# Patient Record
Sex: MALE | Race: WHITE | Employment: OTHER | URBAN - METROPOLITAN AREA
[De-identification: names, ages, dates, MRNs, and addresses within clinical notes are randomized per-mention and may not be internally consistent; named-entity substitution may affect disease eponyms.]

---

## 2017-01-01 PROCEDURE — G9678 ONCOLOGY CARE MODEL SERVICE: HCPCS | Performed by: INTERNAL MEDICINE

## 2017-01-03 ENCOUNTER — APPOINTMENT (OUTPATIENT)
Dept: RADIATION ONCOLOGY | Facility: HOSPITAL | Age: 72
End: 2017-01-03
Attending: RADIOLOGY
Payer: MEDICARE

## 2017-01-04 ENCOUNTER — HOSPITAL ENCOUNTER (OUTPATIENT)
Dept: INTERVENTIONAL RADIOLOGY/VASCULAR | Facility: HOSPITAL | Age: 72
Discharge: HOME | End: 2017-01-04
Attending: INTERNAL MEDICINE | Admitting: INTERNAL MEDICINE
Payer: MEDICARE

## 2017-01-04 DIAGNOSIS — R59.9 LYMPH NODE ENLARGEMENT: ICD-10-CM

## 2017-01-04 PROCEDURE — 88173 CYTOPATH EVAL FNA REPORT: CPT | Performed by: INTERNAL MEDICINE

## 2017-01-04 PROCEDURE — 88172 CYTP DX EVAL FNA 1ST EA SITE: CPT | Performed by: INTERNAL MEDICINE

## 2017-01-04 PROCEDURE — 38505 NEEDLE BIOPSY LYMPH NODES: CPT

## 2017-01-04 PROCEDURE — 10022 HC FINE NEEDLE ASPIR W IMAGING: CPT

## 2017-01-04 PROCEDURE — 88374 M/PHMTRC ALYS ISHQUANT/SEMIQ: CPT

## 2017-01-04 PROCEDURE — 88341 IMHCHEM/IMCYTCHM EA ADD ANTB: CPT | Performed by: INTERNAL MEDICINE

## 2017-01-04 PROCEDURE — 88305 TISSUE EXAM BY PATHOLOGIST: CPT | Performed by: INTERNAL MEDICINE

## 2017-01-04 PROCEDURE — 07923ZX DRAINAGE OF LEFT NECK LYMPHATIC, PERCUTANEOUS APPROACH, DIAGNOSTIC: ICD-10-PCS | Performed by: RADIOLOGY

## 2017-01-04 PROCEDURE — 07B23ZX EXCISION OF LEFT NECK LYMPHATIC, PERCUTANEOUS APPROACH, DIAGNOSTIC: ICD-10-PCS | Performed by: RADIOLOGY

## 2017-01-04 PROCEDURE — 88360 TUMOR IMMUNOHISTOCHEM/MANUAL: CPT

## 2017-01-04 PROCEDURE — 76942 ECHO GUIDE FOR BIOPSY: CPT

## 2017-01-04 PROCEDURE — 88363 XM ARCHIVE TISSUE MOLEC ANAL: CPT | Performed by: INTERNAL MEDICINE

## 2017-01-04 PROCEDURE — 88342 IMHCHEM/IMCYTCHM 1ST ANTB: CPT | Performed by: INTERNAL MEDICINE

## 2017-01-04 PROCEDURE — 81235 EGFR GENE COM VARIANTS: CPT

## 2017-01-04 NOTE — PROCEDURES
[facility]  Procedure Note    [address] [facility] Patient Status:  Outpatient    1945 MRN [id number]   Location [facility] Attending Xavier Moran MD   Hosp Day # 0 PCP Justyna Owens MD     Procedu

## 2017-01-05 ENCOUNTER — TELEPHONE (OUTPATIENT)
Dept: PULMONOLOGY | Facility: CLINIC | Age: 72
End: 2017-01-05

## 2017-01-06 ENCOUNTER — NURSE NAVIGATOR ENCOUNTER (OUTPATIENT)
Dept: HEMATOLOGY/ONCOLOGY | Facility: HOSPITAL | Age: 72
End: 2017-01-06

## 2017-01-06 DIAGNOSIS — C34.90 LUNG CANCER (HCC): Primary | ICD-10-CM

## 2017-01-06 NOTE — PROGRESS NOTES
Introduced myself as Nurse Navigator to Conrado Geller and his wife and explained my role in Luis's care. Role of NN explained as providing:  Guidance to healthcare services to ensure continuity and coordination of care.   Education about your diagnosis, mark

## 2017-01-08 ENCOUNTER — HOSPITAL ENCOUNTER (OUTPATIENT)
Dept: MRI IMAGING | Facility: HOSPITAL | Age: 72
Discharge: HOME | End: 2017-01-08
Attending: INTERNAL MEDICINE
Payer: MEDICARE

## 2017-01-08 ENCOUNTER — PATIENT MESSAGE (OUTPATIENT)
Dept: PULMONOLOGY | Facility: CLINIC | Age: 72
End: 2017-01-08

## 2017-01-08 DIAGNOSIS — C34.90 LUNG CANCER (HCC): ICD-10-CM

## 2017-01-08 PROCEDURE — 70553 MRI BRAIN STEM W/O & W/DYE: CPT

## 2017-01-08 PROCEDURE — A9575 INJ GADOTERATE MEGLUMI 0.1ML: HCPCS | Performed by: INTERNAL MEDICINE

## 2017-01-09 ENCOUNTER — OFFICE VISIT (OUTPATIENT)
Dept: HEMATOLOGY/ONCOLOGY | Facility: HOSPITAL | Age: 72
End: 2017-01-09
Attending: INTERNAL MEDICINE
Payer: MEDICARE

## 2017-01-09 VITALS
HEART RATE: 74 BPM | RESPIRATION RATE: 16 BRPM | DIASTOLIC BLOOD PRESSURE: 88 MMHG | TEMPERATURE: 98 F | HEIGHT: 71 IN | BODY MASS INDEX: 26.04 KG/M2 | SYSTOLIC BLOOD PRESSURE: 144 MMHG | WEIGHT: 186 LBS

## 2017-01-09 DIAGNOSIS — C34.12 CANCER OF UPPER LOBE OF LEFT LUNG (HCC): Primary | ICD-10-CM

## 2017-01-09 PROCEDURE — G0463 HOSPITAL OUTPT CLINIC VISIT: HCPCS | Performed by: INTERNAL MEDICINE

## 2017-01-09 PROCEDURE — 99205 OFFICE O/P NEW HI 60 MIN: CPT | Performed by: INTERNAL MEDICINE

## 2017-01-09 NOTE — CONSULTS
[facility]    PATIENT'S NAME: Keesha Hernandez   CONSULTING PHYSICIAN: Sachin Martinez.  Maegan Ballard MD   PATIENT ACCOUNT #: [de-identified] LOCATION: [address] [place] [address] RECORD #: [id number] YOB: 1945   CONSULTATION DATE: 01/09/2017       CANCER C HISTORY:  Chronic sinusitis, pulmonary nodules, as noted above, hemorrhoids. MEDICATIONS:  None. ALLERGIES:  Dextromethorphan, guaifenesin, ibuprofen (causing itching), phenylephrine (causing itching), pseudoephedrine (causing itching).     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   The patient's performance status is excellent, and we will recommend concurrent chemoradiotherapy using ci

## 2017-01-09 NOTE — TELEPHONE ENCOUNTER
Dr. Sandra Stauffer spoke to pt and family re: bx results, orders MRI of brain and referral to oncology.

## 2017-01-09 NOTE — TELEPHONE ENCOUNTER
From: Natalie Warren  To: Elizabeth Staton MD  Sent: 1/8/2017 10:42 AM CST  Subject: Non-Urgent Medical Question    Update medical procedures:    Fluzone High-Dose 2016-17 0.5ml Syr received 10/6/2016    Colonoscopy done 11/19/2014

## 2017-01-10 ENCOUNTER — TELEPHONE (OUTPATIENT)
Dept: PULMONOLOGY | Facility: CLINIC | Age: 72
End: 2017-01-10

## 2017-01-11 NOTE — TELEPHONE ENCOUNTER
I informed the patient and his wife that the MRI of the brain showed no evidence of metastatic disease.

## 2017-01-12 ENCOUNTER — SOCIAL WORK SERVICES (OUTPATIENT)
Dept: HEMATOLOGY/ONCOLOGY | Facility: HOSPITAL | Age: 72
End: 2017-01-12

## 2017-01-12 ENCOUNTER — NURSE ONLY (OUTPATIENT)
Dept: HEMATOLOGY/ONCOLOGY | Facility: HOSPITAL | Age: 72
End: 2017-01-12
Attending: INTERNAL MEDICINE
Payer: MEDICARE

## 2017-01-12 ENCOUNTER — TELEPHONE (OUTPATIENT)
Dept: HEMATOLOGY/ONCOLOGY | Facility: HOSPITAL | Age: 72
End: 2017-01-12

## 2017-01-12 ENCOUNTER — LAB ENCOUNTER (OUTPATIENT)
Dept: LAB | Facility: HOSPITAL | Age: 72
End: 2017-01-12
Attending: INTERNAL MEDICINE
Payer: MEDICARE

## 2017-01-12 DIAGNOSIS — C34.12 CANCER OF UPPER LOBE OF LEFT LUNG (HCC): Primary | ICD-10-CM

## 2017-01-12 DIAGNOSIS — C34.90 LUNG CANCER (HCC): ICD-10-CM

## 2017-01-12 LAB
ALBUMIN SERPL BCP-MCNC: 4 G/DL (ref 3.5–4.8)
ALBUMIN/GLOB SERPL: 1.5 {RATIO} (ref 1–2)
ALP SERPL-CCNC: 57 U/L (ref 32–100)
ALT SERPL-CCNC: 19 U/L (ref 17–63)
ANION GAP SERPL CALC-SCNC: 8 MMOL/L (ref 0–18)
AST SERPL-CCNC: 24 U/L (ref 15–41)
BASOPHILS # BLD: 0 K/UL (ref 0–0.2)
BASOPHILS NFR BLD: 1 %
BILIRUB SERPL-MCNC: 1.1 MG/DL (ref 0.3–1.2)
BUN SERPL-MCNC: 20 MG/DL (ref 8–20)
BUN/CREAT SERPL: 19.2 (ref 10–20)
CALCIUM SERPL-MCNC: 8.9 MG/DL (ref 8.5–10.5)
CHLORIDE SERPL-SCNC: 101 MMOL/L (ref 95–110)
CO2 SERPL-SCNC: 28 MMOL/L (ref 22–32)
CREAT SERPL-MCNC: 1.04 MG/DL (ref 0.5–1.5)
EOSINOPHIL # BLD: 0.1 K/UL (ref 0–0.7)
EOSINOPHIL NFR BLD: 2 %
ERYTHROCYTE [DISTWIDTH] IN BLOOD BY AUTOMATED COUNT: 13.2 % (ref 11–15)
GLOBULIN PLAS-MCNC: 2.7 G/DL (ref 2.5–3.7)
GLUCOSE SERPL-MCNC: 102 MG/DL (ref 70–99)
HCT VFR BLD AUTO: 44.9 % (ref 41–52)
HGB BLD-MCNC: 15.1 G/DL (ref 13.5–17.5)
LYMPHOCYTES # BLD: 1.4 K/UL (ref 1–4)
LYMPHOCYTES NFR BLD: 30 %
MCH RBC QN AUTO: 33.4 PG (ref 27–32)
MCHC RBC AUTO-ENTMCNC: 33.7 G/DL (ref 32–37)
MCV RBC AUTO: 99 FL (ref 80–100)
MONOCYTES # BLD: 0.5 K/UL (ref 0–1)
MONOCYTES NFR BLD: 11 %
NEUTROPHILS # BLD AUTO: 2.7 K/UL (ref 1.8–7.7)
NEUTROPHILS NFR BLD: 57 %
OSMOLALITY UR CALC.SUM OF ELEC: 287 MOSM/KG (ref 275–295)
PLATELET # BLD AUTO: 199 K/UL (ref 140–400)
PMV BLD AUTO: 10.2 FL (ref 7.4–10.3)
POTASSIUM SERPL-SCNC: 4.3 MMOL/L (ref 3.3–5.1)
PROT SERPL-MCNC: 6.7 G/DL (ref 5.9–8.4)
RBC # BLD AUTO: 4.54 M/UL (ref 4.5–5.9)
SODIUM SERPL-SCNC: 137 MMOL/L (ref 136–144)
WBC # BLD AUTO: 4.7 K/UL (ref 4–11)

## 2017-01-12 PROCEDURE — G0463 HOSPITAL OUTPT CLINIC VISIT: HCPCS | Performed by: INTERNAL MEDICINE

## 2017-01-12 PROCEDURE — 36415 COLL VENOUS BLD VENIPUNCTURE: CPT

## 2017-01-12 PROCEDURE — 80053 COMPREHEN METABOLIC PANEL: CPT

## 2017-01-12 PROCEDURE — 85025 COMPLETE CBC W/AUTO DIFF WBC: CPT

## 2017-01-12 RX ORDER — PROCHLORPERAZINE MALEATE 10 MG
10 TABLET ORAL EVERY 6 HOURS PRN
Qty: 30 TABLET | Refills: 3 | Status: SHIPPED | OUTPATIENT
Start: 2017-01-12 | End: 2017-04-20

## 2017-01-12 RX ORDER — DEXAMETHASONE 4 MG/1
8 TABLET ORAL DAILY
Qty: 6 TABLET | Refills: 3 | Status: SHIPPED | OUTPATIENT
Start: 2017-01-12 | End: 2017-01-13

## 2017-01-12 RX ORDER — OLANZAPINE 5 MG/1
5 TABLET ORAL NIGHTLY
Qty: 16 TABLET | Refills: 0 | Status: SHIPPED | OUTPATIENT
Start: 2017-01-12 | End: 2017-10-20

## 2017-01-12 RX ORDER — ONDANSETRON HYDROCHLORIDE 8 MG/1
8 TABLET, FILM COATED ORAL EVERY 8 HOURS PRN
Qty: 30 TABLET | Refills: 3 | Status: SHIPPED | OUTPATIENT
Start: 2017-01-12 | End: 2017-10-20

## 2017-01-12 NOTE — TELEPHONE ENCOUNTER
Returned call to [facility], they state label directions on decadron need to be re-entered. They said they will be cancelling the current decadron order and asked for MD to re-order it with clarification on label directions.

## 2017-01-12 NOTE — PROGRESS NOTES
Chemotherapy Education    Learner:  Patient, Spouse and Family Member    Barriers / Limitations:  None    Chemotherapy education goals:  · Learn the drug names  · Administration schedule  · Routes of administration  · Treatment setting: if treatment is rec shortness of breath, extremity swelling, palpitations or dizziness develops: Achieved    Comments:    Treatment Effects on Neurological System:    Potential numbness / tingling in hands or feet:  Achieved    Notify MD/RN of any numbness / tingling at next detail    Outcome:  Needs reinforcement     Comments:  Verbalize understanding but may need reinforcement of key concepts along the way. Patient encouraged to call for additional questions.        Patient Education Acknowledgement:  Patient states Yina

## 2017-01-12 NOTE — PROGRESS NOTES
2017  Luis Villasenor  :  1945  [facility]      Met with Luis and family member: spouse and son and presented the patient with the approximate cost of chemotherapy drugs ordered by the oncologist.  The patient wa

## 2017-01-13 ENCOUNTER — TELEPHONE (OUTPATIENT)
Dept: HEMATOLOGY/ONCOLOGY | Facility: HOSPITAL | Age: 72
End: 2017-01-13

## 2017-01-13 ENCOUNTER — OFFICE VISIT (OUTPATIENT)
Dept: RADIATION ONCOLOGY | Facility: HOSPITAL | Age: 72
End: 2017-01-13
Attending: RADIOLOGY
Payer: MEDICARE

## 2017-01-13 VITALS
DIASTOLIC BLOOD PRESSURE: 67 MMHG | HEIGHT: 71 IN | RESPIRATION RATE: 16 BRPM | WEIGHT: 186 LBS | SYSTOLIC BLOOD PRESSURE: 134 MMHG | BODY MASS INDEX: 26.04 KG/M2 | HEART RATE: 68 BPM

## 2017-01-13 DIAGNOSIS — C34.12 CANCER OF UPPER LOBE OF LEFT LUNG (HCC): Primary | ICD-10-CM

## 2017-01-13 PROCEDURE — 99212 OFFICE O/P EST SF 10 MIN: CPT

## 2017-01-13 RX ORDER — OLANZAPINE 5 MG/1
TABLET ORAL
Qty: 90 TABLET | Refills: 0 | OUTPATIENT
Start: 2017-01-13

## 2017-01-13 NOTE — PROGRESS NOTES
Nursing Consultation Note  Patient: Faustino Calderon  YOB: 1945  Age: 66year old  Radiation Oncologist: Dr. Evelyn Armstrong  Referring Physician: No ref. provider found  Diagnosis:No diagnosis found.   Consult Date: 1/13/2017      Chemot tablet Rfl: 3   Ondansetron HCl (ZOFRAN) 8 MG tablet Take 1 tablet (8 mg total) by mouth every 8 (eight) hours as needed for Nausea. Disp: 30 tablet Rfl: 3   dexamethasone (DECADRON) 4 MG tablet Take 2 tablets (8 mg total) by mouth daily.  Take two tablets Patient HAS advnaced directives and a copy has been requested.   Transportation:  Adequate transportation available for expected visits    Pain Assessment:  Describe in your own words what your pain feels like:  none  Present level of pain:  0      Pt seen

## 2017-01-13 NOTE — TELEPHONE ENCOUNTER
Patient's wife has a question for [facility] about genetic testing. Please call Brianna Horn at her mobile phone #[phone].  AS.

## 2017-01-13 NOTE — PROGRESS NOTES
Fatigue Plan Of Care:    Problem:  Fatigue    Problems related to:    Side effect of therapy    Interventions:  Discuss methods to balance rest and activity  Promote excercise within patient's capability  Encourage adequate nutrition and hydration    Expec

## 2017-01-13 NOTE — PROGRESS NOTES
Knowledge Deficit Plan Of Care:    Problem:  Knowledge Deficit    Problems related to:    Radiation therapy    Interventions:  Assess patient knowledge level  Assess knowledge needs  Instruct on treatment planning  Instruct on radiation therapy appointment

## 2017-01-13 NOTE — TELEPHONE ENCOUNTER
Returned Aster's call, I let her know that Dr Max Ha does not recommend genetic testing for patient based on his daughter's MTHFR mutation.   I also then spoke to [place] to inform them of clarification on dexamethasone script: Per Dr Max Ha patient should ta

## 2017-01-13 NOTE — PROGRESS NOTES
Primary language:  English  Language line required?  no  Comprehension Ability:  good  Able to read?  yes  Able to write? yes  Communication tools:  na  Patient's ability to learn:  good  Readiness to learn:   Motivated  Learning preferences:  Discussion,

## 2017-01-13 NOTE — CONSULTS
[facility]    PATIENT'S NAME: Teodora Alba   RADIATION ONCOLOGIST: Micheal Mcmanus.  Chloé Ambriz MD   PATIENT ACCOUNT #: [de-identified] LOCATION: [address] [place] [address] RECORD #: [id number] YOB: 1945   CONSULTATION DATE: 01/13/2017       NHAN He completed his staging with an MRI of the brain on 01/08/2017, and this revealed no evidence of metastases.   The patient then saw Dr. Kirsten Rodas who recommended concomitant chemoradiotherapy for his stage IIIB lung cancer and is planning to deliver cisplatin a and rhythm. Normal S1, S2. No audible murmurs. LYMPHATICS:  There is no supraclavicular, axillary, or inguinal lymphadenopathy. ABDOMEN:  Soft, nontender, nondistended with normoactive bowel sounds and no hepatosplenomegaly.   EXTREMITIES:  Without cl months. I do not expect any long-term or permanent side effects as a result of his treatment. Because I am not likely to irradiate a very large amount of his lung, his risk for radiation pneumonitis should be fairly small.   Still, the risk is real and we

## 2017-01-16 ENCOUNTER — TELEPHONE (OUTPATIENT)
Dept: HEMATOLOGY/ONCOLOGY | Facility: HOSPITAL | Age: 72
End: 2017-01-16

## 2017-01-16 NOTE — TELEPHONE ENCOUNTER
Returned Alonso's call, she had some general questions about the chemo schedule which I answered for her. She has additional questions for Dr Kirsten Rodas regarding genetic testing and specific supplements her dad should take.   I let her know that I would update

## 2017-01-16 NOTE — TELEPHONE ENCOUNTER
PLEASE CALL PT'S DAUGHTER DUNG -[phone] REGARDING QUESTIONS ABOUT PT'S TREATMENTS AND PLAN OF TREATMENT-V

## 2017-01-17 ENCOUNTER — NURSE ONLY (OUTPATIENT)
Dept: HEMATOLOGY/ONCOLOGY | Facility: HOSPITAL | Age: 72
End: 2017-01-17

## 2017-01-17 ENCOUNTER — TELEPHONE (OUTPATIENT)
Dept: HEMATOLOGY/ONCOLOGY | Facility: HOSPITAL | Age: 72
End: 2017-01-17

## 2017-01-17 PROCEDURE — 77399 UNLISTED PX MED RADJ PHYSICS: CPT | Performed by: RADIOLOGY

## 2017-01-17 PROCEDURE — 77332 RADIATION TREATMENT AID(S): CPT | Performed by: RADIOLOGY

## 2017-01-17 PROCEDURE — 77334 RADIATION TREATMENT AID(S): CPT | Performed by: RADIOLOGY

## 2017-01-17 RX ORDER — ASCORBIC ACID 500 MG
500 TABLET ORAL DAILY
COMMUNITY

## 2017-01-17 RX ORDER — MELATONIN
1000 DAILY
COMMUNITY
End: 2020-08-12 | Stop reason: ALTCHOICE

## 2017-01-17 NOTE — TELEPHONE ENCOUNTER
Anthony Manzo said she missed a call from here and she was thinking it could have been Laneia since she was going to get back with her about the testing that they wanted but it not recommending. She is looking for the reasoning. Please call when possible.  Thanks

## 2017-01-19 PROCEDURE — 77338 DESIGN MLC DEVICE FOR IMRT: CPT | Performed by: RADIOLOGY

## 2017-01-19 PROCEDURE — 77293 RESPIRATOR MOTION MGMT SIMUL: CPT | Performed by: RADIOLOGY

## 2017-01-19 PROCEDURE — 77301 RADIOTHERAPY DOSE PLAN IMRT: CPT | Performed by: RADIOLOGY

## 2017-01-19 PROCEDURE — 77300 RADIATION THERAPY DOSE PLAN: CPT | Performed by: RADIOLOGY

## 2017-01-20 ENCOUNTER — NURSE ONLY (OUTPATIENT)
Dept: HEMATOLOGY/ONCOLOGY | Facility: HOSPITAL | Age: 72
End: 2017-01-20
Attending: INTERNAL MEDICINE
Payer: MEDICARE

## 2017-01-20 DIAGNOSIS — C34.12 CANCER OF UPPER LOBE OF LEFT LUNG (HCC): Primary | ICD-10-CM

## 2017-01-20 LAB
ALBUMIN SERPL BCP-MCNC: 4 G/DL (ref 3.5–4.8)
ALBUMIN/GLOB SERPL: 1.3 {RATIO} (ref 1–2)
ALP SERPL-CCNC: 59 U/L (ref 32–100)
ALT SERPL-CCNC: 19 U/L (ref 17–63)
ANION GAP SERPL CALC-SCNC: 7 MMOL/L (ref 0–18)
AST SERPL-CCNC: 22 U/L (ref 15–41)
BASOPHILS # BLD: 0 K/UL (ref 0–0.2)
BASOPHILS NFR BLD: 1 %
BILIRUB SERPL-MCNC: 0.9 MG/DL (ref 0.3–1.2)
BUN SERPL-MCNC: 14 MG/DL (ref 8–20)
BUN/CREAT SERPL: 12.8 (ref 10–20)
CALCIUM SERPL-MCNC: 9.2 MG/DL (ref 8.5–10.5)
CHLORIDE SERPL-SCNC: 101 MMOL/L (ref 95–110)
CO2 SERPL-SCNC: 31 MMOL/L (ref 22–32)
CREAT SERPL-MCNC: 1.09 MG/DL (ref 0.5–1.5)
EOSINOPHIL # BLD: 0.1 K/UL (ref 0–0.7)
EOSINOPHIL NFR BLD: 2 %
ERYTHROCYTE [DISTWIDTH] IN BLOOD BY AUTOMATED COUNT: 12.9 % (ref 11–15)
GLOBULIN PLAS-MCNC: 3.2 G/DL (ref 2.5–3.7)
GLUCOSE SERPL-MCNC: 107 MG/DL (ref 70–99)
HCT VFR BLD AUTO: 44.4 % (ref 41–52)
HGB BLD-MCNC: 15 G/DL (ref 13.5–17.5)
LYMPHOCYTES # BLD: 1.2 K/UL (ref 1–4)
LYMPHOCYTES NFR BLD: 27 %
MCH RBC QN AUTO: 33.2 PG (ref 27–32)
MCHC RBC AUTO-ENTMCNC: 33.9 G/DL (ref 32–37)
MCV RBC AUTO: 97.9 FL (ref 80–100)
MONOCYTES # BLD: 0.5 K/UL (ref 0–1)
MONOCYTES NFR BLD: 11 %
NEUTROPHILS # BLD AUTO: 2.6 K/UL (ref 1.8–7.7)
NEUTROPHILS NFR BLD: 59 %
OSMOLALITY UR CALC.SUM OF ELEC: 289 MOSM/KG (ref 275–295)
PLATELET # BLD AUTO: 201 K/UL (ref 140–400)
PMV BLD AUTO: 9.6 FL (ref 7.4–10.3)
POTASSIUM SERPL-SCNC: 4.3 MMOL/L (ref 3.3–5.1)
PROT SERPL-MCNC: 7.2 G/DL (ref 5.9–8.4)
RBC # BLD AUTO: 4.54 M/UL (ref 4.5–5.9)
SODIUM SERPL-SCNC: 139 MMOL/L (ref 136–144)
WBC # BLD AUTO: 4.4 K/UL (ref 4–11)

## 2017-01-20 PROCEDURE — 36415 COLL VENOUS BLD VENIPUNCTURE: CPT

## 2017-01-20 PROCEDURE — 80053 COMPREHEN METABOLIC PANEL: CPT

## 2017-01-20 PROCEDURE — 85025 COMPLETE CBC W/AUTO DIFF WBC: CPT

## 2017-01-20 NOTE — PROGRESS NOTES
Patient here for lab draw. On 1 attempt #23g butterfly inserted right ac.  Cbc,cmp obtained and sent to lab. Gauze and colban to site. Patient to start cycle 1 Monday.   Instructed will be a long day, encouraged to eat breakfast and take morning medicati

## 2017-01-23 ENCOUNTER — OFFICE VISIT (OUTPATIENT)
Dept: HEMATOLOGY/ONCOLOGY | Facility: HOSPITAL | Age: 72
End: 2017-01-23
Attending: INTERNAL MEDICINE
Payer: MEDICARE

## 2017-01-23 VITALS
SYSTOLIC BLOOD PRESSURE: 151 MMHG | BODY MASS INDEX: 26 KG/M2 | HEART RATE: 72 BPM | TEMPERATURE: 98 F | DIASTOLIC BLOOD PRESSURE: 73 MMHG | RESPIRATION RATE: 16 BRPM | WEIGHT: 185.25 LBS

## 2017-01-23 DIAGNOSIS — C34.12 CANCER OF UPPER LOBE OF LEFT LUNG (HCC): Primary | ICD-10-CM

## 2017-01-23 PROCEDURE — 96361 HYDRATE IV INFUSION ADD-ON: CPT

## 2017-01-23 PROCEDURE — 96367 TX/PROPH/DG ADDL SEQ IV INF: CPT

## 2017-01-23 PROCEDURE — 77280 THER RAD SIMULAJ FIELD SMPL: CPT | Performed by: RADIOLOGY

## 2017-01-23 PROCEDURE — 77386 HC IMRT COMPLEX: CPT | Performed by: RADIOLOGY

## 2017-01-23 PROCEDURE — 96417 CHEMO IV INFUS EACH ADDL SEQ: CPT

## 2017-01-23 PROCEDURE — 96375 TX/PRO/DX INJ NEW DRUG ADDON: CPT

## 2017-01-23 PROCEDURE — 96413 CHEMO IV INFUSION 1 HR: CPT

## 2017-01-23 PROCEDURE — 96366 THER/PROPH/DIAG IV INF ADDON: CPT

## 2017-01-23 RX ORDER — SODIUM CHLORIDE 0.9 % (FLUSH) 0.9 %
SYRINGE (ML) INJECTION
Status: DISCONTINUED
Start: 2017-01-23 | End: 2017-01-23

## 2017-01-23 RX ORDER — SODIUM CHLORIDE 9 MG/ML
1000 INJECTION, SOLUTION INTRAVENOUS ONCE
Status: COMPLETED | OUTPATIENT
Start: 2017-01-23 | End: 2017-01-23

## 2017-01-23 RX ORDER — SODIUM CHLORIDE 9 MG/ML
INJECTION, SOLUTION INTRAVENOUS
Status: DISCONTINUED
Start: 2017-01-23 | End: 2017-01-23

## 2017-01-23 RX ADMIN — SODIUM CHLORIDE 1000 ML: 9 INJECTION, SOLUTION INTRAVENOUS at 12:41:00

## 2017-01-23 NOTE — PROGRESS NOTES
Patient here for Cycle 1 Day 1 of Cisplatin and Etoposide for Adenocarcinoma of left upper lung. Patient is accompanied by wife Michell Conley. Patient will start concurrent RT today. Allergies reviewed and medication list reviewed with patient.   IV started in

## 2017-01-23 NOTE — PATIENT INSTRUCTIONS
Push fluids  Take compazine for nausea if needed.   If nausea not relieved take Zofran  If no BM in 24 hours you can take Senekot, take 2 at bedtime  If you have temperature 100.4 or more do not take tylenol, go to the ER  Any signs of infection call Dr Lyman

## 2017-01-24 ENCOUNTER — OFFICE VISIT (OUTPATIENT)
Dept: HEMATOLOGY/ONCOLOGY | Facility: HOSPITAL | Age: 72
End: 2017-01-24
Attending: INTERNAL MEDICINE
Payer: MEDICARE

## 2017-01-24 ENCOUNTER — OFFICE VISIT (OUTPATIENT)
Dept: RADIATION ONCOLOGY | Facility: HOSPITAL | Age: 72
End: 2017-01-24
Attending: RADIOLOGY
Payer: MEDICARE

## 2017-01-24 VITALS
HEART RATE: 61 BPM | RESPIRATION RATE: 16 BRPM | SYSTOLIC BLOOD PRESSURE: 150 MMHG | BODY MASS INDEX: 26 KG/M2 | TEMPERATURE: 98 F | WEIGHT: 187.81 LBS | OXYGEN SATURATION: 96 % | DIASTOLIC BLOOD PRESSURE: 74 MMHG

## 2017-01-24 DIAGNOSIS — C34.12 CANCER OF UPPER LOBE OF LEFT LUNG (HCC): Primary | ICD-10-CM

## 2017-01-24 PROCEDURE — 96367 TX/PROPH/DG ADDL SEQ IV INF: CPT

## 2017-01-24 PROCEDURE — 77386 HC IMRT COMPLEX: CPT | Performed by: RADIOLOGY

## 2017-01-24 PROCEDURE — 96413 CHEMO IV INFUSION 1 HR: CPT

## 2017-01-24 RX ORDER — SODIUM CHLORIDE 0.9 % (FLUSH) 0.9 %
SYRINGE (ML) INJECTION
Status: DISCONTINUED
Start: 2017-01-24 | End: 2017-01-24

## 2017-01-24 RX ORDER — SODIUM CHLORIDE 9 MG/ML
INJECTION, SOLUTION INTRAVENOUS
Status: DISCONTINUED
Start: 2017-01-24 | End: 2017-01-24

## 2017-01-24 NOTE — PROGRESS NOTES
Patient here for Cycle 2 day 2 Etoposide. Patient denies nausea,vomiting, constipation. Patient reports good appeitie. Saline lock patent to left forearm, good blood return obtained. Flushed with 10 mlns. IV infiltrated after antiemetic started.   Iv r

## 2017-01-24 NOTE — PROGRESS NOTES
[facility] Radiation Treatment Management Note 1-5    Patient:  Lianna Kilpatrick  Age:  66year old  Visit Diagnosis:    1.  Cancer of upper lobe of left lung [facility])      Primary Rad/Onc:  Dr. Jeevan Mackay      Site Delivered Dose (Gy)

## 2017-01-25 ENCOUNTER — OFFICE VISIT (OUTPATIENT)
Dept: HEMATOLOGY/ONCOLOGY | Facility: HOSPITAL | Age: 72
End: 2017-01-25
Attending: INTERNAL MEDICINE
Payer: MEDICARE

## 2017-01-25 VITALS
DIASTOLIC BLOOD PRESSURE: 65 MMHG | TEMPERATURE: 98 F | RESPIRATION RATE: 16 BRPM | SYSTOLIC BLOOD PRESSURE: 133 MMHG | HEART RATE: 70 BPM

## 2017-01-25 DIAGNOSIS — C34.12 CANCER OF UPPER LOBE OF LEFT LUNG (HCC): Primary | ICD-10-CM

## 2017-01-25 PROCEDURE — 77386 HC IMRT COMPLEX: CPT | Performed by: RADIOLOGY

## 2017-01-25 PROCEDURE — 96413 CHEMO IV INFUSION 1 HR: CPT

## 2017-01-25 PROCEDURE — 96367 TX/PROPH/DG ADDL SEQ IV INF: CPT

## 2017-01-25 RX ORDER — SODIUM CHLORIDE 9 MG/ML
INJECTION, SOLUTION INTRAVENOUS
Status: DISCONTINUED
Start: 2017-01-25 | End: 2017-01-25

## 2017-01-25 RX ORDER — SODIUM CHLORIDE 0.9 % (FLUSH) 0.9 %
SYRINGE (ML) INJECTION
Status: DISCONTINUED
Start: 2017-01-25 | End: 2017-01-25

## 2017-01-25 NOTE — PROGRESS NOTES
Pt to infusion for C1 D3 Etoposide. Pt arrived stable and ambulatory with wife. Reports feeling well overall, offers no complaints. States that energy level is good at this time, denies fatigue.  Denies any issues with nausea, vomiting, diarrhea or constipa

## 2017-01-26 ENCOUNTER — OFFICE VISIT (OUTPATIENT)
Dept: HEMATOLOGY/ONCOLOGY | Facility: HOSPITAL | Age: 72
End: 2017-01-26
Attending: INTERNAL MEDICINE
Payer: MEDICARE

## 2017-01-26 VITALS
TEMPERATURE: 99 F | HEART RATE: 82 BPM | RESPIRATION RATE: 16 BRPM | SYSTOLIC BLOOD PRESSURE: 116 MMHG | DIASTOLIC BLOOD PRESSURE: 70 MMHG | BODY MASS INDEX: 26 KG/M2 | WEIGHT: 186 LBS

## 2017-01-26 DIAGNOSIS — C34.12 CANCER OF UPPER LOBE OF LEFT LUNG (HCC): Primary | ICD-10-CM

## 2017-01-26 PROCEDURE — 96366 THER/PROPH/DIAG IV INF ADDON: CPT

## 2017-01-26 PROCEDURE — 96367 TX/PROPH/DG ADDL SEQ IV INF: CPT

## 2017-01-26 PROCEDURE — 77386 HC IMRT COMPLEX: CPT | Performed by: RADIOLOGY

## 2017-01-26 PROCEDURE — 96413 CHEMO IV INFUSION 1 HR: CPT

## 2017-01-26 RX ORDER — SODIUM CHLORIDE 0.9 % (FLUSH) 0.9 %
SYRINGE (ML) INJECTION
Status: DISCONTINUED
Start: 2017-01-26 | End: 2017-01-26

## 2017-01-26 RX ORDER — SODIUM CHLORIDE 9 MG/ML
INJECTION, SOLUTION INTRAVENOUS
Status: DISCONTINUED
Start: 2017-01-26 | End: 2017-01-26

## 2017-01-26 NOTE — PROGRESS NOTES
Pt arrived to infusion for C1D4 Etoposide. PIV remained in right FA from infusion yesterday. IV has a positive blood return. Pt states he is feeling good and tolerating treatment. Complaints of occasional shortness of breath with activity.      Premcurtis g

## 2017-01-27 ENCOUNTER — OFFICE VISIT (OUTPATIENT)
Dept: HEMATOLOGY/ONCOLOGY | Facility: HOSPITAL | Age: 72
End: 2017-01-27
Attending: INTERNAL MEDICINE
Payer: MEDICARE

## 2017-01-27 VITALS
TEMPERATURE: 98 F | SYSTOLIC BLOOD PRESSURE: 138 MMHG | RESPIRATION RATE: 16 BRPM | DIASTOLIC BLOOD PRESSURE: 72 MMHG | HEART RATE: 83 BPM

## 2017-01-27 DIAGNOSIS — C34.12 CANCER OF UPPER LOBE OF LEFT LUNG (HCC): Primary | ICD-10-CM

## 2017-01-27 PROCEDURE — 96413 CHEMO IV INFUSION 1 HR: CPT

## 2017-01-27 PROCEDURE — 96375 TX/PRO/DX INJ NEW DRUG ADDON: CPT

## 2017-01-27 PROCEDURE — 77386 HC IMRT COMPLEX: CPT | Performed by: RADIOLOGY

## 2017-01-27 PROCEDURE — 77336 RADIATION PHYSICS CONSULT: CPT | Performed by: RADIOLOGY

## 2017-01-27 RX ORDER — SODIUM CHLORIDE 9 MG/ML
INJECTION, SOLUTION INTRAVENOUS
Status: DISCONTINUED
Start: 2017-01-27 | End: 2017-01-27

## 2017-01-27 RX ORDER — SODIUM CHLORIDE 0.9 % (FLUSH) 0.9 %
SYRINGE (ML) INJECTION
Status: DISCONTINUED
Start: 2017-01-27 | End: 2017-01-27

## 2017-01-27 NOTE — PROGRESS NOTES
Patient to infusion for Cycle 1 Day 5 Etoposide  Patient arrived ambulatory with spouse  Patient reports feeling well at this time  denies any health changes , no concerns voiced.   denies any N/V/D appetite and fluid intake are good  No reports of fatigue/

## 2017-01-30 ENCOUNTER — OFFICE VISIT (OUTPATIENT)
Dept: HEMATOLOGY/ONCOLOGY | Facility: HOSPITAL | Age: 72
End: 2017-01-30
Attending: INTERNAL MEDICINE
Payer: MEDICARE

## 2017-01-30 VITALS
DIASTOLIC BLOOD PRESSURE: 72 MMHG | BODY MASS INDEX: 26 KG/M2 | RESPIRATION RATE: 16 BRPM | WEIGHT: 183.38 LBS | HEART RATE: 67 BPM | TEMPERATURE: 98 F | SYSTOLIC BLOOD PRESSURE: 136 MMHG

## 2017-01-30 DIAGNOSIS — C34.12 CANCER OF UPPER LOBE OF LEFT LUNG (HCC): Primary | ICD-10-CM

## 2017-01-30 LAB
BASOPHILS # BLD: 0 K/UL (ref 0–0.2)
BASOPHILS NFR BLD: 1 %
EOSINOPHIL # BLD: 0.1 K/UL (ref 0–0.7)
EOSINOPHIL NFR BLD: 1 %
ERYTHROCYTE [DISTWIDTH] IN BLOOD BY AUTOMATED COUNT: 12.3 % (ref 11–15)
HCT VFR BLD AUTO: 40.8 % (ref 41–52)
HGB BLD-MCNC: 13.8 G/DL (ref 13.5–17.5)
LYMPHOCYTES # BLD: 1 K/UL (ref 1–4)
LYMPHOCYTES NFR BLD: 17 %
MCH RBC QN AUTO: 32.9 PG (ref 27–32)
MCHC RBC AUTO-ENTMCNC: 33.8 G/DL (ref 32–37)
MCV RBC AUTO: 97.3 FL (ref 80–100)
MONOCYTES # BLD: 0 K/UL (ref 0–1)
MONOCYTES NFR BLD: 1 %
NEUTROPHILS # BLD AUTO: 4.6 K/UL (ref 1.8–7.7)
NEUTROPHILS NFR BLD: 80 %
PLATELET # BLD AUTO: 130 K/UL (ref 140–400)
PMV BLD AUTO: 9.7 FL (ref 7.4–10.3)
RBC # BLD AUTO: 4.19 M/UL (ref 4.5–5.9)
WBC # BLD AUTO: 5.7 K/UL (ref 4–11)

## 2017-01-30 PROCEDURE — 96375 TX/PRO/DX INJ NEW DRUG ADDON: CPT

## 2017-01-30 PROCEDURE — 96361 HYDRATE IV INFUSION ADD-ON: CPT

## 2017-01-30 PROCEDURE — 85025 COMPLETE CBC W/AUTO DIFF WBC: CPT

## 2017-01-30 PROCEDURE — 96413 CHEMO IV INFUSION 1 HR: CPT

## 2017-01-30 PROCEDURE — 77386 HC IMRT COMPLEX: CPT | Performed by: RADIOLOGY

## 2017-01-30 PROCEDURE — 96366 THER/PROPH/DIAG IV INF ADDON: CPT

## 2017-01-30 PROCEDURE — 96367 TX/PROPH/DG ADDL SEQ IV INF: CPT

## 2017-01-30 RX ORDER — HEPARIN SODIUM (PORCINE) LOCK FLUSH IV SOLN 100 UNIT/ML 100 UNIT/ML
SOLUTION INTRAVENOUS
Status: DISCONTINUED
Start: 2017-01-30 | End: 2017-01-30 | Stop reason: WASHOUT

## 2017-01-30 RX ORDER — 0.9 % SODIUM CHLORIDE 0.9 %
VIAL (ML) INJECTION
Status: DISCONTINUED
Start: 2017-01-30 | End: 2017-01-30 | Stop reason: WASHOUT

## 2017-01-30 RX ORDER — SODIUM CHLORIDE 9 MG/ML
1000 INJECTION, SOLUTION INTRAVENOUS ONCE
Status: COMPLETED | OUTPATIENT
Start: 2017-01-30 | End: 2017-01-30

## 2017-01-30 RX ORDER — SODIUM CHLORIDE 0.9 % (FLUSH) 0.9 %
SYRINGE (ML) INJECTION
Status: DISCONTINUED
Start: 2017-01-30 | End: 2017-01-30

## 2017-01-30 RX ORDER — SODIUM CHLORIDE 9 MG/ML
INJECTION, SOLUTION INTRAVENOUS
Status: DISCONTINUED
Start: 2017-01-30 | End: 2017-01-30

## 2017-01-30 RX ADMIN — SODIUM CHLORIDE 1000 ML: 9 INJECTION, SOLUTION INTRAVENOUS at 11:46:00

## 2017-01-30 NOTE — PROGRESS NOTES
Patient to Infusion for Cycle 1 Day 8 of Cisplatin. Patient states he is feeling good, denies fatigue, good appetite, denies nausea. Patient denies constipation or diarrhea.   Patient had lab earlier this morning, saline lock noted to right upper forearm,

## 2017-01-30 NOTE — PROGRESS NOTES
Patient to [facility] for fast track labs  Patient arrived ambulatory and without complaints  Labs drawn via PIV to Rt FA. PIV saline locked and wrapped with coban  Discharged to waiting area.  chemo pending lab results

## 2017-01-31 ENCOUNTER — OFFICE VISIT (OUTPATIENT)
Dept: RADIATION ONCOLOGY | Facility: HOSPITAL | Age: 72
End: 2017-01-31
Attending: RADIOLOGY
Payer: MEDICARE

## 2017-01-31 VITALS
RESPIRATION RATE: 16 BRPM | WEIGHT: 189 LBS | HEART RATE: 71 BPM | BODY MASS INDEX: 26.46 KG/M2 | HEIGHT: 71 IN | SYSTOLIC BLOOD PRESSURE: 135 MMHG | TEMPERATURE: 97 F | DIASTOLIC BLOOD PRESSURE: 58 MMHG

## 2017-01-31 DIAGNOSIS — C34.12 CANCER OF UPPER LOBE OF LEFT LUNG (HCC): Primary | ICD-10-CM

## 2017-01-31 PROCEDURE — 77386 HC IMRT COMPLEX: CPT | Performed by: RADIOLOGY

## 2017-01-31 NOTE — PROGRESS NOTES
[facility] Radiation Treatment Management Note 6-10    Patient:  Teri Eden  Age:  66year old  Visit Diagnosis:    1.  Cancer of upper lobe of left lung [facility])      Primary Rad/Onc:  Dr. Gordo Morin      Site Delivered Dose (Gy

## 2017-02-01 ENCOUNTER — APPOINTMENT (OUTPATIENT)
Dept: RADIATION ONCOLOGY | Facility: HOSPITAL | Age: 72
End: 2017-02-01
Attending: RADIOLOGY
Payer: MEDICARE

## 2017-02-01 PROCEDURE — G9678 ONCOLOGY CARE MODEL SERVICE: HCPCS | Performed by: INTERNAL MEDICINE

## 2017-02-01 PROCEDURE — 77386 HC IMRT COMPLEX: CPT | Performed by: RADIOLOGY

## 2017-02-02 PROCEDURE — 77386 HC IMRT COMPLEX: CPT | Performed by: RADIOLOGY

## 2017-02-03 ENCOUNTER — TELEPHONE (OUTPATIENT)
Dept: HEMATOLOGY/ONCOLOGY | Facility: HOSPITAL | Age: 72
End: 2017-02-03

## 2017-02-03 PROCEDURE — 77336 RADIATION PHYSICS CONSULT: CPT | Performed by: RADIOLOGY

## 2017-02-03 PROCEDURE — 77386 HC IMRT COMPLEX: CPT | Performed by: RADIOLOGY

## 2017-02-03 NOTE — TELEPHONE ENCOUNTER
Returned call to dtr, she was concerned about a cold and see her father, discussed good handwashing and avoiding exposing him to infections. Answered any questions she had, She thanked me for calling back and answering her questions.

## 2017-02-03 NOTE — TELEPHONE ENCOUNTER
Daughter Fidencio Moon plans to come in and visit with her father but has a cold and is not sure if she should come in. steve

## 2017-02-06 PROCEDURE — 77386 HC IMRT COMPLEX: CPT | Performed by: RADIOLOGY

## 2017-02-07 ENCOUNTER — OFFICE VISIT (OUTPATIENT)
Dept: RADIATION ONCOLOGY | Facility: HOSPITAL | Age: 72
End: 2017-02-07
Attending: RADIOLOGY
Payer: MEDICARE

## 2017-02-07 VITALS
TEMPERATURE: 99 F | HEART RATE: 70 BPM | DIASTOLIC BLOOD PRESSURE: 68 MMHG | SYSTOLIC BLOOD PRESSURE: 163 MMHG | WEIGHT: 184.38 LBS | BODY MASS INDEX: 26 KG/M2 | OXYGEN SATURATION: 97 % | RESPIRATION RATE: 16 BRPM

## 2017-02-07 PROCEDURE — 77386 HC IMRT COMPLEX: CPT | Performed by: RADIOLOGY

## 2017-02-07 NOTE — PROGRESS NOTES
[facility] Radiation Treatment Management Note 11-15    Patient:  Alfreda Carpenter  Age:  66year old  Visit Diagnosis:  No diagnosis found.   Primary Rad/Onc:  Dr. Linda Auguste      Site Delivered Dose (Gy) Prescribed Dose (Gy) Zondra Hammans

## 2017-02-08 PROCEDURE — 77386 HC IMRT COMPLEX: CPT | Performed by: RADIOLOGY

## 2017-02-09 PROCEDURE — 77386 HC IMRT COMPLEX: CPT | Performed by: RADIOLOGY

## 2017-02-10 PROCEDURE — 77386 HC IMRT COMPLEX: CPT | Performed by: RADIOLOGY

## 2017-02-10 PROCEDURE — 77336 RADIATION PHYSICS CONSULT: CPT | Performed by: RADIOLOGY

## 2017-02-13 PROCEDURE — 77386 HC IMRT COMPLEX: CPT | Performed by: RADIOLOGY

## 2017-02-14 ENCOUNTER — OFFICE VISIT (OUTPATIENT)
Dept: RADIATION ONCOLOGY | Facility: HOSPITAL | Age: 72
End: 2017-02-14
Attending: RADIOLOGY
Payer: MEDICARE

## 2017-02-14 VITALS
DIASTOLIC BLOOD PRESSURE: 55 MMHG | BODY MASS INDEX: 26.29 KG/M2 | WEIGHT: 187.81 LBS | RESPIRATION RATE: 16 BRPM | SYSTOLIC BLOOD PRESSURE: 123 MMHG | HEIGHT: 71 IN | HEART RATE: 67 BPM | TEMPERATURE: 98 F

## 2017-02-14 DIAGNOSIS — C34.12 CANCER OF UPPER LOBE OF LEFT LUNG (HCC): Primary | ICD-10-CM

## 2017-02-14 PROCEDURE — 77386 HC IMRT COMPLEX: CPT | Performed by: RADIOLOGY

## 2017-02-14 NOTE — PROGRESS NOTES
[facility] Radiation Treatment Management Note 15-20    Patient:  Jose Narayan  Age:  66year old  Visit Diagnosis:    1.  Cancer of upper lobe of left lung [facility])      Primary Rad/Onc:  Dr. Ariane Smith      Site Delivered Dose (G

## 2017-02-15 PROCEDURE — 77386 HC IMRT COMPLEX: CPT | Performed by: RADIOLOGY

## 2017-02-16 PROCEDURE — 77386 HC IMRT COMPLEX: CPT | Performed by: RADIOLOGY

## 2017-02-17 ENCOUNTER — NURSE ONLY (OUTPATIENT)
Dept: HEMATOLOGY/ONCOLOGY | Facility: HOSPITAL | Age: 72
End: 2017-02-17
Attending: INTERNAL MEDICINE
Payer: MEDICARE

## 2017-02-17 ENCOUNTER — APPOINTMENT (OUTPATIENT)
Dept: LAB | Facility: HOSPITAL | Age: 72
End: 2017-02-17
Attending: INTERNAL MEDICINE
Payer: MEDICARE

## 2017-02-17 VITALS
WEIGHT: 186 LBS | BODY MASS INDEX: 26.04 KG/M2 | RESPIRATION RATE: 16 BRPM | HEART RATE: 64 BPM | DIASTOLIC BLOOD PRESSURE: 63 MMHG | HEIGHT: 71 IN | SYSTOLIC BLOOD PRESSURE: 130 MMHG | TEMPERATURE: 99 F

## 2017-02-17 DIAGNOSIS — Z51.11 CHEMOTHERAPY MANAGEMENT, ENCOUNTER FOR: ICD-10-CM

## 2017-02-17 DIAGNOSIS — C34.12 CANCER OF UPPER LOBE OF LEFT LUNG (HCC): Primary | ICD-10-CM

## 2017-02-17 DIAGNOSIS — T45.1X5A CHEMOTHERAPY-INDUCED NEUTROPENIA (HCC): ICD-10-CM

## 2017-02-17 DIAGNOSIS — D70.1 CHEMOTHERAPY-INDUCED NEUTROPENIA (HCC): ICD-10-CM

## 2017-02-17 LAB
ALBUMIN SERPL BCP-MCNC: 3.9 G/DL (ref 3.5–4.8)
ALBUMIN/GLOB SERPL: 1.2 {RATIO} (ref 1–2)
ALP SERPL-CCNC: 69 U/L (ref 32–100)
ALT SERPL-CCNC: 38 U/L (ref 17–63)
ANION GAP SERPL CALC-SCNC: 7 MMOL/L (ref 0–18)
AST SERPL-CCNC: 34 U/L (ref 15–41)
BASOPHILS # BLD: 0 K/UL (ref 0–0.2)
BASOPHILS NFR BLD: 1 %
BILIRUB SERPL-MCNC: 0.5 MG/DL (ref 0.3–1.2)
BUN SERPL-MCNC: 15 MG/DL (ref 8–20)
BUN/CREAT SERPL: 15 (ref 10–20)
CALCIUM SERPL-MCNC: 9.2 MG/DL (ref 8.5–10.5)
CHLORIDE SERPL-SCNC: 105 MMOL/L (ref 95–110)
CO2 SERPL-SCNC: 27 MMOL/L (ref 22–32)
CREAT SERPL-MCNC: 1 MG/DL (ref 0.5–1.5)
EOSINOPHIL # BLD: 0 K/UL (ref 0–0.7)
EOSINOPHIL NFR BLD: 1 %
ERYTHROCYTE [DISTWIDTH] IN BLOOD BY AUTOMATED COUNT: 12.7 % (ref 11–15)
GLOBULIN PLAS-MCNC: 3.3 G/DL (ref 2.5–3.7)
GLUCOSE SERPL-MCNC: 100 MG/DL (ref 70–99)
HCT VFR BLD AUTO: 39 % (ref 41–52)
HGB BLD-MCNC: 13.3 G/DL (ref 13.5–17.5)
LYMPHOCYTES # BLD: 0.4 K/UL (ref 1–4)
LYMPHOCYTES NFR BLD: 18 %
MCH RBC QN AUTO: 33.4 PG (ref 27–32)
MCHC RBC AUTO-ENTMCNC: 34.2 G/DL (ref 32–37)
MCV RBC AUTO: 97.6 FL (ref 80–100)
MONOCYTES # BLD: 0.4 K/UL (ref 0–1)
MONOCYTES NFR BLD: 17 %
NEUTROPHILS # BLD AUTO: 1.4 K/UL (ref 1.8–7.7)
NEUTROPHILS NFR BLD: 63 %
OSMOLALITY UR CALC.SUM OF ELEC: 289 MOSM/KG (ref 275–295)
PLATELET # BLD AUTO: 173 K/UL (ref 140–400)
PMV BLD AUTO: 8.6 FL (ref 7.4–10.3)
POTASSIUM SERPL-SCNC: 4.5 MMOL/L (ref 3.3–5.1)
PROT SERPL-MCNC: 7.2 G/DL (ref 5.9–8.4)
RBC # BLD AUTO: 4 M/UL (ref 4.5–5.9)
SODIUM SERPL-SCNC: 139 MMOL/L (ref 136–144)
WBC # BLD AUTO: 2.3 K/UL (ref 4–11)

## 2017-02-17 PROCEDURE — 77336 RADIATION PHYSICS CONSULT: CPT | Performed by: RADIOLOGY

## 2017-02-17 PROCEDURE — G0463 HOSPITAL OUTPT CLINIC VISIT: HCPCS | Performed by: INTERNAL MEDICINE

## 2017-02-17 PROCEDURE — 99215 OFFICE O/P EST HI 40 MIN: CPT | Performed by: INTERNAL MEDICINE

## 2017-02-17 PROCEDURE — 77386 HC IMRT COMPLEX: CPT | Performed by: RADIOLOGY

## 2017-02-17 PROCEDURE — 36415 COLL VENOUS BLD VENIPUNCTURE: CPT

## 2017-02-17 PROCEDURE — 85025 COMPLETE CBC W/AUTO DIFF WBC: CPT

## 2017-02-17 PROCEDURE — 80053 COMPREHEN METABOLIC PANEL: CPT

## 2017-02-17 RX ORDER — MONTELUKAST SODIUM 10 MG/1
10 TABLET ORAL NIGHTLY
COMMUNITY
End: 2018-05-31 | Stop reason: ALTCHOICE

## 2017-02-17 RX ORDER — LORATADINE 10 MG/1
10 TABLET ORAL DAILY
Status: ON HOLD | COMMUNITY
End: 2017-10-25

## 2017-02-17 NOTE — PROGRESS NOTES
Patient here for Pre-Chemotherapy lab draw. On 1 attempt #23g butterfly inserted left ac, cbc,cmp obtained and sent to lab. Guaze and colban to site.   Patient discharged ambulatory to [facility] for MD acuna

## 2017-02-17 NOTE — PROGRESS NOTES
Cancer Center Progress Note    Patient Name: Alfreda Carpenter   YOB: 1945   Medical Record Number: [id number]   Attending Physician: Lashae Evans M.D.        Chief Complaint:  Lung cancer    History of Present Illness:  Cancer history:  71-ye Sinus problems   • Lung cancer ([address]      NSCLCA stage IIIB   • Obstructive sleep apnea        Past Surgical History:  History reviewed. No pertinent past surgical history.     Family History:  Family History   Problem Relation Age of Onset   • Cancer Fathe the start of each cycle., Disp: 16 tablet, Rfl: 0  •  Prochlorperazine Maleate (COMPAZINE) 10 mg tablet, Take 1 tablet (10 mg total) by mouth every 6 (six) hours as needed for Nausea., Disp: 30 tablet, Rfl: 3  •  Ondansetron HCl (ZOFRAN) 8 MG tablet, Take Radiology:  none    Cancer of upper lobe of left lung [facility])    Staging form: Lung AJCC V7      Clinical stage from 1/9/2017: Stage IIIB (T1a, N3, M0) - Signed by Ben Mahoney MD on 1/9/2017    Impression and Plan:  66year old male former smoker wi

## 2017-02-20 ENCOUNTER — OFFICE VISIT (OUTPATIENT)
Dept: HEMATOLOGY/ONCOLOGY | Facility: HOSPITAL | Age: 72
End: 2017-02-20
Attending: INTERNAL MEDICINE
Payer: MEDICARE

## 2017-02-20 VITALS
HEART RATE: 70 BPM | TEMPERATURE: 97 F | RESPIRATION RATE: 16 BRPM | DIASTOLIC BLOOD PRESSURE: 64 MMHG | BODY MASS INDEX: 26 KG/M2 | SYSTOLIC BLOOD PRESSURE: 150 MMHG | WEIGHT: 188.81 LBS

## 2017-02-20 DIAGNOSIS — C34.12 CANCER OF UPPER LOBE OF LEFT LUNG (HCC): Primary | ICD-10-CM

## 2017-02-20 PROCEDURE — 77386 HC IMRT COMPLEX: CPT | Performed by: RADIOLOGY

## 2017-02-20 PROCEDURE — 96367 TX/PROPH/DG ADDL SEQ IV INF: CPT

## 2017-02-20 PROCEDURE — 96417 CHEMO IV INFUS EACH ADDL SEQ: CPT

## 2017-02-20 PROCEDURE — 96361 HYDRATE IV INFUSION ADD-ON: CPT

## 2017-02-20 PROCEDURE — 96413 CHEMO IV INFUSION 1 HR: CPT

## 2017-02-20 PROCEDURE — 96366 THER/PROPH/DIAG IV INF ADDON: CPT

## 2017-02-20 RX ORDER — SODIUM CHLORIDE 9 MG/ML
INJECTION, SOLUTION INTRAVENOUS
Status: DISCONTINUED
Start: 2017-02-20 | End: 2017-02-20

## 2017-02-20 RX ORDER — SODIUM CHLORIDE 9 MG/ML
1000 INJECTION, SOLUTION INTRAVENOUS ONCE
Status: COMPLETED | OUTPATIENT
Start: 2017-02-20 | End: 2017-02-20

## 2017-02-20 RX ORDER — 0.9 % SODIUM CHLORIDE 0.9 %
VIAL (ML) INJECTION
Status: DISCONTINUED
Start: 2017-02-20 | End: 2017-02-20

## 2017-02-20 RX ADMIN — SODIUM CHLORIDE 1000 ML: 9 INJECTION, SOLUTION INTRAVENOUS at 11:59:00

## 2017-02-20 NOTE — PROGRESS NOTES
Pt to infusion for C2 D1 Cisplatin/Etoposide. Arrived stable and ambulatory with wife. Reports feeling well overall. Good energy level, denies fatigue. No fevers or infections reported.  Pt states he has good appetite, is eating and drinking well without di

## 2017-02-21 ENCOUNTER — OFFICE VISIT (OUTPATIENT)
Dept: RADIATION ONCOLOGY | Facility: HOSPITAL | Age: 72
End: 2017-02-21
Attending: RADIOLOGY
Payer: MEDICARE

## 2017-02-21 ENCOUNTER — OFFICE VISIT (OUTPATIENT)
Dept: HEMATOLOGY/ONCOLOGY | Facility: HOSPITAL | Age: 72
End: 2017-02-21
Attending: INTERNAL MEDICINE
Payer: MEDICARE

## 2017-02-21 VITALS
SYSTOLIC BLOOD PRESSURE: 131 MMHG | TEMPERATURE: 98 F | DIASTOLIC BLOOD PRESSURE: 68 MMHG | RESPIRATION RATE: 16 BRPM | HEART RATE: 79 BPM

## 2017-02-21 VITALS — WEIGHT: 191 LBS | BODY MASS INDEX: 27 KG/M2

## 2017-02-21 DIAGNOSIS — C34.12 CANCER OF UPPER LOBE OF LEFT LUNG (HCC): Primary | ICD-10-CM

## 2017-02-21 PROCEDURE — 96367 TX/PROPH/DG ADDL SEQ IV INF: CPT

## 2017-02-21 PROCEDURE — 96413 CHEMO IV INFUSION 1 HR: CPT

## 2017-02-21 PROCEDURE — 77386 HC IMRT COMPLEX: CPT | Performed by: RADIOLOGY

## 2017-02-21 RX ORDER — SODIUM CHLORIDE 9 MG/ML
INJECTION, SOLUTION INTRAVENOUS
Status: DISCONTINUED
Start: 2017-02-21 | End: 2017-02-21

## 2017-02-21 RX ORDER — 0.9 % SODIUM CHLORIDE 0.9 %
VIAL (ML) INJECTION
Status: DISCONTINUED
Start: 2017-02-21 | End: 2017-02-21

## 2017-02-21 NOTE — PROGRESS NOTES
[facility] Radiation Treatment Management Note 21-25    Patient:  Katherine Encarnacion  Age:  66year old  Visit Diagnosis:  No diagnosis found.   Primary Rad/Onc:  Dr. Saintclair Scholz      Site Delivered Dose (Gy) Prescribed Dose (Gy) Pari Sargent

## 2017-02-21 NOTE — PROGRESS NOTES
Pt to infusion for C2D2 Etoposide. Pt with no new complaints. States he feels \"good\" after Day 1 yesterday. Wife chairside for support. Right forearm PIV in place from treatment yesterday. PIV flushed with good blood return.   Premedication as ordere

## 2017-02-22 ENCOUNTER — OFFICE VISIT (OUTPATIENT)
Dept: HEMATOLOGY/ONCOLOGY | Facility: HOSPITAL | Age: 72
End: 2017-02-22
Attending: INTERNAL MEDICINE
Payer: MEDICARE

## 2017-02-22 VITALS
SYSTOLIC BLOOD PRESSURE: 134 MMHG | TEMPERATURE: 98 F | HEART RATE: 75 BPM | DIASTOLIC BLOOD PRESSURE: 65 MMHG | RESPIRATION RATE: 16 BRPM

## 2017-02-22 DIAGNOSIS — C34.12 CANCER OF UPPER LOBE OF LEFT LUNG (HCC): Primary | ICD-10-CM

## 2017-02-22 PROCEDURE — 77386 HC IMRT COMPLEX: CPT | Performed by: RADIOLOGY

## 2017-02-22 PROCEDURE — 96413 CHEMO IV INFUSION 1 HR: CPT

## 2017-02-22 PROCEDURE — 96375 TX/PRO/DX INJ NEW DRUG ADDON: CPT

## 2017-02-22 RX ORDER — SODIUM CHLORIDE 9 MG/ML
INJECTION, SOLUTION INTRAVENOUS
Status: DISCONTINUED
Start: 2017-02-22 | End: 2017-02-22

## 2017-02-22 RX ORDER — 0.9 % SODIUM CHLORIDE 0.9 %
VIAL (ML) INJECTION
Status: DISCONTINUED
Start: 2017-02-22 | End: 2017-02-22

## 2017-02-22 NOTE — PROGRESS NOTES
Pt to infusion for C2 D3 Etoposide. Arrived stable and ambulatory with wife. Reports feeling well overall, offers no complaints. Denies any fatigue. No fevers or infections reported. Denies any nausea or vomiting.  Good appetite, eating and drinking well wi

## 2017-02-23 ENCOUNTER — OFFICE VISIT (OUTPATIENT)
Dept: HEMATOLOGY/ONCOLOGY | Facility: HOSPITAL | Age: 72
End: 2017-02-23
Attending: INTERNAL MEDICINE
Payer: MEDICARE

## 2017-02-23 VITALS
DIASTOLIC BLOOD PRESSURE: 74 MMHG | RESPIRATION RATE: 18 BRPM | SYSTOLIC BLOOD PRESSURE: 127 MMHG | TEMPERATURE: 98 F | HEART RATE: 89 BPM

## 2017-02-23 DIAGNOSIS — C34.12 CANCER OF UPPER LOBE OF LEFT LUNG (HCC): Primary | ICD-10-CM

## 2017-02-23 PROCEDURE — 96367 TX/PROPH/DG ADDL SEQ IV INF: CPT

## 2017-02-23 PROCEDURE — 96413 CHEMO IV INFUSION 1 HR: CPT

## 2017-02-23 PROCEDURE — 77386 HC IMRT COMPLEX: CPT | Performed by: RADIOLOGY

## 2017-02-23 RX ORDER — 0.9 % SODIUM CHLORIDE 0.9 %
VIAL (ML) INJECTION
Status: DISCONTINUED
Start: 2017-02-23 | End: 2017-02-23

## 2017-02-23 RX ORDER — SODIUM CHLORIDE 9 MG/ML
INJECTION, SOLUTION INTRAVENOUS
Status: DISCONTINUED
Start: 2017-02-23 | End: 2017-02-23

## 2017-02-23 NOTE — PROGRESS NOTES
Luis to infusion today for C2 D4 Etoposide. He is accompanied by his wife. He offers no complaints, states he feels well. Denies fevers or flu-like symptoms. Reports feeling well after treatment yesterday, drinking plenty of fluids and eating well.

## 2017-02-24 ENCOUNTER — OFFICE VISIT (OUTPATIENT)
Dept: HEMATOLOGY/ONCOLOGY | Facility: HOSPITAL | Age: 72
End: 2017-02-24
Attending: INTERNAL MEDICINE
Payer: MEDICARE

## 2017-02-24 VITALS
TEMPERATURE: 98 F | DIASTOLIC BLOOD PRESSURE: 75 MMHG | SYSTOLIC BLOOD PRESSURE: 154 MMHG | RESPIRATION RATE: 16 BRPM | HEART RATE: 85 BPM

## 2017-02-24 DIAGNOSIS — C34.12 CANCER OF UPPER LOBE OF LEFT LUNG (HCC): Primary | ICD-10-CM

## 2017-02-24 PROCEDURE — 96413 CHEMO IV INFUSION 1 HR: CPT

## 2017-02-24 PROCEDURE — 77386 HC IMRT COMPLEX: CPT | Performed by: RADIOLOGY

## 2017-02-24 PROCEDURE — 96375 TX/PRO/DX INJ NEW DRUG ADDON: CPT

## 2017-02-24 PROCEDURE — 77336 RADIATION PHYSICS CONSULT: CPT | Performed by: RADIOLOGY

## 2017-02-24 RX ORDER — SODIUM CHLORIDE 9 MG/ML
INJECTION, SOLUTION INTRAVENOUS
Status: DISCONTINUED
Start: 2017-02-24 | End: 2017-02-24

## 2017-02-24 RX ORDER — 0.9 % SODIUM CHLORIDE 0.9 %
VIAL (ML) INJECTION
Status: DISCONTINUED
Start: 2017-02-24 | End: 2017-02-24

## 2017-02-24 NOTE — PROGRESS NOTES
Patient to infusion for Cycle 2 Day 5  Etoposide  Patient arrived ambulatory with spouse and reports feeling well.   He offers no complaints , denies any SE at this time  appetite and fluid intake is good   Arrive with PIV to LT FA , flushed well confirm bl

## 2017-02-27 ENCOUNTER — NURSE ONLY (OUTPATIENT)
Dept: HEMATOLOGY/ONCOLOGY | Facility: HOSPITAL | Age: 72
End: 2017-02-27
Attending: INTERNAL MEDICINE
Payer: MEDICARE

## 2017-02-27 VITALS
RESPIRATION RATE: 16 BRPM | WEIGHT: 190 LBS | BODY MASS INDEX: 27 KG/M2 | DIASTOLIC BLOOD PRESSURE: 67 MMHG | TEMPERATURE: 97 F | HEART RATE: 73 BPM | SYSTOLIC BLOOD PRESSURE: 138 MMHG

## 2017-02-27 DIAGNOSIS — C34.12 CANCER OF UPPER LOBE OF LEFT LUNG (HCC): Primary | ICD-10-CM

## 2017-02-27 LAB
BASOPHILS # BLD: 0 K/UL (ref 0–0.2)
BASOPHILS NFR BLD: 0 %
EOSINOPHIL # BLD: 0 K/UL (ref 0–0.7)
EOSINOPHIL NFR BLD: 1 %
ERYTHROCYTE [DISTWIDTH] IN BLOOD BY AUTOMATED COUNT: 13.3 % (ref 11–15)
HCT VFR BLD AUTO: 36.4 % (ref 41–52)
HGB BLD-MCNC: 12.3 G/DL (ref 13.5–17.5)
LYMPHOCYTES # BLD: 0.4 K/UL (ref 1–4)
LYMPHOCYTES NFR BLD: 10 %
MCH RBC QN AUTO: 33.2 PG (ref 27–32)
MCHC RBC AUTO-ENTMCNC: 33.8 G/DL (ref 32–37)
MCV RBC AUTO: 98.1 FL (ref 80–100)
MONOCYTES # BLD: 0.1 K/UL (ref 0–1)
MONOCYTES NFR BLD: 2 %
NEUTROPHILS # BLD AUTO: 3.2 K/UL (ref 1.8–7.7)
NEUTROPHILS NFR BLD: 87 %
PLATELET # BLD AUTO: 123 K/UL (ref 140–400)
PMV BLD AUTO: 8.4 FL (ref 7.4–10.3)
RBC # BLD AUTO: 3.71 M/UL (ref 4.5–5.9)
WBC # BLD AUTO: 3.7 K/UL (ref 4–11)

## 2017-02-27 PROCEDURE — 96413 CHEMO IV INFUSION 1 HR: CPT

## 2017-02-27 PROCEDURE — 96375 TX/PRO/DX INJ NEW DRUG ADDON: CPT

## 2017-02-27 PROCEDURE — 96366 THER/PROPH/DIAG IV INF ADDON: CPT

## 2017-02-27 PROCEDURE — 96361 HYDRATE IV INFUSION ADD-ON: CPT

## 2017-02-27 PROCEDURE — 77386 HC IMRT COMPLEX: CPT | Performed by: RADIOLOGY

## 2017-02-27 PROCEDURE — 85025 COMPLETE CBC W/AUTO DIFF WBC: CPT

## 2017-02-27 PROCEDURE — 96367 TX/PROPH/DG ADDL SEQ IV INF: CPT

## 2017-02-27 RX ORDER — SODIUM CHLORIDE 9 MG/ML
INJECTION, SOLUTION INTRAVENOUS
Status: DISCONTINUED
Start: 2017-02-27 | End: 2017-02-27

## 2017-02-27 RX ORDER — 0.9 % SODIUM CHLORIDE 0.9 %
VIAL (ML) INJECTION
Status: DISCONTINUED
Start: 2017-02-27 | End: 2017-02-27

## 2017-02-27 RX ORDER — SODIUM CHLORIDE 9 MG/ML
1000 INJECTION, SOLUTION INTRAVENOUS ONCE
Status: COMPLETED | OUTPATIENT
Start: 2017-02-27 | End: 2017-02-27

## 2017-02-27 RX ADMIN — SODIUM CHLORIDE 1000 ML: 9 INJECTION, SOLUTION INTRAVENOUS at 11:58:00

## 2017-02-27 NOTE — PROGRESS NOTES
Pt here with his spouse Georges Farmer. Today is his last scheduled chemo treatment - pt is glad. He reports decreased taste and swallowing issue where he finds warmer fluids (soup, etc) go down easier. Oral mucosa is moist, intact. Labs within parameters.  Re

## 2017-02-27 NOTE — PROGRESS NOTES
Pt brought back to lab ambulating without assistance. PIV started in left forearm, blood return positive. Labs drawn from PIV start, attempt X2. Line flushed with 10 cc saline and capped. Site covered in coban. Labs sent.   Pt discharged stable to [facility]

## 2017-02-28 ENCOUNTER — OFFICE VISIT (OUTPATIENT)
Dept: RADIATION ONCOLOGY | Facility: HOSPITAL | Age: 72
End: 2017-02-28
Attending: RADIOLOGY
Payer: MEDICARE

## 2017-02-28 VITALS — BODY MASS INDEX: 26.63 KG/M2 | HEIGHT: 71 IN | HEART RATE: 67 BPM | RESPIRATION RATE: 16 BRPM | WEIGHT: 190.19 LBS

## 2017-02-28 DIAGNOSIS — C34.12 CANCER OF UPPER LOBE OF LEFT LUNG (HCC): Primary | ICD-10-CM

## 2017-02-28 PROCEDURE — 77386 HC IMRT COMPLEX: CPT | Performed by: RADIOLOGY

## 2017-02-28 NOTE — PROGRESS NOTES
[facility] Radiation Treatment Management Note 26-30    Patient:  Ashvin Tillman  Age:  66year old  Visit Diagnosis:    1.  Cancer of upper lobe of left lung [facility])      Primary Rad/Onc:  Dr. Gilbert Done      Site Delivered Dose (G

## 2017-03-01 ENCOUNTER — APPOINTMENT (OUTPATIENT)
Dept: RADIATION ONCOLOGY | Facility: HOSPITAL | Age: 72
End: 2017-03-01
Attending: RADIOLOGY
Payer: MEDICARE

## 2017-03-01 PROCEDURE — 77386 HC IMRT COMPLEX: CPT | Performed by: RADIOLOGY

## 2017-03-01 PROCEDURE — G9678 ONCOLOGY CARE MODEL SERVICE: HCPCS | Performed by: INTERNAL MEDICINE

## 2017-03-02 PROCEDURE — 77386 HC IMRT COMPLEX: CPT | Performed by: RADIOLOGY

## 2017-03-03 PROCEDURE — 77386 HC IMRT COMPLEX: CPT | Performed by: RADIOLOGY

## 2017-03-03 PROCEDURE — 77336 RADIATION PHYSICS CONSULT: CPT | Performed by: RADIOLOGY

## 2017-03-06 PROCEDURE — 77386 HC IMRT COMPLEX: CPT | Performed by: RADIOLOGY

## 2017-03-07 ENCOUNTER — OFFICE VISIT (OUTPATIENT)
Dept: RADIATION ONCOLOGY | Facility: HOSPITAL | Age: 72
End: 2017-03-07
Attending: RADIOLOGY
Payer: MEDICARE

## 2017-03-07 VITALS
WEIGHT: 184.81 LBS | BODY MASS INDEX: 26 KG/M2 | TEMPERATURE: 98 F | DIASTOLIC BLOOD PRESSURE: 68 MMHG | HEART RATE: 73 BPM | OXYGEN SATURATION: 98 % | SYSTOLIC BLOOD PRESSURE: 141 MMHG | RESPIRATION RATE: 16 BRPM

## 2017-03-07 DIAGNOSIS — C34.12 CANCER OF UPPER LOBE OF LEFT LUNG (HCC): Primary | ICD-10-CM

## 2017-03-07 PROCEDURE — 77386 HC IMRT COMPLEX: CPT | Performed by: RADIOLOGY

## 2017-03-07 NOTE — PROGRESS NOTES
[facility] Radiation Treatment Management Note 31-35    Patient:  Damien Villalba  Age:  66year old  Visit Diagnosis:    1.  Cancer of upper lobe of left lung [facility])      Primary Rad/Onc:  Dr. Navarro Listen      Site Delivered Dose (G

## 2017-03-08 PROCEDURE — 77386 HC IMRT COMPLEX: CPT | Performed by: RADIOLOGY

## 2017-03-09 PROCEDURE — 77386 HC IMRT COMPLEX: CPT | Performed by: RADIOLOGY

## 2017-03-10 PROCEDURE — 77386 HC IMRT COMPLEX: CPT | Performed by: RADIOLOGY

## 2017-03-10 PROCEDURE — 77336 RADIATION PHYSICS CONSULT: CPT | Performed by: RADIOLOGY

## 2017-03-14 NOTE — PROGRESS NOTES
[facility]    PATIENT'S NAME: Chucho Grande   RADIATION ONCOLOGIST: Enrique Motley.  Nick Peña MD   PATIENT ACCOUNT #: [de-identified] LOCATION: [address] [place] [address] RECORD #: [id number] YOB: 1945   DATE: 03/10/2017       RADIATION ONCO treat with concomitant chemoradiotherapy. He was treated with a regimen as above, and this was done in conjunction with cis-etoposide given by Dr. Zaida Juarez. TOLERANCE:  The patient tolerated his treatment well overall.   He was quite pleased with the steroi

## 2017-03-17 ENCOUNTER — NURSE ONLY (OUTPATIENT)
Dept: HEMATOLOGY/ONCOLOGY | Facility: HOSPITAL | Age: 72
End: 2017-03-17
Attending: INTERNAL MEDICINE
Payer: MEDICARE

## 2017-03-17 VITALS
TEMPERATURE: 99 F | SYSTOLIC BLOOD PRESSURE: 132 MMHG | RESPIRATION RATE: 16 BRPM | HEART RATE: 74 BPM | WEIGHT: 190 LBS | BODY MASS INDEX: 26.6 KG/M2 | DIASTOLIC BLOOD PRESSURE: 63 MMHG | HEIGHT: 71 IN

## 2017-03-17 DIAGNOSIS — C34.12 CANCER OF UPPER LOBE OF LEFT LUNG (HCC): Primary | ICD-10-CM

## 2017-03-17 DIAGNOSIS — D70.1 CHEMOTHERAPY INDUCED NEUTROPENIA (HCC): ICD-10-CM

## 2017-03-17 DIAGNOSIS — Z09 CHEMOTHERAPY FOLLOW-UP EXAMINATION: ICD-10-CM

## 2017-03-17 DIAGNOSIS — D64.9 ANEMIA, UNSPECIFIED TYPE: ICD-10-CM

## 2017-03-17 DIAGNOSIS — C34.12 CANCER OF UPPER LOBE OF LEFT LUNG (HCC): ICD-10-CM

## 2017-03-17 DIAGNOSIS — T45.1X5A CHEMOTHERAPY INDUCED NEUTROPENIA (HCC): ICD-10-CM

## 2017-03-17 LAB
ALBUMIN SERPL BCP-MCNC: 3.7 G/DL (ref 3.5–4.8)
ALBUMIN/GLOB SERPL: 1.3 {RATIO} (ref 1–2)
ALP SERPL-CCNC: 56 U/L (ref 32–100)
ALT SERPL-CCNC: 25 U/L (ref 17–63)
ANION GAP SERPL CALC-SCNC: 9 MMOL/L (ref 0–18)
AST SERPL-CCNC: 25 U/L (ref 15–41)
BASOPHILS # BLD: 0 K/UL (ref 0–0.2)
BASOPHILS NFR BLD: 0 %
BILIRUB SERPL-MCNC: 0.6 MG/DL (ref 0.3–1.2)
BUN SERPL-MCNC: 25 MG/DL (ref 8–20)
BUN/CREAT SERPL: 28.1 (ref 10–20)
CALCIUM SERPL-MCNC: 9.1 MG/DL (ref 8.5–10.5)
CHLORIDE SERPL-SCNC: 102 MMOL/L (ref 95–110)
CO2 SERPL-SCNC: 26 MMOL/L (ref 22–32)
CREAT SERPL-MCNC: 0.89 MG/DL (ref 0.5–1.5)
EOSINOPHIL # BLD: 0 K/UL (ref 0–0.7)
EOSINOPHIL NFR BLD: 0 %
ERYTHROCYTE [DISTWIDTH] IN BLOOD BY AUTOMATED COUNT: 15.2 % (ref 11–15)
GLOBULIN PLAS-MCNC: 2.9 G/DL (ref 2.5–3.7)
GLUCOSE SERPL-MCNC: 112 MG/DL (ref 70–99)
HCT VFR BLD AUTO: 33.2 % (ref 41–52)
HGB BLD-MCNC: 11.4 G/DL (ref 13.5–17.5)
LYMPHOCYTES # BLD: 0.3 K/UL (ref 1–4)
LYMPHOCYTES NFR BLD: 15 %
MCH RBC QN AUTO: 33.9 PG (ref 27–32)
MCHC RBC AUTO-ENTMCNC: 34.3 G/DL (ref 32–37)
MCV RBC AUTO: 98.8 FL (ref 80–100)
MONOCYTES # BLD: 0.4 K/UL (ref 0–1)
MONOCYTES NFR BLD: 17 %
NEUTROPHILS # BLD AUTO: 1.4 K/UL (ref 1.8–7.7)
NEUTROPHILS NFR BLD: 67 %
OSMOLALITY UR CALC.SUM OF ELEC: 289 MOSM/KG (ref 275–295)
PLATELET # BLD AUTO: 180 K/UL (ref 140–400)
PMV BLD AUTO: 7.9 FL (ref 7.4–10.3)
POTASSIUM SERPL-SCNC: 4.2 MMOL/L (ref 3.3–5.1)
PROT SERPL-MCNC: 6.6 G/DL (ref 5.9–8.4)
RBC # BLD AUTO: 3.36 M/UL (ref 4.5–5.9)
SODIUM SERPL-SCNC: 137 MMOL/L (ref 136–144)
WBC # BLD AUTO: 2.1 K/UL (ref 4–11)

## 2017-03-17 PROCEDURE — G0463 HOSPITAL OUTPT CLINIC VISIT: HCPCS | Performed by: INTERNAL MEDICINE

## 2017-03-17 PROCEDURE — 85025 COMPLETE CBC W/AUTO DIFF WBC: CPT

## 2017-03-17 PROCEDURE — 80053 COMPREHEN METABOLIC PANEL: CPT

## 2017-03-17 PROCEDURE — 36415 COLL VENOUS BLD VENIPUNCTURE: CPT

## 2017-03-17 PROCEDURE — 99215 OFFICE O/P EST HI 40 MIN: CPT | Performed by: INTERNAL MEDICINE

## 2017-03-17 NOTE — PROGRESS NOTES
Cancer Center Progress Note    Patient Name: Natalie Warren   YOB: 1945   Medical Record Number: [id number]   Attending Physician: Ralf Lang M.D.        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     Family History:  Family mouth daily. , Disp: , Rfl:   •  dexamethasone (DECADRON) 4 MG tablet, Take 2 tablets (8 mg total) by mouth daily.  Take two tablets daily for 3 days, starting day 2 of chemo cycle and day 9 of chemo cycle., Disp: 12 tablet, Rfl: 3  •  OLANZapine 5 MG Oral T >60 03/17/2017   CA 9.1 03/17/2017   OSMOCALC 289 03/17/2017   ALKPHO 56 03/17/2017   AST 25 03/17/2017   ALT 25 03/17/2017   ALKPHOS 58 12/10/2011   BILT 0.6 03/17/2017   TP 6.6 03/17/2017   ALB 3.7 03/17/2017   GLOBULIN 2.9 03/17/2017    03/17/2017

## 2017-04-01 PROCEDURE — G9678 ONCOLOGY CARE MODEL SERVICE: HCPCS | Performed by: INTERNAL MEDICINE

## 2017-04-13 ENCOUNTER — OFFICE VISIT (OUTPATIENT)
Dept: RADIATION ONCOLOGY | Facility: HOSPITAL | Age: 72
End: 2017-04-13
Attending: RADIOLOGY
Payer: MEDICARE

## 2017-04-13 VITALS
RESPIRATION RATE: 16 BRPM | WEIGHT: 188 LBS | HEART RATE: 74 BPM | BODY MASS INDEX: 26.32 KG/M2 | TEMPERATURE: 98 F | SYSTOLIC BLOOD PRESSURE: 128 MMHG | HEIGHT: 71 IN | DIASTOLIC BLOOD PRESSURE: 66 MMHG

## 2017-04-13 DIAGNOSIS — C34.12 CANCER OF UPPER LOBE OF LEFT LUNG (HCC): Primary | ICD-10-CM

## 2017-04-14 NOTE — PROGRESS NOTES
[facility]    PATIENT'S NAME: Florinaandria Stratton   RADIATION ONCOLOGIST: Jesús Yusuf.  Lanie Finch MD   PATIENT ACCOUNT #: [de-identified] LOCATION: [address] [place] [address] RECORD #: [id number] YOB: 1945   FOLLOW-UP DATE: 04/13/2017       PALLAVI effects of radiotherapy. His swallowing has recovered a great deal and his skin is well healed. His energy level has recovered as well. Overall, both he and his daughter are quite pleased with his recovery to this point.     He has had no imaging since c 77:24:58  t: 04/14/2017 09:49:53  [facility] [id number]  NAD/    cc: MD Bunny Estrada MD

## 2017-04-20 ENCOUNTER — TELEPHONE (OUTPATIENT)
Dept: PULMONOLOGY | Facility: CLINIC | Age: 72
End: 2017-04-20

## 2017-04-20 ENCOUNTER — OFFICE VISIT (OUTPATIENT)
Dept: PULMONOLOGY | Facility: CLINIC | Age: 72
End: 2017-04-20

## 2017-04-20 ENCOUNTER — TELEPHONE (OUTPATIENT)
Dept: HEMATOLOGY/ONCOLOGY | Facility: HOSPITAL | Age: 72
End: 2017-04-20

## 2017-04-20 VITALS
DIASTOLIC BLOOD PRESSURE: 62 MMHG | SYSTOLIC BLOOD PRESSURE: 108 MMHG | OXYGEN SATURATION: 95 % | WEIGHT: 187.38 LBS | HEIGHT: 69.5 IN | RESPIRATION RATE: 16 BRPM | BODY MASS INDEX: 27.13 KG/M2 | HEART RATE: 75 BPM | TEMPERATURE: 99 F

## 2017-04-20 DIAGNOSIS — C34.12 CANCER OF UPPER LOBE OF LEFT LUNG (HCC): Primary | ICD-10-CM

## 2017-04-20 PROCEDURE — 99213 OFFICE O/P EST LOW 20 MIN: CPT | Performed by: INTERNAL MEDICINE

## 2017-04-20 PROCEDURE — G0463 HOSPITAL OUTPT CLINIC VISIT: HCPCS | Performed by: INTERNAL MEDICINE

## 2017-04-20 RX ORDER — AZITHROMYCIN 250 MG/1
TABLET, FILM COATED ORAL
Qty: 6 TABLET | Refills: 0 | Status: SHIPPED | OUTPATIENT
Start: 2017-04-20 | End: 2017-04-28 | Stop reason: ALTCHOICE

## 2017-04-20 RX ORDER — HYDROCODONE POLISTIREX AND CHLORPHENIRAMINE POLISTIREX 10; 8 MG/5ML; MG/5ML
5 SUSPENSION, EXTENDED RELEASE ORAL 2 TIMES DAILY PRN
Qty: 140 ML | Refills: 0 | Status: SHIPPED | OUTPATIENT
Start: 2017-04-20 | End: 2017-05-04

## 2017-04-20 RX ORDER — PREDNISONE 20 MG/1
TABLET ORAL
Qty: 10 TABLET | Refills: 0 | Status: ON HOLD | OUTPATIENT
Start: 2017-04-20 | End: 2017-10-25 | Stop reason: ALTCHOICE

## 2017-04-20 NOTE — TELEPHONE ENCOUNTER
Pt has persistent productive cough, yellow sputum, & chest congestion per wife. She stts pt had 100.4 degree fever yesterday, but Aleve helped & his temp was 98 degrees this morning. Pt's wife stts he denies any other symptoms.  She stts he finished Chemo e

## 2017-04-20 NOTE — TELEPHONE ENCOUNTER
This patient completed concurrent chemoradiotherapy (Cisplatin & Etoposide - 2/27/2017) & last RT tx 3/10/2017. I explained that Luis's cold symptoms are not related to his treatment he received over a month ago.  He will need to contact his PCP to ad

## 2017-04-20 NOTE — PROGRESS NOTES
The patient is 64-year-old male who I know well from prior evaluation who comes in now for follow-up. He has a history of stage III B adenocarcinoma the lung. He had screening CT scan the chest which demonstrated enlargement of adenopathy.   He underwent

## 2017-04-20 NOTE — TELEPHONE ENCOUNTER
Mrs Landa Pain called and said Luis isn't feeling well. He has chest congestion, a very bad cough and a fever. His fever was 100.1, she gave him aleeve and it has went down to 98.0 but this cough and congestion is giving him trouble.  Please advise

## 2017-04-26 ENCOUNTER — HOSPITAL ENCOUNTER (OUTPATIENT)
Dept: CT IMAGING | Facility: HOSPITAL | Age: 72
Discharge: HOME | End: 2017-04-26
Attending: INTERNAL MEDICINE
Payer: MEDICARE

## 2017-04-26 DIAGNOSIS — C34.12 CANCER OF UPPER LOBE OF LEFT LUNG (HCC): ICD-10-CM

## 2017-04-26 PROCEDURE — 71260 CT THORAX DX C+: CPT

## 2017-04-26 PROCEDURE — 82565 ASSAY OF CREATININE: CPT

## 2017-04-27 ENCOUNTER — LAB ENCOUNTER (OUTPATIENT)
Dept: LAB | Age: 72
End: 2017-04-27
Attending: INTERNAL MEDICINE
Payer: MEDICARE

## 2017-04-27 DIAGNOSIS — T45.1X5A CHEMOTHERAPY INDUCED NEUTROPENIA (HCC): ICD-10-CM

## 2017-04-27 DIAGNOSIS — D64.9 ANEMIA, UNSPECIFIED TYPE: ICD-10-CM

## 2017-04-27 DIAGNOSIS — D70.1 CHEMOTHERAPY INDUCED NEUTROPENIA (HCC): ICD-10-CM

## 2017-04-27 DIAGNOSIS — C34.12 CANCER OF UPPER LOBE OF LEFT LUNG (HCC): ICD-10-CM

## 2017-04-27 PROCEDURE — 85025 COMPLETE CBC W/AUTO DIFF WBC: CPT

## 2017-04-27 PROCEDURE — 80053 COMPREHEN METABOLIC PANEL: CPT

## 2017-04-27 PROCEDURE — 36415 COLL VENOUS BLD VENIPUNCTURE: CPT

## 2017-04-28 ENCOUNTER — OFFICE VISIT (OUTPATIENT)
Dept: HEMATOLOGY/ONCOLOGY | Facility: HOSPITAL | Age: 72
End: 2017-04-28
Attending: INTERNAL MEDICINE
Payer: MEDICARE

## 2017-04-28 VITALS
TEMPERATURE: 98 F | RESPIRATION RATE: 18 BRPM | HEIGHT: 71 IN | HEART RATE: 68 BPM | BODY MASS INDEX: 25.48 KG/M2 | DIASTOLIC BLOOD PRESSURE: 65 MMHG | WEIGHT: 182 LBS | SYSTOLIC BLOOD PRESSURE: 135 MMHG

## 2017-04-28 DIAGNOSIS — D64.9 ANEMIA, UNSPECIFIED TYPE: ICD-10-CM

## 2017-04-28 DIAGNOSIS — Z09 CHEMOTHERAPY FOLLOW-UP EXAMINATION: ICD-10-CM

## 2017-04-28 DIAGNOSIS — T45.1X5A CHEMOTHERAPY INDUCED NEUTROPENIA (HCC): ICD-10-CM

## 2017-04-28 DIAGNOSIS — C34.12 CANCER OF UPPER LOBE OF LEFT LUNG (HCC): Primary | ICD-10-CM

## 2017-04-28 DIAGNOSIS — D70.1 CHEMOTHERAPY INDUCED NEUTROPENIA (HCC): ICD-10-CM

## 2017-04-28 PROCEDURE — 99215 OFFICE O/P EST HI 40 MIN: CPT | Performed by: INTERNAL MEDICINE

## 2017-04-28 PROCEDURE — G0463 HOSPITAL OUTPT CLINIC VISIT: HCPCS | Performed by: INTERNAL MEDICINE

## 2017-04-28 NOTE — PROGRESS NOTES
Cancer Center Progress Note    Patient Name: Jae Colby   YOB: 1945   Medical Record Number: [id number]   Attending Physician: Johnny Hill M.D.        Chief Complaint:  Lung cancer    History of Present Illness:  Cancer history:  72-ye Obstructive sleep apnea        Past Surgical History:  History reviewed. No pertinent past surgical history.     Family History:  Family History   Problem Relation Age of Onset   • Cancer Father      Lung   • Cancer Mother      Breast, ovarian       Social Vitamin B-12 1000 MCG Oral Tab, Take 1,000 mcg by mouth daily. , Disp: , Rfl:   •  OLANZapine 5 MG Oral Tab, Take 1 tablet (5 mg total) by mouth nightly.  Take for 4 days at the start of each cycle., Disp: 16 tablet, Rfl: 0  •  Ondansetron HCl (ZOFRAN) 8 MG Radiology:  CT 4/26/17 chest  1. The treated spiculated peripheral left upper lobe nodule has not significantly intervally changed in size.      2. Interval decrease in size of mediastinal lymphadenopathy.      3.  Peripheral branching nodular opaciti

## 2017-05-01 ENCOUNTER — APPOINTMENT (OUTPATIENT)
Dept: RADIATION ONCOLOGY | Facility: HOSPITAL | Age: 72
End: 2017-05-01
Attending: RADIOLOGY
Payer: MEDICARE

## 2017-05-12 ENCOUNTER — SNF/IP PROF CHARGE ONLY (OUTPATIENT)
Dept: HEMATOLOGY/ONCOLOGY | Facility: HOSPITAL | Age: 72
End: 2017-05-12

## 2017-05-12 DIAGNOSIS — C34.12 CANCER OF UPPER LOBE OF LEFT LUNG (HCC): Primary | ICD-10-CM

## 2017-06-09 ENCOUNTER — OFFICE VISIT (OUTPATIENT)
Dept: HEMATOLOGY/ONCOLOGY | Facility: HOSPITAL | Age: 72
End: 2017-06-09
Attending: INTERNAL MEDICINE
Payer: MEDICARE

## 2017-06-09 ENCOUNTER — OFFICE VISIT (OUTPATIENT)
Dept: RADIATION ONCOLOGY | Facility: HOSPITAL | Age: 72
End: 2017-06-09
Attending: RADIOLOGY
Payer: MEDICARE

## 2017-06-09 VITALS
RESPIRATION RATE: 16 BRPM | HEIGHT: 71 IN | DIASTOLIC BLOOD PRESSURE: 50 MMHG | HEART RATE: 72 BPM | BODY MASS INDEX: 25.76 KG/M2 | TEMPERATURE: 99 F | WEIGHT: 184 LBS | SYSTOLIC BLOOD PRESSURE: 111 MMHG

## 2017-06-09 VITALS
WEIGHT: 184.81 LBS | TEMPERATURE: 98 F | HEART RATE: 72 BPM | OXYGEN SATURATION: 96 % | DIASTOLIC BLOOD PRESSURE: 50 MMHG | RESPIRATION RATE: 16 BRPM | HEIGHT: 71 IN | SYSTOLIC BLOOD PRESSURE: 111 MMHG | BODY MASS INDEX: 25.87 KG/M2

## 2017-06-09 DIAGNOSIS — Z09 CHEMOTHERAPY FOLLOW-UP EXAMINATION: ICD-10-CM

## 2017-06-09 DIAGNOSIS — T45.1X5A CHEMOTHERAPY INDUCED NEUTROPENIA (HCC): ICD-10-CM

## 2017-06-09 DIAGNOSIS — C34.12 CANCER OF UPPER LOBE OF LEFT LUNG (HCC): Primary | ICD-10-CM

## 2017-06-09 DIAGNOSIS — D70.1 CHEMOTHERAPY INDUCED NEUTROPENIA (HCC): ICD-10-CM

## 2017-06-09 DIAGNOSIS — D64.9 ANEMIA, UNSPECIFIED TYPE: ICD-10-CM

## 2017-06-09 PROCEDURE — 99211 OFF/OP EST MAY X REQ PHY/QHP: CPT

## 2017-06-09 PROCEDURE — 99214 OFFICE O/P EST MOD 30 MIN: CPT | Performed by: INTERNAL MEDICINE

## 2017-06-09 PROCEDURE — G0463 HOSPITAL OUTPT CLINIC VISIT: HCPCS | Performed by: INTERNAL MEDICINE

## 2017-06-09 NOTE — PROGRESS NOTES
[facility]    PATIENT'S NAME: Magednelypatrick Ott   RADIATION ONCOLOGIST: Lyn Phelps.  Kale Hendricks MD   PATIENT ACCOUNT #: [de-identified] LOCATION: [address] [place] [address] RECORD #: [id number] YOB: 1945   FOLLOW-UP DATE: 06/09/2017       RAD this is stable and tolerable. His biggest issue at the current time is that he has significant problems in both knees. He will be seeing Orthopedics on June 26 to discuss his potential options in that regard.   With respect to his respiratory status, janeen again for followup in 6 months, but would contact him sooner if the scan should show any suspicious findings. He will continue to follow with Dr. Maegan Ballard as well.   I told the patient that if he should have any problems or questions prior to his next visit, h

## 2017-06-09 NOTE — PROGRESS NOTES
Cancer Center Progress Note    Patient Name: Sarika Argueta   YOB: 1945   Medical Record Number: [id number]   Attending Physician: Brittny Geiger M.D.        Chief Complaint:  Lung cancer    History of Present Illness:  Cancer history:  72-ye NSCLCA stage IIIB   • Obstructive sleep apnea        Past Surgical History:  No past surgical history on file.     Family History:  Family History   Problem Relation Age of Onset   • Cancer Father      Lung   • Cancer Mother      Breast, ovarian       Socia the start of each cycle., Disp: 16 tablet, Rfl: 0  •  Ondansetron HCl (ZOFRAN) 8 MG tablet, Take 1 tablet (8 mg total) by mouth every 8 (eight) hours as needed for Nausea., Disp: 30 tablet, Rfl: 3    Allergies:    Dextromethorphan Hbr    Itching  Guaifenes inferior lingular segment and may represent an acute infectious process. Short-term followup is recommended within 3 months to document resolution.      4. A subcentimeter enhancing focus in the left hepatic lobe has decreased in Security.      5.  Aberrant

## 2017-06-09 NOTE — PROGRESS NOTES
Pt seen in 2 month follow up with Dr Seven Ramírez, having completed radiation to the lung 3/10/17. Last CT scan April. Pt accompanied by his wife. Feeling well overall, but continues to have lingering fatigue, and bilateral knee pain.  Pt has an appointment to

## 2017-07-19 ENCOUNTER — HOSPITAL ENCOUNTER (OUTPATIENT)
Dept: CT IMAGING | Facility: HOSPITAL | Age: 72
Discharge: HOME | End: 2017-07-19
Attending: RADIOLOGY
Payer: MEDICARE

## 2017-07-19 ENCOUNTER — TELEPHONE (OUTPATIENT)
Dept: HEMATOLOGY/ONCOLOGY | Facility: HOSPITAL | Age: 72
End: 2017-07-19

## 2017-07-19 DIAGNOSIS — C34.12 CANCER OF UPPER LOBE OF LEFT LUNG (HCC): ICD-10-CM

## 2017-07-19 LAB — CREAT BLD-MCNC: 0.9 MG/DL (ref 0.5–1.5)

## 2017-07-19 PROCEDURE — 82565 ASSAY OF CREATININE: CPT

## 2017-07-19 PROCEDURE — 71260 CT THORAX DX C+: CPT | Performed by: RADIOLOGY

## 2017-07-19 NOTE — TELEPHONE ENCOUNTER
Wife calling and asking for CT results.  Patient going out of [facility], [address] [facility]

## 2017-07-20 ENCOUNTER — LAB ENCOUNTER (OUTPATIENT)
Dept: LAB | Facility: HOSPITAL | Age: 72
End: 2017-07-20
Attending: INTERNAL MEDICINE
Payer: MEDICARE

## 2017-07-20 ENCOUNTER — OFFICE VISIT (OUTPATIENT)
Dept: HEMATOLOGY/ONCOLOGY | Facility: HOSPITAL | Age: 72
End: 2017-07-20
Attending: INTERNAL MEDICINE
Payer: MEDICARE

## 2017-07-20 VITALS
HEIGHT: 71 IN | TEMPERATURE: 99 F | DIASTOLIC BLOOD PRESSURE: 65 MMHG | BODY MASS INDEX: 25.62 KG/M2 | WEIGHT: 183 LBS | HEART RATE: 64 BPM | SYSTOLIC BLOOD PRESSURE: 136 MMHG | RESPIRATION RATE: 16 BRPM

## 2017-07-20 DIAGNOSIS — C34.12 CANCER OF UPPER LOBE OF LEFT LUNG (HCC): Primary | ICD-10-CM

## 2017-07-20 DIAGNOSIS — Z09 CHEMOTHERAPY FOLLOW-UP EXAMINATION: ICD-10-CM

## 2017-07-20 DIAGNOSIS — Z87.891 FORMER SMOKER: ICD-10-CM

## 2017-07-20 DIAGNOSIS — D64.9 ANEMIA, UNSPECIFIED TYPE: ICD-10-CM

## 2017-07-20 DIAGNOSIS — D70.1 CHEMOTHERAPY INDUCED NEUTROPENIA (HCC): ICD-10-CM

## 2017-07-20 DIAGNOSIS — T45.1X5A CHEMOTHERAPY INDUCED NEUTROPENIA (HCC): ICD-10-CM

## 2017-07-20 DIAGNOSIS — C34.12 CANCER OF UPPER LOBE OF LEFT LUNG (HCC): ICD-10-CM

## 2017-07-20 LAB
ALBUMIN SERPL BCP-MCNC: 4.1 G/DL (ref 3.5–4.8)
ALBUMIN/GLOB SERPL: 1.5 {RATIO} (ref 1–2)
ALP SERPL-CCNC: 53 U/L (ref 32–100)
ALT SERPL-CCNC: 22 U/L (ref 17–63)
ANION GAP SERPL CALC-SCNC: 9 MMOL/L (ref 0–18)
AST SERPL-CCNC: 27 U/L (ref 15–41)
BASOPHILS # BLD: 0 K/UL (ref 0–0.2)
BASOPHILS NFR BLD: 1 %
BILIRUB SERPL-MCNC: 0.7 MG/DL (ref 0.3–1.2)
BUN SERPL-MCNC: 17 MG/DL (ref 8–20)
BUN/CREAT SERPL: 18.9 (ref 10–20)
CALCIUM SERPL-MCNC: 9.8 MG/DL (ref 8.5–10.5)
CHLORIDE SERPL-SCNC: 102 MMOL/L (ref 95–110)
CO2 SERPL-SCNC: 27 MMOL/L (ref 22–32)
CREAT SERPL-MCNC: 0.9 MG/DL (ref 0.5–1.5)
EOSINOPHIL # BLD: 0.1 K/UL (ref 0–0.7)
EOSINOPHIL NFR BLD: 1 %
ERYTHROCYTE [DISTWIDTH] IN BLOOD BY AUTOMATED COUNT: 14.3 % (ref 11–15)
GLOBULIN PLAS-MCNC: 2.8 G/DL (ref 2.5–3.7)
GLUCOSE SERPL-MCNC: 109 MG/DL (ref 70–99)
HCT VFR BLD AUTO: 42.4 % (ref 41–52)
HGB BLD-MCNC: 14.3 G/DL (ref 13.5–17.5)
LYMPHOCYTES # BLD: 0.7 K/UL (ref 1–4)
LYMPHOCYTES NFR BLD: 15 %
MCH RBC QN AUTO: 32.8 PG (ref 27–32)
MCHC RBC AUTO-ENTMCNC: 33.7 G/DL (ref 32–37)
MCV RBC AUTO: 97.2 FL (ref 80–100)
MONOCYTES # BLD: 0.4 K/UL (ref 0–1)
MONOCYTES NFR BLD: 8 %
NEUTROPHILS # BLD AUTO: 3.6 K/UL (ref 1.8–7.7)
NEUTROPHILS NFR BLD: 75 %
OSMOLALITY UR CALC.SUM OF ELEC: 288 MOSM/KG (ref 275–295)
PLATELET # BLD AUTO: 173 K/UL (ref 140–400)
PMV BLD AUTO: 8.7 FL (ref 7.4–10.3)
POTASSIUM SERPL-SCNC: 4.8 MMOL/L (ref 3.3–5.1)
PROT SERPL-MCNC: 6.9 G/DL (ref 5.9–8.4)
RBC # BLD AUTO: 4.36 M/UL (ref 4.5–5.9)
SODIUM SERPL-SCNC: 138 MMOL/L (ref 136–144)
WBC # BLD AUTO: 4.9 K/UL (ref 4–11)

## 2017-07-20 PROCEDURE — 36415 COLL VENOUS BLD VENIPUNCTURE: CPT

## 2017-07-20 PROCEDURE — 85025 COMPLETE CBC W/AUTO DIFF WBC: CPT

## 2017-07-20 PROCEDURE — 80053 COMPREHEN METABOLIC PANEL: CPT

## 2017-07-20 PROCEDURE — G0463 HOSPITAL OUTPT CLINIC VISIT: HCPCS | Performed by: INTERNAL MEDICINE

## 2017-07-20 PROCEDURE — 99214 OFFICE O/P EST MOD 30 MIN: CPT | Performed by: INTERNAL MEDICINE

## 2017-07-20 NOTE — PROGRESS NOTES
Cancer Center Progress Note    Patient Name: Jae Colby   YOB: 1945   Medical Record Number: [id number]   Attending Physician: Johnny Hill M.D.        Chief Complaint:  Lung cancer    History of Present Illness:  Cancer history:  72-ye [facility])    • Obstructive sleep apnea    • Sinus problem     Sinus problems       Past Surgical History:  No past surgical history on file.     Family History:  Family History   Problem Relation Age of Onset   • Cancer Father      Lung   • Cancer Mother      B total) by mouth nightly.  Take for 4 days at the start of each cycle., Disp: 16 tablet, Rfl: 0  •  Ondansetron HCl (ZOFRAN) 8 MG tablet, Take 1 tablet (8 mg total) by mouth every 8 (eight) hours as needed for Nausea., Disp: 30 tablet, Rfl: 3    Allergies: progression. His mediastinal disease has responded to chemotherapy.   There is some evidence of radiation fibrosis per    Cancer of upper lobe of left lung [facility])    Staging form: Lung AJCC V7      Clinical stage from 1/9/2017: Stage IIIB (T1a, N3, M0) - Si

## 2017-07-20 NOTE — TELEPHONE ENCOUNTER
Called wife back, I let her know that Dr Rosa Doan would go over CT with them at pt's upcoming appt next week, she expresses that she is anxious to get the results.   I offered her an appt today for either 215 or 345 if she would like to come in sooner; she said

## 2017-07-26 ENCOUNTER — APPOINTMENT (OUTPATIENT)
Dept: HEMATOLOGY/ONCOLOGY | Facility: HOSPITAL | Age: 72
End: 2017-07-26
Attending: INTERNAL MEDICINE
Payer: MEDICARE

## 2017-10-17 ENCOUNTER — HOSPITAL ENCOUNTER (OUTPATIENT)
Dept: CT IMAGING | Facility: HOSPITAL | Age: 72
Discharge: HOME | End: 2017-10-17
Attending: INTERNAL MEDICINE
Payer: MEDICARE

## 2017-10-17 ENCOUNTER — LAB ENCOUNTER (OUTPATIENT)
Dept: LAB | Facility: HOSPITAL | Age: 72
End: 2017-10-17
Attending: INTERNAL MEDICINE
Payer: MEDICARE

## 2017-10-17 DIAGNOSIS — C34.12 CANCER OF UPPER LOBE OF LEFT LUNG (HCC): ICD-10-CM

## 2017-10-17 PROCEDURE — 80053 COMPREHEN METABOLIC PANEL: CPT

## 2017-10-17 PROCEDURE — 85025 COMPLETE CBC W/AUTO DIFF WBC: CPT

## 2017-10-17 PROCEDURE — 36415 COLL VENOUS BLD VENIPUNCTURE: CPT

## 2017-10-17 PROCEDURE — 71260 CT THORAX DX C+: CPT | Performed by: INTERNAL MEDICINE

## 2017-10-20 ENCOUNTER — TELEPHONE (OUTPATIENT)
Dept: HEMATOLOGY/ONCOLOGY | Facility: HOSPITAL | Age: 72
End: 2017-10-20

## 2017-10-20 ENCOUNTER — OFFICE VISIT (OUTPATIENT)
Dept: HEMATOLOGY/ONCOLOGY | Facility: HOSPITAL | Age: 72
End: 2017-10-20
Attending: INTERNAL MEDICINE
Payer: MEDICARE

## 2017-10-20 ENCOUNTER — TELEPHONE (OUTPATIENT)
Dept: PULMONOLOGY | Facility: CLINIC | Age: 72
End: 2017-10-20

## 2017-10-20 ENCOUNTER — TELEPHONE (OUTPATIENT)
Dept: INTERVENTIONAL RADIOLOGY/VASCULAR | Facility: HOSPITAL | Age: 72
End: 2017-10-20

## 2017-10-20 VITALS
SYSTOLIC BLOOD PRESSURE: 133 MMHG | RESPIRATION RATE: 16 BRPM | DIASTOLIC BLOOD PRESSURE: 59 MMHG | BODY MASS INDEX: 25.9 KG/M2 | HEART RATE: 62 BPM | TEMPERATURE: 98 F | WEIGHT: 185 LBS | HEIGHT: 71 IN

## 2017-10-20 DIAGNOSIS — D64.9 ANEMIA, UNSPECIFIED TYPE: ICD-10-CM

## 2017-10-20 DIAGNOSIS — C34.12 CANCER OF UPPER LOBE OF LEFT LUNG (HCC): ICD-10-CM

## 2017-10-20 DIAGNOSIS — Z51.11 CHEMOTHERAPY MANAGEMENT, ENCOUNTER FOR: ICD-10-CM

## 2017-10-20 DIAGNOSIS — T45.1X5A CHEMOTHERAPY-INDUCED NEUTROPENIA (HCC): ICD-10-CM

## 2017-10-20 DIAGNOSIS — C34.90 LUNG CANCER (HCC): Primary | ICD-10-CM

## 2017-10-20 DIAGNOSIS — D70.1 CHEMOTHERAPY-INDUCED NEUTROPENIA (HCC): ICD-10-CM

## 2017-10-20 PROCEDURE — 99215 OFFICE O/P EST HI 40 MIN: CPT | Performed by: INTERNAL MEDICINE

## 2017-10-20 PROCEDURE — G0463 HOSPITAL OUTPT CLINIC VISIT: HCPCS | Performed by: INTERNAL MEDICINE

## 2017-10-20 RX ORDER — ACETAMINOPHEN 325 MG/1
TABLET ORAL EVERY 6 HOURS PRN
Status: CANCELLED | OUTPATIENT
Start: 2017-10-31

## 2017-10-20 RX ORDER — RANITIDINE 25 MG/ML
50 INJECTION, SOLUTION INTRAMUSCULAR; INTRAVENOUS AS NEEDED
Status: CANCELLED | OUTPATIENT
Start: 2017-10-31

## 2017-10-20 RX ORDER — DIPHENHYDRAMINE HYDROCHLORIDE 50 MG/ML
INJECTION INTRAMUSCULAR; INTRAVENOUS EVERY 4 HOURS PRN
Status: CANCELLED | OUTPATIENT
Start: 2017-10-31

## 2017-10-20 RX ORDER — ALBUTEROL SULFATE 90 UG/1
2 AEROSOL, METERED RESPIRATORY (INHALATION) AS NEEDED
Status: CANCELLED | OUTPATIENT
Start: 2017-10-31

## 2017-10-20 RX ORDER — MEPERIDINE HYDROCHLORIDE 25 MG/ML
INJECTION INTRAMUSCULAR; INTRAVENOUS; SUBCUTANEOUS AS NEEDED
Status: CANCELLED | OUTPATIENT
Start: 2017-10-31

## 2017-10-20 NOTE — TELEPHONE ENCOUNTER
Pt would like to come in for flu vaccine next week. Also requesting a \"senior citezen\" pneumonia vaccine. Prevnar administered 10/20/2004. PJV please advise.

## 2017-10-20 NOTE — TELEPHONE ENCOUNTER
I spoke to patient Josey Martinez to schedule Port Insertion. Appointment is scheduled for [email or web address]. Patient to arrive @12. Instructions given to patient as to where to arrive and arrival time.   Patient NPO midnight 10/24/17 or 6 hrs prior to

## 2017-10-20 NOTE — PROGRESS NOTES
Cancer Center Progress Note    Patient Name: Yas Rivera   YOB: 1945   Medical Record Number: [id number]   Attending Physician: Fernanda Morales M.D.        Chief Complaint:  Lung cancer    History of Present Illness:  Cancer history:  72-ye Sinus problem     Sinus problems       Past Surgical History:  No past surgical history on file.     Family History:  Family History   Problem Relation Age of Onset   • Cancer Father      Lung   • Cancer Mother      Breast, ovarian       Social History: Products    Itching  Ibuprofen               Itching  Phenylephrine           Itching  Pseudoephedrine Hcl     Itching     Review of Systems:  All other systems reviewed and negative x12    Vital Signs:  /59 (BP Location: Left arm, Patient Position: He was diagnosed in December 2016 as above. He was treated with concurrent definitive chemoradiotherapy using cisplatin and etoposide finishing in early March 2017. He had low-grade neutropenia and anemia on chemotherapy.   –Neutropenia anemia-improved fo

## 2017-10-23 NOTE — TELEPHONE ENCOUNTER
Wife notified of below. Nurse visit schedule for tomorrow 11:45. Wife states pt is having a port put in Wednesday and will make sure Dr. Jeannette Matute is okay with pt getting immunizations. Dr. Ame Paez, [place].

## 2017-10-24 ENCOUNTER — OFFICE VISIT (OUTPATIENT)
Dept: HEMATOLOGY/ONCOLOGY | Facility: HOSPITAL | Age: 72
End: 2017-10-24
Attending: PHYSICIAN ASSISTANT
Payer: MEDICARE

## 2017-10-24 ENCOUNTER — NURSE ONLY (OUTPATIENT)
Dept: PULMONOLOGY | Facility: CLINIC | Age: 72
End: 2017-10-24

## 2017-10-24 DIAGNOSIS — C34.12 CANCER OF UPPER LOBE OF LEFT LUNG (HCC): Primary | ICD-10-CM

## 2017-10-24 DIAGNOSIS — Z23 IMMUNIZATION DUE: Primary | ICD-10-CM

## 2017-10-24 PROCEDURE — G0008 ADMIN INFLUENZA VIRUS VAC: HCPCS | Performed by: INTERNAL MEDICINE

## 2017-10-24 PROCEDURE — 90653 IIV ADJUVANT VACCINE IM: CPT | Performed by: INTERNAL MEDICINE

## 2017-10-24 PROCEDURE — G0009 ADMIN PNEUMOCOCCAL VACCINE: HCPCS | Performed by: INTERNAL MEDICINE

## 2017-10-24 PROCEDURE — 99215 OFFICE O/P EST HI 40 MIN: CPT | Performed by: PHYSICIAN ASSISTANT

## 2017-10-24 PROCEDURE — G0463 HOSPITAL OUTPT CLINIC VISIT: HCPCS | Performed by: PHYSICIAN ASSISTANT

## 2017-10-24 PROCEDURE — 90732 PPSV23 VACC 2 YRS+ SUBQ/IM: CPT | Performed by: INTERNAL MEDICINE

## 2017-10-24 NOTE — PATIENT INSTRUCTIONS
Medication Education Record: IV Therapy    Learner:  Patient and Spouse    Barriers / Limitations:  None    Diagnosis:   Lung cancer    IV Cancer Treatment Name(s): Durvalumab  IV Cancer Treatment Frequency Once every 2 weeks    Number of cycles planned Ba provider):  Prochloperazine (Compazine) 10 mg every 6 hours as needed    Helpful hints during cancer treatment:    Diet:  o Avoid greasy or spicy foods on days surrounding chemotherapy  o Eat small frequent meals per day (6-7 meals) rather than 3 large grace sunscreen with an SPF of 30 or higher on any skin exposed to the sun. Re-apply every 2 hours if in the sun and after bathing or sweating.   o For dry skin, use an alcohol-free lotion twice per day, especially after baths.  o If scalp hair loss has occurred Handling Body Waste:   1. Caregivers must wear gloves if exposed to the patient’s blood, urine, stool or vomit. Dispose of the used gloves after each use and wash hands with soap and water.   2. Any sheets or clothes soiled with the bodily fluids should time in your cycle when you could become pregnant or if you are actually pregnant.

## 2017-10-24 NOTE — PROGRESS NOTES
Medication Education Record: IV Therapy    Learner:  Patient and Spouse    Barriers / Limitations:  None    Diagnosis:   Lung cancer    IV Cancer Treatment Name(s): Durvalumab  IV Cancer Treatment Frequency Once every 2 weeks    Number of cycles planned Ba directed by your provider):  Prochloperazine (Compazine) 10 mg every 6 hours as needed    Helpful hints during cancer treatment:    Diet:  o Avoid greasy or spicy foods on days surrounding chemotherapy  o Eat small frequent meals per day (6-7 meals) rather broad-spectrum sunscreen with an SPF of 30 or higher on any skin exposed to the sun. Re-apply every 2 hours if in the sun and after bathing or sweating.   o For dry skin, use an alcohol-free lotion twice per day, especially after baths.  o If scalp hair lo medication. Handling Body Waste:   1. Caregivers must wear gloves if exposed to the patient’s blood, urine, stool or vomit. Dispose of the used gloves after each use and wash hands with soap and water.   2. Any sheets or clothes soiled with the bodily you are at a time in your cycle when you could become pregnant or if you are actually pregnant. Cancer treatment education, including treatment plan, supportive medications, and post-treatment care, was provided to the patient.   The patient/support pers

## 2017-10-25 ENCOUNTER — HOSPITAL ENCOUNTER (OUTPATIENT)
Dept: INTERVENTIONAL RADIOLOGY/VASCULAR | Facility: HOSPITAL | Age: 72
Discharge: HOME | End: 2017-10-25
Attending: INTERNAL MEDICINE | Admitting: INTERNAL MEDICINE
Payer: MEDICARE

## 2017-10-25 VITALS
BODY MASS INDEX: 25.9 KG/M2 | HEART RATE: 64 BPM | SYSTOLIC BLOOD PRESSURE: 110 MMHG | WEIGHT: 185 LBS | RESPIRATION RATE: 17 BRPM | OXYGEN SATURATION: 96 % | HEIGHT: 71 IN | DIASTOLIC BLOOD PRESSURE: 62 MMHG

## 2017-10-25 DIAGNOSIS — C34.90 LUNG CANCER (HCC): ICD-10-CM

## 2017-10-25 PROBLEM — Z45.2 ENCOUNTER FOR CARE RELATED TO PORT-A-CATH: Status: ACTIVE | Noted: 2017-10-25

## 2017-10-25 PROCEDURE — 36561 INSERT TUNNELED CV CATH: CPT

## 2017-10-25 PROCEDURE — 0JH60XZ INSERTION OF TUNNELED VASCULAR ACCESS DEVICE INTO CHEST SUBCUTANEOUS TISSUE AND FASCIA, OPEN APPROACH: ICD-10-PCS | Performed by: RADIOLOGY

## 2017-10-25 PROCEDURE — 02HV33Z INSERTION OF INFUSION DEVICE INTO SUPERIOR VENA CAVA, PERCUTANEOUS APPROACH: ICD-10-PCS | Performed by: RADIOLOGY

## 2017-10-25 PROCEDURE — 77001 FLUOROGUIDE FOR VEIN DEVICE: CPT

## 2017-10-25 PROCEDURE — 99152 MOD SED SAME PHYS/QHP 5/>YRS: CPT

## 2017-10-25 RX ORDER — MIDAZOLAM HYDROCHLORIDE 1 MG/ML
INJECTION INTRAMUSCULAR; INTRAVENOUS
Status: COMPLETED
Start: 2017-10-25 | End: 2017-10-25

## 2017-10-25 RX ORDER — LIDOCAINE HYDROCHLORIDE 20 MG/ML
INJECTION, SOLUTION EPIDURAL; INFILTRATION; INTRACAUDAL; PERINEURAL
Status: COMPLETED
Start: 2017-10-25 | End: 2017-10-25

## 2017-10-25 RX ORDER — SODIUM CHLORIDE 9 MG/ML
INJECTION, SOLUTION INTRAVENOUS
Status: COMPLETED
Start: 2017-10-25 | End: 2017-10-25

## 2017-10-25 RX ORDER — SODIUM CHLORIDE 9 MG/ML
INJECTION, SOLUTION INTRAVENOUS CONTINUOUS
Status: DISCONTINUED | OUTPATIENT
Start: 2017-10-25 | End: 2017-10-25

## 2017-10-25 RX ORDER — BACITRACIN 50000 [USP'U]/1
INJECTION, POWDER, LYOPHILIZED, FOR SOLUTION INTRAMUSCULAR
Status: COMPLETED
Start: 2017-10-25 | End: 2017-10-25

## 2017-10-25 RX ORDER — HEPARIN SODIUM (PORCINE) LOCK FLUSH IV SOLN 100 UNIT/ML 100 UNIT/ML
SOLUTION INTRAVENOUS
Status: COMPLETED
Start: 2017-10-25 | End: 2017-10-25

## 2017-10-25 RX ADMIN — SODIUM CHLORIDE: 9 INJECTION, SOLUTION INTRAVENOUS at 08:57:00

## 2017-10-25 RX ADMIN — SODIUM CHLORIDE: 9 INJECTION, SOLUTION INTRAVENOUS at 08:56:00

## 2017-10-31 ENCOUNTER — OFFICE VISIT (OUTPATIENT)
Dept: HEMATOLOGY/ONCOLOGY | Facility: HOSPITAL | Age: 72
End: 2017-10-31
Attending: INTERNAL MEDICINE
Payer: MEDICARE

## 2017-10-31 VITALS
HEART RATE: 63 BPM | RESPIRATION RATE: 16 BRPM | TEMPERATURE: 98 F | DIASTOLIC BLOOD PRESSURE: 62 MMHG | SYSTOLIC BLOOD PRESSURE: 146 MMHG

## 2017-10-31 DIAGNOSIS — C34.12 CANCER OF UPPER LOBE OF LEFT LUNG (HCC): Primary | ICD-10-CM

## 2017-10-31 DIAGNOSIS — Z45.2 ENCOUNTER FOR CARE RELATED TO PORT-A-CATH: ICD-10-CM

## 2017-10-31 PROCEDURE — 96413 CHEMO IV INFUSION 1 HR: CPT

## 2017-10-31 PROCEDURE — C9492 INJECTION, DURVALUMAB: HCPCS | Performed by: INTERNAL MEDICINE

## 2017-10-31 RX ORDER — SODIUM CHLORIDE 9 MG/ML
INJECTION, SOLUTION INTRAVENOUS
Status: DISCONTINUED
Start: 2017-10-31 | End: 2017-10-31

## 2017-10-31 RX ORDER — 0.9 % SODIUM CHLORIDE 0.9 %
10 VIAL (ML) INJECTION ONCE
Status: CANCELLED | OUTPATIENT
Start: 2017-10-31

## 2017-10-31 RX ORDER — HEPARIN SODIUM (PORCINE) LOCK FLUSH IV SOLN 100 UNIT/ML 100 UNIT/ML
5 SOLUTION INTRAVENOUS ONCE
Status: COMPLETED | OUTPATIENT
Start: 2017-10-31 | End: 2017-10-31

## 2017-10-31 RX ORDER — HEPARIN SODIUM (PORCINE) LOCK FLUSH IV SOLN 100 UNIT/ML 100 UNIT/ML
5 SOLUTION INTRAVENOUS ONCE
Status: CANCELLED | OUTPATIENT
Start: 2017-10-31

## 2017-10-31 RX ORDER — HEPARIN SODIUM (PORCINE) LOCK FLUSH IV SOLN 100 UNIT/ML 100 UNIT/ML
SOLUTION INTRAVENOUS
Status: COMPLETED
Start: 2017-10-31 | End: 2017-10-31

## 2017-10-31 RX ORDER — 0.9 % SODIUM CHLORIDE 0.9 %
VIAL (ML) INJECTION
Status: DISCONTINUED
Start: 2017-10-31 | End: 2017-10-31

## 2017-10-31 RX ADMIN — HEPARIN SODIUM (PORCINE) LOCK FLUSH IV SOLN 100 UNIT/ML 500 UNITS: 100 SOLUTION INTRAVENOUS at 09:18:00

## 2017-10-31 NOTE — PROGRESS NOTES
Post Chemo Documentation  Pt is here for treatment today, C1 durvalumab. Arrives Ambulating independently, accompanied by Spouse                              Denies any complaints at this time.   States that he \"sailed through\" previous chemo treatments outcomes:  able to maintain oral integrity  adequate sleep/rest  family support/coping well  free of infection  maintains/gains weight  no active bleeding  nutritional needs met  optimal lab values  oral membranes intact  patient supported/coping well  sym

## 2017-10-31 NOTE — PATIENT INSTRUCTIONS
Discharge instructions:  - Notify MD if you have fever of greater than 100.4, diarrhea, nausea or vomiting. [facility] number: [phone]  - Take nausea medication as prescribed. - Increase fluid intake.   - Report anything that is out of the ordinary traces of the oral chemotherapy may be present in vaginal fluid or semen for 48 hours after taking. A condom should be used during this time.   Effective birth control should be used throughout treatment to prevent pregnancy while on these medications and

## 2017-11-03 ENCOUNTER — TELEPHONE (OUTPATIENT)
Dept: HEMATOLOGY/ONCOLOGY | Facility: HOSPITAL | Age: 72
End: 2017-11-03

## 2017-11-03 NOTE — TELEPHONE ENCOUNTER
During chemo education, patient/wife inquired about the use of lycopene, curcumin and Selenium. Per Dr. Suni Pratt, ok to take during chemotherapy. When I advised patient of this, he states he is not taking the 3 medications noted above.

## 2017-11-13 ENCOUNTER — NURSE ONLY (OUTPATIENT)
Dept: HEMATOLOGY/ONCOLOGY | Facility: HOSPITAL | Age: 72
End: 2017-11-13
Attending: INTERNAL MEDICINE
Payer: MEDICARE

## 2017-11-13 VITALS
BODY MASS INDEX: 25.76 KG/M2 | HEART RATE: 59 BPM | HEIGHT: 71 IN | RESPIRATION RATE: 16 BRPM | TEMPERATURE: 98 F | SYSTOLIC BLOOD PRESSURE: 143 MMHG | WEIGHT: 184 LBS | DIASTOLIC BLOOD PRESSURE: 65 MMHG

## 2017-11-13 DIAGNOSIS — T45.1X5A CHEMOTHERAPY-INDUCED NEUTROPENIA (HCC): ICD-10-CM

## 2017-11-13 DIAGNOSIS — Z45.2 ENCOUNTER FOR CARE RELATED TO PORT-A-CATH: ICD-10-CM

## 2017-11-13 DIAGNOSIS — D70.1 CHEMOTHERAPY-INDUCED NEUTROPENIA (HCC): ICD-10-CM

## 2017-11-13 DIAGNOSIS — Z51.11 ENCOUNTER FOR ANTINEOPLASTIC CHEMOTHERAPY AND IMMUNOTHERAPY: ICD-10-CM

## 2017-11-13 DIAGNOSIS — C34.12 CANCER OF UPPER LOBE OF LEFT LUNG (HCC): Primary | ICD-10-CM

## 2017-11-13 DIAGNOSIS — Z51.12 ENCOUNTER FOR ANTINEOPLASTIC CHEMOTHERAPY AND IMMUNOTHERAPY: ICD-10-CM

## 2017-11-13 DIAGNOSIS — D64.9 ANEMIA, UNSPECIFIED TYPE: ICD-10-CM

## 2017-11-13 PROBLEM — Z51.81 MEDICATION MONITORING ENCOUNTER: Status: ACTIVE | Noted: 2017-11-13

## 2017-11-13 PROCEDURE — 99215 OFFICE O/P EST HI 40 MIN: CPT | Performed by: INTERNAL MEDICINE

## 2017-11-13 PROCEDURE — 36591 DRAW BLOOD OFF VENOUS DEVICE: CPT

## 2017-11-13 PROCEDURE — G0463 HOSPITAL OUTPT CLINIC VISIT: HCPCS | Performed by: INTERNAL MEDICINE

## 2017-11-13 PROCEDURE — 85025 COMPLETE CBC W/AUTO DIFF WBC: CPT

## 2017-11-13 PROCEDURE — 80053 COMPREHEN METABOLIC PANEL: CPT

## 2017-11-13 RX ORDER — 0.9 % SODIUM CHLORIDE 0.9 %
VIAL (ML) INJECTION
Status: DISCONTINUED
Start: 2017-11-13 | End: 2017-11-13

## 2017-11-13 RX ORDER — HEPARIN SODIUM (PORCINE) LOCK FLUSH IV SOLN 100 UNIT/ML 100 UNIT/ML
5 SOLUTION INTRAVENOUS ONCE
Status: CANCELLED | OUTPATIENT
Start: 2017-11-13

## 2017-11-13 RX ORDER — DIPHENHYDRAMINE HYDROCHLORIDE 50 MG/ML
INJECTION INTRAMUSCULAR; INTRAVENOUS EVERY 4 HOURS PRN
Status: CANCELLED | OUTPATIENT
Start: 2017-11-14

## 2017-11-13 RX ORDER — HEPARIN SODIUM (PORCINE) LOCK FLUSH IV SOLN 100 UNIT/ML 100 UNIT/ML
5 SOLUTION INTRAVENOUS ONCE
Status: COMPLETED | OUTPATIENT
Start: 2017-11-13 | End: 2017-11-13

## 2017-11-13 RX ORDER — RANITIDINE 25 MG/ML
50 INJECTION, SOLUTION INTRAMUSCULAR; INTRAVENOUS AS NEEDED
Status: CANCELLED | OUTPATIENT
Start: 2017-11-14

## 2017-11-13 RX ORDER — ALBUTEROL SULFATE 90 UG/1
2 AEROSOL, METERED RESPIRATORY (INHALATION) AS NEEDED
Status: CANCELLED | OUTPATIENT
Start: 2017-11-14

## 2017-11-13 RX ORDER — ACETAMINOPHEN 325 MG/1
TABLET ORAL EVERY 6 HOURS PRN
Status: CANCELLED | OUTPATIENT
Start: 2017-11-14

## 2017-11-13 RX ORDER — MEPERIDINE HYDROCHLORIDE 25 MG/ML
INJECTION INTRAMUSCULAR; INTRAVENOUS; SUBCUTANEOUS AS NEEDED
Status: CANCELLED | OUTPATIENT
Start: 2017-11-14

## 2017-11-13 RX ORDER — HEPARIN SODIUM (PORCINE) LOCK FLUSH IV SOLN 100 UNIT/ML 100 UNIT/ML
SOLUTION INTRAVENOUS
Status: DISCONTINUED
Start: 2017-11-13 | End: 2017-11-13

## 2017-11-13 RX ORDER — 0.9 % SODIUM CHLORIDE 0.9 %
10 VIAL (ML) INJECTION ONCE
Status: CANCELLED | OUTPATIENT
Start: 2017-11-13

## 2017-11-13 RX ADMIN — HEPARIN SODIUM (PORCINE) LOCK FLUSH IV SOLN 100 UNIT/ML 500 UNITS: 100 SOLUTION INTRAVENOUS at 09:45:00

## 2017-11-13 NOTE — PROGRESS NOTES
Pt to infusion for pre-chemo labs. Arrived ambulatory with wife. Reports feeling well overall, offers no complaints. Port accessed using sterile technique, line flushing well with positive blood return noted.  Labs were collected and sent without difficulty

## 2017-11-13 NOTE — PROGRESS NOTES
Cancer Center Progress Note    Patient Name: Jose Narayan   YOB: 1945   Medical Record Number: [id number]   Attending Physician: Cynthia Velásquez M.D.        Chief Complaint:  Lung cancer    History of Present Illness:  Cancer history:  72-ye inflammation of lung (HCC)    • Obstructive sleep apnea    • Sinus problem     Sinus problems       Past Surgical History:  No past surgical history on file.     Family History:  Family History   Problem Relation Age of Onset   • Cancer Father      Lung   • and negative x12    Vital Signs:  /65 (BP Location: Left arm, Patient Position: Sitting)   Pulse 59   Temp 97.8 °F (36.6 °C) (Oral)   Resp 16   Ht 1.803 m (5' 11\")   Wt 83.5 kg (184 lb)   BMI 25.66 kg/m²     Physical Examination:  General: Patient i neutropenia and anemia on chemotherapy. –Neutropenia anemia-improved following the completion of chemotherapy. –No evidence of progression clinically or on CT as above.   Plan–given he has unresectable stage IIIb disease with no progression following init

## 2017-11-13 NOTE — PATIENT INSTRUCTIONS
1. Proceed with cycle 2 durvalumab    2. Monitor for shortness of breath, cough, diarrhea and abdominal pain. Uncommon but need to call office if occurs    3.   Follow-up with Dr. Rosa Doan in 2 weeks

## 2017-11-14 ENCOUNTER — OFFICE VISIT (OUTPATIENT)
Dept: HEMATOLOGY/ONCOLOGY | Facility: HOSPITAL | Age: 72
End: 2017-11-14
Attending: INTERNAL MEDICINE
Payer: MEDICARE

## 2017-11-14 VITALS
SYSTOLIC BLOOD PRESSURE: 130 MMHG | TEMPERATURE: 98 F | DIASTOLIC BLOOD PRESSURE: 70 MMHG | HEART RATE: 66 BPM | RESPIRATION RATE: 16 BRPM

## 2017-11-14 DIAGNOSIS — Z45.2 ENCOUNTER FOR CARE RELATED TO PORT-A-CATH: ICD-10-CM

## 2017-11-14 DIAGNOSIS — C34.12 CANCER OF UPPER LOBE OF LEFT LUNG (HCC): Primary | ICD-10-CM

## 2017-11-14 PROCEDURE — C9492 INJECTION, DURVALUMAB: HCPCS | Performed by: INTERNAL MEDICINE

## 2017-11-14 PROCEDURE — 96413 CHEMO IV INFUSION 1 HR: CPT

## 2017-11-14 RX ORDER — HEPARIN SODIUM (PORCINE) LOCK FLUSH IV SOLN 100 UNIT/ML 100 UNIT/ML
5 SOLUTION INTRAVENOUS ONCE
Status: COMPLETED | OUTPATIENT
Start: 2017-11-14 | End: 2017-11-14

## 2017-11-14 RX ORDER — 0.9 % SODIUM CHLORIDE 0.9 %
10 VIAL (ML) INJECTION ONCE
Status: CANCELLED | OUTPATIENT
Start: 2017-11-14

## 2017-11-14 RX ORDER — SODIUM CHLORIDE 9 MG/ML
INJECTION, SOLUTION INTRAVENOUS
Status: DISCONTINUED
Start: 2017-11-14 | End: 2017-11-14

## 2017-11-14 RX ORDER — HEPARIN SODIUM (PORCINE) LOCK FLUSH IV SOLN 100 UNIT/ML 100 UNIT/ML
5 SOLUTION INTRAVENOUS ONCE
Status: CANCELLED | OUTPATIENT
Start: 2017-11-14

## 2017-11-14 RX ORDER — 0.9 % SODIUM CHLORIDE 0.9 %
VIAL (ML) INJECTION
Status: DISCONTINUED
Start: 2017-11-14 | End: 2017-11-14

## 2017-11-14 RX ORDER — HEPARIN SODIUM (PORCINE) LOCK FLUSH IV SOLN 100 UNIT/ML 100 UNIT/ML
SOLUTION INTRAVENOUS
Status: COMPLETED
Start: 2017-11-14 | End: 2017-11-14

## 2017-11-14 RX ADMIN — HEPARIN SODIUM (PORCINE) LOCK FLUSH IV SOLN 100 UNIT/ML 500 UNITS: 100 SOLUTION INTRAVENOUS at 09:57:00

## 2017-11-14 NOTE — PROGRESS NOTES
Post Chemo Documentation  Pt is here for treatment today, C2 durvalumab. Arrives Ambulating independently, accompanied by Spouse                              Denies any complaints at this time.     Accessed PAC, primed kyler but when pt accessed kyler woul activity as tolerated  monitor lab values  promoted rest  provided nutrition education    Expected outcomes:  able to maintain oral integrity  adequate sleep/rest  family support/coping well  free of infection  maintains/gains weight  no active bleeding  n

## 2017-11-27 ENCOUNTER — OFFICE VISIT (OUTPATIENT)
Dept: HEMATOLOGY/ONCOLOGY | Facility: HOSPITAL | Age: 72
End: 2017-11-27
Attending: INTERNAL MEDICINE
Payer: MEDICARE

## 2017-11-27 VITALS
SYSTOLIC BLOOD PRESSURE: 145 MMHG | RESPIRATION RATE: 18 BRPM | BODY MASS INDEX: 25.62 KG/M2 | HEIGHT: 71 IN | HEART RATE: 65 BPM | TEMPERATURE: 98 F | WEIGHT: 183 LBS | DIASTOLIC BLOOD PRESSURE: 73 MMHG

## 2017-11-27 DIAGNOSIS — D70.1 CHEMOTHERAPY-INDUCED NEUTROPENIA (HCC): ICD-10-CM

## 2017-11-27 DIAGNOSIS — D64.9 ANEMIA, UNSPECIFIED TYPE: ICD-10-CM

## 2017-11-27 DIAGNOSIS — Z51.11 ENCOUNTER FOR ANTINEOPLASTIC CHEMOTHERAPY AND IMMUNOTHERAPY: ICD-10-CM

## 2017-11-27 DIAGNOSIS — Z45.2 ENCOUNTER FOR CARE RELATED TO PORT-A-CATH: ICD-10-CM

## 2017-11-27 DIAGNOSIS — C34.12 CANCER OF UPPER LOBE OF LEFT LUNG (HCC): Primary | ICD-10-CM

## 2017-11-27 DIAGNOSIS — Z51.12 ENCOUNTER FOR ANTINEOPLASTIC CHEMOTHERAPY AND IMMUNOTHERAPY: ICD-10-CM

## 2017-11-27 DIAGNOSIS — T45.1X5A CHEMOTHERAPY-INDUCED NEUTROPENIA (HCC): ICD-10-CM

## 2017-11-27 PROCEDURE — G0463 HOSPITAL OUTPT CLINIC VISIT: HCPCS | Performed by: INTERNAL MEDICINE

## 2017-11-27 PROCEDURE — 99215 OFFICE O/P EST HI 40 MIN: CPT | Performed by: INTERNAL MEDICINE

## 2017-11-27 PROCEDURE — 85025 COMPLETE CBC W/AUTO DIFF WBC: CPT

## 2017-11-27 PROCEDURE — 80053 COMPREHEN METABOLIC PANEL: CPT

## 2017-11-27 PROCEDURE — 36591 DRAW BLOOD OFF VENOUS DEVICE: CPT

## 2017-11-27 RX ORDER — 0.9 % SODIUM CHLORIDE 0.9 %
VIAL (ML) INJECTION
Status: DISCONTINUED
Start: 2017-11-27 | End: 2017-11-27

## 2017-11-27 RX ORDER — HEPARIN SODIUM (PORCINE) LOCK FLUSH IV SOLN 100 UNIT/ML 100 UNIT/ML
SOLUTION INTRAVENOUS
Status: DISCONTINUED
Start: 2017-11-27 | End: 2017-11-27

## 2017-11-27 RX ORDER — HEPARIN SODIUM (PORCINE) LOCK FLUSH IV SOLN 100 UNIT/ML 100 UNIT/ML
5 SOLUTION INTRAVENOUS ONCE
Status: COMPLETED | OUTPATIENT
Start: 2017-11-27 | End: 2017-11-27

## 2017-11-27 RX ORDER — ACETAMINOPHEN 325 MG/1
TABLET ORAL EVERY 6 HOURS PRN
Status: CANCELLED | OUTPATIENT
Start: 2017-11-28

## 2017-11-27 RX ORDER — DIPHENHYDRAMINE HYDROCHLORIDE 50 MG/ML
INJECTION INTRAMUSCULAR; INTRAVENOUS EVERY 4 HOURS PRN
Status: CANCELLED | OUTPATIENT
Start: 2017-11-28

## 2017-11-27 RX ORDER — RANITIDINE 25 MG/ML
50 INJECTION, SOLUTION INTRAMUSCULAR; INTRAVENOUS AS NEEDED
Status: CANCELLED | OUTPATIENT
Start: 2017-11-28

## 2017-11-27 RX ORDER — 0.9 % SODIUM CHLORIDE 0.9 %
10 VIAL (ML) INJECTION ONCE
Status: CANCELLED | OUTPATIENT
Start: 2017-11-27

## 2017-11-27 RX ORDER — HEPARIN SODIUM (PORCINE) LOCK FLUSH IV SOLN 100 UNIT/ML 100 UNIT/ML
5 SOLUTION INTRAVENOUS ONCE
Status: CANCELLED | OUTPATIENT
Start: 2017-11-27

## 2017-11-27 RX ORDER — MEPERIDINE HYDROCHLORIDE 25 MG/ML
INJECTION INTRAMUSCULAR; INTRAVENOUS; SUBCUTANEOUS AS NEEDED
Status: CANCELLED | OUTPATIENT
Start: 2017-11-28

## 2017-11-27 RX ORDER — ALBUTEROL SULFATE 90 UG/1
2 AEROSOL, METERED RESPIRATORY (INHALATION) AS NEEDED
Status: CANCELLED | OUTPATIENT
Start: 2017-11-28

## 2017-11-27 RX ADMIN — HEPARIN SODIUM (PORCINE) LOCK FLUSH IV SOLN 100 UNIT/ML 500 UNITS: 100 SOLUTION INTRAVENOUS at 14:36:00

## 2017-11-27 NOTE — PROGRESS NOTES
Cancer Center Progress Note    Patient Name: Leopoldo Sessions   YOB: 1945   Medical Record Number: [id number]   Attending Physician: Emily Baker M.D.        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     Family History:  Family History   Problem Relation Age of Onset   • Cancer Father systems reviewed and negative x12    Vital Signs:  /73 (BP Location: Left arm, Patient Position: Sitting, Cuff Size: adult)   Pulse 65   Temp 98.4 °F (36.9 °C) (Oral)   Resp 18   Ht 1.803 m (5' 11\")   Wt 83 kg (183 lb)   BMI 25.52 kg/m²     Physical anemia on chemotherapy. –Neutropenia anemia-improved following the completion of chemotherapy. Lmdick Staples he has unresectable stage IIIb disease with no progression following initial chemotherapy we will add immunotherapy with durvalumab.   –Next CT due [place]

## 2017-11-27 NOTE — PROGRESS NOTES
Pt here for pre-chemo labs and then appt with Dr Keith Thrasher; pt and spouse aware to return tomorrow for possible chemo treatment. He is feeling good overall. He fried a turkey for Thanksgiving. Port accessed per sterile technique.   Easy blood return, flushes

## 2017-11-28 ENCOUNTER — OFFICE VISIT (OUTPATIENT)
Dept: HEMATOLOGY/ONCOLOGY | Facility: HOSPITAL | Age: 72
End: 2017-11-28
Attending: INTERNAL MEDICINE
Payer: MEDICARE

## 2017-11-28 VITALS
RESPIRATION RATE: 16 BRPM | WEIGHT: 184 LBS | SYSTOLIC BLOOD PRESSURE: 142 MMHG | HEART RATE: 68 BPM | DIASTOLIC BLOOD PRESSURE: 68 MMHG | HEIGHT: 71 IN | TEMPERATURE: 98 F | BODY MASS INDEX: 25.76 KG/M2

## 2017-11-28 DIAGNOSIS — Z45.2 ENCOUNTER FOR CARE RELATED TO PORT-A-CATH: ICD-10-CM

## 2017-11-28 DIAGNOSIS — C34.12 CANCER OF UPPER LOBE OF LEFT LUNG (HCC): Primary | ICD-10-CM

## 2017-11-28 PROCEDURE — C9492 INJECTION, DURVALUMAB: HCPCS | Performed by: INTERNAL MEDICINE

## 2017-11-28 PROCEDURE — 96413 CHEMO IV INFUSION 1 HR: CPT

## 2017-11-28 RX ORDER — 0.9 % SODIUM CHLORIDE 0.9 %
10 VIAL (ML) INJECTION ONCE
Status: CANCELLED | OUTPATIENT
Start: 2017-11-28

## 2017-11-28 RX ORDER — SODIUM CHLORIDE 9 MG/ML
INJECTION, SOLUTION INTRAVENOUS
Status: DISCONTINUED
Start: 2017-11-28 | End: 2017-11-28

## 2017-11-28 RX ORDER — HEPARIN SODIUM (PORCINE) LOCK FLUSH IV SOLN 100 UNIT/ML 100 UNIT/ML
5 SOLUTION INTRAVENOUS ONCE
Status: CANCELLED | OUTPATIENT
Start: 2017-11-28

## 2017-11-28 RX ORDER — HEPARIN SODIUM (PORCINE) LOCK FLUSH IV SOLN 100 UNIT/ML 100 UNIT/ML
SOLUTION INTRAVENOUS
Status: DISCONTINUED
Start: 2017-11-28 | End: 2017-11-28

## 2017-11-28 RX ORDER — 0.9 % SODIUM CHLORIDE 0.9 %
VIAL (ML) INJECTION
Status: DISCONTINUED
Start: 2017-11-28 | End: 2017-11-28

## 2017-11-28 RX ORDER — HEPARIN SODIUM (PORCINE) LOCK FLUSH IV SOLN 100 UNIT/ML 100 UNIT/ML
5 SOLUTION INTRAVENOUS ONCE
Status: COMPLETED | OUTPATIENT
Start: 2017-11-28 | End: 2017-11-28

## 2017-11-28 RX ADMIN — HEPARIN SODIUM (PORCINE) LOCK FLUSH IV SOLN 100 UNIT/ML 500 UNITS: 100 SOLUTION INTRAVENOUS at 10:30:00

## 2017-11-28 NOTE — PROGRESS NOTES
Post Chemo Documentation    Patient is here for treatment today, Cycle3, Day 1 Imfinzi.       Arrives Ambulating independently      Accompanied by Spouse                                 Modifications in dose or schedule: No      Verbalizes no complaints at should do this twice a day. 2. If the IV connection is leaking:  a. Put on disposable gloves  b.  Stop the pump by clamping the tubing and turning it off.  c. Cover the connection with a paper towel and a plastic bag.  d. Refer to the Chemotherapy Spill should be used during this time. Effective birth control should be used throughout treatment to prevent pregnancy while on these medications and for several months or years after therapy.   Chemotherapy can have harmful side effects to the fetus, especiall

## 2017-12-11 ENCOUNTER — APPOINTMENT (OUTPATIENT)
Dept: HEMATOLOGY/ONCOLOGY | Facility: HOSPITAL | Age: 72
End: 2017-12-11
Attending: INTERNAL MEDICINE
Payer: MEDICARE

## 2017-12-12 ENCOUNTER — OFFICE VISIT (OUTPATIENT)
Dept: HEMATOLOGY/ONCOLOGY | Facility: HOSPITAL | Age: 72
End: 2017-12-12
Attending: INTERNAL MEDICINE
Payer: MEDICARE

## 2017-12-12 VITALS
DIASTOLIC BLOOD PRESSURE: 59 MMHG | WEIGHT: 183.63 LBS | HEART RATE: 62 BPM | SYSTOLIC BLOOD PRESSURE: 150 MMHG | HEIGHT: 71 IN | RESPIRATION RATE: 16 BRPM | TEMPERATURE: 99 F | BODY MASS INDEX: 25.71 KG/M2

## 2017-12-12 VITALS
WEIGHT: 183 LBS | RESPIRATION RATE: 16 BRPM | BODY MASS INDEX: 26 KG/M2 | SYSTOLIC BLOOD PRESSURE: 150 MMHG | DIASTOLIC BLOOD PRESSURE: 59 MMHG | TEMPERATURE: 99 F | HEART RATE: 62 BPM

## 2017-12-12 DIAGNOSIS — C34.12 CANCER OF UPPER LOBE OF LEFT LUNG (HCC): Primary | ICD-10-CM

## 2017-12-12 DIAGNOSIS — J06.9 UPPER RESPIRATORY TRACT INFECTION, UNSPECIFIED TYPE: ICD-10-CM

## 2017-12-12 DIAGNOSIS — D64.9 ANEMIA, UNSPECIFIED TYPE: ICD-10-CM

## 2017-12-12 DIAGNOSIS — Z51.11 ENCOUNTER FOR ANTINEOPLASTIC CHEMOTHERAPY AND IMMUNOTHERAPY: ICD-10-CM

## 2017-12-12 DIAGNOSIS — Z45.2 ENCOUNTER FOR CARE RELATED TO PORT-A-CATH: ICD-10-CM

## 2017-12-12 DIAGNOSIS — Z51.12 ENCOUNTER FOR ANTINEOPLASTIC CHEMOTHERAPY AND IMMUNOTHERAPY: ICD-10-CM

## 2017-12-12 PROCEDURE — C9492 INJECTION, DURVALUMAB: HCPCS | Performed by: INTERNAL MEDICINE

## 2017-12-12 PROCEDURE — 99215 OFFICE O/P EST HI 40 MIN: CPT | Performed by: INTERNAL MEDICINE

## 2017-12-12 PROCEDURE — 85025 COMPLETE CBC W/AUTO DIFF WBC: CPT

## 2017-12-12 PROCEDURE — G0463 HOSPITAL OUTPT CLINIC VISIT: HCPCS | Performed by: INTERNAL MEDICINE

## 2017-12-12 PROCEDURE — 80053 COMPREHEN METABOLIC PANEL: CPT

## 2017-12-12 PROCEDURE — 96413 CHEMO IV INFUSION 1 HR: CPT

## 2017-12-12 RX ORDER — HEPARIN SODIUM (PORCINE) LOCK FLUSH IV SOLN 100 UNIT/ML 100 UNIT/ML
5 SOLUTION INTRAVENOUS ONCE
Status: CANCELLED | OUTPATIENT
Start: 2017-12-12

## 2017-12-12 RX ORDER — 0.9 % SODIUM CHLORIDE 0.9 %
10 VIAL (ML) INJECTION ONCE
Status: COMPLETED | OUTPATIENT
Start: 2017-12-12 | End: 2017-12-12

## 2017-12-12 RX ORDER — MEPERIDINE HYDROCHLORIDE 25 MG/ML
INJECTION INTRAMUSCULAR; INTRAVENOUS; SUBCUTANEOUS AS NEEDED
Status: CANCELLED | OUTPATIENT
Start: 2017-12-12

## 2017-12-12 RX ORDER — ACETAMINOPHEN 325 MG/1
TABLET ORAL EVERY 6 HOURS PRN
Status: CANCELLED | OUTPATIENT
Start: 2017-12-12

## 2017-12-12 RX ORDER — DIPHENHYDRAMINE HYDROCHLORIDE 50 MG/ML
INJECTION INTRAMUSCULAR; INTRAVENOUS EVERY 4 HOURS PRN
Status: CANCELLED | OUTPATIENT
Start: 2017-12-12

## 2017-12-12 RX ORDER — HEPARIN SODIUM (PORCINE) LOCK FLUSH IV SOLN 100 UNIT/ML 100 UNIT/ML
5 SOLUTION INTRAVENOUS ONCE
Status: COMPLETED | OUTPATIENT
Start: 2017-12-12 | End: 2017-12-12

## 2017-12-12 RX ORDER — SODIUM CHLORIDE 9 MG/ML
INJECTION, SOLUTION INTRAVENOUS
Status: DISCONTINUED
Start: 2017-12-12 | End: 2017-12-12

## 2017-12-12 RX ORDER — HEPARIN SODIUM (PORCINE) LOCK FLUSH IV SOLN 100 UNIT/ML 100 UNIT/ML
SOLUTION INTRAVENOUS
Status: DISCONTINUED
Start: 2017-12-12 | End: 2017-12-12

## 2017-12-12 RX ORDER — 0.9 % SODIUM CHLORIDE 0.9 %
VIAL (ML) INJECTION
Status: DISCONTINUED
Start: 2017-12-12 | End: 2017-12-12

## 2017-12-12 RX ORDER — RANITIDINE 25 MG/ML
50 INJECTION, SOLUTION INTRAMUSCULAR; INTRAVENOUS AS NEEDED
Status: CANCELLED | OUTPATIENT
Start: 2017-12-12

## 2017-12-12 RX ORDER — ALBUTEROL SULFATE 90 UG/1
2 AEROSOL, METERED RESPIRATORY (INHALATION) AS NEEDED
Status: CANCELLED | OUTPATIENT
Start: 2017-12-12

## 2017-12-12 RX ORDER — 0.9 % SODIUM CHLORIDE 0.9 %
10 VIAL (ML) INJECTION ONCE
Status: CANCELLED | OUTPATIENT
Start: 2017-12-12

## 2017-12-12 RX ADMIN — 0.9 % SODIUM CHLORIDE 10 ML: 0.9 % VIAL (ML) INJECTION at 11:00:00

## 2017-12-12 RX ADMIN — HEPARIN SODIUM (PORCINE) LOCK FLUSH IV SOLN 100 UNIT/ML 500 UNITS: 100 SOLUTION INTRAVENOUS at 11:00:00

## 2017-12-12 NOTE — PROGRESS NOTES
Post Chemo Documentation    Patient is here for treatment today, Cycle4, Day 1 Imfinzi.       Arrives Ambulating independently      Accompanied by Spouse                                 Modifications in dose or schedule: No      Verbalizes no complaints at Intravenous (IV) Medication:  1. Make sure the connections of your IV tubing are tight and not leaking. You should do this twice a day. 2. If the IV connection is leaking:  a. Put on disposable gloves  b.  Stop the pump by clamping the tubing and turnin traces of the oral chemotherapy may be present in vaginal fluid or semen for 48 hours after taking. A condom should be used during this time.   Effective birth control should be used throughout treatment to prevent pregnancy while on these medications and

## 2017-12-12 NOTE — PROGRESS NOTES
Pt here for pre-chemo labs Doctor appointment and possible treatment. Ambulated from waiting room with steady gait accompanied by his wife. Patient offers no complaints - he is having some fatigue. Mervin Jewell  Port accessed per sterile technique.   Flushed with 20c

## 2017-12-12 NOTE — PROGRESS NOTES
Cancer Center Progress Note    Patient Name: Yas Rivera   YOB: 1945   Medical Record Number: [id number]   Attending Physician: Fernanda Morales M.D.        Chief Complaint:  Lung cancer    History of Present Illness:  Cancer history:  72-ye pain, diarrhea and rash.         Performance Status:  ECOG 0  Past Medical History:  Past Medical History:   Diagnosis Date   • Hemorrhoids    • Impotence    • Lung cancer ([facility]     NSCLCA stage IIIB   • [facility] Allergies:    Dextromethorphan Hbr    Itching  Guaifenesin             Rash  Homeopathic Products    Itching  Ibuprofen               Itching  Phenylephrine           Itching  Pseudoephedrine Hcl     Itching     Review of Systems:  All other systems re lung (EGFR,ALK, ROS1 negative, PD-L1 80%). He was diagnosed in December 2016 as above. He was treated with concurrent definitive chemoradiotherapy using cisplatin and etoposide finishing in early March 2017.   He had low-grade neutropenia and anemia on ch

## 2017-12-22 ENCOUNTER — NURSE ONLY (OUTPATIENT)
Dept: HEMATOLOGY/ONCOLOGY | Facility: HOSPITAL | Age: 72
End: 2017-12-22
Attending: INTERNAL MEDICINE
Payer: MEDICARE

## 2017-12-22 VITALS
HEIGHT: 71 IN | RESPIRATION RATE: 16 BRPM | HEART RATE: 69 BPM | SYSTOLIC BLOOD PRESSURE: 130 MMHG | WEIGHT: 180 LBS | TEMPERATURE: 98 F | BODY MASS INDEX: 25.2 KG/M2 | DIASTOLIC BLOOD PRESSURE: 83 MMHG

## 2017-12-22 DIAGNOSIS — Z51.11 ENCOUNTER FOR ANTINEOPLASTIC CHEMOTHERAPY AND IMMUNOTHERAPY: ICD-10-CM

## 2017-12-22 DIAGNOSIS — C34.12 CANCER OF UPPER LOBE OF LEFT LUNG (HCC): Primary | ICD-10-CM

## 2017-12-22 DIAGNOSIS — Z87.891 FORMER SMOKER: ICD-10-CM

## 2017-12-22 DIAGNOSIS — D64.9 ANEMIA, UNSPECIFIED TYPE: ICD-10-CM

## 2017-12-22 DIAGNOSIS — Z45.2 ENCOUNTER FOR CARE RELATED TO PORT-A-CATH: ICD-10-CM

## 2017-12-22 DIAGNOSIS — Z51.12 ENCOUNTER FOR ANTINEOPLASTIC CHEMOTHERAPY AND IMMUNOTHERAPY: ICD-10-CM

## 2017-12-22 PROCEDURE — 99215 OFFICE O/P EST HI 40 MIN: CPT | Performed by: INTERNAL MEDICINE

## 2017-12-22 PROCEDURE — 85025 COMPLETE CBC W/AUTO DIFF WBC: CPT

## 2017-12-22 PROCEDURE — 80053 COMPREHEN METABOLIC PANEL: CPT

## 2017-12-22 PROCEDURE — 84443 ASSAY THYROID STIM HORMONE: CPT

## 2017-12-22 PROCEDURE — 36591 DRAW BLOOD OFF VENOUS DEVICE: CPT

## 2017-12-22 PROCEDURE — G0463 HOSPITAL OUTPT CLINIC VISIT: HCPCS | Performed by: INTERNAL MEDICINE

## 2017-12-22 RX ORDER — ACETAMINOPHEN 325 MG/1
TABLET ORAL EVERY 6 HOURS PRN
Status: CANCELLED | OUTPATIENT
Start: 2017-12-26

## 2017-12-22 RX ORDER — ALBUTEROL SULFATE 90 UG/1
2 AEROSOL, METERED RESPIRATORY (INHALATION) AS NEEDED
Status: CANCELLED | OUTPATIENT
Start: 2017-12-26

## 2017-12-22 RX ORDER — 0.9 % SODIUM CHLORIDE 0.9 %
VIAL (ML) INJECTION
Status: DISCONTINUED
Start: 2017-12-22 | End: 2017-12-22

## 2017-12-22 RX ORDER — RANITIDINE 25 MG/ML
50 INJECTION, SOLUTION INTRAMUSCULAR; INTRAVENOUS AS NEEDED
Status: CANCELLED | OUTPATIENT
Start: 2017-12-26

## 2017-12-22 RX ORDER — 0.9 % SODIUM CHLORIDE 0.9 %
10 VIAL (ML) INJECTION ONCE
Status: CANCELLED | OUTPATIENT
Start: 2017-12-22

## 2017-12-22 RX ORDER — DIPHENHYDRAMINE HYDROCHLORIDE 50 MG/ML
INJECTION INTRAMUSCULAR; INTRAVENOUS EVERY 4 HOURS PRN
Status: CANCELLED | OUTPATIENT
Start: 2017-12-26

## 2017-12-22 RX ORDER — HEPARIN SODIUM (PORCINE) LOCK FLUSH IV SOLN 100 UNIT/ML 100 UNIT/ML
5 SOLUTION INTRAVENOUS ONCE
Status: COMPLETED | OUTPATIENT
Start: 2017-12-22 | End: 2017-12-22

## 2017-12-22 RX ORDER — MEPERIDINE HYDROCHLORIDE 25 MG/ML
INJECTION INTRAMUSCULAR; INTRAVENOUS; SUBCUTANEOUS AS NEEDED
Status: CANCELLED | OUTPATIENT
Start: 2017-12-26

## 2017-12-22 RX ORDER — HEPARIN SODIUM (PORCINE) LOCK FLUSH IV SOLN 100 UNIT/ML 100 UNIT/ML
SOLUTION INTRAVENOUS
Status: DISCONTINUED
Start: 2017-12-22 | End: 2017-12-22

## 2017-12-22 RX ORDER — HEPARIN SODIUM (PORCINE) LOCK FLUSH IV SOLN 100 UNIT/ML 100 UNIT/ML
5 SOLUTION INTRAVENOUS ONCE
Status: CANCELLED | OUTPATIENT
Start: 2017-12-22

## 2017-12-22 RX ADMIN — HEPARIN SODIUM (PORCINE) LOCK FLUSH IV SOLN 100 UNIT/ML 500 UNITS: 100 SOLUTION INTRAVENOUS at 10:10:00

## 2017-12-22 NOTE — PROGRESS NOTES
Cancer Center Progress Note    Patient Name: Sarika Argueta   YOB: 1945   Medical Record Number: [id number]   Attending Physician: Brittny Geiger M.D.        Chief Complaint:  Lung cancer    History of Present Illness:  Cancer history:  72-ye History:   Diagnosis Date   • Hemorrhoids    • Impotence    • Lung cancer ([facility] [address]     NSCLCA stage IIIB   • Necrotizing granulomatous inflammation of lung (HCC)    • Obstructive sleep apnea    • Sinus problem     Sinus problems       Past Surgical History: Itching  Ibuprofen               Itching  Phenylephrine           Itching  Pseudoephedrine Hcl     Itching     Review of Systems:  All other systems reviewed and negative x12    Vital Signs:  /83 (BP Location: Left arm, Patient Position: Sitting, Cuf concurrent definitive chemoradiotherapy using cisplatin and etoposide finishing in early March 2017. He had low-grade neutropenia and anemia on chemotherapy. –Neutropenia anemia-improved following the completion of chemotherapy.   Holden Renny he has unresectab

## 2017-12-26 ENCOUNTER — OFFICE VISIT (OUTPATIENT)
Dept: HEMATOLOGY/ONCOLOGY | Facility: HOSPITAL | Age: 72
End: 2017-12-26
Attending: INTERNAL MEDICINE
Payer: MEDICARE

## 2017-12-26 VITALS
DIASTOLIC BLOOD PRESSURE: 64 MMHG | SYSTOLIC BLOOD PRESSURE: 139 MMHG | HEART RATE: 67 BPM | TEMPERATURE: 98 F | RESPIRATION RATE: 16 BRPM

## 2017-12-26 DIAGNOSIS — Z45.2 ENCOUNTER FOR CARE RELATED TO PORT-A-CATH: ICD-10-CM

## 2017-12-26 DIAGNOSIS — C34.12 CANCER OF UPPER LOBE OF LEFT LUNG (HCC): Primary | ICD-10-CM

## 2017-12-26 PROCEDURE — C9492 INJECTION, DURVALUMAB: HCPCS | Performed by: INTERNAL MEDICINE

## 2017-12-26 PROCEDURE — 96413 CHEMO IV INFUSION 1 HR: CPT

## 2017-12-26 RX ORDER — HEPARIN SODIUM (PORCINE) LOCK FLUSH IV SOLN 100 UNIT/ML 100 UNIT/ML
5 SOLUTION INTRAVENOUS ONCE
Status: COMPLETED | OUTPATIENT
Start: 2017-12-26 | End: 2017-12-26

## 2017-12-26 RX ORDER — HEPARIN SODIUM (PORCINE) LOCK FLUSH IV SOLN 100 UNIT/ML 100 UNIT/ML
5 SOLUTION INTRAVENOUS ONCE
Status: CANCELLED | OUTPATIENT
Start: 2017-12-26

## 2017-12-26 RX ORDER — 0.9 % SODIUM CHLORIDE 0.9 %
VIAL (ML) INJECTION
Status: DISCONTINUED
Start: 2017-12-26 | End: 2017-12-26

## 2017-12-26 RX ORDER — HEPARIN SODIUM (PORCINE) LOCK FLUSH IV SOLN 100 UNIT/ML 100 UNIT/ML
SOLUTION INTRAVENOUS
Status: COMPLETED
Start: 2017-12-26 | End: 2017-12-26

## 2017-12-26 RX ORDER — SODIUM CHLORIDE 9 MG/ML
INJECTION, SOLUTION INTRAVENOUS
Status: DISCONTINUED
Start: 2017-12-26 | End: 2017-12-26

## 2017-12-26 RX ORDER — 0.9 % SODIUM CHLORIDE 0.9 %
10 VIAL (ML) INJECTION ONCE
Status: CANCELLED | OUTPATIENT
Start: 2017-12-26

## 2017-12-26 RX ADMIN — HEPARIN SODIUM (PORCINE) LOCK FLUSH IV SOLN 100 UNIT/ML 500 UNITS: 100 SOLUTION INTRAVENOUS at 10:05:00

## 2017-12-26 NOTE — PROGRESS NOTES
Post Chemo Documentation     Patient is here for treatment today, Cycle5, Day 1 Imfinzi.       Arrives Ambulating independently                      Accompanied by Spouse                                                Modifications in dose or schedule: No Accompanied by:Spouse

## 2018-01-09 ENCOUNTER — APPOINTMENT (OUTPATIENT)
Dept: HEMATOLOGY/ONCOLOGY | Facility: HOSPITAL | Age: 73
End: 2018-01-09
Attending: INTERNAL MEDICINE
Payer: MEDICARE

## 2018-01-09 VITALS
WEIGHT: 184 LBS | DIASTOLIC BLOOD PRESSURE: 63 MMHG | RESPIRATION RATE: 18 BRPM | TEMPERATURE: 98 F | BODY MASS INDEX: 25.76 KG/M2 | HEIGHT: 71 IN | HEART RATE: 66 BPM | SYSTOLIC BLOOD PRESSURE: 148 MMHG

## 2018-01-09 DIAGNOSIS — C34.12 CANCER OF UPPER LOBE OF LEFT LUNG (HCC): Primary | ICD-10-CM

## 2018-01-09 DIAGNOSIS — Z45.2 ENCOUNTER FOR CARE RELATED TO PORT-A-CATH: ICD-10-CM

## 2018-01-09 DIAGNOSIS — Z51.12 ENCOUNTER FOR ANTINEOPLASTIC CHEMOTHERAPY AND IMMUNOTHERAPY: ICD-10-CM

## 2018-01-09 DIAGNOSIS — Z51.11 ENCOUNTER FOR ANTINEOPLASTIC CHEMOTHERAPY AND IMMUNOTHERAPY: ICD-10-CM

## 2018-01-09 DIAGNOSIS — D64.9 ANEMIA, UNSPECIFIED TYPE: ICD-10-CM

## 2018-01-09 DIAGNOSIS — Z87.891 FORMER SMOKER: ICD-10-CM

## 2018-01-09 PROCEDURE — 96413 CHEMO IV INFUSION 1 HR: CPT

## 2018-01-09 PROCEDURE — 99215 OFFICE O/P EST HI 40 MIN: CPT | Performed by: INTERNAL MEDICINE

## 2018-01-09 PROCEDURE — G0463 HOSPITAL OUTPT CLINIC VISIT: HCPCS | Performed by: INTERNAL MEDICINE

## 2018-01-09 PROCEDURE — C9492 INJECTION, DURVALUMAB: HCPCS | Performed by: INTERNAL MEDICINE

## 2018-01-09 RX ORDER — 0.9 % SODIUM CHLORIDE 0.9 %
10 VIAL (ML) INJECTION ONCE
Status: CANCELLED | OUTPATIENT
Start: 2018-01-09

## 2018-01-09 RX ORDER — HEPARIN SODIUM (PORCINE) LOCK FLUSH IV SOLN 100 UNIT/ML 100 UNIT/ML
5 SOLUTION INTRAVENOUS ONCE
Status: COMPLETED | OUTPATIENT
Start: 2018-01-09 | End: 2018-01-09

## 2018-01-09 RX ORDER — SODIUM CHLORIDE 9 MG/ML
INJECTION, SOLUTION INTRAVENOUS
Status: DISCONTINUED
Start: 2018-01-09 | End: 2018-01-09

## 2018-01-09 RX ORDER — HEPARIN SODIUM (PORCINE) LOCK FLUSH IV SOLN 100 UNIT/ML 100 UNIT/ML
5 SOLUTION INTRAVENOUS ONCE
Status: CANCELLED | OUTPATIENT
Start: 2018-01-09

## 2018-01-09 RX ORDER — 0.9 % SODIUM CHLORIDE 0.9 %
VIAL (ML) INJECTION
Status: DISCONTINUED
Start: 2018-01-09 | End: 2018-01-09

## 2018-01-09 RX ORDER — DIPHENHYDRAMINE HYDROCHLORIDE 50 MG/ML
INJECTION INTRAMUSCULAR; INTRAVENOUS EVERY 4 HOURS PRN
Status: CANCELLED | OUTPATIENT
Start: 2018-01-09

## 2018-01-09 RX ORDER — ACETAMINOPHEN 325 MG/1
TABLET ORAL EVERY 6 HOURS PRN
Status: CANCELLED | OUTPATIENT
Start: 2018-01-09

## 2018-01-09 RX ORDER — RANITIDINE 25 MG/ML
50 INJECTION, SOLUTION INTRAMUSCULAR; INTRAVENOUS AS NEEDED
Status: CANCELLED | OUTPATIENT
Start: 2018-01-09

## 2018-01-09 RX ORDER — MEPERIDINE HYDROCHLORIDE 25 MG/ML
INJECTION INTRAMUSCULAR; INTRAVENOUS; SUBCUTANEOUS AS NEEDED
Status: CANCELLED | OUTPATIENT
Start: 2018-01-09

## 2018-01-09 RX ORDER — ALBUTEROL SULFATE 90 UG/1
2 AEROSOL, METERED RESPIRATORY (INHALATION) AS NEEDED
Status: CANCELLED | OUTPATIENT
Start: 2018-01-09

## 2018-01-09 RX ORDER — HEPARIN SODIUM (PORCINE) LOCK FLUSH IV SOLN 100 UNIT/ML 100 UNIT/ML
SOLUTION INTRAVENOUS
Status: COMPLETED
Start: 2018-01-09 | End: 2018-01-09

## 2018-01-09 RX ADMIN — HEPARIN SODIUM (PORCINE) LOCK FLUSH IV SOLN 100 UNIT/ML 500 UNITS: 100 SOLUTION INTRAVENOUS at 15:20:00

## 2018-01-09 NOTE — PROGRESS NOTES
Post Chemo Documentation    Patient is here for treatment today, Cycle 6, Day 1 Imfinzi.       Arrives Ambulating independently      Accompanied by Spouse                                 Modifications in dose or schedule: No      Patient reports he is feeli

## 2018-01-09 NOTE — PROGRESS NOTES
Cancer Center Progress Note    Patient Name: Ray Her   YOB: 1945   Medical Record Number: [id number]   Attending Physician: Sri Cordero M.D.        Chief Complaint:  Lung cancer    History of Present Illness:  Cancer history:  72-ye History:   Diagnosis Date   • Hemorrhoids    • Impotence    • Lung cancer ([facility] [address]     NSCLCA stage IIIB   • Necrotizing granulomatous inflammation of lung (HCC)    • Obstructive sleep apnea    • Sinus problem     Sinus problems       Past Surgical History: Itching  Phenylephrine           Itching  Pseudoephedrine Hcl     Itching     Review of Systems:  All other systems reviewed and negative x12    Vital Signs:  /63 (BP Location: Left arm, Patient Position: Sitting, Cuff Size: adult)   Pulse 66 chemoradiotherapy using cisplatin and etoposide finishing in early March 2017. He had low-grade neutropenia and anemia on chemotherapy. –Neutropenia anemia-improved following the completion of chemotherapy.   Merline Pretzel he has unresectable stage IIIb disease

## 2018-01-17 ENCOUNTER — HOSPITAL ENCOUNTER (OUTPATIENT)
Dept: CT IMAGING | Facility: HOSPITAL | Age: 73
Discharge: HOME | End: 2018-01-17
Attending: INTERNAL MEDICINE
Payer: MEDICARE

## 2018-01-17 DIAGNOSIS — C34.12 CANCER OF UPPER LOBE OF LEFT LUNG (HCC): ICD-10-CM

## 2018-01-17 LAB — CREAT BLD-MCNC: 0.9 MG/DL (ref 0.5–1.5)

## 2018-01-17 PROCEDURE — 71260 CT THORAX DX C+: CPT | Performed by: INTERNAL MEDICINE

## 2018-01-17 PROCEDURE — 82565 ASSAY OF CREATININE: CPT

## 2018-01-22 ENCOUNTER — OFFICE VISIT (OUTPATIENT)
Dept: HEMATOLOGY/ONCOLOGY | Facility: HOSPITAL | Age: 73
End: 2018-01-22
Attending: INTERNAL MEDICINE
Payer: MEDICARE

## 2018-01-22 VITALS
WEIGHT: 182 LBS | SYSTOLIC BLOOD PRESSURE: 131 MMHG | BODY MASS INDEX: 25.48 KG/M2 | TEMPERATURE: 98 F | HEIGHT: 71 IN | RESPIRATION RATE: 16 BRPM | HEART RATE: 61 BPM | DIASTOLIC BLOOD PRESSURE: 60 MMHG

## 2018-01-22 DIAGNOSIS — Z51.12 ENCOUNTER FOR ANTINEOPLASTIC CHEMOTHERAPY AND IMMUNOTHERAPY: ICD-10-CM

## 2018-01-22 DIAGNOSIS — C34.12 CANCER OF UPPER LOBE OF LEFT LUNG (HCC): Primary | ICD-10-CM

## 2018-01-22 DIAGNOSIS — D64.9 ANEMIA, UNSPECIFIED TYPE: ICD-10-CM

## 2018-01-22 DIAGNOSIS — Z87.891 FORMER SMOKER: ICD-10-CM

## 2018-01-22 DIAGNOSIS — Z51.11 ENCOUNTER FOR ANTINEOPLASTIC CHEMOTHERAPY AND IMMUNOTHERAPY: ICD-10-CM

## 2018-01-22 DIAGNOSIS — Z45.2 ENCOUNTER FOR CARE RELATED TO PORT-A-CATH: ICD-10-CM

## 2018-01-22 LAB
ALBUMIN SERPL BCP-MCNC: 3.8 G/DL (ref 3.5–4.8)
ALBUMIN/GLOB SERPL: 1.7 {RATIO} (ref 1–2)
ALP SERPL-CCNC: 49 U/L (ref 32–100)
ALT SERPL-CCNC: 16 U/L (ref 17–63)
ANION GAP SERPL CALC-SCNC: 8 MMOL/L (ref 0–18)
AST SERPL-CCNC: 19 U/L (ref 15–41)
BASOPHILS # BLD: 0 K/UL (ref 0–0.2)
BASOPHILS NFR BLD: 1 %
BILIRUB SERPL-MCNC: 0.9 MG/DL (ref 0.3–1.2)
BUN SERPL-MCNC: 15 MG/DL (ref 8–20)
BUN/CREAT SERPL: 16.1 (ref 10–20)
CALCIUM SERPL-MCNC: 8.7 MG/DL (ref 8.5–10.5)
CHLORIDE SERPL-SCNC: 101 MMOL/L (ref 95–110)
CO2 SERPL-SCNC: 26 MMOL/L (ref 22–32)
CREAT SERPL-MCNC: 0.93 MG/DL (ref 0.5–1.5)
EOSINOPHIL # BLD: 0.6 K/UL (ref 0–0.7)
EOSINOPHIL NFR BLD: 13 %
ERYTHROCYTE [DISTWIDTH] IN BLOOD BY AUTOMATED COUNT: 13.3 % (ref 11–15)
GLOBULIN PLAS-MCNC: 2.2 G/DL (ref 2.5–3.7)
GLUCOSE SERPL-MCNC: 105 MG/DL (ref 70–99)
HCT VFR BLD AUTO: 41.3 % (ref 41–52)
HGB BLD-MCNC: 13.8 G/DL (ref 13.5–17.5)
LYMPHOCYTES # BLD: 0.9 K/UL (ref 1–4)
LYMPHOCYTES NFR BLD: 19 %
MCH RBC QN AUTO: 33 PG (ref 27–32)
MCHC RBC AUTO-ENTMCNC: 33.5 G/DL (ref 32–37)
MCV RBC AUTO: 98.4 FL (ref 80–100)
MONOCYTES # BLD: 0.4 K/UL (ref 0–1)
MONOCYTES NFR BLD: 9 %
NEUTROPHILS # BLD AUTO: 2.9 K/UL (ref 1.8–7.7)
NEUTROPHILS NFR BLD: 59 %
OSMOLALITY UR CALC.SUM OF ELEC: 281 MOSM/KG (ref 275–295)
PLATELET # BLD AUTO: 154 K/UL (ref 140–400)
PMV BLD AUTO: 8.9 FL (ref 7.4–10.3)
POTASSIUM SERPL-SCNC: 3.9 MMOL/L (ref 3.3–5.1)
PROT SERPL-MCNC: 6 G/DL (ref 5.9–8.4)
RBC # BLD AUTO: 4.2 M/UL (ref 4.5–5.9)
SODIUM SERPL-SCNC: 135 MMOL/L (ref 136–144)
TSH SERPL-ACNC: 3.35 UIU/ML (ref 0.45–5.33)
WBC # BLD AUTO: 4.9 K/UL (ref 4–11)

## 2018-01-22 PROCEDURE — 99215 OFFICE O/P EST HI 40 MIN: CPT | Performed by: INTERNAL MEDICINE

## 2018-01-22 PROCEDURE — 80053 COMPREHEN METABOLIC PANEL: CPT

## 2018-01-22 PROCEDURE — A4216 STERILE WATER/SALINE, 10 ML: HCPCS

## 2018-01-22 PROCEDURE — 36591 DRAW BLOOD OFF VENOUS DEVICE: CPT

## 2018-01-22 PROCEDURE — 85025 COMPLETE CBC W/AUTO DIFF WBC: CPT

## 2018-01-22 PROCEDURE — G0463 HOSPITAL OUTPT CLINIC VISIT: HCPCS | Performed by: INTERNAL MEDICINE

## 2018-01-22 PROCEDURE — 84443 ASSAY THYROID STIM HORMONE: CPT

## 2018-01-22 RX ORDER — ACETAMINOPHEN 325 MG/1
TABLET ORAL EVERY 6 HOURS PRN
Status: CANCELLED | OUTPATIENT
Start: 2018-01-23

## 2018-01-22 RX ORDER — RANITIDINE 25 MG/ML
50 INJECTION, SOLUTION INTRAMUSCULAR; INTRAVENOUS AS NEEDED
Status: CANCELLED | OUTPATIENT
Start: 2018-01-23

## 2018-01-22 RX ORDER — MEPERIDINE HYDROCHLORIDE 25 MG/ML
INJECTION INTRAMUSCULAR; INTRAVENOUS; SUBCUTANEOUS AS NEEDED
Status: CANCELLED | OUTPATIENT
Start: 2018-01-23

## 2018-01-22 RX ORDER — HEPARIN SODIUM (PORCINE) LOCK FLUSH IV SOLN 100 UNIT/ML 100 UNIT/ML
5 SOLUTION INTRAVENOUS ONCE
Status: CANCELLED | OUTPATIENT
Start: 2018-01-22

## 2018-01-22 RX ORDER — ALBUTEROL SULFATE 90 UG/1
2 AEROSOL, METERED RESPIRATORY (INHALATION) AS NEEDED
Status: CANCELLED | OUTPATIENT
Start: 2018-01-23

## 2018-01-22 RX ORDER — 0.9 % SODIUM CHLORIDE 0.9 %
10 VIAL (ML) INJECTION ONCE
Status: CANCELLED | OUTPATIENT
Start: 2018-01-22

## 2018-01-22 RX ORDER — 0.9 % SODIUM CHLORIDE 0.9 %
VIAL (ML) INJECTION
Status: DISCONTINUED
Start: 2018-01-22 | End: 2018-01-22

## 2018-01-22 RX ORDER — HEPARIN SODIUM (PORCINE) LOCK FLUSH IV SOLN 100 UNIT/ML 100 UNIT/ML
5 SOLUTION INTRAVENOUS ONCE
Status: COMPLETED | OUTPATIENT
Start: 2018-01-22 | End: 2018-01-22

## 2018-01-22 RX ORDER — HEPARIN SODIUM (PORCINE) LOCK FLUSH IV SOLN 100 UNIT/ML 100 UNIT/ML
SOLUTION INTRAVENOUS
Status: COMPLETED
Start: 2018-01-22 | End: 2018-01-22

## 2018-01-22 RX ORDER — DIPHENHYDRAMINE HYDROCHLORIDE 50 MG/ML
INJECTION INTRAMUSCULAR; INTRAVENOUS EVERY 4 HOURS PRN
Status: CANCELLED | OUTPATIENT
Start: 2018-01-23

## 2018-01-22 RX ADMIN — HEPARIN SODIUM (PORCINE) LOCK FLUSH IV SOLN 100 UNIT/ML 500 UNITS: 100 SOLUTION INTRAVENOUS at 08:30:00

## 2018-01-22 NOTE — PROGRESS NOTES
Luis to lab today for fast-track labs. He arrives alert and independent, accompanied by his wife. He feels well, denies fevers or illness. He is due to see Dr. Chon Henry today, with possible treatment tomorrow.   Patient's right-side port accessed using ZIRXi

## 2018-01-23 ENCOUNTER — OFFICE VISIT (OUTPATIENT)
Dept: HEMATOLOGY/ONCOLOGY | Facility: HOSPITAL | Age: 73
End: 2018-01-23
Attending: INTERNAL MEDICINE
Payer: MEDICARE

## 2018-01-23 VITALS
HEART RATE: 73 BPM | TEMPERATURE: 98 F | RESPIRATION RATE: 16 BRPM | SYSTOLIC BLOOD PRESSURE: 133 MMHG | DIASTOLIC BLOOD PRESSURE: 67 MMHG

## 2018-01-23 DIAGNOSIS — Z45.2 ENCOUNTER FOR CARE RELATED TO PORT-A-CATH: ICD-10-CM

## 2018-01-23 DIAGNOSIS — C34.12 CANCER OF UPPER LOBE OF LEFT LUNG (HCC): Primary | ICD-10-CM

## 2018-01-23 PROCEDURE — 96413 CHEMO IV INFUSION 1 HR: CPT

## 2018-01-23 PROCEDURE — C9492 INJECTION, DURVALUMAB: HCPCS | Performed by: INTERNAL MEDICINE

## 2018-01-23 RX ORDER — HEPARIN SODIUM (PORCINE) LOCK FLUSH IV SOLN 100 UNIT/ML 100 UNIT/ML
5 SOLUTION INTRAVENOUS ONCE
Status: CANCELLED | OUTPATIENT
Start: 2018-01-23

## 2018-01-23 RX ORDER — 0.9 % SODIUM CHLORIDE 0.9 %
10 VIAL (ML) INJECTION ONCE
Status: CANCELLED | OUTPATIENT
Start: 2018-01-23

## 2018-01-23 RX ORDER — HEPARIN SODIUM (PORCINE) LOCK FLUSH IV SOLN 100 UNIT/ML 100 UNIT/ML
SOLUTION INTRAVENOUS
Status: DISCONTINUED
Start: 2018-01-23 | End: 2018-01-23

## 2018-01-23 RX ORDER — HEPARIN SODIUM (PORCINE) LOCK FLUSH IV SOLN 100 UNIT/ML 100 UNIT/ML
5 SOLUTION INTRAVENOUS ONCE
Status: COMPLETED | OUTPATIENT
Start: 2018-01-23 | End: 2018-01-23

## 2018-01-23 RX ORDER — SODIUM CHLORIDE 9 MG/ML
INJECTION, SOLUTION INTRAVENOUS
Status: DISCONTINUED
Start: 2018-01-23 | End: 2018-01-23

## 2018-01-23 RX ORDER — 0.9 % SODIUM CHLORIDE 0.9 %
VIAL (ML) INJECTION
Status: DISCONTINUED
Start: 2018-01-23 | End: 2018-01-23

## 2018-01-23 RX ADMIN — HEPARIN SODIUM (PORCINE) LOCK FLUSH IV SOLN 100 UNIT/ML 500 UNITS: 100 SOLUTION INTRAVENOUS at 10:12:00

## 2018-01-23 NOTE — PROGRESS NOTES
Post Chemo Documentation    Patient is here for treatment today, Cycle 7, Day 1 Imfinzi.       Arrives Ambulating independently      Accompanied by Spouse                                 Modifications in dose or schedule: No      Verbalizes no complaints at

## 2018-01-31 ENCOUNTER — SNF/IP PROF CHARGE ONLY (OUTPATIENT)
Dept: HEMATOLOGY/ONCOLOGY | Facility: HOSPITAL | Age: 73
End: 2018-01-31

## 2018-01-31 DIAGNOSIS — C34.12 CANCER OF UPPER LOBE OF LEFT LUNG (HCC): ICD-10-CM

## 2018-01-31 PROCEDURE — G9678 ONCOLOGY CARE MODEL SERVICE: HCPCS | Performed by: INTERNAL MEDICINE

## 2018-02-05 ENCOUNTER — APPOINTMENT (OUTPATIENT)
Dept: HEMATOLOGY/ONCOLOGY | Facility: HOSPITAL | Age: 73
End: 2018-02-05
Attending: INTERNAL MEDICINE
Payer: MEDICARE

## 2018-02-05 VITALS
SYSTOLIC BLOOD PRESSURE: 132 MMHG | TEMPERATURE: 98 F | BODY MASS INDEX: 25.76 KG/M2 | HEART RATE: 70 BPM | HEIGHT: 71 IN | DIASTOLIC BLOOD PRESSURE: 63 MMHG | WEIGHT: 184 LBS | RESPIRATION RATE: 16 BRPM

## 2018-02-05 DIAGNOSIS — Z51.11 ENCOUNTER FOR ANTINEOPLASTIC CHEMOTHERAPY AND IMMUNOTHERAPY: ICD-10-CM

## 2018-02-05 DIAGNOSIS — C34.12 CANCER OF UPPER LOBE OF LEFT LUNG (HCC): Primary | ICD-10-CM

## 2018-02-05 DIAGNOSIS — Z87.891 FORMER SMOKER: ICD-10-CM

## 2018-02-05 DIAGNOSIS — Z51.12 ENCOUNTER FOR ANTINEOPLASTIC CHEMOTHERAPY AND IMMUNOTHERAPY: ICD-10-CM

## 2018-02-05 DIAGNOSIS — D70.1 CHEMOTHERAPY-INDUCED NEUTROPENIA (HCC): ICD-10-CM

## 2018-02-05 DIAGNOSIS — T45.1X5A CHEMOTHERAPY-INDUCED NEUTROPENIA (HCC): ICD-10-CM

## 2018-02-05 DIAGNOSIS — D64.9 ANEMIA, UNSPECIFIED TYPE: ICD-10-CM

## 2018-02-05 PROCEDURE — 99215 OFFICE O/P EST HI 40 MIN: CPT | Performed by: INTERNAL MEDICINE

## 2018-02-05 PROCEDURE — G0463 HOSPITAL OUTPT CLINIC VISIT: HCPCS | Performed by: INTERNAL MEDICINE

## 2018-02-05 RX ORDER — RANITIDINE 25 MG/ML
50 INJECTION, SOLUTION INTRAMUSCULAR; INTRAVENOUS AS NEEDED
Status: CANCELLED | OUTPATIENT
Start: 2018-02-06

## 2018-02-05 RX ORDER — ACETAMINOPHEN 325 MG/1
TABLET ORAL EVERY 6 HOURS PRN
Status: CANCELLED | OUTPATIENT
Start: 2018-02-06

## 2018-02-05 RX ORDER — ALBUTEROL SULFATE 90 UG/1
2 AEROSOL, METERED RESPIRATORY (INHALATION) AS NEEDED
Status: CANCELLED | OUTPATIENT
Start: 2018-02-06

## 2018-02-05 RX ORDER — MEPERIDINE HYDROCHLORIDE 25 MG/ML
INJECTION INTRAMUSCULAR; INTRAVENOUS; SUBCUTANEOUS AS NEEDED
Status: CANCELLED | OUTPATIENT
Start: 2018-02-06

## 2018-02-05 RX ORDER — DIPHENHYDRAMINE HYDROCHLORIDE 50 MG/ML
INJECTION INTRAMUSCULAR; INTRAVENOUS EVERY 4 HOURS PRN
Status: CANCELLED | OUTPATIENT
Start: 2018-02-06

## 2018-02-05 NOTE — PROGRESS NOTES
Cancer Center Progress Note    Patient Name: Bernardo Ly   YOB: 1945   Medical Record Number: [id number]   Attending Physician: Bimal Mantilla M.D.        Chief Complaint:  Lung cancer    History of Present Illness:  Cancer history:  72-ye Status:  ECOG 0  Past Medical History:  Past Medical History:   Diagnosis Date   • Hemorrhoids    • Impotence    • Lung cancer ([facility] [address] [facility]     NSCLCA stage IIIB   • [facility]    • Obstructive sleep apnea    • Sinus prob Rash  Homeopathic Products    Itching  Ibuprofen               Itching  Phenylephrine           Itching  Pseudoephedrine Hcl     Itching     Review of Systems:  All other systems reviewed and negative x12    Vital Signs:  /63 (BP Location 2016 as above. He was treated with concurrent definitive chemoradiotherapy using cisplatin and etoposide finishing in early March 2017. He had low-grade neutropenia and anemia on chemotherapy.   –Neutropenia anemia-improved following the completion of aleisha

## 2018-02-06 ENCOUNTER — OFFICE VISIT (OUTPATIENT)
Dept: HEMATOLOGY/ONCOLOGY | Facility: HOSPITAL | Age: 73
End: 2018-02-06
Attending: INTERNAL MEDICINE
Payer: MEDICARE

## 2018-02-06 VITALS
TEMPERATURE: 98 F | WEIGHT: 186 LBS | HEART RATE: 73 BPM | DIASTOLIC BLOOD PRESSURE: 58 MMHG | BODY MASS INDEX: 26.04 KG/M2 | HEIGHT: 71 IN | SYSTOLIC BLOOD PRESSURE: 127 MMHG | RESPIRATION RATE: 16 BRPM

## 2018-02-06 DIAGNOSIS — Z45.2 ENCOUNTER FOR CARE RELATED TO PORT-A-CATH: ICD-10-CM

## 2018-02-06 DIAGNOSIS — C34.12 CANCER OF UPPER LOBE OF LEFT LUNG (HCC): Primary | ICD-10-CM

## 2018-02-06 PROCEDURE — A4216 STERILE WATER/SALINE, 10 ML: HCPCS

## 2018-02-06 PROCEDURE — C9492 INJECTION, DURVALUMAB: HCPCS | Performed by: INTERNAL MEDICINE

## 2018-02-06 PROCEDURE — 96413 CHEMO IV INFUSION 1 HR: CPT

## 2018-02-06 RX ORDER — 0.9 % SODIUM CHLORIDE 0.9 %
10 VIAL (ML) INJECTION ONCE
Status: CANCELLED | OUTPATIENT
Start: 2018-02-06

## 2018-02-06 RX ORDER — 0.9 % SODIUM CHLORIDE 0.9 %
VIAL (ML) INJECTION
Status: DISCONTINUED
Start: 2018-02-06 | End: 2018-02-06

## 2018-02-06 RX ORDER — HEPARIN SODIUM (PORCINE) LOCK FLUSH IV SOLN 100 UNIT/ML 100 UNIT/ML
5 SOLUTION INTRAVENOUS ONCE
Status: CANCELLED | OUTPATIENT
Start: 2018-02-06

## 2018-02-06 RX ORDER — HEPARIN SODIUM (PORCINE) LOCK FLUSH IV SOLN 100 UNIT/ML 100 UNIT/ML
5 SOLUTION INTRAVENOUS ONCE
Status: COMPLETED | OUTPATIENT
Start: 2018-02-06 | End: 2018-02-06

## 2018-02-06 RX ORDER — SODIUM CHLORIDE 9 MG/ML
INJECTION, SOLUTION INTRAVENOUS
Status: DISCONTINUED
Start: 2018-02-06 | End: 2018-02-06

## 2018-02-06 RX ORDER — HEPARIN SODIUM (PORCINE) LOCK FLUSH IV SOLN 100 UNIT/ML 100 UNIT/ML
SOLUTION INTRAVENOUS
Status: COMPLETED
Start: 2018-02-06 | End: 2018-02-06

## 2018-02-06 RX ADMIN — HEPARIN SODIUM (PORCINE) LOCK FLUSH IV SOLN 100 UNIT/ML 500 UNITS: 100 SOLUTION INTRAVENOUS at 11:35:00

## 2018-02-06 NOTE — PROGRESS NOTES
Post Chemo Documentation    Patient is here for treatment today, Cycle8, Day1 Raheem Whitehead. Per Dr. Pantoja Randee \"Proceed with Cycle 8 durvalumab.  Orders signed\"    Arrives Ambulating independently      Accompanied by Spouse                                 Modificati were answered and patient was instructed to call with further questions. Reinforcement of education is needed at next visit? Yes  Include language provided and  information (if applicable).     Not applicable  Discharge instructions:    Safet

## 2018-02-19 ENCOUNTER — OFFICE VISIT (OUTPATIENT)
Dept: HEMATOLOGY/ONCOLOGY | Facility: HOSPITAL | Age: 73
End: 2018-02-19
Attending: INTERNAL MEDICINE
Payer: MEDICARE

## 2018-02-19 VITALS
WEIGHT: 182 LBS | DIASTOLIC BLOOD PRESSURE: 67 MMHG | SYSTOLIC BLOOD PRESSURE: 136 MMHG | HEART RATE: 73 BPM | HEIGHT: 71 IN | RESPIRATION RATE: 16 BRPM | TEMPERATURE: 98 F | BODY MASS INDEX: 25.48 KG/M2

## 2018-02-19 DIAGNOSIS — Z51.12 ENCOUNTER FOR ANTINEOPLASTIC CHEMOTHERAPY AND IMMUNOTHERAPY: ICD-10-CM

## 2018-02-19 DIAGNOSIS — T45.1X5A CHEMOTHERAPY-INDUCED NEUTROPENIA (HCC): ICD-10-CM

## 2018-02-19 DIAGNOSIS — Z45.2 ENCOUNTER FOR CARE RELATED TO PORT-A-CATH: ICD-10-CM

## 2018-02-19 DIAGNOSIS — D64.9 ANEMIA, UNSPECIFIED TYPE: ICD-10-CM

## 2018-02-19 DIAGNOSIS — C34.12 CANCER OF UPPER LOBE OF LEFT LUNG (HCC): Primary | ICD-10-CM

## 2018-02-19 DIAGNOSIS — D70.1 CHEMOTHERAPY-INDUCED NEUTROPENIA (HCC): ICD-10-CM

## 2018-02-19 DIAGNOSIS — Z51.11 ENCOUNTER FOR ANTINEOPLASTIC CHEMOTHERAPY AND IMMUNOTHERAPY: ICD-10-CM

## 2018-02-19 LAB
ALBUMIN SERPL BCP-MCNC: 3.8 G/DL (ref 3.5–4.8)
ALBUMIN/GLOB SERPL: 1.6 {RATIO} (ref 1–2)
ALP SERPL-CCNC: 48 U/L (ref 32–100)
ALT SERPL-CCNC: 16 U/L (ref 17–63)
ANION GAP SERPL CALC-SCNC: 6 MMOL/L (ref 0–18)
AST SERPL-CCNC: 21 U/L (ref 15–41)
BASOPHILS # BLD: 0 K/UL (ref 0–0.2)
BASOPHILS NFR BLD: 1 %
BILIRUB SERPL-MCNC: 0.8 MG/DL (ref 0.3–1.2)
BUN SERPL-MCNC: 24 MG/DL (ref 8–20)
BUN/CREAT SERPL: 23.5 (ref 10–20)
CALCIUM SERPL-MCNC: 9 MG/DL (ref 8.5–10.5)
CHLORIDE SERPL-SCNC: 105 MMOL/L (ref 95–110)
CO2 SERPL-SCNC: 27 MMOL/L (ref 22–32)
CREAT SERPL-MCNC: 1.02 MG/DL (ref 0.5–1.5)
EOSINOPHIL # BLD: 0.2 K/UL (ref 0–0.7)
EOSINOPHIL NFR BLD: 4 %
ERYTHROCYTE [DISTWIDTH] IN BLOOD BY AUTOMATED COUNT: 13.3 % (ref 11–15)
GLOBULIN PLAS-MCNC: 2.4 G/DL (ref 2.5–3.7)
GLUCOSE SERPL-MCNC: 99 MG/DL (ref 70–99)
HCT VFR BLD AUTO: 40.3 % (ref 41–52)
HGB BLD-MCNC: 14 G/DL (ref 13.5–17.5)
LYMPHOCYTES # BLD: 0.8 K/UL (ref 1–4)
LYMPHOCYTES NFR BLD: 18 %
MCH RBC QN AUTO: 33.9 PG (ref 27–32)
MCHC RBC AUTO-ENTMCNC: 34.7 G/DL (ref 32–37)
MCV RBC AUTO: 97.7 FL (ref 80–100)
MONOCYTES # BLD: 0.4 K/UL (ref 0–1)
MONOCYTES NFR BLD: 8 %
NEUTROPHILS # BLD AUTO: 3.1 K/UL (ref 1.8–7.7)
NEUTROPHILS NFR BLD: 69 %
OSMOLALITY UR CALC.SUM OF ELEC: 290 MOSM/KG (ref 275–295)
PATIENT FASTING: NO
PLATELET # BLD AUTO: 161 K/UL (ref 140–400)
PMV BLD AUTO: 8.6 FL (ref 7.4–10.3)
POTASSIUM SERPL-SCNC: 4 MMOL/L (ref 3.3–5.1)
PROT SERPL-MCNC: 6.2 G/DL (ref 5.9–8.4)
RBC # BLD AUTO: 4.12 M/UL (ref 4.5–5.9)
SODIUM SERPL-SCNC: 138 MMOL/L (ref 136–144)
TSH SERPL-ACNC: 1.4 UIU/ML (ref 0.45–5.33)
WBC # BLD AUTO: 4.4 K/UL (ref 4–11)

## 2018-02-19 PROCEDURE — 36591 DRAW BLOOD OFF VENOUS DEVICE: CPT

## 2018-02-19 PROCEDURE — A4216 STERILE WATER/SALINE, 10 ML: HCPCS

## 2018-02-19 PROCEDURE — 80053 COMPREHEN METABOLIC PANEL: CPT

## 2018-02-19 PROCEDURE — G0463 HOSPITAL OUTPT CLINIC VISIT: HCPCS | Performed by: INTERNAL MEDICINE

## 2018-02-19 PROCEDURE — 99215 OFFICE O/P EST HI 40 MIN: CPT | Performed by: INTERNAL MEDICINE

## 2018-02-19 PROCEDURE — 85025 COMPLETE CBC W/AUTO DIFF WBC: CPT

## 2018-02-19 PROCEDURE — 84443 ASSAY THYROID STIM HORMONE: CPT

## 2018-02-19 RX ORDER — HEPARIN SODIUM (PORCINE) LOCK FLUSH IV SOLN 100 UNIT/ML 100 UNIT/ML
5 SOLUTION INTRAVENOUS ONCE
Status: CANCELLED | OUTPATIENT
Start: 2018-02-19

## 2018-02-19 RX ORDER — DIPHENHYDRAMINE HYDROCHLORIDE 50 MG/ML
INJECTION INTRAMUSCULAR; INTRAVENOUS EVERY 4 HOURS PRN
Status: CANCELLED | OUTPATIENT
Start: 2018-02-20

## 2018-02-19 RX ORDER — ACETAMINOPHEN 325 MG/1
TABLET ORAL EVERY 6 HOURS PRN
Status: CANCELLED | OUTPATIENT
Start: 2018-02-20

## 2018-02-19 RX ORDER — ALBUTEROL SULFATE 90 UG/1
2 AEROSOL, METERED RESPIRATORY (INHALATION) AS NEEDED
Status: CANCELLED | OUTPATIENT
Start: 2018-02-20

## 2018-02-19 RX ORDER — MEPERIDINE HYDROCHLORIDE 25 MG/ML
INJECTION INTRAMUSCULAR; INTRAVENOUS; SUBCUTANEOUS AS NEEDED
Status: CANCELLED | OUTPATIENT
Start: 2018-02-20

## 2018-02-19 RX ORDER — 0.9 % SODIUM CHLORIDE 0.9 %
VIAL (ML) INJECTION
Status: DISCONTINUED
Start: 2018-02-19 | End: 2018-02-19

## 2018-02-19 RX ORDER — RANITIDINE 25 MG/ML
50 INJECTION, SOLUTION INTRAMUSCULAR; INTRAVENOUS AS NEEDED
Status: CANCELLED | OUTPATIENT
Start: 2018-02-20

## 2018-02-19 RX ORDER — 0.9 % SODIUM CHLORIDE 0.9 %
10 VIAL (ML) INJECTION ONCE
Status: CANCELLED | OUTPATIENT
Start: 2018-02-19

## 2018-02-19 RX ORDER — HEPARIN SODIUM (PORCINE) LOCK FLUSH IV SOLN 100 UNIT/ML 100 UNIT/ML
5 SOLUTION INTRAVENOUS ONCE
Status: COMPLETED | OUTPATIENT
Start: 2018-02-19 | End: 2018-02-19

## 2018-02-19 RX ORDER — HEPARIN SODIUM (PORCINE) LOCK FLUSH IV SOLN 100 UNIT/ML 100 UNIT/ML
SOLUTION INTRAVENOUS
Status: COMPLETED
Start: 2018-02-19 | End: 2018-02-19

## 2018-02-19 RX ADMIN — HEPARIN SODIUM (PORCINE) LOCK FLUSH IV SOLN 100 UNIT/ML 500 UNITS: 100 SOLUTION INTRAVENOUS at 11:34:00

## 2018-02-19 NOTE — PROGRESS NOTES
Cancer Center Progress Note    Patient Name: Jesika Good   YOB: 1945   Medical Record Number: [id number]   Attending Physician: Gabe Andino M.D.        Chief Complaint:  Lung cancer    History of Present Illness:  Cancer history:  72-ye Status:  ECOG 0  Past Medical History:  Past Medical History:   Diagnosis Date   • Hemorrhoids    • Impotence    • Lung cancer ([facility] [address]     NSCLCA stage IIIB   • Necrotizing granulomatous inflammation of lung ([facility]    • Obstructive sleep apnea    • Sinus prob Rash  Homeopathic Products    Itching  Ibuprofen               Itching  Phenylephrine           Itching  Pseudoephedrine Hcl     Itching     Review of Systems:  All other systems reviewed and negative x12    Vital Signs:  /67 (BP Location 2016 as above. He was treated with concurrent definitive chemoradiotherapy using cisplatin and etoposide finishing in early March 2017. He had low-grade neutropenia and anemia on chemotherapy.   –Neutropenia anemia-improved following the completion of aleisha

## 2018-02-19 NOTE — PROGRESS NOTES
Port accessed using sterile technique. Labs collected per order. Port deaccessed, site covered w/ 2x2 and secured w/ tape. Appeared to tolerate well. Patient discharged to waiting area for MD davis.

## 2018-02-20 ENCOUNTER — OFFICE VISIT (OUTPATIENT)
Dept: HEMATOLOGY/ONCOLOGY | Facility: HOSPITAL | Age: 73
End: 2018-02-20
Attending: INTERNAL MEDICINE
Payer: MEDICARE

## 2018-02-20 VITALS
SYSTOLIC BLOOD PRESSURE: 123 MMHG | HEART RATE: 62 BPM | RESPIRATION RATE: 16 BRPM | DIASTOLIC BLOOD PRESSURE: 56 MMHG | TEMPERATURE: 98 F

## 2018-02-20 DIAGNOSIS — C34.12 CANCER OF UPPER LOBE OF LEFT LUNG (HCC): Primary | ICD-10-CM

## 2018-02-20 DIAGNOSIS — Z45.2 ENCOUNTER FOR CARE RELATED TO PORT-A-CATH: ICD-10-CM

## 2018-02-20 PROCEDURE — C9492 INJECTION, DURVALUMAB: HCPCS | Performed by: INTERNAL MEDICINE

## 2018-02-20 PROCEDURE — 96413 CHEMO IV INFUSION 1 HR: CPT

## 2018-02-20 PROCEDURE — A4216 STERILE WATER/SALINE, 10 ML: HCPCS

## 2018-02-20 RX ORDER — 0.9 % SODIUM CHLORIDE 0.9 %
10 VIAL (ML) INJECTION ONCE
Status: CANCELLED | OUTPATIENT
Start: 2018-02-20

## 2018-02-20 RX ORDER — HEPARIN SODIUM (PORCINE) LOCK FLUSH IV SOLN 100 UNIT/ML 100 UNIT/ML
5 SOLUTION INTRAVENOUS ONCE
Status: CANCELLED | OUTPATIENT
Start: 2018-02-20

## 2018-02-20 RX ORDER — SODIUM CHLORIDE 9 MG/ML
INJECTION, SOLUTION INTRAVENOUS
Status: DISCONTINUED
Start: 2018-02-20 | End: 2018-02-20

## 2018-02-20 RX ORDER — HEPARIN SODIUM (PORCINE) LOCK FLUSH IV SOLN 100 UNIT/ML 100 UNIT/ML
SOLUTION INTRAVENOUS
Status: DISCONTINUED
Start: 2018-02-20 | End: 2018-02-20

## 2018-02-20 RX ORDER — 0.9 % SODIUM CHLORIDE 0.9 %
VIAL (ML) INJECTION
Status: DISCONTINUED
Start: 2018-02-20 | End: 2018-02-20

## 2018-02-20 RX ORDER — HEPARIN SODIUM (PORCINE) LOCK FLUSH IV SOLN 100 UNIT/ML 100 UNIT/ML
5 SOLUTION INTRAVENOUS ONCE
Status: COMPLETED | OUTPATIENT
Start: 2018-02-20 | End: 2018-02-20

## 2018-02-20 RX ADMIN — HEPARIN SODIUM (PORCINE) LOCK FLUSH IV SOLN 100 UNIT/ML 500 UNITS: 100 SOLUTION INTRAVENOUS at 11:10:00

## 2018-02-20 NOTE — PROGRESS NOTES
Post Chemo Documentation    Patient is here for treatment today, Cycle 9,Day1 Imfinzi.      Arrives Ambulating independently      Accompanied by Spouse just after infusion started                                 Modifications in dose or schedule: No      Ve information (if applicable).     Not applicable  Discharge instructions:    Safety/handling: reviewed  Reviewed symptoms to report to doctor and phone number to call contact Dr. Cristin Jones with any concerns    Discharged to Other stable from infusion     [address]

## 2018-02-28 ENCOUNTER — SNF/IP PROF CHARGE ONLY (OUTPATIENT)
Dept: HEMATOLOGY/ONCOLOGY | Facility: HOSPITAL | Age: 73
End: 2018-02-28

## 2018-02-28 DIAGNOSIS — C34.12 CANCER OF UPPER LOBE OF LEFT LUNG (HCC): ICD-10-CM

## 2018-02-28 PROCEDURE — G9678 ONCOLOGY CARE MODEL SERVICE: HCPCS | Performed by: INTERNAL MEDICINE

## 2018-03-06 ENCOUNTER — OFFICE VISIT (OUTPATIENT)
Dept: HEMATOLOGY/ONCOLOGY | Facility: HOSPITAL | Age: 73
End: 2018-03-06
Attending: INTERNAL MEDICINE
Payer: MEDICARE

## 2018-03-06 VITALS
HEIGHT: 71 IN | SYSTOLIC BLOOD PRESSURE: 138 MMHG | RESPIRATION RATE: 18 BRPM | DIASTOLIC BLOOD PRESSURE: 67 MMHG | HEART RATE: 66 BPM | WEIGHT: 181 LBS | BODY MASS INDEX: 25.34 KG/M2 | TEMPERATURE: 98 F

## 2018-03-06 DIAGNOSIS — Z45.2 ENCOUNTER FOR CARE RELATED TO PORT-A-CATH: ICD-10-CM

## 2018-03-06 DIAGNOSIS — D64.9 ANEMIA, UNSPECIFIED TYPE: ICD-10-CM

## 2018-03-06 DIAGNOSIS — T45.1X5A CHEMOTHERAPY-INDUCED NEUTROPENIA (HCC): ICD-10-CM

## 2018-03-06 DIAGNOSIS — Z51.11 ENCOUNTER FOR ANTINEOPLASTIC CHEMOTHERAPY AND IMMUNOTHERAPY: ICD-10-CM

## 2018-03-06 DIAGNOSIS — D70.1 CHEMOTHERAPY-INDUCED NEUTROPENIA (HCC): ICD-10-CM

## 2018-03-06 DIAGNOSIS — C34.12 CANCER OF UPPER LOBE OF LEFT LUNG (HCC): Primary | ICD-10-CM

## 2018-03-06 DIAGNOSIS — Z51.12 ENCOUNTER FOR ANTINEOPLASTIC CHEMOTHERAPY AND IMMUNOTHERAPY: ICD-10-CM

## 2018-03-06 DIAGNOSIS — Z87.891 FORMER SMOKER: ICD-10-CM

## 2018-03-06 PROCEDURE — C9492 INJECTION, DURVALUMAB: HCPCS | Performed by: INTERNAL MEDICINE

## 2018-03-06 PROCEDURE — A4216 STERILE WATER/SALINE, 10 ML: HCPCS

## 2018-03-06 PROCEDURE — 96413 CHEMO IV INFUSION 1 HR: CPT

## 2018-03-06 PROCEDURE — 99215 OFFICE O/P EST HI 40 MIN: CPT | Performed by: INTERNAL MEDICINE

## 2018-03-06 RX ORDER — DIPHENHYDRAMINE HYDROCHLORIDE 50 MG/ML
INJECTION INTRAMUSCULAR; INTRAVENOUS EVERY 4 HOURS PRN
Status: CANCELLED | OUTPATIENT
Start: 2018-03-06

## 2018-03-06 RX ORDER — 0.9 % SODIUM CHLORIDE 0.9 %
VIAL (ML) INJECTION
Status: DISCONTINUED
Start: 2018-03-06 | End: 2018-03-06

## 2018-03-06 RX ORDER — ALBUTEROL SULFATE 90 UG/1
2 AEROSOL, METERED RESPIRATORY (INHALATION) AS NEEDED
Status: CANCELLED | OUTPATIENT
Start: 2018-03-06

## 2018-03-06 RX ORDER — HEPARIN SODIUM (PORCINE) LOCK FLUSH IV SOLN 100 UNIT/ML 100 UNIT/ML
5 SOLUTION INTRAVENOUS ONCE
Status: COMPLETED | OUTPATIENT
Start: 2018-03-06 | End: 2018-03-06

## 2018-03-06 RX ORDER — HEPARIN SODIUM (PORCINE) LOCK FLUSH IV SOLN 100 UNIT/ML 100 UNIT/ML
5 SOLUTION INTRAVENOUS ONCE
Status: CANCELLED | OUTPATIENT
Start: 2018-03-06

## 2018-03-06 RX ORDER — RANITIDINE 25 MG/ML
50 INJECTION, SOLUTION INTRAMUSCULAR; INTRAVENOUS AS NEEDED
Status: CANCELLED | OUTPATIENT
Start: 2018-03-06

## 2018-03-06 RX ORDER — ACETAMINOPHEN 325 MG/1
TABLET ORAL EVERY 6 HOURS PRN
Status: CANCELLED | OUTPATIENT
Start: 2018-03-06

## 2018-03-06 RX ORDER — HEPARIN SODIUM (PORCINE) LOCK FLUSH IV SOLN 100 UNIT/ML 100 UNIT/ML
SOLUTION INTRAVENOUS
Status: COMPLETED
Start: 2018-03-06 | End: 2018-03-06

## 2018-03-06 RX ORDER — 0.9 % SODIUM CHLORIDE 0.9 %
10 VIAL (ML) INJECTION ONCE
Status: CANCELLED | OUTPATIENT
Start: 2018-03-06

## 2018-03-06 RX ORDER — MEPERIDINE HYDROCHLORIDE 25 MG/ML
INJECTION INTRAMUSCULAR; INTRAVENOUS; SUBCUTANEOUS AS NEEDED
Status: CANCELLED | OUTPATIENT
Start: 2018-03-06

## 2018-03-06 RX ORDER — SODIUM CHLORIDE 9 MG/ML
INJECTION, SOLUTION INTRAVENOUS
Status: DISCONTINUED
Start: 2018-03-06 | End: 2018-03-06

## 2018-03-06 RX ADMIN — HEPARIN SODIUM (PORCINE) LOCK FLUSH IV SOLN 100 UNIT/ML 500 UNITS: 100 SOLUTION INTRAVENOUS at 14:24:00

## 2018-03-06 NOTE — PROGRESS NOTES
Cancer Center Progress Note    Patient Name: Ashlee Gonsales   YOB: 1945   Medical Record Number: [id number]   Attending Physician: Gris Alcantar M.D.        Chief Complaint:  Lung cancer    History of Present Illness:  Cancer history:  72-ye Status:  ECOG 0  Past Medical History:  Past Medical History:   Diagnosis Date   • Hemorrhoids    • Impotence    • Lung cancer ([facility] [address]     NSCLCA stage IIIB   • [facility]rot[facility]    • Obstructive sleep apnea    • Sinus prob Rash  Homeopathic Products    Itching  Ibuprofen               Itching  Phenylephrine           Itching  Pseudoephedrine Hcl     Itching     Review of Systems:  All other systems reviewed and negative x12    Vital Signs:  /67 (BP Location: Left arm, He was treated with concurrent definitive chemoradiotherapy using cisplatin and etoposide finishing in early March 2017. He had low-grade neutropenia and anemia on chemotherapy. –Neutropenia anemia-improved following the completion of chemotherapy.   –gi

## 2018-03-06 NOTE — PROGRESS NOTES
Post Chemo Documentation     Patient is here for treatment today, Cycle10, Day1 Yusuf Munoz. Per Dr. Manohar Saunders \"Proceed with Cycle 10 durvalumab.  Orders signed\"     Arrives Ambulating independently                      Accompanied by Spouse person was attentive during education, verbalized understanding, all questions were answered and patient was instructed to call with further questions. Reinforcement of education is needed at next visit?  Yes  Include language provided and  i

## 2018-03-07 ENCOUNTER — TELEPHONE (OUTPATIENT)
Dept: PULMONOLOGY | Facility: CLINIC | Age: 73
End: 2018-03-07

## 2018-03-07 DIAGNOSIS — G47.33 OSA (OBSTRUCTIVE SLEEP APNEA): Primary | ICD-10-CM

## 2018-03-07 NOTE — TELEPHONE ENCOUNTER
Spouse states pts CPAP machine is broken, pt is eligible for new machine, and requesting new CPA machine at 9 C[address]. Dr. Erik Kamara, okay to order new CPAP machine 9 C[facility]?

## 2018-03-07 NOTE — TELEPHONE ENCOUNTER
Pts wife/Satinder, indicates pt cpap is not working well and indicates pt is due for new cpap, pls call [phone] or M#[phone],thanks.   *[facility] / #[phone] for cpap supplies

## 2018-03-08 NOTE — TELEPHONE ENCOUNTER
Dr. Jaime Siegel informed of below. Telephone order with read back from Dr. Jaime Siegel for new CPAP machine 9 CWP. Order faxed to [facility] [phone]. Fax confirmation received. Spouse informed of this. Spouse verbalized understanding.

## 2018-03-13 ENCOUNTER — TELEPHONE (OUTPATIENT)
Dept: PULMONOLOGY | Facility: CLINIC | Age: 73
End: 2018-03-13

## 2018-03-13 NOTE — TELEPHONE ENCOUNTER
Alexandra Concepcion is asking RN to fax most recent Sleep Study and office notes to [phone] attn: Alexandra Concepcion. For add'l questions pls call. Thank you.

## 2018-03-13 NOTE — TELEPHONE ENCOUNTER
PSG results, titration results, and office visit notes from 4-20-17 faxed to [facility] [phone] and left for Maite FAULKNER to . Fax confirmation received.

## 2018-03-14 ENCOUNTER — TELEPHONE (OUTPATIENT)
Dept: PULMONOLOGY | Facility: CLINIC | Age: 73
End: 2018-03-14

## 2018-03-14 NOTE — TELEPHONE ENCOUNTER
Spoke to Amaya who sttd that he spoke to pt about schedule an office visit with Dr. Jason Slaughter. Alma Rdz pt will be leaving to go out of town for 19 days. Pt will call to schedule an appt.

## 2018-03-14 NOTE — TELEPHONE ENCOUNTER
[facility] states they received office notes from 4/20/17 but they are too old. He needs current notes within last 6 mos discussing use of CPAP. Please fax to [phone].

## 2018-03-20 ENCOUNTER — OFFICE VISIT (OUTPATIENT)
Dept: HEMATOLOGY/ONCOLOGY | Facility: HOSPITAL | Age: 73
End: 2018-03-20
Attending: INTERNAL MEDICINE
Payer: MEDICARE

## 2018-03-20 VITALS
SYSTOLIC BLOOD PRESSURE: 132 MMHG | RESPIRATION RATE: 16 BRPM | HEIGHT: 71 IN | BODY MASS INDEX: 25.2 KG/M2 | TEMPERATURE: 98 F | DIASTOLIC BLOOD PRESSURE: 61 MMHG | HEART RATE: 67 BPM | WEIGHT: 180 LBS

## 2018-03-20 DIAGNOSIS — D70.1 CHEMOTHERAPY-INDUCED NEUTROPENIA (HCC): ICD-10-CM

## 2018-03-20 DIAGNOSIS — Z45.2 ENCOUNTER FOR CARE RELATED TO PORT-A-CATH: ICD-10-CM

## 2018-03-20 DIAGNOSIS — C34.12 CANCER OF UPPER LOBE OF LEFT LUNG (HCC): Primary | ICD-10-CM

## 2018-03-20 DIAGNOSIS — T45.1X5A CHEMOTHERAPY-INDUCED NEUTROPENIA (HCC): ICD-10-CM

## 2018-03-20 DIAGNOSIS — Z51.11 ENCOUNTER FOR ANTINEOPLASTIC CHEMOTHERAPY AND IMMUNOTHERAPY: ICD-10-CM

## 2018-03-20 DIAGNOSIS — Z51.12 ENCOUNTER FOR ANTINEOPLASTIC CHEMOTHERAPY AND IMMUNOTHERAPY: ICD-10-CM

## 2018-03-20 DIAGNOSIS — Z87.891 FORMER SMOKER: ICD-10-CM

## 2018-03-20 LAB
ALBUMIN SERPL BCP-MCNC: 3.7 G/DL (ref 3.5–4.8)
ALBUMIN/GLOB SERPL: 1.5 {RATIO} (ref 1–2)
ALP SERPL-CCNC: 48 U/L (ref 32–100)
ALT SERPL-CCNC: 19 U/L (ref 17–63)
ANION GAP SERPL CALC-SCNC: 6 MMOL/L (ref 0–18)
AST SERPL-CCNC: 22 U/L (ref 15–41)
BASOPHILS # BLD: 0 K/UL (ref 0–0.2)
BASOPHILS NFR BLD: 1 %
BILIRUB SERPL-MCNC: 0.8 MG/DL (ref 0.3–1.2)
BUN SERPL-MCNC: 22 MG/DL (ref 8–20)
BUN/CREAT SERPL: 21.2 (ref 10–20)
CALCIUM SERPL-MCNC: 8.9 MG/DL (ref 8.5–10.5)
CHLORIDE SERPL-SCNC: 108 MMOL/L (ref 95–110)
CO2 SERPL-SCNC: 26 MMOL/L (ref 22–32)
CREAT SERPL-MCNC: 1.04 MG/DL (ref 0.5–1.5)
EOSINOPHIL # BLD: 0.2 K/UL (ref 0–0.7)
EOSINOPHIL NFR BLD: 4 %
ERYTHROCYTE [DISTWIDTH] IN BLOOD BY AUTOMATED COUNT: 12.8 % (ref 11–15)
GLOBULIN PLAS-MCNC: 2.5 G/DL (ref 2.5–3.7)
GLUCOSE SERPL-MCNC: 126 MG/DL (ref 70–99)
HCT VFR BLD AUTO: 38.8 % (ref 41–52)
HGB BLD-MCNC: 13.3 G/DL (ref 13.5–17.5)
LYMPHOCYTES # BLD: 0.8 K/UL (ref 1–4)
LYMPHOCYTES NFR BLD: 18 %
MCH RBC QN AUTO: 33.4 PG (ref 27–32)
MCHC RBC AUTO-ENTMCNC: 34.2 G/DL (ref 32–37)
MCV RBC AUTO: 97.8 FL (ref 80–100)
MONOCYTES # BLD: 0.4 K/UL (ref 0–1)
MONOCYTES NFR BLD: 8 %
NEUTROPHILS # BLD AUTO: 3.2 K/UL (ref 1.8–7.7)
NEUTROPHILS NFR BLD: 70 %
OSMOLALITY UR CALC.SUM OF ELEC: 295 MOSM/KG (ref 275–295)
PATIENT FASTING: NO
PLATELET # BLD AUTO: 156 K/UL (ref 140–400)
PMV BLD AUTO: 8.6 FL (ref 7.4–10.3)
POTASSIUM SERPL-SCNC: 3.8 MMOL/L (ref 3.3–5.1)
PROT SERPL-MCNC: 6.2 G/DL (ref 5.9–8.4)
RBC # BLD AUTO: 3.97 M/UL (ref 4.5–5.9)
SODIUM SERPL-SCNC: 140 MMOL/L (ref 136–144)
TSH SERPL-ACNC: 2.16 UIU/ML (ref 0.45–5.33)
WBC # BLD AUTO: 4.6 K/UL (ref 4–11)

## 2018-03-20 PROCEDURE — 96413 CHEMO IV INFUSION 1 HR: CPT

## 2018-03-20 PROCEDURE — C9492 INJECTION, DURVALUMAB: HCPCS | Performed by: INTERNAL MEDICINE

## 2018-03-20 PROCEDURE — 80053 COMPREHEN METABOLIC PANEL: CPT

## 2018-03-20 PROCEDURE — A4216 STERILE WATER/SALINE, 10 ML: HCPCS

## 2018-03-20 PROCEDURE — 84443 ASSAY THYROID STIM HORMONE: CPT

## 2018-03-20 PROCEDURE — 85025 COMPLETE CBC W/AUTO DIFF WBC: CPT

## 2018-03-20 PROCEDURE — 99215 OFFICE O/P EST HI 40 MIN: CPT | Performed by: INTERNAL MEDICINE

## 2018-03-20 RX ORDER — ACETAMINOPHEN 325 MG/1
TABLET ORAL EVERY 6 HOURS PRN
Status: CANCELLED | OUTPATIENT
Start: 2018-03-20

## 2018-03-20 RX ORDER — RANITIDINE 25 MG/ML
50 INJECTION, SOLUTION INTRAMUSCULAR; INTRAVENOUS AS NEEDED
Status: CANCELLED | OUTPATIENT
Start: 2018-03-20

## 2018-03-20 RX ORDER — 0.9 % SODIUM CHLORIDE 0.9 %
VIAL (ML) INJECTION
Status: DISCONTINUED
Start: 2018-03-20 | End: 2018-03-20

## 2018-03-20 RX ORDER — 0.9 % SODIUM CHLORIDE 0.9 %
10 VIAL (ML) INJECTION ONCE
Status: CANCELLED | OUTPATIENT
Start: 2018-03-20

## 2018-03-20 RX ORDER — 0.9 % SODIUM CHLORIDE 0.9 %
VIAL (ML) INJECTION
Status: DISPENSED
Start: 2018-03-20 | End: 2018-03-20

## 2018-03-20 RX ORDER — HEPARIN SODIUM (PORCINE) LOCK FLUSH IV SOLN 100 UNIT/ML 100 UNIT/ML
5 SOLUTION INTRAVENOUS ONCE
Status: CANCELLED | OUTPATIENT
Start: 2018-03-20

## 2018-03-20 RX ORDER — ALBUTEROL SULFATE 90 UG/1
2 AEROSOL, METERED RESPIRATORY (INHALATION) AS NEEDED
Status: CANCELLED | OUTPATIENT
Start: 2018-03-20

## 2018-03-20 RX ORDER — MEPERIDINE HYDROCHLORIDE 25 MG/ML
INJECTION INTRAMUSCULAR; INTRAVENOUS; SUBCUTANEOUS AS NEEDED
Status: CANCELLED | OUTPATIENT
Start: 2018-03-20

## 2018-03-20 RX ORDER — DIPHENHYDRAMINE HYDROCHLORIDE 50 MG/ML
INJECTION INTRAMUSCULAR; INTRAVENOUS EVERY 4 HOURS PRN
Status: CANCELLED | OUTPATIENT
Start: 2018-03-20

## 2018-03-20 RX ORDER — SODIUM CHLORIDE 9 MG/ML
INJECTION, SOLUTION INTRAVENOUS
Status: DISPENSED
Start: 2018-03-20 | End: 2018-03-20

## 2018-03-20 RX ORDER — HEPARIN SODIUM (PORCINE) LOCK FLUSH IV SOLN 100 UNIT/ML 100 UNIT/ML
5 SOLUTION INTRAVENOUS ONCE
Status: COMPLETED | OUTPATIENT
Start: 2018-03-20 | End: 2018-03-20

## 2018-03-20 RX ORDER — HEPARIN SODIUM (PORCINE) LOCK FLUSH IV SOLN 100 UNIT/ML 100 UNIT/ML
SOLUTION INTRAVENOUS
Status: DISPENSED
Start: 2018-03-20 | End: 2018-03-20

## 2018-03-20 RX ADMIN — HEPARIN SODIUM (PORCINE) LOCK FLUSH IV SOLN 100 UNIT/ML 500 UNITS: 100 SOLUTION INTRAVENOUS at 13:45:00

## 2018-03-20 NOTE — PROGRESS NOTES
Right chest port accessed for SD treatment. Good blood return noted from port. Labs obtained and sent. Port flushed with 20 ml ns, saline locked with alcohol caps.

## 2018-03-20 NOTE — PROGRESS NOTES
Cancer Center Progress Note    Patient Name: Qi Patel   YOB: 1945   Medical Record Number: [id number]   Attending Physician: Nica Tolbert M.D.        Chief Complaint:  Lung cancer    History of Present Illness:  Cancer history:  72-ye Status:  ECOG 0  Past Medical History:  Past Medical History:   Diagnosis Date   • Hemorrhoids    • Impotence    • Lung cancer ([facility] [address]     NSCLCA stage IIIB   • [facility]rot[facility]    • Obstructive sleep apnea    • Sinus prob Rash  Homeopathic Products    Itching  Ibuprofen               Itching  Phenylephrine           Itching  Pseudoephedrine Hcl     Itching     Review of Systems:  All other systems reviewed and negative x12    Vital Signs:  /61 (BP Location as above. He was treated with concurrent definitive chemoradiotherapy using cisplatin and etoposide finishing in early March 2017. He had low-grade neutropenia and anemia on chemotherapy.   –Neutropenia anemia-improved following the completion of chemothe

## 2018-03-20 NOTE — PATIENT INSTRUCTIONS
Cancer Treatment Side Effects (refer to [facility] L[facility] [address]85 for further information):   Allergic reactions  Constipation  Diarrhea  Eye disorders  Fatigue  Hormone gland changes involving the thyroid, pituitary, adrenal and pancreas  Kidney / Bladder effect tobacco  o An acceptable mouthwash is Biotene®   o If soreness or sores develop, contact the office. Diarrhea or Constipation    o For Diarrhea:  Imodium A-D use for diarrhea:   Take 2 tabs (4mg) at the first sign of diarrhea; then take 1 tab (2mg) every bleeds, blood in urine, stool or phlegm)  • Pain with urination  • Persistent mood changes, depression, nervousness, difficulty sleeping   • Pain, redness, swelling or blistering at the IV site  • If you go to Emergency Room for any reason or seek medical fluids after you have treatment. 4. Sexual activity is safe while throughout treatment. It is possible that traces of the oral chemotherapy may be present in vaginal fluid or semen for 48 hours after taking. A condom should be used during this time.   E

## 2018-03-31 ENCOUNTER — SNF/IP PROF CHARGE ONLY (OUTPATIENT)
Dept: HEMATOLOGY/ONCOLOGY | Facility: HOSPITAL | Age: 73
End: 2018-03-31

## 2018-03-31 DIAGNOSIS — C34.12 CANCER OF UPPER LOBE OF LEFT LUNG (HCC): ICD-10-CM

## 2018-03-31 PROCEDURE — G9678 ONCOLOGY CARE MODEL SERVICE: HCPCS | Performed by: INTERNAL MEDICINE

## 2018-04-10 ENCOUNTER — OFFICE VISIT (OUTPATIENT)
Dept: HEMATOLOGY/ONCOLOGY | Facility: HOSPITAL | Age: 73
End: 2018-04-10
Attending: INTERNAL MEDICINE
Payer: MEDICARE

## 2018-04-10 VITALS
HEIGHT: 71 IN | TEMPERATURE: 99 F | OXYGEN SATURATION: 96 % | WEIGHT: 173 LBS | SYSTOLIC BLOOD PRESSURE: 111 MMHG | DIASTOLIC BLOOD PRESSURE: 58 MMHG | BODY MASS INDEX: 24.22 KG/M2 | HEART RATE: 83 BPM | RESPIRATION RATE: 16 BRPM

## 2018-04-10 DIAGNOSIS — D64.9 ANEMIA, UNSPECIFIED TYPE: ICD-10-CM

## 2018-04-10 DIAGNOSIS — C34.12 CANCER OF UPPER LOBE OF LEFT LUNG (HCC): Primary | ICD-10-CM

## 2018-04-10 DIAGNOSIS — Z51.11 ENCOUNTER FOR ANTINEOPLASTIC CHEMOTHERAPY AND IMMUNOTHERAPY: ICD-10-CM

## 2018-04-10 DIAGNOSIS — Z45.2 ENCOUNTER FOR CARE RELATED TO PORT-A-CATH: ICD-10-CM

## 2018-04-10 DIAGNOSIS — Z51.12 ENCOUNTER FOR ANTINEOPLASTIC CHEMOTHERAPY AND IMMUNOTHERAPY: ICD-10-CM

## 2018-04-10 DIAGNOSIS — Z87.891 FORMER SMOKER: ICD-10-CM

## 2018-04-10 PROCEDURE — 99215 OFFICE O/P EST HI 40 MIN: CPT | Performed by: INTERNAL MEDICINE

## 2018-04-10 PROCEDURE — C9492 INJECTION, DURVALUMAB: HCPCS | Performed by: INTERNAL MEDICINE

## 2018-04-10 PROCEDURE — 96413 CHEMO IV INFUSION 1 HR: CPT

## 2018-04-10 PROCEDURE — A4216 STERILE WATER/SALINE, 10 ML: HCPCS

## 2018-04-10 RX ORDER — HEPARIN SODIUM (PORCINE) LOCK FLUSH IV SOLN 100 UNIT/ML 100 UNIT/ML
SOLUTION INTRAVENOUS
Status: COMPLETED
Start: 2018-04-10 | End: 2018-04-10

## 2018-04-10 RX ORDER — 0.9 % SODIUM CHLORIDE 0.9 %
VIAL (ML) INJECTION
Status: DISCONTINUED
Start: 2018-04-10 | End: 2018-04-10

## 2018-04-10 RX ORDER — 0.9 % SODIUM CHLORIDE 0.9 %
10 VIAL (ML) INJECTION ONCE
Status: CANCELLED | OUTPATIENT
Start: 2018-04-10

## 2018-04-10 RX ORDER — DIPHENHYDRAMINE HYDROCHLORIDE 50 MG/ML
INJECTION INTRAMUSCULAR; INTRAVENOUS EVERY 4 HOURS PRN
Status: CANCELLED | OUTPATIENT
Start: 2018-04-10

## 2018-04-10 RX ORDER — ALBUTEROL SULFATE 90 UG/1
2 AEROSOL, METERED RESPIRATORY (INHALATION) AS NEEDED
Status: CANCELLED | OUTPATIENT
Start: 2018-04-10

## 2018-04-10 RX ORDER — MEPERIDINE HYDROCHLORIDE 25 MG/ML
INJECTION INTRAMUSCULAR; INTRAVENOUS; SUBCUTANEOUS AS NEEDED
Status: CANCELLED | OUTPATIENT
Start: 2018-04-10

## 2018-04-10 RX ORDER — SODIUM CHLORIDE 9 MG/ML
INJECTION, SOLUTION INTRAVENOUS
Status: DISCONTINUED
Start: 2018-04-10 | End: 2018-04-10

## 2018-04-10 RX ORDER — HEPARIN SODIUM (PORCINE) LOCK FLUSH IV SOLN 100 UNIT/ML 100 UNIT/ML
5 SOLUTION INTRAVENOUS ONCE
Status: COMPLETED | OUTPATIENT
Start: 2018-04-10 | End: 2018-04-10

## 2018-04-10 RX ORDER — HEPARIN SODIUM (PORCINE) LOCK FLUSH IV SOLN 100 UNIT/ML 100 UNIT/ML
5 SOLUTION INTRAVENOUS ONCE
Status: CANCELLED | OUTPATIENT
Start: 2018-04-10

## 2018-04-10 RX ORDER — RANITIDINE 25 MG/ML
50 INJECTION, SOLUTION INTRAMUSCULAR; INTRAVENOUS AS NEEDED
Status: CANCELLED | OUTPATIENT
Start: 2018-04-10

## 2018-04-10 RX ORDER — ACETAMINOPHEN 325 MG/1
TABLET ORAL EVERY 6 HOURS PRN
Status: CANCELLED | OUTPATIENT
Start: 2018-04-10

## 2018-04-10 RX ADMIN — HEPARIN SODIUM (PORCINE) LOCK FLUSH IV SOLN 100 UNIT/ML 500 UNITS: 100 SOLUTION INTRAVENOUS at 14:05:00

## 2018-04-10 NOTE — PROGRESS NOTES
Cancer Center Progress Note    Patient Name: Leopoldo Sessions   YOB: 1945   Medical Record Number: [id number]   Attending Physician: Emily Baker M.D.        Chief Complaint:  Lung cancer    History of Present Illness:  Cancer history:  73-ye Status:  ECOG 0  Past Medical History:  Past Medical History:   Diagnosis Date   • Hemorrhoids    • Impotence    • Lung cancer ([facility] [address]     NSCLCA stage IIIB   • [facility]ecrot[facility] granulomat[facility]    • Obstructive sleep apnea    • Sinus prob Rash  Homeopathic Products    Itching  Ibuprofen               Itching  Phenylephrine           Itching  Pseudoephedrine Hcl     Itching     Review of Systems:  All other systems reviewed and negative x12    Vital Signs:  /58 (BP Location: Left arm, as above. He was treated with concurrent definitive chemoradiotherapy using cisplatin and etoposide finishing in early March 2017. He had low-grade neutropenia and anemia on chemotherapy.   –Neutropenia anemia-improved following the completion of chemothe

## 2018-04-10 NOTE — PROGRESS NOTES
Post Chemo Documentation    Patient is here for treatment today, Cycle12, Day 1  Infimzi      Arrives Ambulating independently      Accompanied by Spouse                                 Modifications in dose or schedule: No      Verbalizes her is feeling b

## 2018-04-19 ENCOUNTER — TELEPHONE (OUTPATIENT)
Dept: HEMATOLOGY/ONCOLOGY | Facility: HOSPITAL | Age: 73
End: 2018-04-19

## 2018-04-19 NOTE — TELEPHONE ENCOUNTER
Toxicities: C13 D1 Durvalumab on 4/10/2018    Post Nasal Drip/Productive cough/Night Sweats/Fatigue/Anorexia    Fever: Grade 0 (Luis reports he has had a cold for the last 2-3 weeks. He denies c/s/f. He said he did have drenching night sweats last night. He denies having a runny nose, no sore throat or head congestion. He has post nasal drip, chest congestion & a productive cough. He is coughing up yellow/green mucus. He stated he feels the cold symptoms are getting better. He denies urgency, frequency or burning with urination. His urine is a light yellow color. He denies hematuria.)  Fatigue: Grade 1  Dyspnea: Grade 0 (Denies dyspnea at rest or with exertion. He denies chest pain or pressure.)  Anorexia: Grade 2 (Luis reports having a decreased appetite. He is eating small meals 3-4 per day. He will try using the Ensure/Boost supplements.)  Nausea: Grade 0 (Denies)  Vomiting: Grade 0 (Denies)  Dehydration: Grade 1 (Pt is drinking an adequate amount of caffeine free liquids. He denies feeling light headed or dizzy.)  Constipation: Grade 0 (Luis reports he is having 1-2 formed, brown BMs daily. He denies hematochezia or melena.)  Diarrhea: Grade 0 (Denies)    I offered Luis an acute care visit with [place]. He declined. He stated they just got an emergent phone call about his sister in law. They are leaving now to go to her home. He stated she may have passed away. I encouraged him to call the office if his symptoms don't resolve or become worse. He agreed.

## 2018-04-19 NOTE — TELEPHONE ENCOUNTER
Kaity Engle has a cough and is having night sweats. He does not have an appetite, he is not eating. He is coughing up mucus. Kaity Engle is schedule to have a blood test on Tuesday and visit with the doctor. Satinder sounds very concerned.  Please Advise thanks

## 2018-04-23 ENCOUNTER — TELEPHONE (OUTPATIENT)
Dept: HEMATOLOGY/ONCOLOGY | Facility: HOSPITAL | Age: 73
End: 2018-04-23

## 2018-04-23 NOTE — TELEPHONE ENCOUNTER
The patient wife called and said they had a death in the family and the  will be tomorrow.  Conrado Geller has MD michael and chemo for tomorrow, they would like to reschedule, please advise

## 2018-04-24 ENCOUNTER — APPOINTMENT (OUTPATIENT)
Dept: HEMATOLOGY/ONCOLOGY | Facility: HOSPITAL | Age: 73
End: 2018-04-24
Attending: INTERNAL MEDICINE
Payer: MEDICARE

## 2018-04-25 ENCOUNTER — NURSE ONLY (OUTPATIENT)
Dept: HEMATOLOGY/ONCOLOGY | Facility: HOSPITAL | Age: 73
End: 2018-04-25
Attending: INTERNAL MEDICINE
Payer: MEDICARE

## 2018-04-25 VITALS
SYSTOLIC BLOOD PRESSURE: 126 MMHG | HEART RATE: 80 BPM | WEIGHT: 173 LBS | RESPIRATION RATE: 18 BRPM | HEIGHT: 71 IN | BODY MASS INDEX: 24.22 KG/M2 | DIASTOLIC BLOOD PRESSURE: 53 MMHG | TEMPERATURE: 99 F

## 2018-04-25 VITALS
TEMPERATURE: 99 F | DIASTOLIC BLOOD PRESSURE: 53 MMHG | SYSTOLIC BLOOD PRESSURE: 126 MMHG | WEIGHT: 173 LBS | HEART RATE: 80 BPM | BODY MASS INDEX: 24 KG/M2 | RESPIRATION RATE: 18 BRPM

## 2018-04-25 DIAGNOSIS — Z87.891 FORMER SMOKER: ICD-10-CM

## 2018-04-25 DIAGNOSIS — R74.01 TRANSAMINITIS: ICD-10-CM

## 2018-04-25 DIAGNOSIS — Z51.11 ENCOUNTER FOR ANTINEOPLASTIC CHEMOTHERAPY AND IMMUNOTHERAPY: ICD-10-CM

## 2018-04-25 DIAGNOSIS — Z45.2 ENCOUNTER FOR CARE RELATED TO PORT-A-CATH: Primary | ICD-10-CM

## 2018-04-25 DIAGNOSIS — J18.9 PNEUMONITIS: ICD-10-CM

## 2018-04-25 DIAGNOSIS — C34.12 CANCER OF UPPER LOBE OF LEFT LUNG (HCC): Primary | ICD-10-CM

## 2018-04-25 DIAGNOSIS — C34.12 CANCER OF UPPER LOBE OF LEFT LUNG (HCC): ICD-10-CM

## 2018-04-25 DIAGNOSIS — D64.9 ANEMIA, UNSPECIFIED TYPE: ICD-10-CM

## 2018-04-25 DIAGNOSIS — Z51.12 ENCOUNTER FOR ANTINEOPLASTIC CHEMOTHERAPY AND IMMUNOTHERAPY: ICD-10-CM

## 2018-04-25 PROCEDURE — 80053 COMPREHEN METABOLIC PANEL: CPT

## 2018-04-25 PROCEDURE — 36591 DRAW BLOOD OFF VENOUS DEVICE: CPT

## 2018-04-25 PROCEDURE — 85025 COMPLETE CBC W/AUTO DIFF WBC: CPT

## 2018-04-25 PROCEDURE — A4216 STERILE WATER/SALINE, 10 ML: HCPCS

## 2018-04-25 PROCEDURE — 99215 OFFICE O/P EST HI 40 MIN: CPT | Performed by: INTERNAL MEDICINE

## 2018-04-25 PROCEDURE — 84443 ASSAY THYROID STIM HORMONE: CPT

## 2018-04-25 RX ORDER — 0.9 % SODIUM CHLORIDE 0.9 %
10 VIAL (ML) INJECTION ONCE
Status: CANCELLED | OUTPATIENT
Start: 2018-04-25

## 2018-04-25 RX ORDER — 0.9 % SODIUM CHLORIDE 0.9 %
VIAL (ML) INJECTION
Status: DISCONTINUED
Start: 2018-04-25 | End: 2018-04-25

## 2018-04-25 RX ORDER — PREDNISONE 10 MG/1
40 TABLET ORAL 2 TIMES DAILY
Qty: 85 TABLET | Refills: 0 | Status: SHIPPED | OUTPATIENT
Start: 2018-04-25 | End: 2018-05-31 | Stop reason: ALTCHOICE

## 2018-04-25 RX ORDER — HEPARIN SODIUM (PORCINE) LOCK FLUSH IV SOLN 100 UNIT/ML 100 UNIT/ML
SOLUTION INTRAVENOUS
Status: DISCONTINUED
Start: 2018-04-25 | End: 2018-04-25

## 2018-04-25 RX ORDER — HEPARIN SODIUM (PORCINE) LOCK FLUSH IV SOLN 100 UNIT/ML 100 UNIT/ML
5 SOLUTION INTRAVENOUS ONCE
Status: CANCELLED | OUTPATIENT
Start: 2018-04-25

## 2018-04-25 RX ORDER — AZITHROMYCIN 250 MG/1
TABLET, FILM COATED ORAL
Qty: 6 TABLET | Refills: 0 | Status: SHIPPED | OUTPATIENT
Start: 2018-04-25 | End: 2018-05-03 | Stop reason: ALTCHOICE

## 2018-04-25 RX ORDER — HEPARIN SODIUM (PORCINE) LOCK FLUSH IV SOLN 100 UNIT/ML 100 UNIT/ML
5 SOLUTION INTRAVENOUS ONCE
Status: COMPLETED | OUTPATIENT
Start: 2018-04-25 | End: 2018-04-25

## 2018-04-25 RX ADMIN — HEPARIN SODIUM (PORCINE) LOCK FLUSH IV SOLN 100 UNIT/ML 500 UNITS: 100 SOLUTION INTRAVENOUS at 14:30:00

## 2018-04-25 NOTE — PROGRESS NOTES
Cancer Center Progress Note    Patient Name: Marisa John   YOB: 1945   Medical Record Number: [id number]   Attending Physician: Rubén Gonzalez M.D.        Chief Complaint:  Lung cancer    History of Present Illness:  Cancer history:  73-ye week.  He feels more fatigued overall. Casey Lyons He is doing well overall. Denies any unintentional weight loss. His appetite is good. He denies any adenopathy. He denies new cough, shortness of breath, abdominal pain, diarrhea and rash.         Performance Statu mouth daily. , Disp: , Rfl:   •  L-METHYLFOLATE OR, Take 1,000 mg by mouth daily. , Disp: , Rfl:   •  Vitamin B-12 1000 MCG Oral Tab, Take 1,000 mcg by mouth daily. , Disp: , Rfl:     Allergies:    Dextromethorphan Hbr    Itching  Guaifenesin             Rash left upper lobe of the lung (EGFR,ALK, ROS1 negative, PD-L1 80%). He was diagnosed in December 2016 as above. He was treated with concurrent definitive chemoradiotherapy using cisplatin and etoposide finishing in early March 2017.   He had low-grade neutr

## 2018-04-25 NOTE — PROGRESS NOTES
Patient to infusion from clinic to de access port.  chemo will be deferred 2 weeks due to transaminase and patient having URI   Patient rescheduled for repeat labs, MD and chemo   Port  Flushed well with NACL and heparin , de accessed, 2 x 2 and tape to sit

## 2018-04-25 NOTE — PROGRESS NOTES
Luis to lab; port accessed using sterile technique, labs drawn and sent. Port flushed with a total of 30ml ns, capped and secured with paper tape. Luis to lobby to await exam and possible treatment.

## 2018-04-30 ENCOUNTER — SNF/IP PROF CHARGE ONLY (OUTPATIENT)
Dept: HEMATOLOGY/ONCOLOGY | Facility: HOSPITAL | Age: 73
End: 2018-04-30

## 2018-04-30 DIAGNOSIS — C34.12 CANCER OF UPPER LOBE OF LEFT LUNG (HCC): ICD-10-CM

## 2018-04-30 PROCEDURE — G9678 ONCOLOGY CARE MODEL SERVICE: HCPCS | Performed by: INTERNAL MEDICINE

## 2018-05-03 ENCOUNTER — TELEPHONE (OUTPATIENT)
Dept: PULMONOLOGY | Facility: CLINIC | Age: 73
End: 2018-05-03

## 2018-05-03 ENCOUNTER — OFFICE VISIT (OUTPATIENT)
Dept: PULMONOLOGY | Facility: CLINIC | Age: 73
End: 2018-05-03

## 2018-05-03 ENCOUNTER — HOSPITAL ENCOUNTER (OUTPATIENT)
Dept: CT IMAGING | Facility: HOSPITAL | Age: 73
Discharge: HOME | End: 2018-05-03
Attending: INTERNAL MEDICINE | Admitting: INTERNAL MEDICINE
Payer: MEDICARE

## 2018-05-03 VITALS
HEART RATE: 86 BPM | WEIGHT: 172.81 LBS | HEIGHT: 71 IN | OXYGEN SATURATION: 98 % | BODY MASS INDEX: 24.19 KG/M2 | DIASTOLIC BLOOD PRESSURE: 65 MMHG | RESPIRATION RATE: 19 BRPM | SYSTOLIC BLOOD PRESSURE: 105 MMHG

## 2018-05-03 DIAGNOSIS — Z12.5 SCREENING FOR PROSTATE CANCER: ICD-10-CM

## 2018-05-03 DIAGNOSIS — G47.33 OSA (OBSTRUCTIVE SLEEP APNEA): ICD-10-CM

## 2018-05-03 DIAGNOSIS — C34.12 CANCER OF UPPER LOBE OF LEFT LUNG (HCC): Primary | ICD-10-CM

## 2018-05-03 DIAGNOSIS — C34.12 CANCER OF UPPER LOBE OF LEFT LUNG (HCC): ICD-10-CM

## 2018-05-03 PROCEDURE — 74177 CT ABD & PELVIS W/CONTRAST: CPT | Performed by: INTERNAL MEDICINE

## 2018-05-03 PROCEDURE — G0463 HOSPITAL OUTPT CLINIC VISIT: HCPCS | Performed by: INTERNAL MEDICINE

## 2018-05-03 PROCEDURE — 99213 OFFICE O/P EST LOW 20 MIN: CPT | Performed by: INTERNAL MEDICINE

## 2018-05-03 PROCEDURE — 82565 ASSAY OF CREATININE: CPT

## 2018-05-03 PROCEDURE — 71260 CT THORAX DX C+: CPT | Performed by: INTERNAL MEDICINE

## 2018-05-03 NOTE — TELEPHONE ENCOUNTER
Per Hamilton/KAUSHIK Araiza the pt coordinatoris currently in a meeting and will be out of the meeting around 9:15/9:30. Pt given the number to [facility] [phone] to request a download and have it faxed to [phone].

## 2018-05-03 NOTE — TELEPHONE ENCOUNTER
Pts wife states pt has appt with [facility] today and does not know how to get download from CPAP. Please call.

## 2018-05-03 NOTE — PROGRESS NOTES
The patient is a 75-year-old male who I know well from prior evaluation comes in now for follow-up. He is vigilant with use of his CPAP device but the runoff button is malfunctioning. He needs a new device.   He is currently getting immune therapy for his

## 2018-05-08 ENCOUNTER — NURSE ONLY (OUTPATIENT)
Dept: HEMATOLOGY/ONCOLOGY | Facility: HOSPITAL | Age: 73
End: 2018-05-08
Attending: INTERNAL MEDICINE
Payer: MEDICARE

## 2018-05-08 ENCOUNTER — TELEPHONE (OUTPATIENT)
Dept: PULMONOLOGY | Facility: CLINIC | Age: 73
End: 2018-05-08

## 2018-05-08 VITALS
SYSTOLIC BLOOD PRESSURE: 134 MMHG | HEIGHT: 71 IN | BODY MASS INDEX: 24.08 KG/M2 | RESPIRATION RATE: 18 BRPM | DIASTOLIC BLOOD PRESSURE: 67 MMHG | HEART RATE: 68 BPM | WEIGHT: 172 LBS

## 2018-05-08 DIAGNOSIS — Z45.2 ENCOUNTER FOR CARE RELATED TO PORT-A-CATH: Primary | ICD-10-CM

## 2018-05-08 DIAGNOSIS — C34.12 PRIMARY ADENOCARCINOMA OF UPPER LOBE OF LEFT LUNG (HCC): Primary | ICD-10-CM

## 2018-05-08 DIAGNOSIS — C34.12 CANCER OF UPPER LOBE OF LEFT LUNG (HCC): ICD-10-CM

## 2018-05-08 DIAGNOSIS — Z51.11 CHEMOTHERAPY MANAGEMENT, ENCOUNTER FOR: Primary | ICD-10-CM

## 2018-05-08 DIAGNOSIS — D64.9 ANEMIA, UNSPECIFIED TYPE: ICD-10-CM

## 2018-05-08 DIAGNOSIS — R74.01 TRANSAMINITIS: ICD-10-CM

## 2018-05-08 DIAGNOSIS — C34.12 CANCER OF UPPER LOBE OF LEFT LUNG (HCC): Primary | ICD-10-CM

## 2018-05-08 DIAGNOSIS — Z87.891 FORMER SMOKER: ICD-10-CM

## 2018-05-08 DIAGNOSIS — J18.9 PNEUMONITIS: ICD-10-CM

## 2018-05-08 DIAGNOSIS — Z12.5 SCREENING FOR PROSTATE CANCER: ICD-10-CM

## 2018-05-08 PROCEDURE — 99215 OFFICE O/P EST HI 40 MIN: CPT | Performed by: INTERNAL MEDICINE

## 2018-05-08 PROCEDURE — A4216 STERILE WATER/SALINE, 10 ML: HCPCS

## 2018-05-08 PROCEDURE — 36591 DRAW BLOOD OFF VENOUS DEVICE: CPT

## 2018-05-08 PROCEDURE — 80053 COMPREHEN METABOLIC PANEL: CPT

## 2018-05-08 PROCEDURE — 84443 ASSAY THYROID STIM HORMONE: CPT

## 2018-05-08 PROCEDURE — 85025 COMPLETE CBC W/AUTO DIFF WBC: CPT

## 2018-05-08 RX ORDER — HEPARIN SODIUM (PORCINE) LOCK FLUSH IV SOLN 100 UNIT/ML 100 UNIT/ML
5 SOLUTION INTRAVENOUS ONCE
Status: CANCELLED | OUTPATIENT
Start: 2018-05-08

## 2018-05-08 RX ORDER — 0.9 % SODIUM CHLORIDE 0.9 %
VIAL (ML) INJECTION
Status: DISCONTINUED
Start: 2018-05-08 | End: 2018-05-08

## 2018-05-08 RX ORDER — HEPARIN SODIUM (PORCINE) LOCK FLUSH IV SOLN 100 UNIT/ML 100 UNIT/ML
5 SOLUTION INTRAVENOUS ONCE
Status: COMPLETED | OUTPATIENT
Start: 2018-05-08 | End: 2018-05-08

## 2018-05-08 RX ORDER — 0.9 % SODIUM CHLORIDE 0.9 %
10 VIAL (ML) INJECTION ONCE
Status: CANCELLED | OUTPATIENT
Start: 2018-05-08

## 2018-05-08 RX ORDER — HEPARIN SODIUM (PORCINE) LOCK FLUSH IV SOLN 100 UNIT/ML 100 UNIT/ML
SOLUTION INTRAVENOUS
Status: COMPLETED
Start: 2018-05-08 | End: 2018-05-08

## 2018-05-08 RX ADMIN — HEPARIN SODIUM (PORCINE) LOCK FLUSH IV SOLN 100 UNIT/ML 500 UNITS: 100 SOLUTION INTRAVENOUS at 09:30:00

## 2018-05-08 NOTE — PROGRESS NOTES
Patient to Infusion after MD visit. Imfinzi deferred this week. Patient on Prednisone. Return in 2 week. Patient gets Labs every other visit. Wife states that MD want patient to have Lab draw before next visit.   Called and spoke with Kiarra and confirm

## 2018-05-08 NOTE — PROGRESS NOTES
Cancer Center Progress Note    Patient Name: Katherine Encarnacion   YOB: 1945   Medical Record Number: [id number]   Attending Physician: Tula Severs, M.D.        Chief Complaint:  Lung cancer    History of Present Illness:  Cancer history:  73-ye disease. Interval:  The patient returns for consideration of Cycle 13 durvalumab. He is feeling more short of breath. He has had cough and wheezes over the last week. He feels more fatigued overall. Tunde Greco He is doing well overall.  Denies any unintentional 050-232-59 MG/5ML Oral Suspension, Take 15 mL by mouth 4 (four) times daily as needed for Indigestion. , Disp: , Rfl:   •  Montelukast Sodium 10 MG Oral Tab, Take 10 mg by mouth nightly., Disp: , Rfl:   •  L-Glutamine 500 MG Oral Cap, Take 500 mg by mouth d AST 25 05/08/2018   ALT 35 05/08/2018   ALKPHOS 58 12/10/2011   BILT 0.8 05/08/2018   TP 5.7 (L) 05/08/2018   ALB 3.3 (L) 05/08/2018   GLOBULIN 2.4 (L) 05/08/2018    05/08/2018   K 4.1 05/08/2018   CL 99 05/08/2018   CO2 28 05/08/2018       Radiolo

## 2018-05-08 NOTE — PROGRESS NOTES
Patient ambulted from waiting room with steady gait accompanied by his wife. Right side Port accessed using sterile technique. Labs collected and sent per order. Port flushed capped and alcohol caps applied. Ronaldo Tammy Appeared to tolerate well.   Patient dischar

## 2018-05-08 NOTE — TELEPHONE ENCOUNTER
pts wife want [facility] to know that the pt. had his CT Scan done on 5/3 and blood test was done today 5/8.

## 2018-05-09 NOTE — TELEPHONE ENCOUNTER
RN, please let the patient know that his lab results look good. Please also let him know that his CT scan the chest shows inflammation and scar related to prior radiation. No definitive evidence of recurrent cancer.   The patient should already have an or

## 2018-05-10 ENCOUNTER — TELEPHONE (OUTPATIENT)
Dept: PULMONOLOGY | Facility: CLINIC | Age: 73
End: 2018-05-10

## 2018-05-10 NOTE — TELEPHONE ENCOUNTER
PSG 10/19/12 & Titration 11/6/2012 faxed to [facility] at #[phone]. Conf rcvd.

## 2018-05-10 NOTE — TELEPHONE ENCOUNTER
Notified pt's wife of Dr. Jacqueline ferro. Explained rx will be mailed out about 1 mo prior to due date. She verbalized understanding. Rx filed in Chronic Calendar.

## 2018-05-10 NOTE — TELEPHONE ENCOUNTER
Danica/KAUSHIK called to request sleep study results (preferably baseline or any studies less than 9 yrs old). Please fax to [phone].

## 2018-05-18 DIAGNOSIS — Z12.5 PROSTATE CANCER SCREENING: Primary | ICD-10-CM

## 2018-05-22 ENCOUNTER — NURSE ONLY (OUTPATIENT)
Dept: HEMATOLOGY/ONCOLOGY | Facility: HOSPITAL | Age: 73
End: 2018-05-22
Attending: INTERNAL MEDICINE
Payer: MEDICARE

## 2018-05-22 VITALS
RESPIRATION RATE: 18 BRPM | WEIGHT: 178 LBS | HEIGHT: 71 IN | BODY MASS INDEX: 24.92 KG/M2 | SYSTOLIC BLOOD PRESSURE: 123 MMHG | DIASTOLIC BLOOD PRESSURE: 64 MMHG | HEART RATE: 91 BPM | TEMPERATURE: 98 F

## 2018-05-22 VITALS
DIASTOLIC BLOOD PRESSURE: 64 MMHG | HEIGHT: 71 IN | HEART RATE: 91 BPM | WEIGHT: 178 LBS | RESPIRATION RATE: 18 BRPM | SYSTOLIC BLOOD PRESSURE: 123 MMHG | TEMPERATURE: 98 F | BODY MASS INDEX: 24.92 KG/M2

## 2018-05-22 DIAGNOSIS — C34.12 CANCER OF UPPER LOBE OF LEFT LUNG (HCC): Primary | ICD-10-CM

## 2018-05-22 DIAGNOSIS — D64.9 ANEMIA, UNSPECIFIED TYPE: ICD-10-CM

## 2018-05-22 DIAGNOSIS — R74.01 TRANSAMINITIS: ICD-10-CM

## 2018-05-22 DIAGNOSIS — Z45.2 ENCOUNTER FOR CARE RELATED TO PORT-A-CATH: ICD-10-CM

## 2018-05-22 DIAGNOSIS — J18.9 PNEUMONITIS: ICD-10-CM

## 2018-05-22 DIAGNOSIS — Z51.11 CHEMOTHERAPY MANAGEMENT, ENCOUNTER FOR: ICD-10-CM

## 2018-05-22 PROBLEM — D50.9 IRON DEFICIENCY ANEMIA: Status: ACTIVE | Noted: 2018-05-22

## 2018-05-22 PROCEDURE — A4216 STERILE WATER/SALINE, 10 ML: HCPCS

## 2018-05-22 PROCEDURE — 80053 COMPREHEN METABOLIC PANEL: CPT

## 2018-05-22 PROCEDURE — 84443 ASSAY THYROID STIM HORMONE: CPT

## 2018-05-22 PROCEDURE — 82607 VITAMIN B-12: CPT

## 2018-05-22 PROCEDURE — 84466 ASSAY OF TRANSFERRIN: CPT

## 2018-05-22 PROCEDURE — C9492 INJECTION, DURVALUMAB: HCPCS | Performed by: INTERNAL MEDICINE

## 2018-05-22 PROCEDURE — 83540 ASSAY OF IRON: CPT

## 2018-05-22 PROCEDURE — 85025 COMPLETE CBC W/AUTO DIFF WBC: CPT

## 2018-05-22 PROCEDURE — 99215 OFFICE O/P EST HI 40 MIN: CPT | Performed by: INTERNAL MEDICINE

## 2018-05-22 PROCEDURE — 96413 CHEMO IV INFUSION 1 HR: CPT

## 2018-05-22 RX ORDER — 0.9 % SODIUM CHLORIDE 0.9 %
10 VIAL (ML) INJECTION ONCE
Status: CANCELLED | OUTPATIENT
Start: 2018-05-22

## 2018-05-22 RX ORDER — SODIUM CHLORIDE 9 MG/ML
INJECTION, SOLUTION INTRAVENOUS
Status: DISCONTINUED
Start: 2018-05-22 | End: 2018-05-22

## 2018-05-22 RX ORDER — HEPARIN SODIUM (PORCINE) LOCK FLUSH IV SOLN 100 UNIT/ML 100 UNIT/ML
5 SOLUTION INTRAVENOUS ONCE
Status: CANCELLED | OUTPATIENT
Start: 2018-05-22

## 2018-05-22 RX ORDER — MEPERIDINE HYDROCHLORIDE 25 MG/ML
INJECTION INTRAMUSCULAR; INTRAVENOUS; SUBCUTANEOUS AS NEEDED
Status: CANCELLED | OUTPATIENT
Start: 2018-05-22

## 2018-05-22 RX ORDER — 0.9 % SODIUM CHLORIDE 0.9 %
VIAL (ML) INJECTION
Status: DISCONTINUED
Start: 2018-05-22 | End: 2018-05-22

## 2018-05-22 RX ORDER — DIPHENHYDRAMINE HYDROCHLORIDE 50 MG/ML
INJECTION INTRAMUSCULAR; INTRAVENOUS EVERY 4 HOURS PRN
Status: CANCELLED | OUTPATIENT
Start: 2018-05-22

## 2018-05-22 RX ORDER — ALBUTEROL SULFATE 90 UG/1
2 AEROSOL, METERED RESPIRATORY (INHALATION) AS NEEDED
Status: CANCELLED | OUTPATIENT
Start: 2018-05-22

## 2018-05-22 RX ORDER — ACETAMINOPHEN 325 MG/1
TABLET ORAL EVERY 6 HOURS PRN
Status: CANCELLED | OUTPATIENT
Start: 2018-05-22

## 2018-05-22 RX ORDER — HEPARIN SODIUM (PORCINE) LOCK FLUSH IV SOLN 100 UNIT/ML 100 UNIT/ML
5 SOLUTION INTRAVENOUS ONCE
Status: COMPLETED | OUTPATIENT
Start: 2018-05-22 | End: 2018-05-22

## 2018-05-22 RX ORDER — HEPARIN SODIUM (PORCINE) LOCK FLUSH IV SOLN 100 UNIT/ML 100 UNIT/ML
SOLUTION INTRAVENOUS
Status: COMPLETED
Start: 2018-05-22 | End: 2018-05-22

## 2018-05-22 RX ORDER — RANITIDINE 25 MG/ML
50 INJECTION, SOLUTION INTRAMUSCULAR; INTRAVENOUS AS NEEDED
Status: CANCELLED | OUTPATIENT
Start: 2018-05-22

## 2018-05-22 RX ADMIN — HEPARIN SODIUM (PORCINE) LOCK FLUSH IV SOLN 100 UNIT/ML 500 UNITS: 100 SOLUTION INTRAVENOUS at 12:00:00

## 2018-05-22 NOTE — PROGRESS NOTES
Patient here for SD treatment. Port accessed without difficulty, brisk blood return noted. Labs obtained as ordered and sent. Port capped, flushed with alcohol caps. Discharged back to [facility] for MD visit.     Patient reported feeling much better, no fur

## 2018-05-22 NOTE — PROGRESS NOTES
Cancer Center Progress Note    Patient Name: Ángel Cruz   YOB: 1945   Medical Record Number: [id number]   Attending Physician: Jesse Llamas M.D.        Chief Complaint:  Lung cancer    History of Present Illness:  Cancer history:  73-ye disease. Interval:  The patient returns for consideration of Cycle 13 durvalumab. Is now feeling much better overall and is tapered off of prednisone. He feels more fatigued overall. Bertin Rodríguez He is doing well overall. Denies any unintentional weight loss.   Hi Oil-Cholecalciferol (FISH OIL + D3) 0987-9016 MG-UNIT Oral Cap, Take 1 capsule by mouth daily. , Disp: , Rfl:   •  Vitamin C 500 MG Oral Tab, Take 500 mg by mouth daily. , Disp: , Rfl:   •  L-METHYLFOLATE OR, Take 1,000 mg by mouth daily. , Disp: , Rfl:   • 05/22/2018   ALKPHO 63 05/22/2018   AST 24 05/22/2018   ALT 28 05/22/2018   ALKPHOS 58 12/10/2011   BILT 1.2 05/22/2018   TP 6.0 05/22/2018   ALB 3.1 (L) 05/22/2018   GLOBULIN 2.9 05/22/2018    (L) 05/22/2018   K 4.0 05/22/2018    05/22/2018

## 2018-05-22 NOTE — PROGRESS NOTES
Post Chemo Documentation    Patient is here for treatment today, Cycle13, Day 1  Infimzi      Arrives Ambulating independently      Accompanied by Spouse                                 Modifications in dose or schedule: No      Verbalizes he is feeling be Reinforcement of education is needed at next visit? No  Include language provided and  information (if applicable).     NA  Discharge instructions:    Safety/handling:  smartphrase EMONCPOSTCHEMO  Reviewed symptoms to report to doctor and phon

## 2018-05-24 PROBLEM — K90.9 MALABSORPTION: Status: ACTIVE | Noted: 2018-05-24

## 2018-05-24 PROBLEM — K90.9 MALABSORPTION (HCC): Status: ACTIVE | Noted: 2018-05-24

## 2018-05-31 ENCOUNTER — APPOINTMENT (OUTPATIENT)
Dept: HEMATOLOGY/ONCOLOGY | Facility: HOSPITAL | Age: 73
End: 2018-05-31
Attending: INTERNAL MEDICINE
Payer: MEDICARE

## 2018-05-31 ENCOUNTER — TELEPHONE (OUTPATIENT)
Dept: HEMATOLOGY/ONCOLOGY | Facility: HOSPITAL | Age: 73
End: 2018-05-31

## 2018-05-31 ENCOUNTER — TELEPHONE (OUTPATIENT)
Dept: PULMONOLOGY | Facility: CLINIC | Age: 73
End: 2018-05-31

## 2018-05-31 ENCOUNTER — TELEPHONE (OUTPATIENT)
Dept: GASTROENTEROLOGY | Facility: CLINIC | Age: 73
End: 2018-05-31

## 2018-05-31 ENCOUNTER — SNF/IP PROF CHARGE ONLY (OUTPATIENT)
Dept: HEMATOLOGY/ONCOLOGY | Facility: HOSPITAL | Age: 73
End: 2018-05-31

## 2018-05-31 VITALS
SYSTOLIC BLOOD PRESSURE: 132 MMHG | BODY MASS INDEX: 25 KG/M2 | TEMPERATURE: 99 F | RESPIRATION RATE: 16 BRPM | WEIGHT: 178 LBS | HEART RATE: 97 BPM | DIASTOLIC BLOOD PRESSURE: 65 MMHG

## 2018-05-31 VITALS
DIASTOLIC BLOOD PRESSURE: 71 MMHG | TEMPERATURE: 99 F | RESPIRATION RATE: 16 BRPM | SYSTOLIC BLOOD PRESSURE: 103 MMHG | HEART RATE: 72 BPM

## 2018-05-31 DIAGNOSIS — Z87.891 FORMER SMOKER: ICD-10-CM

## 2018-05-31 DIAGNOSIS — D50.9 IRON DEFICIENCY ANEMIA, UNSPECIFIED IRON DEFICIENCY ANEMIA TYPE: Primary | ICD-10-CM

## 2018-05-31 DIAGNOSIS — C34.12 CANCER OF UPPER LOBE OF LEFT LUNG (HCC): ICD-10-CM

## 2018-05-31 DIAGNOSIS — J18.9 PNEUMONITIS: ICD-10-CM

## 2018-05-31 DIAGNOSIS — D50.9 IRON DEFICIENCY ANEMIA, UNSPECIFIED IRON DEFICIENCY ANEMIA TYPE: ICD-10-CM

## 2018-05-31 DIAGNOSIS — D50.9 IRON DEFICIENCY ANEMIA: Primary | ICD-10-CM

## 2018-05-31 DIAGNOSIS — C34.12 CANCER OF UPPER LOBE OF LEFT LUNG (HCC): Primary | ICD-10-CM

## 2018-05-31 DIAGNOSIS — Z51.11 CHEMOTHERAPY MANAGEMENT, ENCOUNTER FOR: ICD-10-CM

## 2018-05-31 DIAGNOSIS — Z45.2 ENCOUNTER FOR CARE RELATED TO PORT-A-CATH: ICD-10-CM

## 2018-05-31 DIAGNOSIS — R74.01 TRANSAMINITIS: ICD-10-CM

## 2018-05-31 PROCEDURE — 85025 COMPLETE CBC W/AUTO DIFF WBC: CPT

## 2018-05-31 PROCEDURE — 80048 BASIC METABOLIC PNL TOTAL CA: CPT

## 2018-05-31 PROCEDURE — 96365 THER/PROPH/DIAG IV INF INIT: CPT

## 2018-05-31 PROCEDURE — 96366 THER/PROPH/DIAG IV INF ADDON: CPT

## 2018-05-31 PROCEDURE — G9678 ONCOLOGY CARE MODEL SERVICE: HCPCS | Performed by: INTERNAL MEDICINE

## 2018-05-31 PROCEDURE — 99215 OFFICE O/P EST HI 40 MIN: CPT | Performed by: INTERNAL MEDICINE

## 2018-05-31 PROCEDURE — A4216 STERILE WATER/SALINE, 10 ML: HCPCS

## 2018-05-31 RX ORDER — HEPARIN SODIUM (PORCINE) LOCK FLUSH IV SOLN 100 UNIT/ML 100 UNIT/ML
5 SOLUTION INTRAVENOUS ONCE
Status: CANCELLED | OUTPATIENT
Start: 2018-05-31

## 2018-05-31 RX ORDER — POLYETHYLENE GLYCOL 3350, SODIUM CHLORIDE, SODIUM BICARBONATE, POTASSIUM CHLORIDE 420; 11.2; 5.72; 1.48 G/4L; G/4L; G/4L; G/4L
4 POWDER, FOR SOLUTION ORAL ONCE
Qty: 4000 ML | Refills: 0 | Status: SHIPPED | OUTPATIENT
Start: 2018-05-31 | End: 2018-05-31

## 2018-05-31 RX ORDER — 0.9 % SODIUM CHLORIDE 0.9 %
10 VIAL (ML) INJECTION ONCE
Status: CANCELLED | OUTPATIENT
Start: 2018-05-31

## 2018-05-31 RX ORDER — HEPARIN SODIUM (PORCINE) LOCK FLUSH IV SOLN 100 UNIT/ML 100 UNIT/ML
SOLUTION INTRAVENOUS
Status: DISCONTINUED
Start: 2018-05-31 | End: 2018-05-31

## 2018-05-31 RX ORDER — 0.9 % SODIUM CHLORIDE 0.9 %
VIAL (ML) INJECTION
Status: DISCONTINUED
Start: 2018-05-31 | End: 2018-05-31

## 2018-05-31 RX ORDER — MULTIVITAMIN WITH IRON
200 TABLET ORAL DAILY
COMMUNITY
End: 2020-11-05 | Stop reason: ALTCHOICE

## 2018-05-31 RX ORDER — PREDNISONE 20 MG/1
20 TABLET ORAL DAILY
Qty: 33 TABLET | Refills: 0 | Status: SHIPPED | OUTPATIENT
Start: 2018-05-31 | End: 2018-07-02 | Stop reason: ALTCHOICE

## 2018-05-31 RX ORDER — HEPARIN SODIUM (PORCINE) LOCK FLUSH IV SOLN 100 UNIT/ML 100 UNIT/ML
5 SOLUTION INTRAVENOUS ONCE
Status: DISCONTINUED | OUTPATIENT
Start: 2018-05-31 | End: 2018-05-31

## 2018-05-31 RX ORDER — PREDNISONE 20 MG/1
20 TABLET ORAL DAILY
Qty: 33 TABLET | Refills: 0 | Status: SHIPPED | OUTPATIENT
Start: 2018-05-31 | End: 2018-05-31

## 2018-05-31 RX ORDER — PREDNISONE 20 MG/1
20 TABLET ORAL DAILY
Qty: 33 TABLET | Refills: 0 | Status: CANCELLED
Start: 2018-05-31

## 2018-05-31 RX ORDER — SODIUM CHLORIDE 9 MG/ML
INJECTION, SOLUTION INTRAVENOUS
Status: DISCONTINUED
Start: 2018-05-31 | End: 2018-05-31

## 2018-05-31 RX ORDER — PROPRANOLOL/HYDROCHLOROTHIAZID 40 MG-25MG
500 TABLET ORAL DAILY
COMMUNITY
End: 2020-08-12 | Stop reason: ALTCHOICE

## 2018-05-31 RX ORDER — BIOTIN 1 MG
1000 TABLET ORAL DAILY
COMMUNITY

## 2018-05-31 NOTE — PROGRESS NOTES
Cancer Center Progress Note    Patient Name: Josey Martinez   YOB: 1945   Medical Record Number: [id number]   Attending Physician: Anibal Weston M.D.        Chief Complaint:  Lung cancer    History of Present Illness:  Cancer history:  73-ye that did not show any evidence of metastatic disease. Interval:  The patient returns for consideration of Cycle 14 durvalumab. He has worsening cough and shortness of breath he feels more fatigued overall. .. Denies any unintentional weight loss.   His a Rfl:   •  L-Glutamine 500 MG Oral Cap, Take 500 mg by mouth daily. , Disp: , Rfl:   •  Fish Oil-Cholecalciferol (FISH OIL + D3) 4138-4527 MG-UNIT Oral Cap, Take 1 capsule by mouth daily. , Disp: , Rfl:   •  Vitamin C 500 MG Oral Tab, Take 500 mg by mouth saranya 05/31/2018   K 5.1 05/31/2018    05/31/2018   CO2 28 05/31/2018       Radiology:  CT chest and pelvis personally reviewed from 5/3/2018. There is some progression of the fibrotic changes post radiation.   There is no definitive evidence of progressio

## 2018-05-31 NOTE — PROGRESS NOTES
Patient here to get first dose of Venofer. Will get total of 3 doses. Patient reports fatigue. States he has not been feeling well. Patient reports cough has returned, states it started on Monday.   Patient states he is coughing at night, and with exe

## 2018-05-31 NOTE — TELEPHONE ENCOUNTER
Dr Jacob Josue, Nurse Navigator called (ext [phone]). Dr Fabio Matias would like pt to have egd /colon for iron deficiency anemia. You did colonoscopy 2014. Does pt need to see you or Jose Luis Freire first, or schedule directly? Thanks.

## 2018-05-31 NOTE — TELEPHONE ENCOUNTER
Spoke to pts wife with approval of ANA LUISA. Pts wife stated pt is having a little trouble breathing. Pt denies difficulty breathing but I could hear him having to catch breath passing the phone to wife. I scheduled an OV.   Future Appointments  Date Time Jalil

## 2018-05-31 NOTE — TELEPHONE ENCOUNTER
Dr. Zaida Juarez is requesting a GI consult/EGD and colonoscopy with Dr. Shanta Richardson for iron deficiency anemia.     Thank you,  Олег CARDONA

## 2018-05-31 NOTE — TELEPHONE ENCOUNTER
Mrs Calderon Palma is calling because the Prednisone was sent to the wrong pharmacy. It should go to [facility] not [facility]. She asked someone to take [facility] off because they cannot use them anymore per insurance.  She will call [facility] and tell them to disrega

## 2018-05-31 NOTE — TELEPHONE ENCOUNTER
If no symptoms and no breathing difficulty, may schedule a colonoscopy and EGD for an iron deficiency anemia. Split dose TriLyte preparation. I would use [facility].

## 2018-05-31 NOTE — TELEPHONE ENCOUNTER
Pam Gonsales notified that once form has been received we will date and fax back to [facility] [phone]. Form received and date has been added and faxed to [facility].

## 2018-05-31 NOTE — TELEPHONE ENCOUNTER
Robert Calvin from [address] [place] [address] dr Kaylynn Del Toro signed a certificate for medical necessity but did not date it .  Would like to know if they can get it dated and sent back to fax number [phone] please call Constantino Lee at [phone] xtn [phone]

## 2018-06-01 ENCOUNTER — TELEPHONE (OUTPATIENT)
Dept: HEMATOLOGY/ONCOLOGY | Facility: HOSPITAL | Age: 73
End: 2018-06-01

## 2018-06-01 NOTE — TELEPHONE ENCOUNTER
Henrry Counter- spoke with pt and wife regarding new 6/14 appts. Wife stated that pt wasn't able to schedule appt with  until 6/13. Pt assumed he needed to be seen sooner. pls call.  Thank you

## 2018-06-01 NOTE — TELEPHONE ENCOUNTER
Notified patient and spouse, that, in my opinion, a consult with Dr. Omar Barksdale on 6/13/18 is reasonable as far as timing. Keep 6/13/18 with GI and 6/14/18 with Dr. Jil Bingham. The GI work-up is necessary but independent of immunotherapy for lung cancer.

## 2018-06-04 ENCOUNTER — APPOINTMENT (OUTPATIENT)
Dept: HEMATOLOGY/ONCOLOGY | Facility: HOSPITAL | Age: 73
End: 2018-06-04
Attending: INTERNAL MEDICINE
Payer: MEDICARE

## 2018-06-05 ENCOUNTER — OFFICE VISIT (OUTPATIENT)
Dept: HEMATOLOGY/ONCOLOGY | Facility: HOSPITAL | Age: 73
End: 2018-06-05
Attending: INTERNAL MEDICINE
Payer: MEDICARE

## 2018-06-05 VITALS
TEMPERATURE: 97 F | DIASTOLIC BLOOD PRESSURE: 75 MMHG | SYSTOLIC BLOOD PRESSURE: 131 MMHG | HEART RATE: 72 BPM | RESPIRATION RATE: 16 BRPM

## 2018-06-05 DIAGNOSIS — Z45.2 ENCOUNTER FOR CARE RELATED TO PORT-A-CATH: ICD-10-CM

## 2018-06-05 DIAGNOSIS — D50.9 IRON DEFICIENCY ANEMIA, UNSPECIFIED IRON DEFICIENCY ANEMIA TYPE: Primary | ICD-10-CM

## 2018-06-05 DIAGNOSIS — C34.12 CANCER OF UPPER LOBE OF LEFT LUNG (HCC): ICD-10-CM

## 2018-06-05 PROCEDURE — 96366 THER/PROPH/DIAG IV INF ADDON: CPT

## 2018-06-05 PROCEDURE — A4216 STERILE WATER/SALINE, 10 ML: HCPCS

## 2018-06-05 PROCEDURE — 96365 THER/PROPH/DIAG IV INF INIT: CPT

## 2018-06-05 RX ORDER — HEPARIN SODIUM (PORCINE) LOCK FLUSH IV SOLN 100 UNIT/ML 100 UNIT/ML
SOLUTION INTRAVENOUS
Status: COMPLETED
Start: 2018-06-05 | End: 2018-06-05

## 2018-06-05 RX ORDER — HEPARIN SODIUM (PORCINE) LOCK FLUSH IV SOLN 100 UNIT/ML 100 UNIT/ML
5 SOLUTION INTRAVENOUS ONCE
Status: CANCELLED | OUTPATIENT
Start: 2018-06-05

## 2018-06-05 RX ORDER — 0.9 % SODIUM CHLORIDE 0.9 %
10 VIAL (ML) INJECTION ONCE
Status: CANCELLED | OUTPATIENT
Start: 2018-06-05

## 2018-06-05 RX ORDER — 0.9 % SODIUM CHLORIDE 0.9 %
VIAL (ML) INJECTION
Status: DISCONTINUED
Start: 2018-06-05 | End: 2018-06-05

## 2018-06-05 RX ORDER — HEPARIN SODIUM (PORCINE) LOCK FLUSH IV SOLN 100 UNIT/ML 100 UNIT/ML
5 SOLUTION INTRAVENOUS ONCE
Status: COMPLETED | OUTPATIENT
Start: 2018-06-05 | End: 2018-06-05

## 2018-06-05 RX ORDER — SODIUM CHLORIDE 9 MG/ML
INJECTION, SOLUTION INTRAVENOUS
Status: DISCONTINUED
Start: 2018-06-05 | End: 2018-06-05

## 2018-06-05 RX ADMIN — HEPARIN SODIUM (PORCINE) LOCK FLUSH IV SOLN 100 UNIT/ML 500 UNITS: 100 SOLUTION INTRAVENOUS at 11:45:00

## 2018-06-12 ENCOUNTER — OFFICE VISIT (OUTPATIENT)
Dept: HEMATOLOGY/ONCOLOGY | Facility: HOSPITAL | Age: 73
End: 2018-06-12
Attending: INTERNAL MEDICINE
Payer: MEDICARE

## 2018-06-12 VITALS
BODY MASS INDEX: 25 KG/M2 | WEIGHT: 181 LBS | TEMPERATURE: 97 F | DIASTOLIC BLOOD PRESSURE: 60 MMHG | HEART RATE: 70 BPM | SYSTOLIC BLOOD PRESSURE: 136 MMHG | RESPIRATION RATE: 16 BRPM

## 2018-06-12 DIAGNOSIS — Z45.2 ENCOUNTER FOR CARE RELATED TO PORT-A-CATH: ICD-10-CM

## 2018-06-12 DIAGNOSIS — D50.9 IRON DEFICIENCY ANEMIA, UNSPECIFIED IRON DEFICIENCY ANEMIA TYPE: Primary | ICD-10-CM

## 2018-06-12 DIAGNOSIS — C34.12 CANCER OF UPPER LOBE OF LEFT LUNG (HCC): ICD-10-CM

## 2018-06-12 PROCEDURE — 96365 THER/PROPH/DIAG IV INF INIT: CPT

## 2018-06-12 PROCEDURE — 96366 THER/PROPH/DIAG IV INF ADDON: CPT

## 2018-06-12 PROCEDURE — A4216 STERILE WATER/SALINE, 10 ML: HCPCS

## 2018-06-12 RX ORDER — HEPARIN SODIUM (PORCINE) LOCK FLUSH IV SOLN 100 UNIT/ML 100 UNIT/ML
5 SOLUTION INTRAVENOUS ONCE
Status: COMPLETED | OUTPATIENT
Start: 2018-06-12 | End: 2018-06-12

## 2018-06-12 RX ORDER — HEPARIN SODIUM (PORCINE) LOCK FLUSH IV SOLN 100 UNIT/ML 100 UNIT/ML
5 SOLUTION INTRAVENOUS ONCE
Status: CANCELLED | OUTPATIENT
Start: 2018-06-12

## 2018-06-12 RX ORDER — 0.9 % SODIUM CHLORIDE 0.9 %
VIAL (ML) INJECTION
Status: DISCONTINUED
Start: 2018-06-12 | End: 2018-06-12

## 2018-06-12 RX ORDER — HEPARIN SODIUM (PORCINE) LOCK FLUSH IV SOLN 100 UNIT/ML 100 UNIT/ML
SOLUTION INTRAVENOUS
Status: COMPLETED
Start: 2018-06-12 | End: 2018-06-12

## 2018-06-12 RX ORDER — SODIUM CHLORIDE 9 MG/ML
INJECTION, SOLUTION INTRAVENOUS
Status: DISCONTINUED
Start: 2018-06-12 | End: 2018-06-12

## 2018-06-12 RX ORDER — 0.9 % SODIUM CHLORIDE 0.9 %
10 VIAL (ML) INJECTION ONCE
Status: CANCELLED | OUTPATIENT
Start: 2018-06-12

## 2018-06-12 RX ADMIN — HEPARIN SODIUM (PORCINE) LOCK FLUSH IV SOLN 100 UNIT/ML 500 UNITS: 100 SOLUTION INTRAVENOUS at 11:25:00

## 2018-06-12 NOTE — PROGRESS NOTES
Pt to infusion for Venofer 400mg 3 of 3. Pt states he feels \"very good\". Continues on prednisone for Imfimzi SE.  States energy and appetite have been very good. Port accessed and flushed with good blood return. Pt tolerated Venofer infusion.   Kevin Gaines

## 2018-06-13 ENCOUNTER — OFFICE VISIT (OUTPATIENT)
Dept: GASTROENTEROLOGY | Facility: CLINIC | Age: 73
End: 2018-06-13

## 2018-06-13 VITALS
WEIGHT: 180.38 LBS | HEIGHT: 71 IN | SYSTOLIC BLOOD PRESSURE: 121 MMHG | DIASTOLIC BLOOD PRESSURE: 73 MMHG | HEART RATE: 76 BPM | BODY MASS INDEX: 25.25 KG/M2

## 2018-06-13 DIAGNOSIS — D50.0 IRON DEFICIENCY ANEMIA DUE TO CHRONIC BLOOD LOSS: Primary | ICD-10-CM

## 2018-06-13 PROCEDURE — G0463 HOSPITAL OUTPT CLINIC VISIT: HCPCS | Performed by: INTERNAL MEDICINE

## 2018-06-13 PROCEDURE — 99203 OFFICE O/P NEW LOW 30 MIN: CPT | Performed by: INTERNAL MEDICINE

## 2018-06-13 RX ORDER — POLYETHYLENE GLYCOL 3350, SODIUM CHLORIDE, SODIUM BICARBONATE, POTASSIUM CHLORIDE 420; 11.2; 5.72; 1.48 G/4L; G/4L; G/4L; G/4L
4 POWDER, FOR SOLUTION ORAL ONCE
Qty: 4000 ML | Refills: 0 | Status: SHIPPED | OUTPATIENT
Start: 2018-06-13 | End: 2018-06-13

## 2018-06-13 NOTE — PATIENT INSTRUCTIONS
Schedule upper and lower endoscopy following a split dose TriLyte preparation and monitored anesthesia care for an iron deficiency anemia.

## 2018-06-14 ENCOUNTER — OFFICE VISIT (OUTPATIENT)
Dept: HEMATOLOGY/ONCOLOGY | Facility: HOSPITAL | Age: 73
End: 2018-06-14
Attending: INTERNAL MEDICINE
Payer: MEDICARE

## 2018-06-14 ENCOUNTER — TELEPHONE (OUTPATIENT)
Dept: GASTROENTEROLOGY | Facility: CLINIC | Age: 73
End: 2018-06-14

## 2018-06-14 VITALS
RESPIRATION RATE: 18 BRPM | DIASTOLIC BLOOD PRESSURE: 56 MMHG | HEIGHT: 71 IN | HEART RATE: 80 BPM | TEMPERATURE: 98 F | WEIGHT: 182 LBS | BODY MASS INDEX: 25.48 KG/M2 | SYSTOLIC BLOOD PRESSURE: 128 MMHG

## 2018-06-14 DIAGNOSIS — D50.9 IRON DEFICIENCY ANEMIA, UNSPECIFIED IRON DEFICIENCY ANEMIA TYPE: Primary | ICD-10-CM

## 2018-06-14 DIAGNOSIS — C34.12 CANCER OF UPPER LOBE OF LEFT LUNG (HCC): ICD-10-CM

## 2018-06-14 DIAGNOSIS — Z12.5 PROSTATE CANCER SCREENING: ICD-10-CM

## 2018-06-14 DIAGNOSIS — R74.01 TRANSAMINITIS: ICD-10-CM

## 2018-06-14 DIAGNOSIS — Z45.2 ENCOUNTER FOR CARE RELATED TO PORT-A-CATH: ICD-10-CM

## 2018-06-14 DIAGNOSIS — J18.9 PNEUMONITIS: ICD-10-CM

## 2018-06-14 PROCEDURE — 80053 COMPREHEN METABOLIC PANEL: CPT

## 2018-06-14 PROCEDURE — 99214 OFFICE O/P EST MOD 30 MIN: CPT | Performed by: INTERNAL MEDICINE

## 2018-06-14 PROCEDURE — 36591 DRAW BLOOD OFF VENOUS DEVICE: CPT

## 2018-06-14 PROCEDURE — A4216 STERILE WATER/SALINE, 10 ML: HCPCS

## 2018-06-14 PROCEDURE — 85025 COMPLETE CBC W/AUTO DIFF WBC: CPT

## 2018-06-14 RX ORDER — HEPARIN SODIUM (PORCINE) LOCK FLUSH IV SOLN 100 UNIT/ML 100 UNIT/ML
SOLUTION INTRAVENOUS
Status: DISCONTINUED
Start: 2018-06-14 | End: 2018-06-14

## 2018-06-14 RX ORDER — HEPARIN SODIUM (PORCINE) LOCK FLUSH IV SOLN 100 UNIT/ML 100 UNIT/ML
5 SOLUTION INTRAVENOUS ONCE
Status: COMPLETED | OUTPATIENT
Start: 2018-06-14 | End: 2018-06-14

## 2018-06-14 RX ORDER — 0.9 % SODIUM CHLORIDE 0.9 %
VIAL (ML) INJECTION
Status: DISCONTINUED
Start: 2018-06-14 | End: 2018-06-14

## 2018-06-14 RX ORDER — HEPARIN SODIUM (PORCINE) LOCK FLUSH IV SOLN 100 UNIT/ML 100 UNIT/ML
5 SOLUTION INTRAVENOUS ONCE
Status: CANCELLED | OUTPATIENT
Start: 2018-06-14

## 2018-06-14 RX ORDER — 0.9 % SODIUM CHLORIDE 0.9 %
10 VIAL (ML) INJECTION ONCE
Status: CANCELLED | OUTPATIENT
Start: 2018-06-14

## 2018-06-14 RX ADMIN — HEPARIN SODIUM (PORCINE) LOCK FLUSH IV SOLN 100 UNIT/ML 500 UNITS: 100 SOLUTION INTRAVENOUS at 12:30:00

## 2018-06-14 NOTE — TELEPHONE ENCOUNTER
Scheduled for:  Colonoscopy [address]  Provider Name:   Date:  6/2/18  Location:  [facility]  Sedation:  Mac  Time:  2738 / DOODOTA 7976  Prep:Split dose Trilyte,Npo 3 hours prior to procedure  Meds/Allergies Reconciled?:  Physician reviewed  Diagnosis

## 2018-06-14 NOTE — PROGRESS NOTES
Cancer Center Progress Note    Patient Name: Marisa John   YOB: 1945   Medical Record Number: [id number]   Attending Physician: Rubén Gonzalez M.D.        Chief Complaint:  Lung cancer    History of Present Illness:  Cancer history:  73-ye 01/08/2017 that did not show any evidence of metastatic disease. Interval:  Returns for follow-up now completing a course of prednisone for transaminitis/pneumonitis. Feeling much better overall. Energy level is improved.  Denies any unintentional weig tablet (20 mg total) by mouth daily. 3 tabs daily x 5 days, then two tabs daily x 5 days, then one tab x 5 days, then 1/2 tab x 5 days. Take with food. , Disp: 33 tablet, Rfl: 0  •  aspirin 81 MG Oral Tab, Take 81 mg by mouth daily. , Disp: , Rfl:   •  L-Gl 06/14/2018   CA 8.8 06/14/2018   OSMOCALC 285 06/14/2018   ALKPHO 61 06/14/2018   AST 23 06/14/2018   ALT 29 06/14/2018   ALKPHOS 58 12/10/2011   BILT 0.7 06/14/2018   TP 5.5 (L) 06/14/2018   ALB 3.3 (L) 06/14/2018   GLOBULIN 2.2 (L) 06/14/2018    06

## 2018-06-18 ENCOUNTER — TELEPHONE (OUTPATIENT)
Dept: PULMONOLOGY | Facility: CLINIC | Age: 73
End: 2018-06-18

## 2018-06-18 NOTE — TELEPHONE ENCOUNTER
Spoke with pt. Informed him of [facility] message below. Pt had no further questions at this time.

## 2018-06-18 NOTE — TELEPHONE ENCOUNTER
----- Message from Davy Dobson MD sent at 6/15/2018  9:42 PM CDT -----  RN, please let the patient know that his PSA is normal.

## 2018-06-19 ENCOUNTER — APPOINTMENT (OUTPATIENT)
Dept: HEMATOLOGY/ONCOLOGY | Facility: HOSPITAL | Age: 73
End: 2018-06-19
Attending: INTERNAL MEDICINE
Payer: MEDICARE

## 2018-06-19 ENCOUNTER — APPOINTMENT (OUTPATIENT)
Dept: HEMATOLOGY/ONCOLOGY | Facility: HOSPITAL | Age: 73
End: 2018-06-19
Payer: MEDICARE

## 2018-06-19 NOTE — PROGRESS NOTES
HPI:    Patient ID: Alicia Rondon is a 74year old male. HPI  The patient is seen today with his wife. He was last seen in November 2014 at the time of a screening colonoscopy which was normal with the exception of a small hyperplastic polyp.     Kermit Goldberg then 1/2 tab x 5 days. Take with food. Disp: 33 tablet Rfl: 0   aspirin 81 MG Oral Tab Take 81 mg by mouth daily. Disp:  Rfl:    L-Glutamine 500 MG Oral Cap Take 500 mg by mouth daily.  Disp:  Rfl:    Fish Oil-Cholecalciferol (FISH OIL + D3) 4059-7574 MG-U (L)   Hemoglobin      13.5 - 17.5 g/dL 10.4 (L) 11.3 (L) 12.7 (L) 10.9 (L)   Hematocrit      41.0 - 52.0 % 31.0 (L) 33.5 (L) 38.6 (L) 32.3 (L)   MCV      80.0 - 100.0 fL 93.8 95.2 96.4 93.5   MCH      27.0 - 32.0 pg 31.6 32.1 (H) 31.7 31.4   MCHC      32. 0 228 - 428 mcg/dL  223 (L)     IRON SATURATION      20 - 55 %  6 (L)     TRANSFERRIN      180 - 329 mg/dL  169 (L)     Vitamin B12      181 - 914 pg/mL  >1,500 (H)       Component      Latest Ref Rng & Units 3/20/2018 2/19/2018 1/22/2018 12/22/2017   WBC BUN/CREA RATIO      10.0 - 20.0       CALCULATED OSMOLALITY      275 - 295 mOsm/kg       GFR, Non-      >=60       GFR, -American      >=60       Patient Fasting? Iron, Serum      45 - 182 mcg/dL       Iron Bind. Cap. (TI U/L       Total Bilirubin      0.3 - 1.2 mg/dL       TOTAL PROTEIN      5.9 - 8.4 g/dL       Albumin      3.5 - 4.8 g/dL       GLOBULIN, TOTAL      2.5 - 3.7 g/dL       A/G RATIO      1.0 - 2.0       ANION GAP      0 - 18 mmol/L       BUN/CREA RATIO      1

## 2018-06-21 ENCOUNTER — SURGERY (OUTPATIENT)
Age: 73
End: 2018-06-21

## 2018-06-21 ENCOUNTER — ANESTHESIA (OUTPATIENT)
Dept: ENDOSCOPY | Facility: HOSPITAL | Age: 73
End: 2018-06-21
Payer: MEDICARE

## 2018-06-21 ENCOUNTER — HOSPITAL ENCOUNTER (OUTPATIENT)
Facility: HOSPITAL | Age: 73
Setting detail: HOSPITAL OUTPATIENT SURGERY
Discharge: HOME | End: 2018-06-21
Attending: INTERNAL MEDICINE | Admitting: INTERNAL MEDICINE
Payer: MEDICARE

## 2018-06-21 ENCOUNTER — ANESTHESIA EVENT (OUTPATIENT)
Dept: ENDOSCOPY | Facility: HOSPITAL | Age: 73
End: 2018-06-21
Payer: MEDICARE

## 2018-06-21 DIAGNOSIS — D50.9 IRON DEFICIENCY ANEMIA, UNSPECIFIED IRON DEFICIENCY ANEMIA TYPE: ICD-10-CM

## 2018-06-21 DIAGNOSIS — K63.5 COLON POLYP: Primary | ICD-10-CM

## 2018-06-21 PROCEDURE — 43239 EGD BIOPSY SINGLE/MULTIPLE: CPT | Performed by: INTERNAL MEDICINE

## 2018-06-21 PROCEDURE — 0DB98ZX EXCISION OF DUODENUM, VIA NATURAL OR ARTIFICIAL OPENING ENDOSCOPIC, DIAGNOSTIC: ICD-10-PCS | Performed by: INTERNAL MEDICINE

## 2018-06-21 PROCEDURE — 0DBK8ZX EXCISION OF ASCENDING COLON, VIA NATURAL OR ARTIFICIAL OPENING ENDOSCOPIC, DIAGNOSTIC: ICD-10-PCS | Performed by: INTERNAL MEDICINE

## 2018-06-21 RX ORDER — LIDOCAINE HYDROCHLORIDE 10 MG/ML
INJECTION, SOLUTION EPIDURAL; INFILTRATION; INTRACAUDAL; PERINEURAL AS NEEDED
Status: DISCONTINUED | OUTPATIENT
Start: 2018-06-21 | End: 2018-06-21 | Stop reason: SURG

## 2018-06-21 RX ORDER — SODIUM CHLORIDE, SODIUM LACTATE, POTASSIUM CHLORIDE, CALCIUM CHLORIDE 600; 310; 30; 20 MG/100ML; MG/100ML; MG/100ML; MG/100ML
INJECTION, SOLUTION INTRAVENOUS CONTINUOUS
Status: DISCONTINUED | OUTPATIENT
Start: 2018-06-21 | End: 2018-06-21

## 2018-06-21 RX ORDER — NALOXONE HYDROCHLORIDE 0.4 MG/ML
80 INJECTION, SOLUTION INTRAMUSCULAR; INTRAVENOUS; SUBCUTANEOUS AS NEEDED
Status: DISCONTINUED | OUTPATIENT
Start: 2018-06-21 | End: 2018-06-21

## 2018-06-21 RX ORDER — LEVOMEFOLATE CALCIUM 7.5 MG TABLET
15 TABLET DAILY
Qty: 30 TABLET | Refills: 0 | Status: SHIPPED | OUTPATIENT
Start: 2018-06-21 | End: 2018-07-21

## 2018-06-21 RX ADMIN — SODIUM CHLORIDE, SODIUM LACTATE, POTASSIUM CHLORIDE, CALCIUM CHLORIDE: 600; 310; 30; 20 INJECTION, SOLUTION INTRAVENOUS at 14:55:00

## 2018-06-21 RX ADMIN — LIDOCAINE HYDROCHLORIDE 50 MG: 10 INJECTION, SOLUTION EPIDURAL; INFILTRATION; INTRACAUDAL; PERINEURAL at 14:58:00

## 2018-06-21 NOTE — H&P
History & Physical Examination    Patient Name: Jyoti Millan  MRN: [id number]  CSN: [id number]  YOB: 1945    Diagnosis: Iron deficiency anemia         Prescriptions Prior to Admission:  LYCOPENE OR Take by mouth.  Disp:  Rfl:  6/15/2018 30. 00 Years     Types: Cigarettes    Quit date: 6/16/2000    Smokeless tobacco: Never Used    Alcohol use Yes  0.5 oz/week    1 Glasses of wine per week         Comment: occassional       SYSTEM Check if Review is Normal Check if Physical Exam is Normal If

## 2018-06-21 NOTE — ANESTHESIA PREPROCEDURE EVALUATION
Anesthesia PreOp Note    HPI:     Taqueria Huitron is a 74year old male who presents for preoperative consultation requested by: Birgit Juárez MD    Date of Surgery: 6/21/2018    Procedure(s):  COLONOSCOPY  ESOPHAGOGASTRODUODENOSCOPY (EGD)  [place] aspirin 81 MG Oral Tab Take 81 mg by mouth daily. Disp:  Rfl:  6/20/2018   L-Glutamine 500 MG Oral Cap Take 500 mg by mouth daily. Disp:  Rfl:  6/15/2018   Fish Oil-Cholecalciferol (FISH OIL + D3) 7100-4227 MG-UNIT Oral Cap Take 1 capsule by mouth daily.  D CREATSERUM 0.92 06/14/2018   GLU 98 06/14/2018   CA 8.8 06/14/2018          Vital Signs: Body mass index is 25.1 kg/m². height is 5' 11\" and weight is 180 lb. His blood pressure is 128/70 and his pulse is 70.  His respiration is 16 and oxygen saturation

## 2018-06-21 NOTE — OPERATIVE REPORT
[facility] Endoscopy Report      Date of Procedure:  06/21/18      Preoperative Diagnosis:  Iron deficiency anemia      Postoperative Diagnosis:  1. Diminutive ascending colon polyp  2.   Normal examination of the upper digestive tract tolerated without immediate complication. Findings:  The preparation of the colon was good. There was a fair amount of extensive medication particulate debris that required irrigation and suctioning.   The terminal ileum was examined for 5 cm and visu

## 2018-06-21 NOTE — ANESTHESIA POSTPROCEDURE EVALUATION
Patient: Jyoti Millan    Procedure Summary     Date:  06/21/18 Room / Location:  [address] [place] [address] ENDOSCOPY 05 / [address] [place] [address] ENDOSCOPY    Anesthesia Start:  8175 Anesthesia Stop:  5827    Procedures:       COLONOSCOPY (N/A )      ESOPHAGOGASTRODUODENOSCOPY (EGD) (N/A ) D

## 2018-06-22 VITALS
DIASTOLIC BLOOD PRESSURE: 74 MMHG | OXYGEN SATURATION: 95 % | BODY MASS INDEX: 25.2 KG/M2 | RESPIRATION RATE: 15 BRPM | WEIGHT: 180 LBS | SYSTOLIC BLOOD PRESSURE: 133 MMHG | HEART RATE: 65 BPM | HEIGHT: 71 IN

## 2018-06-30 ENCOUNTER — SNF/IP PROF CHARGE ONLY (OUTPATIENT)
Dept: HEMATOLOGY/ONCOLOGY | Facility: HOSPITAL | Age: 73
End: 2018-06-30

## 2018-06-30 DIAGNOSIS — C34.12 CANCER OF UPPER LOBE OF LEFT LUNG (HCC): ICD-10-CM

## 2018-06-30 PROCEDURE — G9678 ONCOLOGY CARE MODEL SERVICE: HCPCS | Performed by: INTERNAL MEDICINE

## 2018-07-02 ENCOUNTER — TELEPHONE (OUTPATIENT)
Dept: HEMATOLOGY/ONCOLOGY | Facility: HOSPITAL | Age: 73
End: 2018-07-02

## 2018-07-02 ENCOUNTER — HOSPITAL ENCOUNTER (OUTPATIENT)
Dept: GENERAL RADIOLOGY | Facility: HOSPITAL | Age: 73
Discharge: HOME | End: 2018-07-02
Attending: INTERNAL MEDICINE
Payer: MEDICARE

## 2018-07-02 ENCOUNTER — OFFICE VISIT (OUTPATIENT)
Dept: HEMATOLOGY/ONCOLOGY | Facility: HOSPITAL | Age: 73
End: 2018-07-02
Attending: INTERNAL MEDICINE
Payer: MEDICARE

## 2018-07-02 VITALS
WEIGHT: 180 LBS | HEART RATE: 82 BPM | OXYGEN SATURATION: 96 % | TEMPERATURE: 98 F | SYSTOLIC BLOOD PRESSURE: 135 MMHG | BODY MASS INDEX: 25.2 KG/M2 | DIASTOLIC BLOOD PRESSURE: 62 MMHG | HEIGHT: 71 IN | RESPIRATION RATE: 18 BRPM

## 2018-07-02 DIAGNOSIS — R05.9 COUGH: ICD-10-CM

## 2018-07-02 DIAGNOSIS — J18.9 PNEUMONIA DUE TO INFECTIOUS ORGANISM, UNSPECIFIED LATERALITY, UNSPECIFIED PART OF LUNG: ICD-10-CM

## 2018-07-02 DIAGNOSIS — C34.90 LUNG CANCER (HCC): ICD-10-CM

## 2018-07-02 DIAGNOSIS — D50.9 IRON DEFICIENCY ANEMIA, UNSPECIFIED IRON DEFICIENCY ANEMIA TYPE: ICD-10-CM

## 2018-07-02 DIAGNOSIS — Z45.2 ENCOUNTER FOR CARE RELATED TO PORT-A-CATH: ICD-10-CM

## 2018-07-02 DIAGNOSIS — J18.9 PNEUMONITIS: ICD-10-CM

## 2018-07-02 DIAGNOSIS — C34.12 CANCER OF UPPER LOBE OF LEFT LUNG (HCC): Primary | ICD-10-CM

## 2018-07-02 DIAGNOSIS — C34.12 CANCER OF UPPER LOBE OF LEFT LUNG (HCC): ICD-10-CM

## 2018-07-02 DIAGNOSIS — D50.9 IRON DEFICIENCY ANEMIA, UNSPECIFIED IRON DEFICIENCY ANEMIA TYPE: Primary | ICD-10-CM

## 2018-07-02 LAB
ALBUMIN SERPL BCP-MCNC: 3.2 G/DL (ref 3.5–4.8)
ALBUMIN/GLOB SERPL: 1 {RATIO} (ref 1–2)
ALP SERPL-CCNC: 84 U/L (ref 32–100)
ALT SERPL-CCNC: 43 U/L (ref 17–63)
ANION GAP SERPL CALC-SCNC: 8 MMOL/L (ref 0–18)
AST SERPL-CCNC: 42 U/L (ref 15–41)
BASOPHILS # BLD: 0 K/UL (ref 0–0.2)
BASOPHILS NFR BLD: 1 %
BILIRUB SERPL-MCNC: 1 MG/DL (ref 0.3–1.2)
BUN SERPL-MCNC: 14 MG/DL (ref 8–20)
BUN/CREAT SERPL: 14.1 (ref 10–20)
CALCIUM SERPL-MCNC: 8.8 MG/DL (ref 8.5–10.5)
CHLORIDE SERPL-SCNC: 98 MMOL/L (ref 95–110)
CO2 SERPL-SCNC: 26 MMOL/L (ref 22–32)
CREAT SERPL-MCNC: 0.99 MG/DL (ref 0.5–1.5)
EOSINOPHIL # BLD: 0.1 K/UL (ref 0–0.7)
EOSINOPHIL NFR BLD: 2 %
ERYTHROCYTE [DISTWIDTH] IN BLOOD BY AUTOMATED COUNT: 21 % (ref 11–15)
GLOBULIN PLAS-MCNC: 3.3 G/DL (ref 2.5–3.7)
GLUCOSE SERPL-MCNC: 108 MG/DL (ref 70–99)
HCT VFR BLD AUTO: 35.4 % (ref 41–52)
HGB BLD-MCNC: 11.8 G/DL (ref 13.5–17.5)
IRON SATN MFR SERPL: 8 % (ref 20–55)
IRON SERPL-MCNC: 15 MCG/DL (ref 45–182)
LYMPHOCYTES # BLD: 0.6 K/UL (ref 1–4)
LYMPHOCYTES NFR BLD: 8 %
MCH RBC QN AUTO: 31.9 PG (ref 27–32)
MCHC RBC AUTO-ENTMCNC: 33.3 G/DL (ref 32–37)
MCV RBC AUTO: 95.6 FL (ref 80–100)
MONOCYTES # BLD: 0.9 K/UL (ref 0–1)
MONOCYTES NFR BLD: 11 %
NEUTROPHILS # BLD AUTO: 6.1 K/UL (ref 1.8–7.7)
NEUTROPHILS NFR BLD: 79 %
OSMOLALITY UR CALC.SUM OF ELEC: 275 MOSM/KG (ref 275–295)
PATIENT FASTING: NO
PLATELET # BLD AUTO: 233 K/UL (ref 140–400)
PMV BLD AUTO: 8 FL (ref 7.4–10.3)
POTASSIUM SERPL-SCNC: 4.2 MMOL/L (ref 3.3–5.1)
PROT SERPL-MCNC: 6.5 G/DL (ref 5.9–8.4)
RBC # BLD AUTO: 3.7 M/UL (ref 4.5–5.9)
SODIUM SERPL-SCNC: 132 MMOL/L (ref 136–144)
TIBC SERPL-MCNC: 185 MCG/DL (ref 228–428)
TRANSFERRIN SERPL-MCNC: 140 MG/DL (ref 180–329)
WBC # BLD AUTO: 7.7 K/UL (ref 4–11)

## 2018-07-02 PROCEDURE — A4216 STERILE WATER/SALINE, 10 ML: HCPCS

## 2018-07-02 PROCEDURE — 85025 COMPLETE CBC W/AUTO DIFF WBC: CPT

## 2018-07-02 PROCEDURE — 99214 OFFICE O/P EST MOD 30 MIN: CPT | Performed by: INTERNAL MEDICINE

## 2018-07-02 PROCEDURE — 80053 COMPREHEN METABOLIC PANEL: CPT

## 2018-07-02 PROCEDURE — 71046 X-RAY EXAM CHEST 2 VIEWS: CPT | Performed by: INTERNAL MEDICINE

## 2018-07-02 PROCEDURE — 36591 DRAW BLOOD OFF VENOUS DEVICE: CPT

## 2018-07-02 PROCEDURE — 84466 ASSAY OF TRANSFERRIN: CPT

## 2018-07-02 PROCEDURE — 83540 ASSAY OF IRON: CPT

## 2018-07-02 RX ORDER — HEPARIN SODIUM (PORCINE) LOCK FLUSH IV SOLN 100 UNIT/ML 100 UNIT/ML
5 SOLUTION INTRAVENOUS ONCE
Status: COMPLETED | OUTPATIENT
Start: 2018-07-02 | End: 2018-07-02

## 2018-07-02 RX ORDER — HEPARIN SODIUM (PORCINE) LOCK FLUSH IV SOLN 100 UNIT/ML 100 UNIT/ML
SOLUTION INTRAVENOUS
Status: COMPLETED
Start: 2018-07-02 | End: 2018-07-02

## 2018-07-02 RX ORDER — 0.9 % SODIUM CHLORIDE 0.9 %
10 VIAL (ML) INJECTION ONCE
Status: CANCELLED | OUTPATIENT
Start: 2018-07-02

## 2018-07-02 RX ORDER — 0.9 % SODIUM CHLORIDE 0.9 %
VIAL (ML) INJECTION
Status: DISCONTINUED
Start: 2018-07-02 | End: 2018-07-02

## 2018-07-02 RX ORDER — HEPARIN SODIUM (PORCINE) LOCK FLUSH IV SOLN 100 UNIT/ML 100 UNIT/ML
5 SOLUTION INTRAVENOUS ONCE
Status: CANCELLED | OUTPATIENT
Start: 2018-07-02

## 2018-07-02 RX ORDER — PREDNISONE 20 MG/1
20 TABLET ORAL DAILY
Qty: 18 TABLET | Refills: 0 | Status: SHIPPED | OUTPATIENT
Start: 2018-07-02 | End: 2018-07-31 | Stop reason: ALTCHOICE

## 2018-07-02 RX ORDER — LEVOFLOXACIN 750 MG/1
750 TABLET ORAL DAILY
Qty: 10 TABLET | Refills: 0 | Status: SHIPPED | OUTPATIENT
Start: 2018-07-02 | End: 2018-07-27

## 2018-07-02 RX ADMIN — HEPARIN SODIUM (PORCINE) LOCK FLUSH IV SOLN 100 UNIT/ML 500 UNITS: 100 SOLUTION INTRAVENOUS at 15:51:00

## 2018-07-02 NOTE — PROGRESS NOTES
Cancer Center Progress Note    Patient Name: Janet Chino   YOB: 1945   Medical Record Number: [id number]   Attending Physician: Alex Arana M.D.        Chief Complaint:  Lung cancer    History of Present Illness:  Cancer history:  73-ye performed on 01/08/2017 that did not show any evidence of metastatic disease. Interval:  Presents for interim follow-up with worsening cough low-grade fever with temperature 100.4 at home and feelings of fatigue.   This is been an issue for several days Rfl:   •  Cholecalciferol (VITAMIN D3) 1000 units Oral Cap, Take 1 tablet by mouth daily. , Disp: , Rfl:   •  CALCIUM CITRATE OR, Take by mouth., Disp: , Rfl:   •  PREDNISONE 20 MG Oral Tab, Take 1 tablet (20 mg total) by mouth daily.  3 tabs daily x 5 days, 06/14/2018   ALB 3.3 (L) 06/14/2018   GLOBULIN 2.2 (L) 06/14/2018    06/14/2018   K 4.0 06/14/2018    06/14/2018   CO2 28 06/14/2018       Radiology:  Chest x-ray with worsening left-sided opacity    None    Cancer of upper lobe of left lung (H

## 2018-07-02 NOTE — TELEPHONE ENCOUNTER
The patient is wife is calling said the patient is not feeling well, He has been weak, fatigue,  Shivering always cold and just not feeling well, Please advise

## 2018-07-02 NOTE — TELEPHONE ENCOUNTER
Patient called to say he has been cold, shaking at times, sob with exertion at times, productive cough, yellow sputum that has increased last few days. They had not been taking temp so I requested that he do so now, temp 100.4.       Also appetite decrease

## 2018-07-02 NOTE — TELEPHONE ENCOUNTER
Called patient, LM that iron levels are low, MD has ordered iron infusion x2, asked him to call back with any questions.

## 2018-07-03 ENCOUNTER — APPOINTMENT (OUTPATIENT)
Dept: HEMATOLOGY/ONCOLOGY | Facility: HOSPITAL | Age: 73
End: 2018-07-03
Attending: INTERNAL MEDICINE
Payer: MEDICARE

## 2018-07-03 ENCOUNTER — APPOINTMENT (OUTPATIENT)
Dept: HEMATOLOGY/ONCOLOGY | Facility: HOSPITAL | Age: 73
End: 2018-07-03
Payer: MEDICARE

## 2018-07-06 ENCOUNTER — TELEPHONE (OUTPATIENT)
Dept: HEMATOLOGY/ONCOLOGY | Facility: HOSPITAL | Age: 73
End: 2018-07-06

## 2018-07-10 ENCOUNTER — TELEPHONE (OUTPATIENT)
Dept: GASTROENTEROLOGY | Facility: CLINIC | Age: 73
End: 2018-07-10

## 2018-07-10 ENCOUNTER — TELEPHONE (OUTPATIENT)
Dept: PULMONOLOGY | Facility: CLINIC | Age: 73
End: 2018-07-10

## 2018-07-10 DIAGNOSIS — K92.2 CHRONIC GI BLEEDING: Primary | ICD-10-CM

## 2018-07-10 NOTE — TELEPHONE ENCOUNTER
Pts wife called to request that GS review labs that were ordered by Dr. Mariajose Prabhakar. She states that iron was very low and would like to know what next step in treatment will be. She states that pt is having iron infusion and another one next week per Dr. Mariajose Prabhakar.

## 2018-07-11 ENCOUNTER — OFFICE VISIT (OUTPATIENT)
Dept: HEMATOLOGY/ONCOLOGY | Facility: HOSPITAL | Age: 73
End: 2018-07-11
Attending: INTERNAL MEDICINE
Payer: MEDICARE

## 2018-07-11 VITALS
RESPIRATION RATE: 18 BRPM | HEART RATE: 84 BPM | DIASTOLIC BLOOD PRESSURE: 70 MMHG | SYSTOLIC BLOOD PRESSURE: 128 MMHG | TEMPERATURE: 99 F

## 2018-07-11 DIAGNOSIS — D50.9 IRON DEFICIENCY ANEMIA, UNSPECIFIED IRON DEFICIENCY ANEMIA TYPE: Primary | ICD-10-CM

## 2018-07-11 DIAGNOSIS — C34.12 CANCER OF UPPER LOBE OF LEFT LUNG (HCC): ICD-10-CM

## 2018-07-11 DIAGNOSIS — Z45.2 ENCOUNTER FOR CARE RELATED TO PORT-A-CATH: ICD-10-CM

## 2018-07-11 PROCEDURE — A4216 STERILE WATER/SALINE, 10 ML: HCPCS

## 2018-07-11 PROCEDURE — 96374 THER/PROPH/DIAG INJ IV PUSH: CPT

## 2018-07-11 RX ORDER — HEPARIN SODIUM (PORCINE) LOCK FLUSH IV SOLN 100 UNIT/ML 100 UNIT/ML
5 SOLUTION INTRAVENOUS ONCE
Status: DISCONTINUED | OUTPATIENT
Start: 2018-07-11 | End: 2018-07-11

## 2018-07-11 RX ORDER — HEPARIN SODIUM (PORCINE) LOCK FLUSH IV SOLN 100 UNIT/ML 100 UNIT/ML
5 SOLUTION INTRAVENOUS ONCE
Status: CANCELLED | OUTPATIENT
Start: 2018-07-11

## 2018-07-11 RX ORDER — 0.9 % SODIUM CHLORIDE 0.9 %
VIAL (ML) INJECTION
Status: DISCONTINUED
Start: 2018-07-11 | End: 2018-07-11

## 2018-07-11 RX ORDER — 0.9 % SODIUM CHLORIDE 0.9 %
10 VIAL (ML) INJECTION ONCE
Status: CANCELLED | OUTPATIENT
Start: 2018-07-11

## 2018-07-11 RX ORDER — SODIUM CHLORIDE 9 MG/ML
INJECTION, SOLUTION INTRAVENOUS
Status: DISCONTINUED
Start: 2018-07-11 | End: 2018-07-11

## 2018-07-11 RX ORDER — HEPARIN SODIUM (PORCINE) LOCK FLUSH IV SOLN 100 UNIT/ML 100 UNIT/ML
SOLUTION INTRAVENOUS
Status: DISCONTINUED
Start: 2018-07-11 | End: 2018-07-11

## 2018-07-11 NOTE — PROGRESS NOTES
Luis for first feraheme infusion ordered by Dr. Kirsten Rodas. Has had iron iv infusions previous. Plan of care explained, possible side effects explained. All questions answered to the best of my ability. Refer to charting of port.  Feraheme infused with no s

## 2018-07-12 NOTE — TELEPHONE ENCOUNTER
Routed to GI Schedulers-   GI schedulers- please schedule pt with orders from Dr. Reggie Berry below.

## 2018-07-13 NOTE — TELEPHONE ENCOUNTER
Patient's wife advised that message was routed to GI schedulers and they will call him back to schedule in the next several days.

## 2018-07-16 NOTE — TELEPHONE ENCOUNTER
Scheduled for:  Capsule Endoscopy 42357  Provider Name: Dr. Orourke March  Date:  7/24/18  Location:  [facility]  Sedation:  None  Time:  0730 (pt is aware to arrive at 0630)   Prep:  NPO after midnight, sent via MYOS?:  Physician manny Boyce

## 2018-07-17 ENCOUNTER — APPOINTMENT (OUTPATIENT)
Dept: HEMATOLOGY/ONCOLOGY | Facility: HOSPITAL | Age: 73
End: 2018-07-17
Attending: INTERNAL MEDICINE
Payer: MEDICARE

## 2018-07-18 ENCOUNTER — OFFICE VISIT (OUTPATIENT)
Dept: HEMATOLOGY/ONCOLOGY | Facility: HOSPITAL | Age: 73
End: 2018-07-18
Attending: INTERNAL MEDICINE
Payer: MEDICARE

## 2018-07-18 VITALS
TEMPERATURE: 98 F | RESPIRATION RATE: 18 BRPM | DIASTOLIC BLOOD PRESSURE: 60 MMHG | HEART RATE: 71 BPM | SYSTOLIC BLOOD PRESSURE: 113 MMHG

## 2018-07-18 DIAGNOSIS — Z45.2 ENCOUNTER FOR CARE RELATED TO PORT-A-CATH: ICD-10-CM

## 2018-07-18 DIAGNOSIS — D50.9 IRON DEFICIENCY ANEMIA, UNSPECIFIED IRON DEFICIENCY ANEMIA TYPE: Primary | ICD-10-CM

## 2018-07-18 DIAGNOSIS — C34.12 CANCER OF UPPER LOBE OF LEFT LUNG (HCC): ICD-10-CM

## 2018-07-18 PROCEDURE — A4216 STERILE WATER/SALINE, 10 ML: HCPCS

## 2018-07-18 PROCEDURE — 96365 THER/PROPH/DIAG IV INF INIT: CPT

## 2018-07-18 RX ORDER — HEPARIN SODIUM (PORCINE) LOCK FLUSH IV SOLN 100 UNIT/ML 100 UNIT/ML
SOLUTION INTRAVENOUS
Status: COMPLETED
Start: 2018-07-18 | End: 2018-07-18

## 2018-07-18 RX ORDER — HEPARIN SODIUM (PORCINE) LOCK FLUSH IV SOLN 100 UNIT/ML 100 UNIT/ML
5 SOLUTION INTRAVENOUS ONCE
Status: COMPLETED | OUTPATIENT
Start: 2018-07-18 | End: 2018-07-18

## 2018-07-18 RX ORDER — HEPARIN SODIUM (PORCINE) LOCK FLUSH IV SOLN 100 UNIT/ML 100 UNIT/ML
5 SOLUTION INTRAVENOUS ONCE
Status: CANCELLED | OUTPATIENT
Start: 2018-07-18

## 2018-07-18 RX ORDER — SODIUM CHLORIDE 9 MG/ML
INJECTION, SOLUTION INTRAVENOUS
Status: DISCONTINUED
Start: 2018-07-18 | End: 2018-07-18

## 2018-07-18 RX ORDER — 0.9 % SODIUM CHLORIDE 0.9 %
10 VIAL (ML) INJECTION ONCE
Status: CANCELLED | OUTPATIENT
Start: 2018-07-18

## 2018-07-18 RX ORDER — 0.9 % SODIUM CHLORIDE 0.9 %
VIAL (ML) INJECTION
Status: DISCONTINUED
Start: 2018-07-18 | End: 2018-07-18

## 2018-07-18 RX ADMIN — HEPARIN SODIUM (PORCINE) LOCK FLUSH IV SOLN 100 UNIT/ML 500 UNITS: 100 SOLUTION INTRAVENOUS at 14:15:00

## 2018-07-18 NOTE — PROGRESS NOTES
Pt to infusion today for IV feraheme 2 of 2. Pt states that he has had some improvement in his fatigue and shortness of breath after the first infusion.   Pt still experiencing fatigue, shortness of breath with exertion and dizziness if he stands up too qu

## 2018-07-23 ENCOUNTER — TELEPHONE (OUTPATIENT)
Dept: GASTROENTEROLOGY | Facility: CLINIC | Age: 73
End: 2018-07-23

## 2018-07-23 NOTE — TELEPHONE ENCOUNTER
Andree/Ita from endo called asking if you want this pt to have a prep for tomorrow's capsule endoscopy.  There is not one ordered for the pt    Please avise

## 2018-07-24 ENCOUNTER — SURGERY (OUTPATIENT)
Age: 73
End: 2018-07-24

## 2018-07-24 ENCOUNTER — HOSPITAL ENCOUNTER (OUTPATIENT)
Facility: HOSPITAL | Age: 73
Setting detail: HOSPITAL OUTPATIENT SURGERY
Discharge: HOME | End: 2018-07-24
Attending: INTERNAL MEDICINE | Admitting: INTERNAL MEDICINE
Payer: MEDICARE

## 2018-07-24 VITALS
WEIGHT: 180 LBS | DIASTOLIC BLOOD PRESSURE: 70 MMHG | HEART RATE: 74 BPM | OXYGEN SATURATION: 100 % | BODY MASS INDEX: 25.2 KG/M2 | RESPIRATION RATE: 14 BRPM | HEIGHT: 71 IN | SYSTOLIC BLOOD PRESSURE: 130 MMHG

## 2018-07-24 DIAGNOSIS — K92.2 CHRONIC GI BLEEDING: ICD-10-CM

## 2018-07-24 PROCEDURE — 0DJ07ZZ INSPECTION OF UPPER INTESTINAL TRACT, VIA NATURAL OR ARTIFICIAL OPENING: ICD-10-PCS | Performed by: INTERNAL MEDICINE

## 2018-07-26 ENCOUNTER — TELEPHONE (OUTPATIENT)
Dept: GASTROENTEROLOGY | Facility: CLINIC | Age: 73
End: 2018-07-26

## 2018-07-26 DIAGNOSIS — D50.0 IRON DEFICIENCY ANEMIA DUE TO CHRONIC BLOOD LOSS: Primary | ICD-10-CM

## 2018-07-26 NOTE — TELEPHONE ENCOUNTER
I spoke to the patient and his wife. Since yesterday the patient has felt weak, sleepy and had a low-grade fever and chills. He feels similar to the way he felt when he was anemic. I have reviewed the capsule enteroscopy.   There is clear fluid throughou

## 2018-07-26 NOTE — TELEPHONE ENCOUNTER
Spoke to wife (okay per FYPEGGY in [facility] verbal release)    She stated that pt has been having low iron which is why test was done. He has been having the iron infusions as well. Per wife patient is feeling how he felt before when his \"iron was low\".  Feeling v

## 2018-07-26 NOTE — TELEPHONE ENCOUNTER
Pt wife is calling for results of capsule EGD done 07/24/18 states pt is not feeling well and is weak has loss of appetite and wants to sleep all the time.  Please call thank you

## 2018-07-27 ENCOUNTER — OFFICE VISIT (OUTPATIENT)
Dept: HEMATOLOGY/ONCOLOGY | Facility: HOSPITAL | Age: 73
End: 2018-07-27
Attending: INTERNAL MEDICINE
Payer: MEDICARE

## 2018-07-27 ENCOUNTER — TELEPHONE (OUTPATIENT)
Dept: HEMATOLOGY/ONCOLOGY | Facility: HOSPITAL | Age: 73
End: 2018-07-27

## 2018-07-27 ENCOUNTER — HOSPITAL ENCOUNTER (OUTPATIENT)
Dept: GENERAL RADIOLOGY | Facility: HOSPITAL | Age: 73
Discharge: HOME | End: 2018-07-27
Attending: PHYSICIAN ASSISTANT
Payer: MEDICARE

## 2018-07-27 VITALS
HEIGHT: 71 IN | RESPIRATION RATE: 18 BRPM | SYSTOLIC BLOOD PRESSURE: 142 MMHG | WEIGHT: 178 LBS | TEMPERATURE: 101 F | BODY MASS INDEX: 24.92 KG/M2 | HEART RATE: 84 BPM | DIASTOLIC BLOOD PRESSURE: 62 MMHG

## 2018-07-27 VITALS
SYSTOLIC BLOOD PRESSURE: 142 MMHG | HEART RATE: 84 BPM | TEMPERATURE: 98 F | DIASTOLIC BLOOD PRESSURE: 62 MMHG | RESPIRATION RATE: 18 BRPM

## 2018-07-27 DIAGNOSIS — C34.12 CANCER OF UPPER LOBE OF LEFT LUNG (HCC): ICD-10-CM

## 2018-07-27 DIAGNOSIS — J18.9 PNEUMONITIS: ICD-10-CM

## 2018-07-27 DIAGNOSIS — R06.00 DYSPNEA, UNSPECIFIED TYPE: ICD-10-CM

## 2018-07-27 DIAGNOSIS — D50.0 IRON DEFICIENCY ANEMIA DUE TO CHRONIC BLOOD LOSS: Primary | ICD-10-CM

## 2018-07-27 DIAGNOSIS — R50.9 FEVER AND CHILLS: ICD-10-CM

## 2018-07-27 DIAGNOSIS — D50.9 IRON DEFICIENCY ANEMIA, UNSPECIFIED IRON DEFICIENCY ANEMIA TYPE: ICD-10-CM

## 2018-07-27 DIAGNOSIS — J18.9 PNEUMONIA OF RIGHT LUNG DUE TO INFECTIOUS ORGANISM, UNSPECIFIED PART OF LUNG: ICD-10-CM

## 2018-07-27 DIAGNOSIS — R50.9 FEVER AND CHILLS: Primary | ICD-10-CM

## 2018-07-27 DIAGNOSIS — Z45.2 ENCOUNTER FOR CARE RELATED TO PORT-A-CATH: ICD-10-CM

## 2018-07-27 PROBLEM — J98.4 PNEUMONITIS: Status: ACTIVE | Noted: 2018-07-27

## 2018-07-27 LAB
ALBUMIN SERPL BCP-MCNC: 2.8 G/DL (ref 3.5–4.8)
ALBUMIN/GLOB SERPL: 0.9 {RATIO} (ref 1–2)
ALP SERPL-CCNC: 75 U/L (ref 32–100)
ALT SERPL-CCNC: 33 U/L (ref 17–63)
ANION GAP SERPL CALC-SCNC: 8 MMOL/L (ref 0–18)
AST SERPL-CCNC: 42 U/L (ref 15–41)
BASOPHILS # BLD: 0.1 K/UL (ref 0–0.2)
BASOPHILS NFR BLD: 1 %
BILIRUB SERPL-MCNC: 0.8 MG/DL (ref 0.3–1.2)
BILIRUB UR QL: NEGATIVE
BUN SERPL-MCNC: 15 MG/DL (ref 8–20)
BUN/CREAT SERPL: 15.2 (ref 10–20)
CALCIUM SERPL-MCNC: 8.4 MG/DL (ref 8.5–10.5)
CHLORIDE SERPL-SCNC: 102 MMOL/L (ref 95–110)
CLARITY UR: CLEAR
CO2 SERPL-SCNC: 25 MMOL/L (ref 22–32)
COLOR UR: YELLOW
CREAT SERPL-MCNC: 0.99 MG/DL (ref 0.5–1.5)
EOSINOPHIL # BLD: 0.1 K/UL (ref 0–0.7)
EOSINOPHIL NFR BLD: 2 %
ERYTHROCYTE [DISTWIDTH] IN BLOOD BY AUTOMATED COUNT: 19.8 % (ref 11–15)
FERRITIN SERPL IA-MCNC: 1916 NG/ML (ref 24–336)
GLOBULIN PLAS-MCNC: 3.2 G/DL (ref 2.5–3.7)
GLUCOSE SERPL-MCNC: 163 MG/DL (ref 70–99)
GLUCOSE UR-MCNC: NEGATIVE MG/DL
HCT VFR BLD AUTO: 36.6 % (ref 41–52)
HGB BLD-MCNC: 12.3 G/DL (ref 13.5–17.5)
HGB UR QL STRIP.AUTO: NEGATIVE
IRON SATN MFR SERPL: 9 % (ref 20–55)
IRON SERPL-MCNC: 14 MCG/DL (ref 45–182)
KETONES UR-MCNC: NEGATIVE MG/DL
LEUKOCYTE ESTERASE UR QL STRIP.AUTO: NEGATIVE
LYMPHOCYTES # BLD: 0.4 K/UL (ref 1–4)
LYMPHOCYTES NFR BLD: 4 %
MCH RBC QN AUTO: 32.2 PG (ref 27–32)
MCHC RBC AUTO-ENTMCNC: 33.6 G/DL (ref 32–37)
MCV RBC AUTO: 95.7 FL (ref 80–100)
MONOCYTES # BLD: 0.6 K/UL (ref 0–1)
MONOCYTES NFR BLD: 7 %
NEUTROPHILS # BLD AUTO: 7.1 K/UL (ref 1.8–7.7)
NEUTROPHILS NFR BLD: 86 %
NITRITE UR QL STRIP.AUTO: NEGATIVE
OSMOLALITY UR CALC.SUM OF ELEC: 284 MOSM/KG (ref 275–295)
PATIENT FASTING: NO
PH UR: 6 [PH] (ref 5–8)
PLATELET # BLD AUTO: 188 K/UL (ref 140–400)
PMV BLD AUTO: 8.8 FL (ref 7.4–10.3)
POTASSIUM SERPL-SCNC: 3.9 MMOL/L (ref 3.3–5.1)
PROT SERPL-MCNC: 6 G/DL (ref 5.9–8.4)
PROT UR-MCNC: NEGATIVE MG/DL
RBC # BLD AUTO: 3.82 M/UL (ref 4.5–5.9)
SODIUM SERPL-SCNC: 135 MMOL/L (ref 136–144)
SP GR UR STRIP: 1.01 (ref 1–1.03)
TIBC SERPL-MCNC: 154 MCG/DL (ref 228–428)
TRANSFERRIN SERPL-MCNC: 117 MG/DL (ref 180–329)
UROBILINOGEN UR STRIP-ACNC: <2
VIT C UR-MCNC: NEGATIVE MG/DL
WBC # BLD AUTO: 8.3 K/UL (ref 4–11)

## 2018-07-27 PROCEDURE — 80053 COMPREHEN METABOLIC PANEL: CPT

## 2018-07-27 PROCEDURE — 36591 DRAW BLOOD OFF VENOUS DEVICE: CPT

## 2018-07-27 PROCEDURE — 82728 ASSAY OF FERRITIN: CPT

## 2018-07-27 PROCEDURE — 99213 OFFICE O/P EST LOW 20 MIN: CPT | Performed by: PHYSICIAN ASSISTANT

## 2018-07-27 PROCEDURE — 85025 COMPLETE CBC W/AUTO DIFF WBC: CPT

## 2018-07-27 PROCEDURE — A4216 STERILE WATER/SALINE, 10 ML: HCPCS

## 2018-07-27 PROCEDURE — 83540 ASSAY OF IRON: CPT

## 2018-07-27 PROCEDURE — 71046 X-RAY EXAM CHEST 2 VIEWS: CPT | Performed by: PHYSICIAN ASSISTANT

## 2018-07-27 PROCEDURE — 84466 ASSAY OF TRANSFERRIN: CPT

## 2018-07-27 RX ORDER — HEPARIN SODIUM (PORCINE) LOCK FLUSH IV SOLN 100 UNIT/ML 100 UNIT/ML
5 SOLUTION INTRAVENOUS ONCE
Status: CANCELLED | OUTPATIENT
Start: 2018-07-27

## 2018-07-27 RX ORDER — LEVOFLOXACIN 750 MG/1
750 TABLET ORAL DAILY
Qty: 10 TABLET | Refills: 0 | Status: ON HOLD | OUTPATIENT
Start: 2018-07-27 | End: 2018-08-04

## 2018-07-27 RX ORDER — 0.9 % SODIUM CHLORIDE 0.9 %
10 VIAL (ML) INJECTION ONCE
Status: CANCELLED | OUTPATIENT
Start: 2018-07-27

## 2018-07-27 RX ORDER — 0.9 % SODIUM CHLORIDE 0.9 %
VIAL (ML) INJECTION
Status: DISCONTINUED
Start: 2018-07-27 | End: 2018-07-27

## 2018-07-27 RX ORDER — HEPARIN SODIUM (PORCINE) LOCK FLUSH IV SOLN 100 UNIT/ML 100 UNIT/ML
SOLUTION INTRAVENOUS
Status: COMPLETED
Start: 2018-07-27 | End: 2018-07-27

## 2018-07-27 RX ORDER — HEPARIN SODIUM (PORCINE) LOCK FLUSH IV SOLN 100 UNIT/ML 100 UNIT/ML
5 SOLUTION INTRAVENOUS ONCE
Status: COMPLETED | OUTPATIENT
Start: 2018-07-27 | End: 2018-07-27

## 2018-07-27 RX ADMIN — HEPARIN SODIUM (PORCINE) LOCK FLUSH IV SOLN 100 UNIT/ML 500 UNITS: 100 SOLUTION INTRAVENOUS at 14:25:00

## 2018-07-27 NOTE — PATIENT INSTRUCTIONS
1.  Levaquin 500 mg once daily for 10 days    2. Ibuprofen as needed - monitor temperature    3. Stay hydrated    4. Reschedule CT chest for the week of 8/13/18    5. Call as needed - especially if feeling worse    6.   Follow-up originally scheduled wi

## 2018-07-27 NOTE — PROGRESS NOTES
Labs drawn via port. Pt coming today to see Varghese Pearce NP for complaints of low grade fevers, chills and sweating. Presently afebrile, but c/o always feeling tired, slight shortness of breath on exertion.  Port capped and left in place until after NP visit

## 2018-07-27 NOTE — TELEPHONE ENCOUNTER
Toxicities:  Hx of Lung Cancer & Iron Deficiency Anemia  Pt instructed by Dr Jean Perdomo to have labs (cbc/ferritin/iron/TIBC) drawn today. He also instructed the pt to see Dr Cristin Jones if he continues to not feel well.   Pt scheduled to see Dr Cristin Jones on 8/3/2018

## 2018-07-31 ENCOUNTER — HOSPITAL ENCOUNTER (INPATIENT)
Facility: HOSPITAL | Age: 73
LOS: 4 days | Discharge: HOME | DRG: 194 | End: 2018-08-04
Attending: INTERNAL MEDICINE | Admitting: INTERNAL MEDICINE
Payer: MEDICARE

## 2018-07-31 ENCOUNTER — APPOINTMENT (OUTPATIENT)
Dept: GENERAL RADIOLOGY | Facility: HOSPITAL | Age: 73
DRG: 194 | End: 2018-07-31
Attending: INTERNAL MEDICINE
Payer: MEDICARE

## 2018-07-31 ENCOUNTER — OFFICE VISIT (OUTPATIENT)
Dept: HEMATOLOGY/ONCOLOGY | Facility: HOSPITAL | Age: 73
End: 2018-07-31
Attending: INTERNAL MEDICINE
Payer: MEDICARE

## 2018-07-31 ENCOUNTER — TELEPHONE (OUTPATIENT)
Dept: HEMATOLOGY/ONCOLOGY | Facility: HOSPITAL | Age: 73
End: 2018-07-31

## 2018-07-31 VITALS
TEMPERATURE: 100 F | RESPIRATION RATE: 20 BRPM | HEIGHT: 71 IN | WEIGHT: 179 LBS | SYSTOLIC BLOOD PRESSURE: 124 MMHG | DIASTOLIC BLOOD PRESSURE: 62 MMHG | HEART RATE: 99 BPM | BODY MASS INDEX: 25.06 KG/M2 | OXYGEN SATURATION: 92 %

## 2018-07-31 DIAGNOSIS — J18.9 PNEUMONIA OF RIGHT LUNG DUE TO INFECTIOUS ORGANISM, UNSPECIFIED PART OF LUNG: ICD-10-CM

## 2018-07-31 DIAGNOSIS — C34.12 CANCER OF UPPER LOBE OF LEFT LUNG (HCC): Primary | ICD-10-CM

## 2018-07-31 LAB
ALBUMIN SERPL BCP-MCNC: 2.5 G/DL (ref 3.5–4.8)
ALBUMIN/GLOB SERPL: 0.7 {RATIO} (ref 1–2)
ALP SERPL-CCNC: 146 U/L (ref 32–100)
ALT SERPL-CCNC: 45 U/L (ref 17–63)
ANION GAP SERPL CALC-SCNC: 10 MMOL/L (ref 0–18)
AST SERPL-CCNC: 38 U/L (ref 15–41)
BASOPHILS # BLD: 0 K/UL (ref 0–0.2)
BASOPHILS NFR BLD: 1 %
BILIRUB SERPL-MCNC: 0.5 MG/DL (ref 0.3–1.2)
BUN SERPL-MCNC: 16 MG/DL (ref 8–20)
BUN/CREAT SERPL: 16.5 (ref 10–20)
CALCIUM SERPL-MCNC: 8.5 MG/DL (ref 8.5–10.5)
CHLORIDE SERPL-SCNC: 97 MMOL/L (ref 95–110)
CO2 SERPL-SCNC: 24 MMOL/L (ref 22–32)
CREAT SERPL-MCNC: 0.97 MG/DL (ref 0.5–1.5)
EOSINOPHIL # BLD: 0.1 K/UL (ref 0–0.7)
EOSINOPHIL NFR BLD: 1 %
ERYTHROCYTE [DISTWIDTH] IN BLOOD BY AUTOMATED COUNT: 19.4 % (ref 11–15)
GLOBULIN PLAS-MCNC: 3.5 G/DL (ref 2.5–3.7)
GLUCOSE BLDC GLUCOMTR-MCNC: 118 MG/DL (ref 70–99)
GLUCOSE SERPL-MCNC: 121 MG/DL (ref 70–99)
HCT VFR BLD AUTO: 35.6 % (ref 41–52)
HGB BLD-MCNC: 11.8 G/DL (ref 13.5–17.5)
L PNEUMO AG UR QL: NEGATIVE
LYMPHOCYTES # BLD: 0.5 K/UL (ref 1–4)
LYMPHOCYTES NFR BLD: 6 %
MCH RBC QN AUTO: 32 PG (ref 27–32)
MCHC RBC AUTO-ENTMCNC: 33.1 G/DL (ref 32–37)
MCV RBC AUTO: 96.7 FL (ref 80–100)
MONOCYTES # BLD: 0.7 K/UL (ref 0–1)
MONOCYTES NFR BLD: 8 %
NEUTROPHILS # BLD AUTO: 7.8 K/UL (ref 1.8–7.7)
NEUTROPHILS NFR BLD: 85 %
OSMOLALITY UR CALC.SUM OF ELEC: 274 MOSM/KG (ref 275–295)
PLATELET # BLD AUTO: 227 K/UL (ref 140–400)
PMV BLD AUTO: 7.8 FL (ref 7.4–10.3)
POTASSIUM SERPL-SCNC: 3.7 MMOL/L (ref 3.3–5.1)
PROCALCITONIN SERPL-MCNC: 0.39 NG/ML (ref ?–0.11)
PROT SERPL-MCNC: 6 G/DL (ref 5.9–8.4)
RBC # BLD AUTO: 3.68 M/UL (ref 4.5–5.9)
SODIUM SERPL-SCNC: 131 MMOL/L (ref 136–144)
STREP PNEUMO ANTIGEN, URINE: NEGATIVE
WBC # BLD AUTO: 9.2 K/UL (ref 4–11)

## 2018-07-31 PROCEDURE — 99223 1ST HOSP IP/OBS HIGH 75: CPT | Performed by: INTERNAL MEDICINE

## 2018-07-31 PROCEDURE — 71046 X-RAY EXAM CHEST 2 VIEWS: CPT | Performed by: INTERNAL MEDICINE

## 2018-07-31 RX ORDER — IBUPROFEN 200 MG
1 CAPSULE ORAL DAILY
Status: DISCONTINUED | OUTPATIENT
Start: 2018-08-01 | End: 2018-08-04

## 2018-07-31 RX ORDER — ASPIRIN 81 MG/1
81 TABLET ORAL DAILY
Status: DISCONTINUED | OUTPATIENT
Start: 2018-08-01 | End: 2018-08-04

## 2018-07-31 RX ORDER — IPRATROPIUM BROMIDE AND ALBUTEROL SULFATE 2.5; .5 MG/3ML; MG/3ML
3 SOLUTION RESPIRATORY (INHALATION) EVERY 6 HOURS PRN
Status: DISCONTINUED | OUTPATIENT
Start: 2018-07-31 | End: 2018-08-04

## 2018-07-31 RX ORDER — SODIUM CHLORIDE 450 MG/100ML
INJECTION, SOLUTION INTRAVENOUS CONTINUOUS
Status: DISCONTINUED | OUTPATIENT
Start: 2018-07-31 | End: 2018-08-03

## 2018-07-31 RX ORDER — HEPARIN SODIUM 5000 [USP'U]/ML
5000 INJECTION, SOLUTION INTRAVENOUS; SUBCUTANEOUS EVERY 12 HOURS
Status: DISCONTINUED | OUTPATIENT
Start: 2018-07-31 | End: 2018-08-04

## 2018-07-31 RX ORDER — 0.9 % SODIUM CHLORIDE 0.9 %
VIAL (ML) INJECTION
Status: COMPLETED
Start: 2018-07-31 | End: 2018-07-31

## 2018-07-31 RX ORDER — PROPRANOLOL/HYDROCHLOROTHIAZID 40 MG-25MG
500 TABLET ORAL DAILY
Status: DISCONTINUED | OUTPATIENT
Start: 2018-07-31 | End: 2018-07-31 | Stop reason: RX

## 2018-07-31 RX ORDER — SODIUM CHLORIDE 9 MG/ML
INJECTION, SOLUTION INTRAVENOUS
Status: COMPLETED
Start: 2018-07-31 | End: 2018-07-31

## 2018-07-31 RX ORDER — METHYLPREDNISOLONE SODIUM SUCCINATE 40 MG/ML
40 INJECTION, POWDER, LYOPHILIZED, FOR SOLUTION INTRAMUSCULAR; INTRAVENOUS EVERY 12 HOURS
Status: DISCONTINUED | OUTPATIENT
Start: 2018-07-31 | End: 2018-08-01

## 2018-07-31 RX ORDER — PANTOPRAZOLE SODIUM 40 MG/1
40 TABLET, DELAYED RELEASE ORAL
Status: DISCONTINUED | OUTPATIENT
Start: 2018-08-01 | End: 2018-08-04

## 2018-07-31 RX ORDER — ASCORBIC ACID 500 MG
500 TABLET ORAL DAILY
Status: DISCONTINUED | OUTPATIENT
Start: 2018-08-01 | End: 2018-08-04

## 2018-07-31 RX ORDER — CHOLECALCIFEROL (VITAMIN D3) 125 MCG
1000 CAPSULE ORAL DAILY
Status: DISCONTINUED | OUTPATIENT
Start: 2018-08-01 | End: 2018-08-04

## 2018-07-31 RX ORDER — MULTIVITAMIN WITH IRON
200 TABLET ORAL DAILY
Status: DISCONTINUED | OUTPATIENT
Start: 2018-07-31 | End: 2018-07-31 | Stop reason: RX

## 2018-07-31 NOTE — TELEPHONE ENCOUNTER
Kemi Pérez called to speak with the nurse. Luis has pneumonia. He is experiencing chills, he's sweating, and he has a fever. Last night his fever was 101.8 and today its 99.9.  Jaimee Cadena can be reached at [phone] Please Advise thanks

## 2018-07-31 NOTE — PROGRESS NOTES
S:  74year old male, presents today with his wife, with c/o no improvement on levofloxocin 750 mg daily x 4 days for right-sided pneumonia. (Initial assessment was on 7/27/18). He reports anorexia, malaise and increasing SOB. Fever persists, Tmax 101. 8

## 2018-07-31 NOTE — PROGRESS NOTES
Pharmacy Note: Dietary Supplement Discontinuation Per Policy    Selenium & Tumeric has been discontinued on [facility] per policy. This supplement may be restarted upon discharge using the medication reconciliation process.     Thank you,   Westley Guaman

## 2018-07-31 NOTE — TELEPHONE ENCOUNTER
Toxicities: Hx of Lung CA & Iron Deficiency Anemia  S/P concurrent chemotherapy & RT (completed March 2017) Immunotherapy Durvalumab initiated on 10/31/2017. He completed 12 cycles. His last treatment was 4/10/2018. Durvalumab discontinued due to pneumonitis/transaminitis. Pt saw Denisa Isael Turner on 7/27/2018 & started Levaquin 750mg po daily x 10 days. Pt told to call the office if he does not feel better. Next appt with Dr Akash King on 8/3/2018    Fever/Drenching Night Sweats/Generalized Body Aches/Productive Cough/Fatigue/Dizziness    Fever: Grade 0 (Pt taking Levaquin 750mg po daily x 10 days. He started tx on 7/27/2018 after seeing Isael Conner. Pt reports temp of 101.8 at 9pm last night. He took Tylenol & went to bed. He had drenching night sweats. Temp today 97.6 at 7:30am 99.9 at 11:45am. He has a productive hacking cough. Mucus is clear. Bessy Subramanian also reports he has generalized body aches. No sick family members.)  Fatigue: Grade 2 Sukumar Roberson reports feeling weak & tired)  Dyspnea: Grade 0 (Denies feeling short of breath at rest or with exertion at this time. He does report intermittent dyspnea. He denies chest pain or pressure.)  Anorexia: Grade 1 (Decreased appetite. He reports he is still eating 3 times a day with fruit for snack)  Nausea: Grade 0 (Denies)  Vomiting: Grade 0 (Denies)  Dehydration: Grade 0 Sukumar Roberson reports he is drinking 66-100oz of water daily. He denies feeling light headed or dizzy when he changes position. He does report feeling dizzy when he bends over. )  Constipation: Grade 0 (Denies   Bessy Subramanian had a formed, brown BM this afternoon. He denies hematochezia or melena.)  Diarrhea: Grade 0 (Denies)    Wife & patient concerned he is not feeling better. Pt scheduled for an acute care appt with REAGAN Conner at 2:30pm.  I spoke with Piotr Burnett. She has an RN who can draw labs. She requests Farhan Willis or Dr Akash King let them know what labs to draw.

## 2018-07-31 NOTE — CONSULTS
[facility]    PATIENT'S NAME: [email or web address]   ATTENDING PHYSICIAN: Chyrl Boas, MD   CONSULTING PHYSICIAN: Gloris Meckel.  MD Kami   PATIENT ACCOUNT#:   [id number]    LOCATION:  1W [facility] #:   [id number]       DATE OF BIRTH: History of lung cancer in father, and breast and ovarian cancer in mother. REVIEW OF SYSTEMS:  All other review of systems negative x12. PHYSICAL EXAMINATION:    GENERAL:  No acute distress, alert and oriented.   VITAL SIGNS:  ECOG performance statu consultation. Thank you very much for the consultation request.    Dictated By Perico Zhang.  Keith Thrasher MD  d: 07/31/2018 18:10:02  t: 07/31/2018 18:45:18  [facility] [id number]  [facility]/

## 2018-07-31 NOTE — H&P
[address] [facility] Patient Status:  Inpatient    1945 MRN [id number]   Location [facility] 1W Attending Catia Carpenter MD   Hosp Day # 0 PCP James Dvaalos MD     Date:  2018  Hamida Forte Problem Relation Age of Onset   • Cancer Father      Lung   • Cancer Mother      Breast, ovarian     Social History:  Smoking status: Former Smoker                                                              Packs/day: 1.00      Years: 30.00        Type Results  Component Value Date   WBC 8.3 07/27/2018   HGB 12.3 (L) 07/27/2018   HCT 36.6 (L) 07/27/2018    07/27/2018   CREATSERUM 0.99 07/27/2018   BUN 15 07/27/2018    (L) 07/27/2018   K 3.9 07/27/2018    07/27/2018   CO2 25 07/27/2018

## 2018-08-01 LAB
GLUCOSE BLDC GLUCOMTR-MCNC: 166 MG/DL (ref 70–99)
GLUCOSE BLDC GLUCOMTR-MCNC: 215 MG/DL (ref 70–99)
IRON SATN MFR SERPL: 20 % (ref 20–55)
IRON SERPL-MCNC: 24 MCG/DL (ref 45–182)
TIBC SERPL-MCNC: 120 MCG/DL (ref 228–428)
TRANSFERRIN SERPL-MCNC: 91 MG/DL (ref 180–329)

## 2018-08-01 PROCEDURE — 99233 SBSQ HOSP IP/OBS HIGH 50: CPT | Performed by: INTERNAL MEDICINE

## 2018-08-01 PROCEDURE — 99232 SBSQ HOSP IP/OBS MODERATE 35: CPT | Performed by: INTERNAL MEDICINE

## 2018-08-01 RX ORDER — METHYLPREDNISOLONE SODIUM SUCCINATE 40 MG/ML
40 INJECTION, POWDER, LYOPHILIZED, FOR SOLUTION INTRAMUSCULAR; INTRAVENOUS DAILY
Status: DISCONTINUED | OUTPATIENT
Start: 2018-08-02 | End: 2018-08-02

## 2018-08-01 NOTE — PROGRESS NOTES
[place] [facility]    Progress Note    [address] [place] Patient Status:  Inpatient    1945 MRN [id number]   Location [facility] 4W/SW/SE Attending Georganne Meckel, MD   Hosp Day # 1 PCP Deepali Camp MD     Subjective:     Ca prophylaxis   Heparin sq         Results:     Lab Results  Component Value Date   WBC 9.2 07/31/2018   HGB 11.8 (L) 07/31/2018   HCT 35.6 (L) 07/31/2018    07/31/2018   CREATSERUM 0.97 07/31/2018   BUN 16 07/31/2018    (L) 07/31/2018   K 3.7 0

## 2018-08-01 NOTE — PROGRESS NOTES
[address] [facility] dosing service    Initial Pharmacokinetic Consult for Vancomycin Dosing     Damien Villalba is a 74year old male admitted on 7/31 who is being treated for pneumonia.   Pharmacy has been asked to dose Vancomycin by Dr. Zina Garrison    He has no ac Pharmacy will need BUN/Scr daily while on Vancomycin to assess renal function. 4.  Pharmacy will follow and monitor renal function changes, toxicity and efficacy. Pharmacy will continue to follow him.   We appreciate the opportunity to assist in his c

## 2018-08-02 ENCOUNTER — APPOINTMENT (OUTPATIENT)
Dept: GENERAL RADIOLOGY | Facility: HOSPITAL | Age: 73
DRG: 194 | End: 2018-08-02
Attending: INTERNAL MEDICINE
Payer: MEDICARE

## 2018-08-02 LAB
GLUCOSE BLDC GLUCOMTR-MCNC: 251 MG/DL (ref 70–99)
VANCOMYCIN TROUGH SERPL-MCNC: 11.7 MCG/ML (ref 10–20)

## 2018-08-02 PROCEDURE — 71045 X-RAY EXAM CHEST 1 VIEW: CPT | Performed by: INTERNAL MEDICINE

## 2018-08-02 PROCEDURE — 99232 SBSQ HOSP IP/OBS MODERATE 35: CPT | Performed by: INTERNAL MEDICINE

## 2018-08-02 RX ORDER — 0.9 % SODIUM CHLORIDE 0.9 %
VIAL (ML) INJECTION
Status: COMPLETED
Start: 2018-08-02 | End: 2018-08-02

## 2018-08-02 NOTE — PROGRESS NOTES
120 [facility] dosing service    Follow-up Pharmacokinetic Consult for Vancomycin Dosing     Esha Murry is a 74year old male admitted on 7/31 who is being treated for pneumonia. Patient is on day 3 of Vancomycin 1 gm IV Q 12 hours.   Goal trough is function)    2. Pharmacy will re-check Vancomycin trough levels in next 3-5 days if renally stable. Goal trough level 15-20 ug/mL. 3.  Pharmacy will need BUN/Scr daily while on Vancomycin to assess renal function.     4.  Pharmacy will follow and monit

## 2018-08-02 NOTE — PROGRESS NOTES
Pulmonary/Critical Care Follow Up Note    HPI:   Arnaldo President is a 74year old male with No chief complaint on file.       PCP Rebecca Sánchez MD  Admission Attending Debra Mederos 15 Day #2    Feeling better  Less cough  Less sob  No 1770 ml     nad  Lung RLL crackle  cv reg   abd soft +BS  Ext warm      LABS:             ASSESSMENT/PLAN:     1- Acute RLL pneumonia  Feeling better  CXR stable  No feever  Plan cont iv abx   F/u cbc   Follow cx        2- COPD with acute exacerbation

## 2018-08-02 NOTE — PROGRESS NOTES
[facility]    Hematology/Oncology   Progress Note    [address] [facility] Patient Status:  Inpatient    1945 MRN [id number]   Location [facility] 4W/SW/SE Attending Jeffrey Ponce MD   Hosp Day # 2 PCP Wayne Walter MD adenocarcinoma of the left lung, stage IIIB, status post concurrent chemoradiotherapy and durvalumab consolidation with completion in 5/18, now failing outpatient treatment of pneumonia, admitted to the hospital with pneumonia with continued fevers, fatigu

## 2018-08-03 ENCOUNTER — APPOINTMENT (OUTPATIENT)
Dept: HEMATOLOGY/ONCOLOGY | Facility: HOSPITAL | Age: 73
End: 2018-08-03
Attending: INTERNAL MEDICINE
Payer: MEDICARE

## 2018-08-03 LAB
ANION GAP SERPL CALC-SCNC: 6 MMOL/L (ref 0–18)
BASOPHILS # BLD: 0 K/UL (ref 0–0.2)
BASOPHILS NFR BLD: 0 %
BUN SERPL-MCNC: 19 MG/DL (ref 8–20)
BUN/CREAT SERPL: 19.6 (ref 10–20)
CALCIUM SERPL-MCNC: 8.7 MG/DL (ref 8.5–10.5)
CHLORIDE SERPL-SCNC: 103 MMOL/L (ref 95–110)
CO2 SERPL-SCNC: 29 MMOL/L (ref 22–32)
CREAT SERPL-MCNC: 0.97 MG/DL (ref 0.5–1.5)
EOSINOPHIL # BLD: 0 K/UL (ref 0–0.7)
EOSINOPHIL NFR BLD: 1 %
ERYTHROCYTE [DISTWIDTH] IN BLOOD BY AUTOMATED COUNT: 19 % (ref 11–15)
GLUCOSE BLDC GLUCOMTR-MCNC: 131 MG/DL (ref 70–99)
GLUCOSE BLDC GLUCOMTR-MCNC: 196 MG/DL (ref 70–99)
GLUCOSE SERPL-MCNC: 94 MG/DL (ref 70–99)
HCT VFR BLD AUTO: 35.4 % (ref 41–52)
HGB BLD-MCNC: 11.9 G/DL (ref 13.5–17.5)
LYMPHOCYTES # BLD: 0.8 K/UL (ref 1–4)
LYMPHOCYTES NFR BLD: 12 %
MCH RBC QN AUTO: 32.5 PG (ref 27–32)
MCHC RBC AUTO-ENTMCNC: 33.7 G/DL (ref 32–37)
MCV RBC AUTO: 96.4 FL (ref 80–100)
MONOCYTES # BLD: 0.5 K/UL (ref 0–1)
MONOCYTES NFR BLD: 7 %
NEUTROPHILS # BLD AUTO: 6 K/UL (ref 1.8–7.7)
NEUTROPHILS NFR BLD: 81 %
OSMOLALITY UR CALC.SUM OF ELEC: 288 MOSM/KG (ref 275–295)
PLATELET # BLD AUTO: 289 K/UL (ref 140–400)
PMV BLD AUTO: 7.6 FL (ref 7.4–10.3)
POTASSIUM SERPL-SCNC: 4.1 MMOL/L (ref 3.3–5.1)
RBC # BLD AUTO: 3.67 M/UL (ref 4.5–5.9)
SODIUM SERPL-SCNC: 138 MMOL/L (ref 136–144)
WBC # BLD AUTO: 7.4 K/UL (ref 4–11)

## 2018-08-03 PROCEDURE — 99232 SBSQ HOSP IP/OBS MODERATE 35: CPT | Performed by: INTERNAL MEDICINE

## 2018-08-03 RX ORDER — SODIUM CHLORIDE 9 MG/ML
INJECTION, SOLUTION INTRAVENOUS
Status: COMPLETED
Start: 2018-08-03 | End: 2018-08-03

## 2018-08-03 RX ORDER — 0.9 % SODIUM CHLORIDE 0.9 %
VIAL (ML) INJECTION
Status: COMPLETED
Start: 2018-08-03 | End: 2018-08-03

## 2018-08-03 NOTE — CM/SW NOTE
08/03/18 1400   CM/SW Screening   Referral [facility] staff; Chart review;Nursing rounds  (patient, wife)   Patient's Mental Status Alert;Oriented   Patient's [address]   Number of Levels in Home 1  (plus ba

## 2018-08-03 NOTE — PROGRESS NOTES
[facility]    Hematology/Oncology   Progress Note    60 [facility] Patient Status:  Inpatient    1945 MRN [id number]   Location [facility] 4W/SW/SE Attending Georganne Meckel, MD   Hosp Day # 3 PCP Deepali Camp MD Postop changes left shoulder. 10. Catheter in superior vena cava. Dictated by (CST): Brigid Francois MD on 8/02/2018 at 9:15     Approved by (CST): Brigid Francois MD on 8/02/2018 at 9:24              Medications reviewed.     Assessment and Plan:

## 2018-08-03 NOTE — PROGRESS NOTES
[place] [facility] - [facility]    Progress Note    60 [facility] Patient Status:  Inpatient    1945 MRN [id number]   Location [facility] 4W/SW/SE Attending Imani Avila MD   Hosp Day # 3 PCP Sal Gallegos MD     Subjective:     Padmaja Mclaughlin pneumonitis/transaminitis.     Next CT chest for restaging due in August 2018.       4- WALLACE   Continue with cpap during sleep      5- HL   Statin      6- DVT prophylaxis   Heparin sq           Results:     Lab Results  Component Value Date   WBC 7.4 08/03/

## 2018-08-04 VITALS
BODY MASS INDEX: 24.59 KG/M2 | DIASTOLIC BLOOD PRESSURE: 69 MMHG | RESPIRATION RATE: 18 BRPM | SYSTOLIC BLOOD PRESSURE: 137 MMHG | OXYGEN SATURATION: 96 % | WEIGHT: 175.63 LBS | TEMPERATURE: 97 F | HEART RATE: 73 BPM | HEIGHT: 71 IN

## 2018-08-04 PROBLEM — R50.9 FEVER AND CHILLS: Status: RESOLVED | Noted: 2018-07-27 | Resolved: 2018-08-04

## 2018-08-04 PROBLEM — J98.4 PNEUMONITIS: Status: RESOLVED | Noted: 2018-07-27 | Resolved: 2018-08-04

## 2018-08-04 PROBLEM — R06.00 DYSPNEA: Status: RESOLVED | Noted: 2018-07-27 | Resolved: 2018-08-04

## 2018-08-04 PROBLEM — J18.9 PNEUMONITIS: Status: RESOLVED | Noted: 2018-07-27 | Resolved: 2018-08-04

## 2018-08-04 LAB — GLUCOSE BLDC GLUCOMTR-MCNC: 84 MG/DL (ref 70–99)

## 2018-08-04 PROCEDURE — 99238 HOSP IP/OBS DSCHRG MGMT 30/<: CPT | Performed by: INTERNAL MEDICINE

## 2018-08-04 RX ORDER — HEPARIN SODIUM (PORCINE) LOCK FLUSH IV SOLN 100 UNIT/ML 100 UNIT/ML
SOLUTION INTRAVENOUS
Status: DISCONTINUED
Start: 2018-08-04 | End: 2018-08-04

## 2018-08-04 RX ORDER — AMOXICILLIN AND CLAVULANATE POTASSIUM 875; 125 MG/1; MG/1
1 TABLET, FILM COATED ORAL 2 TIMES DAILY
Qty: 10 TABLET | Refills: 0 | Status: SHIPPED | OUTPATIENT
Start: 2018-08-04 | End: 2018-08-09

## 2018-08-04 RX ORDER — 0.9 % SODIUM CHLORIDE 0.9 %
VIAL (ML) INJECTION
Status: DISCONTINUED
Start: 2018-08-04 | End: 2018-08-04

## 2018-08-04 NOTE — DISCHARGE SUMMARY
[address] [facility] [place],2Nd  Floor Patient Status:  Inpatient    1945 MRN [id number]   Location [facility] 4W/SW/SE Attending Sudhir Arora MD   Hosp Day # 4 PCP Shruti Vann MD     Date of Admission: daily.      CALCIUM CITRATE OR  Take 1 tablet by mouth daily. aspirin 81 MG Oral Tab  Take 81 mg by mouth daily. L-Glutamine 500 MG Oral Cap  Take 500 mg by mouth daily.     Fish Oil-Cholecalciferol (FISH OIL + D3) 6297-1103 MG-UNIT Oral Cap  Take 1

## 2018-08-06 ENCOUNTER — PATIENT OUTREACH (OUTPATIENT)
Dept: CASE MANAGEMENT | Age: 73
End: 2018-08-06

## 2018-08-07 ENCOUNTER — TELEPHONE (OUTPATIENT)
Dept: PULMONOLOGY | Facility: CLINIC | Age: 73
End: 2018-08-07

## 2018-08-07 ENCOUNTER — TELEPHONE (OUTPATIENT)
Dept: CARDIOLOGY UNIT | Facility: HOSPITAL | Age: 73
End: 2018-08-07

## 2018-08-07 DIAGNOSIS — Z12.5 PROSTATE CANCER SCREENING: ICD-10-CM

## 2018-08-07 DIAGNOSIS — R73.09 ELEVATED GLUCOSE LEVEL: Primary | ICD-10-CM

## 2018-08-07 NOTE — TELEPHONE ENCOUNTER
Larissa Maryjaspreet returned call - confirmed appt for pt to see [address] [place] [address] on 8/23/2018. Also wants to know if pt is due for any tests. Pls call. Thank you.

## 2018-08-07 NOTE — TELEPHONE ENCOUNTER
Pt discharged from [facility] on 8/4/18 . Please call to schedule TCM follow up with Primary Care Physician. Book by date 8/18/18.    Thanks

## 2018-08-07 NOTE — PROGRESS NOTES
TCM OUTREACH    Call made to attempt to reach patient for TCM follow up. No answer, Voicemail left requesting call back at [phone].   TE routed to clinical staff for assistance w scheduling      Book by date: 8/18/18    (Triage Team if pt returns charmaine

## 2018-08-08 ENCOUNTER — TELEPHONE (OUTPATIENT)
Dept: CARDIOLOGY UNIT | Facility: HOSPITAL | Age: 73
End: 2018-08-08

## 2018-08-08 ENCOUNTER — OFFICE VISIT (OUTPATIENT)
Dept: CARDIOLOGY CLINIC | Facility: HOSPITAL | Age: 73
End: 2018-08-08
Attending: INTERNAL MEDICINE
Payer: MEDICARE

## 2018-08-08 VITALS
DIASTOLIC BLOOD PRESSURE: 55 MMHG | HEART RATE: 76 BPM | WEIGHT: 174 LBS | SYSTOLIC BLOOD PRESSURE: 126 MMHG | OXYGEN SATURATION: 94 % | BODY MASS INDEX: 24 KG/M2

## 2018-08-08 DIAGNOSIS — Z12.5 PROSTATE CANCER SCREENING: ICD-10-CM

## 2018-08-08 DIAGNOSIS — J18.9 COMMUNITY ACQUIRED PNEUMONIA OF LEFT LOWER LOBE OF LUNG: Primary | ICD-10-CM

## 2018-08-08 DIAGNOSIS — J18.9 PNA (PNEUMONIA): ICD-10-CM

## 2018-08-08 DIAGNOSIS — C34.12 CANCER OF UPPER LOBE OF LEFT LUNG (HCC): ICD-10-CM

## 2018-08-08 DIAGNOSIS — R73.09 ELEVATED GLUCOSE LEVEL: ICD-10-CM

## 2018-08-08 LAB
ALBUMIN SERPL BCP-MCNC: 2.9 G/DL (ref 3.5–4.8)
ALBUMIN/GLOB SERPL: 1 {RATIO} (ref 1–2)
ALP SERPL-CCNC: 89 U/L (ref 32–100)
ALT SERPL-CCNC: 46 U/L (ref 17–63)
ANION GAP SERPL CALC-SCNC: 8 MMOL/L (ref 0–18)
AST SERPL-CCNC: 23 U/L (ref 15–41)
BASOPHILS # BLD: 0 K/UL (ref 0–0.2)
BASOPHILS NFR BLD: 0 %
BILIRUB SERPL-MCNC: 0.5 MG/DL (ref 0.3–1.2)
BUN SERPL-MCNC: 17 MG/DL (ref 8–20)
BUN/CREAT SERPL: 18.3 (ref 10–20)
CALCIUM SERPL-MCNC: 8.8 MG/DL (ref 8.5–10.5)
CHLORIDE SERPL-SCNC: 101 MMOL/L (ref 95–110)
CO2 SERPL-SCNC: 28 MMOL/L (ref 22–32)
CREAT SERPL-MCNC: 0.93 MG/DL (ref 0.5–1.5)
EOSINOPHIL # BLD: 0.1 K/UL (ref 0–0.7)
EOSINOPHIL NFR BLD: 1 %
ERYTHROCYTE [DISTWIDTH] IN BLOOD BY AUTOMATED COUNT: 19.2 % (ref 11–15)
GLOBULIN PLAS-MCNC: 3 G/DL (ref 2.5–3.7)
GLUCOSE SERPL-MCNC: 89 MG/DL (ref 70–99)
HCT VFR BLD AUTO: 38.5 % (ref 41–52)
HGB BLD-MCNC: 13 G/DL (ref 13.5–17.5)
LYMPHOCYTES # BLD: 0.3 K/UL (ref 1–4)
LYMPHOCYTES NFR BLD: 4 %
MCH RBC QN AUTO: 32.3 PG (ref 27–32)
MCHC RBC AUTO-ENTMCNC: 33.6 G/DL (ref 32–37)
MCV RBC AUTO: 96.1 FL (ref 80–100)
MONOCYTES # BLD: 0.8 K/UL (ref 0–1)
MONOCYTES NFR BLD: 10 %
NEUTROPHILS # BLD AUTO: 7.2 K/UL (ref 1.8–7.7)
NEUTROPHILS NFR BLD: 83 %
NEUTS BAND NFR BLD: 2 %
OSMOLALITY UR CALC.SUM OF ELEC: 285 MOSM/KG (ref 275–295)
PATIENT FASTING: NO
PLATELET # BLD AUTO: 354 K/UL (ref 140–400)
PMV BLD AUTO: 7.5 FL (ref 7.4–10.3)
POTASSIUM SERPL-SCNC: 4.6 MMOL/L (ref 3.3–5.1)
PROT SERPL-MCNC: 5.9 G/DL (ref 5.9–8.4)
PSA SERPL-MCNC: 2.8 NG/ML (ref 0–4)
RBC # BLD AUTO: 4.01 M/UL (ref 4.5–5.9)
SODIUM SERPL-SCNC: 137 MMOL/L (ref 136–144)
WBC # BLD AUTO: 8.4 K/UL (ref 4–11)

## 2018-08-08 PROCEDURE — 85025 COMPLETE CBC W/AUTO DIFF WBC: CPT | Performed by: NURSE PRACTITIONER

## 2018-08-08 PROCEDURE — 99214 OFFICE O/P EST MOD 30 MIN: CPT | Performed by: NURSE PRACTITIONER

## 2018-08-08 PROCEDURE — 36415 COLL VENOUS BLD VENIPUNCTURE: CPT | Performed by: NURSE PRACTITIONER

## 2018-08-08 PROCEDURE — 99212 OFFICE O/P EST SF 10 MIN: CPT | Performed by: NURSE PRACTITIONER

## 2018-08-08 PROCEDURE — 94618 PULMONARY STRESS TESTING: CPT | Performed by: NURSE PRACTITIONER

## 2018-08-08 PROCEDURE — 85007 BL SMEAR W/DIFF WBC COUNT: CPT | Performed by: NURSE PRACTITIONER

## 2018-08-08 PROCEDURE — 85027 COMPLETE CBC AUTOMATED: CPT | Performed by: NURSE PRACTITIONER

## 2018-08-08 PROCEDURE — 80053 COMPREHEN METABOLIC PANEL: CPT | Performed by: NURSE PRACTITIONER

## 2018-08-08 NOTE — PROGRESS NOTES
Initial Post Discharge Follow Up   Discharge Date: 8/4/18  Contact Date: 8/6/2018    Consent Verification:  Assessment Completed With: Patient  HIPAA Verified?   Yes    Discharge Dx:  PNA     General:   • How have you been since your discharge from the [facility] mouth daily.  Disp:  Rfl:      • When you were leaving the hospital were any medication changes discussed with you? yes  • If you were prescribed a new medication:   o Was the new medication’s purpose explained? yes  o Was the new medication’s side effects [facility]  [address] [place] [address]  [phone] [facility] AND [address] [place] [address] Hematology Oncology  [address] [place] [address]

## 2018-08-08 NOTE — PATIENT INSTRUCTIONS
Use incentive spirometer 4 sets of 10 breaths daily    Continue all your same medications    Call if having any dizziness, lightheadedness, heart racing, palpitations, chest pain, shortness of breath, coughing, swelling, weight gain, fever, chills or worse

## 2018-08-08 NOTE — PROGRESS NOTES
[address] [facility] Patient Status:  Outpatient    1945 MRN [id number]   Location MD Greg Pereyra MD       [address] [place] [address] is a 74year old male who presents to 08/08/2018 09:07 AM    08/08/2018 09:07 AM   CREATSERUM 0.93 08/08/2018 09:07 AM   BUN 17 08/08/2018 09:07 AM    08/08/2018 09:07 AM   K 4.6 08/08/2018 09:07 AM    08/08/2018 09:07 AM   CO2 28 08/08/2018 09:07 AM   GLU 89 08/08/2018 09:0 Results  Resting SpO2 (minimum): 94 %, Resting HR (max): 76, Resting Oxygen Device: None, Number of laps made: 8 laps, Distance Ambulated (ft): 800 ft, Number of times stopped: 0, Minutes required to return to resting level: 1, Recovery SpO2: 93 %, Recover and with Dr. Rosa Doan on August 17 th    Follow up with the specialty care clinic as needed or as directed    [email or web address]. nhlbi.nih.gov      Exercise daily as tolerated, with goal of doing moderate aerobic exercise like walking for about 30 minutes 5 day

## 2018-08-08 NOTE — TELEPHONE ENCOUNTER
Pt wife stts pt had blood drawn today. Pt wife informed to wait then until appt to discuss any further testing w/ dr. Neela Adames, she voiced understanding.

## 2018-08-08 NOTE — PROGRESS NOTES
TCM OUTREACH    VM received from pt. Callback made to attempt to reach patient for TCM follow up. No answer, Voicemail left requesting call back at [phone].     Book by date: 8/18/18    (Triage Team if pt returns call please transfer to ext [phone])

## 2018-08-08 NOTE — TELEPHONE ENCOUNTER
ALONZOTCB--Dr. Goldman Hopping placed lab orders for pt to have done prior to appt 8/23, pt to fast 12 hrs prior to labs, pt does not need an appt for labs can go to any location

## 2018-08-15 ENCOUNTER — HOSPITAL ENCOUNTER (OUTPATIENT)
Dept: GENERAL RADIOLOGY | Facility: HOSPITAL | Age: 73
Discharge: HOME | DRG: 166 | End: 2018-08-15
Attending: NURSE PRACTITIONER
Payer: MEDICARE

## 2018-08-15 ENCOUNTER — TELEPHONE (OUTPATIENT)
Dept: CARDIOLOGY CLINIC | Facility: HOSPITAL | Age: 73
End: 2018-08-15

## 2018-08-15 DIAGNOSIS — J15.0 PNEUMONIA OF BOTH LUNGS DUE TO KLEBSIELLA PNEUMONIAE, UNSPECIFIED PART OF LUNG (HCC): Primary | ICD-10-CM

## 2018-08-15 DIAGNOSIS — J15.0 PNEUMONIA OF BOTH LUNGS DUE TO KLEBSIELLA PNEUMONIAE, UNSPECIFIED PART OF LUNG (HCC): ICD-10-CM

## 2018-08-15 PROCEDURE — 71046 X-RAY EXAM CHEST 2 VIEWS: CPT | Performed by: NURSE PRACTITIONER

## 2018-08-15 RX ORDER — CEFDINIR 300 MG/1
300 CAPSULE ORAL 2 TIMES DAILY
Qty: 20 CAPSULE | Refills: 0 | Status: ON HOLD | OUTPATIENT
Start: 2018-08-15 | End: 2018-08-27

## 2018-08-15 NOTE — TELEPHONE ENCOUNTER
Repeat chest x-ray noted progressive infiltrate and right lung. Patient had failed outpatient Levaquin therapy, was treated with vancomycin and Zosyn and patient and discharged on Augmentin.   Recommend cefdinir 300 mg 1 tablet twice daily for 10 days and

## 2018-08-15 NOTE — TELEPHONE ENCOUNTER
Patient called reporting his fever of 99.2 with slightly increased cough with white secretions and mildly short of breath with walking and exertion denies chest pain or pleuritic pain.   Denies lightheadedness or dizziness just having some persistent weakne

## 2018-08-17 ENCOUNTER — APPOINTMENT (OUTPATIENT)
Dept: CT IMAGING | Facility: HOSPITAL | Age: 73
DRG: 166 | End: 2018-08-17
Attending: INTERNAL MEDICINE
Payer: MEDICARE

## 2018-08-17 ENCOUNTER — OFFICE VISIT (OUTPATIENT)
Dept: HEMATOLOGY/ONCOLOGY | Facility: HOSPITAL | Age: 73
End: 2018-08-17
Attending: INTERNAL MEDICINE
Payer: MEDICARE

## 2018-08-17 ENCOUNTER — HOSPITAL ENCOUNTER (INPATIENT)
Facility: HOSPITAL | Age: 73
LOS: 10 days | Discharge: HOME | DRG: 166 | End: 2018-08-27
Attending: INTERNAL MEDICINE | Admitting: INTERNAL MEDICINE
Payer: MEDICARE

## 2018-08-17 VITALS
WEIGHT: 178.81 LBS | RESPIRATION RATE: 20 BRPM | HEART RATE: 83 BPM | DIASTOLIC BLOOD PRESSURE: 83 MMHG | HEIGHT: 71 IN | BODY MASS INDEX: 25.03 KG/M2 | SYSTOLIC BLOOD PRESSURE: 135 MMHG | TEMPERATURE: 99 F

## 2018-08-17 VITALS
TEMPERATURE: 99 F | OXYGEN SATURATION: 91 % | SYSTOLIC BLOOD PRESSURE: 135 MMHG | RESPIRATION RATE: 20 BRPM | DIASTOLIC BLOOD PRESSURE: 83 MMHG | HEART RATE: 83 BPM

## 2018-08-17 DIAGNOSIS — C34.12 CANCER OF UPPER LOBE OF LEFT LUNG (HCC): Primary | ICD-10-CM

## 2018-08-17 DIAGNOSIS — J18.9 PNEUMONIA: ICD-10-CM

## 2018-08-17 DIAGNOSIS — J18.9 PNEUMONIA OF RIGHT LUNG DUE TO INFECTIOUS ORGANISM, UNSPECIFIED PART OF LUNG: ICD-10-CM

## 2018-08-17 DIAGNOSIS — R50.9 FEVER, UNSPECIFIED FEVER CAUSE: ICD-10-CM

## 2018-08-17 DIAGNOSIS — J18.9 PNEUMONITIS: ICD-10-CM

## 2018-08-17 DIAGNOSIS — Z45.2 ENCOUNTER FOR CARE RELATED TO PORT-A-CATH: ICD-10-CM

## 2018-08-17 DIAGNOSIS — C34.12 CANCER OF UPPER LOBE OF LEFT LUNG (HCC): ICD-10-CM

## 2018-08-17 DIAGNOSIS — D50.9 IRON DEFICIENCY ANEMIA, UNSPECIFIED IRON DEFICIENCY ANEMIA TYPE: Primary | ICD-10-CM

## 2018-08-17 LAB
ANION GAP SERPL CALC-SCNC: 8 MMOL/L (ref 0–18)
BASOPHILS # BLD: 0 K/UL (ref 0–0.2)
BASOPHILS NFR BLD: 1 %
BUN SERPL-MCNC: 15 MG/DL (ref 8–20)
BUN/CREAT SERPL: 17.2 (ref 10–20)
CALCIUM SERPL-MCNC: 8.2 MG/DL (ref 8.5–10.5)
CHLORIDE SERPL-SCNC: 98 MMOL/L (ref 95–110)
CO2 SERPL-SCNC: 25 MMOL/L (ref 22–32)
CREAT SERPL-MCNC: 0.87 MG/DL (ref 0.5–1.5)
EOSINOPHIL # BLD: 0.1 K/UL (ref 0–0.7)
EOSINOPHIL NFR BLD: 2 %
ERYTHROCYTE [DISTWIDTH] IN BLOOD BY AUTOMATED COUNT: 17.7 % (ref 11–15)
GLUCOSE SERPL-MCNC: 194 MG/DL (ref 70–99)
HCT VFR BLD AUTO: 35.7 % (ref 41–52)
HGB BLD-MCNC: 12.1 G/DL (ref 13.5–17.5)
IRON SATN MFR SERPL: 13 % (ref 20–55)
IRON SERPL-MCNC: 19 MCG/DL (ref 45–182)
LYMPHOCYTES # BLD: 0.4 K/UL (ref 1–4)
LYMPHOCYTES NFR BLD: 6 %
MCH RBC QN AUTO: 32.6 PG (ref 27–32)
MCHC RBC AUTO-ENTMCNC: 33.8 G/DL (ref 32–37)
MCV RBC AUTO: 96.6 FL (ref 80–100)
MONOCYTES # BLD: 0.7 K/UL (ref 0–1)
MONOCYTES NFR BLD: 10 %
NEUTROPHILS # BLD AUTO: 6.3 K/UL (ref 1.8–7.7)
NEUTROPHILS NFR BLD: 83 %
OSMOLALITY UR CALC.SUM OF ELEC: 278 MOSM/KG (ref 275–295)
PLATELET # BLD AUTO: 205 K/UL (ref 140–400)
PMV BLD AUTO: 8.3 FL (ref 7.4–10.3)
POTASSIUM SERPL-SCNC: 3.6 MMOL/L (ref 3.3–5.1)
RBC # BLD AUTO: 3.7 M/UL (ref 4.5–5.9)
SODIUM SERPL-SCNC: 131 MMOL/L (ref 136–144)
TIBC SERPL-MCNC: 149 MCG/DL (ref 228–428)
TRANSFERRIN SERPL-MCNC: 113 MG/DL (ref 180–329)
WBC # BLD AUTO: 7.7 K/UL (ref 4–11)

## 2018-08-17 PROCEDURE — 84466 ASSAY OF TRANSFERRIN: CPT

## 2018-08-17 PROCEDURE — 99215 OFFICE O/P EST HI 40 MIN: CPT | Performed by: INTERNAL MEDICINE

## 2018-08-17 PROCEDURE — 71260 CT THORAX DX C+: CPT | Performed by: INTERNAL MEDICINE

## 2018-08-17 PROCEDURE — 85025 COMPLETE CBC W/AUTO DIFF WBC: CPT

## 2018-08-17 PROCEDURE — 83540 ASSAY OF IRON: CPT

## 2018-08-17 PROCEDURE — 99222 1ST HOSP IP/OBS MODERATE 55: CPT | Performed by: INTERNAL MEDICINE

## 2018-08-17 PROCEDURE — A4216 STERILE WATER/SALINE, 10 ML: HCPCS

## 2018-08-17 PROCEDURE — 36591 DRAW BLOOD OFF VENOUS DEVICE: CPT

## 2018-08-17 RX ORDER — POLYETHYLENE GLYCOL-3350, SODIUM CHLORIDE, POTASSIUM CHLORIDE AND SODIUM BICARBONATE 420; 11.2; 5.72; 1.48 G/438.4G; G/438.4G; G/438.4G; G/438.4G
POWDER, FOR SOLUTION ORAL
COMMUNITY
Start: 2018-06-18 | End: 2019-03-19

## 2018-08-17 RX ORDER — HEPARIN SODIUM (PORCINE) LOCK FLUSH IV SOLN 100 UNIT/ML 100 UNIT/ML
5 SOLUTION INTRAVENOUS ONCE
Status: COMPLETED | OUTPATIENT
Start: 2018-08-17 | End: 2018-08-17

## 2018-08-17 RX ORDER — SODIUM CHLORIDE 9 MG/ML
INJECTION, SOLUTION INTRAVENOUS
Status: COMPLETED
Start: 2018-08-17 | End: 2018-08-17

## 2018-08-17 RX ORDER — ACETAMINOPHEN 325 MG/1
650 TABLET ORAL EVERY 6 HOURS PRN
Status: DISCONTINUED | OUTPATIENT
Start: 2018-08-17 | End: 2018-08-27

## 2018-08-17 RX ORDER — 0.9 % SODIUM CHLORIDE 0.9 %
10 VIAL (ML) INJECTION ONCE
Status: CANCELLED | OUTPATIENT
Start: 2018-08-17

## 2018-08-17 RX ORDER — HEPARIN SODIUM (PORCINE) LOCK FLUSH IV SOLN 100 UNIT/ML 100 UNIT/ML
SOLUTION INTRAVENOUS
Status: COMPLETED
Start: 2018-08-17 | End: 2018-08-17

## 2018-08-17 RX ORDER — MULTIVITAMIN WITH IRON
200 TABLET ORAL DAILY
Status: DISCONTINUED | OUTPATIENT
Start: 2018-08-17 | End: 2018-08-17 | Stop reason: RX

## 2018-08-17 RX ORDER — 0.9 % SODIUM CHLORIDE 0.9 %
VIAL (ML) INJECTION
Status: COMPLETED
Start: 2018-08-17 | End: 2018-08-17

## 2018-08-17 RX ORDER — IPRATROPIUM BROMIDE AND ALBUTEROL SULFATE 2.5; .5 MG/3ML; MG/3ML
3 SOLUTION RESPIRATORY (INHALATION)
Status: DISCONTINUED | OUTPATIENT
Start: 2018-08-17 | End: 2018-08-19

## 2018-08-17 RX ORDER — 0.9 % SODIUM CHLORIDE 0.9 %
VIAL (ML) INJECTION
Status: DISCONTINUED
Start: 2018-08-17 | End: 2018-08-17

## 2018-08-17 RX ORDER — MELATONIN
25 DAILY
Status: DISCONTINUED | OUTPATIENT
Start: 2018-08-17 | End: 2018-08-27

## 2018-08-17 RX ORDER — HEPARIN SODIUM (PORCINE) LOCK FLUSH IV SOLN 100 UNIT/ML 100 UNIT/ML
5 SOLUTION INTRAVENOUS ONCE
Status: CANCELLED | OUTPATIENT
Start: 2018-08-17

## 2018-08-17 RX ORDER — ASPIRIN 81 MG/1
81 TABLET ORAL DAILY
Status: DISCONTINUED | OUTPATIENT
Start: 2018-08-17 | End: 2018-08-27

## 2018-08-17 RX ORDER — SODIUM CHLORIDE 9 MG/ML
INJECTION, SOLUTION INTRAVENOUS CONTINUOUS
Status: DISCONTINUED | OUTPATIENT
Start: 2018-08-17 | End: 2018-08-25

## 2018-08-17 RX ORDER — HEPARIN SODIUM 5000 [USP'U]/ML
5000 INJECTION, SOLUTION INTRAVENOUS; SUBCUTANEOUS EVERY 8 HOURS SCHEDULED
Status: DISCONTINUED | OUTPATIENT
Start: 2018-08-17 | End: 2018-08-27

## 2018-08-17 RX ADMIN — HEPARIN SODIUM (PORCINE) LOCK FLUSH IV SOLN 100 UNIT/ML 500 UNITS: 100 SOLUTION INTRAVENOUS at 11:08:00

## 2018-08-17 NOTE — PROGRESS NOTES
L-Glutamine, lycopene, and selenium has been cancelled due to herbal status/lack of availability at the hospital per P [employer] T Committee Protocol.     Yanique Nieto, PharmD

## 2018-08-17 NOTE — PROGRESS NOTES
Cancer Center Progress Note    Patient Name: Jae Colby   YOB: 1945   Medical Record Number: [id number]   Attending Physician: Johnny Hill M.D.        Chief Complaint:  Lung cancer    History of Present Illness:  Cancer history:  73-ye INDWELLING, IMP    Family History:  Family History   Problem Relation Age of Onset   • Cancer Father      Lung   • Cancer Mother      Breast, ovarian       Social History:    Social History  Social History   Marital status:   Spouse name: N/A    Nickolas Escobedo and negative x12    Vital Signs:  /83 (BP Location: Left arm, Patient Position: Sitting, Cuff Size: adult)   Pulse 83   Temp 99 °F (37.2 °C) (Oral)   Resp 20   Ht 1.803 m (5' 11\")   Wt 81.1 kg (178 lb 12.8 oz)   BMI 24.94 kg/m²     Physical Examinat neutropenia and anemia on chemotherapy. –Neutropenia anemia-improved following the completion of chemotherapy. Wilfredo Farfan he has unresectable stage IIIb disease with no progression following initial chemotherapy we  added immunotherapy with durvalumab.   Corinna Freeman

## 2018-08-17 NOTE — H&P
[address] [facility],[address] [facility] Patient Status:  Inpatient    1945 MRN [id number]   Location [facility] 1W Attending Tiffany Zendejas DO   Hosp Day # 0 PCP Deepa Hernandez MD     Date of Admission: 30 mg by mouth daily. LYCOPENE OR Take 20 mg by mouth daily. Selenium 200 MCG Oral Tab Take 200 mcg by mouth daily. Turmeric (CURCUMIN 95) 500 MG Oral Cap Take 500 mg by mouth daily.      Cholecalciferol (VITAMIN D3) 1000 units Oral Cap Take 1,000 08/08/2018   BUN 17 08/08/2018    08/08/2018   K 4.6 08/08/2018    08/08/2018   CO2 28 08/08/2018   GLU 89 08/08/2018   CA 8.8 08/08/2018   ALB 2.9 (L) 08/08/2018   ALKPHO 89 08/08/2018   TP 5.9 08/08/2018   AST 23 08/08/2018   ALT 46 08/08/201 upper lobe lung cancer. TECHNIQUE:   Two views. FINDINGS: LINES/TUBES:  There is a right-sided chest port catheter, the tip projects over the SVC. HEART/VASC: Heart size within normal limits.  There is calcification and tortuosity of the aorta compatible Postoperative changes of rotator cuff anchors within the greater tuberosity. CONCLUSION:   New large air space opacities within the right upper and right lower lobes suspicious for pneumonia.  Followup exam recommended after resolution of patient's sym tear. 9. Postop changes left shoulder. 10. Catheter in superior vena cava. Dictated by (CST): Piotr Barksdale MD on 8/02/2018 at 9:15     Approved by (CST): Piotr Barksdale MD on 8/02/2018 at 9:24            Assessment   1. HCAP  2. Fevers  3.   A

## 2018-08-18 ENCOUNTER — APPOINTMENT (OUTPATIENT)
Dept: GENERAL RADIOLOGY | Facility: HOSPITAL | Age: 73
DRG: 166 | End: 2018-08-18
Attending: INTERNAL MEDICINE
Payer: MEDICARE

## 2018-08-18 LAB
ANION GAP SERPL CALC-SCNC: 7 MMOL/L (ref 0–18)
BASOPHILS # BLD: 0 K/UL (ref 0–0.2)
BASOPHILS NFR BLD: 0 %
BUN SERPL-MCNC: 11 MG/DL (ref 8–20)
BUN/CREAT SERPL: 13.8 (ref 10–20)
CALCIUM SERPL-MCNC: 8.2 MG/DL (ref 8.5–10.5)
CHLORIDE SERPL-SCNC: 99 MMOL/L (ref 95–110)
CO2 SERPL-SCNC: 27 MMOL/L (ref 22–32)
CREAT SERPL-MCNC: 0.8 MG/DL (ref 0.5–1.5)
EOSINOPHIL # BLD: 0.3 K/UL (ref 0–0.7)
EOSINOPHIL NFR BLD: 4 %
ERYTHROCYTE [DISTWIDTH] IN BLOOD BY AUTOMATED COUNT: 18.3 % (ref 11–15)
ERYTHROCYTE [SEDIMENTATION RATE] IN BLOOD: 99 MM/HR (ref 0–20)
GLUCOSE SERPL-MCNC: 101 MG/DL (ref 70–99)
HCT VFR BLD AUTO: 32.7 % (ref 41–52)
HGB BLD-MCNC: 11.2 G/DL (ref 13.5–17.5)
LYMPHOCYTES # BLD: 0.5 K/UL (ref 1–4)
LYMPHOCYTES NFR BLD: 8 %
MCH RBC QN AUTO: 32.3 PG (ref 27–32)
MCHC RBC AUTO-ENTMCNC: 34.2 G/DL (ref 32–37)
MCV RBC AUTO: 94.4 FL (ref 80–100)
MONOCYTES # BLD: 0.6 K/UL (ref 0–1)
MONOCYTES NFR BLD: 9 %
NEUTROPHILS # BLD AUTO: 5 K/UL (ref 1.8–7.7)
NEUTROPHILS NFR BLD: 78 %
OSMOLALITY UR CALC.SUM OF ELEC: 276 MOSM/KG (ref 275–295)
PLATELET # BLD AUTO: 207 K/UL (ref 140–400)
PMV BLD AUTO: 8.7 FL (ref 7.4–10.3)
POTASSIUM SERPL-SCNC: 3.9 MMOL/L (ref 3.3–5.1)
PROCALCITONIN SERPL-MCNC: 0.17 NG/ML (ref ?–0.11)
RBC # BLD AUTO: 3.46 M/UL (ref 4.5–5.9)
SODIUM SERPL-SCNC: 133 MMOL/L (ref 136–144)
WBC # BLD AUTO: 6.4 K/UL (ref 4–11)

## 2018-08-18 PROCEDURE — 71045 X-RAY EXAM CHEST 1 VIEW: CPT | Performed by: INTERNAL MEDICINE

## 2018-08-18 PROCEDURE — 99233 SBSQ HOSP IP/OBS HIGH 50: CPT | Performed by: INTERNAL MEDICINE

## 2018-08-18 RX ORDER — 0.9 % SODIUM CHLORIDE 0.9 %
VIAL (ML) INJECTION
Status: COMPLETED
Start: 2018-08-18 | End: 2018-08-18

## 2018-08-18 NOTE — PROGRESS NOTES
[facility] dosing service    Initial Pharmacokinetic Consult for Vancomycin Dosing     Ashlee Gonsales is a 74year old male admitted on 8/17/18 who is being treated for pneumonia. Pharmacy has been asked to dose Vancomycin by Dr. Kevan Landaverde.     He has PharmD  8/17/2018  7:27 PM  [address] [place]: [phone]

## 2018-08-18 NOTE — PROGRESS NOTES
[place] [facility] - [facility]    Progress Note    [address] [place] Patient Status:  Inpatient    1945 MRN [id number]   Location [facility] 4W/SW/SE Attending Kb Schulz, DO   Hosp Day # 1 PCP Willie Montenegro MD        Subjective: since unresectable Stage IIIB disease.  Completed 12 cycles of durvalumab, with last treatment on 4/10/18.  Durvalumab discontinued due to pneumonitis/transaminitis.      4- WALLACE   Continue with cpap during sleep      5- HL   Statin      6- DVT prophylaxis

## 2018-08-18 NOTE — PLAN OF CARE
DISCHARGE PLANNING    • Discharge to home or other facility with appropriate resources Progressing        Patient Centered Care    • Patient preferences are identified and integrated in the patient's plan of care Progressing        RESPIRATORY - ADULT    •

## 2018-08-18 NOTE — PROGRESS NOTES
08/18/18 1207   Clinical Encounter Type   Visited With Patient and family together   Routine Visit Introduction   Continue Visiting No  (pt mainly wanted communion, which he recived)   Mandaeism Encounters   Spiritual Requests During Visit / Latonya López

## 2018-08-19 LAB
APTT PPP: 41.8 SECONDS (ref 23.2–35.3)
INR BLD: 1.2 (ref 0.9–1.2)
PROTHROMBIN TIME: 14.6 SECONDS (ref 11.8–14.5)
VANCOMYCIN TROUGH SERPL-MCNC: 13.6 MCG/ML (ref 10–20)

## 2018-08-19 PROCEDURE — 99232 SBSQ HOSP IP/OBS MODERATE 35: CPT | Performed by: INTERNAL MEDICINE

## 2018-08-19 RX ORDER — IPRATROPIUM BROMIDE AND ALBUTEROL SULFATE 2.5; .5 MG/3ML; MG/3ML
3 SOLUTION RESPIRATORY (INHALATION)
Status: DISCONTINUED | OUTPATIENT
Start: 2018-08-19 | End: 2018-08-27

## 2018-08-19 RX ORDER — 0.9 % SODIUM CHLORIDE 0.9 %
VIAL (ML) INJECTION
Status: COMPLETED
Start: 2018-08-19 | End: 2018-08-19

## 2018-08-19 RX ORDER — 0.9 % SODIUM CHLORIDE 0.9 %
VIAL (ML) INJECTION
Status: DISPENSED
Start: 2018-08-19 | End: 2018-08-20

## 2018-08-19 RX ORDER — IPRATROPIUM BROMIDE AND ALBUTEROL SULFATE 2.5; .5 MG/3ML; MG/3ML
3 SOLUTION RESPIRATORY (INHALATION) EVERY 6 HOURS PRN
Status: DISCONTINUED | OUTPATIENT
Start: 2018-08-19 | End: 2018-08-27

## 2018-08-19 NOTE — PROGRESS NOTES
[place] [facility] - [facility]     Progress Note        60 [facility] Patient Status:  Inpatient    1945 MRN [id number]   Location [facility] 4W/SW/SE Attending Irene Wesley, DO   Hosp Day # 2 PCP Felecia Ochoa MD       Subjective: middle and lower lobes. 2. Milder ground-glass infiltrate involving the left upper lobe. 3. Mild right hilar adenopathy, probably reactive. 4. No significant pleural effusion.      Dictated by (CST): Kaylee Hood MD on 8/17/2018 at 21:22     Appr

## 2018-08-19 NOTE — PROGRESS NOTES
120 [facility] dosing service    Follow-up Pharmacokinetic Consult for Vancomycin Dosing     Ray Her is a 74year old male admitted on 8/17 who is being treated for pneumonia. Patient is on day 3 of Vancomycin 1.25 gm IV Q 12 hours.   Goal trough Vancomycin at 1.25 gm IVPB Q 12 hours (based on Trough of 13.6 ug/mL, pharmacokinetics, 78kg weight and renal function)    2. Pharmacy will re-check Vancomycin trough levels prior to 0500am dose on 8/21. Goal trough level 15-20 ug/mL.     3.  Pharmacy efrain

## 2018-08-20 LAB
ANION GAP SERPL CALC-SCNC: 5 MMOL/L (ref 0–18)
BASOPHILS # BLD: 0 K/UL (ref 0–0.2)
BASOPHILS NFR BLD: 1 %
BASOPHILS NFR FLD: 0 %
BUN SERPL-MCNC: 8 MG/DL (ref 8–20)
BUN/CREAT SERPL: 11 (ref 10–20)
CALCIUM SERPL-MCNC: 8.2 MG/DL (ref 8.5–10.5)
CHLORIDE SERPL-SCNC: 101 MMOL/L (ref 95–110)
CO2 SERPL-SCNC: 28 MMOL/L (ref 22–32)
CREAT SERPL-MCNC: 0.73 MG/DL (ref 0.5–1.5)
EOSINOPHIL # BLD: 0.3 K/UL (ref 0–0.7)
EOSINOPHIL NFR BLD: 7 %
EOSINOPHIL NFR FLD: 6 %
ERYTHROCYTE [DISTWIDTH] IN BLOOD BY AUTOMATED COUNT: 18.1 % (ref 11–15)
GLUCOSE SERPL-MCNC: 104 MG/DL (ref 70–99)
HCT VFR BLD AUTO: 40.7 % (ref 41–52)
HGB BLD-MCNC: 13.8 G/DL (ref 13.5–17.5)
LYMPHOCYTES # BLD: 0.3 K/UL (ref 1–4)
LYMPHOCYTES NFR BLD: 6 %
LYMPHOCYTES NFR FLD: 14 %
M TB CMPLX RRNA SPEC QL PROBE: NEGATIVE
M TB IFN-G CD4+ BCKGRND COR BLD-ACNC: 0.02 IU/ML
M TB IFN-G CD4+ T-CELLS BLD-ACNC: 0.08 IU/ML
M TB TUBERC IGNF/MITOGEN IGNF CONTROL: 0.19 IU/ML
MCH RBC QN AUTO: 32.1 PG (ref 27–32)
MCHC RBC AUTO-ENTMCNC: 33.8 G/DL (ref 32–37)
MCV RBC AUTO: 94.9 FL (ref 80–100)
MONOCYTES # BLD: 0.4 K/UL (ref 0–1)
MONOCYTES NFR BLD: 9 %
MONOCYTES NFR FLD: 13 %
NEUTROPHILS # BLD AUTO: 3.8 K/UL (ref 1.8–7.7)
NEUTROPHILS NFR BLD: 79 %
NEUTROPHILS NFR FLD: 66 %
OSMOLALITY UR CALC.SUM OF ELEC: 277 MOSM/KG (ref 275–295)
PLATELET # BLD AUTO: 157 K/UL (ref 140–400)
PMV BLD AUTO: 8 FL (ref 7.4–10.3)
POTASSIUM SERPL-SCNC: 3.7 MMOL/L (ref 3.3–5.1)
RBC # BLD AUTO: 4.29 M/UL (ref 4.5–5.9)
RBC # FLD: 1039 /CUMM (ref ?–1)
SODIUM SERPL-SCNC: 134 MMOL/L (ref 136–144)
WBC # BLD AUTO: 4.8 K/UL (ref 4–11)
WBC # FLD: 170 /CUMM (ref ?–1)
WBC OTHER NFR FLD: 1 %

## 2018-08-20 PROCEDURE — 31624 DX BRONCHOSCOPE/LAVAGE: CPT | Performed by: INTERNAL MEDICINE

## 2018-08-20 PROCEDURE — 0B9F8ZX DRAINAGE OF RIGHT LOWER LUNG LOBE, VIA NATURAL OR ARTIFICIAL OPENING ENDOSCOPIC, DIAGNOSTIC: ICD-10-PCS | Performed by: INTERNAL MEDICINE

## 2018-08-20 RX ORDER — 0.9 % SODIUM CHLORIDE 0.9 %
VIAL (ML) INJECTION
Status: COMPLETED
Start: 2018-08-20 | End: 2018-08-20

## 2018-08-20 RX ORDER — SODIUM CHLORIDE 9 MG/ML
INJECTION, SOLUTION INTRAVENOUS
Status: COMPLETED
Start: 2018-08-20 | End: 2018-08-20

## 2018-08-20 RX ORDER — LIDOCAINE HYDROCHLORIDE 40 MG/ML
INJECTION, SOLUTION RETROBULBAR; TOPICAL
Status: DISCONTINUED | OUTPATIENT
Start: 2018-08-20 | End: 2018-08-20 | Stop reason: HOSPADM

## 2018-08-20 RX ORDER — MIDAZOLAM HYDROCHLORIDE 1 MG/ML
INJECTION INTRAMUSCULAR; INTRAVENOUS
Status: DISCONTINUED | OUTPATIENT
Start: 2018-08-20 | End: 2018-08-20 | Stop reason: HOSPADM

## 2018-08-20 NOTE — PROGRESS NOTES
[facility]    Progress Note      Assessment and Plan:   1. Pulmonary infiltrates–etiology is uncertain. Inflammatory versus infectious versus neoplastic.   The patient underwent bronchoscopy today with bronchoalveolar lavage of the right l

## 2018-08-20 NOTE — OPERATIVE REPORT
[facility] Endoscopy Report      Date of Procedure:  07/24/18        Preoperative Diagnosis:  Iron deficiency anemia with negative upper and lower endoscopy      Postoperative Diagnosis:  1.   Possible nonbleeding small bowel vascular malfor were possible nonbleeding small bowel vascular malformations at 4 minutes 45 seconds, 3 hours 26 minutes, 4 hours 40 minutes and 5 hours 7 minutes. Limited views of the colon were normal.  The quality of the study was good. Impression:  1.   Possible

## 2018-08-20 NOTE — CM/SW NOTE
CTL note:  Pt is a BPCI readmission enrolled in Remedy Program on 8/3/18 under . There are 73 days remaining in current episode.

## 2018-08-20 NOTE — PROCEDURES
[facility]  Procedure Note  2018     [address] [place] [address] Patient Status:  Inpatient    1945 MRN [id number]   Location [facility] ENDOSCOPY LAB SUITES Attending Alton Hitchcock DO   Hosp Day # 3 PCP Raoul Carson MD This office note has been dictated.

## 2018-08-21 LAB — VANCOMYCIN TROUGH SERPL-MCNC: 13.4 MCG/ML (ref 10–20)

## 2018-08-21 PROCEDURE — 99232 SBSQ HOSP IP/OBS MODERATE 35: CPT | Performed by: INTERNAL MEDICINE

## 2018-08-21 RX ORDER — METHYLPREDNISOLONE SODIUM SUCCINATE 40 MG/ML
40 INJECTION, POWDER, LYOPHILIZED, FOR SOLUTION INTRAMUSCULAR; INTRAVENOUS EVERY 6 HOURS
Status: DISCONTINUED | OUTPATIENT
Start: 2018-08-21 | End: 2018-08-25

## 2018-08-21 RX ORDER — SACCHAROMYCES BOULARDII 250 MG
250 CAPSULE ORAL 2 TIMES DAILY
Status: DISCONTINUED | OUTPATIENT
Start: 2018-08-21 | End: 2018-08-27

## 2018-08-21 RX ORDER — SODIUM CHLORIDE 9 MG/ML
INJECTION, SOLUTION INTRAVENOUS
Status: COMPLETED
Start: 2018-08-21 | End: 2018-08-21

## 2018-08-21 NOTE — PROGRESS NOTES
Met with patient and wife at bedside. Continues to have fevers and cough. At this point we have no evidence of recurrence of his lung cancer. We will continue to follow peripherally. Would be happy to see the patient in formal consultation.   Please charmaine

## 2018-08-21 NOTE — CM/SW NOTE
8/21CM-Per rounds, the Patient might need home oxygen.   The Patient's RN has charted the Patient's oxygen This Writer informed the Patient's RN that I would leave a  DME Face to Face Form needs is in the Patient’s chart to be completed  and signed by the P

## 2018-08-21 NOTE — PROGRESS NOTES
[facility]    Progress Note      Assessment and Plan:   1. Pulmonary infiltrates–etiology is uncertain. Inflammatory versus infectious versus neoplastic.   The patient underwent bronchoscopy 2 days ago with bronchoalveolar lavage of the ri

## 2018-08-21 NOTE — PROGRESS NOTES
[facility] dosing service    Follow-up Pharmacokinetic Consult for Vancomycin Dosing     Zi Smith is a 74year old male admitted on 8/17 who is being treated for pneumonia. Patient is on day 4 of Vancomycin 1.25 gm IV Q 12 hours.   Goal trough Vancomycin at 1.25 gm IVPB Q 12 hours (based on Trough of 13.4 ug/mL, pharmacokinetics, 78kg weight and renal function)    2. Pharmacy will re-check Vancomycin trough levels when necessary. Goal trough level 15-20 ug/mL.     3.  Pharmacy will need BUN/Scr

## 2018-08-21 NOTE — PLAN OF CARE
RESPIRATORY - ADULT    • Achieves optimal ventilation and oxygenation Not Progressing          DISCHARGE PLANNING    • Discharge to home or other facility with appropriate resources Progressing        Patient Centered Care    • Patient preferences are iden

## 2018-08-22 ENCOUNTER — APPOINTMENT (OUTPATIENT)
Dept: GENERAL RADIOLOGY | Facility: HOSPITAL | Age: 73
DRG: 166 | End: 2018-08-22
Attending: INTERNAL MEDICINE
Payer: MEDICARE

## 2018-08-22 LAB
ANION GAP SERPL CALC-SCNC: 5 MMOL/L (ref 0–18)
ASPERGILLUS GALACTOMANNAN AG, BAL: POSITIVE
ASPERGILLUS GALACTOMANNAN INDX: 0.62
BUN SERPL-MCNC: 13 MG/DL (ref 8–20)
BUN/CREAT SERPL: 22.8 (ref 10–20)
CALCIUM SERPL-MCNC: 8.4 MG/DL (ref 8.5–10.5)
CHLORIDE SERPL-SCNC: 104 MMOL/L (ref 95–110)
CO2 SERPL-SCNC: 28 MMOL/L (ref 22–32)
CREAT SERPL-MCNC: 0.57 MG/DL (ref 0.5–1.5)
GLUCOSE SERPL-MCNC: 181 MG/DL (ref 70–99)
MYELOPEROX ANTIBODIES, IGG: 0 AU/ML
OSMOLALITY UR CALC.SUM OF ELEC: 289 MOSM/KG (ref 275–295)
POTASSIUM SERPL-SCNC: 3.9 MMOL/L (ref 3.3–5.1)
SERINE PROTEASE3, IGG: 2 AU/ML
SODIUM SERPL-SCNC: 137 MMOL/L (ref 136–144)

## 2018-08-22 PROCEDURE — 71045 X-RAY EXAM CHEST 1 VIEW: CPT | Performed by: INTERNAL MEDICINE

## 2018-08-22 PROCEDURE — 99232 SBSQ HOSP IP/OBS MODERATE 35: CPT | Performed by: INTERNAL MEDICINE

## 2018-08-22 RX ORDER — 0.9 % SODIUM CHLORIDE 0.9 %
VIAL (ML) INJECTION
Status: COMPLETED
Start: 2018-08-22 | End: 2018-08-22

## 2018-08-22 NOTE — PROGRESS NOTES
[facility]    Progress Note      Assessment and Plan:   1. Pulmonary infiltrates–etiology is uncertain. Inflammatory versus infectious versus neoplastic.   The patient underwent bronchoscopy 2 days ago with bronchoalveolar lavage of the ri 08/22/2018   BUN 13 08/22/2018    08/22/2018   K 3.9 08/22/2018    08/22/2018   CO2 28 08/22/2018    08/22/2018   CA 8.4 08/22/2018     Chest x-ray–extensive right greater than left infiltrate without change.   Chronic scarring in the lef

## 2018-08-23 PROCEDURE — 99232 SBSQ HOSP IP/OBS MODERATE 35: CPT | Performed by: INTERNAL MEDICINE

## 2018-08-23 NOTE — PROGRESS NOTES
[facility]    Progress Note      Assessment and Plan:   1. Pulmonary infiltrates–etiology is uncertain. Inflammatory versus infectious versus neoplastic.   The patient underwent bronchoscopy 2 days ago with bronchoalveolar lavage of the ri than left infiltrate without change. Chronic scarring in the left upper lobe.     Ro Villarreal MD  Medical Director, Critical Care, [address] [place] [address]  Medical Director, [facility]  Pager: [phone]

## 2018-08-24 LAB
CYTOMEGALOVIRUS DETECTION, PCR: NOT DETECTED
P. JIROVECII DETECTION BY PCR: NOT DETECTED

## 2018-08-24 PROCEDURE — 99232 SBSQ HOSP IP/OBS MODERATE 35: CPT | Performed by: INTERNAL MEDICINE

## 2018-08-24 NOTE — CM/SW NOTE
CTL progression of care note: met with pt and his wife at bedside. Pt is currently on 02 per N/C. Home 02 pending 02 sats as per conversation with RN. Discussed discharge planning: pt is ambulatory in halls, lives in a ranch home.  Home care services dec

## 2018-08-24 NOTE — DIETARY NOTE
Brief Nutrition Note:    Chart reviewed to rescreen for nutritional risk. Visit to pt. Pt with history of 10# wt loss. Pt reports due to pneumonia x2. Eating 100% of meals at this time.  Denies any GI issues except occasional difficulty swallowing--cant

## 2018-08-24 NOTE — PROGRESS NOTES
[facility]     Progress Note        [address] Patient Status:  Inpatient    1945 MRN [id number]   Location [facility] 4W/SW/SE Attending Olivia Jameson, DO   Hosp Day # 7 PCP Deepali Camp MD       Subjective: S2  Respiratory: Right-sided crackles  GI: abdomen soft, non tender  Extremities: no clubbing, cyanosis, edema  Neurologic: no gross motor deficits  Skin: warm, dry      Results:                    Assessment   1. Pulmonary infiltrates  2.   Acute respirat

## 2018-08-25 LAB
ANION GAP SERPL CALC-SCNC: 8 MMOL/L (ref 0–18)
BUN SERPL-MCNC: 21 MG/DL (ref 8–20)
BUN/CREAT SERPL: 31.3 (ref 10–20)
CALCIUM SERPL-MCNC: 8.1 MG/DL (ref 8.5–10.5)
CHLORIDE SERPL-SCNC: 100 MMOL/L (ref 95–110)
CO2 SERPL-SCNC: 31 MMOL/L (ref 22–32)
CREAT SERPL-MCNC: 0.67 MG/DL (ref 0.5–1.5)
GLUCOSE SERPL-MCNC: 157 MG/DL (ref 70–99)
OSMOLALITY UR CALC.SUM OF ELEC: 294 MOSM/KG (ref 275–295)
POTASSIUM SERPL-SCNC: 3.2 MMOL/L (ref 3.3–5.1)
SODIUM SERPL-SCNC: 139 MMOL/L (ref 136–144)
VANCOMYCIN TROUGH SERPL-MCNC: 16.2 MCG/ML (ref 10–20)

## 2018-08-25 PROCEDURE — 99233 SBSQ HOSP IP/OBS HIGH 50: CPT | Performed by: INTERNAL MEDICINE

## 2018-08-25 RX ORDER — METHYLPREDNISOLONE SODIUM SUCCINATE 40 MG/ML
40 INJECTION, POWDER, LYOPHILIZED, FOR SOLUTION INTRAMUSCULAR; INTRAVENOUS EVERY 12 HOURS
Status: DISCONTINUED | OUTPATIENT
Start: 2018-08-26 | End: 2018-08-27

## 2018-08-25 RX ORDER — 0.9 % SODIUM CHLORIDE 0.9 %
VIAL (ML) INJECTION
Status: COMPLETED
Start: 2018-08-25 | End: 2018-08-25

## 2018-08-25 RX ORDER — FUROSEMIDE 20 MG/1
20 TABLET ORAL ONCE
Status: COMPLETED | OUTPATIENT
Start: 2018-08-25 | End: 2018-08-25

## 2018-08-25 NOTE — PROGRESS NOTES
[facility] dosing service    Follow-up Pharmacokinetic Consult for Vancomycin Dosing     Ray Her is a 74year old male who is being treated for pneumonia. Patient is on day 9 of Vancomycin 1.25 gm IV Q 12 hours. Goal trough is 15-20 ug/mL. above: 1. Continue Vancomycin at 1.25 gm IVPB Q 12 hours (based on Trough of 16.2 ug/mL, pharmacokinetics, 78.6 kg weight and renal function)    2. Pharmacy will re-check Vancomycin trough levels when needed. Goal trough level 15-20 ug/mL.     3.  Phar

## 2018-08-25 NOTE — PROGRESS NOTES
O2 walk, pt ambulated 100 feet on room air, O2 sat 87%, O2 replaced to O2 2L n/c, O2 sat 95% after 100 feet ambulation. O2 sat at rest on room air, 89%, O2 sat at rest on 2L O2 96%.

## 2018-08-25 NOTE — PROGRESS NOTES
[facility]    Progress Note    [address] Patient Status:  Inpatient    1945 MRN [id number]   Location [facility] 4W/SW/SE Attending Smitha Bourgeois, DO   Hosp Day # 8 PCP Rebecca Sánchez MD        Subjective: durvalumab, with last treatment on 4/10/18.  Durvalumab discontinued due to pneumonitis/transaminitis.      4- WALLACE   Continue with cpap during sleep      5- HL   Statin      6- DVT prophylaxis   Heparin sq                                Results:     Lab Re

## 2018-08-26 LAB
ANION GAP SERPL CALC-SCNC: 9 MMOL/L (ref 0–18)
BASOPHILS # BLD: 0 K/UL (ref 0–0.2)
BASOPHILS NFR BLD: 0 %
BUN SERPL-MCNC: 21 MG/DL (ref 8–20)
BUN/CREAT SERPL: 28 (ref 10–20)
CALCIUM SERPL-MCNC: 8.3 MG/DL (ref 8.5–10.5)
CHLORIDE SERPL-SCNC: 99 MMOL/L (ref 95–110)
CO2 SERPL-SCNC: 33 MMOL/L (ref 22–32)
CREAT SERPL-MCNC: 0.75 MG/DL (ref 0.5–1.5)
EOSINOPHIL # BLD: 0 K/UL (ref 0–0.7)
EOSINOPHIL NFR BLD: 0 %
ERYTHROCYTE [DISTWIDTH] IN BLOOD BY AUTOMATED COUNT: 18.1 % (ref 11–15)
GLUCOSE SERPL-MCNC: 141 MG/DL (ref 70–99)
HCT VFR BLD AUTO: 37.1 % (ref 41–52)
HGB BLD-MCNC: 11.9 G/DL (ref 13.5–17.5)
LYMPHOCYTES # BLD: 0.7 K/UL (ref 1–4)
LYMPHOCYTES NFR BLD: 7 %
MCH RBC QN AUTO: 31.5 PG (ref 27–32)
MCHC RBC AUTO-ENTMCNC: 32.2 G/DL (ref 32–37)
MCV RBC AUTO: 97.9 FL (ref 80–100)
METAMYELOCYTES # BLD MANUAL: 0.1 K/UL
METAMYELOCYTES # BLD MANUAL: 0.2 K/UL
METAMYELOCYTES NFR BLD: 2 %
MONOCYTES # BLD: 0.3 K/UL (ref 0–1)
MONOCYTES NFR BLD: 3 %
MYELOCYTES NFR BLD: 1 %
NEUTROPHILS # BLD AUTO: 8.7 K/UL (ref 1.8–7.7)
NEUTROPHILS NFR BLD: 84 %
NEUTS BAND NFR BLD: 3 %
OSMOLALITY UR CALC.SUM OF ELEC: 297 MOSM/KG (ref 275–295)
PLATELET # BLD AUTO: 408 K/UL (ref 140–400)
PMV BLD AUTO: 7.8 FL (ref 7.4–10.3)
POTASSIUM SERPL-SCNC: 3.1 MMOL/L (ref 3.3–5.1)
RBC # BLD AUTO: 3.78 M/UL (ref 4.5–5.9)
SODIUM SERPL-SCNC: 141 MMOL/L (ref 136–144)
WBC # BLD AUTO: 10 K/UL (ref 4–11)

## 2018-08-26 PROCEDURE — 99232 SBSQ HOSP IP/OBS MODERATE 35: CPT | Performed by: INTERNAL MEDICINE

## 2018-08-26 RX ORDER — 0.9 % SODIUM CHLORIDE 0.9 %
VIAL (ML) INJECTION
Status: COMPLETED
Start: 2018-08-26 | End: 2018-08-26

## 2018-08-26 NOTE — PROGRESS NOTES
[facility]    Progress Note      Assessment and Plan:   1. Pulmonary infiltrates–etiology is uncertain. The patient has been covered with a long course of IV antibiotics.   Voriconazole was started because galactomannan was positive on the yanique.    Ruth Ann Cunningham MD  Medical Director, Critical Care, [address] [place] [address]  Medical Director, [facility]  Pager: [phone]

## 2018-08-27 ENCOUNTER — APPOINTMENT (OUTPATIENT)
Dept: GENERAL RADIOLOGY | Facility: HOSPITAL | Age: 73
DRG: 166 | End: 2018-08-27
Attending: INTERNAL MEDICINE
Payer: MEDICARE

## 2018-08-27 VITALS
TEMPERATURE: 97 F | HEART RATE: 87 BPM | SYSTOLIC BLOOD PRESSURE: 144 MMHG | HEIGHT: 71 IN | BODY MASS INDEX: 24.26 KG/M2 | OXYGEN SATURATION: 95 % | WEIGHT: 173.31 LBS | RESPIRATION RATE: 16 BRPM | DIASTOLIC BLOOD PRESSURE: 61 MMHG

## 2018-08-27 LAB
ANION GAP SERPL CALC-SCNC: 10 MMOL/L (ref 0–18)
BUN SERPL-MCNC: 22 MG/DL (ref 8–20)
BUN/CREAT SERPL: 35.5 (ref 10–20)
CALCIUM SERPL-MCNC: 8.3 MG/DL (ref 8.5–10.5)
CHLORIDE SERPL-SCNC: 96 MMOL/L (ref 95–110)
CO2 SERPL-SCNC: 32 MMOL/L (ref 22–32)
CREAT SERPL-MCNC: 0.62 MG/DL (ref 0.5–1.5)
GLUCOSE SERPL-MCNC: 126 MG/DL (ref 70–99)
MAGNESIUM SERPL-MCNC: 2.3 MG/DL (ref 1.8–2.5)
OSMOLALITY UR CALC.SUM OF ELEC: 291 MOSM/KG (ref 275–295)
PHOSPHATE SERPL-MCNC: 3.5 MG/DL (ref 2.4–4.7)
POTASSIUM SERPL-SCNC: 3.3 MMOL/L (ref 3.3–5.1)
SODIUM SERPL-SCNC: 138 MMOL/L (ref 136–144)

## 2018-08-27 PROCEDURE — 71046 X-RAY EXAM CHEST 2 VIEWS: CPT | Performed by: INTERNAL MEDICINE

## 2018-08-27 PROCEDURE — 99238 HOSP IP/OBS DSCHRG MGMT 30/<: CPT | Performed by: INTERNAL MEDICINE

## 2018-08-27 RX ORDER — 0.9 % SODIUM CHLORIDE 0.9 %
VIAL (ML) INJECTION
Status: COMPLETED
Start: 2018-08-27 | End: 2018-08-27

## 2018-08-27 RX ORDER — VORICONAZOLE 200 MG/1
200 TABLET ORAL 2 TIMES DAILY
Qty: 60 TABLET | Refills: 6 | Status: SHIPPED | OUTPATIENT
Start: 2018-08-27 | End: 2019-02-26

## 2018-08-27 RX ORDER — POTASSIUM CHLORIDE 20 MEQ/1
40 TABLET, EXTENDED RELEASE ORAL EVERY 4 HOURS
Status: DISCONTINUED | OUTPATIENT
Start: 2018-08-27 | End: 2018-08-27

## 2018-08-27 RX ORDER — HEPARIN SODIUM (PORCINE) LOCK FLUSH IV SOLN 100 UNIT/ML 100 UNIT/ML
SOLUTION INTRAVENOUS
Status: DISCONTINUED
Start: 2018-08-27 | End: 2018-08-27

## 2018-08-27 NOTE — DISCHARGE SUMMARY
Discharge Summary    Date of Admission: 8/17/2018    Date of Discharge: 8/27/2018    Hospital Course:     The patient was admitted to the hospital with severe pneumonia symptomatology with shortness breath cough and extensive right lung infiltrates which di should see Dr. Brittanie Kim in the office within 2-3 weeks. Discharge Diagnosis:  Invasive aspergillus pneumonia, pneumonia, acute on chronic respiratory failure, COPD, lung cancer, sleep apnea.      Ge Orozco MD  Medical Director, Critical Care, [address] [facility]

## 2018-08-27 NOTE — PLAN OF CARE
PT PULSE OX   92% WHILE ON 2 L O2 AND WALKING,   88% WHILE WALKING WITHOUT O2 ON,   95% RESTING WITH O2 ON AND   95% RESTING WITHOUT O2 ON

## 2018-08-28 ENCOUNTER — PATIENT OUTREACH (OUTPATIENT)
Dept: CASE MANAGEMENT | Age: 73
End: 2018-08-28

## 2018-08-28 DIAGNOSIS — Z02.9 ENCOUNTERS FOR ADMINISTRATIVE PURPOSE: ICD-10-CM

## 2018-08-28 RX ORDER — TRIAMTERENE AND HYDROCHLOROTHIAZIDE 37.5; 25 MG/1; MG/1
CAPSULE ORAL
Qty: 12 CAPSULE | Refills: 0 | Status: SHIPPED | OUTPATIENT
Start: 2018-08-28 | End: 2018-09-22

## 2018-08-28 NOTE — TELEPHONE ENCOUNTER
Pt requesting to spk w rn, indicates pt was released from the hosp yesterday and feet/toes/ankles are swollen, pls call at:[phone],thanks.

## 2018-08-28 NOTE — TELEPHONE ENCOUNTER
Spoke to pt and wife.   Pt states he was discharged yesterday from the hospital.  Pt c/o bilateral feet and ankles swelling which started Saturday in the hospital.  Pt states Dr. Eva Quispe prescribed lasix in the hospital.  Pt states he was not prescribed lasi

## 2018-08-28 NOTE — TELEPHONE ENCOUNTER
RN,  Okay to prescribe Dyazide 1 tablet up to 3 times per week to be taken as needed for lower extremity swelling. Please facilitate.   Also, encouraged the use of compression stockings

## 2018-08-29 ENCOUNTER — TELEPHONE (OUTPATIENT)
Dept: PULMONOLOGY | Facility: CLINIC | Age: 73
End: 2018-08-29

## 2018-08-29 DIAGNOSIS — J18.9 HCAP (HEALTHCARE-ASSOCIATED PNEUMONIA): Primary | ICD-10-CM

## 2018-08-29 DIAGNOSIS — J90 PLEURAL EFFUSION, RIGHT: Primary | ICD-10-CM

## 2018-08-29 DIAGNOSIS — J44.9 CHRONIC OBSTRUCTIVE PULMONARY DISEASE, UNSPECIFIED COPD TYPE (HCC): ICD-10-CM

## 2018-08-29 DIAGNOSIS — C34.90 ADENOCARCINOMA OF LUNG, UNSPECIFIED LATERALITY (HCC): ICD-10-CM

## 2018-08-29 NOTE — TELEPHONE ENCOUNTER
Spoke to pt for TCM today. Pt does not have TCM appt scheduled at this time. TCM appt recommended by 9/10/18 as pt is a moderate risk for readmission. Pt states he also needs to have chest x-ray done prior to appt. Please advise.     TRIAGE:  Please f/u

## 2018-08-29 NOTE — TELEPHONE ENCOUNTER
Current Outpatient Prescriptions:  voriconazole 200 MG Oral Tab Take 1 tablet (200 mg total) by mouth 2 (two) times daily.  Disp: 60 tablet Rfl: 6     Alt med requested Copay is $905 for one mos and $2,273 for 3 mos pt cannot afford med ,pls change to domonique

## 2018-08-29 NOTE — TELEPHONE ENCOUNTER
Transferred to [facility] in [facility] (p. #[phone]). She stts we may speak w/ Pre-cert @ #[phone] re: cost reduction/the member can apply for extra assistance. Transferred to Stefany Ge in [employer].  He stts member called for tier exception & i

## 2018-08-29 NOTE — TELEPHONE ENCOUNTER
[place] stts pt & his wife decided to pay for 1 month of voriconazole.  He stts he is not aware if there is a preferred med, but fluconazole 200 mg bid Q #60 is same class of med & will cost pt $3. Per [employer] vaughan otero of voriconazole 200 mg Q #60 is $5362

## 2018-08-29 NOTE — TELEPHONE ENCOUNTER
Francoise Melo stts she doesn't show anyone in their system w/ last name and . Spoke w/ Roberto Lo @ [facility]. He stts pt has Medicare Part D for prescription coverage @ p.  #[phone], his ID is #[id number], & he doesn't have any supplemental prescription coverag

## 2018-08-29 NOTE — PROGRESS NOTES
Initial Post Discharge Follow Up   Discharge Date: 8/27/18  Contact Date: 8/28/2018    Consent Verification:  Assessment Completed With: Patient  Spouse: wife, Justyna Gavin received per patient?  verbal  HIPAA Verified?   Yes    Discharge Dx:  Andrea units Oral Cap Take 1,000 Units by mouth daily. Disp:  Rfl:    CALCIUM CITRATE OR Take 500 mg by mouth daily. Disp:  Rfl:    aspirin 81 MG Oral Tab Take 81 mg by mouth daily. Disp:  Rfl:    L-Glutamine 500 MG Oral Cap Take 500 mg by mouth daily.  Disp: Follow up appointments:   TE sent to PCP office regarding HFU appt. Per wife, pt also needs to have chest x-ray done prior to appt.       PCP TCM/HFU appointment: scheduled at D/C within 7-14 days  no     NCM Reviewed/scheduled/rescheduled PCP TCM/HFU

## 2018-08-29 NOTE — TELEPHONE ENCOUNTER
Spoke to pt/wife for TCM today. Pt states they declined HH during admission, but now that he is home, he would like to start [facility] for RN and PT. Please advise. Per pt, OK to call home # or wife's cell # back with an update.

## 2018-08-29 NOTE — TELEPHONE ENCOUNTER
Per Qasim Morales pt demographics, H & P, and HH rx w/ specific disciplines should be faxed to #[phone]. [facility]- pls sign rx if agreeable/change to your liking & advise when complete. Thanks.

## 2018-08-30 NOTE — TELEPHONE ENCOUNTER
HH rx, H & P 8/17/18, & Pt Reg Facesheet faxed to [facility] @ #[phone]. Conf rcvd.

## 2018-08-31 NOTE — TELEPHONE ENCOUNTER
Notified pt's wife of below. Explained will f/u re: decision s/p response. She voiced understanding.

## 2018-08-31 NOTE — TELEPHONE ENCOUNTER
Informed pt's wife of Dr. Naren Shelton orders & below. She was given [facility] phone #. Explained they may contact [facility] if they do not hear from them shortly. Spouse voiced understanding.

## 2018-08-31 NOTE — TELEPHONE ENCOUNTER
Requested appeal through Kortney Carroll. She stts decision of verbal appeal will take 7 days, will be in the form of call, & any supportive evidence should be faxed to #[phone].  Faxed H & P 8/17, Discharge Summary 8/27, & Aspergillus Galactomannan results 8/2

## 2018-08-31 NOTE — TELEPHONE ENCOUNTER
Wife stts pt to f/u in 3-4 wks, he is doing much better, swelling annika LEs & breathing has improved, & HH requested b/c she was concerned w/ pt being evaluated in a timely fashion. Explained TCM appt recommended by 9/10, will d/w MD, & f/u.  She voiced under

## 2018-09-05 NOTE — TELEPHONE ENCOUNTER
Per decision re: Request for Reconsideration of Medicare Prescription Drug Denial: \"Medicare rules don't let us approve a tiering exception when there is no lower tier drug used to treat your condition.  Unfortunately, there is no lower tier drug for your

## 2018-09-05 NOTE — TELEPHONE ENCOUNTER
Notified Bridgette Vuong of Dr. Carlota ferro. Explained we may appeal this decision, but there is nothing additional to submit & it doesn't seem likely to be approved based on response maria del rosario.  Instructed her to go online to [facility] site to apply for fi

## 2018-09-05 NOTE — TELEPHONE ENCOUNTER
Informed spouse of Dr. Faustino Ramirez orders. Sched pt on 9/10 12:15 pm [facility] per her request. She was given appt time & location. Kam Vee she hasn't contacted [facility]  since pt is doing much better & will be seen shortly.  Reassured her that flu vaccin

## 2018-09-06 NOTE — TELEPHONE ENCOUNTER
Discussed w/ Loc Palomino (supervisor) as appt never added by PPD Template Edit Team despite two requests sent via [facility] by RN & consult appt for another pt was scheduled on 9/10 12 pm.    Rescheduled pt's appt to 9/13 1 pm [facility].  Pt & his wife were agreeable w/ jan

## 2018-09-10 NOTE — TELEPHONE ENCOUNTER
[facility]- pls sign rx for CXR if you want pt to have one prior to his upcoming visit (see bolded text below from 9/5).

## 2018-09-13 ENCOUNTER — LAB ENCOUNTER (OUTPATIENT)
Dept: LAB | Facility: HOSPITAL | Age: 73
End: 2018-09-13
Attending: INTERNAL MEDICINE
Payer: MEDICARE

## 2018-09-13 ENCOUNTER — HOSPITAL ENCOUNTER (OUTPATIENT)
Dept: GENERAL RADIOLOGY | Facility: HOSPITAL | Age: 73
Discharge: HOME | End: 2018-09-13
Attending: INTERNAL MEDICINE | Admitting: INTERNAL MEDICINE
Payer: MEDICARE

## 2018-09-13 ENCOUNTER — OFFICE VISIT (OUTPATIENT)
Dept: PULMONOLOGY | Facility: CLINIC | Age: 73
End: 2018-09-13
Payer: MEDICARE

## 2018-09-13 VITALS
OXYGEN SATURATION: 95 % | SYSTOLIC BLOOD PRESSURE: 97 MMHG | HEART RATE: 67 BPM | DIASTOLIC BLOOD PRESSURE: 60 MMHG | BODY MASS INDEX: 24.64 KG/M2 | HEIGHT: 71 IN | RESPIRATION RATE: 20 BRPM | WEIGHT: 176 LBS

## 2018-09-13 DIAGNOSIS — R74.01 TRANSAMINITIS: ICD-10-CM

## 2018-09-13 DIAGNOSIS — J90 PLEURAL EFFUSION, RIGHT: ICD-10-CM

## 2018-09-13 DIAGNOSIS — J18.9 PNA (PNEUMONIA): ICD-10-CM

## 2018-09-13 DIAGNOSIS — J18.9 PNEUMONIA OF RIGHT LUNG DUE TO INFECTIOUS ORGANISM, UNSPECIFIED PART OF LUNG: Primary | ICD-10-CM

## 2018-09-13 LAB
ALBUMIN SERPL BCP-MCNC: 3.6 G/DL (ref 3.5–4.8)
ALP SERPL-CCNC: 93 U/L (ref 32–100)
ALT SERPL-CCNC: 25 U/L (ref 17–63)
ANION GAP SERPL CALC-SCNC: 10 MMOL/L (ref 0–18)
AST SERPL-CCNC: 24 U/L (ref 15–41)
BASOPHILS # BLD: 0 K/UL (ref 0–0.2)
BASOPHILS NFR BLD: 1 %
BILIRUB DIRECT SERPL-MCNC: 0.1 MG/DL (ref 0–0.2)
BILIRUB SERPL-MCNC: 0.5 MG/DL (ref 0.3–1.2)
BUN SERPL-MCNC: 12 MG/DL (ref 8–20)
BUN/CREAT SERPL: 14.8 (ref 10–20)
CALCIUM SERPL-MCNC: 9.5 MG/DL (ref 8.5–10.5)
CHLORIDE SERPL-SCNC: 103 MMOL/L (ref 95–110)
CO2 SERPL-SCNC: 26 MMOL/L (ref 22–32)
CREAT SERPL-MCNC: 0.81 MG/DL (ref 0.5–1.5)
EOSINOPHIL # BLD: 0.2 K/UL (ref 0–0.7)
EOSINOPHIL NFR BLD: 4 %
ERYTHROCYTE [DISTWIDTH] IN BLOOD BY AUTOMATED COUNT: 18.6 % (ref 11–15)
GLUCOSE SERPL-MCNC: 99 MG/DL (ref 70–99)
HCT VFR BLD AUTO: 38.2 % (ref 41–52)
HGB BLD-MCNC: 12.7 G/DL (ref 13.5–17.5)
LYMPHOCYTES # BLD: 0.7 K/UL (ref 1–4)
LYMPHOCYTES NFR BLD: 15 %
MCH RBC QN AUTO: 32.3 PG (ref 27–32)
MCHC RBC AUTO-ENTMCNC: 33.1 G/DL (ref 32–37)
MCV RBC AUTO: 97.4 FL (ref 80–100)
MONOCYTES # BLD: 0.5 K/UL (ref 0–1)
MONOCYTES NFR BLD: 9 %
NEUTROPHILS # BLD AUTO: 3.6 K/UL (ref 1.8–7.7)
NEUTROPHILS NFR BLD: 71 %
OSMOLALITY UR CALC.SUM OF ELEC: 288 MOSM/KG (ref 275–295)
PLATELET # BLD AUTO: 156 K/UL (ref 140–400)
PMV BLD AUTO: 8.5 FL (ref 7.4–10.3)
POTASSIUM SERPL-SCNC: 3.9 MMOL/L (ref 3.3–5.1)
PROT SERPL-MCNC: 6.6 G/DL (ref 5.9–8.4)
RBC # BLD AUTO: 3.93 M/UL (ref 4.5–5.9)
SODIUM SERPL-SCNC: 139 MMOL/L (ref 136–144)
WBC # BLD AUTO: 5 K/UL (ref 4–11)

## 2018-09-13 PROCEDURE — 80048 BASIC METABOLIC PNL TOTAL CA: CPT

## 2018-09-13 PROCEDURE — 36415 COLL VENOUS BLD VENIPUNCTURE: CPT

## 2018-09-13 PROCEDURE — 71046 X-RAY EXAM CHEST 2 VIEWS: CPT | Performed by: INTERNAL MEDICINE

## 2018-09-13 PROCEDURE — 1111F DSCHRG MED/CURRENT MED MERGE: CPT | Performed by: INTERNAL MEDICINE

## 2018-09-13 PROCEDURE — G0463 HOSPITAL OUTPT CLINIC VISIT: HCPCS | Performed by: INTERNAL MEDICINE

## 2018-09-13 PROCEDURE — 80076 HEPATIC FUNCTION PANEL: CPT

## 2018-09-13 PROCEDURE — 85025 COMPLETE CBC W/AUTO DIFF WBC: CPT

## 2018-09-13 PROCEDURE — 99213 OFFICE O/P EST LOW 20 MIN: CPT | Performed by: INTERNAL MEDICINE

## 2018-09-13 NOTE — PROGRESS NOTES
The patient is a 56-year-old male who I know well from prior evaluation comes in now for follow-up. He was hospitalized with pneumonia syndrome. The bronchoscopy demonstrated galactomannan positivity on the bronchoalveolar lavage.   He has been started on

## 2018-09-14 ENCOUNTER — TELEPHONE (OUTPATIENT)
Dept: HEMATOLOGY/ONCOLOGY | Facility: HOSPITAL | Age: 73
End: 2018-09-14

## 2018-09-14 NOTE — TELEPHONE ENCOUNTER
Wife called asking when [facility] needs to be seen and if he needs port flush/labs prior to appt?

## 2018-09-17 NOTE — TELEPHONE ENCOUNTER
After scheduling patient wife is asking if there will be a blood test for Iron. Spoke to Kiarra BYRNE she will find out and let me know so I can change comments.  steve

## 2018-09-18 ENCOUNTER — TELEPHONE (OUTPATIENT)
Dept: PULMONOLOGY | Facility: CLINIC | Age: 73
End: 2018-09-18

## 2018-09-19 NOTE — TELEPHONE ENCOUNTER
Pt's wife stts she is sending 3rd/final appeal for voriconazole to [employer] (p. #[phone]/L. #[phone]).  She requests our office fax supportive evidence to them as this info was initially sent to Medicare Pt D during 2nd appeal. Spo

## 2018-09-24 RX ORDER — TRIAMTERENE AND HYDROCHLOROTHIAZIDE 37.5; 25 MG/1; MG/1
CAPSULE ORAL
Qty: 12 CAPSULE | Refills: 2 | Status: SHIPPED | OUTPATIENT
Start: 2018-09-24 | End: 2019-03-19

## 2018-10-01 ENCOUNTER — OFFICE VISIT (OUTPATIENT)
Dept: HEMATOLOGY/ONCOLOGY | Facility: HOSPITAL | Age: 73
End: 2018-10-01
Attending: INTERNAL MEDICINE
Payer: MEDICARE

## 2018-10-01 VITALS
TEMPERATURE: 98 F | HEIGHT: 71 IN | RESPIRATION RATE: 18 BRPM | WEIGHT: 180 LBS | DIASTOLIC BLOOD PRESSURE: 72 MMHG | HEART RATE: 79 BPM | SYSTOLIC BLOOD PRESSURE: 136 MMHG | BODY MASS INDEX: 25.2 KG/M2

## 2018-10-01 DIAGNOSIS — C34.12 CANCER OF UPPER LOBE OF LEFT LUNG (HCC): ICD-10-CM

## 2018-10-01 DIAGNOSIS — Z45.2 ENCOUNTER FOR CARE RELATED TO PORT-A-CATH: ICD-10-CM

## 2018-10-01 DIAGNOSIS — D50.9 IRON DEFICIENCY ANEMIA, UNSPECIFIED IRON DEFICIENCY ANEMIA TYPE: Primary | ICD-10-CM

## 2018-10-01 DIAGNOSIS — B44.9 ASPERGILLUS PNEUMONIA (HCC): ICD-10-CM

## 2018-10-01 PROCEDURE — 84466 ASSAY OF TRANSFERRIN: CPT

## 2018-10-01 PROCEDURE — 99214 OFFICE O/P EST MOD 30 MIN: CPT | Performed by: INTERNAL MEDICINE

## 2018-10-01 PROCEDURE — 36591 DRAW BLOOD OFF VENOUS DEVICE: CPT

## 2018-10-01 PROCEDURE — A4216 STERILE WATER/SALINE, 10 ML: HCPCS

## 2018-10-01 PROCEDURE — 85025 COMPLETE CBC W/AUTO DIFF WBC: CPT

## 2018-10-01 PROCEDURE — 83540 ASSAY OF IRON: CPT

## 2018-10-01 RX ORDER — 0.9 % SODIUM CHLORIDE 0.9 %
VIAL (ML) INJECTION
Status: DISCONTINUED
Start: 2018-10-01 | End: 2018-10-01

## 2018-10-01 RX ORDER — 0.9 % SODIUM CHLORIDE 0.9 %
10 VIAL (ML) INJECTION ONCE
Status: CANCELLED | OUTPATIENT
Start: 2018-10-01

## 2018-10-01 RX ORDER — HEPARIN SODIUM (PORCINE) LOCK FLUSH IV SOLN 100 UNIT/ML 100 UNIT/ML
SOLUTION INTRAVENOUS
Status: COMPLETED
Start: 2018-10-01 | End: 2018-10-01

## 2018-10-01 RX ORDER — HEPARIN SODIUM (PORCINE) LOCK FLUSH IV SOLN 100 UNIT/ML 100 UNIT/ML
5 SOLUTION INTRAVENOUS ONCE
Status: CANCELLED | OUTPATIENT
Start: 2018-10-01

## 2018-10-01 RX ORDER — HEPARIN SODIUM (PORCINE) LOCK FLUSH IV SOLN 100 UNIT/ML 100 UNIT/ML
5 SOLUTION INTRAVENOUS ONCE
Status: COMPLETED | OUTPATIENT
Start: 2018-10-01 | End: 2018-10-01

## 2018-10-01 RX ADMIN — HEPARIN SODIUM (PORCINE) LOCK FLUSH IV SOLN 100 UNIT/ML 500 UNITS: 100 SOLUTION INTRAVENOUS at 13:15:00

## 2018-10-01 NOTE — PROGRESS NOTES
Cancer Center Progress Note    Patient Name: Taqueria Huitron   YOB: 1945   Medical Record Number: [id number]   Attending Physician: Ruby Page M.D.        Chief Complaint:  Lung cancer    History of Present Illness:  Cancer history:  73-ye Surgical History:  8/20/2018: BRONCHOSCOPY; N/A      Comment:  Performed by Cha Morgan MD at [address] [place] [address] ENDOSCOPY  7/24/2018: CAPSULE ENDOSCOPY; N/A      Comment:  Performed by Davon Romo MD at [address] [place] [address] ENDOSCOPY  2010: COLONOSCOPY  6/21/2018: Wendy Summers daily        Occupational Exposure: Not Asked        Hobby Hazards: Not Asked        Sleep Concern: Not Asked        Stress Concern: Not Asked        Weight Concern: Not Asked        Special Diet: Not Asked        Back Care: Not Asked        Exercise: Not 1.803 m (5' 11\")   Wt 81.6 kg (180 lb)   BMI 25.10 kg/m²     Physical Examination:  General: Patient is alert and oriented x 3, not in acute distress. Psych:  Mood and affect appropriate  HEENT: EOMs intact. PERRL. Oropharynx is clear. Neck: No JVD.  No progression following initial chemotherapy we  added immunotherapy with durvalumab. –anemia with severe iron deficiency. Responded to IV iron.   He has met with gastroenterology and had EGD and colonoscopy without any evidence of a source of GI blood loss

## 2018-10-02 ENCOUNTER — TELEPHONE (OUTPATIENT)
Dept: PULMONOLOGY | Facility: CLINIC | Age: 73
End: 2018-10-02

## 2018-10-02 NOTE — TELEPHONE ENCOUNTER
Spouse called requesting appt for flu shot. Appt made for 10/5/18 10:00 am.  Spouse notified of time date and place of appt. Spouse verbalized understanding.

## 2018-10-05 ENCOUNTER — IMMUNIZATION (OUTPATIENT)
Dept: PULMONOLOGY | Facility: CLINIC | Age: 73
End: 2018-10-05
Payer: MEDICARE

## 2018-10-05 PROCEDURE — 90653 IIV ADJUVANT VACCINE IM: CPT | Performed by: INTERNAL MEDICINE

## 2018-10-05 PROCEDURE — G0008 ADMIN INFLUENZA VIRUS VAC: HCPCS | Performed by: INTERNAL MEDICINE

## 2018-10-15 ENCOUNTER — TELEPHONE (OUTPATIENT)
Dept: PULMONOLOGY | Facility: CLINIC | Age: 73
End: 2018-10-15

## 2018-10-15 NOTE — TELEPHONE ENCOUNTER
Mike Lamb calling to see if [address] wanted her to see pt for add'l visits. States pt has been pretty stable but has had add'l visits to hospital due to pneumonia. Pls call. Thank you.

## 2018-10-15 NOTE — TELEPHONE ENCOUNTER
Manny Vasquez informed of Dr. Bianka Raphael message/orders below. Manny Vasquez verbalized understanding.

## 2018-10-25 ENCOUNTER — TELEPHONE (OUTPATIENT)
Dept: PULMONOLOGY | Facility: CLINIC | Age: 73
End: 2018-10-25

## 2018-11-19 ENCOUNTER — HOSPITAL ENCOUNTER (OUTPATIENT)
Dept: CT IMAGING | Facility: HOSPITAL | Age: 73
Discharge: HOME | End: 2018-11-19
Attending: INTERNAL MEDICINE
Payer: MEDICARE

## 2018-11-19 DIAGNOSIS — C34.12 CANCER OF UPPER LOBE OF LEFT LUNG (HCC): ICD-10-CM

## 2018-11-19 PROCEDURE — 71260 CT THORAX DX C+: CPT | Performed by: INTERNAL MEDICINE

## 2018-11-19 PROCEDURE — 82565 ASSAY OF CREATININE: CPT

## 2018-11-19 RX ORDER — 0.9 % SODIUM CHLORIDE 0.9 %
10 VIAL (ML) INJECTION ONCE
Status: CANCELLED | OUTPATIENT
Start: 2018-11-19

## 2018-11-19 RX ORDER — HEPARIN SODIUM (PORCINE) LOCK FLUSH IV SOLN 100 UNIT/ML 100 UNIT/ML
5 SOLUTION INTRAVENOUS ONCE
Status: CANCELLED | OUTPATIENT
Start: 2018-11-19

## 2018-11-20 ENCOUNTER — NURSE ONLY (OUTPATIENT)
Dept: HEMATOLOGY/ONCOLOGY | Facility: HOSPITAL | Age: 73
End: 2018-11-20
Attending: INTERNAL MEDICINE
Payer: MEDICARE

## 2018-11-20 VITALS
TEMPERATURE: 98 F | RESPIRATION RATE: 18 BRPM | HEIGHT: 71 IN | DIASTOLIC BLOOD PRESSURE: 70 MMHG | BODY MASS INDEX: 25.34 KG/M2 | HEART RATE: 76 BPM | SYSTOLIC BLOOD PRESSURE: 111 MMHG | WEIGHT: 181 LBS

## 2018-11-20 DIAGNOSIS — C34.12 CANCER OF UPPER LOBE OF LEFT LUNG (HCC): Primary | ICD-10-CM

## 2018-11-20 DIAGNOSIS — B44.9 ASPERGILLUS PNEUMONIA (HCC): ICD-10-CM

## 2018-11-20 DIAGNOSIS — J18.9 PNEUMONITIS: ICD-10-CM

## 2018-11-20 DIAGNOSIS — D50.9 IRON DEFICIENCY ANEMIA, UNSPECIFIED IRON DEFICIENCY ANEMIA TYPE: ICD-10-CM

## 2018-11-20 DIAGNOSIS — D64.9 ANEMIA, UNSPECIFIED TYPE: ICD-10-CM

## 2018-11-20 DIAGNOSIS — Z45.2 ENCOUNTER FOR CARE RELATED TO PORT-A-CATH: ICD-10-CM

## 2018-11-20 PROCEDURE — A4216 STERILE WATER/SALINE, 10 ML: HCPCS

## 2018-11-20 PROCEDURE — 99214 OFFICE O/P EST MOD 30 MIN: CPT | Performed by: INTERNAL MEDICINE

## 2018-11-20 PROCEDURE — 36591 DRAW BLOOD OFF VENOUS DEVICE: CPT

## 2018-11-20 PROCEDURE — 85025 COMPLETE CBC W/AUTO DIFF WBC: CPT

## 2018-11-20 PROCEDURE — 80053 COMPREHEN METABOLIC PANEL: CPT

## 2018-11-20 RX ORDER — HEPARIN SODIUM (PORCINE) LOCK FLUSH IV SOLN 100 UNIT/ML 100 UNIT/ML
SOLUTION INTRAVENOUS
Status: COMPLETED
Start: 2018-11-20 | End: 2018-11-20

## 2018-11-20 RX ORDER — HEPARIN SODIUM (PORCINE) LOCK FLUSH IV SOLN 100 UNIT/ML 100 UNIT/ML
5 SOLUTION INTRAVENOUS ONCE
Status: COMPLETED | OUTPATIENT
Start: 2018-11-20 | End: 2018-11-20

## 2018-11-20 RX ORDER — 0.9 % SODIUM CHLORIDE 0.9 %
VIAL (ML) INJECTION
Status: DISCONTINUED
Start: 2018-11-20 | End: 2018-11-20

## 2018-11-20 RX ORDER — HEPARIN SODIUM (PORCINE) LOCK FLUSH IV SOLN 100 UNIT/ML 100 UNIT/ML
5 SOLUTION INTRAVENOUS ONCE
Status: CANCELLED | OUTPATIENT
Start: 2018-11-20

## 2018-11-20 RX ORDER — 0.9 % SODIUM CHLORIDE 0.9 %
10 VIAL (ML) INJECTION ONCE
Status: CANCELLED | OUTPATIENT
Start: 2018-11-20

## 2018-11-20 RX ADMIN — HEPARIN SODIUM (PORCINE) LOCK FLUSH IV SOLN 100 UNIT/ML 500 UNITS: 100 SOLUTION INTRAVENOUS at 11:45:00

## 2018-11-20 NOTE — PROGRESS NOTES
Cancer Center Progress Note    Patient Name: Jyoti Millan   YOB: 1945   Medical Record Number: [id number]   Attending Physician: Tito Leon M.D.        Chief Complaint:  Lung cancer    History of Present Illness:  Cancer history:  73-ye Surgical History:   Procedure Laterality Date   • BRONCHOSCOPY N/A 8/20/2018    Performed by Manuel Montesinos MD at [facility] ENDOSCOPY   • CAPSULE ENDOSCOPY N/A 7/24/2018    Performed by Christine Iniguez MD at [facility] ENDOSCOPY   • COLONOSCOPY  2010   • Dale Camejo Not Asked        Special Diet: Not Asked        Back Care: Not Asked        Exercise: Not Asked        Bike Helmet: Not Asked        Seat Belt: Not Asked        Self-Exams: Not Asked    Social History Narrative      Not on file        Current Medications: affect appropriate  HEENT: EOMs intact. PERRL. Oropharynx is clear. Neck: No JVD. No palpable lymphadenopathy. Neck is supple. 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

## 2018-11-27 ENCOUNTER — TELEPHONE (OUTPATIENT)
Dept: PULMONOLOGY | Facility: CLINIC | Age: 73
End: 2018-11-27

## 2018-11-27 ENCOUNTER — OFFICE VISIT (OUTPATIENT)
Dept: PULMONOLOGY | Facility: CLINIC | Age: 73
End: 2018-11-27
Payer: MEDICARE

## 2018-11-27 VITALS
HEART RATE: 61 BPM | HEIGHT: 69.5 IN | SYSTOLIC BLOOD PRESSURE: 126 MMHG | BODY MASS INDEX: 26.93 KG/M2 | DIASTOLIC BLOOD PRESSURE: 72 MMHG | WEIGHT: 186 LBS | RESPIRATION RATE: 18 BRPM | OXYGEN SATURATION: 98 %

## 2018-11-27 DIAGNOSIS — C34.12 CANCER OF UPPER LOBE OF LEFT LUNG (HCC): Primary | ICD-10-CM

## 2018-11-27 PROCEDURE — 99213 OFFICE O/P EST LOW 20 MIN: CPT | Performed by: INTERNAL MEDICINE

## 2018-11-27 PROCEDURE — 1111F DSCHRG MED/CURRENT MED MERGE: CPT | Performed by: INTERNAL MEDICINE

## 2018-11-27 PROCEDURE — G0463 HOSPITAL OUTPT CLINIC VISIT: HCPCS | Performed by: INTERNAL MEDICINE

## 2018-11-27 NOTE — TELEPHONE ENCOUNTER
Pts wife/Satinder calling to ask if pt should be still on O2, pts oxygen is at saturated at 98 on room air, pls call at:[phone],thanks.

## 2018-11-27 NOTE — PROGRESS NOTES
Robotic immune sac the patient is a 68-year-old white male whom I know well from prior evaluation and comes in now for follow-up. He is being treated for pulmonary aspergillosis and his infiltrates are vastly improved with voriconazole.   We have reviewed

## 2018-11-28 NOTE — TELEPHONE ENCOUNTER
Wife informed of below. Wife requesting order be sent to [facility]. Order faxed to [facility] [phone]. Fax confirmation received.

## 2018-11-28 NOTE — TELEPHONE ENCOUNTER
Spouse states pt has not used the oxygen for a couple weeks and would like to know if Dr. Myron Giron thinks they can get rid of oxygen. Pt had appt with Dr. Myron Giron yesterday.   Pt SpO2 was 98% room air at rest.

## 2019-01-03 NOTE — TELEPHONE ENCOUNTER
Can hold eliuis for 24hours prior to surgery.  Restart when hemostasis complete I spoke to Dr. Maegan Ballard earlier and to the patient and his wife today. The patient's hemoglobin dropped slightly despite IV iron. Iron levels remain low. Recent EGD and colonoscopy were negative.   We discussed a capsule enteroscopy including the risks of in

## 2019-01-14 ENCOUNTER — TELEPHONE (OUTPATIENT)
Dept: PULMONOLOGY | Facility: CLINIC | Age: 74
End: 2019-01-14

## 2019-01-14 RX ORDER — AZITHROMYCIN 250 MG/1
TABLET, FILM COATED ORAL
Qty: 1 PACKAGE | Refills: 0 | Status: SHIPPED | OUTPATIENT
Start: 2019-01-14 | End: 2019-02-19 | Stop reason: ALTCHOICE

## 2019-01-14 NOTE — TELEPHONE ENCOUNTER
Pt wife called and states the pt has a bad cold and has been coughing up phlegm please call thank you

## 2019-01-14 NOTE — TELEPHONE ENCOUNTER
Pt c/o productive cough clear sputum, runny nose, body aches (had flu shot) for 1 week. Pt c/o diarrhea x's 2 days. Pt denies SOB, fever, chest pain/congestion/tightness.

## 2019-02-11 ENCOUNTER — HOSPITAL ENCOUNTER (OUTPATIENT)
Dept: CT IMAGING | Facility: HOSPITAL | Age: 74
Discharge: HOME | End: 2019-02-11
Attending: INTERNAL MEDICINE
Payer: MEDICARE

## 2019-02-11 DIAGNOSIS — C34.12 CANCER OF UPPER LOBE OF LEFT LUNG (HCC): ICD-10-CM

## 2019-02-11 LAB — CREAT BLD-MCNC: 1 MG/DL (ref 0.5–1.5)

## 2019-02-11 PROCEDURE — 71260 CT THORAX DX C+: CPT | Performed by: INTERNAL MEDICINE

## 2019-02-11 PROCEDURE — 74177 CT ABD & PELVIS W/CONTRAST: CPT | Performed by: INTERNAL MEDICINE

## 2019-02-11 PROCEDURE — 82565 ASSAY OF CREATININE: CPT

## 2019-02-19 ENCOUNTER — NURSE ONLY (OUTPATIENT)
Dept: HEMATOLOGY/ONCOLOGY | Facility: HOSPITAL | Age: 74
End: 2019-02-19
Attending: INTERNAL MEDICINE
Payer: MEDICARE

## 2019-02-19 VITALS
WEIGHT: 179.88 LBS | RESPIRATION RATE: 18 BRPM | BODY MASS INDEX: 25.18 KG/M2 | SYSTOLIC BLOOD PRESSURE: 142 MMHG | TEMPERATURE: 98 F | HEART RATE: 63 BPM | HEIGHT: 71 IN | DIASTOLIC BLOOD PRESSURE: 73 MMHG

## 2019-02-19 DIAGNOSIS — B44.9 ASPERGILLUS PNEUMONIA (HCC): Primary | ICD-10-CM

## 2019-02-19 DIAGNOSIS — R73.09 ELEVATED GLUCOSE: ICD-10-CM

## 2019-02-19 DIAGNOSIS — Z45.2 ENCOUNTER FOR CARE RELATED TO PORT-A-CATH: ICD-10-CM

## 2019-02-19 DIAGNOSIS — D50.9 IRON DEFICIENCY ANEMIA, UNSPECIFIED IRON DEFICIENCY ANEMIA TYPE: Primary | ICD-10-CM

## 2019-02-19 DIAGNOSIS — C34.12 CANCER OF UPPER LOBE OF LEFT LUNG (HCC): ICD-10-CM

## 2019-02-19 DIAGNOSIS — D50.9 IRON DEFICIENCY ANEMIA, UNSPECIFIED IRON DEFICIENCY ANEMIA TYPE: ICD-10-CM

## 2019-02-19 DIAGNOSIS — J18.9 PNEUMONITIS: ICD-10-CM

## 2019-02-19 LAB
ALBUMIN SERPL-MCNC: 3.8 G/DL (ref 3.4–5)
ALBUMIN/GLOB SERPL: 1.4 {RATIO} (ref 1–2)
ALP LIVER SERPL-CCNC: 103 U/L (ref 45–117)
ALT SERPL-CCNC: 34 U/L (ref 16–61)
ANION GAP SERPL CALC-SCNC: 4 MMOL/L (ref 0–18)
AST SERPL-CCNC: 23 U/L (ref 15–37)
BASOPHILS # BLD AUTO: 0.03 X10(3) UL (ref 0–0.2)
BASOPHILS NFR BLD AUTO: 0.6 %
BILIRUB SERPL-MCNC: 0.7 MG/DL (ref 0.1–2)
BUN BLD-MCNC: 25 MG/DL (ref 7–18)
BUN/CREAT SERPL: 25.8 (ref 10–20)
CALCIUM BLD-MCNC: 8.8 MG/DL (ref 8.5–10.1)
CHLORIDE SERPL-SCNC: 107 MMOL/L (ref 98–107)
CO2 SERPL-SCNC: 28 MMOL/L (ref 21–32)
CREAT BLD-MCNC: 0.97 MG/DL (ref 0.7–1.3)
DEPRECATED RDW RBC AUTO: 49.3 FL (ref 35.1–46.3)
EOSINOPHIL # BLD AUTO: 0.07 X10(3) UL (ref 0–0.7)
EOSINOPHIL NFR BLD AUTO: 1.4 %
ERYTHROCYTE [DISTWIDTH] IN BLOOD BY AUTOMATED COUNT: 13.4 % (ref 11–15)
EST. AVERAGE GLUCOSE BLD GHB EST-MCNC: 120 MG/DL (ref 68–126)
GLOBULIN PLAS-MCNC: 2.8 G/DL (ref 2.8–4.4)
GLUCOSE BLD-MCNC: 95 MG/DL (ref 70–99)
HBA1C MFR BLD HPLC: 5.8 % (ref ?–5.7)
HCT VFR BLD AUTO: 42.3 % (ref 39–53)
HGB BLD-MCNC: 13.9 G/DL (ref 13–17.5)
IMM GRANULOCYTES # BLD AUTO: 0.01 X10(3) UL (ref 0–1)
IMM GRANULOCYTES NFR BLD: 0.2 %
LYMPHOCYTES # BLD AUTO: 1.01 X10(3) UL (ref 1–4)
LYMPHOCYTES NFR BLD AUTO: 20.8 %
M PROTEIN MFR SERPL ELPH: 6.6 G/DL (ref 6.4–8.2)
MCH RBC QN AUTO: 32.9 PG (ref 26–34)
MCHC RBC AUTO-ENTMCNC: 32.9 G/DL (ref 31–37)
MCV RBC AUTO: 100.2 FL (ref 80–100)
MONOCYTES # BLD AUTO: 0.48 X10(3) UL (ref 0.1–1)
MONOCYTES NFR BLD AUTO: 9.9 %
NEUTROPHILS # BLD AUTO: 3.25 X10 (3) UL (ref 1.5–7.7)
NEUTROPHILS # BLD AUTO: 3.25 X10(3) UL (ref 1.5–7.7)
NEUTROPHILS NFR BLD AUTO: 67.1 %
OSMOLALITY SERPL CALC.SUM OF ELEC: 292 MOSM/KG (ref 275–295)
PLATELET # BLD AUTO: 196 10(3)UL (ref 150–450)
POTASSIUM SERPL-SCNC: 4 MMOL/L (ref 3.5–5.1)
RBC # BLD AUTO: 4.22 X10(6)UL (ref 3.8–5.8)
SODIUM SERPL-SCNC: 139 MMOL/L (ref 136–145)
WBC # BLD AUTO: 4.9 X10(3) UL (ref 4–11)

## 2019-02-19 PROCEDURE — 85025 COMPLETE CBC W/AUTO DIFF WBC: CPT

## 2019-02-19 PROCEDURE — 36591 DRAW BLOOD OFF VENOUS DEVICE: CPT

## 2019-02-19 PROCEDURE — 80053 COMPREHEN METABOLIC PANEL: CPT

## 2019-02-19 PROCEDURE — 99214 OFFICE O/P EST MOD 30 MIN: CPT | Performed by: INTERNAL MEDICINE

## 2019-02-19 PROCEDURE — 83036 HEMOGLOBIN GLYCOSYLATED A1C: CPT

## 2019-02-19 RX ORDER — HEPARIN SODIUM (PORCINE) LOCK FLUSH IV SOLN 100 UNIT/ML 100 UNIT/ML
5 SOLUTION INTRAVENOUS ONCE
Status: COMPLETED | OUTPATIENT
Start: 2019-02-19 | End: 2019-02-19

## 2019-02-19 RX ORDER — 0.9 % SODIUM CHLORIDE 0.9 %
VIAL (ML) INJECTION
Status: DISCONTINUED
Start: 2019-02-19 | End: 2019-02-19

## 2019-02-19 RX ORDER — HEPARIN SODIUM (PORCINE) LOCK FLUSH IV SOLN 100 UNIT/ML 100 UNIT/ML
5 SOLUTION INTRAVENOUS ONCE
Status: CANCELLED | OUTPATIENT
Start: 2019-02-19

## 2019-02-19 RX ORDER — 0.9 % SODIUM CHLORIDE 0.9 %
10 VIAL (ML) INJECTION ONCE
Status: CANCELLED | OUTPATIENT
Start: 2019-02-19

## 2019-02-19 RX ADMIN — HEPARIN SODIUM (PORCINE) LOCK FLUSH IV SOLN 100 UNIT/ML 500 UNITS: 100 SOLUTION INTRAVENOUS at 10:04:00

## 2019-02-19 NOTE — PROGRESS NOTES
Cancer Center Progress Note    Patient Name: Katherine Encarnacion   YOB: 1945   Medical Record Number: [id number]   Attending Physician: Tula Severs, M.D.        Chief Complaint:  Lung cancer    History of Present Illness:  Cancer history:  73-ye Surgical History:   Procedure Laterality Date   • BRONCHOSCOPY N/A 8/20/2018    Performed by Marsha Sahu MD at [address] [place] [address] ENDOSCOPY   • CAPSULE ENDOSCOPY N/A 7/24/2018    Performed by Korey Puente MD at [address] [place] [address] ENDOSCOPY   • COLONOSCOPY  2010   • Corinne Limb file        Attends meetings of clubs or organizations: Not on file        Relationship status: Not on file      Intimate partner violence:        Fear of current or ex partner: Not on file        Emotionally abused: Not on file        Physically abused: N MG-UNIT Oral Cap, Take 1 capsule by mouth daily. , Disp: , Rfl:   •  Vitamin C 500 MG Oral Tab, Take 500 mg by mouth daily. , Disp: , Rfl:   •  Vitamin B-12 1000 MCG Oral Tab, Take 1,000 mcg by mouth daily. 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 He was diagnosed in December 2016 as above.

## 2019-02-26 ENCOUNTER — OFFICE VISIT (OUTPATIENT)
Dept: PULMONOLOGY | Facility: CLINIC | Age: 74
End: 2019-02-26
Payer: MEDICARE

## 2019-02-26 VITALS
WEIGHT: 184.81 LBS | RESPIRATION RATE: 18 BRPM | HEIGHT: 71 IN | SYSTOLIC BLOOD PRESSURE: 137 MMHG | BODY MASS INDEX: 25.87 KG/M2 | HEART RATE: 59 BPM | DIASTOLIC BLOOD PRESSURE: 70 MMHG | OXYGEN SATURATION: 99 %

## 2019-02-26 DIAGNOSIS — J18.9 PNEUMONIA OF RIGHT LUNG DUE TO INFECTIOUS ORGANISM, UNSPECIFIED PART OF LUNG: Primary | ICD-10-CM

## 2019-02-26 PROCEDURE — G0463 HOSPITAL OUTPT CLINIC VISIT: HCPCS | Performed by: INTERNAL MEDICINE

## 2019-02-26 PROCEDURE — 99213 OFFICE O/P EST LOW 20 MIN: CPT | Performed by: INTERNAL MEDICINE

## 2019-02-26 RX ORDER — VORICONAZOLE 200 MG/1
200 TABLET ORAL 2 TIMES DAILY
Qty: 60 TABLET | Refills: 6 | Status: SHIPPED | OUTPATIENT
Start: 2019-02-26 | End: 2019-03-07

## 2019-02-26 NOTE — PROGRESS NOTES
Patient is a 68-year-old male who I know well from prior evaluation comes in now for follow-up. He has a history of lung cancer without evident disease and is followed by Dr. Ailyn Michaels.   Additionally, the patient has pulmonary aspergillosis and is follow-up CT

## 2019-03-07 ENCOUNTER — TELEPHONE (OUTPATIENT)
Dept: PULMONOLOGY | Facility: CLINIC | Age: 74
End: 2019-03-07

## 2019-03-07 NOTE — TELEPHONE ENCOUNTER
Pharmacy inquiring if they can dispense 180 tablets of medication and adjust refills down.  Please call thank you [phone]          Current Outpatient Medications:   •  voriconazole 200 MG Oral Tab, Take 1 tablet (200 mg total) by mouth 2 (two) times da

## 2019-03-08 RX ORDER — VORICONAZOLE 200 MG/1
200 TABLET ORAL 2 TIMES DAILY
Qty: 180 TABLET | Refills: 1 | Status: SHIPPED | OUTPATIENT
Start: 2019-03-08 | End: 2019-10-02

## 2019-03-08 NOTE — TELEPHONE ENCOUNTER
Called and informed pt's wife awaiting response from Dr. Jason Slaughter. She is requesting call back once Rx sent. Has already been routed to Dr. Jason Slaughter.

## 2019-03-09 NOTE — TELEPHONE ENCOUNTER
Notified pt's wife of below. Explained rx sent to [facility] x 3 mo w/ 1 refill. She voiced understanding.

## 2019-03-19 ENCOUNTER — OFFICE VISIT (OUTPATIENT)
Dept: PULMONOLOGY | Facility: CLINIC | Age: 74
End: 2019-03-19
Payer: MEDICARE

## 2019-03-19 ENCOUNTER — HOSPITAL ENCOUNTER (OUTPATIENT)
Dept: GENERAL RADIOLOGY | Facility: HOSPITAL | Age: 74
Discharge: HOME | End: 2019-03-19
Attending: INTERNAL MEDICINE
Payer: MEDICARE

## 2019-03-19 ENCOUNTER — TELEPHONE (OUTPATIENT)
Dept: PULMONOLOGY | Facility: CLINIC | Age: 74
End: 2019-03-19

## 2019-03-19 VITALS
HEIGHT: 71 IN | DIASTOLIC BLOOD PRESSURE: 74 MMHG | WEIGHT: 180 LBS | HEART RATE: 79 BPM | TEMPERATURE: 99 F | SYSTOLIC BLOOD PRESSURE: 131 MMHG | BODY MASS INDEX: 25.2 KG/M2

## 2019-03-19 DIAGNOSIS — C34.12 CANCER OF UPPER LOBE OF LEFT LUNG (HCC): Primary | ICD-10-CM

## 2019-03-19 DIAGNOSIS — R05.9 COUGH: ICD-10-CM

## 2019-03-19 PROCEDURE — 99213 OFFICE O/P EST LOW 20 MIN: CPT | Performed by: INTERNAL MEDICINE

## 2019-03-19 PROCEDURE — 71046 X-RAY EXAM CHEST 2 VIEWS: CPT | Performed by: INTERNAL MEDICINE

## 2019-03-19 PROCEDURE — G0463 HOSPITAL OUTPT CLINIC VISIT: HCPCS | Performed by: INTERNAL MEDICINE

## 2019-03-19 RX ORDER — LEVOFLOXACIN 500 MG/1
500 TABLET, FILM COATED ORAL DAILY
Qty: 7 TABLET | Refills: 0 | Status: SHIPPED | OUTPATIENT
Start: 2019-03-19 | End: 2019-03-19

## 2019-03-19 RX ORDER — LEVOFLOXACIN 500 MG/1
500 TABLET, FILM COATED ORAL DAILY
Qty: 7 TABLET | Refills: 0 | Status: SHIPPED | OUTPATIENT
Start: 2019-03-19 | End: 2019-05-21 | Stop reason: ALTCHOICE

## 2019-03-19 NOTE — TELEPHONE ENCOUNTER
Wife states pt has a temp of 105, coughing up  phlegm, chills and body aches.  Please call [phone]

## 2019-03-19 NOTE — PROGRESS NOTES
The patient is 75-year-old male who I know well from prior evaluation comes in now for follow-up. He now has 3 days of robust cough. He is currently being treated for pulmonary aspergillosis and has had radiographic response.   He has a history of lung ca

## 2019-03-19 NOTE — TELEPHONE ENCOUNTER
Called and spoke with the patient and his wife on speaker phone. He has been experiencing a productive cough for the past 3-4 days. White-yellow phlegm. Hard to cough up. Recently Dx with Aspergillus and is taking fluconazole.  Has a low grade fever o 100.5

## 2019-04-19 ENCOUNTER — TELEPHONE (OUTPATIENT)
Dept: PULMONOLOGY | Facility: CLINIC | Age: 74
End: 2019-04-19

## 2019-04-19 NOTE — TELEPHONE ENCOUNTER
Verbal consent obtained from pt, okay to speak to wife. Wife states, they received a automated voicemail to schedule appt for pt and wondering when pt to return to clinic.  Informed her according to [address] [place] [address] 3/19/19, pt should have follow-up visit in 6-12 months

## 2019-04-29 ENCOUNTER — TELEPHONE (OUTPATIENT)
Dept: PULMONOLOGY | Facility: CLINIC | Age: 74
End: 2019-04-29

## 2019-04-29 NOTE — TELEPHONE ENCOUNTER
Pts wife/Luckunalzia calling for pt requesting to spk w Dr Deneen Perez or rn, pt is currently out of town. Pt having severe abd pain/bloating/cold sweats. Last apprx . 15min on/off. Pls call at:[phone],thanks.

## 2019-04-29 NOTE — TELEPHONE ENCOUNTER
Spoke to pt and spouse. Pt c/o midline abd pain more on the right side 10/10 that comes and goes a few times a week, pain lasts 10-15 min with sweating. Pt states this has been going on for 3 weeks. Pt states his stools are normal brown color and formed. Pt denies fever and n/v/d. Pt states he is in [place]. Pt states he feels discomfort when he presses on his right side of abdomen. Pt and spouse informed to call 911/pt to be evaluated in the ER. Pt and spouse verbalized understanding.

## 2019-04-30 NOTE — TELEPHONE ENCOUNTER
Spouse states pt was seen in the ER had CT scan and they were not able to find what has been causing pts abd pain. Spouse states they will be returning to [place] 5/20/19. F/U appt made for 5/21/19 2:30. Spouse notified of time date and place of appt. Spouse verbalized understanding and states she will bring records from ER visit.

## 2019-05-21 ENCOUNTER — NURSE ONLY (OUTPATIENT)
Dept: HEMATOLOGY/ONCOLOGY | Facility: HOSPITAL | Age: 74
End: 2019-05-21
Attending: INTERNAL MEDICINE
Payer: MEDICARE

## 2019-05-21 ENCOUNTER — TELEPHONE (OUTPATIENT)
Dept: PULMONOLOGY | Facility: CLINIC | Age: 74
End: 2019-05-21

## 2019-05-21 VITALS
BODY MASS INDEX: 24.78 KG/M2 | TEMPERATURE: 98 F | HEART RATE: 57 BPM | HEIGHT: 71 IN | SYSTOLIC BLOOD PRESSURE: 130 MMHG | OXYGEN SATURATION: 99 % | RESPIRATION RATE: 18 BRPM | WEIGHT: 177 LBS | DIASTOLIC BLOOD PRESSURE: 65 MMHG

## 2019-05-21 DIAGNOSIS — C34.12 CANCER OF UPPER LOBE OF LEFT LUNG (HCC): ICD-10-CM

## 2019-05-21 DIAGNOSIS — C34.90 MALIGNANT NEOPLASM OF LUNG, UNSPECIFIED LATERALITY, UNSPECIFIED PART OF LUNG (HCC): ICD-10-CM

## 2019-05-21 DIAGNOSIS — D50.9 IRON DEFICIENCY ANEMIA, UNSPECIFIED IRON DEFICIENCY ANEMIA TYPE: Primary | ICD-10-CM

## 2019-05-21 DIAGNOSIS — J18.9 PNEUMONITIS: ICD-10-CM

## 2019-05-21 DIAGNOSIS — B44.9 ASPERGILLUS PNEUMONIA (HCC): ICD-10-CM

## 2019-05-21 DIAGNOSIS — Z45.2 ENCOUNTER FOR CARE RELATED TO PORT-A-CATH: ICD-10-CM

## 2019-05-21 PROCEDURE — 36591 DRAW BLOOD OFF VENOUS DEVICE: CPT

## 2019-05-21 PROCEDURE — 80053 COMPREHEN METABOLIC PANEL: CPT

## 2019-05-21 PROCEDURE — 99214 OFFICE O/P EST MOD 30 MIN: CPT | Performed by: INTERNAL MEDICINE

## 2019-05-21 PROCEDURE — 85025 COMPLETE CBC W/AUTO DIFF WBC: CPT

## 2019-05-21 RX ORDER — HEPARIN SODIUM (PORCINE) LOCK FLUSH IV SOLN 100 UNIT/ML 100 UNIT/ML
5 SOLUTION INTRAVENOUS ONCE
Status: CANCELLED | OUTPATIENT
Start: 2019-05-21

## 2019-05-21 RX ORDER — 0.9 % SODIUM CHLORIDE 0.9 %
VIAL (ML) INJECTION
Status: DISCONTINUED
Start: 2019-05-21 | End: 2019-05-21

## 2019-05-21 RX ORDER — 0.9 % SODIUM CHLORIDE 0.9 %
10 VIAL (ML) INJECTION ONCE
Status: CANCELLED | OUTPATIENT
Start: 2019-05-21

## 2019-05-21 RX ORDER — HEPARIN SODIUM (PORCINE) LOCK FLUSH IV SOLN 100 UNIT/ML 100 UNIT/ML
5 SOLUTION INTRAVENOUS ONCE
Status: COMPLETED | OUTPATIENT
Start: 2019-05-21 | End: 2019-05-21

## 2019-05-21 RX ADMIN — HEPARIN SODIUM (PORCINE) LOCK FLUSH IV SOLN 100 UNIT/ML 500 UNITS: 100 SOLUTION INTRAVENOUS at 09:50:00

## 2019-05-21 NOTE — TELEPHONE ENCOUNTER
Pt stopped at desk- had ER fu appt today w [facility]- cancelled due to [facility] out of office-  See comm 4/29  Pt wants sooner than 1st avail- pl call pt

## 2019-05-21 NOTE — PROGRESS NOTES
Cancer Center Progress Note    Patient Name: Teri Eden   YOB: 1945   Medical Record Number: [id number]   Attending Physician: Norma Edwards M.D.        Chief Complaint:  Lung cancer    History of Present Illness:  Cancer history:  73-ye Surgical History:   Procedure Laterality Date   • BRONCHOSCOPY N/A 8/20/2018    Performed by Janis Brunson MD at [address] [place] [address] ENDOSCOPY   • CAPSULE ENDOSCOPY N/A 7/24/2018    Performed by Uche Molina MD at [address] [place] [address] ENDOSCOPY   • COLONOSCOPY  2010   • Avinash Vargas file        Attends meetings of clubs or organizations: Not on file        Relationship status: Not on file      Intimate partner violence:        Fear of current or ex partner: Not on file        Emotionally abused: Not on file        Physically abused: N Allergies:  No Known Allergies     Review of Systems:  All other systems reviewed and negative x12    Vital Signs:  /65 (BP Location: Left arm, Patient Position: Sitting, Cuff Size: adult)   Pulse 57   Temp 97.8 °F (36.6 °C) (Oral)   Resp 18   Ht cisplatin and etoposide finishing in early March 2017. He had low-grade neutropenia and anemia on chemotherapy. –Neutropenia anemia-improved following the completion of chemotherapy.   Jair Lindsay he has unresectable stage IIIb disease with no progression foll

## 2019-05-28 NOTE — TELEPHONE ENCOUNTER
Pt stts he is doing pretty good. He accepted appt offered. Appt info given. Pt voiced understanding. none

## 2019-06-13 ENCOUNTER — OFFICE VISIT (OUTPATIENT)
Dept: PULMONOLOGY | Facility: CLINIC | Age: 74
End: 2019-06-13
Payer: MEDICARE

## 2019-06-13 VITALS
OXYGEN SATURATION: 98 % | SYSTOLIC BLOOD PRESSURE: 133 MMHG | HEART RATE: 55 BPM | HEIGHT: 70 IN | WEIGHT: 179 LBS | RESPIRATION RATE: 18 BRPM | BODY MASS INDEX: 25.62 KG/M2 | DIASTOLIC BLOOD PRESSURE: 78 MMHG

## 2019-06-13 DIAGNOSIS — J18.9 PNEUMONIA OF RIGHT LUNG DUE TO INFECTIOUS ORGANISM, UNSPECIFIED PART OF LUNG: Primary | ICD-10-CM

## 2019-06-13 PROCEDURE — G0463 HOSPITAL OUTPT CLINIC VISIT: HCPCS | Performed by: INTERNAL MEDICINE

## 2019-06-13 PROCEDURE — 99213 OFFICE O/P EST LOW 20 MIN: CPT | Performed by: INTERNAL MEDICINE

## 2019-06-13 NOTE — PROGRESS NOTES
The patient is a 25-year-old male who I am now evaluating in follow-up. He had a tick bite a couple days ago and the wound looks okay. He has been on voriconazole for pulmonary aspergillosis for the past 10 months.   He is going to get a follow-up CAT sca

## 2019-08-01 ENCOUNTER — TELEPHONE (OUTPATIENT)
Dept: PULMONOLOGY | Facility: CLINIC | Age: 74
End: 2019-08-01

## 2019-08-15 ENCOUNTER — HOSPITAL ENCOUNTER (OUTPATIENT)
Dept: CT IMAGING | Facility: HOSPITAL | Age: 74
Discharge: HOME | End: 2019-08-15
Attending: INTERNAL MEDICINE
Payer: MEDICARE

## 2019-08-15 DIAGNOSIS — C34.12 PRIMARY CANCER OF LEFT UPPER LOBE OF LUNG (HCC): ICD-10-CM

## 2019-08-15 LAB — CREAT BLD-MCNC: 0.9 MG/DL (ref 0.7–1.3)

## 2019-08-15 PROCEDURE — 71260 CT THORAX DX C+: CPT | Performed by: INTERNAL MEDICINE

## 2019-08-15 PROCEDURE — 82565 ASSAY OF CREATININE: CPT

## 2019-08-20 ENCOUNTER — NURSE ONLY (OUTPATIENT)
Dept: HEMATOLOGY/ONCOLOGY | Facility: HOSPITAL | Age: 74
End: 2019-08-20
Attending: INTERNAL MEDICINE
Payer: MEDICARE

## 2019-08-20 ENCOUNTER — TELEPHONE (OUTPATIENT)
Dept: PULMONOLOGY | Facility: CLINIC | Age: 74
End: 2019-08-20

## 2019-08-20 VITALS
WEIGHT: 179.69 LBS | BODY MASS INDEX: 25.16 KG/M2 | RESPIRATION RATE: 18 BRPM | OXYGEN SATURATION: 97 % | SYSTOLIC BLOOD PRESSURE: 142 MMHG | TEMPERATURE: 98 F | DIASTOLIC BLOOD PRESSURE: 96 MMHG | HEIGHT: 71 IN | HEART RATE: 63 BPM

## 2019-08-20 DIAGNOSIS — Z45.2 ENCOUNTER FOR CARE RELATED TO PORT-A-CATH: ICD-10-CM

## 2019-08-20 DIAGNOSIS — C34.12 CANCER OF UPPER LOBE OF LEFT LUNG (HCC): ICD-10-CM

## 2019-08-20 DIAGNOSIS — D50.9 IRON DEFICIENCY ANEMIA, UNSPECIFIED IRON DEFICIENCY ANEMIA TYPE: Primary | ICD-10-CM

## 2019-08-20 DIAGNOSIS — B44.9 ASPERGILLUS PNEUMONIA (HCC): ICD-10-CM

## 2019-08-20 DIAGNOSIS — C34.90 MALIGNANT NEOPLASM OF LUNG, UNSPECIFIED LATERALITY, UNSPECIFIED PART OF LUNG (HCC): ICD-10-CM

## 2019-08-20 DIAGNOSIS — J18.9 PNEUMONITIS: ICD-10-CM

## 2019-08-20 DIAGNOSIS — R93.89 ABNORMAL CHEST CT: Primary | ICD-10-CM

## 2019-08-20 LAB
ALBUMIN SERPL-MCNC: 3.8 G/DL (ref 3.4–5)
ALBUMIN/GLOB SERPL: 1.2 {RATIO} (ref 1–2)
ALP LIVER SERPL-CCNC: 117 U/L (ref 45–117)
ALT SERPL-CCNC: 28 U/L (ref 16–61)
ANION GAP SERPL CALC-SCNC: 5 MMOL/L (ref 0–18)
AST SERPL-CCNC: 21 U/L (ref 15–37)
BASOPHILS # BLD AUTO: 0.03 X10(3) UL (ref 0–0.2)
BASOPHILS NFR BLD AUTO: 0.6 %
BILIRUB SERPL-MCNC: 0.5 MG/DL (ref 0.1–2)
BUN BLD-MCNC: 22 MG/DL (ref 7–18)
BUN/CREAT SERPL: 23.9 (ref 10–20)
CALCIUM BLD-MCNC: 8.9 MG/DL (ref 8.5–10.1)
CHLORIDE SERPL-SCNC: 108 MMOL/L (ref 98–112)
CO2 SERPL-SCNC: 27 MMOL/L (ref 21–32)
CREAT BLD-MCNC: 0.92 MG/DL (ref 0.7–1.3)
DEPRECATED RDW RBC AUTO: 46.9 FL (ref 35.1–46.3)
EOSINOPHIL # BLD AUTO: 0.04 X10(3) UL (ref 0–0.7)
EOSINOPHIL NFR BLD AUTO: 0.8 %
ERYTHROCYTE [DISTWIDTH] IN BLOOD BY AUTOMATED COUNT: 12.6 % (ref 11–15)
GLOBULIN PLAS-MCNC: 3.1 G/DL (ref 2.8–4.4)
GLUCOSE BLD-MCNC: 104 MG/DL (ref 70–99)
HCT VFR BLD AUTO: 42.8 % (ref 39–53)
HGB BLD-MCNC: 14.3 G/DL (ref 13–17.5)
IMM GRANULOCYTES # BLD AUTO: 0.01 X10(3) UL (ref 0–1)
IMM GRANULOCYTES NFR BLD: 0.2 %
LYMPHOCYTES # BLD AUTO: 1.1 X10(3) UL (ref 1–4)
LYMPHOCYTES NFR BLD AUTO: 23.3 %
M PROTEIN MFR SERPL ELPH: 6.9 G/DL (ref 6.4–8.2)
MCH RBC QN AUTO: 33.9 PG (ref 26–34)
MCHC RBC AUTO-ENTMCNC: 33.4 G/DL (ref 31–37)
MCV RBC AUTO: 101.4 FL (ref 80–100)
MONOCYTES # BLD AUTO: 0.4 X10(3) UL (ref 0.1–1)
MONOCYTES NFR BLD AUTO: 8.5 %
NEUTROPHILS # BLD AUTO: 3.15 X10 (3) UL (ref 1.5–7.7)
NEUTROPHILS # BLD AUTO: 3.15 X10(3) UL (ref 1.5–7.7)
NEUTROPHILS NFR BLD AUTO: 66.6 %
OSMOLALITY SERPL CALC.SUM OF ELEC: 294 MOSM/KG (ref 275–295)
PATIENT FASTING: NO
PLATELET # BLD AUTO: 191 10(3)UL (ref 150–450)
POTASSIUM SERPL-SCNC: 4 MMOL/L (ref 3.5–5.1)
RBC # BLD AUTO: 4.22 X10(6)UL (ref 3.8–5.8)
SODIUM SERPL-SCNC: 140 MMOL/L (ref 136–145)
WBC # BLD AUTO: 4.7 X10(3) UL (ref 4–11)

## 2019-08-20 PROCEDURE — 99214 OFFICE O/P EST MOD 30 MIN: CPT | Performed by: INTERNAL MEDICINE

## 2019-08-20 PROCEDURE — 80053 COMPREHEN METABOLIC PANEL: CPT

## 2019-08-20 PROCEDURE — 36591 DRAW BLOOD OFF VENOUS DEVICE: CPT

## 2019-08-20 PROCEDURE — 85025 COMPLETE CBC W/AUTO DIFF WBC: CPT

## 2019-08-20 RX ORDER — HEPARIN SODIUM (PORCINE) LOCK FLUSH IV SOLN 100 UNIT/ML 100 UNIT/ML
5 SOLUTION INTRAVENOUS ONCE
Status: DISCONTINUED | OUTPATIENT
Start: 2019-08-20 | End: 2019-08-20

## 2019-08-20 RX ORDER — HEPARIN SODIUM (PORCINE) LOCK FLUSH IV SOLN 100 UNIT/ML 100 UNIT/ML
5 SOLUTION INTRAVENOUS ONCE
Status: CANCELLED | OUTPATIENT
Start: 2019-08-20

## 2019-08-20 RX ORDER — 0.9 % SODIUM CHLORIDE 0.9 %
VIAL (ML) INJECTION
Status: DISCONTINUED
Start: 2019-08-20 | End: 2019-08-20

## 2019-08-20 RX ORDER — 0.9 % SODIUM CHLORIDE 0.9 %
10 VIAL (ML) INJECTION ONCE
Status: CANCELLED | OUTPATIENT
Start: 2019-08-20

## 2019-08-20 NOTE — PROGRESS NOTES
Cancer Center Progress Note    Patient Name: Sarika Argueta   YOB: 1945   Medical Record Number: [id number]   Attending Physician: Brittny Geiger M.D.        Chief Complaint:  Lung cancer    History of Present Illness:  Cancer history:  74-ye Surgical History:   Procedure Laterality Date   • BRONCHOSCOPY N/A 8/20/2018    Performed by Johanna Meyer MD at [facility] ENDOSCOPY   • CAPSULE ENDOSCOPY N/A 7/24/2018    Performed by Adria Huddleston MD at [facility] ENDOSCOPY   • COLONOSCOPY  2010   • Gemma Dewey organization: Not on file        Attends meetings of clubs or organizations: Not on file        Relationship status: Not on file      Intimate partner violence:        Fear of current or ex partner: Not on file        Emotionally abused: Not on file Disp: , Rfl:     Allergies:  No Known Allergies     Review of Systems:  All other systems reviewed and negative x12    Vital Signs:  BP (!) 142/96 (BP Location: Left arm, Patient Position: Sitting)   Pulse 63   Temp 98 °F (36.7 °C) (Oral)   Resp 18   Ht 1. 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 He was diagnosed in December 2016 as above.   He w

## 2019-08-20 NOTE — TELEPHONE ENCOUNTER
Pt informed of Dr. Carlota Merida message/results/orders below. Pt verbalized understanding. Order added to chronic calendar. Pt c/o cough (sometimes productive) with white sputum for 1 month. Pt denies fever, SOB, chest pain/congestion/tightness.

## 2019-08-20 NOTE — TELEPHONE ENCOUNTER
----- Message from Av Elkins MD sent at 8/18/2019 11:53 AM CDT -----  RN, please call the patient to let him know that the CT scan of the chest showed no evidence of recurrent lung cancer; however, there was a new area of haziness at the periphery o

## 2019-08-21 RX ORDER — LEVOFLOXACIN 500 MG/1
500 TABLET, FILM COATED ORAL DAILY
Qty: 7 TABLET | Refills: 0 | Status: SHIPPED | OUTPATIENT
Start: 2019-08-21 | End: 2020-02-26 | Stop reason: ALTCHOICE

## 2019-10-01 ENCOUNTER — TELEPHONE (OUTPATIENT)
Dept: PULMONOLOGY | Facility: CLINIC | Age: 74
End: 2019-10-01

## 2019-10-01 ENCOUNTER — NURSE ONLY (OUTPATIENT)
Dept: HEMATOLOGY/ONCOLOGY | Facility: HOSPITAL | Age: 74
End: 2019-10-01
Attending: INTERNAL MEDICINE
Payer: MEDICARE

## 2019-10-01 DIAGNOSIS — D50.9 IRON DEFICIENCY ANEMIA, UNSPECIFIED IRON DEFICIENCY ANEMIA TYPE: Primary | ICD-10-CM

## 2019-10-01 DIAGNOSIS — C34.12 CANCER OF UPPER LOBE OF LEFT LUNG (HCC): ICD-10-CM

## 2019-10-01 DIAGNOSIS — Z45.2 ENCOUNTER FOR CARE RELATED TO PORT-A-CATH: ICD-10-CM

## 2019-10-01 PROCEDURE — 96523 IRRIG DRUG DELIVERY DEVICE: CPT

## 2019-10-01 RX ORDER — HEPARIN SODIUM (PORCINE) LOCK FLUSH IV SOLN 100 UNIT/ML 100 UNIT/ML
5 SOLUTION INTRAVENOUS ONCE
Status: COMPLETED | OUTPATIENT
Start: 2019-10-01 | End: 2019-10-01

## 2019-10-01 RX ORDER — 0.9 % SODIUM CHLORIDE 0.9 %
VIAL (ML) INJECTION
Status: DISCONTINUED
Start: 2019-10-01 | End: 2019-10-01

## 2019-10-01 RX ORDER — 0.9 % SODIUM CHLORIDE 0.9 %
10 VIAL (ML) INJECTION ONCE
Status: CANCELLED | OUTPATIENT
Start: 2019-10-01

## 2019-10-01 RX ORDER — HEPARIN SODIUM (PORCINE) LOCK FLUSH IV SOLN 100 UNIT/ML 100 UNIT/ML
5 SOLUTION INTRAVENOUS ONCE
Status: CANCELLED | OUTPATIENT
Start: 2019-10-01

## 2019-10-01 RX ADMIN — HEPARIN SODIUM (PORCINE) LOCK FLUSH IV SOLN 100 UNIT/ML 500 UNITS: 100 SOLUTION INTRAVENOUS at 09:14:00

## 2019-10-01 NOTE — TELEPHONE ENCOUNTER
John Nash was wondering if patient should continue taking Voriconazole. As patient will need a refill. Please call. Thank you. Current Outpatient Medications:  voriconazole 200 MG Oral Tab Take 1 tablet (200 mg total) by mouth 2 (two) times daily.

## 2019-10-02 RX ORDER — VORICONAZOLE 200 MG/1
200 TABLET ORAL 2 TIMES DAILY
Qty: 180 TABLET | Refills: 1 | Status: SHIPPED | OUTPATIENT
Start: 2019-10-02 | End: 2019-11-27

## 2019-10-02 NOTE — TELEPHONE ENCOUNTER
LOV 6/13/19  Last refill 3/8/19    Dr. Jaime Siegel- Please review and sign pended order if appropriate.

## 2019-10-02 NOTE — TELEPHONE ENCOUNTER
Verbal consent obtained from pt to speak to wife regarding BONILLA. Informed wife of Dr. Alesha Jeronimo message/order below. Verified pharmacy. Wife verbalized understanding.

## 2019-10-28 ENCOUNTER — OFFICE VISIT (OUTPATIENT)
Dept: PULMONOLOGY | Facility: CLINIC | Age: 74
End: 2019-10-28
Payer: MEDICARE

## 2019-10-28 VITALS
BODY MASS INDEX: 26.51 KG/M2 | OXYGEN SATURATION: 98 % | TEMPERATURE: 97 F | WEIGHT: 179 LBS | HEART RATE: 60 BPM | DIASTOLIC BLOOD PRESSURE: 77 MMHG | SYSTOLIC BLOOD PRESSURE: 139 MMHG | HEIGHT: 69 IN

## 2019-10-28 DIAGNOSIS — Z12.5 SCREENING FOR PROSTATE CANCER: ICD-10-CM

## 2019-10-28 DIAGNOSIS — E78.5 DYSLIPIDEMIA: ICD-10-CM

## 2019-10-28 DIAGNOSIS — C34.92 MALIGNANT NEOPLASM OF LEFT LUNG, UNSPECIFIED PART OF LUNG (HCC): Primary | ICD-10-CM

## 2019-10-28 PROCEDURE — 90662 IIV NO PRSV INCREASED AG IM: CPT | Performed by: INTERNAL MEDICINE

## 2019-10-28 PROCEDURE — G0439 PPPS, SUBSEQ VISIT: HCPCS | Performed by: INTERNAL MEDICINE

## 2019-10-28 PROCEDURE — G0008 ADMIN INFLUENZA VIRUS VAC: HCPCS | Performed by: INTERNAL MEDICINE

## 2019-10-28 NOTE — PROGRESS NOTES
The patient is a 43-year-old male who I know well from prior evaluation of comes in now for follow-up. His vision is okay, hearing okay, memory is good. 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   He patient's last colonoscopy was June 2018. We will send PSA and lipid panel. We will also give flu shot today.

## 2019-10-31 ENCOUNTER — TELEPHONE (OUTPATIENT)
Dept: PULMONOLOGY | Facility: CLINIC | Age: 74
End: 2019-10-31

## 2019-11-21 ENCOUNTER — HOSPITAL ENCOUNTER (OUTPATIENT)
Dept: CT IMAGING | Facility: HOSPITAL | Age: 74
Discharge: HOME | End: 2019-11-21
Attending: INTERNAL MEDICINE
Payer: MEDICARE

## 2019-11-21 DIAGNOSIS — B44.9 ASPERGILLUS PNEUMONIA (HCC): ICD-10-CM

## 2019-11-21 DIAGNOSIS — C34.90 MALIGNANT NEOPLASM OF LUNG, UNSPECIFIED LATERALITY, UNSPECIFIED PART OF LUNG (HCC): ICD-10-CM

## 2019-11-21 PROCEDURE — 74177 CT ABD & PELVIS W/CONTRAST: CPT | Performed by: INTERNAL MEDICINE

## 2019-11-21 PROCEDURE — 82565 ASSAY OF CREATININE: CPT

## 2019-11-21 PROCEDURE — 71260 CT THORAX DX C+: CPT | Performed by: INTERNAL MEDICINE

## 2019-11-26 ENCOUNTER — NURSE ONLY (OUTPATIENT)
Dept: HEMATOLOGY/ONCOLOGY | Facility: HOSPITAL | Age: 74
End: 2019-11-26
Attending: INTERNAL MEDICINE
Payer: MEDICARE

## 2019-11-26 ENCOUNTER — TELEPHONE (OUTPATIENT)
Dept: PULMONOLOGY | Facility: CLINIC | Age: 74
End: 2019-11-26

## 2019-11-26 VITALS
SYSTOLIC BLOOD PRESSURE: 141 MMHG | WEIGHT: 179.88 LBS | BODY MASS INDEX: 25.18 KG/M2 | TEMPERATURE: 98 F | RESPIRATION RATE: 18 BRPM | DIASTOLIC BLOOD PRESSURE: 69 MMHG | OXYGEN SATURATION: 95 % | HEIGHT: 71 IN | HEART RATE: 61 BPM

## 2019-11-26 DIAGNOSIS — D50.9 IRON DEFICIENCY ANEMIA, UNSPECIFIED IRON DEFICIENCY ANEMIA TYPE: Primary | ICD-10-CM

## 2019-11-26 DIAGNOSIS — C34.12 CANCER OF UPPER LOBE OF LEFT LUNG (HCC): ICD-10-CM

## 2019-11-26 DIAGNOSIS — C34.90 MALIGNANT NEOPLASM OF LUNG, UNSPECIFIED LATERALITY, UNSPECIFIED PART OF LUNG (HCC): ICD-10-CM

## 2019-11-26 DIAGNOSIS — Z45.2 ENCOUNTER FOR CARE RELATED TO PORT-A-CATH: ICD-10-CM

## 2019-11-26 DIAGNOSIS — B44.9 ASPERGILLUS PNEUMONIA (HCC): Primary | ICD-10-CM

## 2019-11-26 DIAGNOSIS — J18.9 PNEUMONITIS: ICD-10-CM

## 2019-11-26 PROCEDURE — 99214 OFFICE O/P EST MOD 30 MIN: CPT | Performed by: INTERNAL MEDICINE

## 2019-11-26 PROCEDURE — 85025 COMPLETE CBC W/AUTO DIFF WBC: CPT

## 2019-11-26 PROCEDURE — 80053 COMPREHEN METABOLIC PANEL: CPT

## 2019-11-26 PROCEDURE — 36591 DRAW BLOOD OFF VENOUS DEVICE: CPT

## 2019-11-26 RX ORDER — HEPARIN SODIUM (PORCINE) LOCK FLUSH IV SOLN 100 UNIT/ML 100 UNIT/ML
5 SOLUTION INTRAVENOUS ONCE
Status: CANCELLED | OUTPATIENT
Start: 2019-11-26

## 2019-11-26 RX ORDER — HEPARIN SODIUM (PORCINE) LOCK FLUSH IV SOLN 100 UNIT/ML 100 UNIT/ML
5 SOLUTION INTRAVENOUS ONCE
Status: COMPLETED | OUTPATIENT
Start: 2019-11-26 | End: 2019-11-26

## 2019-11-26 RX ORDER — 0.9 % SODIUM CHLORIDE 0.9 %
VIAL (ML) INJECTION
Status: DISCONTINUED
Start: 2019-11-26 | End: 2019-11-26

## 2019-11-26 RX ORDER — 0.9 % SODIUM CHLORIDE 0.9 %
10 VIAL (ML) INJECTION ONCE
Status: CANCELLED | OUTPATIENT
Start: 2019-11-26

## 2019-11-26 RX ADMIN — HEPARIN SODIUM (PORCINE) LOCK FLUSH IV SOLN 100 UNIT/ML 500 UNITS: 100 SOLUTION INTRAVENOUS at 09:15:00

## 2019-11-26 NOTE — TELEPHONE ENCOUNTER
RN, please call the patient. There was very subtle increase in 1 of the lymph nodes in the chest.  It looks like Dr. Vicki Lopez has already discussed this with him and has suggested a follow-up CT scan the chest at the 3 months.   Please confirm with patient if
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
## 2019-11-26 NOTE — TELEPHONE ENCOUNTER
Spoke with pt, verbal consent obtained to release BONILLA to wife. Informed wife message to be routed to Dr. Meredith Hall to review results (CT Chest + Abdomen) and also informed them to follow up with Dr. Manohar Saunders as he ordered test. Wife verbalized understanding.

## 2019-11-27 RX ORDER — VORICONAZOLE 200 MG/1
200 TABLET ORAL 2 TIMES DAILY
Qty: 60 TABLET | Refills: 6 | Status: SHIPPED | OUTPATIENT
Start: 2019-11-27 | End: 2019-11-29

## 2019-11-27 RX ORDER — VORICONAZOLE 200 MG/1
200 TABLET ORAL 2 TIMES DAILY
Qty: 60 TABLET | Refills: 6 | Status: SHIPPED | OUTPATIENT
Start: 2019-11-27 | End: 2019-11-27

## 2019-11-27 NOTE — TELEPHONE ENCOUNTER
Spoke with wife. Informed wife of Dr. Samuels Flood message below. Wife verbalized understanding. Dr. Rubén Terrazas- Wife is wondering if pt should continue taking voriconazole? Pt has about 2 weeks left of medication.

## 2019-11-27 NOTE — TELEPHONE ENCOUNTER
Spoke with wife. Wife informed of Dr. Isis Lim message/order below, wife verbalized understanding. Informed wife prescription sent to [facility], wife requesting prescription to be sent to [facility] instead.  Informed [facility] not to fill medi

## 2019-11-29 NOTE — TELEPHONE ENCOUNTER
Pt requesting to have script sent in for 90 day supply. Pt requesting to have two scripts sent in for 90 day supply to [facility].

## 2019-12-01 RX ORDER — VORICONAZOLE 200 MG/1
200 TABLET ORAL 2 TIMES DAILY
Qty: 180 TABLET | Refills: 3 | Status: SHIPPED | OUTPATIENT
Start: 2019-12-01 | End: 2020-02-24

## 2020-02-20 ENCOUNTER — HOSPITAL ENCOUNTER (OUTPATIENT)
Dept: CT IMAGING | Facility: HOSPITAL | Age: 75
Discharge: HOME | End: 2020-02-20
Attending: INTERNAL MEDICINE
Payer: MEDICARE

## 2020-02-20 DIAGNOSIS — C34.90 MALIGNANT NEOPLASM OF LUNG, UNSPECIFIED LATERALITY, UNSPECIFIED PART OF LUNG (HCC): ICD-10-CM

## 2020-02-20 LAB — CREAT BLD-MCNC: 1.1 MG/DL (ref 0.7–1.3)

## 2020-02-20 PROCEDURE — 71260 CT THORAX DX C+: CPT | Performed by: INTERNAL MEDICINE

## 2020-02-20 PROCEDURE — 82565 ASSAY OF CREATININE: CPT

## 2020-02-24 ENCOUNTER — TELEPHONE (OUTPATIENT)
Dept: PULMONOLOGY | Facility: CLINIC | Age: 75
End: 2020-02-24

## 2020-02-26 ENCOUNTER — OFFICE VISIT (OUTPATIENT)
Dept: HEMATOLOGY/ONCOLOGY | Facility: HOSPITAL | Age: 75
End: 2020-02-26
Attending: INTERNAL MEDICINE
Payer: MEDICARE

## 2020-02-26 VITALS
HEART RATE: 60 BPM | BODY MASS INDEX: 25.48 KG/M2 | TEMPERATURE: 98 F | RESPIRATION RATE: 16 BRPM | WEIGHT: 182 LBS | SYSTOLIC BLOOD PRESSURE: 152 MMHG | HEIGHT: 71 IN | DIASTOLIC BLOOD PRESSURE: 71 MMHG | OXYGEN SATURATION: 98 %

## 2020-02-26 DIAGNOSIS — C34.12 CANCER OF UPPER LOBE OF LEFT LUNG (HCC): ICD-10-CM

## 2020-02-26 DIAGNOSIS — B44.9 ASPERGILLUS PNEUMONIA (HCC): ICD-10-CM

## 2020-02-26 DIAGNOSIS — C34.90 MALIGNANT NEOPLASM OF LUNG, UNSPECIFIED LATERALITY, UNSPECIFIED PART OF LUNG (HCC): Primary | ICD-10-CM

## 2020-02-26 DIAGNOSIS — D50.9 IRON DEFICIENCY ANEMIA, UNSPECIFIED IRON DEFICIENCY ANEMIA TYPE: Primary | ICD-10-CM

## 2020-02-26 DIAGNOSIS — C34.90 MALIGNANT NEOPLASM OF LUNG, UNSPECIFIED LATERALITY, UNSPECIFIED PART OF LUNG (HCC): ICD-10-CM

## 2020-02-26 DIAGNOSIS — Z45.2 ENCOUNTER FOR CARE RELATED TO PORT-A-CATH: ICD-10-CM

## 2020-02-26 DIAGNOSIS — J18.9 PNEUMONITIS: ICD-10-CM

## 2020-02-26 LAB
ALBUMIN SERPL-MCNC: 3.7 G/DL (ref 3.4–5)
ALBUMIN/GLOB SERPL: 1.3 {RATIO} (ref 1–2)
ALP LIVER SERPL-CCNC: 112 U/L (ref 45–117)
ALT SERPL-CCNC: 31 U/L (ref 16–61)
ANION GAP SERPL CALC-SCNC: 5 MMOL/L (ref 0–18)
AST SERPL-CCNC: 26 U/L (ref 15–37)
BASOPHILS # BLD AUTO: 0.02 X10(3) UL (ref 0–0.2)
BASOPHILS NFR BLD AUTO: 0.4 %
BILIRUB SERPL-MCNC: 0.3 MG/DL (ref 0.1–2)
BUN BLD-MCNC: 23 MG/DL (ref 7–18)
BUN/CREAT SERPL: 21.9 (ref 10–20)
CALCIUM BLD-MCNC: 8.7 MG/DL (ref 8.5–10.1)
CHLORIDE SERPL-SCNC: 108 MMOL/L (ref 98–112)
CO2 SERPL-SCNC: 26 MMOL/L (ref 21–32)
CREAT BLD-MCNC: 1.05 MG/DL (ref 0.7–1.3)
DEPRECATED RDW RBC AUTO: 47 FL (ref 35.1–46.3)
EOSINOPHIL # BLD AUTO: 0.08 X10(3) UL (ref 0–0.7)
EOSINOPHIL NFR BLD AUTO: 1.7 %
ERYTHROCYTE [DISTWIDTH] IN BLOOD BY AUTOMATED COUNT: 12.6 % (ref 11–15)
GLOBULIN PLAS-MCNC: 2.8 G/DL (ref 2.8–4.4)
GLUCOSE BLD-MCNC: 95 MG/DL (ref 70–99)
HCT VFR BLD AUTO: 42.1 % (ref 39–53)
HGB BLD-MCNC: 14.2 G/DL (ref 13–17.5)
IMM GRANULOCYTES # BLD AUTO: 0.01 X10(3) UL (ref 0–1)
IMM GRANULOCYTES NFR BLD: 0.2 %
LYMPHOCYTES # BLD AUTO: 1.01 X10(3) UL (ref 1–4)
LYMPHOCYTES NFR BLD AUTO: 21.3 %
M PROTEIN MFR SERPL ELPH: 6.5 G/DL (ref 6.4–8.2)
MCH RBC QN AUTO: 34.1 PG (ref 26–34)
MCHC RBC AUTO-ENTMCNC: 33.7 G/DL (ref 31–37)
MCV RBC AUTO: 101.2 FL (ref 80–100)
MONOCYTES # BLD AUTO: 0.45 X10(3) UL (ref 0.1–1)
MONOCYTES NFR BLD AUTO: 9.5 %
NEUTROPHILS # BLD AUTO: 3.17 X10 (3) UL (ref 1.5–7.7)
NEUTROPHILS # BLD AUTO: 3.17 X10(3) UL (ref 1.5–7.7)
NEUTROPHILS NFR BLD AUTO: 66.9 %
OSMOLALITY SERPL CALC.SUM OF ELEC: 291 MOSM/KG (ref 275–295)
PLATELET # BLD AUTO: 185 10(3)UL (ref 150–450)
POTASSIUM SERPL-SCNC: 4.2 MMOL/L (ref 3.5–5.1)
RBC # BLD AUTO: 4.16 X10(6)UL (ref 3.8–5.8)
SODIUM SERPL-SCNC: 139 MMOL/L (ref 136–145)
WBC # BLD AUTO: 4.7 X10(3) UL (ref 4–11)

## 2020-02-26 PROCEDURE — 99214 OFFICE O/P EST MOD 30 MIN: CPT | Performed by: INTERNAL MEDICINE

## 2020-02-26 PROCEDURE — 36591 DRAW BLOOD OFF VENOUS DEVICE: CPT

## 2020-02-26 PROCEDURE — 80053 COMPREHEN METABOLIC PANEL: CPT

## 2020-02-26 PROCEDURE — 85025 COMPLETE CBC W/AUTO DIFF WBC: CPT

## 2020-02-26 RX ORDER — 0.9 % SODIUM CHLORIDE 0.9 %
10 VIAL (ML) INJECTION ONCE
Status: CANCELLED | OUTPATIENT
Start: 2020-02-26

## 2020-02-26 RX ORDER — HEPARIN SODIUM (PORCINE) LOCK FLUSH IV SOLN 100 UNIT/ML 100 UNIT/ML
5 SOLUTION INTRAVENOUS ONCE
Status: COMPLETED | OUTPATIENT
Start: 2020-02-26 | End: 2020-02-26

## 2020-02-26 RX ORDER — 0.9 % SODIUM CHLORIDE 0.9 %
VIAL (ML) INJECTION
Status: DISCONTINUED
Start: 2020-02-26 | End: 2020-02-26

## 2020-02-26 RX ORDER — VORICONAZOLE 200 MG/1
200 TABLET ORAL 2 TIMES DAILY
COMMUNITY
Start: 2018-08-27 | End: 2020-08-12 | Stop reason: ALTCHOICE

## 2020-02-26 RX ORDER — HEPARIN SODIUM (PORCINE) LOCK FLUSH IV SOLN 100 UNIT/ML 100 UNIT/ML
5 SOLUTION INTRAVENOUS ONCE
Status: CANCELLED | OUTPATIENT
Start: 2020-02-26

## 2020-02-26 RX ADMIN — HEPARIN SODIUM (PORCINE) LOCK FLUSH IV SOLN 100 UNIT/ML 500 UNITS: 100 SOLUTION INTRAVENOUS at 08:54:00

## 2020-02-26 NOTE — PROGRESS NOTES
Cancer Center Progress Note    Patient Name: Faustino Calderon   YOB: 1945   Medical Record Number: [id number]   Attending Physician: Antwon Hogan M.D.        Chief Complaint:  Lung cancer    History of Present Illness:  Cancer history:  74-ye Procedure Laterality Date   • BRONCHOSCOPY N/A 8/20/2018    Performed by Jean Marie Orta MD at [address] [place] [address] ENDOSCOPY   • CAPSULE ENDOSCOPY N/A 7/24/2018    Performed by Laina Mccurdy MD at [address] [place] [address] ENDOSCOPY   • COLONOSCOPY  2010   • COLONOSCOPY     • COLONO or organizations: Not on file        Relationship status: Not on file      Intimate partner violence:        Fear of current or ex partner: Not on file        Emotionally abused: Not on file        Physically abused: Not on file        Forced sexual Randall negative x12    Vital Signs:  /71 (BP Location: Left arm, Patient Position: Sitting, Cuff Size: adult)   Pulse 60   Temp 98 °F (36.7 °C) (Oral)   Resp 16   Ht 1.803 m (5' 11\")   Wt 82.6 kg (182 lb)   SpO2 98%   BMI 25.38 kg/m²     Physical Examinati neutropenia and anemia on chemotherapy. –Neutropenia anemia-improved following the completion of chemotherapy. Nida Jerry he has unresectable stage IIIb disease with no progression following initial chemotherapy we  added immunotherapy with durvalumab.   Renetta Abernathy

## 2020-03-11 ENCOUNTER — HOSPITAL ENCOUNTER (OUTPATIENT)
Dept: INTERVENTIONAL RADIOLOGY/VASCULAR | Facility: HOSPITAL | Age: 75
Discharge: HOME | End: 2020-03-11
Attending: RADIOLOGY | Admitting: RADIOLOGY
Payer: MEDICARE

## 2020-03-11 VITALS
RESPIRATION RATE: 20 BRPM | BODY MASS INDEX: 25.5 KG/M2 | HEART RATE: 67 BPM | OXYGEN SATURATION: 98 % | WEIGHT: 182.13 LBS | HEIGHT: 71 IN | SYSTOLIC BLOOD PRESSURE: 136 MMHG | DIASTOLIC BLOOD PRESSURE: 70 MMHG

## 2020-03-11 DIAGNOSIS — C34.90 MALIGNANT NEOPLASM OF LUNG, UNSPECIFIED LATERALITY, UNSPECIFIED PART OF LUNG (HCC): ICD-10-CM

## 2020-03-11 PROCEDURE — 99152 MOD SED SAME PHYS/QHP 5/>YRS: CPT

## 2020-03-11 PROCEDURE — 36590 REMOVAL TUNNELED CV CATH: CPT

## 2020-03-11 PROCEDURE — 05PYX3Z REMOVAL OF INFUSION DEVICE FROM UPPER VEIN, EXTERNAL APPROACH: ICD-10-PCS | Performed by: RADIOLOGY

## 2020-03-11 PROCEDURE — 0JPV3WZ REMOVAL OF TOTALLY IMPLANTABLE VASCULAR ACCESS DEVICE FROM UPPER EXTREMITY SUBCUTANEOUS TISSUE AND FASCIA, PERCUTANEOUS APPROACH: ICD-10-PCS | Performed by: RADIOLOGY

## 2020-03-11 RX ORDER — MIDAZOLAM HYDROCHLORIDE 1 MG/ML
INJECTION INTRAMUSCULAR; INTRAVENOUS
Status: COMPLETED
Start: 2020-03-11 | End: 2020-03-11

## 2020-03-11 RX ORDER — SODIUM CHLORIDE 9 MG/ML
INJECTION, SOLUTION INTRAVENOUS CONTINUOUS
Status: DISCONTINUED | OUTPATIENT
Start: 2020-03-11 | End: 2020-03-11

## 2020-03-11 RX ORDER — LIDOCAINE HYDROCHLORIDE 20 MG/ML
INJECTION, SOLUTION EPIDURAL; INFILTRATION; INTRACAUDAL; PERINEURAL
Status: COMPLETED
Start: 2020-03-11 | End: 2020-03-11

## 2020-03-11 RX ORDER — CEFAZOLIN SODIUM/WATER 2 G/20 ML
SYRINGE (ML) INTRAVENOUS
Status: COMPLETED
Start: 2020-03-11 | End: 2020-03-11

## 2020-03-11 NOTE — PROCEDURES
[facility]  Brief IR Post-Procedure Note    [address] [facility] Patient Status:  Outpatient in a Bed    1945 MRN [id number]   Location [facility] Attending Sebastien West MD   Hosp Day # 0 PCP Angelic Rothman

## 2020-03-11 NOTE — PROGRESS NOTES
DISCHARGE NOTE      Pt is able to sit up and ambulate without difficulty. Pt voided and tolerated fluids and food. Procedural site remains dry and intact. No signs and symptoms of bleeding/hematoma noted.    Instruction provided, patient/family verbali

## 2020-03-11 NOTE — PRE-SEDATION ASSESSMENT
[facility]  IR Pre-Procedure Sedation Assessment    History of snoring or sleep or apnea?    No    History of previous problems with anesthesia or sedation  No    Physical Findings:  Neck: nl ROM  CV: RRR  PULM: normal respiratory rate/effor

## 2020-03-11 NOTE — H&P
[address] [facility] Patient Status:  Outpatient in a Bed    1945 MRN [id number]   Location [facility] Attending Branden Munoz MD   Hosp Day # 0 PCP Chyrl Boas reports that he does not use drugs. Allergies:  No Known Allergies    Home Medications:  voriconazole 200 MG Oral Tab, Take 200 mg by mouth 2 (two) times daily.   , Disp: , Rfl: , 3/10/2020 at Unknown time  Pyridoxine HCl (VITAMIN B-6 OR), Take 30 mg by Anemia      68-year-old male with stage IIIb lung adenocarcinoma status post completion of chemotherapy regimen.     Plan:  Dia Birch MD  3/11/2020  11:03 AM

## 2020-04-29 ENCOUNTER — TELEPHONE (OUTPATIENT)
Dept: PULMONOLOGY | Facility: CLINIC | Age: 75
End: 2020-04-29

## 2020-04-29 NOTE — TELEPHONE ENCOUNTER
Patient's wife Kathryn Ramírez called to request for surgery clearance for the Patient.  The patient is having knee replacement surgery on May 21st. Please advise

## 2020-04-29 NOTE — TELEPHONE ENCOUNTER
Discussed orders from [employer]. Scheduled pt 4/30 11 am. Appt info & video visit instructions given.  Explained benefits & coverage of video visit may be d/w insurance company, instructions for video visit sent via [address], & we did not receiv

## 2020-04-29 NOTE — TELEPHONE ENCOUNTER
Dr. Kevin Epps, Patient is scheduled for knee replacement surgery on 5/21/2020 and requesting surgery clearance. Please advise if you would like video visit, telephone visit, and or in office visit prior to providing clearance. Thank you.

## 2020-04-30 ENCOUNTER — TELEMEDICINE (OUTPATIENT)
Dept: PULMONOLOGY | Facility: CLINIC | Age: 75
End: 2020-04-30

## 2020-04-30 DIAGNOSIS — C34.92 MALIGNANT NEOPLASM OF LEFT LUNG, UNSPECIFIED PART OF LUNG (HCC): Primary | ICD-10-CM

## 2020-04-30 DIAGNOSIS — I25.10 CORONARY ARTERIOSCLEROSIS: ICD-10-CM

## 2020-04-30 PROCEDURE — 99213 OFFICE O/P EST LOW 20 MIN: CPT | Performed by: INTERNAL MEDICINE

## 2020-04-30 NOTE — PROGRESS NOTES
Virtual Video Visit    Joe Romeo verbally consents to to a Video Visit on 4/30/2020. Patient understands and accepts financial responsibility for any deductible, co-insurance and/or co-pays associated with this service.     This visit is conducted never drink and drive, always wear a safety belt, have a smoke detector and fire extiguisher in the house, avoid the use of candles in the home as they are the most common cause of housefire, and see me annually for complete examination.     Duration of the

## 2020-05-01 ENCOUNTER — TELEPHONE (OUTPATIENT)
Dept: PULMONOLOGY | Facility: CLINIC | Age: 75
End: 2020-05-01

## 2020-05-01 DIAGNOSIS — Z01.818 PRE-OP TESTING: Primary | ICD-10-CM

## 2020-05-01 NOTE — TELEPHONE ENCOUNTER
Left message for nurse navigator for Dr. Mario Barney requesting necessary pre-op labs for patient's upcoming knee surgery.

## 2020-05-04 NOTE — TELEPHONE ENCOUNTER
I spoke with Arden Starr, nurse with Dr. Shona Min. They are requesting pre-op CBC, BMP, and EKG, which I have ordered. Please call the patient to facilitate labs and EKG.  We also need to fax video visit with Dr. Ortega Patrick (4/30) to Dr. Ford Gupta nurse, Arden Starr, at 015-3

## 2020-05-05 NOTE — TELEPHONE ENCOUNTER
Orders given by Nora Tariq PA-C discussed w/ pt.  Explained notes from video visit w/ Dr. Carmen Zarate will be faxed to Dr. Andrzej Dong nurse, orders in computer system, may obtain testing through [facility], no appt necessary, 12 hr fasting except water for labs, &

## 2020-05-07 ENCOUNTER — LAB ENCOUNTER (OUTPATIENT)
Dept: LAB | Facility: HOSPITAL | Age: 75
End: 2020-05-07
Attending: PHYSICIAN ASSISTANT
Payer: MEDICARE

## 2020-05-07 DIAGNOSIS — Z01.818 PRE-OP TESTING: ICD-10-CM

## 2020-05-07 DIAGNOSIS — E78.5 DYSLIPIDEMIA: ICD-10-CM

## 2020-05-07 DIAGNOSIS — C34.90 MALIGNANT NEOPLASM OF LUNG, UNSPECIFIED LATERALITY, UNSPECIFIED PART OF LUNG (HCC): ICD-10-CM

## 2020-05-07 DIAGNOSIS — Z12.5 SCREENING FOR PROSTATE CANCER: ICD-10-CM

## 2020-05-07 PROCEDURE — 80053 COMPREHEN METABOLIC PANEL: CPT

## 2020-05-07 PROCEDURE — 93005 ELECTROCARDIOGRAM TRACING: CPT

## 2020-05-07 PROCEDURE — 93010 ELECTROCARDIOGRAM REPORT: CPT | Performed by: PHYSICIAN ASSISTANT

## 2020-05-07 PROCEDURE — 85025 COMPLETE CBC W/AUTO DIFF WBC: CPT

## 2020-05-07 PROCEDURE — 80061 LIPID PANEL: CPT

## 2020-05-07 PROCEDURE — 36415 COLL VENOUS BLD VENIPUNCTURE: CPT

## 2020-05-08 ENCOUNTER — TELEPHONE (OUTPATIENT)
Dept: PULMONOLOGY | Facility: CLINIC | Age: 75
End: 2020-05-08

## 2020-05-08 NOTE — TELEPHONE ENCOUNTER
Called and spoke with spouse, wanted to know if labs/EKG sent to Dr. Chris Caromna. Faxed results to his office [phone] at this time.   Also, she states a PSA was drawn at the time and they informed lab not to do as they do not want to receive a bill and wa

## 2020-05-08 NOTE — TELEPHONE ENCOUNTER
Spoke with wife, stated PSA was an old order from Oct. 2019, so they are ok that is was done and just wanted to let office know.

## 2020-05-14 ENCOUNTER — HOSPITAL ENCOUNTER (OUTPATIENT)
Dept: NUCLEAR MEDICINE | Facility: HOSPITAL | Age: 75
Discharge: HOME | End: 2020-05-14
Attending: INTERNAL MEDICINE
Payer: MEDICARE

## 2020-05-14 ENCOUNTER — HOSPITAL ENCOUNTER (OUTPATIENT)
Dept: CV DIAGNOSTICS | Facility: HOSPITAL | Age: 75
Discharge: HOME | End: 2020-05-14
Attending: INTERNAL MEDICINE
Payer: MEDICARE

## 2020-05-14 DIAGNOSIS — I25.10 CORONARY ARTERIOSCLEROSIS: ICD-10-CM

## 2020-05-14 PROCEDURE — 78452 HT MUSCLE IMAGE SPECT MULT: CPT | Performed by: INTERNAL MEDICINE

## 2020-05-14 PROCEDURE — 93016 CV STRESS TEST SUPVJ ONLY: CPT | Performed by: INTERNAL MEDICINE

## 2020-05-14 PROCEDURE — 93017 CV STRESS TEST TRACING ONLY: CPT | Performed by: INTERNAL MEDICINE

## 2020-05-14 PROCEDURE — 93018 CV STRESS TEST I&R ONLY: CPT | Performed by: INTERNAL MEDICINE

## 2020-05-20 NOTE — TELEPHONE ENCOUNTER
Spoke with Damaris Wallace RN regarding mutual patient and message below. Damaris Wallace informed EKG, lexiscan and labs faxed to [phone]. Fax confirmation received. Damaris Wallace states fax has not been received and will call pulmo office if anything else is needed.

## 2020-05-20 NOTE — TELEPHONE ENCOUNTER
Linda Duncan called again. She needs results by noon today. Please call when information is faxed to [phone].

## 2020-05-20 NOTE — TELEPHONE ENCOUNTER
Rubén Conklin op nurse calling to request additional information to include EGK, actual tracing and lab and john scan required. Please fax:[phone], thanks.   *Patient scheduled for joint replacement tomorrow morning

## 2020-05-28 ENCOUNTER — TELEPHONE (OUTPATIENT)
Dept: PULMONOLOGY | Facility: CLINIC | Age: 75
End: 2020-05-28

## 2020-05-28 NOTE — TELEPHONE ENCOUNTER
----- Message from Venkata Hinds MD sent at 5/26/2020  9:06 PM CDT -----  RN, please call the patient to let him know that his EKG is stable and his stress test is normal.

## 2020-05-28 NOTE — TELEPHONE ENCOUNTER
Spoke to patient regarding Dr. Peralta Field message below. , verified name and date of birth. Patient verbalized understanding.

## 2020-06-08 PROBLEM — Z96.652 S/P TOTAL KNEE ARTHROPLASTY, LEFT: Status: ACTIVE | Noted: 2020-06-08

## 2020-07-03 ENCOUNTER — TELEPHONE (OUTPATIENT)
Dept: PULMONOLOGY | Facility: CLINIC | Age: 75
End: 2020-07-03

## 2020-07-03 NOTE — TELEPHONE ENCOUNTER
Reynaldo Contreras requesting patient to be seen prior to his 7/23/20 Knee replacement for sx clearance. Please call. Thank you.

## 2020-07-03 NOTE — TELEPHONE ENCOUNTER
Spoke with wife. Appointment scheduled for patient on 7/9/20 at [address] [place] [address]. Verified date, time, and location. Wife verbalized understanding.

## 2020-07-03 NOTE — TELEPHONE ENCOUNTER
Dr. Gaines [facility]- Patient needing appointment for surgery clearance (knee replacement surgery on 7/23/20).  Okay to front load on 7/9/20 at 9AM?

## 2020-07-03 NOTE — TELEPHONE ENCOUNTER
You can offer patient appointment with me next week for clearance if he is willing. Unable to front load morning of 7/9 with Dr. Zo Barber. no peripheral edema/no dyspnea on exertion/no chest pain/no palpitations/no paroxysmal nocturnal dyspnea/no orthopnea

## 2020-07-09 ENCOUNTER — TELEPHONE (OUTPATIENT)
Dept: PULMONOLOGY | Facility: CLINIC | Age: 75
End: 2020-07-09

## 2020-07-09 ENCOUNTER — OFFICE VISIT (OUTPATIENT)
Dept: PULMONOLOGY | Facility: CLINIC | Age: 75
End: 2020-07-09
Payer: MEDICARE

## 2020-07-09 VITALS
WEIGHT: 180 LBS | HEART RATE: 64 BPM | SYSTOLIC BLOOD PRESSURE: 112 MMHG | RESPIRATION RATE: 18 BRPM | DIASTOLIC BLOOD PRESSURE: 74 MMHG | BODY MASS INDEX: 25.77 KG/M2 | OXYGEN SATURATION: 99 % | HEIGHT: 70 IN

## 2020-07-09 DIAGNOSIS — G47.33 OSA (OBSTRUCTIVE SLEEP APNEA): ICD-10-CM

## 2020-07-09 DIAGNOSIS — Z01.818 PREOPERATIVE EXAMINATION: Primary | ICD-10-CM

## 2020-07-09 PROCEDURE — 99213 OFFICE O/P EST LOW 20 MIN: CPT | Performed by: PHYSICIAN ASSISTANT

## 2020-07-09 PROCEDURE — G0463 HOSPITAL OUTPT CLINIC VISIT: HCPCS | Performed by: PHYSICIAN ASSISTANT

## 2020-07-09 NOTE — PROGRESS NOTES
Pulmonary Progress Note    History of Present Illness:  Ankita Ko is a 66year old male who is a known patient to Dr. Jasiel Chau and presents to clinic today for preoperative clearance for right knee replacement on 7/23 by Dr. Rebeka Russell.   The patient had obstructive sleep apnea on CPAP with residual respiratory events of 29/h. Will increase pressure setting from 9 CWP to 11 CWP today; however, okay to proceed to operating room as planned.   He should bring his CPAP device with him at the time of the surger

## 2020-07-09 NOTE — TELEPHONE ENCOUNTER
DME order ,LOV and face sheet faxed to [facility] [phone]. Confirmation received.

## 2020-07-10 ENCOUNTER — TELEPHONE (OUTPATIENT)
Dept: PULMONOLOGY | Facility: CLINIC | Age: 75
End: 2020-07-10

## 2020-07-10 NOTE — TELEPHONE ENCOUNTER
----- Message from Marco Herman PA-C sent at 7/10/2020  4:03 PM CDT -----  Regarding: Preop Labs  Nursing: Could you please call Dr. Vita Sheldon office (ortho) in [place] and see if they need preop labs on this patient for upcoming knee replacement?  Please

## 2020-07-13 NOTE — TELEPHONE ENCOUNTER
Tried calling Dr Bal's office at [phone] but they were closed at [address] [facility].

## 2020-07-14 NOTE — TELEPHONE ENCOUNTER
Dr. Sumanth Hernandez office called in to advise that the patient does not need to repeat labs.  Please advise

## 2020-07-14 NOTE — TELEPHONE ENCOUNTER
Spoke with Gabo Armenta at Dr. Larry Viramontes office regarding if pre-op labs are needed and requested fax number to fax H&P. Fax number [phone] ATTN: Dr. Larry Viramontes office. H&P note faxed. Fax confirmation received.  Will await response back from Dr. Larry Viramontes offic

## 2020-07-17 ENCOUNTER — TELEPHONE (OUTPATIENT)
Dept: PULMONOLOGY | Facility: CLINIC | Age: 75
End: 2020-07-17

## 2020-07-17 NOTE — TELEPHONE ENCOUNTER
Spoke with patient and wife. Verbal consent obtained to speak to wife regarding sensitive health information. Wife inquiring if any pre-op labs needs to be done prior to surgery. See telephone encounter 7/10/20.  Informed wife Dr. Carlene Hughes office states pat

## 2020-07-20 ENCOUNTER — TELEPHONE (OUTPATIENT)
Dept: PULMONOLOGY | Facility: CLINIC | Age: 75
End: 2020-07-20

## 2020-07-20 NOTE — TELEPHONE ENCOUNTER
No recent labs ordered. Office visit note and previous EKG (5/7/20) faxed to [facility]. Fax confirmation received.

## 2020-07-20 NOTE — TELEPHONE ENCOUNTER
[facility]  Requesting surgical clearance letter, history, physical and testing results from labs and EKG regarding total knee replacement scheduled for 7/23/20.        Fax: [phone]

## 2020-07-22 NOTE — TELEPHONE ENCOUNTER
Noted. Letter faxed to [facility] pre-op testing department [phone] & [phone]. Fax confirmations received. Spoke with Ines Quinn at [facility] pre-op. Informed Ines Quinn of above. Ines Quinn states they received letter and they johny labs for the patient.  No

## 2020-07-22 NOTE — TELEPHONE ENCOUNTER
Lynn/ Pre-Op Jabier Terry requesting [phone] faxed as soon as possible. Mamie Adams states if Dr. Zo Barber will clear pt based on previous labs from 5/7/20 it must be  notated on [phone] Mamie Adams states fax must be received no later than 11am or pt will need to resche

## 2020-08-04 ENCOUNTER — HOSPITAL ENCOUNTER (OUTPATIENT)
Dept: CT IMAGING | Facility: HOSPITAL | Age: 75
Discharge: HOME | End: 2020-08-04
Attending: INTERNAL MEDICINE
Payer: MEDICARE

## 2020-08-04 DIAGNOSIS — C34.90 MALIGNANT NEOPLASM OF LUNG, UNSPECIFIED LATERALITY, UNSPECIFIED PART OF LUNG (HCC): ICD-10-CM

## 2020-08-04 LAB — CREAT BLD-MCNC: 1 MG/DL (ref 0.7–1.3)

## 2020-08-04 PROCEDURE — 71260 CT THORAX DX C+: CPT | Performed by: INTERNAL MEDICINE

## 2020-08-04 PROCEDURE — 82565 ASSAY OF CREATININE: CPT

## 2020-08-10 ENCOUNTER — TELEPHONE (OUTPATIENT)
Dept: PULMONOLOGY | Facility: CLINIC | Age: 75
End: 2020-08-10

## 2020-08-10 DIAGNOSIS — R59.0 MEDIASTINAL LYMPHADENOPATHY: Primary | ICD-10-CM

## 2020-08-10 PROBLEM — Z96.651 S/P TOTAL KNEE ARTHROPLASTY, RIGHT: Status: ACTIVE | Noted: 2020-08-10

## 2020-08-10 RX ORDER — VORICONAZOLE 200 MG/1
200 TABLET ORAL 2 TIMES DAILY
Qty: 90 TABLET | Refills: 3 | Status: SHIPPED | OUTPATIENT
Start: 2020-08-10 | End: 2021-02-12

## 2020-08-10 RX ORDER — VORICONAZOLE 200 MG/1
200 TABLET ORAL 2 TIMES DAILY
Qty: 60 TABLET | Refills: 5 | Status: SHIPPED | OUTPATIENT
Start: 2020-08-10 | End: 2020-08-10

## 2020-08-10 NOTE — TELEPHONE ENCOUNTER
Adis José calling to inform  [facility] that patient started Physical therapy today. Wanted to make sure it was safe for patient to continue therapy with the pandemic. Please call. Thank you.

## 2020-08-10 NOTE — TELEPHONE ENCOUNTER
Spoke with Satinder (ANA LUISA on file). Pari Blackwood states patient started PT for his knee. Spouse states she is concern with patient going to PT with \"this fungal issue in his lungs\". Spouse states patient wears a mask and practices proper hand hygiene.  Spouse

## 2020-08-10 NOTE — TELEPHONE ENCOUNTER
Wife calling to inform Dr. Verónica De Jesus pt completed CT CHEST on 8/4/20 and requesting to have Dr. Verónica De Jesus review results.  Please call [phone]

## 2020-08-10 NOTE — TELEPHONE ENCOUNTER
MARY and Collette Footman, I spoke with the patient regarding the results of the CAT scan. The patient has new infiltrate in his right apex. There is also subtle increase in the mediastinal lymphadenopathy.   I had discontinued his therapy directed toward pulmonary as

## 2020-08-10 NOTE — TELEPHONE ENCOUNTER
See alternative TE 8/10/20. Script below ordered, will order 90 day supply. Spoke with Satinder (ANA LUISA on file). Informed spouse new 90 day supply script was sent to pharmacy. Spouse voiced understanding.

## 2020-08-10 NOTE — TELEPHONE ENCOUNTER
Current Outpatient Medications   Medication Sig Dispense Refill   • voriconazole 200 MG Oral Tab Take 1 tablet (200 mg total) by mouth 2 (two) times daily. 60 tablet 5     Lucrezia requesting 90 days supplies for Voriconazole. Please call. Thank you.

## 2020-08-12 ENCOUNTER — NURSE ONLY (OUTPATIENT)
Dept: HEMATOLOGY/ONCOLOGY | Facility: HOSPITAL | Age: 75
End: 2020-08-12
Attending: INTERNAL MEDICINE
Payer: MEDICARE

## 2020-08-12 VITALS
BODY MASS INDEX: 25.31 KG/M2 | SYSTOLIC BLOOD PRESSURE: 135 MMHG | HEIGHT: 71 IN | WEIGHT: 180.75 LBS | DIASTOLIC BLOOD PRESSURE: 65 MMHG | RESPIRATION RATE: 16 BRPM | TEMPERATURE: 98 F | HEART RATE: 73 BPM | OXYGEN SATURATION: 96 %

## 2020-08-12 DIAGNOSIS — C34.90 MALIGNANT NEOPLASM OF LUNG, UNSPECIFIED LATERALITY, UNSPECIFIED PART OF LUNG (HCC): ICD-10-CM

## 2020-08-12 DIAGNOSIS — J18.9 PNEUMONITIS: ICD-10-CM

## 2020-08-12 DIAGNOSIS — D50.9 IRON DEFICIENCY ANEMIA, UNSPECIFIED IRON DEFICIENCY ANEMIA TYPE: Primary | ICD-10-CM

## 2020-08-12 DIAGNOSIS — C34.90 MALIGNANT NEOPLASM OF LUNG, UNSPECIFIED LATERALITY, UNSPECIFIED PART OF LUNG (HCC): Primary | ICD-10-CM

## 2020-08-12 DIAGNOSIS — C34.12 CANCER OF UPPER LOBE OF LEFT LUNG (HCC): ICD-10-CM

## 2020-08-12 DIAGNOSIS — Z92.21 HISTORY OF CHEMOTHERAPY: ICD-10-CM

## 2020-08-12 DIAGNOSIS — Z92.3 HISTORY OF RADIATION THERAPY: ICD-10-CM

## 2020-08-12 DIAGNOSIS — Z45.2 ENCOUNTER FOR CARE RELATED TO PORT-A-CATH: ICD-10-CM

## 2020-08-12 DIAGNOSIS — B44.9 ASPERGILLUS PNEUMONIA (HCC): ICD-10-CM

## 2020-08-12 LAB
ALBUMIN SERPL-MCNC: 3.6 G/DL (ref 3.4–5)
ALBUMIN/GLOB SERPL: 1 {RATIO} (ref 1–2)
ALP LIVER SERPL-CCNC: 135 U/L (ref 45–117)
ALT SERPL-CCNC: 25 U/L (ref 16–61)
ANION GAP SERPL CALC-SCNC: 6 MMOL/L (ref 0–18)
AST SERPL-CCNC: 23 U/L (ref 15–37)
BASOPHILS # BLD AUTO: 0.04 X10(3) UL (ref 0–0.2)
BASOPHILS NFR BLD AUTO: 0.8 %
BILIRUB SERPL-MCNC: 0.6 MG/DL (ref 0.1–2)
BUN BLD-MCNC: 27 MG/DL (ref 7–18)
BUN/CREAT SERPL: 25.5 (ref 10–20)
CALCIUM BLD-MCNC: 9.1 MG/DL (ref 8.5–10.1)
CHLORIDE SERPL-SCNC: 105 MMOL/L (ref 98–112)
CO2 SERPL-SCNC: 27 MMOL/L (ref 21–32)
CREAT BLD-MCNC: 1.06 MG/DL (ref 0.7–1.3)
DEPRECATED RDW RBC AUTO: 49.9 FL (ref 35.1–46.3)
EOSINOPHIL # BLD AUTO: 0.06 X10(3) UL (ref 0–0.7)
EOSINOPHIL NFR BLD AUTO: 1.1 %
ERYTHROCYTE [DISTWIDTH] IN BLOOD BY AUTOMATED COUNT: 13.4 % (ref 11–15)
GLOBULIN PLAS-MCNC: 3.7 G/DL (ref 2.8–4.4)
GLUCOSE BLD-MCNC: 96 MG/DL (ref 70–99)
HCT VFR BLD AUTO: 39.5 % (ref 39–53)
HGB BLD-MCNC: 12.9 G/DL (ref 13–17.5)
IMM GRANULOCYTES # BLD AUTO: 0.01 X10(3) UL (ref 0–1)
IMM GRANULOCYTES NFR BLD: 0.2 %
LYMPHOCYTES # BLD AUTO: 0.92 X10(3) UL (ref 1–4)
LYMPHOCYTES NFR BLD AUTO: 17.6 %
M PROTEIN MFR SERPL ELPH: 7.3 G/DL (ref 6.4–8.2)
MCH RBC QN AUTO: 32.8 PG (ref 26–34)
MCHC RBC AUTO-ENTMCNC: 32.7 G/DL (ref 31–37)
MCV RBC AUTO: 100.5 FL (ref 80–100)
MONOCYTES # BLD AUTO: 0.48 X10(3) UL (ref 0.1–1)
MONOCYTES NFR BLD AUTO: 9.2 %
NEUTROPHILS # BLD AUTO: 3.71 X10 (3) UL (ref 1.5–7.7)
NEUTROPHILS # BLD AUTO: 3.71 X10(3) UL (ref 1.5–7.7)
NEUTROPHILS NFR BLD AUTO: 71.1 %
OSMOLALITY SERPL CALC.SUM OF ELEC: 291 MOSM/KG (ref 275–295)
PLATELET # BLD AUTO: 347 10(3)UL (ref 150–450)
POTASSIUM SERPL-SCNC: 4.4 MMOL/L (ref 3.5–5.1)
RBC # BLD AUTO: 3.93 X10(6)UL (ref 3.8–5.8)
SODIUM SERPL-SCNC: 138 MMOL/L (ref 136–145)
WBC # BLD AUTO: 5.2 X10(3) UL (ref 4–11)

## 2020-08-12 PROCEDURE — 80053 COMPREHEN METABOLIC PANEL: CPT

## 2020-08-12 PROCEDURE — 99214 OFFICE O/P EST MOD 30 MIN: CPT | Performed by: INTERNAL MEDICINE

## 2020-08-12 PROCEDURE — 85025 COMPLETE CBC W/AUTO DIFF WBC: CPT

## 2020-08-12 PROCEDURE — 36415 COLL VENOUS BLD VENIPUNCTURE: CPT

## 2020-08-12 RX ORDER — HEPARIN SODIUM (PORCINE) LOCK FLUSH IV SOLN 100 UNIT/ML 100 UNIT/ML
5 SOLUTION INTRAVENOUS ONCE
Status: DISCONTINUED | OUTPATIENT
Start: 2020-08-12 | End: 2020-08-12

## 2020-08-12 RX ORDER — 0.9 % SODIUM CHLORIDE 0.9 %
10 VIAL (ML) INJECTION ONCE
Status: CANCELLED | OUTPATIENT
Start: 2020-08-12

## 2020-08-12 RX ORDER — HEPARIN SODIUM (PORCINE) LOCK FLUSH IV SOLN 100 UNIT/ML 100 UNIT/ML
5 SOLUTION INTRAVENOUS ONCE
Status: CANCELLED | OUTPATIENT
Start: 2020-08-12
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
## 2020-09-05 NOTE — TELEPHONE ENCOUNTER
Dr. Pinky Garcia, order pending for 90 day supply with 1 refill if you are agreeable for Voriconazole. negative...

## 2020-09-23 ENCOUNTER — TELEPHONE (OUTPATIENT)
Dept: PULMONOLOGY | Facility: CLINIC | Age: 75
End: 2020-09-23

## 2020-09-23 NOTE — TELEPHONE ENCOUNTER
Pts wife called to find out if Dr. Ofelia Colbert would like pt to get pneumonia vaccine. Flu shot is scheduled for 9/30/20. Please call.

## 2020-09-23 NOTE — TELEPHONE ENCOUNTER
RN, the patient has had pneumococcal vaccination twice in the past; however, with his COPD and history of lung cancer, he would be a candidate for a Prevnar 13 booster. Please facilitate.

## 2020-09-23 NOTE — TELEPHONE ENCOUNTER
Dr George Ortega- please advise if pt needs pneumonia vaccine, according to our records last pneumococcal injection given on 10/24/17.

## 2020-09-24 NOTE — TELEPHONE ENCOUNTER
Prevnar 13 injection ordered. Spoke with patient's wife and notified her.  Appointment notes edited to include flu shot with Prevnar 13

## 2020-09-30 ENCOUNTER — NURSE ONLY (OUTPATIENT)
Dept: PULMONOLOGY | Facility: CLINIC | Age: 75
End: 2020-09-30
Payer: MEDICARE

## 2020-09-30 VITALS
WEIGHT: 186 LBS | SYSTOLIC BLOOD PRESSURE: 142 MMHG | RESPIRATION RATE: 18 BRPM | HEIGHT: 71 IN | OXYGEN SATURATION: 99 % | TEMPERATURE: 97 F | HEART RATE: 65 BPM | BODY MASS INDEX: 26.04 KG/M2 | DIASTOLIC BLOOD PRESSURE: 78 MMHG

## 2020-09-30 PROCEDURE — G0009 ADMIN PNEUMOCOCCAL VACCINE: HCPCS | Performed by: INTERNAL MEDICINE

## 2020-09-30 PROCEDURE — 90662 IIV NO PRSV INCREASED AG IM: CPT | Performed by: INTERNAL MEDICINE

## 2020-09-30 PROCEDURE — 90670 PCV13 VACCINE IM: CPT | Performed by: INTERNAL MEDICINE

## 2020-09-30 PROCEDURE — G0008 ADMIN INFLUENZA VIRUS VAC: HCPCS | Performed by: INTERNAL MEDICINE

## 2020-09-30 NOTE — PROGRESS NOTES
Pt here for flu shot high dose and Prevnar 13 injection. It was administrated to left and right deltoid. Pt tolerated inj well. VIS gave to pt . Time spent with pt 15 minutes.

## 2020-11-02 ENCOUNTER — PATIENT MESSAGE (OUTPATIENT)
Dept: PULMONOLOGY | Facility: CLINIC | Age: 75
End: 2020-11-02

## 2020-11-02 ENCOUNTER — HOSPITAL ENCOUNTER (OUTPATIENT)
Dept: CT IMAGING | Facility: HOSPITAL | Age: 75
Discharge: HOME | End: 2020-11-02
Attending: INTERNAL MEDICINE
Payer: MEDICARE

## 2020-11-02 DIAGNOSIS — B44.9 ASPERGILLUS PNEUMONIA (HCC): ICD-10-CM

## 2020-11-02 DIAGNOSIS — C34.90 MALIGNANT NEOPLASM OF LUNG, UNSPECIFIED LATERALITY, UNSPECIFIED PART OF LUNG (HCC): ICD-10-CM

## 2020-11-02 PROCEDURE — 82565 ASSAY OF CREATININE: CPT

## 2020-11-02 PROCEDURE — 71260 CT THORAX DX C+: CPT | Performed by: INTERNAL MEDICINE

## 2020-11-05 ENCOUNTER — OFFICE VISIT (OUTPATIENT)
Dept: PULMONOLOGY | Facility: CLINIC | Age: 75
End: 2020-11-05
Payer: MEDICARE

## 2020-11-05 ENCOUNTER — OFFICE VISIT (OUTPATIENT)
Dept: AUDIOLOGY | Facility: CLINIC | Age: 75
End: 2020-11-05
Payer: MEDICARE

## 2020-11-05 ENCOUNTER — OFFICE VISIT (OUTPATIENT)
Dept: OTOLARYNGOLOGY | Facility: CLINIC | Age: 75
End: 2020-11-05
Payer: MEDICARE

## 2020-11-05 VITALS — DIASTOLIC BLOOD PRESSURE: 71 MMHG | HEART RATE: 62 BPM | SYSTOLIC BLOOD PRESSURE: 149 MMHG

## 2020-11-05 VITALS
SYSTOLIC BLOOD PRESSURE: 136 MMHG | RESPIRATION RATE: 18 BRPM | HEART RATE: 68 BPM | TEMPERATURE: 97 F | OXYGEN SATURATION: 95 % | BODY MASS INDEX: 26.6 KG/M2 | WEIGHT: 190 LBS | DIASTOLIC BLOOD PRESSURE: 68 MMHG | HEIGHT: 71 IN

## 2020-11-05 DIAGNOSIS — J18.9 PNEUMONIA OF RIGHT LUNG DUE TO INFECTIOUS ORGANISM, UNSPECIFIED PART OF LUNG: Primary | ICD-10-CM

## 2020-11-05 DIAGNOSIS — H90.3 SENSORINEURAL HEARING LOSS, BILATERAL: Primary | ICD-10-CM

## 2020-11-05 DIAGNOSIS — H69.82 DYSFUNCTION OF LEFT EUSTACHIAN TUBE: Primary | ICD-10-CM

## 2020-11-05 PROCEDURE — 92567 TYMPANOMETRY: CPT | Performed by: AUDIOLOGIST

## 2020-11-05 PROCEDURE — G0463 HOSPITAL OUTPT CLINIC VISIT: HCPCS | Performed by: OTOLARYNGOLOGY

## 2020-11-05 PROCEDURE — 92557 COMPREHENSIVE HEARING TEST: CPT | Performed by: AUDIOLOGIST

## 2020-11-05 PROCEDURE — 99213 OFFICE O/P EST LOW 20 MIN: CPT | Performed by: OTOLARYNGOLOGY

## 2020-11-05 PROCEDURE — 99213 OFFICE O/P EST LOW 20 MIN: CPT | Performed by: INTERNAL MEDICINE

## 2020-11-05 PROCEDURE — G0463 HOSPITAL OUTPT CLINIC VISIT: HCPCS | Performed by: INTERNAL MEDICINE

## 2020-11-05 RX ORDER — LORATADINE 10 MG/1
10 TABLET ORAL DAILY
Qty: 30 TABLET | Refills: 3 | Status: SHIPPED | OUTPATIENT
Start: 2020-11-05

## 2020-11-05 RX ORDER — FLUTICASONE PROPIONATE 50 MCG
1 SPRAY, SUSPENSION (ML) NASAL 2 TIMES DAILY
Qty: 1 BOTTLE | Refills: 3 | Status: SHIPPED | OUTPATIENT
Start: 2020-11-05

## 2020-11-05 RX ORDER — MONTELUKAST SODIUM 10 MG/1
10 TABLET ORAL NIGHTLY
Qty: 30 TABLET | Refills: 3 | Status: SHIPPED | OUTPATIENT
Start: 2020-11-05

## 2020-11-05 NOTE — PROGRESS NOTES
AUDIOGRAM     [facility] was referred for testing by Alice Dang for decreased hearing in both ears   4/6/1945  [id number]      Otoscopic inspection: right ear no cerumen; left ear no cerumen.        Tests/Procedures  Patient was tested via  stand

## 2020-11-05 NOTE — PROGRESS NOTES
Arnaldo President is a 66year old male. Patient presents with:  Hearing Loss: Hearing loss in the left ear. Onset last week. Feels like he has pressure like in an airplane.        HISTORY OF PRESENT ILLNESS  I have seen him in the past for chronic nasal Mother         Breast, ovarian       Past Medical History:   Diagnosis Date   • Exposure to medical diagnostic radiation    • Hemorrhoids    • Impotence    • Lung cancer ([facility] [address]     NSCLCA stage IIIB   • Necrotizing granulomatous inflammation of lung ([facility] [address] Normal, Left: Normal.   Neurological Normal Memory - Normal. Cranial nerves - Cranial nerves II through XII grossly intact.    Head/Face Normal Facial features - Normal. Eyebrows - Normal. Skull - Normal.        Nasopharynx Normal External nose - Normal. Molina Kennedy may occur. When identified these errors have been corrected. While every attempt is made to correct errors during dictation discrepancies may still exist    Christo Toscano MD    11/5/2020    12:53 PM

## 2020-11-05 NOTE — PATIENT INSTRUCTIONS
How Hearing Aids Can Help You     Losing your hearing can be frustrating. But hearing aids can help you hear what Yasmineta Courser been missing. Not everyone who has hearing loss needs hearing aids.  Hearing aids will most likely help you if your hearing loss:  · K © 4527-5032 [employer] [address]. 1407 [facility] [place] [facility], [facility] [place]. All rights reserved. This information is not intended as a substitute for professional medical care. Always follow your healthcare professional's instructions.

## 2020-11-05 NOTE — PROGRESS NOTES
The patient is 75-year-old male well-known well from prior evaluation comes in now for follow-up. He has a history of left upper lobe cancer of the lung status post therapy as well as a history of pulmonary aspergillosis.   His recent CT scan of the chest wear a safety belt, have a smoke detector and fire extiguisher in the house, avoid the use of candles in the home as they are the most common cause of housefire, and see me annually for complete examination. 4.  Cardiac screening–negative stress    5.

## 2020-11-09 ENCOUNTER — TELEPHONE (OUTPATIENT)
Dept: PULMONOLOGY | Facility: CLINIC | Age: 75
End: 2020-11-09

## 2020-11-09 NOTE — TELEPHONE ENCOUNTER
Cindy Diaz states patient saw Dr Rambo Lamar recently and wanted to know what tests were ordered. Please call. Thank you.

## 2020-11-09 NOTE — TELEPHONE ENCOUNTER
Spoke with patient. CT scan was done and Dr. Zo Barber was to discuss further testing with Dr. Fawad Finch. Please advise on next steps.

## 2020-11-11 NOTE — TELEPHONE ENCOUNTER
Spoke to patients spouse (HIPPA verified) and informed currently waiting for Dr. Sascha Solares response on next steps as he was going to discuss further testing with Dr. Scarlett Jeff. Patients spouse wanted to inform Dr. Brittanie Kim patient will be seeing Dr. Scarlett Jeff on Friday.

## 2020-11-13 ENCOUNTER — NURSE ONLY (OUTPATIENT)
Dept: HEMATOLOGY/ONCOLOGY | Facility: HOSPITAL | Age: 75
End: 2020-11-13
Attending: INTERNAL MEDICINE
Payer: MEDICARE

## 2020-11-13 VITALS
HEART RATE: 59 BPM | OXYGEN SATURATION: 97 % | WEIGHT: 186 LBS | DIASTOLIC BLOOD PRESSURE: 58 MMHG | RESPIRATION RATE: 16 BRPM | HEIGHT: 71 IN | BODY MASS INDEX: 26.04 KG/M2 | SYSTOLIC BLOOD PRESSURE: 143 MMHG | TEMPERATURE: 98 F

## 2020-11-13 DIAGNOSIS — B44.9 ASPERGILLUS PNEUMONIA (HCC): ICD-10-CM

## 2020-11-13 DIAGNOSIS — Z92.3 HISTORY OF RADIATION THERAPY: ICD-10-CM

## 2020-11-13 DIAGNOSIS — C34.90 MALIGNANT NEOPLASM OF LUNG, UNSPECIFIED LATERALITY, UNSPECIFIED PART OF LUNG (HCC): Primary | ICD-10-CM

## 2020-11-13 DIAGNOSIS — C34.90 MALIGNANT NEOPLASM OF LUNG, UNSPECIFIED LATERALITY, UNSPECIFIED PART OF LUNG (HCC): ICD-10-CM

## 2020-11-13 DIAGNOSIS — Z92.21 HISTORY OF CHEMOTHERAPY: ICD-10-CM

## 2020-11-13 PROCEDURE — 85025 COMPLETE CBC W/AUTO DIFF WBC: CPT

## 2020-11-13 PROCEDURE — 99214 OFFICE O/P EST MOD 30 MIN: CPT | Performed by: INTERNAL MEDICINE

## 2020-11-13 PROCEDURE — 36415 COLL VENOUS BLD VENIPUNCTURE: CPT

## 2020-11-13 PROCEDURE — 80053 COMPREHEN METABOLIC PANEL: CPT
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
## 2021-01-25 ENCOUNTER — TELEPHONE (OUTPATIENT)
Dept: PULMONOLOGY | Facility: CLINIC | Age: 76
End: 2021-01-25

## 2021-02-03 DIAGNOSIS — Z23 NEED FOR VACCINATION: ICD-10-CM

## 2021-02-04 ENCOUNTER — IMMUNIZATION (OUTPATIENT)
Dept: LAB | Age: 76
End: 2021-02-04
Attending: HOSPITALIST
Payer: MEDICARE

## 2021-02-04 DIAGNOSIS — Z23 NEED FOR VACCINATION: Primary | ICD-10-CM

## 2021-02-04 PROCEDURE — 0001A SARSCOV2 VAC 30MCG/0.3ML IM: CPT

## 2021-02-08 ENCOUNTER — HOSPITAL ENCOUNTER (OUTPATIENT)
Dept: CT IMAGING | Facility: HOSPITAL | Age: 76
Discharge: HOME | End: 2021-02-08
Attending: INTERNAL MEDICINE
Payer: MEDICARE

## 2021-02-08 DIAGNOSIS — J18.9 PNEUMONIA OF RIGHT LUNG DUE TO INFECTIOUS ORGANISM, UNSPECIFIED PART OF LUNG: ICD-10-CM

## 2021-02-08 LAB — CREAT BLD-MCNC: 1.1 MG/DL

## 2021-02-08 PROCEDURE — 82565 ASSAY OF CREATININE: CPT

## 2021-02-08 PROCEDURE — 71260 CT THORAX DX C+: CPT | Performed by: INTERNAL MEDICINE

## 2021-02-11 ENCOUNTER — TELEPHONE (OUTPATIENT)
Dept: PULMONOLOGY | Facility: CLINIC | Age: 76
End: 2021-02-11

## 2021-02-11 DIAGNOSIS — C34.12 CANCER OF UPPER LOBE OF LEFT LUNG (HCC): Primary | ICD-10-CM

## 2021-02-11 NOTE — TELEPHONE ENCOUNTER
RN, the patient is due for CT scan of the chest now. There is no plan to repeat the PET scan at this moment. He should remain on the voriconazole for now and then I will call him after we get the CAT scan.   Results of CAT scan may impact how long we cont

## 2021-02-11 NOTE — TELEPHONE ENCOUNTER
RN, please ignore my last email. Please facilitate PET scanning. I spoke with the patient and his wife.

## 2021-02-11 NOTE — TELEPHONE ENCOUNTER
Eddie Ballesteros MD 3 days ago        Just received test results and doctor’s comments. When do I need a pet scan? What about the aspergillosis?     Do I need to continue the voriconazole or a maintenance dose or discontinue taking i

## 2021-02-11 NOTE — TELEPHONE ENCOUNTER
Dr. Carmen Zarate-  patient had CT CHEST on 2/8/21. Your result note recommendations were to evaluate with PET scanning. I just want to clarify what is needed and when imaging should be done. Please advise.

## 2021-02-11 NOTE — TELEPHONE ENCOUNTER
----- Message from [facility] sent at 2/8/2021  3:05 PM CST -----  Regarding: Non-Urgent Medical Question  Contact: [phone]  Just received test results and doctor’s comments. When do I need a pet scan? What about the aspergillosis?     Do I n

## 2021-02-12 DIAGNOSIS — C34.90 MALIGNANT NEOPLASM OF LUNG, UNSPECIFIED LATERALITY, UNSPECIFIED PART OF LUNG (HCC): Primary | ICD-10-CM

## 2021-02-12 RX ORDER — VORICONAZOLE 200 MG/1
TABLET ORAL
Qty: 90 TABLET | Refills: 0 | Status: SHIPPED | OUTPATIENT
Start: 2021-02-12 | End: 2021-04-01 | Stop reason: ALTCHOICE

## 2021-02-12 NOTE — TELEPHONE ENCOUNTER
PET scan ordered and placed in chronic calendar. Spoke to patient/spouse and informed them of Dr. Jacqueline Peña message/result note below. Patient verbalized understanding. Dr. Africa Amezcua you like PET scan now or in 3 months?

## 2021-02-12 NOTE — TELEPHONE ENCOUNTER
Spoke to patient and informed him PET scan is ordered and due now. Phone number given for central scheduling [phone]. Patient verbalized understanding.

## 2021-02-12 NOTE — TELEPHONE ENCOUNTER
Last seen 11-5-2020   Last refill 8-  Routed to [facility] for sign off

## 2021-02-15 ENCOUNTER — APPOINTMENT (OUTPATIENT)
Dept: HEMATOLOGY/ONCOLOGY | Facility: HOSPITAL | Age: 76
End: 2021-02-15
Attending: INTERNAL MEDICINE
Payer: MEDICARE

## 2021-02-22 ENCOUNTER — HOSPITAL ENCOUNTER (OUTPATIENT)
Dept: NUCLEAR MEDICINE | Facility: HOSPITAL | Age: 76
Discharge: HOME | End: 2021-02-22
Attending: INTERNAL MEDICINE
Payer: MEDICARE

## 2021-02-22 DIAGNOSIS — C34.12 CANCER OF UPPER LOBE OF LEFT LUNG (HCC): ICD-10-CM

## 2021-02-22 LAB — GLUCOSE BLDC GLUCOMTR-MCNC: 103 MG/DL (ref 70–99)

## 2021-02-22 PROCEDURE — 78815 PET IMAGE W/CT SKULL-THIGH: CPT | Performed by: INTERNAL MEDICINE

## 2021-02-22 PROCEDURE — 82962 GLUCOSE BLOOD TEST: CPT

## 2021-02-24 ENCOUNTER — NURSE ONLY (OUTPATIENT)
Dept: HEMATOLOGY/ONCOLOGY | Facility: HOSPITAL | Age: 76
End: 2021-02-24
Attending: INTERNAL MEDICINE
Payer: MEDICARE

## 2021-02-24 VITALS
DIASTOLIC BLOOD PRESSURE: 71 MMHG | RESPIRATION RATE: 18 BRPM | SYSTOLIC BLOOD PRESSURE: 160 MMHG | HEIGHT: 71 IN | TEMPERATURE: 98 F | HEART RATE: 59 BPM | OXYGEN SATURATION: 100 % | BODY MASS INDEX: 26.18 KG/M2 | WEIGHT: 187 LBS

## 2021-02-24 DIAGNOSIS — C34.90 MALIGNANT NEOPLASM OF LUNG, UNSPECIFIED LATERALITY, UNSPECIFIED PART OF LUNG (HCC): ICD-10-CM

## 2021-02-24 DIAGNOSIS — Z92.3 HISTORY OF RADIATION THERAPY: ICD-10-CM

## 2021-02-24 DIAGNOSIS — C34.90 MALIGNANT NEOPLASM OF LUNG, UNSPECIFIED LATERALITY, UNSPECIFIED PART OF LUNG (HCC): Primary | ICD-10-CM

## 2021-02-24 DIAGNOSIS — Z92.21 HISTORY OF CHEMOTHERAPY: ICD-10-CM

## 2021-02-24 DIAGNOSIS — B44.9 ASPERGILLUS PNEUMONIA (HCC): ICD-10-CM

## 2021-02-24 LAB
ALBUMIN SERPL-MCNC: 3.9 G/DL (ref 3.4–5)
ALBUMIN/GLOB SERPL: 1.3 {RATIO} (ref 1–2)
ALP LIVER SERPL-CCNC: 108 U/L
ALT SERPL-CCNC: 26 U/L
ANION GAP SERPL CALC-SCNC: 3 MMOL/L (ref 0–18)
AST SERPL-CCNC: 21 U/L (ref 15–37)
BASOPHILS # BLD AUTO: 0.02 X10(3) UL (ref 0–0.2)
BASOPHILS NFR BLD AUTO: 0.4 %
BILIRUB SERPL-MCNC: 0.5 MG/DL (ref 0.1–2)
BUN BLD-MCNC: 22 MG/DL (ref 7–18)
BUN/CREAT SERPL: 22.7 (ref 10–20)
CALCIUM BLD-MCNC: 9.2 MG/DL (ref 8.5–10.1)
CHLORIDE SERPL-SCNC: 105 MMOL/L (ref 98–112)
CO2 SERPL-SCNC: 30 MMOL/L (ref 21–32)
CREAT BLD-MCNC: 0.97 MG/DL
DEPRECATED RDW RBC AUTO: 47.9 FL (ref 35.1–46.3)
EOSINOPHIL # BLD AUTO: 0.07 X10(3) UL (ref 0–0.7)
EOSINOPHIL NFR BLD AUTO: 1.5 %
ERYTHROCYTE [DISTWIDTH] IN BLOOD BY AUTOMATED COUNT: 13.1 % (ref 11–15)
GLOBULIN PLAS-MCNC: 3.1 G/DL (ref 2.8–4.4)
GLUCOSE BLD-MCNC: 100 MG/DL (ref 70–99)
HCT VFR BLD AUTO: 44.1 %
HGB BLD-MCNC: 14.4 G/DL
IMM GRANULOCYTES # BLD AUTO: 0.01 X10(3) UL (ref 0–1)
IMM GRANULOCYTES NFR BLD: 0.2 %
LYMPHOCYTES # BLD AUTO: 0.83 X10(3) UL (ref 1–4)
LYMPHOCYTES NFR BLD AUTO: 17.6 %
M PROTEIN MFR SERPL ELPH: 7 G/DL (ref 6.4–8.2)
MCH RBC QN AUTO: 32.4 PG (ref 26–34)
MCHC RBC AUTO-ENTMCNC: 32.7 G/DL (ref 31–37)
MCV RBC AUTO: 99.3 FL
MONOCYTES # BLD AUTO: 0.39 X10(3) UL (ref 0.1–1)
MONOCYTES NFR BLD AUTO: 8.3 %
NEUTROPHILS # BLD AUTO: 3.4 X10 (3) UL (ref 1.5–7.7)
NEUTROPHILS # BLD AUTO: 3.4 X10(3) UL (ref 1.5–7.7)
NEUTROPHILS NFR BLD AUTO: 72 %
OSMOLALITY SERPL CALC.SUM OF ELEC: 289 MOSM/KG (ref 275–295)
PLATELET # BLD AUTO: 207 10(3)UL (ref 150–450)
POTASSIUM SERPL-SCNC: 4.2 MMOL/L (ref 3.5–5.1)
RBC # BLD AUTO: 4.44 X10(6)UL
SODIUM SERPL-SCNC: 138 MMOL/L (ref 136–145)
WBC # BLD AUTO: 4.7 X10(3) UL (ref 4–11)

## 2021-02-24 PROCEDURE — 85025 COMPLETE CBC W/AUTO DIFF WBC: CPT

## 2021-02-24 PROCEDURE — 36415 COLL VENOUS BLD VENIPUNCTURE: CPT

## 2021-02-24 PROCEDURE — 80053 COMPREHEN METABOLIC PANEL: CPT

## 2021-02-24 PROCEDURE — 99215 OFFICE O/P EST HI 40 MIN: CPT | Performed by: INTERNAL MEDICINE

## 2021-02-25 ENCOUNTER — IMMUNIZATION (OUTPATIENT)
Dept: LAB | Age: 76
End: 2021-02-25
Attending: HOSPITALIST
Payer: MEDICARE

## 2021-02-25 DIAGNOSIS — Z23 NEED FOR VACCINATION: Primary | ICD-10-CM

## 2021-02-25 PROCEDURE — 0002A SARSCOV2 VAC 30MCG/0.3ML IM: CPT

## 2021-03-01 ENCOUNTER — TELEPHONE (OUTPATIENT)
Dept: PULMONOLOGY | Facility: CLINIC | Age: 76
End: 2021-03-01

## 2021-03-01 DIAGNOSIS — R94.2 ABNORMAL PET SCAN OF LUNG: Primary | ICD-10-CM

## 2021-03-01 NOTE — TELEPHONE ENCOUNTER
RN, please send in orders for bronchoscopy with endobronchial ultrasound needle aspirate of mediastinal lymph node. I already have him scheduled for March 10 at 8:00 in the morning.   Please send in orders for PT PTT and platelets to be done immediately pr

## 2021-03-02 NOTE — TELEPHONE ENCOUNTER
Dr. Gaines Public- Please review/sign pended orders. Also, do I need to contact the patient to provide any instructions?

## 2021-03-03 NOTE — TELEPHONE ENCOUNTER
Spoke with Arianna in (Endoscopy scheduling), states COVID test to be scheduled by pre-admission testing dept [phone]. Wife informed of this and was advised to contact them if she does not hear from them. Provided number. Wife verbalized understanding.

## 2021-03-03 NOTE — TELEPHONE ENCOUNTER
Verbal consent obtained from patient to speak to wife Bhupendra Mcintosh regarding sensitive health information. Confirmed patient had EBUS instructions prior to procedure (date/arrival time/procedure time, where to park.  Location, NPO after midnight prior to or mor

## 2021-03-08 ENCOUNTER — LAB ENCOUNTER (OUTPATIENT)
Dept: LAB | Facility: HOSPITAL | Age: 76
End: 2021-03-08
Attending: INTERNAL MEDICINE
Payer: MEDICARE

## 2021-03-08 DIAGNOSIS — Z01.818 PRE-OP TESTING: ICD-10-CM

## 2021-03-08 LAB — SARS-COV-2 RNA RESP QL NAA+PROBE: NOT DETECTED

## 2021-03-09 ENCOUNTER — ANESTHESIA EVENT (OUTPATIENT)
Dept: ENDOSCOPY | Facility: HOSPITAL | Age: 76
End: 2021-03-09
Payer: MEDICARE

## 2021-03-10 ENCOUNTER — ANESTHESIA (OUTPATIENT)
Dept: ENDOSCOPY | Facility: HOSPITAL | Age: 76
End: 2021-03-10
Payer: MEDICARE

## 2021-03-10 ENCOUNTER — HOSPITAL ENCOUNTER (OUTPATIENT)
Facility: HOSPITAL | Age: 76
Setting detail: HOSPITAL OUTPATIENT SURGERY
Discharge: HOME | End: 2021-03-10
Attending: INTERNAL MEDICINE | Admitting: INTERNAL MEDICINE
Payer: MEDICARE

## 2021-03-10 VITALS
TEMPERATURE: 98 F | RESPIRATION RATE: 14 BRPM | HEART RATE: 63 BPM | WEIGHT: 184 LBS | OXYGEN SATURATION: 97 % | BODY MASS INDEX: 26.34 KG/M2 | HEIGHT: 70 IN | SYSTOLIC BLOOD PRESSURE: 151 MMHG | DIASTOLIC BLOOD PRESSURE: 73 MMHG

## 2021-03-10 DIAGNOSIS — Z01.818 PRE-OP TESTING: Primary | ICD-10-CM

## 2021-03-10 DIAGNOSIS — R94.2 ABNORMAL PET SCAN OF LUNG: ICD-10-CM

## 2021-03-10 LAB
APTT PPP: 29.7 SECONDS (ref 23.2–35.3)
INR BLD: 0.94 (ref 0.9–1.2)
PLATELET # BLD AUTO: 184 10(3)UL (ref 150–450)
PROTHROMBIN TIME: 12.4 SECONDS (ref 11.8–14.5)

## 2021-03-10 PROCEDURE — 07D78ZX EXTRACTION OF THORAX LYMPHATIC, VIA NATURAL OR ARTIFICIAL OPENING ENDOSCOPIC, DIAGNOSTIC: ICD-10-PCS | Performed by: INTERNAL MEDICINE

## 2021-03-10 PROCEDURE — 31652 BRONCH EBUS SAMPLNG 1/2 NODE: CPT | Performed by: INTERNAL MEDICINE

## 2021-03-10 RX ORDER — HYDROCODONE BITARTRATE AND ACETAMINOPHEN 5; 325 MG/1; MG/1
2 TABLET ORAL AS NEEDED
Status: DISCONTINUED | OUTPATIENT
Start: 2021-03-10 | End: 2021-03-10 | Stop reason: HOSPADM

## 2021-03-10 RX ORDER — MORPHINE SULFATE 4 MG/ML
4 INJECTION, SOLUTION INTRAMUSCULAR; INTRAVENOUS EVERY 10 MIN PRN
Status: DISCONTINUED | OUTPATIENT
Start: 2021-03-10 | End: 2021-03-10 | Stop reason: HOSPADM

## 2021-03-10 RX ORDER — NEOSTIGMINE METHYLSULFATE 1 MG/ML
INJECTION INTRAVENOUS AS NEEDED
Status: DISCONTINUED | OUTPATIENT
Start: 2021-03-10 | End: 2021-03-10 | Stop reason: SURG

## 2021-03-10 RX ORDER — DEXAMETHASONE SODIUM PHOSPHATE 4 MG/ML
VIAL (ML) INJECTION AS NEEDED
Status: DISCONTINUED | OUTPATIENT
Start: 2021-03-10 | End: 2021-03-10 | Stop reason: SURG

## 2021-03-10 RX ORDER — EPHEDRINE SULFATE 50 MG/ML
INJECTION, SOLUTION INTRAVENOUS AS NEEDED
Status: DISCONTINUED | OUTPATIENT
Start: 2021-03-10 | End: 2021-03-10 | Stop reason: SURG

## 2021-03-10 RX ORDER — LIDOCAINE HYDROCHLORIDE 40 MG/ML
SOLUTION TOPICAL AS NEEDED
Status: DISCONTINUED | OUTPATIENT
Start: 2021-03-10 | End: 2021-03-10 | Stop reason: SURG

## 2021-03-10 RX ORDER — ONDANSETRON 2 MG/ML
4 INJECTION INTRAMUSCULAR; INTRAVENOUS ONCE AS NEEDED
Status: DISCONTINUED | OUTPATIENT
Start: 2021-03-10 | End: 2021-03-10 | Stop reason: HOSPADM

## 2021-03-10 RX ORDER — SODIUM CHLORIDE, SODIUM LACTATE, POTASSIUM CHLORIDE, CALCIUM CHLORIDE 600; 310; 30; 20 MG/100ML; MG/100ML; MG/100ML; MG/100ML
INJECTION, SOLUTION INTRAVENOUS CONTINUOUS
Status: DISCONTINUED | OUTPATIENT
Start: 2021-03-10 | End: 2021-03-10 | Stop reason: HOSPADM

## 2021-03-10 RX ORDER — SODIUM CHLORIDE, SODIUM LACTATE, POTASSIUM CHLORIDE, CALCIUM CHLORIDE 600; 310; 30; 20 MG/100ML; MG/100ML; MG/100ML; MG/100ML
INJECTION, SOLUTION INTRAVENOUS CONTINUOUS
Status: DISCONTINUED | OUTPATIENT
Start: 2021-03-10 | End: 2021-03-10

## 2021-03-10 RX ORDER — HYDROMORPHONE HYDROCHLORIDE 1 MG/ML
0.6 INJECTION, SOLUTION INTRAMUSCULAR; INTRAVENOUS; SUBCUTANEOUS EVERY 5 MIN PRN
Status: DISCONTINUED | OUTPATIENT
Start: 2021-03-10 | End: 2021-03-10 | Stop reason: HOSPADM

## 2021-03-10 RX ORDER — ROCURONIUM BROMIDE 10 MG/ML
INJECTION, SOLUTION INTRAVENOUS AS NEEDED
Status: DISCONTINUED | OUTPATIENT
Start: 2021-03-10 | End: 2021-03-10 | Stop reason: SURG

## 2021-03-10 RX ORDER — NALOXONE HYDROCHLORIDE 0.4 MG/ML
80 INJECTION, SOLUTION INTRAMUSCULAR; INTRAVENOUS; SUBCUTANEOUS AS NEEDED
Status: DISCONTINUED | OUTPATIENT
Start: 2021-03-10 | End: 2021-03-10 | Stop reason: HOSPADM

## 2021-03-10 RX ORDER — PROCHLORPERAZINE EDISYLATE 5 MG/ML
5 INJECTION INTRAMUSCULAR; INTRAVENOUS ONCE AS NEEDED
Status: DISCONTINUED | OUTPATIENT
Start: 2021-03-10 | End: 2021-03-10 | Stop reason: HOSPADM

## 2021-03-10 RX ORDER — ONDANSETRON 2 MG/ML
INJECTION INTRAMUSCULAR; INTRAVENOUS AS NEEDED
Status: DISCONTINUED | OUTPATIENT
Start: 2021-03-10 | End: 2021-03-10 | Stop reason: SURG

## 2021-03-10 RX ORDER — HYDROMORPHONE HYDROCHLORIDE 1 MG/ML
0.4 INJECTION, SOLUTION INTRAMUSCULAR; INTRAVENOUS; SUBCUTANEOUS EVERY 5 MIN PRN
Status: DISCONTINUED | OUTPATIENT
Start: 2021-03-10 | End: 2021-03-10 | Stop reason: HOSPADM

## 2021-03-10 RX ORDER — GLYCOPYRROLATE 0.2 MG/ML
INJECTION, SOLUTION INTRAMUSCULAR; INTRAVENOUS AS NEEDED
Status: DISCONTINUED | OUTPATIENT
Start: 2021-03-10 | End: 2021-03-10 | Stop reason: SURG

## 2021-03-10 RX ORDER — LIDOCAINE HYDROCHLORIDE 10 MG/ML
INJECTION, SOLUTION EPIDURAL; INFILTRATION; INTRACAUDAL; PERINEURAL AS NEEDED
Status: DISCONTINUED | OUTPATIENT
Start: 2021-03-10 | End: 2021-03-10 | Stop reason: SURG

## 2021-03-10 RX ORDER — HYDROCODONE BITARTRATE AND ACETAMINOPHEN 5; 325 MG/1; MG/1
1 TABLET ORAL AS NEEDED
Status: DISCONTINUED | OUTPATIENT
Start: 2021-03-10 | End: 2021-03-10 | Stop reason: HOSPADM

## 2021-03-10 RX ORDER — MORPHINE SULFATE 4 MG/ML
2 INJECTION, SOLUTION INTRAMUSCULAR; INTRAVENOUS EVERY 10 MIN PRN
Status: DISCONTINUED | OUTPATIENT
Start: 2021-03-10 | End: 2021-03-10 | Stop reason: HOSPADM

## 2021-03-10 RX ORDER — MORPHINE SULFATE 10 MG/ML
6 INJECTION, SOLUTION INTRAMUSCULAR; INTRAVENOUS EVERY 10 MIN PRN
Status: DISCONTINUED | OUTPATIENT
Start: 2021-03-10 | End: 2021-03-10 | Stop reason: HOSPADM

## 2021-03-10 RX ORDER — HYDROMORPHONE HYDROCHLORIDE 1 MG/ML
0.2 INJECTION, SOLUTION INTRAMUSCULAR; INTRAVENOUS; SUBCUTANEOUS EVERY 5 MIN PRN
Status: DISCONTINUED | OUTPATIENT
Start: 2021-03-10 | End: 2021-03-10 | Stop reason: HOSPADM

## 2021-03-10 RX ADMIN — ONDANSETRON 4 MG: 2 INJECTION INTRAMUSCULAR; INTRAVENOUS at 08:29:00

## 2021-03-10 RX ADMIN — ROCURONIUM BROMIDE 40 MG: 10 INJECTION, SOLUTION INTRAVENOUS at 08:25:00

## 2021-03-10 RX ADMIN — SODIUM CHLORIDE, SODIUM LACTATE, POTASSIUM CHLORIDE, CALCIUM CHLORIDE: 600; 310; 30; 20 INJECTION, SOLUTION INTRAVENOUS at 08:57:00

## 2021-03-10 RX ADMIN — SODIUM CHLORIDE, SODIUM LACTATE, POTASSIUM CHLORIDE, CALCIUM CHLORIDE: 600; 310; 30; 20 INJECTION, SOLUTION INTRAVENOUS at 08:34:00

## 2021-03-10 RX ADMIN — LIDOCAINE HYDROCHLORIDE 4 ML: 40 SOLUTION TOPICAL at 08:27:00

## 2021-03-10 RX ADMIN — EPHEDRINE SULFATE 5 MG: 50 INJECTION, SOLUTION INTRAVENOUS at 08:37:00

## 2021-03-10 RX ADMIN — GLYCOPYRROLATE 0.2 MG: 0.2 INJECTION, SOLUTION INTRAMUSCULAR; INTRAVENOUS at 08:29:00

## 2021-03-10 RX ADMIN — DEXAMETHASONE SODIUM PHOSPHATE 4 MG: 4 MG/ML VIAL (ML) INJECTION at 08:29:00

## 2021-03-10 RX ADMIN — LIDOCAINE HYDROCHLORIDE 50 MG: 10 INJECTION, SOLUTION EPIDURAL; INFILTRATION; INTRACAUDAL; PERINEURAL at 08:24:00

## 2021-03-10 RX ADMIN — SODIUM CHLORIDE, SODIUM LACTATE, POTASSIUM CHLORIDE, CALCIUM CHLORIDE: 600; 310; 30; 20 INJECTION, SOLUTION INTRAVENOUS at 08:20:00

## 2021-03-10 RX ADMIN — EPHEDRINE SULFATE 5 MG: 50 INJECTION, SOLUTION INTRAVENOUS at 08:51:00

## 2021-03-10 RX ADMIN — NEOSTIGMINE METHYLSULFATE 4 MG: 1 INJECTION INTRAVENOUS at 08:56:00

## 2021-03-10 RX ADMIN — GLYCOPYRROLATE 0.8 MG: 0.2 INJECTION, SOLUTION INTRAMUSCULAR; INTRAVENOUS at 08:56:00

## 2021-03-10 NOTE — PROCEDURES
[facility]  Procedure Note  03/10/21    [address] [place] [address] Patient Status:  Hospital Outpatient Surgery    1945 MRN [id number]   Location [facility] ENDOSCOPY LAB SUITES Attending Alton Cai MD   Hosp Day # 0 ANTONINA Silva Stage 9: Additional Anesthesia Volume In Cc: 0

## 2021-03-10 NOTE — ANESTHESIA POSTPROCEDURE EVALUATION
Patient: Janice Clark    Procedure Summary     Date: 03/10/21 Room / Location: [facility] ENDOSCOPY 05 / [facility]    Anesthesia Start: 0820 Anesthesia Stop: 2674    Procedures:       BRONCHOSCOPY (N/A )      ENDOBRONCHIAL ULTRASOUND (EBUS) (N/A ) Diagn

## 2021-03-10 NOTE — ANESTHESIA PROCEDURE NOTES
Airway  Date/Time: 3/10/2021 8:27 AM  Urgency: Elective    Airway not difficult    General Information and Staff    Patient location during procedure: OR  Anesthesiologist: Lloyd Patten MD  Resident/CRNA: Aisha Nguyễn CRNA  Performed: CRNA     Indic

## 2021-03-10 NOTE — ANESTHESIA PREPROCEDURE EVALUATION
Anesthesia PreOp Note    HPI:     Taqueria Huitron is a 66year old male who presents for preoperative consultation requested by: Kevin Jane MD    Date of Surgery: 3/10/2021    Procedure(s):  BRONCHOSCOPY/ ENDOBRONCHIAL ULTRASOUND (EBUS)  Kane Morelos Performed by Wilbur Goldberg, MD at [address] [place] [address] ENDOSCOPY   • COLONOSCOPY  2010   • COLONOSCOPY     • COLONOSCOPY N/A 6/21/2018    Performed by Wilbur Goldberg, MD at [address] [place] [address] ENDOSCOPY   • ESOPHAGOGASTRODUODENOSCOPY (EGD) N/A 6/21/2018    Performed by Jose Martin Wyman per week        Comment: rare no longer has his       Drug use: No      Sexual activity: Not on file    Other Topics      Concerns:         Service: Not Asked        Blood Transfusions: Not Asked        Caffeine Concern: Yes          coffee, 2 cups BUN 22 (H) 02/24/2021    CREATSERUM 0.97 02/24/2021     (H) 02/24/2021    PGLU 103 (H) 02/22/2021    CA 9.2 02/24/2021          Vital Signs: Body mass index is 26.4 kg/m². height is 1.778 m (5' 10\") and weight is 83.5 kg (184 lb).  His oral tempe

## 2021-03-10 NOTE — H&P
[facility]    History & Physical    Date:  3/1/2021   Date of Admission:  3/10/2021      Chief Complaint:   Jesika Good is a(n) 66year old male with PET positive mediastinal adenopathy    HPI:   The patient has a history of left upp 20.7      Smokeless tobacco: Never Used    Vaping Use      Vaping Use: Never used    Alcohol use:  Yes      Alcohol/week: 0.8 standard drinks      Types: 1 Glasses of wine per week      Comment: rare no longer has his     Drug use: No    Allergies/Medicatio Assessment/Plan:   1. PET scan positive mediastinal adenopathy    Recommendations: For endobronchial ultrasound needle aspirate of mediastinal lymph nodes    2. History of lung cancer    3. History of aspergillosis    4. Sleep apnea    Florestine Sella.

## 2021-03-12 ENCOUNTER — OFFICE VISIT (OUTPATIENT)
Dept: HEMATOLOGY/ONCOLOGY | Facility: HOSPITAL | Age: 76
End: 2021-03-12
Attending: INTERNAL MEDICINE
Payer: MEDICARE

## 2021-03-12 VITALS
OXYGEN SATURATION: 96 % | SYSTOLIC BLOOD PRESSURE: 138 MMHG | WEIGHT: 189 LBS | DIASTOLIC BLOOD PRESSURE: 75 MMHG | TEMPERATURE: 98 F | RESPIRATION RATE: 18 BRPM | BODY MASS INDEX: 26.46 KG/M2 | HEIGHT: 71 IN | HEART RATE: 74 BPM

## 2021-03-12 DIAGNOSIS — Z92.21 HISTORY OF CHEMOTHERAPY: ICD-10-CM

## 2021-03-12 DIAGNOSIS — B44.9 ASPERGILLUS PNEUMONIA (HCC): ICD-10-CM

## 2021-03-12 DIAGNOSIS — Z92.3 HISTORY OF RADIATION THERAPY: ICD-10-CM

## 2021-03-12 DIAGNOSIS — C34.90 MALIGNANT NEOPLASM OF LUNG, UNSPECIFIED LATERALITY, UNSPECIFIED PART OF LUNG (HCC): Primary | ICD-10-CM

## 2021-03-12 PROCEDURE — 99215 OFFICE O/P EST HI 40 MIN: CPT | Performed by: INTERNAL MEDICINE

## 2021-03-12 NOTE — PROGRESS NOTES
Cancer Center Progress Note    Patient Name: Qi Patel   YOB: 1945   Medical Record Number: [id number]   Attending Physician: Nica Tolbert M.D.        Chief Complaint:  Lung cancer    History of Present Illness:  Cancer history:  75-ye Surgical History:  Past Surgical History:   Procedure Laterality Date   • BRONCHOSCOPY N/A 3/10/2021    Performed by Krissy Parra MD at [address] [place] [address] ENDOSCOPY   • bronchoscopy with lavage N/A 8/20/2018    Performed by John Luna MD at [address] [place] [address] ENDOSCOPY   • C Concern: Not Asked        Weight Concern: Not Asked        Special Diet: Not Asked        Back Care: Not Asked        Exercise: Not Asked        Bike Helmet: Not Asked        Seat Belt: Not Asked        Self-Exams: Not Asked    Social History Narrative Nasal route 2 (two) times daily.  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   He was diagnosed in December 2016

## 2021-03-15 ENCOUNTER — TELEPHONE (OUTPATIENT)
Dept: HEMATOLOGY/ONCOLOGY | Facility: HOSPITAL | Age: 76
End: 2021-03-15

## 2021-03-19 ENCOUNTER — NURSE ONLY (OUTPATIENT)
Dept: HEMATOLOGY/ONCOLOGY | Facility: HOSPITAL | Age: 76
End: 2021-03-19
Attending: INTERNAL MEDICINE
Payer: MEDICARE

## 2021-03-19 VITALS
HEART RATE: 60 BPM | TEMPERATURE: 98 F | DIASTOLIC BLOOD PRESSURE: 57 MMHG | SYSTOLIC BLOOD PRESSURE: 135 MMHG | RESPIRATION RATE: 16 BRPM | OXYGEN SATURATION: 97 %

## 2021-03-19 DIAGNOSIS — C34.12 CANCER OF UPPER LOBE OF LEFT LUNG (HCC): ICD-10-CM

## 2021-03-19 DIAGNOSIS — C34.92 MALIGNANT NEOPLASM OF LEFT LUNG, UNSPECIFIED PART OF LUNG (HCC): Primary | ICD-10-CM

## 2021-03-19 PROCEDURE — 96413 CHEMO IV INFUSION 1 HR: CPT

## 2021-03-19 PROCEDURE — 99211 OFF/OP EST MAY X REQ PHY/QHP: CPT

## 2021-03-19 NOTE — PROGRESS NOTES
Medication Education Record: IV Therapy    Learner:  Patient and Spouse    Barriers / Limitations:  None    Psychosocial Assessment:  patient psychosocial response appropriate    Diagnosis:   Lung cancer     IV Cancer Treatment Name(s): Pembrolizumab   IV higher risk for this, steps will be taken to prevent and minimize this from occurring.     Recommended Anti-nausea medications (as directed by your provider):  Compazine 10mg every 6 hours as needed      Helpful hints during cancer treatment:    Diet:  o Av broad-spectrum sunscreen with an SPF of 30 or higher on any skin exposed to the sun. Re-apply every 2 hours if in the sun and after bathing or sweating.   o For dry skin, use an alcohol-free lotion twice per day, especially after baths.  o If scalp hair lo member is receiving chemotherapy, whether in the clinic or at home, the following precautions must be taken to lessen any exposure to the medication. Safety for my family and friends:  1.  Due to safety concerns and the nature of this treatment enviro

## 2021-03-19 NOTE — PROGRESS NOTES
Pt here for C1D1 Keytruda. Arrives Ambulating independently, accompanied by Spouse           Pregnancy screening: Not applicable    Modifications in dose or schedule: No    Pt had chemo education prior to this appt. Consent was obtained.   Reinforced with

## 2021-03-22 ENCOUNTER — TELEPHONE (OUTPATIENT)
Dept: HEMATOLOGY/ONCOLOGY | Facility: HOSPITAL | Age: 76
End: 2021-03-22

## 2021-03-22 NOTE — TELEPHONE ENCOUNTER
Toxicities: C1 D1 Pembrolizumab on 3/19/2021    Tracy Trevizo said he is feeling well. On Saturday he had some itching on the left side of the abdomen. No redness or rash. It completely resolved by Sunday morning.  I encouraged him to eat 5-6 small, protein rich grace

## 2021-04-01 ENCOUNTER — TELEPHONE (OUTPATIENT)
Dept: HEMATOLOGY/ONCOLOGY | Facility: HOSPITAL | Age: 76
End: 2021-04-01

## 2021-04-01 ENCOUNTER — NURSE ONLY (OUTPATIENT)
Dept: HEMATOLOGY/ONCOLOGY | Facility: HOSPITAL | Age: 76
End: 2021-04-01
Attending: INTERNAL MEDICINE
Payer: MEDICARE

## 2021-04-01 ENCOUNTER — HOSPITAL ENCOUNTER (OUTPATIENT)
Dept: GENERAL RADIOLOGY | Facility: HOSPITAL | Age: 76
Discharge: HOME | End: 2021-04-01
Attending: NURSE PRACTITIONER
Payer: MEDICARE

## 2021-04-01 VITALS
TEMPERATURE: 98 F | OXYGEN SATURATION: 96 % | HEIGHT: 71 IN | HEART RATE: 75 BPM | RESPIRATION RATE: 16 BRPM | SYSTOLIC BLOOD PRESSURE: 142 MMHG | DIASTOLIC BLOOD PRESSURE: 68 MMHG | BODY MASS INDEX: 26.32 KG/M2 | WEIGHT: 188 LBS

## 2021-04-01 DIAGNOSIS — R53.1 WEAKNESS: ICD-10-CM

## 2021-04-01 DIAGNOSIS — R94.2 ABNORMAL PET SCAN OF LUNG: ICD-10-CM

## 2021-04-01 DIAGNOSIS — R06.2 WHEEZE: ICD-10-CM

## 2021-04-01 DIAGNOSIS — E86.9 VOLUME DEPLETION: ICD-10-CM

## 2021-04-01 DIAGNOSIS — R68.83 CHILLS: ICD-10-CM

## 2021-04-01 DIAGNOSIS — J18.9 PNEUMONITIS: ICD-10-CM

## 2021-04-01 DIAGNOSIS — R30.0 DYSURIA: Primary | ICD-10-CM

## 2021-04-01 DIAGNOSIS — R06.00 DOE (DYSPNEA ON EXERTION): ICD-10-CM

## 2021-04-01 DIAGNOSIS — Z92.89 HISTORY OF IMMUNOTHERAPY: ICD-10-CM

## 2021-04-01 DIAGNOSIS — J18.9 COMMUNITY ACQUIRED PNEUMONIA OF LEFT UPPER LOBE OF LUNG: ICD-10-CM

## 2021-04-01 DIAGNOSIS — R30.0 DYSURIA: ICD-10-CM

## 2021-04-01 DIAGNOSIS — C34.92 MALIGNANT NEOPLASM OF LEFT LUNG, UNSPECIFIED PART OF LUNG (HCC): ICD-10-CM

## 2021-04-01 PROCEDURE — 87040 BLOOD CULTURE FOR BACTERIA: CPT

## 2021-04-01 PROCEDURE — 81003 URINALYSIS AUTO W/O SCOPE: CPT

## 2021-04-01 PROCEDURE — 99215 OFFICE O/P EST HI 40 MIN: CPT | Performed by: NURSE PRACTITIONER

## 2021-04-01 PROCEDURE — 36415 COLL VENOUS BLD VENIPUNCTURE: CPT

## 2021-04-01 PROCEDURE — 80053 COMPREHEN METABOLIC PANEL: CPT

## 2021-04-01 PROCEDURE — 85025 COMPLETE CBC W/AUTO DIFF WBC: CPT

## 2021-04-01 PROCEDURE — 71046 X-RAY EXAM CHEST 2 VIEWS: CPT | Performed by: NURSE PRACTITIONER

## 2021-04-01 RX ORDER — LEVOFLOXACIN 750 MG/1
750 TABLET ORAL DAILY
Qty: 7 TABLET | Refills: 0 | Status: SHIPPED | OUTPATIENT
Start: 2021-04-01 | End: 2021-04-16 | Stop reason: ALTCHOICE

## 2021-04-01 RX ORDER — PREDNISONE 20 MG/1
TABLET ORAL
Qty: 31 TABLET | Refills: 0 | Status: SHIPPED | OUTPATIENT
Start: 2021-04-01 | End: 2021-04-01

## 2021-04-01 RX ORDER — PREDNISONE 20 MG/1
TABLET ORAL
Qty: 31 TABLET | Refills: 0 | Status: SHIPPED | OUTPATIENT
Start: 2021-04-01 | End: 2021-04-14

## 2021-04-01 NOTE — TELEPHONE ENCOUNTER
Luis and his wife says he has chills, shortness of breath, and he is very weak. She says he is having a hard time sleeping, and loss of appetite.

## 2021-04-01 NOTE — PROGRESS NOTES
Cancer Center Progress Note    Patient Name: Ankita Ko   YOB: 1945   Medical Record Number: [id number]   Attending Physician: Roseline Strickland M.D.        Chief Complaint:  Lung cancer    History of Present Illness:  Cancer history:  75-ye his CPAP mask. Still able to perform all ADLs and ambulated to clinic without issue. Anorexia is stable, oral hydration is adequate with drinking ~8 glasses/day. No diarrhea, N/V or abdominal pain. Feels he has noticed darker colored urine this week.  Hamilton Palumbo Not on file      Highest education level: Not on file    Occupational History      Not on file    Tobacco Use      Smoking status: Former Smoker        Packs/day: 1.00        Years: 30.00        Pack years: 30        Types: Cigarettes        Quit date: 6/1 Current or Ex-Partner:       Emotionally Abused:       Physically Abused:       Sexually Abused:       Current Medications:    Current Outpatient Medications:   •  VORICONAZOLE 200 MG Oral Tab, TAKE ONE TABLET (200MG TOTAL) BY MOUTH TWICE DAILY , Disp: 90 Date    WBC 9.5 04/01/2021    WBC 4.7 02/24/2021    WBC 3.7 (L) 11/13/2020     Lab Results   Component Value Date    HGB 13.7 04/01/2021    HGB 14.4 02/24/2021    HGB 14.5 11/13/2020      Lab Results   Component Value Date    .0 04/01/2021    PLT 18 to IV iron. He has met with gastroenterology and had EGD and colonoscopy without any evidence of a source of GI blood loss. –Completed 13 cycles of durvalumab however developed recurrent pneumonitis/transaminitis. He has responded well to prednisone.  He

## 2021-04-01 NOTE — TELEPHONE ENCOUNTER
Toxicities: C1 D1 Pembrolizumab on 3/19/2021     Low Grade Fever/Dysuria/Weakness/Dyspnea/Anorexia/Dehydration     Low Grade Fever: (Since last Saturday Maru Enriquez has been having intermittent chills and low grade fevers 99.0 to 99.8 at 7:30 am. He feels gene

## 2021-04-01 NOTE — PATIENT INSTRUCTIONS
Chest xray today in [facility]--walk-in.  I will call you with results    Labs today and urine sample today, will call you with results    Continue to push any non-caffeinated fluids    Ok to take tylenol or motrin after you take your temperature for

## 2021-04-09 ENCOUNTER — OFFICE VISIT (OUTPATIENT)
Dept: HEMATOLOGY/ONCOLOGY | Facility: HOSPITAL | Age: 76
End: 2021-04-09
Attending: INTERNAL MEDICINE
Payer: MEDICARE

## 2021-04-09 VITALS
DIASTOLIC BLOOD PRESSURE: 60 MMHG | RESPIRATION RATE: 16 BRPM | BODY MASS INDEX: 26.6 KG/M2 | WEIGHT: 190 LBS | TEMPERATURE: 98 F | HEART RATE: 82 BPM | HEIGHT: 70.98 IN | OXYGEN SATURATION: 94 % | SYSTOLIC BLOOD PRESSURE: 143 MMHG

## 2021-04-09 DIAGNOSIS — Z51.11 CHEMOTHERAPY MANAGEMENT, ENCOUNTER FOR: Primary | ICD-10-CM

## 2021-04-09 DIAGNOSIS — Z92.89 HISTORY OF IMMUNOTHERAPY: ICD-10-CM

## 2021-04-09 DIAGNOSIS — C34.92 MALIGNANT NEOPLASM OF LEFT LUNG, UNSPECIFIED PART OF LUNG (HCC): Primary | ICD-10-CM

## 2021-04-09 DIAGNOSIS — J18.9 PNEUMONITIS: ICD-10-CM

## 2021-04-09 DIAGNOSIS — C34.12 CANCER OF UPPER LOBE OF LEFT LUNG (HCC): ICD-10-CM

## 2021-04-09 PROCEDURE — 80053 COMPREHEN METABOLIC PANEL: CPT

## 2021-04-09 PROCEDURE — 36415 COLL VENOUS BLD VENIPUNCTURE: CPT

## 2021-04-09 PROCEDURE — 99215 OFFICE O/P EST HI 40 MIN: CPT | Performed by: INTERNAL MEDICINE

## 2021-04-09 PROCEDURE — 85025 COMPLETE CBC W/AUTO DIFF WBC: CPT

## 2021-04-09 RX ORDER — PREDNISONE 10 MG/1
10 TABLET ORAL DAILY
Qty: 30 TABLET | Refills: 0 | Status: SHIPPED | OUTPATIENT
Start: 2021-04-15 | End: 2021-05-04

## 2021-04-09 NOTE — PROGRESS NOTES
Treatment deferred today due to Immune mediated Pneumonitis/Pneumonia. Patient to infusion to have Saline lock removed. Patient states he is feeling better, no fever, cough, or shortness of breath.   States he has completed antibiotic, still has 1 week

## 2021-04-09 NOTE — PATIENT INSTRUCTIONS
We will push your next Keytruda infusion back by 1 week as you continue your Prednisone taper. Complete the current Prednisone taper, where you take 40mg (2 tabs) til tomorrow 4/10, then 20mg  (1 tab) daily for 4 DAYS until Wednesday.     Starting [employer]

## 2021-04-09 NOTE — PROGRESS NOTES
Cancer Center Progress Note    Patient Name: Kimberlee Marcum   YOB: 1945   Medical Record Number: [id number]   Attending Physician: Dwain Contreras M.D.        Chief Complaint:  Lung cancer    History of Present Illness:  Cancer history:  75-ye fevers, chills or wheeze at home. No LAD or n/s. MILLAN is improving with different activities, and denies SOB at rest. No cough. No chest pain or hypoxia at home. No palpitations. Performs all ADLs and ambulated to clinic without issue.     Eating and drinki level: Not on file    Occupational History      Not on file    Tobacco Use      Smoking status: Former Smoker        Packs/day: 1.00        Years: 30.00        Pack years: 30        Types: Cigarettes        Quit date: 6/16/2000        Years since quitting: Emotionally Abused:       Physically Abused:       Sexually Abused:       Current Medications:    Current Outpatient Medications:   •  levofloxacin 750 MG Oral Tab, Take 1 tablet (750 mg total) by mouth daily. , Disp: 7 tablet, Rfl: 0  •  PREDNISONE 20 MG O S1S2  Abdomen: Soft, non-tender, non-distended. NABS x 4, No hepatosplenomegaly. No palpable mass. Extremities: No edema. Neurological: 5/5 motor x4.     Derm:  No rash    LABS:    CBC:    Lab Results   Component Value Date    WBC 11.4 (H) 04/09/2021 following the completion of chemotherapy. Nichelle Maxlouis he has unresectable stage IIIb disease with no progression following initial chemotherapy we  added immunotherapy with durvalumab. –anemia with severe iron deficiency. Responded to IV iron.   He has met wi work-up in process    BRYON Patel    Seen and examined with HARPER Gallagher agree with and edited above. Recurrent adenocarcinoma of the lung currently on pembrolizumab however recurrent pneumonitis also.   Good response to prednisone will begin taper

## 2021-04-12 ENCOUNTER — TELEPHONE (OUTPATIENT)
Dept: HEMATOLOGY/ONCOLOGY | Facility: HOSPITAL | Age: 76
End: 2021-04-12

## 2021-04-12 NOTE — TELEPHONE ENCOUNTER
Left a message giving the patient new appointment times for 4/16, asked to call back if any questions

## 2021-04-16 ENCOUNTER — OFFICE VISIT (OUTPATIENT)
Dept: HEMATOLOGY/ONCOLOGY | Facility: HOSPITAL | Age: 76
End: 2021-04-16
Attending: INTERNAL MEDICINE
Payer: MEDICARE

## 2021-04-16 VITALS
OXYGEN SATURATION: 95 % | SYSTOLIC BLOOD PRESSURE: 131 MMHG | WEIGHT: 189 LBS | RESPIRATION RATE: 16 BRPM | DIASTOLIC BLOOD PRESSURE: 69 MMHG | HEART RATE: 63 BPM | HEIGHT: 71 IN | TEMPERATURE: 98 F | BODY MASS INDEX: 26.46 KG/M2

## 2021-04-16 VITALS
WEIGHT: 189 LBS | DIASTOLIC BLOOD PRESSURE: 69 MMHG | OXYGEN SATURATION: 95 % | TEMPERATURE: 98 F | HEART RATE: 63 BPM | RESPIRATION RATE: 16 BRPM | SYSTOLIC BLOOD PRESSURE: 131 MMHG | BODY MASS INDEX: 26 KG/M2

## 2021-04-16 DIAGNOSIS — C34.12 CANCER OF UPPER LOBE OF LEFT LUNG (HCC): ICD-10-CM

## 2021-04-16 DIAGNOSIS — Z51.81 MEDICATION MONITORING ENCOUNTER: ICD-10-CM

## 2021-04-16 DIAGNOSIS — J18.9 PNEUMONITIS: ICD-10-CM

## 2021-04-16 DIAGNOSIS — Z51.11 CHEMOTHERAPY MANAGEMENT, ENCOUNTER FOR: Primary | ICD-10-CM

## 2021-04-16 DIAGNOSIS — C34.92 MALIGNANT NEOPLASM OF LEFT LUNG, UNSPECIFIED PART OF LUNG (HCC): Primary | ICD-10-CM

## 2021-04-16 PROCEDURE — 99215 OFFICE O/P EST HI 40 MIN: CPT | Performed by: NURSE PRACTITIONER

## 2021-04-16 PROCEDURE — 96413 CHEMO IV INFUSION 1 HR: CPT

## 2021-04-16 NOTE — PROGRESS NOTES
Cancer Center Progress Note    Patient Name: Jose Narayan   YOB: 1945   Medical Record Number: [id number]   Attending Physician: Cynthia Velásquez M.D.        Chief Complaint:  Lung cancer    History of Present Illness:  Cancer history:  75-ye with good energy. No further fevers, chills or wheeze at home. No LAD or n/s. MILLAN is resolved with different activities, and denies SOB at rest. No cough. No chest pain or hypoxia at home. No palpitations.  Performs all ADLs and ambulated to clinic without used    Substance and Sexual Activity      Alcohol use:  Yes        Alcohol/week: 0.8 standard drinks        Types: 1 Glasses of wine per week        Comment: rare no longer has his       Drug use: No      Sexual activity: Not on file    Other Topics      C Montelukast Sodium 10 MG Oral Tab, Take 1 tablet (10 mg total) by mouth nightly. (Patient not taking: Reported on 3/12/2021 ), Disp: 30 tablet, Rfl: 3  •  loratadine 10 MG Oral Tab, Take 1 tablet (10 mg total) by mouth daily.  (Patient not taking: Reported .0 03/10/2021       Lab Results   Component Value Date     (H) 04/09/2021    BUN 29 (H) 04/09/2021    BUNCREA 27.6 (H) 04/09/2021    CREATSERUM 1.05 04/09/2021    ANIONGAP 5 04/09/2021    GFR >60 05/14/2016    GFRNAA 68 04/09/2021    GFRAA all cancer directed therapy as of the end of April 2018. –Recurrent disease biopsy-proven mediastinal lymph nodes as of March 2021. Same histology. Given PD-L1 greater than 50% we will proceed with single agent pembrolizumab.   Also discussed nivolumab

## 2021-04-16 NOTE — PROGRESS NOTES
Pt here for C2 D1 Keytruda.   Arrives Ambulating independently, accompanied by self        Pregnancy screening: Not applicable    Modifications in dose or schedule: yes - delay 1 week d/t pneumonitis, on steroids, now ok to proceed while on 10mg prednisone

## 2021-05-02 DIAGNOSIS — Z51.11 CHEMOTHERAPY MANAGEMENT, ENCOUNTER FOR: ICD-10-CM

## 2021-05-02 DIAGNOSIS — J98.4 PNEUMONITIS: ICD-10-CM

## 2021-05-02 DIAGNOSIS — C34.12 CANCER OF UPPER LOBE OF LEFT LUNG (HCC): ICD-10-CM

## 2021-05-02 DIAGNOSIS — Z92.89 HISTORY OF IMMUNOTHERAPY: ICD-10-CM

## 2021-05-04 RX ORDER — PREDNISONE 5 MG/1
10 TABLET ORAL DAILY
Qty: 60 TABLET | Refills: 0 | Status: SHIPPED | OUTPATIENT
Start: 2021-05-04 | End: 2021-05-07

## 2021-05-07 ENCOUNTER — NURSE ONLY (OUTPATIENT)
Dept: HEMATOLOGY/ONCOLOGY | Facility: HOSPITAL | Age: 76
End: 2021-05-07
Attending: INTERNAL MEDICINE
Payer: MEDICARE

## 2021-05-07 VITALS
DIASTOLIC BLOOD PRESSURE: 65 MMHG | BODY MASS INDEX: 26.74 KG/M2 | WEIGHT: 191 LBS | HEART RATE: 65 BPM | RESPIRATION RATE: 16 BRPM | TEMPERATURE: 98 F | SYSTOLIC BLOOD PRESSURE: 127 MMHG | HEIGHT: 71 IN | OXYGEN SATURATION: 95 %

## 2021-05-07 VITALS
OXYGEN SATURATION: 95 % | BODY MASS INDEX: 27 KG/M2 | DIASTOLIC BLOOD PRESSURE: 65 MMHG | HEART RATE: 65 BPM | SYSTOLIC BLOOD PRESSURE: 127 MMHG | TEMPERATURE: 98 F | RESPIRATION RATE: 16 BRPM | WEIGHT: 191 LBS

## 2021-05-07 DIAGNOSIS — J18.9 PNEUMONITIS: ICD-10-CM

## 2021-05-07 DIAGNOSIS — C34.12 CANCER OF UPPER LOBE OF LEFT LUNG (HCC): ICD-10-CM

## 2021-05-07 DIAGNOSIS — C34.92 MALIGNANT NEOPLASM OF LEFT LUNG, UNSPECIFIED PART OF LUNG (HCC): Primary | ICD-10-CM

## 2021-05-07 DIAGNOSIS — Z51.11 CHEMOTHERAPY MANAGEMENT, ENCOUNTER FOR: Primary | ICD-10-CM

## 2021-05-07 DIAGNOSIS — Z92.89 HISTORY OF IMMUNOTHERAPY: ICD-10-CM

## 2021-05-07 PROCEDURE — 96413 CHEMO IV INFUSION 1 HR: CPT

## 2021-05-07 PROCEDURE — 84443 ASSAY THYROID STIM HORMONE: CPT

## 2021-05-07 PROCEDURE — 99215 OFFICE O/P EST HI 40 MIN: CPT | Performed by: INTERNAL MEDICINE

## 2021-05-07 PROCEDURE — 80053 COMPREHEN METABOLIC PANEL: CPT

## 2021-05-07 PROCEDURE — 85025 COMPLETE CBC W/AUTO DIFF WBC: CPT

## 2021-05-07 RX ORDER — PREDNISONE 1 MG/1
5 TABLET ORAL DAILY
Qty: 30 TABLET | Refills: 1 | Status: SHIPPED | OUTPATIENT
Start: 2021-05-07 | End: 2021-06-01

## 2021-05-07 NOTE — PROGRESS NOTES
Cancer Center Progress Note    Patient Name: Ángel Cruz   YOB: 1945   Medical Record Number: [id number]   Attending Physician: Jesse Llamas M.D.        Chief Complaint:  Lung cancer    History of Present Illness:  Cancer history:  75-ye to clinic without issue. Eating and drinking adequately. No diarrhea, N/V or abdominal pain. No focal neurological deficits, including no confusion, headache pain or vision changes. No skin rash or further pruritis.          Performance Status:  ECOG 0  P Caffeine Concern: Yes          coffee, 2 cups daily        Occupational Exposure: Not Asked        Hobby Hazards: Not Asked        Sleep Concern: Not Asked        Stress Concern: Not Asked        Weight Concern: Not Asked        Special Diet: Not Asked Oral Tab, Take 81 mg by mouth daily. , Disp: , Rfl:   •  Vitamin C 500 MG Oral Tab, Take 500 mg by mouth daily. , Disp: , Rfl:     Allergies:  No Known Allergies     Review of Systems:  All other systems reviewed and negative x12    Vital Signs:  /65 ( Radiology:  PEt/CT Chest   CONCLUSION: Abnormal F 18 FDG PET-CT study demonstrating hypermetabolic mediastinal lymphadenopathy involving the prevascular, right paratracheal, AP window, bilateral hilar and subcarinal lymph nodes suspicious for recurre until next visit. –previous anemia with iron deficiency improved. --Recurrent immune mediated pneumonitis diagnosed 4/1 post-C1 Pembrolizumab with c/o low-grade fever, wheeze, mild MILLAN and fatigue. CXR confirmed, w/ possible pneumonia on 4/1.  Complete

## 2021-05-07 NOTE — PROGRESS NOTES
Pt here for C3 D1 Keytruda. Arrives Ambulating independently, accompanied by self        Pregnancy screening: Not applicable    Modifications in dose or schedule: No    PIV placed to right wrist vein in lab. Easy blood return noted and flushes well.

## 2021-05-08 NOTE — PROGRESS NOTES
Seen and examined with HARPER Gallagher. . Agree with her note. 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 Recurrence >2 years later)currently on pembrolizumab however recurrent pneumonitis also.   Good response to predni

## 2021-05-17 ENCOUNTER — HOSPITAL ENCOUNTER (OUTPATIENT)
Dept: GENERAL RADIOLOGY | Age: 76
Discharge: HOME | End: 2021-05-17
Attending: INTERNAL MEDICINE
Payer: MEDICARE

## 2021-05-17 ENCOUNTER — TELEPHONE (OUTPATIENT)
Dept: PULMONOLOGY | Facility: CLINIC | Age: 76
End: 2021-05-17

## 2021-05-17 DIAGNOSIS — M89.8X1 PAIN OF LEFT CLAVICLE: ICD-10-CM

## 2021-05-17 DIAGNOSIS — R07.81 RIB PAIN ON LEFT SIDE: Primary | ICD-10-CM

## 2021-05-17 DIAGNOSIS — R07.81 RIB PAIN ON LEFT SIDE: ICD-10-CM

## 2021-05-17 PROCEDURE — 73000 X-RAY EXAM OF COLLAR BONE: CPT | Performed by: INTERNAL MEDICINE

## 2021-05-17 PROCEDURE — 71100 X-RAY EXAM RIBS UNI 2 VIEWS: CPT | Performed by: INTERNAL MEDICINE

## 2021-05-17 NOTE — TELEPHONE ENCOUNTER
Per pt he was gardening, pushing a mei, put it against his chest to push, heard something snap, kept working, & pain came back that night & kept persisting.  Stts still has pain below the collar bone/clavicle near first rib on L side rated 5-6/10 w/o moveme

## 2021-05-17 NOTE — TELEPHONE ENCOUNTER
Pt notified of Dr. Mckeon Pole orders below. Explained orders entered in computer & testing may be done at [place] location. Their hours were given. He voiced understanding. Per pt he will have testing today.

## 2021-05-17 NOTE — TELEPHONE ENCOUNTER
Pts wife states pt injured his clavicle on left side 4-5 days ago and is still having pain. Pt would like order for xray. Please call.

## 2021-05-18 NOTE — TELEPHONE ENCOUNTER
Informed pt of Dr. Sascha Solares orders below. Encouraged pt to rest & contact us if sx do not improve. He voiced understanding & was agreeable.

## 2021-05-18 NOTE — TELEPHONE ENCOUNTER
RN, please let the patient know that his clavicular x-ray and rib x-rays did not show any evidence of fracture.

## 2021-05-28 ENCOUNTER — OFFICE VISIT (OUTPATIENT)
Dept: HEMATOLOGY/ONCOLOGY | Facility: HOSPITAL | Age: 76
End: 2021-05-28
Attending: NURSE PRACTITIONER
Payer: MEDICARE

## 2021-05-28 ENCOUNTER — OFFICE VISIT (OUTPATIENT)
Dept: HEMATOLOGY/ONCOLOGY | Facility: HOSPITAL | Age: 76
End: 2021-05-28
Attending: INTERNAL MEDICINE
Payer: MEDICARE

## 2021-05-28 VITALS
WEIGHT: 189 LBS | HEIGHT: 71 IN | SYSTOLIC BLOOD PRESSURE: 85 MMHG | DIASTOLIC BLOOD PRESSURE: 70 MMHG | OXYGEN SATURATION: 95 % | RESPIRATION RATE: 16 BRPM | TEMPERATURE: 99 F | BODY MASS INDEX: 26.46 KG/M2 | HEART RATE: 70 BPM

## 2021-05-28 VITALS
SYSTOLIC BLOOD PRESSURE: 150 MMHG | HEART RATE: 64 BPM | BODY MASS INDEX: 26.46 KG/M2 | WEIGHT: 189 LBS | HEIGHT: 71 IN | RESPIRATION RATE: 16 BRPM | DIASTOLIC BLOOD PRESSURE: 68 MMHG

## 2021-05-28 DIAGNOSIS — Z51.11 CHEMOTHERAPY MANAGEMENT, ENCOUNTER FOR: Primary | ICD-10-CM

## 2021-05-28 DIAGNOSIS — J18.9 PNEUMONITIS: ICD-10-CM

## 2021-05-28 DIAGNOSIS — C34.12 CANCER OF UPPER LOBE OF LEFT LUNG (HCC): ICD-10-CM

## 2021-05-28 DIAGNOSIS — C34.92 MALIGNANT NEOPLASM OF LEFT LUNG, UNSPECIFIED PART OF LUNG (HCC): ICD-10-CM

## 2021-05-28 DIAGNOSIS — C34.92 MALIGNANT NEOPLASM OF LEFT LUNG, UNSPECIFIED PART OF LUNG (HCC): Primary | ICD-10-CM

## 2021-05-28 PROCEDURE — 85025 COMPLETE CBC W/AUTO DIFF WBC: CPT

## 2021-05-28 PROCEDURE — 80053 COMPREHEN METABOLIC PANEL: CPT

## 2021-05-28 PROCEDURE — 99215 OFFICE O/P EST HI 40 MIN: CPT | Performed by: INTERNAL MEDICINE

## 2021-05-28 PROCEDURE — 96413 CHEMO IV INFUSION 1 HR: CPT

## 2021-05-28 NOTE — PROGRESS NOTES
Cancer Center Progress Note    Patient Name: Jae Colby   YOB: 1945   Medical Record Number: [id number]   Attending Physician: Johnny Hill M.D.        Chief Complaint:  Lung cancer    History of Present Illness:  Cancer history:  75-ye palpitations. Had a recent upper left chest injury where he felt he fractured a rib, but xrays done by Padmaja Corbin MD were unremarkable; discomfort from this is much improved. Eating and drinking adequately. No diarrhea, N/V or abdominal pain.   No focal neurol No      Sexual activity: Not on file    Other Topics      Concerns:         Service: Not Asked        Blood Transfusions: Not Asked        Caffeine Concern: Yes          coffee, 2 cups daily        Occupational Exposure: Not Asked        Raudel Roberson Disp: 1 Bottle, Rfl: 3  •  Cholecalciferol (VITAMIN D3) 1000 units Oral Cap, Take 1,000 Units by mouth daily. , Disp: , Rfl:   •  aspirin 81 MG Oral Tab, Take 81 mg by mouth daily. , Disp: , Rfl:   •  Vitamin C 500 MG Oral Tab, Take 500 mg by mouth daily. , 6.7 05/28/2021    ALB 3.2 (L) 05/28/2021    GLOBULIN 3.5 05/28/2021     05/28/2021    K 3.9 05/28/2021     05/28/2021    CO2 27.0 05/28/2021       Radiology:  PEt/CT Chest   CONCLUSION: Abnormal F 18 FDG PET-CT study demonstrating hypermetaboli below. Ok to proceed wtih Cycle #4 Pembrolizumab today; delayed C2 d/t steroid taper, ok to continue with decreased Prednisone dose to 5mg daily as below until next visit. Reimaging with CT prior to next cycle.   –previous anemia with iron deficiency impro

## 2021-05-28 NOTE — PROGRESS NOTES
Pt here for C4 D1 Keytruda. Arrives Ambulating independently, accompanied by self        Pregnancy screening: Not applicable    Modifications in dose or schedule: No    PIV placed to right hand in lab. Easy blood return noted and flushes well.       Mo Forman

## 2021-05-29 DIAGNOSIS — Z51.11 CHEMOTHERAPY MANAGEMENT, ENCOUNTER FOR: ICD-10-CM

## 2021-05-29 DIAGNOSIS — Z92.89 HISTORY OF IMMUNOTHERAPY: ICD-10-CM

## 2021-05-29 DIAGNOSIS — J98.4 PNEUMONITIS: ICD-10-CM

## 2021-05-29 DIAGNOSIS — C34.12 CANCER OF UPPER LOBE OF LEFT LUNG (HCC): ICD-10-CM

## 2021-06-01 RX ORDER — PREDNISONE 5 MG/1
5 TABLET ORAL DAILY
Qty: 30 TABLET | Refills: 0 | Status: SHIPPED | OUTPATIENT
Start: 2021-06-01 | End: 2021-07-02 | Stop reason: ALTCHOICE

## 2021-06-07 ENCOUNTER — TELEPHONE (OUTPATIENT)
Dept: HEMATOLOGY/ONCOLOGY | Facility: HOSPITAL | Age: 76
End: 2021-06-07

## 2021-06-08 ENCOUNTER — TELEPHONE (OUTPATIENT)
Dept: HEMATOLOGY/ONCOLOGY | Facility: HOSPITAL | Age: 76
End: 2021-06-08

## 2021-06-08 ENCOUNTER — OFFICE VISIT (OUTPATIENT)
Dept: PULMONOLOGY | Facility: CLINIC | Age: 76
End: 2021-06-08
Payer: MEDICARE

## 2021-06-08 ENCOUNTER — TELEPHONE (OUTPATIENT)
Dept: PULMONOLOGY | Facility: CLINIC | Age: 76
End: 2021-06-08

## 2021-06-08 VITALS
HEIGHT: 70 IN | OXYGEN SATURATION: 95 % | WEIGHT: 186 LBS | TEMPERATURE: 98 F | DIASTOLIC BLOOD PRESSURE: 65 MMHG | BODY MASS INDEX: 26.63 KG/M2 | HEART RATE: 71 BPM | RESPIRATION RATE: 18 BRPM | SYSTOLIC BLOOD PRESSURE: 147 MMHG

## 2021-06-08 DIAGNOSIS — R21 RASH: Primary | ICD-10-CM

## 2021-06-08 PROCEDURE — 99213 OFFICE O/P EST LOW 20 MIN: CPT | Performed by: INTERNAL MEDICINE

## 2021-06-08 NOTE — TELEPHONE ENCOUNTER
Stts pt has a R buttock closed sore which looks like multiple scabs (5-6 qty) surrounded by pinkish redness in circular formation, & has pain w/ pressure (sitting) & an intermittent throbbing sensation @ any given time onset 1 wk.  Denies any oozing, trauma

## 2021-06-08 NOTE — TELEPHONE ENCOUNTER
Returned call he was seen by PCP today and felt he has shingles, non draining. Will follow up with MD as needed, to call with condition update prior to his next scheduled apt.

## 2021-06-08 NOTE — TELEPHONE ENCOUNTER
Reviewed Dr. Aldo Robertson recommendations from today's appt w/ pt & wife. They had no further concerns/questions at this time.

## 2021-06-08 NOTE — TELEPHONE ENCOUNTER
Called spouse with recommendations to apply heat 10-15 minutes to area and rest, use ES Tylenol for pain, monitor for fevers first and to follow up with PCP or Dermatologist.  She verbalizes understanding.

## 2021-06-08 NOTE — PROGRESS NOTES
Is a 64-year-old male who Garfield from prior evaluation from with 5 days of rash at the right buttock extending towards the right scrotum and right side of the penis and at the prefire and lateral right upper thigh.   It hurts when he applies pressure to

## 2021-06-08 NOTE — TELEPHONE ENCOUNTER
Spouse called again, Safia Trujillo still has lump and redness to right butt cheek, no broken skin, painful when sitting on it(3/10). No fevers, eating and drinking,  Bowels moving okay. Please advise. To be seen, go see dermatologist, other?

## 2021-06-15 ENCOUNTER — HOSPITAL ENCOUNTER (OUTPATIENT)
Dept: CT IMAGING | Facility: HOSPITAL | Age: 76
Discharge: HOME | End: 2021-06-15
Attending: NURSE PRACTITIONER
Payer: MEDICARE

## 2021-06-15 DIAGNOSIS — Z51.11 CHEMOTHERAPY MANAGEMENT, ENCOUNTER FOR: ICD-10-CM

## 2021-06-15 DIAGNOSIS — J18.9 PNEUMONITIS: ICD-10-CM

## 2021-06-15 DIAGNOSIS — C34.92 MALIGNANT NEOPLASM OF LEFT LUNG, UNSPECIFIED PART OF LUNG (HCC): ICD-10-CM

## 2021-06-15 DIAGNOSIS — C34.12 CANCER OF UPPER LOBE OF LEFT LUNG (HCC): ICD-10-CM

## 2021-06-15 PROCEDURE — 71260 CT THORAX DX C+: CPT | Performed by: NURSE PRACTITIONER

## 2021-06-16 ENCOUNTER — TELEPHONE (OUTPATIENT)
Dept: PULMONOLOGY | Facility: CLINIC | Age: 76
End: 2021-06-16

## 2021-06-17 ENCOUNTER — TELEPHONE (OUTPATIENT)
Dept: HEMATOLOGY/ONCOLOGY | Facility: HOSPITAL | Age: 76
End: 2021-06-17

## 2021-06-17 NOTE — TELEPHONE ENCOUNTER
Valencia Ring called with a question about Luis's appointments for tomorrow. They received a call from Dr Myron Giron yesterday with his CT scan results. He told them Luis has pneumonitis.  He asked them to speak with BRYON Mosley about possibly bouchrain

## 2021-06-17 NOTE — TELEPHONE ENCOUNTER
I called Luis and Robyn and updated them that Conrado Geller should come for his appointments tomorrow. Dr Mary Aguilar is reviewing his case at the thoracic tumor board today. They both verbalized understanding and agreed with the plan.

## 2021-06-18 ENCOUNTER — APPOINTMENT (OUTPATIENT)
Dept: HEMATOLOGY/ONCOLOGY | Facility: HOSPITAL | Age: 76
End: 2021-06-18
Attending: INTERNAL MEDICINE
Payer: MEDICARE

## 2021-06-18 ENCOUNTER — OFFICE VISIT (OUTPATIENT)
Dept: HEMATOLOGY/ONCOLOGY | Facility: HOSPITAL | Age: 76
End: 2021-06-18
Attending: NURSE PRACTITIONER
Payer: MEDICARE

## 2021-06-18 VITALS
TEMPERATURE: 98 F | RESPIRATION RATE: 16 BRPM | OXYGEN SATURATION: 95 % | SYSTOLIC BLOOD PRESSURE: 140 MMHG | HEIGHT: 71 IN | HEART RATE: 71 BPM | WEIGHT: 182 LBS | BODY MASS INDEX: 25.48 KG/M2 | DIASTOLIC BLOOD PRESSURE: 65 MMHG

## 2021-06-18 DIAGNOSIS — J18.9 PNEUMONITIS: ICD-10-CM

## 2021-06-18 DIAGNOSIS — C34.12 CANCER OF UPPER LOBE OF LEFT LUNG (HCC): ICD-10-CM

## 2021-06-18 DIAGNOSIS — C34.92 MALIGNANT NEOPLASM OF LEFT LUNG, UNSPECIFIED PART OF LUNG (HCC): Primary | ICD-10-CM

## 2021-06-18 DIAGNOSIS — Z92.89 HISTORY OF IMMUNOTHERAPY: ICD-10-CM

## 2021-06-18 DIAGNOSIS — Z51.11 CHEMOTHERAPY MANAGEMENT, ENCOUNTER FOR: Primary | ICD-10-CM

## 2021-06-18 PROCEDURE — 80053 COMPREHEN METABOLIC PANEL: CPT

## 2021-06-18 PROCEDURE — 84443 ASSAY THYROID STIM HORMONE: CPT

## 2021-06-18 PROCEDURE — 85025 COMPLETE CBC W/AUTO DIFF WBC: CPT

## 2021-06-18 PROCEDURE — 36415 COLL VENOUS BLD VENIPUNCTURE: CPT

## 2021-06-18 PROCEDURE — 99215 OFFICE O/P EST HI 40 MIN: CPT | Performed by: INTERNAL MEDICINE

## 2021-06-18 RX ORDER — PREDNISONE 10 MG/1
TABLET ORAL
Qty: 50 TABLET | Refills: 0 | Status: SHIPPED | OUTPATIENT
Start: 2021-06-18 | End: 2021-07-02 | Stop reason: ALTCHOICE

## 2021-06-18 NOTE — PROGRESS NOTES
Cancer Center Progress Note    Patient Name: Donovan José   YOB: 1945   Medical Record Number: [id number]   Attending Physician: Georges Hoffman M.D.        Chief Complaint:  Lung cancer    History of Present Illness:  Cancer history:  64 ye that resolves, and did notice episode of hypoxia at home this morning at 87% on RA. No palpitations. Fractured rib pain is still improved. Eating and drinking adequately. No diarrhea, N/V or abdominal pain.   No focal neurological deficits, including no co use: Yes        Alcohol/week: 0.8 standard drinks        Types: 1 Glasses of wine per week        Comment: rare no longer has his       Drug use: No      Sexual activity: Not on file    Other Topics      Concerns:         Service: Not Asked total) by mouth daily. , Disp: 30 tablet, Rfl: 3  •  Fluticasone Propionate 50 MCG/ACT Nasal Suspension, 1 spray by Nasal route 2 (two) times daily. , Disp: 1 Bottle, Rfl: 3  •  Cholecalciferol (VITAMIN D3) 1000 units Oral Cap, Take 1,000 Units by mouth andriy 06/18/2021    ALKPHO 83 06/18/2021    AST 24 06/18/2021    ALT 23 06/18/2021    ALKPHOS 58 12/10/2011    BILT 0.5 06/18/2021    TP 7.2 06/18/2021    ALB 3.1 (L) 06/18/2021    GLOBULIN 4.1 06/18/2021     06/18/2021    K 4.4 06/18/2021     06/18/ and transaminitis will use single agent immunotherapy. Received Cycle #1 Pembrolizumab on 3/19/21, w/ recurrent pneumonitis as below. HOLD Cycle #5 Pembrolizumab today; delayed C2 d/t steroid taper, now on decreased Prednisone dose of 5mg daily as below. of the pneumonitis. Repeat current several years later now again with pneumonitis on pembrolizumab. He does have responding disease radiographically.   Based on his most recent CT and symptoms we will increase his prednisone return to clinic in 2 to 3 wee

## 2021-06-18 NOTE — PATIENT INSTRUCTIONS
Prednisone course for treatment of pneumonitis. Please call mid-next week for an update on how you're feeling.     Take 5 tablets (50 mg total) by mouth daily for 4 days,     THEN 4 tablets (40 mg total) daily for 3 days,     THEN 3 tablets (30 mg total) da

## 2021-06-27 DIAGNOSIS — Z51.11 CHEMOTHERAPY MANAGEMENT, ENCOUNTER FOR: ICD-10-CM

## 2021-06-27 DIAGNOSIS — C34.12 CANCER OF UPPER LOBE OF LEFT LUNG (HCC): ICD-10-CM

## 2021-06-27 DIAGNOSIS — J98.4 PNEUMONITIS: ICD-10-CM

## 2021-06-27 DIAGNOSIS — Z92.89 HISTORY OF IMMUNOTHERAPY: ICD-10-CM

## 2021-06-28 RX ORDER — PREDNISONE 5 MG/1
TABLET ORAL
Qty: 30 TABLET | Refills: 0 | OUTPATIENT
Start: 2021-06-28

## 2021-07-02 ENCOUNTER — OFFICE VISIT (OUTPATIENT)
Dept: HEMATOLOGY/ONCOLOGY | Facility: HOSPITAL | Age: 76
End: 2021-07-02
Attending: INTERNAL MEDICINE
Payer: MEDICARE

## 2021-07-02 ENCOUNTER — OFFICE VISIT (OUTPATIENT)
Dept: HEMATOLOGY/ONCOLOGY | Facility: HOSPITAL | Age: 76
End: 2021-07-02
Attending: NURSE PRACTITIONER
Payer: MEDICARE

## 2021-07-02 VITALS
DIASTOLIC BLOOD PRESSURE: 63 MMHG | HEIGHT: 71 IN | BODY MASS INDEX: 26.6 KG/M2 | OXYGEN SATURATION: 96 % | RESPIRATION RATE: 16 BRPM | WEIGHT: 190 LBS | HEART RATE: 74 BPM | TEMPERATURE: 98 F | SYSTOLIC BLOOD PRESSURE: 145 MMHG

## 2021-07-02 VITALS
BODY MASS INDEX: 27 KG/M2 | WEIGHT: 190 LBS | RESPIRATION RATE: 16 BRPM | SYSTOLIC BLOOD PRESSURE: 145 MMHG | TEMPERATURE: 98 F | HEART RATE: 74 BPM | DIASTOLIC BLOOD PRESSURE: 63 MMHG | OXYGEN SATURATION: 96 %

## 2021-07-02 DIAGNOSIS — Z51.11 CHEMOTHERAPY MANAGEMENT, ENCOUNTER FOR: Primary | ICD-10-CM

## 2021-07-02 DIAGNOSIS — J18.9 PNEUMONITIS: ICD-10-CM

## 2021-07-02 DIAGNOSIS — C34.92 MALIGNANT NEOPLASM OF LEFT LUNG, UNSPECIFIED PART OF LUNG (HCC): Primary | ICD-10-CM

## 2021-07-02 DIAGNOSIS — Z92.89 HISTORY OF IMMUNOTHERAPY: ICD-10-CM

## 2021-07-02 DIAGNOSIS — C34.12 CANCER OF UPPER LOBE OF LEFT LUNG (HCC): ICD-10-CM

## 2021-07-02 DIAGNOSIS — D84.9 IMMUNOCOMPROMISED (HCC): ICD-10-CM

## 2021-07-02 LAB
ALBUMIN SERPL-MCNC: 3.2 G/DL (ref 3.4–5)
ALBUMIN/GLOB SERPL: 0.9 {RATIO} (ref 1–2)
ALP LIVER SERPL-CCNC: 80 U/L
ALT SERPL-CCNC: 34 U/L
ANION GAP SERPL CALC-SCNC: 5 MMOL/L (ref 0–18)
AST SERPL-CCNC: 26 U/L (ref 15–37)
BASOPHILS # BLD AUTO: 0.02 X10(3) UL (ref 0–0.2)
BASOPHILS NFR BLD AUTO: 0.2 %
BILIRUB SERPL-MCNC: 0.7 MG/DL (ref 0.1–2)
BUN BLD-MCNC: 25 MG/DL (ref 7–18)
BUN/CREAT SERPL: 28.7 (ref 10–20)
CALCIUM BLD-MCNC: 8.6 MG/DL (ref 8.5–10.1)
CHLORIDE SERPL-SCNC: 103 MMOL/L (ref 98–112)
CO2 SERPL-SCNC: 28 MMOL/L (ref 21–32)
CREAT BLD-MCNC: 0.87 MG/DL
DEPRECATED RDW RBC AUTO: 53 FL (ref 35.1–46.3)
EOSINOPHIL # BLD AUTO: 0.11 X10(3) UL (ref 0–0.7)
EOSINOPHIL NFR BLD AUTO: 0.9 %
ERYTHROCYTE [DISTWIDTH] IN BLOOD BY AUTOMATED COUNT: 14.7 % (ref 11–15)
GLOBULIN PLAS-MCNC: 3.4 G/DL (ref 2.8–4.4)
GLUCOSE BLD-MCNC: 105 MG/DL (ref 70–99)
HCT VFR BLD AUTO: 44 %
HGB BLD-MCNC: 14 G/DL
IMM GRANULOCYTES # BLD AUTO: 0.06 X10(3) UL (ref 0–1)
IMM GRANULOCYTES NFR BLD: 0.5 %
LYMPHOCYTES # BLD AUTO: 0.5 X10(3) UL (ref 1–4)
LYMPHOCYTES NFR BLD AUTO: 4.2 %
M PROTEIN MFR SERPL ELPH: 6.6 G/DL (ref 6.4–8.2)
MCH RBC QN AUTO: 31.5 PG (ref 26–34)
MCHC RBC AUTO-ENTMCNC: 31.8 G/DL (ref 31–37)
MCV RBC AUTO: 98.9 FL
MONOCYTES # BLD AUTO: 0.87 X10(3) UL (ref 0.1–1)
MONOCYTES NFR BLD AUTO: 7.3 %
NEUTROPHILS # BLD AUTO: 10.3 X10 (3) UL (ref 1.5–7.7)
NEUTROPHILS # BLD AUTO: 10.3 X10(3) UL (ref 1.5–7.7)
NEUTROPHILS NFR BLD AUTO: 86.9 %
OSMOLALITY SERPL CALC.SUM OF ELEC: 287 MOSM/KG (ref 275–295)
PLATELET # BLD AUTO: 186 10(3)UL (ref 150–450)
POTASSIUM SERPL-SCNC: 4.2 MMOL/L (ref 3.5–5.1)
RBC # BLD AUTO: 4.45 X10(6)UL
SODIUM SERPL-SCNC: 136 MMOL/L (ref 136–145)
TSI SER-ACNC: 2.05 MIU/ML (ref 0.36–3.74)
WBC # BLD AUTO: 11.9 X10(3) UL (ref 4–11)

## 2021-07-02 PROCEDURE — 80053 COMPREHEN METABOLIC PANEL: CPT

## 2021-07-02 PROCEDURE — 84443 ASSAY THYROID STIM HORMONE: CPT

## 2021-07-02 PROCEDURE — 99215 OFFICE O/P EST HI 40 MIN: CPT | Performed by: INTERNAL MEDICINE

## 2021-07-02 PROCEDURE — 85025 COMPLETE CBC W/AUTO DIFF WBC: CPT

## 2021-07-02 PROCEDURE — 36415 COLL VENOUS BLD VENIPUNCTURE: CPT

## 2021-07-02 PROCEDURE — 96413 CHEMO IV INFUSION 1 HR: CPT

## 2021-07-02 RX ORDER — SULFAMETHOXAZOLE AND TRIMETHOPRIM 800; 160 MG/1; MG/1
TABLET ORAL
Qty: 20 TABLET | Refills: 0 | Status: SHIPPED | OUTPATIENT
Start: 2021-07-02 | End: 2021-07-23

## 2021-07-02 RX ORDER — PREDNISONE 10 MG/1
10 TABLET ORAL DAILY
Qty: 30 TABLET | Refills: 5 | Status: SHIPPED | OUTPATIENT
Start: 2021-07-02 | End: 2021-07-02

## 2021-07-02 RX ORDER — PREDNISONE 10 MG/1
10 TABLET ORAL DAILY
Qty: 30 TABLET | Refills: 5 | Status: SHIPPED | OUTPATIENT
Start: 2021-07-02 | End: 2021-12-14

## 2021-07-02 RX ORDER — SULFAMETHOXAZOLE AND TRIMETHOPRIM 800; 160 MG/1; MG/1
TABLET ORAL
Qty: 20 TABLET | Refills: 0 | Status: SHIPPED | OUTPATIENT
Start: 2021-07-02 | End: 2021-07-02

## 2021-07-02 NOTE — PATIENT INSTRUCTIONS
Continue Keytruda today, but stay on Prednisone 10mg daily indefinitely to prevent the inflammation of the lung    Because Prednisone puts you at bigger risk of infection, will start a preventative dose of antibiotic called Bactrim--- take 1 tablet twice d

## 2021-07-02 NOTE — PROGRESS NOTES
Pt here for C5 D1 Keytruda.   Arrives Ambulating independently, accompanied by self        Pregnancy screening: Not applicable    Modifications in dose or schedule: No    Pt will remain in prednisone 10mg po daily and had bactrim bid on Mon-Wed-Fri added to

## 2021-07-02 NOTE — PROGRESS NOTES
Cancer Center Progress Note    Patient Name: Yas Rivera   YOB: 1945   Medical Record Number: [id number]   Attending Physician: Fernanda Morales M.D.        Chief Complaint:  Lung cancer    History of Present Illness:  Cancer history:  64 ye relatively well. Cough is nearly resolved, MILLAN is 70% improved, no wheeze or CP. No SOB at rest and no hypoxia episodes at home or in clinic today. Able to still perform all ADLs. No fevers, chills, and no LAD or n/s. Eating and drinking adequately.  No 0.8 standard drinks        Types: 1 Glasses of wine per week        Comment: rare no longer has his       Drug use: No      Sexual activity: Not on file    Other Topics      Concerns:         Service: Not Asked        Blood Transfusions: Not Asked Rfl: 0  •  predniSONE 5 MG Oral Tab, Take 1 tablet (5 mg total) by mouth daily. , Disp: 30 tablet, Rfl: 0  •  Montelukast Sodium 10 MG Oral Tab, Take 1 tablet (10 mg total) by mouth nightly., Disp: 30 tablet, Rfl: 3  •  loratadine 10 MG Oral Tab, Take 1 tab Lab Results   Component Value Date     (H) 07/02/2021    BUN 25 (H) 07/02/2021    BUNCREA 28.7 (H) 07/02/2021    CREATSERUM 0.87 07/02/2021    ANIONGAP 5 07/02/2021    GFR >60 05/14/2016    GFRNAA 83 07/02/2021    GFRAA 96 07/02/2021    CA 8.6 as of the end of April 2018. –Recurrent disease biopsy-proven mediastinal lymph nodes as of March 2021. Same histology. Given PD-L1 greater than 50% we will proceed with single agent pembrolizumab.   Also discussed nivolumab/ipilimumab however with pre monitor. Call if worsens w/ increased Prednisone dosing as above. BRYON Barry      Medical decision making high risk complications of systemic therapy for cancer. Seen and examined with HARPER Padron agree with and edited above.   Lizette Schumacher

## 2021-07-09 ENCOUNTER — APPOINTMENT (OUTPATIENT)
Dept: HEMATOLOGY/ONCOLOGY | Facility: HOSPITAL | Age: 76
End: 2021-07-09
Attending: INTERNAL MEDICINE
Payer: MEDICARE

## 2021-07-09 ENCOUNTER — APPOINTMENT (OUTPATIENT)
Dept: HEMATOLOGY/ONCOLOGY | Facility: HOSPITAL | Age: 76
End: 2021-07-09
Attending: NURSE PRACTITIONER
Payer: MEDICARE

## 2021-07-23 ENCOUNTER — OFFICE VISIT (OUTPATIENT)
Dept: HEMATOLOGY/ONCOLOGY | Facility: HOSPITAL | Age: 76
End: 2021-07-23
Attending: INTERNAL MEDICINE
Payer: MEDICARE

## 2021-07-23 VITALS
DIASTOLIC BLOOD PRESSURE: 57 MMHG | RESPIRATION RATE: 16 BRPM | TEMPERATURE: 98 F | SYSTOLIC BLOOD PRESSURE: 122 MMHG | OXYGEN SATURATION: 99 % | HEIGHT: 70.98 IN | WEIGHT: 187 LBS | BODY MASS INDEX: 26.18 KG/M2 | HEART RATE: 56 BPM

## 2021-07-23 VITALS
BODY MASS INDEX: 26 KG/M2 | DIASTOLIC BLOOD PRESSURE: 57 MMHG | RESPIRATION RATE: 16 BRPM | SYSTOLIC BLOOD PRESSURE: 122 MMHG | WEIGHT: 187 LBS | OXYGEN SATURATION: 99 % | HEART RATE: 56 BPM | TEMPERATURE: 98 F

## 2021-07-23 DIAGNOSIS — C34.92 MALIGNANT NEOPLASM OF LEFT LUNG, UNSPECIFIED PART OF LUNG (HCC): ICD-10-CM

## 2021-07-23 DIAGNOSIS — C34.92 MALIGNANT NEOPLASM OF LEFT LUNG, UNSPECIFIED PART OF LUNG (HCC): Primary | ICD-10-CM

## 2021-07-23 DIAGNOSIS — J18.9 PNEUMONITIS: ICD-10-CM

## 2021-07-23 DIAGNOSIS — Z51.11 CHEMOTHERAPY MANAGEMENT, ENCOUNTER FOR: Primary | ICD-10-CM

## 2021-07-23 DIAGNOSIS — C34.12 CANCER OF UPPER LOBE OF LEFT LUNG (HCC): ICD-10-CM

## 2021-07-23 DIAGNOSIS — D84.9 IMMUNOCOMPROMISED (HCC): ICD-10-CM

## 2021-07-23 LAB
ALBUMIN SERPL-MCNC: 3.7 G/DL (ref 3.4–5)
ALBUMIN/GLOB SERPL: 1.2 {RATIO} (ref 1–2)
ALP LIVER SERPL-CCNC: 71 U/L
ALT SERPL-CCNC: 40 U/L
ANION GAP SERPL CALC-SCNC: 6 MMOL/L (ref 0–18)
AST SERPL-CCNC: 28 U/L (ref 15–37)
BASOPHILS # BLD AUTO: 0.02 X10(3) UL (ref 0–0.2)
BASOPHILS NFR BLD AUTO: 0.3 %
BILIRUB SERPL-MCNC: 0.4 MG/DL (ref 0.1–2)
BUN BLD-MCNC: 20 MG/DL (ref 7–18)
BUN/CREAT SERPL: 19 (ref 10–20)
CALCIUM BLD-MCNC: 8.9 MG/DL (ref 8.5–10.1)
CHLORIDE SERPL-SCNC: 108 MMOL/L (ref 98–112)
CO2 SERPL-SCNC: 24 MMOL/L (ref 21–32)
CREAT BLD-MCNC: 1.05 MG/DL
DEPRECATED RDW RBC AUTO: 51.9 FL (ref 35.1–46.3)
EOSINOPHIL # BLD AUTO: 0.03 X10(3) UL (ref 0–0.7)
EOSINOPHIL NFR BLD AUTO: 0.4 %
ERYTHROCYTE [DISTWIDTH] IN BLOOD BY AUTOMATED COUNT: 14.6 % (ref 11–15)
GLOBULIN PLAS-MCNC: 3.1 G/DL (ref 2.8–4.4)
GLUCOSE BLD-MCNC: 106 MG/DL (ref 70–99)
HCT VFR BLD AUTO: 41.7 %
HGB BLD-MCNC: 13.6 G/DL
IMM GRANULOCYTES # BLD AUTO: 0.03 X10(3) UL (ref 0–1)
IMM GRANULOCYTES NFR BLD: 0.4 %
LYMPHOCYTES # BLD AUTO: 0.71 X10(3) UL (ref 1–4)
LYMPHOCYTES NFR BLD AUTO: 9.3 %
M PROTEIN MFR SERPL ELPH: 6.8 G/DL (ref 6.4–8.2)
MCH RBC QN AUTO: 31.5 PG (ref 26–34)
MCHC RBC AUTO-ENTMCNC: 32.6 G/DL (ref 31–37)
MCV RBC AUTO: 96.5 FL
MONOCYTES # BLD AUTO: 0.31 X10(3) UL (ref 0.1–1)
MONOCYTES NFR BLD AUTO: 4.1 %
NEUTROPHILS # BLD AUTO: 6.54 X10 (3) UL (ref 1.5–7.7)
NEUTROPHILS # BLD AUTO: 6.54 X10(3) UL (ref 1.5–7.7)
NEUTROPHILS NFR BLD AUTO: 85.5 %
OSMOLALITY SERPL CALC.SUM OF ELEC: 289 MOSM/KG (ref 275–295)
PLATELET # BLD AUTO: 204 10(3)UL (ref 150–450)
POTASSIUM SERPL-SCNC: 4.5 MMOL/L (ref 3.5–5.1)
RBC # BLD AUTO: 4.32 X10(6)UL
SODIUM SERPL-SCNC: 138 MMOL/L (ref 136–145)
TSI SER-ACNC: 2.09 MIU/ML (ref 0.36–3.74)
WBC # BLD AUTO: 7.6 X10(3) UL (ref 4–11)

## 2021-07-23 PROCEDURE — 96413 CHEMO IV INFUSION 1 HR: CPT

## 2021-07-23 PROCEDURE — 99215 OFFICE O/P EST HI 40 MIN: CPT | Performed by: INTERNAL MEDICINE

## 2021-07-23 PROCEDURE — 80053 COMPREHEN METABOLIC PANEL: CPT

## 2021-07-23 PROCEDURE — 84443 ASSAY THYROID STIM HORMONE: CPT

## 2021-07-23 PROCEDURE — 85025 COMPLETE CBC W/AUTO DIFF WBC: CPT

## 2021-07-23 RX ORDER — SULFAMETHOXAZOLE AND TRIMETHOPRIM 800; 160 MG/1; MG/1
TABLET ORAL
Qty: 20 TABLET | Refills: 3 | Status: SHIPPED | OUTPATIENT
Start: 2021-07-23 | End: 2021-10-22

## 2021-07-23 NOTE — PROGRESS NOTES
Cancer Center Progress Note    Patient Name: Zi Smith   YOB: 1945   Medical Record Number: [id number]   Attending Physician: Deyvi Morales M.D.        Chief Complaint:  Lung cancer    History of Present Illness:  Cancer history:  64 ye today.  Able to still perform all ADLs, doing a lot of outside yard work without issue. No fevers, chills, and no LAD or n/s. Eating and drinking adequately. No diarrhea, N/V or abdominal pain.   No focal neurological deficits, including no confusion, hea his       Drug use: No      Sexual activity: Not on file    Other Topics      Concerns:         Service: Not Asked        Blood Transfusions: Not Asked        Caffeine Concern: Yes          coffee, 2 cups daily        Occupational Exposure: Not Ask mg total) by mouth daily. , Disp: 30 tablet, Rfl: 3  •  Fluticasone Propionate 50 MCG/ACT Nasal Suspension, 1 spray by Nasal route 2 (two) times daily. 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 Received Cycle #1 Pembrolizumab on 3/19/21, w/ recurrent pneumonitis as below. Delayed C2 d/t steroid taper, and was kept on 5mg Prednisone daily.   Reimaging with CT after C4 with pneumo

## 2021-07-30 ENCOUNTER — APPOINTMENT (OUTPATIENT)
Dept: HEMATOLOGY/ONCOLOGY | Facility: HOSPITAL | Age: 76
End: 2021-07-30
Attending: INTERNAL MEDICINE
Payer: MEDICARE

## 2021-07-30 ENCOUNTER — APPOINTMENT (OUTPATIENT)
Dept: HEMATOLOGY/ONCOLOGY | Facility: HOSPITAL | Age: 76
End: 2021-07-30
Attending: NURSE PRACTITIONER
Payer: MEDICARE

## 2021-08-13 ENCOUNTER — NURSE ONLY (OUTPATIENT)
Dept: HEMATOLOGY/ONCOLOGY | Facility: HOSPITAL | Age: 76
End: 2021-08-13
Attending: INTERNAL MEDICINE
Payer: MEDICARE

## 2021-08-13 ENCOUNTER — OFFICE VISIT (OUTPATIENT)
Dept: HEMATOLOGY/ONCOLOGY | Facility: HOSPITAL | Age: 76
End: 2021-08-13
Attending: NURSE PRACTITIONER
Payer: MEDICARE

## 2021-08-13 VITALS
HEART RATE: 71 BPM | HEIGHT: 71 IN | WEIGHT: 189 LBS | OXYGEN SATURATION: 97 % | DIASTOLIC BLOOD PRESSURE: 56 MMHG | BODY MASS INDEX: 26.46 KG/M2 | TEMPERATURE: 98 F | SYSTOLIC BLOOD PRESSURE: 128 MMHG | RESPIRATION RATE: 18 BRPM

## 2021-08-13 VITALS
WEIGHT: 189 LBS | BODY MASS INDEX: 26 KG/M2 | RESPIRATION RATE: 18 BRPM | SYSTOLIC BLOOD PRESSURE: 128 MMHG | HEART RATE: 71 BPM | OXYGEN SATURATION: 97 % | DIASTOLIC BLOOD PRESSURE: 56 MMHG | TEMPERATURE: 98 F

## 2021-08-13 DIAGNOSIS — C34.92 MALIGNANT NEOPLASM OF LEFT LUNG, UNSPECIFIED PART OF LUNG (HCC): Primary | ICD-10-CM

## 2021-08-13 DIAGNOSIS — Z51.11 CHEMOTHERAPY MANAGEMENT, ENCOUNTER FOR: Primary | ICD-10-CM

## 2021-08-13 DIAGNOSIS — C34.92 MALIGNANT NEOPLASM OF LEFT LUNG, UNSPECIFIED PART OF LUNG (HCC): ICD-10-CM

## 2021-08-13 DIAGNOSIS — B44.9 ASPERGILLUS PNEUMONIA (HCC): ICD-10-CM

## 2021-08-13 DIAGNOSIS — J18.9 PNEUMONITIS: ICD-10-CM

## 2021-08-13 LAB
ALBUMIN SERPL-MCNC: 3.6 G/DL (ref 3.4–5)
ALBUMIN/GLOB SERPL: 1.2 {RATIO} (ref 1–2)
ALP LIVER SERPL-CCNC: 72 U/L
ALT SERPL-CCNC: 32 U/L
ANION GAP SERPL CALC-SCNC: 3 MMOL/L (ref 0–18)
AST SERPL-CCNC: 28 U/L (ref 15–37)
BASOPHILS # BLD AUTO: 0.03 X10(3) UL (ref 0–0.2)
BASOPHILS NFR BLD AUTO: 0.6 %
BILIRUB SERPL-MCNC: 0.5 MG/DL (ref 0.1–2)
BUN BLD-MCNC: 25 MG/DL (ref 7–18)
BUN/CREAT SERPL: 28.1 (ref 10–20)
CALCIUM BLD-MCNC: 8.9 MG/DL (ref 8.5–10.1)
CHLORIDE SERPL-SCNC: 107 MMOL/L (ref 98–112)
CO2 SERPL-SCNC: 28 MMOL/L (ref 21–32)
CREAT BLD-MCNC: 0.89 MG/DL
DEPRECATED RDW RBC AUTO: 53.2 FL (ref 35.1–46.3)
EOSINOPHIL # BLD AUTO: 0.24 X10(3) UL (ref 0–0.7)
EOSINOPHIL NFR BLD AUTO: 4.5 %
ERYTHROCYTE [DISTWIDTH] IN BLOOD BY AUTOMATED COUNT: 14.8 % (ref 11–15)
GLOBULIN PLAS-MCNC: 3.1 G/DL (ref 2.8–4.4)
GLUCOSE BLD-MCNC: 93 MG/DL (ref 70–99)
HCT VFR BLD AUTO: 41.2 %
HGB BLD-MCNC: 13.5 G/DL
IMM GRANULOCYTES # BLD AUTO: 0.03 X10(3) UL (ref 0–1)
IMM GRANULOCYTES NFR BLD: 0.6 %
LYMPHOCYTES # BLD AUTO: 0.89 X10(3) UL (ref 1–4)
LYMPHOCYTES NFR BLD AUTO: 16.5 %
M PROTEIN MFR SERPL ELPH: 6.7 G/DL (ref 6.4–8.2)
MCH RBC QN AUTO: 31.5 PG (ref 26–34)
MCHC RBC AUTO-ENTMCNC: 32.8 G/DL (ref 31–37)
MCV RBC AUTO: 96.3 FL
MONOCYTES # BLD AUTO: 0.46 X10(3) UL (ref 0.1–1)
MONOCYTES NFR BLD AUTO: 8.5 %
NEUTROPHILS # BLD AUTO: 3.74 X10 (3) UL (ref 1.5–7.7)
NEUTROPHILS # BLD AUTO: 3.74 X10(3) UL (ref 1.5–7.7)
NEUTROPHILS NFR BLD AUTO: 69.3 %
OSMOLALITY SERPL CALC.SUM OF ELEC: 290 MOSM/KG (ref 275–295)
PLATELET # BLD AUTO: 184 10(3)UL (ref 150–450)
POTASSIUM SERPL-SCNC: 4.5 MMOL/L (ref 3.5–5.1)
RBC # BLD AUTO: 4.28 X10(6)UL
SODIUM SERPL-SCNC: 138 MMOL/L (ref 136–145)
TSI SER-ACNC: 2.43 MIU/ML (ref 0.36–3.74)
WBC # BLD AUTO: 5.4 X10(3) UL (ref 4–11)

## 2021-08-13 PROCEDURE — 99215 OFFICE O/P EST HI 40 MIN: CPT | Performed by: INTERNAL MEDICINE

## 2021-08-13 PROCEDURE — 96413 CHEMO IV INFUSION 1 HR: CPT

## 2021-08-13 PROCEDURE — 85025 COMPLETE CBC W/AUTO DIFF WBC: CPT

## 2021-08-13 PROCEDURE — 80053 COMPREHEN METABOLIC PANEL: CPT

## 2021-08-13 PROCEDURE — 84443 ASSAY THYROID STIM HORMONE: CPT

## 2021-08-13 NOTE — PROGRESS NOTES
Cancer Center Progress Note    Patient Name: Ángel Cruz   YOB: 1945   Medical Record Number: [id number]   Attending Physician: Jesse Llamas M.D.        Chief Complaint:  Lung cancer    History of Present Illness:  Cancer history:  64 ye today.  Able to still perform all ADLs, doing a lot of outside yard work without issue. No fevers, chills, and no LAD or n/s. Eating and drinking adequately. No diarrhea, N/V or abdominal pain.   No focal neurological deficits, including no confusion, hea his       Drug use: No      Sexual activity: Not on file    Other Topics      Concerns:         Service: Not Asked        Blood Transfusions: Not Asked        Caffeine Concern: Yes          coffee, 2 cups daily        Occupational Exposure: Not Ask mg total) by mouth daily. , Disp: 30 tablet, Rfl: 3  •  Fluticasone Propionate 50 MCG/ACT Nasal Suspension, 1 spray by Nasal route 2 (two) times daily. , Disp: 1 Bottle, Rfl: 3  •  Cholecalciferol (VITAMIN D3) 1000 units Oral Cap, Take 1,000 Units by mouth d OSMOCALC 290 08/13/2021    ALKPHO 72 08/13/2021    AST 28 08/13/2021    ALT 32 08/13/2021    ALKPHOS 58 12/10/2011    BILT 0.5 08/13/2021    TP 6.7 08/13/2021    ALB 3.6 08/13/2021    GLOBULIN 3.1 08/13/2021     08/13/2021    K 4.5 08/13/2021    CL 1 pneumonitis changes, decreased areas of lymphadenopathy r/t malignancy showing good tx response. Due to recurrence of clinically symptomatic pneumonitis below, HELD Cycle #5 Pembrolizumab for 2 weeks with below steroid taper.    Plan to continue Cycle #7 to

## 2021-08-13 NOTE — PROGRESS NOTES
Pt here for C7 D1 Keytruda. Arrives Ambulating independently, accompanied by self        Pregnancy screening: Not applicable    Modifications in dose or schedule: No. Tolerating tx, had exam prior.  Lab results wnl      PIV established in lab today, + bloo

## 2021-08-20 ENCOUNTER — APPOINTMENT (OUTPATIENT)
Dept: HEMATOLOGY/ONCOLOGY | Facility: HOSPITAL | Age: 76
End: 2021-08-20
Attending: INTERNAL MEDICINE
Payer: MEDICARE

## 2021-09-03 ENCOUNTER — NURSE ONLY (OUTPATIENT)
Dept: HEMATOLOGY/ONCOLOGY | Facility: HOSPITAL | Age: 76
End: 2021-09-03
Attending: INTERNAL MEDICINE
Payer: MEDICARE

## 2021-09-03 ENCOUNTER — OFFICE VISIT (OUTPATIENT)
Dept: HEMATOLOGY/ONCOLOGY | Facility: HOSPITAL | Age: 76
End: 2021-09-03
Attending: NURSE PRACTITIONER
Payer: MEDICARE

## 2021-09-03 VITALS
DIASTOLIC BLOOD PRESSURE: 62 MMHG | RESPIRATION RATE: 16 BRPM | OXYGEN SATURATION: 96 % | TEMPERATURE: 98 F | BODY MASS INDEX: 26.46 KG/M2 | HEART RATE: 53 BPM | HEIGHT: 71 IN | SYSTOLIC BLOOD PRESSURE: 127 MMHG | WEIGHT: 189 LBS

## 2021-09-03 VITALS
HEART RATE: 53 BPM | OXYGEN SATURATION: 96 % | DIASTOLIC BLOOD PRESSURE: 62 MMHG | SYSTOLIC BLOOD PRESSURE: 127 MMHG | RESPIRATION RATE: 16 BRPM | BODY MASS INDEX: 26 KG/M2 | TEMPERATURE: 98 F | WEIGHT: 189 LBS

## 2021-09-03 DIAGNOSIS — C34.92 MALIGNANT NEOPLASM OF LEFT LUNG, UNSPECIFIED PART OF LUNG (HCC): Primary | ICD-10-CM

## 2021-09-03 DIAGNOSIS — Z51.11 CHEMOTHERAPY MANAGEMENT, ENCOUNTER FOR: Primary | ICD-10-CM

## 2021-09-03 DIAGNOSIS — C34.92 MALIGNANT NEOPLASM OF LEFT LUNG, UNSPECIFIED PART OF LUNG (HCC): ICD-10-CM

## 2021-09-03 DIAGNOSIS — J18.9 PNEUMONITIS: ICD-10-CM

## 2021-09-03 LAB
ALBUMIN SERPL-MCNC: 3.9 G/DL (ref 3.4–5)
ALBUMIN/GLOB SERPL: 1.3 {RATIO} (ref 1–2)
ALP LIVER SERPL-CCNC: 67 U/L
ALT SERPL-CCNC: 29 U/L
ANION GAP SERPL CALC-SCNC: 8 MMOL/L (ref 0–18)
AST SERPL-CCNC: 22 U/L (ref 15–37)
BASOPHILS # BLD AUTO: 0.01 X10(3) UL (ref 0–0.2)
BASOPHILS NFR BLD AUTO: 0.2 %
BILIRUB SERPL-MCNC: 0.5 MG/DL (ref 0.1–2)
BUN BLD-MCNC: 25 MG/DL (ref 7–18)
BUN/CREAT SERPL: 25 (ref 10–20)
CALCIUM BLD-MCNC: 8.9 MG/DL (ref 8.5–10.1)
CHLORIDE SERPL-SCNC: 105 MMOL/L (ref 98–112)
CO2 SERPL-SCNC: 24 MMOL/L (ref 21–32)
CREAT BLD-MCNC: 1 MG/DL
DEPRECATED RDW RBC AUTO: 53.6 FL (ref 35.1–46.3)
EOSINOPHIL # BLD AUTO: 0.03 X10(3) UL (ref 0–0.7)
EOSINOPHIL NFR BLD AUTO: 0.5 %
ERYTHROCYTE [DISTWIDTH] IN BLOOD BY AUTOMATED COUNT: 14.8 % (ref 11–15)
GLOBULIN PLAS-MCNC: 2.9 G/DL (ref 2.8–4.4)
GLUCOSE BLD-MCNC: 109 MG/DL (ref 70–99)
HCT VFR BLD AUTO: 43.5 %
HGB BLD-MCNC: 14.3 G/DL
IMM GRANULOCYTES # BLD AUTO: 0.02 X10(3) UL (ref 0–1)
IMM GRANULOCYTES NFR BLD: 0.3 %
LYMPHOCYTES # BLD AUTO: 0.61 X10(3) UL (ref 1–4)
LYMPHOCYTES NFR BLD AUTO: 10.2 %
M PROTEIN MFR SERPL ELPH: 6.8 G/DL (ref 6.4–8.2)
MCH RBC QN AUTO: 31.8 PG (ref 26–34)
MCHC RBC AUTO-ENTMCNC: 32.9 G/DL (ref 31–37)
MCV RBC AUTO: 96.9 FL
MONOCYTES # BLD AUTO: 0.22 X10(3) UL (ref 0.1–1)
MONOCYTES NFR BLD AUTO: 3.7 %
NEUTROPHILS # BLD AUTO: 5.08 X10 (3) UL (ref 1.5–7.7)
NEUTROPHILS # BLD AUTO: 5.08 X10(3) UL (ref 1.5–7.7)
NEUTROPHILS NFR BLD AUTO: 85.1 %
OSMOLALITY SERPL CALC.SUM OF ELEC: 289 MOSM/KG (ref 275–295)
PLATELET # BLD AUTO: 195 10(3)UL (ref 150–450)
POTASSIUM SERPL-SCNC: 4.4 MMOL/L (ref 3.5–5.1)
RBC # BLD AUTO: 4.49 X10(6)UL
SODIUM SERPL-SCNC: 137 MMOL/L (ref 136–145)
TSI SER-ACNC: 1.82 MIU/ML (ref 0.36–3.74)
WBC # BLD AUTO: 6 X10(3) UL (ref 4–11)

## 2021-09-03 PROCEDURE — 96413 CHEMO IV INFUSION 1 HR: CPT

## 2021-09-03 PROCEDURE — 80053 COMPREHEN METABOLIC PANEL: CPT

## 2021-09-03 PROCEDURE — 84443 ASSAY THYROID STIM HORMONE: CPT

## 2021-09-03 PROCEDURE — 85025 COMPLETE CBC W/AUTO DIFF WBC: CPT

## 2021-09-03 PROCEDURE — 99215 OFFICE O/P EST HI 40 MIN: CPT | Performed by: INTERNAL MEDICINE

## 2021-09-03 NOTE — PATIENT INSTRUCTIONS
For our Immunocompromised patients with cancer on cancer treatment and/or steroid therapy:     We recommend you obtain a Covid Booster vaccine for further protection against Covid-19; please obtain at least 28 days after your SECOND mRNA vaccination Parmjit

## 2021-09-03 NOTE — PROGRESS NOTES
Cancer Center Progress Note    Patient Name: Raymond Beavers   YOB: 1945   Medical Record Number: [id number]   Attending Physician: Sahil Shotr M.D.        Chief Complaint:  Lung cancer    History of Present Illness:  Cancer history:  64 ye Able to still perform all ADLs, doing a lot of outside yard work without issue. No fevers, chills, and no LAD or n/s. Eating and drinking adequately. No diarrhea, N/V or abdominal pain.   No focal neurological deficits, including no confusion, headache p no longer has his       Drug use: No      Sexual activity: Not on file    Other Topics      Concerns:         Service: Not Asked        Blood Transfusions: Not Asked        Caffeine Concern: Yes          coffee, 2 cups daily        Occupational Exp Take 1 tablet (10 mg total) by mouth daily. , Disp: 30 tablet, Rfl: 3  •  Fluticasone Propionate 50 MCG/ACT Nasal Suspension, 1 spray by Nasal route 2 (two) times daily. , Disp: 1 Bottle, Rfl: 3  •  Cholecalciferol (VITAMIN D3) 1000 units Oral Cap, Take 1,00 09/03/2021    OSMOCALC 289 09/03/2021    ALKPHO 67 09/03/2021    AST 22 09/03/2021    ALT 29 09/03/2021    ALKPHOS 58 12/10/2011    BILT 0.5 09/03/2021    TP 6.8 09/03/2021    ALB 3.9 09/03/2021    GLOBULIN 2.9 09/03/2021     09/03/2021    K 4.4 09/0 C4 with pneumonitis changes, decreased areas of lymphadenopathy r/t malignancy showing good tx response. Due to recurrence of clinically symptomatic pneumonitis below, HELD Cycle #5 Pembrolizumab for 2 weeks with below steroid taper.    Plan to continue Cyc

## 2021-09-03 NOTE — PROGRESS NOTES
Pt here for C8 D1 Keytruda. Arrives Ambulating independently, accompanied by self        Pregnancy screening: Not applicable    Modifications in dose or schedule: No. Tolerating tx, had exam prior.  Lab results wnl      PIV established in lab today, + bloo

## 2021-09-06 ENCOUNTER — IMMUNIZATION (OUTPATIENT)
Dept: LAB | Facility: HOSPITAL | Age: 76
End: 2021-09-06
Attending: EMERGENCY MEDICINE
Payer: MEDICARE

## 2021-09-06 DIAGNOSIS — Z23 NEED FOR VACCINATION: Primary | ICD-10-CM

## 2021-09-06 PROCEDURE — 0003A SARSCOV2 VAC 30MCG/0.3ML IM: CPT

## 2021-09-07 ENCOUNTER — TELEPHONE (OUTPATIENT)
Dept: PULMONOLOGY | Facility: CLINIC | Age: 76
End: 2021-09-07

## 2021-09-07 NOTE — TELEPHONE ENCOUNTER
Pt's wife states that her's and her husbands C-pap machines are both recalled, both are pt's of Dr Lucinda Livingston, looking for advise.  Please call

## 2021-09-08 NOTE — TELEPHONE ENCOUNTER
pts wfie is requesting to speak to Nurse Brooklyn Ortega again, as she has additional questions for her.

## 2021-09-08 NOTE — TELEPHONE ENCOUNTER
Spoke to patient and relayed Dr. Marie Finely message below:  The device is very safe to use as long as they do not use an ozone based cleaning system.   It is thought that the ozone cleaning system resulted in premature degradation of one of the filters in the

## 2021-09-20 ENCOUNTER — NURSE ONLY (OUTPATIENT)
Dept: HEMATOLOGY/ONCOLOGY | Facility: HOSPITAL | Age: 76
End: 2021-09-20
Attending: INTERNAL MEDICINE
Payer: MEDICARE

## 2021-09-20 ENCOUNTER — HOSPITAL ENCOUNTER (OUTPATIENT)
Dept: CT IMAGING | Facility: HOSPITAL | Age: 76
Discharge: HOME | End: 2021-09-20
Attending: NURSE PRACTITIONER
Payer: MEDICARE

## 2021-09-20 DIAGNOSIS — J18.9 PNEUMONITIS: ICD-10-CM

## 2021-09-20 DIAGNOSIS — Z51.11 CHEMOTHERAPY MANAGEMENT, ENCOUNTER FOR: ICD-10-CM

## 2021-09-20 DIAGNOSIS — C34.92 MALIGNANT NEOPLASM OF LEFT LUNG, UNSPECIFIED PART OF LUNG (HCC): ICD-10-CM

## 2021-09-20 PROCEDURE — 71260 CT THORAX DX C+: CPT | Performed by: NURSE PRACTITIONER

## 2021-09-20 PROCEDURE — 36415 COLL VENOUS BLD VENIPUNCTURE: CPT

## 2021-09-24 ENCOUNTER — OFFICE VISIT (OUTPATIENT)
Dept: HEMATOLOGY/ONCOLOGY | Facility: HOSPITAL | Age: 76
End: 2021-09-24
Attending: INTERNAL MEDICINE
Payer: MEDICARE

## 2021-09-24 ENCOUNTER — OFFICE VISIT (OUTPATIENT)
Dept: HEMATOLOGY/ONCOLOGY | Facility: HOSPITAL | Age: 76
End: 2021-09-24
Attending: NURSE PRACTITIONER
Payer: MEDICARE

## 2021-09-24 VITALS
SYSTOLIC BLOOD PRESSURE: 138 MMHG | WEIGHT: 192 LBS | BODY MASS INDEX: 26.88 KG/M2 | OXYGEN SATURATION: 96 % | HEART RATE: 61 BPM | DIASTOLIC BLOOD PRESSURE: 59 MMHG | HEIGHT: 71 IN | RESPIRATION RATE: 18 BRPM | TEMPERATURE: 98 F

## 2021-09-24 VITALS
WEIGHT: 192 LBS | OXYGEN SATURATION: 98 % | RESPIRATION RATE: 18 BRPM | SYSTOLIC BLOOD PRESSURE: 138 MMHG | DIASTOLIC BLOOD PRESSURE: 59 MMHG | BODY MASS INDEX: 27 KG/M2 | TEMPERATURE: 98 F | HEART RATE: 61 BPM

## 2021-09-24 DIAGNOSIS — C34.92 MALIGNANT NEOPLASM OF LEFT LUNG, UNSPECIFIED PART OF LUNG (HCC): Primary | ICD-10-CM

## 2021-09-24 DIAGNOSIS — Z51.11 CHEMOTHERAPY MANAGEMENT, ENCOUNTER FOR: Primary | ICD-10-CM

## 2021-09-24 DIAGNOSIS — C34.92 MALIGNANT NEOPLASM OF LEFT LUNG, UNSPECIFIED PART OF LUNG (HCC): ICD-10-CM

## 2021-09-24 DIAGNOSIS — J18.9 PNEUMONITIS: ICD-10-CM

## 2021-09-24 LAB
ALBUMIN SERPL-MCNC: 3.5 G/DL (ref 3.4–5)
ALBUMIN/GLOB SERPL: 1.1 {RATIO} (ref 1–2)
ALP LIVER SERPL-CCNC: 82 U/L
ALT SERPL-CCNC: 30 U/L
ANION GAP SERPL CALC-SCNC: 7 MMOL/L (ref 0–18)
AST SERPL-CCNC: 22 U/L (ref 15–37)
BASOPHILS # BLD AUTO: 0.03 X10(3) UL (ref 0–0.2)
BASOPHILS NFR BLD AUTO: 0.3 %
BILIRUB SERPL-MCNC: 0.4 MG/DL (ref 0.1–2)
BUN BLD-MCNC: 30 MG/DL (ref 7–18)
BUN/CREAT SERPL: 33.7 (ref 10–20)
CALCIUM BLD-MCNC: 8.4 MG/DL (ref 8.5–10.1)
CHLORIDE SERPL-SCNC: 107 MMOL/L (ref 98–112)
CO2 SERPL-SCNC: 24 MMOL/L (ref 21–32)
CREAT BLD-MCNC: 0.89 MG/DL
DEPRECATED RDW RBC AUTO: 54.1 FL (ref 35.1–46.3)
EOSINOPHIL # BLD AUTO: 0.42 X10(3) UL (ref 0–0.7)
EOSINOPHIL NFR BLD AUTO: 4.6 %
ERYTHROCYTE [DISTWIDTH] IN BLOOD BY AUTOMATED COUNT: 14.9 % (ref 11–15)
GLOBULIN PLAS-MCNC: 3.1 G/DL (ref 2.8–4.4)
GLUCOSE BLD-MCNC: 101 MG/DL (ref 70–99)
HCT VFR BLD AUTO: 41.7 %
HGB BLD-MCNC: 13.9 G/DL
IMM GRANULOCYTES # BLD AUTO: 0.04 X10(3) UL (ref 0–1)
IMM GRANULOCYTES NFR BLD: 0.4 %
LYMPHOCYTES # BLD AUTO: 0.93 X10(3) UL (ref 1–4)
LYMPHOCYTES NFR BLD AUTO: 10.2 %
MCH RBC QN AUTO: 32.5 PG (ref 26–34)
MCHC RBC AUTO-ENTMCNC: 33.3 G/DL (ref 31–37)
MCV RBC AUTO: 97.4 FL
MONOCYTES # BLD AUTO: 0.59 X10(3) UL (ref 0.1–1)
MONOCYTES NFR BLD AUTO: 6.5 %
NEUTROPHILS # BLD AUTO: 7.13 X10 (3) UL (ref 1.5–7.7)
NEUTROPHILS # BLD AUTO: 7.13 X10(3) UL (ref 1.5–7.7)
NEUTROPHILS NFR BLD AUTO: 78 %
OSMOLALITY SERPL CALC.SUM OF ELEC: 292 MOSM/KG (ref 275–295)
PLATELET # BLD AUTO: 204 10(3)UL (ref 150–450)
POTASSIUM SERPL-SCNC: 4.1 MMOL/L (ref 3.5–5.1)
PROT SERPL-MCNC: 6.6 G/DL (ref 6.4–8.2)
RBC # BLD AUTO: 4.28 X10(6)UL
SODIUM SERPL-SCNC: 138 MMOL/L (ref 136–145)
TSI SER-ACNC: 2.6 MIU/ML (ref 0.36–3.74)
WBC # BLD AUTO: 9.1 X10(3) UL (ref 4–11)

## 2021-09-24 PROCEDURE — 96413 CHEMO IV INFUSION 1 HR: CPT

## 2021-09-24 PROCEDURE — 80053 COMPREHEN METABOLIC PANEL: CPT

## 2021-09-24 PROCEDURE — 84443 ASSAY THYROID STIM HORMONE: CPT

## 2021-09-24 PROCEDURE — 85025 COMPLETE CBC W/AUTO DIFF WBC: CPT

## 2021-09-24 PROCEDURE — 99215 OFFICE O/P EST HI 40 MIN: CPT | Performed by: INTERNAL MEDICINE

## 2021-09-24 NOTE — PROGRESS NOTES
Pt here for C9 D1 Keytruda. Arrives Ambulating independently, accompanied by self         Pregnancy screening: Not applicable     Modifications in dose or schedule: No.    PIV established in lab today, + blood return noted.                               Fr

## 2021-09-24 NOTE — PROGRESS NOTES
Cancer Center Progress Note    Patient Name: Donovan José   YOB: 1945   Medical Record Number: [id number]   Attending Physician: Georges Hoffman M.D.        Chief Complaint:  Lung cancer    History of Present Illness:  Cancer history:  64 ye today.  Able to still perform all ADLs, doing a lot of outside yard work without issue. No fevers, chills, and no LAD or n/s. Eating and drinking adequately. No diarrhea, N/V or abdominal pain.   No focal neurological deficits, including no confusion, hea Types: 1 Glasses of wine per week        Comment: rare no longer has his       Drug use: No      Sexual activity: Not on file    Other Topics      Concerns:         Service: Not Asked        Blood Transfusions: Not Asked        Caffeine Concern: [employer] on file      Current Medications:    Current Outpatient Medications:   •  Sulfamethoxazole-TMP -160 MG Oral Tab per tablet, One tablet PO BID on Monday, Wednesday, Friday, Disp: 20 tablet, Rfl: 3  •  predniSONE 10 MG Oral Tab, Take 1 tablet (10 mg to Date    HGB 13.9 09/24/2021    HGB 14.3 09/03/2021    HGB 13.5 08/13/2021      Lab Results   Component Value Date    .0 09/24/2021    .0 09/03/2021    .0 08/13/2021       Lab Results   Component Value Date     (H) 09/24/2021 mediastinal lymph nodes as of March 2021. Same histology. Given PD-L1 greater than 50% we will proceed with single agent pembrolizumab.   Also discussed nivolumab/ipilimumab however with previous issues of pneumonitis and transaminitis will use single age monitoring. --Suspecting healing shingles lesions to right thigh/buttocks. No intervention needed with crusting over since 6/8. No PHN pain. Supportive care and monitor. Call if worsens w/ increased Prednisone dosing as above.        Medical decision ma

## 2021-10-14 ENCOUNTER — TELEPHONE (OUTPATIENT)
Dept: PULMONOLOGY | Facility: CLINIC | Age: 76
End: 2021-10-14

## 2021-10-14 NOTE — TELEPHONE ENCOUNTER
Spouse accepted [place] for [place] 10:[place] [facility] [place]. Appt info given. She voiced understanding. Dr. Vita Dennison- pls sign rx if agreeable.

## 2021-10-15 ENCOUNTER — OFFICE VISIT (OUTPATIENT)
Dept: HEMATOLOGY/ONCOLOGY | Facility: HOSPITAL | Age: 76
End: 2021-10-15
Attending: INTERNAL MEDICINE
Payer: MEDICARE

## 2021-10-15 VITALS
DIASTOLIC BLOOD PRESSURE: 53 MMHG | HEART RATE: 57 BPM | WEIGHT: 191 LBS | TEMPERATURE: 98 F | OXYGEN SATURATION: 97 % | RESPIRATION RATE: 18 BRPM | SYSTOLIC BLOOD PRESSURE: 143 MMHG | BODY MASS INDEX: 26.74 KG/M2 | HEIGHT: 71 IN

## 2021-10-15 DIAGNOSIS — C34.92 MALIGNANT NEOPLASM OF LEFT LUNG, UNSPECIFIED PART OF LUNG (HCC): Primary | ICD-10-CM

## 2021-10-15 DIAGNOSIS — Z51.11 CHEMOTHERAPY MANAGEMENT, ENCOUNTER FOR: ICD-10-CM

## 2021-10-15 DIAGNOSIS — J18.9 PNEUMONITIS: ICD-10-CM

## 2021-10-15 DIAGNOSIS — B44.9 ASPERGILLUS PNEUMONIA (HCC): ICD-10-CM

## 2021-10-15 PROCEDURE — 99215 OFFICE O/P EST HI 40 MIN: CPT | Performed by: INTERNAL MEDICINE

## 2021-10-15 PROCEDURE — 84443 ASSAY THYROID STIM HORMONE: CPT

## 2021-10-15 PROCEDURE — 96413 CHEMO IV INFUSION 1 HR: CPT

## 2021-10-15 PROCEDURE — 80053 COMPREHEN METABOLIC PANEL: CPT

## 2021-10-15 PROCEDURE — 85025 COMPLETE CBC W/AUTO DIFF WBC: CPT

## 2021-10-15 NOTE — PROGRESS NOTES
Pt here for C10 D1 Keytruda. Arrives Ambulating independently, accompanied by self         Pregnancy screening: Not applicable     Modifications in dose or schedule: No.   Please note C12 will be delayed (ok per MD) for personal reasons.  C13 will occur on

## 2021-10-15 NOTE — PROGRESS NOTES
Cancer Center Progress Note    Patient Name: Ángel Cruz   YOB: 1945   Medical Record Number: [id number]   Attending Physician: Jesse Llamas M.D.        Chief Complaint:  Lung cancer    History of Present Illness:  Cancer history:  64 ye able to complete activities of daily living    Covid vaccinated. Received Covid booster on 9/6 without issue.     Performance Status:  ECOG 0    Past Medical History:  Past Medical History:   Diagnosis Date   • Exposure to medical diagnostic radiation    • Hazards: Not Asked        Sleep Concern: Not Asked        Stress Concern: Not Asked        Weight Concern: Not Asked        Special Diet: Not Asked        Back Care: Not Asked        Exercise: Not Asked        Bike Helmet: Not Asked        Seat Belt: Not A Take 1 tablet (10 mg total) by mouth nightly., Disp: 30 tablet, Rfl: 3  •  loratadine 10 MG Oral Tab, Take 1 tablet (10 mg total) by mouth daily. , Disp: 30 tablet, Rfl: 3  •  Fluticasone Propionate 50 MCG/ACT Nasal Suspension, 1 spray by Nasal route 2 (two AST 22 09/24/2021    ALT 30 09/24/2021    ALKPHOS 58 12/10/2011    BILT 0.4 09/24/2021    TP 6.6 09/24/2021    ALB 3.5 09/24/2021    GLOBULIN 3.1 09/24/2021     09/24/2021    K 4.1 09/24/2021     09/24/2021    CO2 24.0 09/24/2021       Radiolog Prednisone 10mg daily dosing. Continue PCP prophy with Bactrim MWF BID. Reimaging prior to C9 with ongoing stable disease and improvement in pneumonitis. –Anemia with severe iron deficiency. Resolved. Responded to IV iron.   He has met with gastroenter

## 2021-10-20 ENCOUNTER — NURSE ONLY (OUTPATIENT)
Dept: PULMONOLOGY | Facility: CLINIC | Age: 76
End: 2021-10-20
Payer: MEDICARE

## 2021-10-20 DIAGNOSIS — Z23 FLU VACCINE NEED: ICD-10-CM

## 2021-10-20 PROCEDURE — G0008 ADMIN INFLUENZA VIRUS VAC: HCPCS | Performed by: INTERNAL MEDICINE

## 2021-10-20 PROCEDURE — 90662 IIV NO PRSV INCREASED AG IM: CPT | Performed by: INTERNAL MEDICINE

## 2021-10-22 DIAGNOSIS — C34.12 CANCER OF UPPER LOBE OF LEFT LUNG (HCC): ICD-10-CM

## 2021-10-22 DIAGNOSIS — D84.9 IMMUNOCOMPROMISED (HCC): ICD-10-CM

## 2021-10-22 DIAGNOSIS — J98.4 PNEUMONITIS: ICD-10-CM

## 2021-10-22 DIAGNOSIS — Z51.11 CHEMOTHERAPY MANAGEMENT, ENCOUNTER FOR: ICD-10-CM

## 2021-10-22 RX ORDER — SULFAMETHOXAZOLE AND TRIMETHOPRIM 800; 160 MG/1; MG/1
TABLET ORAL
Qty: 20 TABLET | Refills: 3 | Status: CANCELLED | OUTPATIENT
Start: 2021-10-22

## 2021-11-05 ENCOUNTER — NURSE ONLY (OUTPATIENT)
Dept: HEMATOLOGY/ONCOLOGY | Facility: HOSPITAL | Age: 76
End: 2021-11-05
Attending: INTERNAL MEDICINE
Payer: MEDICARE

## 2021-11-05 VITALS
SYSTOLIC BLOOD PRESSURE: 140 MMHG | RESPIRATION RATE: 18 BRPM | BODY MASS INDEX: 26.6 KG/M2 | OXYGEN SATURATION: 96 % | DIASTOLIC BLOOD PRESSURE: 70 MMHG | HEART RATE: 66 BPM | WEIGHT: 190 LBS | TEMPERATURE: 98 F | HEIGHT: 71 IN

## 2021-11-05 VITALS
BODY MASS INDEX: 27 KG/M2 | OXYGEN SATURATION: 96 % | HEART RATE: 66 BPM | SYSTOLIC BLOOD PRESSURE: 140 MMHG | RESPIRATION RATE: 18 BRPM | TEMPERATURE: 98 F | DIASTOLIC BLOOD PRESSURE: 70 MMHG | WEIGHT: 190 LBS

## 2021-11-05 DIAGNOSIS — C34.92 MALIGNANT NEOPLASM OF LEFT LUNG, UNSPECIFIED PART OF LUNG (HCC): ICD-10-CM

## 2021-11-05 DIAGNOSIS — C34.92 MALIGNANT NEOPLASM OF LEFT LUNG, UNSPECIFIED PART OF LUNG (HCC): Primary | ICD-10-CM

## 2021-11-05 DIAGNOSIS — B44.9 ASPERGILLUS PNEUMONIA (HCC): ICD-10-CM

## 2021-11-05 DIAGNOSIS — Z51.11 CHEMOTHERAPY MANAGEMENT, ENCOUNTER FOR: Primary | ICD-10-CM

## 2021-11-05 DIAGNOSIS — J18.9 PNEUMONITIS: ICD-10-CM

## 2021-11-05 PROCEDURE — 80053 COMPREHEN METABOLIC PANEL: CPT

## 2021-11-05 PROCEDURE — 84443 ASSAY THYROID STIM HORMONE: CPT

## 2021-11-05 PROCEDURE — 85025 COMPLETE CBC W/AUTO DIFF WBC: CPT

## 2021-11-05 PROCEDURE — 96413 CHEMO IV INFUSION 1 HR: CPT

## 2021-11-05 PROCEDURE — 99215 OFFICE O/P EST HI 40 MIN: CPT | Performed by: INTERNAL MEDICINE

## 2021-11-05 NOTE — PROGRESS NOTES
Seen and examined with HARPER Galalgher. . Agree with her note. 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 Recurrence >2 years later)currently on pembrolizumab however recurrent pneumonitis also.   Good response to predni

## 2021-11-05 NOTE — PROGRESS NOTES
Pt here for C11 D1 Keytruda. Arrives Ambulating independently, accompanied by self         Pregnancy screening: Not applicable     Modifications in dose or schedule: No.   Please note C12 will be delayed (ok per MD) , pt will be visiting his daughter.   C1

## 2021-11-05 NOTE — PROGRESS NOTES
Cancer Center Progress Note    Patient Name: Alfreda Carpenter   YOB: 1945   Medical Record Number: [id number]   Attending Physician: Lashae Evans M.D.        Chief Complaint:  Lung cancer    History of Present Illness:  Cancer history:  64 ye very good. Is able to complete activities of daily living. No new diarrhea, abdominal pain, skin rash, pruritis, anorexia, or new pain. No focal neurological deficits. No active bleeding. Covid vaccinated.  Received Covid booster on 9/6 without issue Blood Transfusions: Not Asked        Caffeine Concern: Yes          coffee, 2 cups daily        Occupational Exposure: Not Asked        Hobby Hazards: Not Asked        Sleep Concern: Not Asked        Stress Concern: Not Asked        Weight Concern: Not Ask tablet, Rfl: 3  •  predniSONE 10 MG Oral Tab, Take 1 tablet (10 mg total) by mouth daily. , Disp: 30 tablet, Rfl: 5  •  Montelukast Sodium 10 MG Oral Tab, Take 1 tablet (10 mg total) by mouth nightly., Disp: 30 tablet, Rfl: 3  •  loratadine 10 MG Oral Tab, Lab Results   Component Value Date     (H) 11/05/2021    BUN 23 (H) 11/05/2021    BUNCREA 20.2 (H) 11/05/2021    CREATSERUM 1.14 11/05/2021    ANIONGAP 4 11/05/2021    GFR >60 05/14/2016    GFRNAA 61 11/05/2021    GFRAA 71 11/05/2021    CA 9.1 Prednisone daily. Reimaging with CT after C4 with pneumonitis changes, decreased areas of lymphadenopathy r/t malignancy showing good tx response.  Due to recurrence of clinically symptomatic pneumonitis below, HELD Cycle #5 Pembrolizumab for 2 weeks with 99.3

## 2021-11-26 ENCOUNTER — APPOINTMENT (OUTPATIENT)
Dept: HEMATOLOGY/ONCOLOGY | Facility: HOSPITAL | Age: 76
End: 2021-11-26
Attending: INTERNAL MEDICINE
Payer: MEDICARE

## 2021-11-26 ENCOUNTER — APPOINTMENT (OUTPATIENT)
Dept: HEMATOLOGY/ONCOLOGY | Facility: HOSPITAL | Age: 76
End: 2021-11-26
Attending: NURSE PRACTITIONER
Payer: MEDICARE

## 2021-12-02 ENCOUNTER — TELEPHONE (OUTPATIENT)
Dept: PULMONOLOGY | Facility: CLINIC | Age: 76
End: 2021-12-02

## 2021-12-02 DIAGNOSIS — G47.33 OSA (OBSTRUCTIVE SLEEP APNEA): Primary | ICD-10-CM

## 2021-12-02 NOTE — TELEPHONE ENCOUNTER
Haim Welsh requesting orders for Cpap mask to be switched from [facility] to [address] [place] [address] Rd. Please call. Thank you.

## 2021-12-10 ENCOUNTER — NURSE ONLY (OUTPATIENT)
Dept: HEMATOLOGY/ONCOLOGY | Facility: HOSPITAL | Age: 76
End: 2021-12-10
Attending: INTERNAL MEDICINE
Payer: MEDICARE

## 2021-12-10 VITALS
RESPIRATION RATE: 18 BRPM | WEIGHT: 192 LBS | HEIGHT: 71 IN | DIASTOLIC BLOOD PRESSURE: 58 MMHG | OXYGEN SATURATION: 97 % | BODY MASS INDEX: 26.88 KG/M2 | HEART RATE: 62 BPM | SYSTOLIC BLOOD PRESSURE: 134 MMHG | TEMPERATURE: 98 F

## 2021-12-10 VITALS
WEIGHT: 192 LBS | OXYGEN SATURATION: 97 % | HEIGHT: 71 IN | DIASTOLIC BLOOD PRESSURE: 58 MMHG | HEART RATE: 62 BPM | BODY MASS INDEX: 26.88 KG/M2 | TEMPERATURE: 98 F | RESPIRATION RATE: 18 BRPM | SYSTOLIC BLOOD PRESSURE: 134 MMHG

## 2021-12-10 DIAGNOSIS — Z51.11 CHEMOTHERAPY MANAGEMENT, ENCOUNTER FOR: Primary | ICD-10-CM

## 2021-12-10 DIAGNOSIS — C34.92 MALIGNANT NEOPLASM OF LEFT LUNG, UNSPECIFIED PART OF LUNG (HCC): Primary | ICD-10-CM

## 2021-12-10 DIAGNOSIS — J18.9 PNEUMONITIS: ICD-10-CM

## 2021-12-10 DIAGNOSIS — C34.92 MALIGNANT NEOPLASM OF LEFT LUNG, UNSPECIFIED PART OF LUNG (HCC): ICD-10-CM

## 2021-12-10 DIAGNOSIS — D84.9 IMMUNOCOMPROMISED (HCC): ICD-10-CM

## 2021-12-10 PROCEDURE — 80053 COMPREHEN METABOLIC PANEL: CPT

## 2021-12-10 PROCEDURE — 36415 COLL VENOUS BLD VENIPUNCTURE: CPT

## 2021-12-10 PROCEDURE — 84443 ASSAY THYROID STIM HORMONE: CPT

## 2021-12-10 PROCEDURE — 96413 CHEMO IV INFUSION 1 HR: CPT

## 2021-12-10 PROCEDURE — 99215 OFFICE O/P EST HI 40 MIN: CPT | Performed by: NURSE PRACTITIONER

## 2021-12-10 PROCEDURE — 85025 COMPLETE CBC W/AUTO DIFF WBC: CPT

## 2021-12-10 NOTE — PROGRESS NOTES
Cancer Center Progress Note    Patient Name: Delfina Wright   YOB: 1945   Medical Record Number: [id number]   Attending Physician: Ernesto Parra M.D.        Chief Complaint:  Lung cancer    History of Present Illness:  Cancer history:  64 ye good.  Is able to complete activities of daily living. No new diarrhea, abdominal pain, skin rash, pruritis, anorexia, or new pain. No focal neurological deficits. No active bleeding. Covid vaccinated. Received Covid booster on 9/6 without issue. Transfusions: Not Asked        Caffeine Concern: Yes          coffee, 2 cups daily        Occupational Exposure: Not Asked        Hobby Hazards: Not Asked        Sleep Concern: Not Asked        Stress Concern: Not Asked        Weight Concern: Not Asked lb)   SpO2 97%   BMI 26.78 kg/m²     Physical Examination:  General: Patient is alert and oriented x 3, not in acute distress. Psych:  Mood and affect appropriate  HEENT: EOMs intact. PERRL. Oropharynx is clear. Neck: No JVD.  No palpable lymphadenopathy treated with concurrent definitive chemoradiotherapy using cisplatin and etoposide finishing in early March 2017. He had low-grade neutropenia and anemia on chemotherapy.  Given he has unresectable stage IIIb disease with no progression following initial c several cycles. 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 Pulmonology on consult for mgmt and monitoring.        Medical decision making high risk

## 2021-12-10 NOTE — PROGRESS NOTES
Pt here for C12 Keytruda. Arrives Ambulating independently, accompanied by self        Pregnancy screening: Not applicable    Modifications in dose or schedule: No. Tolerating tx, had exam prior.  Lab results wnl      PIV established by this nurse, + blood

## 2021-12-14 DIAGNOSIS — Z51.11 CHEMOTHERAPY MANAGEMENT, ENCOUNTER FOR: ICD-10-CM

## 2021-12-14 DIAGNOSIS — J98.4 PNEUMONITIS: ICD-10-CM

## 2021-12-14 DIAGNOSIS — Z92.89 HISTORY OF IMMUNOTHERAPY: ICD-10-CM

## 2021-12-14 RX ORDER — PREDNISONE 10 MG/1
TABLET ORAL
Qty: 30 TABLET | Refills: 5 | Status: SHIPPED | OUTPATIENT
Start: 2021-12-14 | End: 2022-05-02

## 2021-12-21 ENCOUNTER — OFFICE VISIT (OUTPATIENT)
Dept: PULMONOLOGY | Facility: CLINIC | Age: 76
End: 2021-12-21
Payer: MEDICARE

## 2021-12-21 VITALS
RESPIRATION RATE: 16 BRPM | DIASTOLIC BLOOD PRESSURE: 67 MMHG | BODY MASS INDEX: 27.27 KG/M2 | SYSTOLIC BLOOD PRESSURE: 136 MMHG | HEIGHT: 71 IN | OXYGEN SATURATION: 95 % | HEART RATE: 64 BPM | WEIGHT: 194.81 LBS

## 2021-12-21 DIAGNOSIS — C34.12 CANCER OF UPPER LOBE OF LEFT LUNG (HCC): Primary | ICD-10-CM

## 2021-12-21 DIAGNOSIS — E78.5 DYSLIPIDEMIA: ICD-10-CM

## 2021-12-21 DIAGNOSIS — Z12.5 ENCOUNTER FOR PROSTATE CANCER SCREENING: ICD-10-CM

## 2021-12-21 DIAGNOSIS — G47.33 OSA (OBSTRUCTIVE SLEEP APNEA): ICD-10-CM

## 2021-12-21 PROCEDURE — 99213 OFFICE O/P EST LOW 20 MIN: CPT | Performed by: INTERNAL MEDICINE

## 2021-12-21 NOTE — PROGRESS NOTES
The patient is 38-year-old male who [place] from prior evaluation comes in now for follow-up. He notes he is doing well. He is having serial CT scans performed by his oncology team.  He needs a new CPAP machine. His current machine is being recalled.

## 2021-12-30 ENCOUNTER — OFFICE VISIT (OUTPATIENT)
Dept: HEMATOLOGY/ONCOLOGY | Facility: HOSPITAL | Age: 76
End: 2021-12-30
Attending: INTERNAL MEDICINE
Payer: MEDICARE

## 2021-12-30 VITALS
OXYGEN SATURATION: 96 % | SYSTOLIC BLOOD PRESSURE: 135 MMHG | DIASTOLIC BLOOD PRESSURE: 71 MMHG | TEMPERATURE: 99 F | WEIGHT: 198 LBS | RESPIRATION RATE: 16 BRPM | BODY MASS INDEX: 27.72 KG/M2 | HEART RATE: 67 BPM | HEIGHT: 71 IN

## 2021-12-30 VITALS
TEMPERATURE: 99 F | RESPIRATION RATE: 16 BRPM | OXYGEN SATURATION: 96 % | BODY MASS INDEX: 28 KG/M2 | HEART RATE: 67 BPM | SYSTOLIC BLOOD PRESSURE: 135 MMHG | WEIGHT: 198 LBS | DIASTOLIC BLOOD PRESSURE: 71 MMHG

## 2021-12-30 DIAGNOSIS — C34.92 MALIGNANT NEOPLASM OF LEFT LUNG, UNSPECIFIED PART OF LUNG (HCC): ICD-10-CM

## 2021-12-30 DIAGNOSIS — C34.12 CANCER OF UPPER LOBE OF LEFT LUNG (HCC): ICD-10-CM

## 2021-12-30 DIAGNOSIS — C34.92 MALIGNANT NEOPLASM OF LEFT LUNG, UNSPECIFIED PART OF LUNG (HCC): Primary | ICD-10-CM

## 2021-12-30 DIAGNOSIS — Z51.11 CHEMOTHERAPY MANAGEMENT, ENCOUNTER FOR: Primary | ICD-10-CM

## 2021-12-30 DIAGNOSIS — J18.9 PNEUMONITIS: ICD-10-CM

## 2021-12-30 DIAGNOSIS — D84.9 IMMUNOCOMPROMISED (HCC): ICD-10-CM

## 2021-12-30 PROCEDURE — 96361 HYDRATE IV INFUSION ADD-ON: CPT

## 2021-12-30 PROCEDURE — 84443 ASSAY THYROID STIM HORMONE: CPT

## 2021-12-30 PROCEDURE — 80053 COMPREHEN METABOLIC PANEL: CPT

## 2021-12-30 PROCEDURE — 99215 OFFICE O/P EST HI 40 MIN: CPT | Performed by: NURSE PRACTITIONER

## 2021-12-30 PROCEDURE — 96413 CHEMO IV INFUSION 1 HR: CPT

## 2021-12-30 PROCEDURE — 85025 COMPLETE CBC W/AUTO DIFF WBC: CPT

## 2021-12-30 RX ORDER — SULFAMETHOXAZOLE AND TRIMETHOPRIM 800; 160 MG/1; MG/1
TABLET ORAL
Qty: 60 TABLET | Refills: 3 | Status: SHIPPED | OUTPATIENT
Start: 2021-12-30

## 2021-12-30 NOTE — PROGRESS NOTES
Cancer Center Progress Note    Patient Name: Raymond Beavers   YOB: 1945   Medical Record Number: [id number]   Attending Physician: Sahil Short M.D.        Chief Complaint:  Lung cancer    History of Present Illness:  Cancer history:  64 ye good.  Is able to complete activities of daily living. No new diarrhea, abdominal pain, skin rash, pruritis, anorexia, or new pain. No focal neurological deficits. No active bleeding. Covid vaccinated. Received Covid booster on 9/6 without issue. Transfusions: Not Asked        Caffeine Concern: Yes          coffee, 2 cups daily        Occupational Exposure: Not Asked        Hobby Hazards: Not Asked        Sleep Concern: Not Asked        Stress Concern: Not Asked        Weight Concern: Not Asked 96%   BMI 27.62 kg/m²     Physical Examination:  General: Patient is alert and oriented x 3, not in acute distress. Psych:  Mood and affect appropriate  HEENT: EOMs intact. PERRL. Oropharynx is clear. Neck: No JVD. No palpable lymphadenopathy.  Neck is s concurrent definitive chemoradiotherapy using cisplatin and etoposide finishing in early March 2017. He had low-grade neutropenia and anemia on chemotherapy.  Given he has unresectable stage IIIb disease with no progression following initial chemotherapy w imaging 9/2021 with improvement in pneumonitis. –Cautiously proceeding with pembrolizumab and is now tolerating treatment well over several cycles.     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

## 2021-12-30 NOTE — PROGRESS NOTES
Pt here for C13 Keytruda.   Arrives Ambulating independently from [facility]    Pregnancy screening: Not applicable    Modifications in dose or schedule: No.NSB added to today's treatment    Frequency of blood return and site check throughout administration: Prior

## 2022-01-18 ENCOUNTER — HOSPITAL ENCOUNTER (OUTPATIENT)
Dept: CT IMAGING | Facility: HOSPITAL | Age: 77
Discharge: HOME | End: 2022-01-18
Attending: NURSE PRACTITIONER
Payer: MEDICARE

## 2022-01-18 DIAGNOSIS — Z51.11 CHEMOTHERAPY MANAGEMENT, ENCOUNTER FOR: ICD-10-CM

## 2022-01-18 DIAGNOSIS — C34.92 MALIGNANT NEOPLASM OF LEFT LUNG, UNSPECIFIED PART OF LUNG (HCC): ICD-10-CM

## 2022-01-18 PROCEDURE — 74177 CT ABD & PELVIS W/CONTRAST: CPT | Performed by: NURSE PRACTITIONER

## 2022-01-18 PROCEDURE — 71260 CT THORAX DX C+: CPT | Performed by: NURSE PRACTITIONER

## 2022-01-20 ENCOUNTER — TELEPHONE (OUTPATIENT)
Dept: PULMONOLOGY | Facility: CLINIC | Age: 77
End: 2022-01-20

## 2022-01-20 NOTE — TELEPHONE ENCOUNTER
Tania Slater requesting Dr Esperanza Garcia to review CT Scan patient had done for Dr Linette Hector. Please call to discuss. Thank you.

## 2022-01-21 ENCOUNTER — OFFICE VISIT (OUTPATIENT)
Dept: HEMATOLOGY/ONCOLOGY | Facility: HOSPITAL | Age: 77
End: 2022-01-21
Attending: INTERNAL MEDICINE
Payer: MEDICARE

## 2022-01-21 VITALS
BODY MASS INDEX: 28 KG/M2 | HEART RATE: 61 BPM | OXYGEN SATURATION: 96 % | DIASTOLIC BLOOD PRESSURE: 61 MMHG | RESPIRATION RATE: 16 BRPM | SYSTOLIC BLOOD PRESSURE: 136 MMHG | TEMPERATURE: 98 F | WEIGHT: 198 LBS

## 2022-01-21 VITALS
HEIGHT: 71 IN | HEART RATE: 61 BPM | DIASTOLIC BLOOD PRESSURE: 61 MMHG | OXYGEN SATURATION: 96 % | WEIGHT: 198 LBS | BODY MASS INDEX: 27.72 KG/M2 | TEMPERATURE: 98 F | SYSTOLIC BLOOD PRESSURE: 136 MMHG | RESPIRATION RATE: 16 BRPM

## 2022-01-21 DIAGNOSIS — J18.9 PNEUMONITIS: ICD-10-CM

## 2022-01-21 DIAGNOSIS — R94.2 ABNORMAL PET SCAN OF LUNG: ICD-10-CM

## 2022-01-21 DIAGNOSIS — C34.92 MALIGNANT NEOPLASM OF LEFT LUNG, UNSPECIFIED PART OF LUNG (HCC): Primary | ICD-10-CM

## 2022-01-21 DIAGNOSIS — Z12.5 ENCOUNTER FOR PROSTATE CANCER SCREENING: ICD-10-CM

## 2022-01-21 DIAGNOSIS — C34.92 MALIGNANT NEOPLASM OF LEFT LUNG, UNSPECIFIED PART OF LUNG (HCC): ICD-10-CM

## 2022-01-21 DIAGNOSIS — E78.5 DYSLIPIDEMIA: ICD-10-CM

## 2022-01-21 DIAGNOSIS — Z51.11 CHEMOTHERAPY MANAGEMENT, ENCOUNTER FOR: Primary | ICD-10-CM

## 2022-01-21 LAB
ALBUMIN SERPL-MCNC: 4.1 G/DL (ref 3.4–5)
ALBUMIN/GLOB SERPL: 1.4 {RATIO} (ref 1–2)
ALP LIVER SERPL-CCNC: 71 U/L
ALT SERPL-CCNC: 27 U/L
ANION GAP SERPL CALC-SCNC: 6 MMOL/L (ref 0–18)
AST SERPL-CCNC: 24 U/L (ref 15–37)
BASOPHILS # BLD AUTO: 0.03 X10(3) UL (ref 0–0.2)
BASOPHILS NFR BLD AUTO: 0.4 %
BILIRUB SERPL-MCNC: 0.6 MG/DL (ref 0.1–2)
BUN BLD-MCNC: 26 MG/DL (ref 7–18)
BUN/CREAT SERPL: 24.3 (ref 10–20)
CALCIUM BLD-MCNC: 9.5 MG/DL (ref 8.5–10.1)
CHLORIDE SERPL-SCNC: 107 MMOL/L (ref 98–112)
CHOLEST SERPL-MCNC: 247 MG/DL (ref ?–200)
CO2 SERPL-SCNC: 24 MMOL/L (ref 21–32)
COMPLEXED PSA SERPL-MCNC: 4.61 NG/ML (ref ?–4)
CREAT BLD-MCNC: 1.07 MG/DL
DEPRECATED RDW RBC AUTO: 47.2 FL (ref 35.1–46.3)
EOSINOPHIL # BLD AUTO: 0.04 X10(3) UL (ref 0–0.7)
EOSINOPHIL NFR BLD AUTO: 0.5 %
ERYTHROCYTE [DISTWIDTH] IN BLOOD BY AUTOMATED COUNT: 12.5 % (ref 11–15)
FASTING PATIENT LIPID ANSWER: NO
GLOBULIN PLAS-MCNC: 2.9 G/DL (ref 2.8–4.4)
GLUCOSE BLD-MCNC: 113 MG/DL (ref 70–99)
HCT VFR BLD AUTO: 45.7 %
HDLC SERPL-MCNC: 75 MG/DL (ref 40–59)
HGB BLD-MCNC: 15.2 G/DL
IMM GRANULOCYTES # BLD AUTO: 0.06 X10(3) UL (ref 0–1)
IMM GRANULOCYTES NFR BLD: 0.7 %
LDLC SERPL CALC-MCNC: 156 MG/DL (ref ?–100)
LYMPHOCYTES # BLD AUTO: 0.63 X10(3) UL (ref 1–4)
LYMPHOCYTES NFR BLD AUTO: 7.4 %
MCH RBC QN AUTO: 33.5 PG (ref 26–34)
MCHC RBC AUTO-ENTMCNC: 33.3 G/DL (ref 31–37)
MCV RBC AUTO: 100.7 FL
MONOCYTES # BLD AUTO: 0.33 X10(3) UL (ref 0.1–1)
MONOCYTES NFR BLD AUTO: 3.9 %
NEUTROPHILS # BLD AUTO: 7.44 X10 (3) UL (ref 1.5–7.7)
NEUTROPHILS # BLD AUTO: 7.44 X10(3) UL (ref 1.5–7.7)
NEUTROPHILS NFR BLD AUTO: 87.1 %
NONHDLC SERPL-MCNC: 172 MG/DL (ref ?–130)
OSMOLALITY SERPL CALC.SUM OF ELEC: 290 MOSM/KG (ref 275–295)
PLATELET # BLD AUTO: 192 10(3)UL (ref 150–450)
PLATELET # BLD AUTO: 198 10(3)UL (ref 150–450)
POTASSIUM SERPL-SCNC: 4.1 MMOL/L (ref 3.5–5.1)
PROT SERPL-MCNC: 7 G/DL (ref 6.4–8.2)
RBC # BLD AUTO: 4.54 X10(6)UL
SODIUM SERPL-SCNC: 137 MMOL/L (ref 136–145)
TRIGL SERPL-MCNC: 95 MG/DL (ref 30–149)
TSI SER-ACNC: 1.95 MIU/ML (ref 0.36–3.74)
VLDLC SERPL CALC-MCNC: 18 MG/DL (ref 0–30)
WBC # BLD AUTO: 8.5 X10(3) UL (ref 4–11)

## 2022-01-21 PROCEDURE — 84443 ASSAY THYROID STIM HORMONE: CPT

## 2022-01-21 PROCEDURE — 80053 COMPREHEN METABOLIC PANEL: CPT

## 2022-01-21 PROCEDURE — 96413 CHEMO IV INFUSION 1 HR: CPT

## 2022-01-21 PROCEDURE — 85049 AUTOMATED PLATELET COUNT: CPT

## 2022-01-21 PROCEDURE — 80061 LIPID PANEL: CPT

## 2022-01-21 PROCEDURE — 85025 COMPLETE CBC W/AUTO DIFF WBC: CPT

## 2022-01-21 PROCEDURE — 99215 OFFICE O/P EST HI 40 MIN: CPT | Performed by: INTERNAL MEDICINE

## 2022-01-21 NOTE — PROGRESS NOTES
Pt here for C14  Keytruda. Arrives Ambulating independently, accompanied by self        Pregnancy screening: Not applicable    Modifications in dose or schedule: No. Tolerating tx, had exam prior.  Lab results wnl      PIV established in lab with + blood,

## 2022-01-21 NOTE — TELEPHONE ENCOUNTER
Per incoming call from Dr. Garry Barthel, discussed with Dr. Garzon Standard will watch for now and repeat CT scan in 6-8 weeks. Spoke with Patient and wife informed them of Dr. Soy Brunson message.   Patient and wife verbalized understanding and state that they had appointmen

## 2022-01-21 NOTE — PROGRESS NOTES
Cancer Center Progress Note    Patient Name: Nolan Cooney   YOB: 1945   Medical Record Number: [id number]   Attending Physician: Mary Luis M.D.        Chief Complaint:  Lung cancer    History of Present Illness:  Cancer history:  64 ye good.  Is able to complete activities of daily living. No new diarrhea, abdominal pain, skin rash, pruritis, anorexia, or new pain. No focal neurological deficits. No active bleeding. Covid vaccinated. Received Covid booster on 9/6 without issue. Transfusions: Not Asked        Caffeine Concern: Yes          coffee, 2 cups daily        Occupational Exposure: Not Asked        Hobby Hazards: Not Asked        Sleep Concern: Not Asked        Stress Concern: Not Asked        Weight Concern: Not Asked kg/m²     Physical Examination:  General: Patient is alert and oriented x 3, not in acute distress. Psych:  Mood and affect appropriate  HEENT: EOMs intact. PERRL. Oropharynx is clear. Neck: No JVD. No palpable lymphadenopathy. Neck is supple.   Ardelle Dickson chemoradiotherapy using cisplatin and etoposide finishing in early March 2017. He had low-grade neutropenia and anemia on chemotherapy.  Given he has unresectable stage IIIb disease with no progression following initial chemotherapy we  added immunotherapy exam, encouraged to push aggressive oral hydration. Will provide 0.9NS IV bolus today in clinic. Monitor. 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   Now on Prednisone 10mg daily indefinitely(lowest tolerable dosing) as

## 2022-02-10 ENCOUNTER — APPOINTMENT (OUTPATIENT)
Dept: HEMATOLOGY/ONCOLOGY | Facility: HOSPITAL | Age: 77
End: 2022-02-10
Attending: INTERNAL MEDICINE
Payer: MEDICARE

## 2022-02-10 ENCOUNTER — TELEPHONE (OUTPATIENT)
Dept: PULMONOLOGY | Facility: CLINIC | Age: 77
End: 2022-02-10

## 2022-02-11 ENCOUNTER — TELEPHONE (OUTPATIENT)
Dept: PULMONOLOGY | Facility: CLINIC | Age: 77
End: 2022-02-11

## 2022-02-11 ENCOUNTER — NURSE ONLY (OUTPATIENT)
Dept: HEMATOLOGY/ONCOLOGY | Facility: HOSPITAL | Age: 77
End: 2022-02-11
Attending: INTERNAL MEDICINE
Payer: MEDICARE

## 2022-02-11 VITALS
BODY MASS INDEX: 27.58 KG/M2 | WEIGHT: 197 LBS | DIASTOLIC BLOOD PRESSURE: 56 MMHG | RESPIRATION RATE: 16 BRPM | SYSTOLIC BLOOD PRESSURE: 126 MMHG | TEMPERATURE: 99 F | HEIGHT: 71 IN | OXYGEN SATURATION: 94 % | HEART RATE: 65 BPM

## 2022-02-11 DIAGNOSIS — J18.9 PNEUMONITIS: ICD-10-CM

## 2022-02-11 DIAGNOSIS — C34.92 MALIGNANT NEOPLASM OF LEFT LUNG, UNSPECIFIED PART OF LUNG (HCC): Primary | ICD-10-CM

## 2022-02-11 DIAGNOSIS — B44.9 ASPERGILLUS PNEUMONIA (HCC): ICD-10-CM

## 2022-02-11 DIAGNOSIS — Z51.11 CHEMOTHERAPY MANAGEMENT, ENCOUNTER FOR: Primary | ICD-10-CM

## 2022-02-11 DIAGNOSIS — C34.92 MALIGNANT NEOPLASM OF LEFT LUNG, UNSPECIFIED PART OF LUNG (HCC): ICD-10-CM

## 2022-02-11 LAB
ALBUMIN SERPL-MCNC: 3.9 G/DL (ref 3.4–5)
ALBUMIN/GLOB SERPL: 1.1 {RATIO} (ref 1–2)
ALP LIVER SERPL-CCNC: 85 U/L
ALT SERPL-CCNC: 28 U/L
ANION GAP SERPL CALC-SCNC: 5 MMOL/L (ref 0–18)
AST SERPL-CCNC: 23 U/L (ref 15–37)
BASOPHILS # BLD AUTO: 0.02 X10(3) UL (ref 0–0.2)
BASOPHILS NFR BLD AUTO: 0.2 %
BILIRUB SERPL-MCNC: 0.7 MG/DL (ref 0.1–2)
BUN BLD-MCNC: 18 MG/DL (ref 7–18)
BUN/CREAT SERPL: 16.1 (ref 10–20)
CALCIUM BLD-MCNC: 9.1 MG/DL (ref 8.5–10.1)
CHLORIDE SERPL-SCNC: 104 MMOL/L (ref 98–112)
CO2 SERPL-SCNC: 29 MMOL/L (ref 21–32)
CREAT BLD-MCNC: 1.12 MG/DL
DEPRECATED RDW RBC AUTO: 47.9 FL (ref 35.1–46.3)
EOSINOPHIL # BLD AUTO: 0.14 X10(3) UL (ref 0–0.7)
EOSINOPHIL NFR BLD AUTO: 1.6 %
ERYTHROCYTE [DISTWIDTH] IN BLOOD BY AUTOMATED COUNT: 12.6 % (ref 11–15)
GLOBULIN PLAS-MCNC: 3.5 G/DL (ref 2.8–4.4)
GLUCOSE BLD-MCNC: 107 MG/DL (ref 70–99)
HCT VFR BLD AUTO: 50.2 %
HGB BLD-MCNC: 16.6 G/DL
IMM GRANULOCYTES # BLD AUTO: 0.03 X10(3) UL (ref 0–1)
IMM GRANULOCYTES NFR BLD: 0.3 %
LYMPHOCYTES # BLD AUTO: 0.66 X10(3) UL (ref 1–4)
LYMPHOCYTES NFR BLD AUTO: 7.3 %
MCH RBC QN AUTO: 34.1 PG (ref 26–34)
MCHC RBC AUTO-ENTMCNC: 33.1 G/DL (ref 31–37)
MCV RBC AUTO: 103.1 FL
MONOCYTES # BLD AUTO: 0.55 X10(3) UL (ref 0.1–1)
MONOCYTES NFR BLD AUTO: 6.1 %
NEUTROPHILS # BLD AUTO: 7.63 X10 (3) UL (ref 1.5–7.7)
NEUTROPHILS # BLD AUTO: 7.63 X10(3) UL (ref 1.5–7.7)
NEUTROPHILS NFR BLD AUTO: 84.5 %
OSMOLALITY SERPL CALC.SUM OF ELEC: 288 MOSM/KG (ref 275–295)
PLATELET # BLD AUTO: 195 10(3)UL (ref 150–450)
POTASSIUM SERPL-SCNC: 4.5 MMOL/L (ref 3.5–5.1)
PROT SERPL-MCNC: 7.4 G/DL (ref 6.4–8.2)
RBC # BLD AUTO: 4.87 X10(6)UL
SODIUM SERPL-SCNC: 138 MMOL/L (ref 136–145)
TSI SER-ACNC: 1.9 MIU/ML (ref 0.36–3.74)
WBC # BLD AUTO: 9 X10(3) UL (ref 4–11)

## 2022-02-11 PROCEDURE — 80053 COMPREHEN METABOLIC PANEL: CPT

## 2022-02-11 PROCEDURE — 99215 OFFICE O/P EST HI 40 MIN: CPT | Performed by: INTERNAL MEDICINE

## 2022-02-11 PROCEDURE — 85025 COMPLETE CBC W/AUTO DIFF WBC: CPT

## 2022-02-11 PROCEDURE — 96413 CHEMO IV INFUSION 1 HR: CPT

## 2022-02-11 PROCEDURE — 84443 ASSAY THYROID STIM HORMONE: CPT

## 2022-02-11 NOTE — PROGRESS NOTES
Seen and examined with HARPER Gallagher. . Agree with her note. 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 Recurrence >2 years later)currently on pembrolizumab however recurrent pneumonitis also. Good response to prednisone now taperred to 10mg daily  and continue pembrolizumab. on exam comfortable nonlabored respirations.   Most recent imaging reviewed at thoracic tumor conference there are changes of possible subtle progression however we will not change his treatment at this time we will reimage in 6 weeks    Autumn Arevalo MD

## 2022-02-11 NOTE — TELEPHONE ENCOUNTER
Addended notes faxed to [address] [place] [address] at [facility] at [phone]. Fax confirmation received.

## 2022-02-11 NOTE — TELEPHONE ENCOUNTER
Dr. Garry Barthel - Please addend office visit note 12/21/21 to state patient was using and benefiting from CPAP before device malfunctioned.

## 2022-02-11 NOTE — PROGRESS NOTES
Pt here for C15D1 Keytruda. Arrives Ambulating independently, accompanied by Spouse           Modifications in dose or schedule: No      Patient denies possible pregnancy since last therapy cycle: NA    Patient reports he is well, denies any complaints. PIV previously started from lab draw, positive blood return noted.  Frequency of blood return and site check throughout administration: Prior to administration, Prior to each drug and At completion of therapy   Discharged with future appointments scheduled, Ambulating independently, accompanied by:Spouse    Outpatient Oncology Care Plan  Problem list:  fatigue  Problems related to:    chemotherapy  side effect of treatment  Interventions:  monitor for signs/symptoms of infection  educate regarding self care  encourage activity as tolerated  Expected outcomes:  adequate sleep/rest  free of infection  maintains/gains weight  understands plan of care  verbalize how to care for self  Progress towards outcome:  making progress    Education Record    Learner:  Patient and Spouse  Barriers / Limitations:  None  Method:  Discussion  Outcome:  Observed demonstration  Comments:

## 2022-02-11 NOTE — TELEPHONE ENCOUNTER
Calling for face to face notes stating that CPAP was benefiting pt before it became broken beyond repair

## 2022-02-18 ENCOUNTER — TELEPHONE (OUTPATIENT)
Dept: PULMONOLOGY | Facility: CLINIC | Age: 77
End: 2022-02-18

## 2022-02-18 NOTE — TELEPHONE ENCOUNTER
Pt's wife Seth Soler states pt wants Dr to lower the pressure to 9 when he gets his new C-pap machine so he can get used to it.

## 2022-02-18 NOTE — TELEPHONE ENCOUNTER
[facility] states that notes need to say pt was using and benefiting from C Pap and that machine is broken beyond repair. Please fax.

## 2022-02-18 NOTE — TELEPHONE ENCOUNTER
Spoke to patients wife and informed her to call office when he does receive new CPAP machine so adjustments can be made. Spouse verbalized understanding.

## 2022-02-18 NOTE — TELEPHONE ENCOUNTER
Dr. Ivy Pisano is requesting another addendum based on Medicare Guidelines to office visit note 12/21/2021. Jared Burroughs please add BEYOND REPAIR to the sentence below. Addendum: The patient was using and was benefiting from ongoing use of his CPAP device prior to a being broken.

## 2022-02-21 NOTE — TELEPHONE ENCOUNTER
Addended office visit note faxed to [address] [place] [address] at [facility]. Fax confirmation received.

## 2022-02-23 ENCOUNTER — TELEPHONE (OUTPATIENT)
Dept: HEMATOLOGY/ONCOLOGY | Facility: HOSPITAL | Age: 77
End: 2022-02-23

## 2022-02-23 ENCOUNTER — TELEPHONE (OUTPATIENT)
Dept: PULMONOLOGY | Facility: CLINIC | Age: 77
End: 2022-02-23

## 2022-02-23 ENCOUNTER — HOSPITAL ENCOUNTER (OUTPATIENT)
Dept: GENERAL RADIOLOGY | Facility: HOSPITAL | Age: 77
Discharge: HOME | End: 2022-02-23
Attending: NURSE PRACTITIONER
Payer: MEDICARE

## 2022-02-23 ENCOUNTER — OFFICE VISIT (OUTPATIENT)
Dept: HEMATOLOGY/ONCOLOGY | Facility: HOSPITAL | Age: 77
End: 2022-02-23
Attending: INTERNAL MEDICINE
Payer: MEDICARE

## 2022-02-23 VITALS
SYSTOLIC BLOOD PRESSURE: 149 MMHG | TEMPERATURE: 98 F | HEIGHT: 71 IN | WEIGHT: 193 LBS | DIASTOLIC BLOOD PRESSURE: 75 MMHG | BODY MASS INDEX: 27.02 KG/M2 | HEART RATE: 69 BPM | OXYGEN SATURATION: 93 % | RESPIRATION RATE: 16 BRPM

## 2022-02-23 DIAGNOSIS — J18.9 PNEUMONITIS: ICD-10-CM

## 2022-02-23 DIAGNOSIS — C34.92 MALIGNANT NEOPLASM OF LEFT LUNG, UNSPECIFIED PART OF LUNG (HCC): ICD-10-CM

## 2022-02-23 DIAGNOSIS — J18.9 PNEUMONIA OF RIGHT LOWER LOBE DUE TO INFECTIOUS ORGANISM: ICD-10-CM

## 2022-02-23 DIAGNOSIS — R06.00 DOE (DYSPNEA ON EXERTION): ICD-10-CM

## 2022-02-23 DIAGNOSIS — C34.92 MALIGNANT NEOPLASM OF LEFT LUNG, UNSPECIFIED PART OF LUNG (HCC): Primary | ICD-10-CM

## 2022-02-23 PROCEDURE — 99214 OFFICE O/P EST MOD 30 MIN: CPT | Performed by: INTERNAL MEDICINE

## 2022-02-23 PROCEDURE — 71046 X-RAY EXAM CHEST 2 VIEWS: CPT | Performed by: NURSE PRACTITIONER

## 2022-02-23 RX ORDER — PANTOPRAZOLE SODIUM 40 MG/1
40 TABLET, DELAYED RELEASE ORAL DAILY
Qty: 30 TABLET | Refills: 0 | Status: SHIPPED | OUTPATIENT
Start: 2022-02-23 | End: 2022-03-11

## 2022-02-23 RX ORDER — PREDNISONE 20 MG/1
TABLET ORAL
Qty: 34 TABLET | Refills: 0 | Status: SHIPPED | OUTPATIENT
Start: 2022-02-23 | End: 2022-03-09

## 2022-02-23 RX ORDER — LEVOFLOXACIN 750 MG/1
750 TABLET ORAL DAILY
Qty: 7 TABLET | Refills: 0 | Status: SHIPPED | OUTPATIENT
Start: 2022-02-23 | End: 2022-03-02

## 2022-02-23 NOTE — PATIENT INSTRUCTIONS
Start Steroid taper, take with food in AM;    Prednisone 80mg daily x 3 days    Prednisone 60mg x 4 days    Prednisone 40mg daily x 3 days    Prednisone 20mg daily x 4 days    Go back to 10mg daily at completion      Take Pantoprazole on empty stomach every morning to protect stomach from steroid-induced heartburn/GERD symptoms      Levaquin 750mg daily x 7 days-- antibiotic      Call us on Friday to let us know how you are feeling    Will delay treatment by 1 week to let steroid taper complete. Will call to reschedule.     Will hold on CT imaging      If worsening low oxygen <90%, shortness of breath, or new fevers/chills, chest pain or wheeze, report to ER right away and page Dr Ghulam Gomez

## 2022-02-23 NOTE — TELEPHONE ENCOUNTER
Dr. Imani Corona, patient calling and requesting you review chest xray done today 2/23/22. Please advise. Thank you.

## 2022-02-23 NOTE — TELEPHONE ENCOUNTER
Patient and spouse called, he is having increasing sob with exertion, PO 89-91%. Denies fevers, sob with exertion, denies nausea, vomiting or diarrhea, eating fair, occasional cough with mucus-clear to white, occasional lightheaded, no urine or bowel issues. Taste and smell intact, mild dry throat. Had had a day of runny nose, gone now or present after using CPAP which is on recall. No know Covid exposure, Vaccinated x 3. He thinks he is also due for CT scan. Please advise.

## 2022-02-23 NOTE — TELEPHONE ENCOUNTER
[facility], instructed to present to Keysha Iverson for stat CXR followed by ACV with Frank Hodges at 1130. They will work their way over to CXR now.

## 2022-02-24 NOTE — TELEPHONE ENCOUNTER
Spoke to Reba Torres at [facility] who stated patient is not eligable for a new device until 5/2023 per Medicare guidelines; despite having DX of lung cancer . Reba Torres stated frustration speaking to patient/spouse multiple times a day regarding issue. Reba Torres suggested patient call Medicare or they would have to pay $1200 for a new device. Spoke to patient who and relayed information from [facility]. Offered re-assurance.

## 2022-02-25 ENCOUNTER — TELEPHONE (OUTPATIENT)
Dept: HEMATOLOGY/ONCOLOGY | Facility: HOSPITAL | Age: 77
End: 2022-02-25

## 2022-02-25 NOTE — TELEPHONE ENCOUNTER
Per Uzma Bustamante @ [facility] (p. #[phone]) they are going to provide pt with loaner CPAP machine on Monday until he receives new CPAP machine from [employer] d/t recall. Dr. Vincent Walker.

## 2022-02-25 NOTE — TELEPHONE ENCOUNTER
Luis called to let BRYON Teague know that he is feeling stronger. He is feeling better. He still has dyspnea on exertion, but he recovers quickly. No chest pain or chest pressure. Lewis Queen his wife said that they received a call from Dr Laurita Whalen yesterday. He told them Konrad Mary will be calling them about setting up a CT scan. They will await Smita's call.

## 2022-02-25 NOTE — TELEPHONE ENCOUNTER
Checked in with Conrado Geller on respiratory status. Compliant with current Prednisone and levaquin dosing per Rx. Feels his MILLAN is improving. No hypoxia. No new acute changes, including no fevers/chills, SOB at rest, CP or tightness. No congestion or wheeze. Good energy and performing more ADLs. Tony Miramontes MD called to discuss next CT scan. TB conference reviewed his case w/ MD Mcclure. [id number] [address] per MD to hold off on CT imaging for now with improvement of symptoms, will plan to reassess at ~8-week time point before next visit in March. Will perform sooner if symptoms worsen. Pt and wife agreeable to this plan.

## 2022-03-04 ENCOUNTER — APPOINTMENT (OUTPATIENT)
Dept: HEMATOLOGY/ONCOLOGY | Facility: HOSPITAL | Age: 77
End: 2022-03-04
Attending: INTERNAL MEDICINE
Payer: MEDICARE

## 2022-03-06 ENCOUNTER — TELEPHONE (OUTPATIENT)
Dept: HEMATOLOGY/ONCOLOGY | Facility: HOSPITAL | Age: 77
End: 2022-03-06

## 2022-03-06 NOTE — TELEPHONE ENCOUNTER
One episode of sat at 85% but improved, no further desats  To call if persistent low sats  Cherri Reyes MD

## 2022-03-07 NOTE — TELEPHONE ENCOUNTER
Called patient he states his pulse ox has been running from 92%, 93%, no increased sob at this time, he is having CT and follow up later in week and reinforced to call if PO decrease or sob worsens, he verbalizes understanding.

## 2022-03-10 ENCOUNTER — HOSPITAL ENCOUNTER (OUTPATIENT)
Dept: CT IMAGING | Facility: HOSPITAL | Age: 77
Discharge: HOME | End: 2022-03-10
Attending: NURSE PRACTITIONER
Payer: MEDICARE

## 2022-03-10 DIAGNOSIS — Z51.11 CHEMOTHERAPY MANAGEMENT, ENCOUNTER FOR: ICD-10-CM

## 2022-03-10 DIAGNOSIS — R06.00 DOE (DYSPNEA ON EXERTION): ICD-10-CM

## 2022-03-10 DIAGNOSIS — Z92.89 HISTORY OF IMMUNOTHERAPY: ICD-10-CM

## 2022-03-10 DIAGNOSIS — C34.92 MALIGNANT NEOPLASM OF LEFT LUNG, UNSPECIFIED PART OF LUNG (HCC): ICD-10-CM

## 2022-03-10 PROCEDURE — 74177 CT ABD & PELVIS W/CONTRAST: CPT | Performed by: NURSE PRACTITIONER

## 2022-03-10 PROCEDURE — 71260 CT THORAX DX C+: CPT | Performed by: NURSE PRACTITIONER

## 2022-03-11 ENCOUNTER — NURSE ONLY (OUTPATIENT)
Dept: HEMATOLOGY/ONCOLOGY | Facility: HOSPITAL | Age: 77
End: 2022-03-11
Attending: INTERNAL MEDICINE
Payer: MEDICARE

## 2022-03-11 VITALS
SYSTOLIC BLOOD PRESSURE: 135 MMHG | OXYGEN SATURATION: 96 % | RESPIRATION RATE: 18 BRPM | BODY MASS INDEX: 27.3 KG/M2 | TEMPERATURE: 98 F | HEIGHT: 71 IN | HEART RATE: 64 BPM | WEIGHT: 195 LBS | DIASTOLIC BLOOD PRESSURE: 63 MMHG

## 2022-03-11 DIAGNOSIS — C34.92 MALIGNANT NEOPLASM OF LEFT LUNG, UNSPECIFIED PART OF LUNG (HCC): ICD-10-CM

## 2022-03-11 DIAGNOSIS — J18.9 PNEUMONIA OF RIGHT LOWER LOBE DUE TO INFECTIOUS ORGANISM: Primary | ICD-10-CM

## 2022-03-11 DIAGNOSIS — C34.92 MALIGNANT NEOPLASM OF LEFT LUNG, UNSPECIFIED PART OF LUNG (HCC): Primary | ICD-10-CM

## 2022-03-11 DIAGNOSIS — R06.00 DOE (DYSPNEA ON EXERTION): ICD-10-CM

## 2022-03-11 DIAGNOSIS — J18.9 PNEUMONITIS: ICD-10-CM

## 2022-03-11 DIAGNOSIS — Z51.11 CHEMOTHERAPY MANAGEMENT, ENCOUNTER FOR: ICD-10-CM

## 2022-03-11 LAB
ALBUMIN SERPL-MCNC: 3.6 G/DL (ref 3.4–5)
ALBUMIN/GLOB SERPL: 1.2 {RATIO} (ref 1–2)
ALP LIVER SERPL-CCNC: 79 U/L
ALT SERPL-CCNC: 32 U/L
ANION GAP SERPL CALC-SCNC: 7 MMOL/L (ref 0–18)
BASOPHILS # BLD AUTO: 0.03 X10(3) UL (ref 0–0.2)
BASOPHILS NFR BLD AUTO: 0.3 %
BILIRUB SERPL-MCNC: 0.9 MG/DL (ref 0.1–2)
BUN BLD-MCNC: 25 MG/DL (ref 7–18)
BUN/CREAT SERPL: 21.9 (ref 10–20)
CALCIUM BLD-MCNC: 8.7 MG/DL (ref 8.5–10.1)
CHLORIDE SERPL-SCNC: 105 MMOL/L (ref 98–112)
CO2 SERPL-SCNC: 28 MMOL/L (ref 21–32)
CREAT BLD-MCNC: 1.14 MG/DL
EOSINOPHIL # BLD AUTO: 0.05 X10(3) UL (ref 0–0.7)
EOSINOPHIL NFR BLD AUTO: 0.4 %
ERYTHROCYTE [DISTWIDTH] IN BLOOD BY AUTOMATED COUNT: 12.9 % (ref 11–15)
GLOBULIN PLAS-MCNC: 3 G/DL (ref 2.8–4.4)
GLUCOSE BLD-MCNC: 97 MG/DL (ref 70–99)
HCT VFR BLD AUTO: 47.4 %
HGB BLD-MCNC: 15.2 G/DL
IMM GRANULOCYTES # BLD AUTO: 0.09 X10(3) UL (ref 0–1)
IMM GRANULOCYTES NFR BLD: 0.8 %
LYMPHOCYTES # BLD AUTO: 0.84 X10(3) UL (ref 1–4)
LYMPHOCYTES NFR BLD AUTO: 7.4 %
MCH RBC QN AUTO: 32.7 PG (ref 26–34)
MCHC RBC AUTO-ENTMCNC: 32.1 G/DL (ref 31–37)
MCV RBC AUTO: 101.9 FL
MONOCYTES # BLD AUTO: 0.69 X10(3) UL (ref 0.1–1)
MONOCYTES NFR BLD AUTO: 6.1 %
NEUTROPHILS # BLD AUTO: 9.64 X10 (3) UL (ref 1.5–7.7)
NEUTROPHILS # BLD AUTO: 9.64 X10(3) UL (ref 1.5–7.7)
NEUTROPHILS NFR BLD AUTO: 85 %
OSMOLALITY SERPL CALC.SUM OF ELEC: 294 MOSM/KG (ref 275–295)
PLATELET # BLD AUTO: 178 10(3)UL (ref 150–450)
POTASSIUM SERPL-SCNC: 4.1 MMOL/L (ref 3.5–5.1)
PROT SERPL-MCNC: 6.6 G/DL (ref 6.4–8.2)
RBC # BLD AUTO: 4.65 X10(6)UL
TSI SER-ACNC: 2.6 MIU/ML (ref 0.36–3.74)
WBC # BLD AUTO: 11.3 X10(3) UL (ref 4–11)

## 2022-03-11 PROCEDURE — 85025 COMPLETE CBC W/AUTO DIFF WBC: CPT

## 2022-03-11 PROCEDURE — 99215 OFFICE O/P EST HI 40 MIN: CPT | Performed by: INTERNAL MEDICINE

## 2022-03-11 PROCEDURE — 84443 ASSAY THYROID STIM HORMONE: CPT

## 2022-03-11 PROCEDURE — 36415 COLL VENOUS BLD VENIPUNCTURE: CPT

## 2022-03-11 PROCEDURE — 80053 COMPREHEN METABOLIC PANEL: CPT

## 2022-03-11 RX ORDER — AMOXICILLIN AND CLAVULANATE POTASSIUM 875; 125 MG/1; MG/1
1 TABLET, FILM COATED ORAL 2 TIMES DAILY
Qty: 14 TABLET | Refills: 0 | Status: SHIPPED | OUTPATIENT
Start: 2022-03-11 | End: 2022-03-18 | Stop reason: ALTCHOICE

## 2022-03-11 RX ORDER — PANTOPRAZOLE SODIUM 40 MG/1
40 TABLET, DELAYED RELEASE ORAL DAILY
Qty: 30 TABLET | Refills: 0 | Status: SHIPPED | OUTPATIENT
Start: 2022-03-11

## 2022-03-14 ENCOUNTER — TELEPHONE (OUTPATIENT)
Dept: PULMONOLOGY | Facility: CLINIC | Age: 77
End: 2022-03-14

## 2022-03-14 NOTE — TELEPHONE ENCOUNTER
Dr. Pippa Medellin, patients wife would like to discuss CT Scan results done on  3/10/22. Thank you.

## 2022-03-15 NOTE — TELEPHONE ENCOUNTER
Informed patients spouse (HIPPA verified) and informed message sent to Dr. Jaylin Lino. Informed awaiting response. Spouse verbalized understanding and had no further questions at this time.

## 2022-03-16 ENCOUNTER — OFFICE VISIT (OUTPATIENT)
Dept: SURGERY | Facility: CLINIC | Age: 77
End: 2022-03-16
Payer: MEDICARE

## 2022-03-16 VITALS
BODY MASS INDEX: 27.92 KG/M2 | DIASTOLIC BLOOD PRESSURE: 73 MMHG | SYSTOLIC BLOOD PRESSURE: 142 MMHG | HEART RATE: 64 BPM | HEIGHT: 70 IN | WEIGHT: 195 LBS

## 2022-03-16 DIAGNOSIS — N52.8 OTHER MALE ERECTILE DYSFUNCTION: ICD-10-CM

## 2022-03-16 DIAGNOSIS — R97.20 ELEVATED PSA: Primary | ICD-10-CM

## 2022-03-16 DIAGNOSIS — N50.3 EPIDIDYMAL CYST: ICD-10-CM

## 2022-03-16 PROCEDURE — 99204 OFFICE O/P NEW MOD 45 MIN: CPT | Performed by: UROLOGY

## 2022-03-16 NOTE — TELEPHONE ENCOUNTER
Discussed results with wife. Getting Augmentin by oncology which and ordered and reviewed the CT with the patient.

## 2022-03-17 DIAGNOSIS — C34.92 MALIGNANT NEOPLASM OF LEFT LUNG, UNSPECIFIED PART OF LUNG (HCC): ICD-10-CM

## 2022-03-17 RX ORDER — PANTOPRAZOLE SODIUM 40 MG/1
TABLET, DELAYED RELEASE ORAL
Qty: 30 TABLET | Refills: 0 | OUTPATIENT
Start: 2022-03-17

## 2022-03-18 ENCOUNTER — OFFICE VISIT (OUTPATIENT)
Dept: HEMATOLOGY/ONCOLOGY | Facility: HOSPITAL | Age: 77
End: 2022-03-18
Attending: INTERNAL MEDICINE
Payer: MEDICARE

## 2022-03-18 ENCOUNTER — OFFICE VISIT (OUTPATIENT)
Dept: HEMATOLOGY/ONCOLOGY | Facility: HOSPITAL | Age: 77
End: 2022-03-18
Attending: NURSE PRACTITIONER
Payer: MEDICARE

## 2022-03-18 VITALS
HEIGHT: 71 IN | DIASTOLIC BLOOD PRESSURE: 57 MMHG | TEMPERATURE: 98 F | HEART RATE: 75 BPM | RESPIRATION RATE: 18 BRPM | OXYGEN SATURATION: 94 % | WEIGHT: 200 LBS | BODY MASS INDEX: 28 KG/M2 | SYSTOLIC BLOOD PRESSURE: 129 MMHG

## 2022-03-18 VITALS
RESPIRATION RATE: 18 BRPM | HEART RATE: 75 BPM | OXYGEN SATURATION: 94 % | WEIGHT: 200 LBS | BODY MASS INDEX: 28 KG/M2 | DIASTOLIC BLOOD PRESSURE: 57 MMHG | TEMPERATURE: 98 F | SYSTOLIC BLOOD PRESSURE: 129 MMHG

## 2022-03-18 DIAGNOSIS — C34.92 MALIGNANT NEOPLASM OF LEFT LUNG, UNSPECIFIED PART OF LUNG (HCC): Primary | ICD-10-CM

## 2022-03-18 DIAGNOSIS — Z51.11 CHEMOTHERAPY MANAGEMENT, ENCOUNTER FOR: ICD-10-CM

## 2022-03-18 DIAGNOSIS — B44.9 ASPERGILLUS PNEUMONIA (HCC): ICD-10-CM

## 2022-03-18 DIAGNOSIS — Z92.3 HISTORY OF RADIATION THERAPY: ICD-10-CM

## 2022-03-18 LAB
ALBUMIN SERPL-MCNC: 3.6 G/DL (ref 3.4–5)
ALBUMIN/GLOB SERPL: 1.2 {RATIO} (ref 1–2)
ALP LIVER SERPL-CCNC: 69 U/L
ALT SERPL-CCNC: 31 U/L
ANION GAP SERPL CALC-SCNC: 4 MMOL/L (ref 0–18)
AST SERPL-CCNC: 21 U/L (ref 15–37)
BASOPHILS # BLD AUTO: 0.02 X10(3) UL (ref 0–0.2)
BASOPHILS NFR BLD AUTO: 0.2 %
BILIRUB SERPL-MCNC: 0.8 MG/DL (ref 0.1–2)
BUN BLD-MCNC: 27 MG/DL (ref 7–18)
BUN/CREAT SERPL: 26.2 (ref 10–20)
CALCIUM BLD-MCNC: 9.1 MG/DL (ref 8.5–10.1)
CHLORIDE SERPL-SCNC: 107 MMOL/L (ref 98–112)
CO2 SERPL-SCNC: 28 MMOL/L (ref 21–32)
CREAT BLD-MCNC: 1.03 MG/DL
DEPRECATED RDW RBC AUTO: 47.1 FL (ref 35.1–46.3)
EOSINOPHIL # BLD AUTO: 0.02 X10(3) UL (ref 0–0.7)
EOSINOPHIL NFR BLD AUTO: 0.2 %
ERYTHROCYTE [DISTWIDTH] IN BLOOD BY AUTOMATED COUNT: 12.5 % (ref 11–15)
FASTING STATUS PATIENT QL REPORTED: NO
GLOBULIN PLAS-MCNC: 3 G/DL (ref 2.8–4.4)
GLUCOSE BLD-MCNC: 114 MG/DL (ref 70–99)
HCT VFR BLD AUTO: 44.3 %
HGB BLD-MCNC: 14.3 G/DL
IMM GRANULOCYTES NFR BLD: 0.4 %
LYMPHOCYTES # BLD AUTO: 0.44 X10(3) UL (ref 1–4)
MCH RBC QN AUTO: 32.9 PG (ref 26–34)
MCV RBC AUTO: 101.8 FL
MONOCYTES # BLD AUTO: 0.35 X10(3) UL (ref 0.1–1)
MONOCYTES NFR BLD AUTO: 3.9 %
NEUTROPHILS # BLD AUTO: 8.2 X10 (3) UL (ref 1.5–7.7)
NEUTROPHILS # BLD AUTO: 8.2 X10(3) UL (ref 1.5–7.7)
NEUTROPHILS NFR BLD AUTO: 90.4 %
OSMOLALITY SERPL CALC.SUM OF ELEC: 294 MOSM/KG (ref 275–295)
PLATELET # BLD AUTO: 149 10(3)UL (ref 150–450)
POTASSIUM SERPL-SCNC: 4.3 MMOL/L (ref 3.5–5.1)
PROT SERPL-MCNC: 6.6 G/DL (ref 6.4–8.2)
RBC # BLD AUTO: 4.35 X10(6)UL
SODIUM SERPL-SCNC: 139 MMOL/L (ref 136–145)
TSI SER-ACNC: 2.26 MIU/ML (ref 0.36–3.74)
WBC # BLD AUTO: 9.1 X10(3) UL (ref 4–11)

## 2022-03-18 PROCEDURE — 99215 OFFICE O/P EST HI 40 MIN: CPT | Performed by: INTERNAL MEDICINE

## 2022-03-18 PROCEDURE — 80053 COMPREHEN METABOLIC PANEL: CPT

## 2022-03-18 PROCEDURE — 85025 COMPLETE CBC W/AUTO DIFF WBC: CPT

## 2022-03-18 PROCEDURE — 84443 ASSAY THYROID STIM HORMONE: CPT

## 2022-03-18 PROCEDURE — 96413 CHEMO IV INFUSION 1 HR: CPT

## 2022-03-18 RX ORDER — ALBUTEROL SULFATE 90 UG/1
2 AEROSOL, METERED RESPIRATORY (INHALATION) EVERY 4 HOURS PRN
Qty: 1 EACH | Refills: 2 | Status: SHIPPED | OUTPATIENT
Start: 2022-03-18

## 2022-03-18 NOTE — PROGRESS NOTES
Pt here for [facility]. Arrives Ambulating independently, accompanied by Spouse           Modifications in dose or schedule: he was deferred for Immune related pneumonitis. OK to proceed now. Patient denies possible pregnancy since last therapy cycle: NA    Patient reports he is well, denies any complaints. PIV previously started from lab draw, positive blood return noted.  Frequency of blood return and site check throughout administration: Prior to administration, Prior to each drug and At completion of therapy   Discharged with future appointments scheduled, Ambulating independently, accompanied by:Spouse    Outpatient Oncology Care Plan  Problem list:  fatigue  Problems related to:    chemotherapy  side effect of treatment  Interventions:  monitor for signs/symptoms of infection  educate regarding self care  encourage activity as tolerated  Expected outcomes:  adequate sleep/rest  free of infection  maintains/gains weight  understands plan of care  verbalize how to care for self  Progress towards outcome:  making progress    Education Record    Learner:  Patient and Spouse  Barriers / Limitations:  None  Method:  Discussion  Outcome:  Observed demonstration  Comments:

## 2022-03-18 NOTE — PATIENT INSTRUCTIONS
1. Proceed with pembrolizumab today    2. Continue prednisone 10 mg once daily for now. Albuterol inhaler 2 puffs every 4 hours as needed     3. Call office if breathing gets worse or new symptoms developed    4. Will discuss bronchoscopy with Dr. Laurita Whalen    5.   Follow-up in 3 weeks

## 2022-03-20 ENCOUNTER — LAB ENCOUNTER (OUTPATIENT)
Dept: LAB | Facility: HOSPITAL | Age: 77
End: 2022-03-20
Attending: RADIOLOGY
Payer: MEDICARE

## 2022-03-20 DIAGNOSIS — Z01.818 PRE-OP TESTING: ICD-10-CM

## 2022-03-20 LAB — SARS-COV-2 RNA RESP QL NAA+PROBE: NOT DETECTED

## 2022-03-23 ENCOUNTER — HOSPITAL ENCOUNTER (OUTPATIENT)
Dept: INTERVENTIONAL RADIOLOGY/VASCULAR | Facility: HOSPITAL | Age: 77
Discharge: HOME | End: 2022-03-23
Attending: RADIOLOGY | Admitting: RADIOLOGY
Payer: MEDICARE

## 2022-03-23 VITALS
BODY MASS INDEX: 27.3 KG/M2 | DIASTOLIC BLOOD PRESSURE: 59 MMHG | SYSTOLIC BLOOD PRESSURE: 117 MMHG | WEIGHT: 195 LBS | RESPIRATION RATE: 16 BRPM | HEIGHT: 71 IN | OXYGEN SATURATION: 96 % | HEART RATE: 58 BPM | TEMPERATURE: 98 F

## 2022-03-23 DIAGNOSIS — Z01.818 PRE-OP TESTING: Primary | ICD-10-CM

## 2022-03-23 DIAGNOSIS — C34.92 MALIGNANT NEOPLASM OF LEFT LUNG, UNSPECIFIED PART OF LUNG (HCC): ICD-10-CM

## 2022-03-23 PROCEDURE — 77001 FLUOROGUIDE FOR VEIN DEVICE: CPT

## 2022-03-23 PROCEDURE — 02HV33Z INSERTION OF INFUSION DEVICE INTO SUPERIOR VENA CAVA, PERCUTANEOUS APPROACH: ICD-10-PCS | Performed by: RADIOLOGY

## 2022-03-23 PROCEDURE — 36561 INSERT TUNNELED CV CATH: CPT

## 2022-03-23 PROCEDURE — 99152 MOD SED SAME PHYS/QHP 5/>YRS: CPT

## 2022-03-23 PROCEDURE — 36415 COLL VENOUS BLD VENIPUNCTURE: CPT

## 2022-03-23 RX ORDER — HEPARIN SODIUM (PORCINE) LOCK FLUSH IV SOLN 100 UNIT/ML 100 UNIT/ML
SOLUTION INTRAVENOUS
Status: COMPLETED
Start: 2022-03-23 | End: 2022-03-23

## 2022-03-23 RX ORDER — MIDAZOLAM HYDROCHLORIDE 1 MG/ML
INJECTION INTRAMUSCULAR; INTRAVENOUS
Status: COMPLETED
Start: 2022-03-23 | End: 2022-03-23

## 2022-03-23 RX ORDER — CEFAZOLIN SODIUM/WATER 2 G/20 ML
SYRINGE (ML) INTRAVENOUS
Status: COMPLETED
Start: 2022-03-23 | End: 2022-03-23

## 2022-03-23 RX ORDER — LIDOCAINE HYDROCHLORIDE 20 MG/ML
INJECTION, SOLUTION EPIDURAL; INFILTRATION; INTRACAUDAL; PERINEURAL
Status: COMPLETED
Start: 2022-03-23 | End: 2022-03-23

## 2022-03-23 RX ORDER — SODIUM CHLORIDE 9 MG/ML
INJECTION, SOLUTION INTRAVENOUS CONTINUOUS
Status: DISCONTINUED | OUTPATIENT
Start: 2022-03-23 | End: 2022-03-23

## 2022-03-23 RX ADMIN — SODIUM CHLORIDE: 9 INJECTION, SOLUTION INTRAVENOUS at 09:15:00

## 2022-03-23 NOTE — INTERVAL H&P NOTE
The above referenced H&P was reviewed by Sy Miller MD on 3/23/2022, the patient was examined and no significant changes have occurred in the patient's condition since the H&P was performed. Risks, benefits, alternative treatments and consequences of no treatment were discussed. We will proceed with procedure as planned.       Sy Miller MD  3/23/2022  10:44 AM

## 2022-03-23 NOTE — IVS NOTE
DISCHARGE NOTE     Pt is able to sit up and ambulate without difficulty. Pt voided and tolerated fluids and food. Right chest procedural site remains dry and intact with good circulation, motion, and sensation. No signs and symptoms of bleeding/hematoma noted. Pt denies any pain or discomfort at this time. IV access removed  Instruction provided, patient/family verbalizes understanding. Dr. Cindy Weston spoke with patient/family post procedure.      Pt discharge via wheelchair to [address] - patient's wife, Kong Roy, at bedside and will be driving patient home    Follow up Appointment: n/a    New Prescription: n/a

## 2022-03-25 ENCOUNTER — APPOINTMENT (OUTPATIENT)
Dept: HEMATOLOGY/ONCOLOGY | Facility: HOSPITAL | Age: 77
End: 2022-03-25
Attending: INTERNAL MEDICINE
Payer: MEDICARE

## 2022-03-25 ENCOUNTER — APPOINTMENT (OUTPATIENT)
Dept: HEMATOLOGY/ONCOLOGY | Facility: HOSPITAL | Age: 77
End: 2022-03-25
Attending: NURSE PRACTITIONER
Payer: MEDICARE

## 2022-04-01 ENCOUNTER — APPOINTMENT (OUTPATIENT)
Dept: HEMATOLOGY/ONCOLOGY | Facility: HOSPITAL | Age: 77
End: 2022-04-01
Attending: INTERNAL MEDICINE
Payer: MEDICARE

## 2022-04-07 RX ORDER — SODIUM CHLORIDE 9 MG/ML
10 INJECTION INTRAVENOUS ONCE
OUTPATIENT
Start: 2022-04-08

## 2022-04-07 RX ORDER — HEPARIN SODIUM (PORCINE) LOCK FLUSH IV SOLN 100 UNIT/ML 100 UNIT/ML
5 SOLUTION INTRAVENOUS ONCE
OUTPATIENT
Start: 2022-04-08

## 2022-04-08 ENCOUNTER — OFFICE VISIT (OUTPATIENT)
Dept: HEMATOLOGY/ONCOLOGY | Facility: HOSPITAL | Age: 77
End: 2022-04-08
Attending: INTERNAL MEDICINE
Payer: MEDICARE

## 2022-04-08 ENCOUNTER — OFFICE VISIT (OUTPATIENT)
Dept: HEMATOLOGY/ONCOLOGY | Facility: HOSPITAL | Age: 77
End: 2022-04-08
Attending: NURSE PRACTITIONER
Payer: MEDICARE

## 2022-04-08 VITALS
RESPIRATION RATE: 18 BRPM | DIASTOLIC BLOOD PRESSURE: 54 MMHG | WEIGHT: 193.81 LBS | TEMPERATURE: 98 F | HEIGHT: 71 IN | SYSTOLIC BLOOD PRESSURE: 141 MMHG | OXYGEN SATURATION: 93 % | BODY MASS INDEX: 27.13 KG/M2 | HEART RATE: 66 BPM

## 2022-04-08 VITALS
HEIGHT: 71 IN | TEMPERATURE: 98 F | RESPIRATION RATE: 18 BRPM | HEART RATE: 66 BPM | SYSTOLIC BLOOD PRESSURE: 141 MMHG | WEIGHT: 193 LBS | BODY MASS INDEX: 27.02 KG/M2 | OXYGEN SATURATION: 93 % | DIASTOLIC BLOOD PRESSURE: 54 MMHG

## 2022-04-08 DIAGNOSIS — C34.92 MALIGNANT NEOPLASM OF LEFT LUNG, UNSPECIFIED PART OF LUNG (HCC): ICD-10-CM

## 2022-04-08 DIAGNOSIS — R06.00 DOE (DYSPNEA ON EXERTION): ICD-10-CM

## 2022-04-08 DIAGNOSIS — Z51.11 CHEMOTHERAPY MANAGEMENT, ENCOUNTER FOR: Primary | ICD-10-CM

## 2022-04-08 DIAGNOSIS — C34.12 CANCER OF UPPER LOBE OF LEFT LUNG (HCC): ICD-10-CM

## 2022-04-08 DIAGNOSIS — C34.92 MALIGNANT NEOPLASM OF LEFT LUNG, UNSPECIFIED PART OF LUNG (HCC): Primary | ICD-10-CM

## 2022-04-08 DIAGNOSIS — J18.9 PNEUMONITIS: ICD-10-CM

## 2022-04-08 LAB
ALBUMIN SERPL-MCNC: 3.3 G/DL (ref 3.4–5)
ALBUMIN/GLOB SERPL: 1 {RATIO} (ref 1–2)
ALP LIVER SERPL-CCNC: 66 U/L
ALT SERPL-CCNC: 26 U/L
ANION GAP SERPL CALC-SCNC: 4 MMOL/L (ref 0–18)
AST SERPL-CCNC: 18 U/L (ref 15–37)
BASOPHILS # BLD AUTO: 0.02 X10(3) UL (ref 0–0.2)
BASOPHILS NFR BLD AUTO: 0.3 %
BILIRUB SERPL-MCNC: 0.6 MG/DL (ref 0.1–2)
BUN BLD-MCNC: 27 MG/DL (ref 7–18)
BUN/CREAT SERPL: 23.9 (ref 10–20)
CALCIUM BLD-MCNC: 8.8 MG/DL (ref 8.5–10.1)
CHLORIDE SERPL-SCNC: 107 MMOL/L (ref 98–112)
CO2 SERPL-SCNC: 28 MMOL/L (ref 21–32)
CREAT BLD-MCNC: 1.13 MG/DL
DEPRECATED RDW RBC AUTO: 48.7 FL (ref 35.1–46.3)
EOSINOPHIL # BLD AUTO: 0.06 X10(3) UL (ref 0–0.7)
EOSINOPHIL NFR BLD AUTO: 0.9 %
ERYTHROCYTE [DISTWIDTH] IN BLOOD BY AUTOMATED COUNT: 13.2 % (ref 11–15)
GLOBULIN PLAS-MCNC: 3.2 G/DL (ref 2.8–4.4)
GLUCOSE BLD-MCNC: 96 MG/DL (ref 70–99)
HCT VFR BLD AUTO: 41.2 %
HGB BLD-MCNC: 13.4 G/DL
IMM GRANULOCYTES # BLD AUTO: 0.04 X10(3) UL (ref 0–1)
IMM GRANULOCYTES NFR BLD: 0.6 %
LYMPHOCYTES # BLD AUTO: 0.86 X10(3) UL (ref 1–4)
LYMPHOCYTES NFR BLD AUTO: 13.5 %
MCH RBC QN AUTO: 32.6 PG (ref 26–34)
MCHC RBC AUTO-ENTMCNC: 32.5 G/DL (ref 31–37)
MCV RBC AUTO: 100.2 FL
MONOCYTES # BLD AUTO: 0.69 X10(3) UL (ref 0.1–1)
MONOCYTES NFR BLD AUTO: 10.8 %
NEUTROPHILS # BLD AUTO: 4.72 X10 (3) UL (ref 1.5–7.7)
NEUTROPHILS # BLD AUTO: 4.72 X10(3) UL (ref 1.5–7.7)
NEUTROPHILS NFR BLD AUTO: 73.9 %
OSMOLALITY SERPL CALC.SUM OF ELEC: 293 MOSM/KG (ref 275–295)
PLATELET # BLD AUTO: 191 10(3)UL (ref 150–450)
POTASSIUM SERPL-SCNC: 3.7 MMOL/L (ref 3.5–5.1)
PROT SERPL-MCNC: 6.5 G/DL (ref 6.4–8.2)
RBC # BLD AUTO: 4.11 X10(6)UL
SODIUM SERPL-SCNC: 139 MMOL/L (ref 136–145)
TSI SER-ACNC: 3.06 MIU/ML (ref 0.36–3.74)
WBC # BLD AUTO: 6.4 X10(3) UL (ref 4–11)

## 2022-04-08 PROCEDURE — 84443 ASSAY THYROID STIM HORMONE: CPT

## 2022-04-08 PROCEDURE — 85025 COMPLETE CBC W/AUTO DIFF WBC: CPT

## 2022-04-08 PROCEDURE — 80053 COMPREHEN METABOLIC PANEL: CPT

## 2022-04-08 PROCEDURE — 36591 DRAW BLOOD OFF VENOUS DEVICE: CPT

## 2022-04-08 PROCEDURE — 99215 OFFICE O/P EST HI 40 MIN: CPT | Performed by: INTERNAL MEDICINE

## 2022-04-08 RX ORDER — PREDNISONE 10 MG/1
TABLET ORAL
Qty: 82 TABLET | Refills: 0 | Status: SHIPPED | OUTPATIENT
Start: 2022-04-08 | End: 2022-04-29

## 2022-04-08 NOTE — PROGRESS NOTES
Patient did not get treated for C17D1 Keytruda, will be delayed. HARPER Curiel accessed port. Future appointments scheduled.
hard copy, drawn during this pregnancy

## 2022-04-08 NOTE — PROGRESS NOTES
Seen and examined with HARPER Gallagher. . Agree with her note. 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 Recurrence >2 years later)currently on pembrolizumab however recurrent pneumonitis also. Good response to prednisone now taperred to 10mg daily he is feeling much more short of breath this visit we will increase his prednisone.   Given recent changes on CT imaging reviewed at thoracic tumor conference we will have him see pulmonology for bronchoscopy to further delineate whether he has progression versus pneumonitis also history of aspergillus and at risk for other opportunistic infections

## 2022-04-12 ENCOUNTER — TELEPHONE (OUTPATIENT)
Dept: PULMONOLOGY | Facility: CLINIC | Age: 77
End: 2022-04-12

## 2022-04-12 NOTE — TELEPHONE ENCOUNTER
I spoke to the patient and we will try to schedule bronchoscopy with transbronchial biopsies under MAC for April 20 at 8:00 in the morning. I left a message with the .

## 2022-04-12 NOTE — TELEPHONE ENCOUNTER
Dr. Pippa Medellin, are you aware of this? Does patient need to be added on for bronch? Patient was last seen by you 12/21/21. Copied from Dr. Mohsen Dwyer office note of 4/8/22:    Due to worsening MILLAN today, will hold immunotherapy and encourage a repeat bronch with Pulmonology in near future.

## 2022-04-12 NOTE — TELEPHONE ENCOUNTER
Pts wife Bre Westbrook states that Dr. Miguel Larkin was going to speak to Dr Cory Gregory about recent PET scan results. She states that pt might need bronchoscopy  Please call.

## 2022-04-13 NOTE — TELEPHONE ENCOUNTER
Incoming call from Dr. Garry Barthel, patient to have bronchoscopy with transbronchial biopsies, MAC anesthesia and fluoroscopy. Labs prior to procedure PT/INR, PTT & Platelets. Spoke with Arianna in Endoscopy, bronchoscopy scheduled for 8:30am on 4/20/22. Patient to arrive at 7:30am.    Dr. Garry Barthel - Please review/sign referral.  Also, patient has reported taking aspirin 81mg daily. When do you want him to discontinue prior to procedure?

## 2022-04-15 DIAGNOSIS — C34.92 MALIGNANT NEOPLASM OF LEFT LUNG, UNSPECIFIED PART OF LUNG (HCC): ICD-10-CM

## 2022-04-15 RX ORDER — PANTOPRAZOLE SODIUM 40 MG/1
40 TABLET, DELAYED RELEASE ORAL DAILY
Qty: 90 TABLET | Refills: 1 | Status: SHIPPED | OUTPATIENT
Start: 2022-04-15 | End: 2022-08-12

## 2022-04-17 ENCOUNTER — LAB ENCOUNTER (OUTPATIENT)
Dept: LAB | Facility: HOSPITAL | Age: 77
End: 2022-04-17
Attending: INTERNAL MEDICINE
Payer: MEDICARE

## 2022-04-17 DIAGNOSIS — Z01.818 PRE-OP TESTING: ICD-10-CM

## 2022-04-17 LAB — SARS-COV-2 RNA RESP QL NAA+PROBE: NOT DETECTED

## 2022-04-18 ENCOUNTER — TELEPHONE (OUTPATIENT)
Dept: PULMONOLOGY | Facility: CLINIC | Age: 77
End: 2022-04-18

## 2022-04-18 NOTE — TELEPHONE ENCOUNTER
Spoke with patient's wife states patient has dental cleaning and check tomorrow, 4/19/22, given Amoxicillin 2000mg to take 1 hour prior to the cleaning. Inquiring if this will affect bronchoscopy on 4/20/22.

## 2022-04-18 NOTE — TELEPHONE ENCOUNTER
Spoke with patient's wife, Vincent Greenwood, informed her of Dr. Nona Pugh message. Wife verbalized understanding.

## 2022-04-20 ENCOUNTER — HOSPITAL ENCOUNTER (OUTPATIENT)
Facility: HOSPITAL | Age: 77
Setting detail: HOSPITAL OUTPATIENT SURGERY
Discharge: HOME | End: 2022-04-20
Attending: INTERNAL MEDICINE | Admitting: INTERNAL MEDICINE
Payer: MEDICARE

## 2022-04-20 ENCOUNTER — ANESTHESIA EVENT (OUTPATIENT)
Dept: ENDOSCOPY | Facility: HOSPITAL | Age: 77
End: 2022-04-20
Payer: MEDICARE

## 2022-04-20 ENCOUNTER — APPOINTMENT (OUTPATIENT)
Dept: GENERAL RADIOLOGY | Facility: HOSPITAL | Age: 77
End: 2022-04-20
Attending: INTERNAL MEDICINE
Payer: MEDICARE

## 2022-04-20 ENCOUNTER — ANESTHESIA (OUTPATIENT)
Dept: ENDOSCOPY | Facility: HOSPITAL | Age: 77
End: 2022-04-20
Payer: MEDICARE

## 2022-04-20 VITALS
BODY MASS INDEX: 27.3 KG/M2 | WEIGHT: 195 LBS | TEMPERATURE: 97 F | SYSTOLIC BLOOD PRESSURE: 139 MMHG | HEIGHT: 71 IN | DIASTOLIC BLOOD PRESSURE: 67 MMHG | OXYGEN SATURATION: 96 % | RESPIRATION RATE: 15 BRPM | HEART RATE: 55 BPM

## 2022-04-20 DIAGNOSIS — Z01.818 PRE-OP TESTING: Primary | ICD-10-CM

## 2022-04-20 DIAGNOSIS — R91.8 PULMONARY INFILTRATES: ICD-10-CM

## 2022-04-20 LAB
APTT PPP: 27.2 SECONDS (ref 23.3–35.6)
BASOPHILS NFR BRONCH: 0 %
EOSINOPHIL NFR BRONCH: 19 %
INR BLD: 0.94 (ref 0.8–1.2)
LYMPHOCYTES NFR BRONCH: 10 %
M TB CMPLX RRNA SPEC QL PROBE: NOT DETECTED
MONOS+MACROS NFR BRONCH: 10 %
NEUTROPHILS NFR BRONCH: 52 %
PLATELET # BLD AUTO: 162 10(3)UL (ref 150–450)
PROTHROMBIN TIME: 12.6 SECONDS (ref 11.6–14.8)
RBC # FLD: 578 /CUMM (ref ?–1)
TOTAL CELLS COUNTED FLD: 100
WBC # FLD: 24 /CUMM (ref ?–1)
WBC OTHER NFR BRONCH: 9 %

## 2022-04-20 PROCEDURE — 0BDF8ZX EXTRACTION OF RIGHT LOWER LUNG LOBE, VIA NATURAL OR ARTIFICIAL OPENING ENDOSCOPIC, DIAGNOSTIC: ICD-10-PCS | Performed by: INTERNAL MEDICINE

## 2022-04-20 PROCEDURE — 31623 DX BRONCHOSCOPE/BRUSH: CPT | Performed by: INTERNAL MEDICINE

## 2022-04-20 PROCEDURE — 0BBF8ZX EXCISION OF RIGHT LOWER LUNG LOBE, VIA NATURAL OR ARTIFICIAL OPENING ENDOSCOPIC, DIAGNOSTIC: ICD-10-PCS | Performed by: INTERNAL MEDICINE

## 2022-04-20 PROCEDURE — 76000 FLUOROSCOPY <1 HR PHYS/QHP: CPT | Performed by: INTERNAL MEDICINE

## 2022-04-20 PROCEDURE — 31624 DX BRONCHOSCOPE/LAVAGE: CPT | Performed by: INTERNAL MEDICINE

## 2022-04-20 PROCEDURE — 31628 BRONCHOSCOPY/LUNG BX EACH: CPT | Performed by: INTERNAL MEDICINE

## 2022-04-20 RX ORDER — ATROPINE SULFATE 0.4 MG/ML
0.4 AMPUL (ML) INJECTION ONCE
Status: COMPLETED | OUTPATIENT
Start: 2022-04-20 | End: 2022-04-20

## 2022-04-20 RX ORDER — MIDAZOLAM HYDROCHLORIDE 1 MG/ML
INJECTION INTRAMUSCULAR; INTRAVENOUS AS NEEDED
Status: DISCONTINUED | OUTPATIENT
Start: 2022-04-20 | End: 2022-04-20 | Stop reason: SURG

## 2022-04-20 RX ORDER — SODIUM CHLORIDE, SODIUM LACTATE, POTASSIUM CHLORIDE, CALCIUM CHLORIDE 600; 310; 30; 20 MG/100ML; MG/100ML; MG/100ML; MG/100ML
INJECTION, SOLUTION INTRAVENOUS CONTINUOUS
Status: DISCONTINUED | OUTPATIENT
Start: 2022-04-20 | End: 2022-04-20

## 2022-04-20 RX ORDER — LIDOCAINE HYDROCHLORIDE 10 MG/ML
INJECTION, SOLUTION EPIDURAL; INFILTRATION; INTRACAUDAL; PERINEURAL AS NEEDED
Status: DISCONTINUED | OUTPATIENT
Start: 2022-04-20 | End: 2022-04-20 | Stop reason: SURG

## 2022-04-20 RX ADMIN — LIDOCAINE HYDROCHLORIDE 50 MG: 10 INJECTION, SOLUTION EPIDURAL; INFILTRATION; INTRACAUDAL; PERINEURAL at 08:56:00

## 2022-04-20 RX ADMIN — SODIUM CHLORIDE, SODIUM LACTATE, POTASSIUM CHLORIDE, CALCIUM CHLORIDE: 600; 310; 30; 20 INJECTION, SOLUTION INTRAVENOUS at 09:20:00

## 2022-04-20 RX ADMIN — SODIUM CHLORIDE, SODIUM LACTATE, POTASSIUM CHLORIDE, CALCIUM CHLORIDE: 600; 310; 30; 20 INJECTION, SOLUTION INTRAVENOUS at 08:54:00

## 2022-04-20 RX ADMIN — MIDAZOLAM HYDROCHLORIDE 1 MG: 1 INJECTION INTRAMUSCULAR; INTRAVENOUS at 08:56:00

## 2022-04-20 NOTE — PROCEDURES
[facility]  Procedure Note  22    [address] [place] [address] Patient Status:  Hospital Outpatient Surgery    1945 MRN [id number]   Location [facility] ENDOSCOPY LAB SUITES Attending Ashok Horton MD   [facility] Day # 0 PCP Hemal Marie MD     Procedure: Bronchoscopy with right lower lobe transbronchial biopsies under fluoroscopic guidance    Pre-Procedure Diagnosis: Infiltrates right lower lobe    Post-Procedure Diagnosis: Mild friability to airways    Anesthesia:  MAC    Finding and procedure: The patient was sedated by anesthesiology and a bite-block was placed. The small Olympus bronchoscope was inserted through the bite-block. The trachea was unremarkable as reaching the mainstem bronchi and reached the segmental bronchi which were completely imaged. The right lower lobe lateral segment was cannulated and lavaged with 90 cc of saline followed by 30 cc relatively clear return followed by 2 brushings of the right lower lobe lateral segment using fluoroscopic guidance followed by 7 transbronchial biopsies using fluoroscopic guidance of the right lower lobe lateral and posterior segments.     Specimens: Sent    Blood Loss: None    Tourniquet Time: None  Complications:  None  Drains:  None    Secondary Diagnosis: None    Hemal Marie MD  Medical Director, Critical Care, [facility]  Medical Director, [address] [place] [address]. [phone]  Pager: [phone]

## 2022-04-21 ENCOUNTER — TELEPHONE (OUTPATIENT)
Dept: PULMONOLOGY | Facility: CLINIC | Age: 77
End: 2022-04-21

## 2022-04-22 ENCOUNTER — APPOINTMENT (OUTPATIENT)
Dept: HEMATOLOGY/ONCOLOGY | Facility: HOSPITAL | Age: 77
End: 2022-04-22
Attending: INTERNAL MEDICINE
Payer: MEDICARE

## 2022-04-23 LAB
ASPERGILLUS GALACTOMANNAN AG, BAL: POSITIVE
ASPERGILLUS GALACTOMANNAN INDX: 4.21

## 2022-04-26 ENCOUNTER — TELEPHONE (OUTPATIENT)
Dept: PULMONOLOGY | Facility: CLINIC | Age: 77
End: 2022-04-26

## 2022-04-26 RX ORDER — VORICONAZOLE 200 MG/1
200 TABLET, FILM COATED ORAL 2 TIMES DAILY
Qty: 180 TABLET | Refills: 3 | Status: SHIPPED | OUTPATIENT
Start: 2022-04-26

## 2022-04-26 NOTE — TELEPHONE ENCOUNTER
RN, I spoke with the patient. 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 Will discuss with his oncologist implications regarding immunotherapy.

## 2022-04-27 ENCOUNTER — TELEPHONE (OUTPATIENT)
Dept: HEMATOLOGY/ONCOLOGY | Facility: HOSPITAL | Age: 77
End: 2022-04-27

## 2022-04-27 NOTE — TELEPHONE ENCOUNTER
Spoke with patient's wife. She wanted to make sure you discuss patient's test results with Dr. Imani Corona.

## 2022-04-27 NOTE — TELEPHONE ENCOUNTER
Confirmed with wife that Dr Momo Curtis and Dr Rogelio Hendricks may have already spoken, but that on Thursdays during Tumor Board they will discuss Luis's care. She confirmed she was already aware, but appreciated the phone call.

## 2022-04-29 ENCOUNTER — NURSE ONLY (OUTPATIENT)
Dept: HEMATOLOGY/ONCOLOGY | Facility: HOSPITAL | Age: 77
End: 2022-04-29
Attending: INTERNAL MEDICINE
Payer: MEDICARE

## 2022-04-29 VITALS
HEART RATE: 65 BPM | SYSTOLIC BLOOD PRESSURE: 120 MMHG | HEIGHT: 71 IN | OXYGEN SATURATION: 96 % | WEIGHT: 196 LBS | DIASTOLIC BLOOD PRESSURE: 65 MMHG | TEMPERATURE: 98 F | BODY MASS INDEX: 27.44 KG/M2 | RESPIRATION RATE: 18 BRPM

## 2022-04-29 VITALS
DIASTOLIC BLOOD PRESSURE: 65 MMHG | BODY MASS INDEX: 27.44 KG/M2 | SYSTOLIC BLOOD PRESSURE: 120 MMHG | OXYGEN SATURATION: 96 % | HEIGHT: 71 IN | WEIGHT: 196 LBS | RESPIRATION RATE: 18 BRPM | HEART RATE: 65 BPM | TEMPERATURE: 98 F

## 2022-04-29 DIAGNOSIS — Z92.3 HISTORY OF RADIATION THERAPY: ICD-10-CM

## 2022-04-29 DIAGNOSIS — C34.92 MALIGNANT NEOPLASM OF LEFT LUNG, UNSPECIFIED PART OF LUNG (HCC): Primary | ICD-10-CM

## 2022-04-29 DIAGNOSIS — D50.9 IRON DEFICIENCY ANEMIA, UNSPECIFIED IRON DEFICIENCY ANEMIA TYPE: ICD-10-CM

## 2022-04-29 DIAGNOSIS — B44.9 ASPERGILLUS PNEUMONIA (HCC): ICD-10-CM

## 2022-04-29 DIAGNOSIS — C34.12 CANCER OF UPPER LOBE OF LEFT LUNG (HCC): ICD-10-CM

## 2022-04-29 DIAGNOSIS — Z51.11 CHEMOTHERAPY MANAGEMENT, ENCOUNTER FOR: ICD-10-CM

## 2022-04-29 DIAGNOSIS — Z45.2 ENCOUNTER FOR CARE RELATED TO PORT-A-CATH: ICD-10-CM

## 2022-04-29 LAB
ALBUMIN SERPL-MCNC: 3.4 G/DL (ref 3.4–5)
ALBUMIN/GLOB SERPL: 1.1 {RATIO} (ref 1–2)
ALP LIVER SERPL-CCNC: 63 U/L
ALT SERPL-CCNC: 29 U/L
ANION GAP SERPL CALC-SCNC: 4 MMOL/L (ref 0–18)
AST SERPL-CCNC: 18 U/L (ref 15–37)
BASOPHILS # BLD AUTO: 0.01 X10(3) UL (ref 0–0.2)
BASOPHILS NFR BLD AUTO: 0.1 %
BILIRUB SERPL-MCNC: 0.6 MG/DL (ref 0.1–2)
BUN BLD-MCNC: 27 MG/DL (ref 7–18)
BUN/CREAT SERPL: 23.9 (ref 10–20)
CALCIUM BLD-MCNC: 9.1 MG/DL (ref 8.5–10.1)
CHLORIDE SERPL-SCNC: 106 MMOL/L (ref 98–112)
CO2 SERPL-SCNC: 29 MMOL/L (ref 21–32)
CREAT BLD-MCNC: 1.13 MG/DL
DEPRECATED RDW RBC AUTO: 51.5 FL (ref 35.1–46.3)
EOSINOPHIL # BLD AUTO: 0 X10(3) UL (ref 0–0.7)
EOSINOPHIL NFR BLD AUTO: 0 %
ERYTHROCYTE [DISTWIDTH] IN BLOOD BY AUTOMATED COUNT: 13.6 % (ref 11–15)
GLOBULIN PLAS-MCNC: 3.1 G/DL (ref 2.8–4.4)
GLUCOSE BLD-MCNC: 109 MG/DL (ref 70–99)
HCT VFR BLD AUTO: 43.5 %
HGB BLD-MCNC: 14.1 G/DL
IMM GRANULOCYTES # BLD AUTO: 0.08 X10(3) UL (ref 0–1)
IMM GRANULOCYTES NFR BLD: 0.8 %
LYMPHOCYTES # BLD AUTO: 0.49 X10(3) UL (ref 1–4)
LYMPHOCYTES NFR BLD AUTO: 4.6 %
MCH RBC QN AUTO: 32.9 PG (ref 26–34)
MCHC RBC AUTO-ENTMCNC: 32.4 G/DL (ref 31–37)
MCV RBC AUTO: 101.6 FL
MONOCYTES # BLD AUTO: 0.4 X10(3) UL (ref 0.1–1)
MONOCYTES NFR BLD AUTO: 3.8 %
NEUTROPHILS # BLD AUTO: 9.65 X10 (3) UL (ref 1.5–7.7)
NEUTROPHILS # BLD AUTO: 9.65 X10(3) UL (ref 1.5–7.7)
NEUTROPHILS NFR BLD AUTO: 90.7 %
OSMOLALITY SERPL CALC.SUM OF ELEC: 294 MOSM/KG (ref 275–295)
PLATELET # BLD AUTO: 167 10(3)UL (ref 150–450)
POTASSIUM SERPL-SCNC: 4.5 MMOL/L (ref 3.5–5.1)
PROT SERPL-MCNC: 6.5 G/DL (ref 6.4–8.2)
RBC # BLD AUTO: 4.28 X10(6)UL
SODIUM SERPL-SCNC: 139 MMOL/L (ref 136–145)
TSI SER-ACNC: 1.75 MIU/ML (ref 0.36–3.74)
WBC # BLD AUTO: 10.6 X10(3) UL (ref 4–11)

## 2022-04-29 PROCEDURE — 85025 COMPLETE CBC W/AUTO DIFF WBC: CPT

## 2022-04-29 PROCEDURE — 80053 COMPREHEN METABOLIC PANEL: CPT

## 2022-04-29 PROCEDURE — 96413 CHEMO IV INFUSION 1 HR: CPT

## 2022-04-29 PROCEDURE — 99215 OFFICE O/P EST HI 40 MIN: CPT | Performed by: INTERNAL MEDICINE

## 2022-04-29 PROCEDURE — 84443 ASSAY THYROID STIM HORMONE: CPT

## 2022-04-29 RX ORDER — HEPARIN SODIUM (PORCINE) LOCK FLUSH IV SOLN 100 UNIT/ML 100 UNIT/ML
5 SOLUTION INTRAVENOUS ONCE
OUTPATIENT
Start: 2022-04-29

## 2022-04-29 RX ORDER — SODIUM CHLORIDE 9 MG/ML
10 INJECTION INTRAVENOUS ONCE
OUTPATIENT
Start: 2022-04-29

## 2022-04-29 RX ORDER — HEPARIN SODIUM (PORCINE) LOCK FLUSH IV SOLN 100 UNIT/ML 100 UNIT/ML
5 SOLUTION INTRAVENOUS ONCE
Status: COMPLETED | OUTPATIENT
Start: 2022-04-29 | End: 2022-04-29

## 2022-04-29 RX ORDER — HEPARIN SODIUM (PORCINE) LOCK FLUSH IV SOLN 100 UNIT/ML 100 UNIT/ML
SOLUTION INTRAVENOUS
Status: COMPLETED
Start: 2022-04-29 | End: 2022-04-29

## 2022-04-29 RX ADMIN — HEPARIN SODIUM (PORCINE) LOCK FLUSH IV SOLN 100 UNIT/ML 500 UNITS: 100 SOLUTION INTRAVENOUS at 16:43:00

## 2022-04-29 NOTE — PROGRESS NOTES
Pt here for [address]. Arrives Ambulating independently, accompanied by Spouse           Modifications in dose or schedule: Yes, patient was delayed due to MILLAN. Patient had a bronchoscopy with positive Aspergillus, per Severo Shall, APN okay to proceed with today's treatment. Patient denies possible pregnancy since last therapy cycle: NA    Patient reports he is well, denies any complaints. PIV previously started from lab draw, positive blood return noted.  Frequency of blood return and site check throughout administration: Prior to administration, Prior to each drug and At completion of therapy   Discharged with future appointments scheduled, Ambulating independently, accompanied by:Spouse    Outpatient Oncology Care Plan  Problem list:  fatigue  Problems related to:    chemotherapy  side effect of treatment  Interventions:  monitor for signs/symptoms of infection  educate regarding self care  encourage activity as tolerated  Expected outcomes:  adequate sleep/rest  free of infection  maintains/gains weight  understands plan of care  verbalize how to care for self  Progress towards outcome:  making progress    Education Record    Learner:  Patient and Spouse  Barriers / Limitations:  None  Method:  Discussion  Outcome:  Observed demonstration  Comments:

## 2022-05-02 DIAGNOSIS — Z92.89 HISTORY OF IMMUNOTHERAPY: ICD-10-CM

## 2022-05-02 DIAGNOSIS — Z51.11 CHEMOTHERAPY MANAGEMENT, ENCOUNTER FOR: ICD-10-CM

## 2022-05-02 DIAGNOSIS — J98.4 PNEUMONITIS: ICD-10-CM

## 2022-05-02 RX ORDER — PREDNISONE 10 MG/1
TABLET ORAL
Qty: 30 TABLET | Refills: 5 | Status: SHIPPED | OUTPATIENT
Start: 2022-05-02 | End: 2022-09-23

## 2022-05-16 ENCOUNTER — TELEPHONE (OUTPATIENT)
Dept: PULMONOLOGY | Facility: CLINIC | Age: 77
End: 2022-05-16

## 2022-05-16 NOTE — TELEPHONE ENCOUNTER
Informed spouse of Dr. Nona Pugh orders below. She accepted appt for pt on 5/19 @ 2 pm at [facility]. Appt info given. Explained pt to obtain CXR prior to appt, they may call Central Scheduling to schedule appt for CXR if desired, & to allow enough time for CXR before appt time. She voiced understanding. Confirmed she has phone # for [employer].

## 2022-05-16 NOTE — TELEPHONE ENCOUNTER
Wife called in stating pt is having shortness of breathe. Attempting to call high priority RN line.  Please call [phone]

## 2022-05-16 NOTE — TELEPHONE ENCOUNTER
Per wife pt has been taking voriconazole for aspergillus x 2 wks & still has SOB. Pt c/o feeling bloated & weak (generalized). Pt stts he walks 20 feet & has SOB. Confirmed pt taking prednisone 10 mg qd, Bactrim bid three times/wk, and hasn't been using albuterol inhaler or montelukast sodium. Wife stts his lowest O2 sat is 92-93%. Instructed pt to use albuterol inhaler as ordered. Explained RN will f/u s/p d/w MD. He voiced understanding. Dr. Scott Renteria- pt looking for an appt or your recommendation(s).

## 2022-05-19 ENCOUNTER — OFFICE VISIT (OUTPATIENT)
Dept: PULMONOLOGY | Facility: CLINIC | Age: 77
End: 2022-05-19
Payer: MEDICARE

## 2022-05-19 ENCOUNTER — HOSPITAL ENCOUNTER (OUTPATIENT)
Dept: GENERAL RADIOLOGY | Facility: HOSPITAL | Age: 77
Discharge: HOME | End: 2022-05-19
Attending: INTERNAL MEDICINE
Payer: MEDICARE

## 2022-05-19 VITALS
HEART RATE: 76 BPM | SYSTOLIC BLOOD PRESSURE: 122 MMHG | OXYGEN SATURATION: 96 % | HEIGHT: 70 IN | RESPIRATION RATE: 16 BRPM | DIASTOLIC BLOOD PRESSURE: 75 MMHG | BODY MASS INDEX: 27.92 KG/M2 | WEIGHT: 195 LBS

## 2022-05-19 DIAGNOSIS — R06.00 DYSPNEA, UNSPECIFIED TYPE: Primary | ICD-10-CM

## 2022-05-19 DIAGNOSIS — R06.02 SOB (SHORTNESS OF BREATH): ICD-10-CM

## 2022-05-19 PROBLEM — J44.9 CHRONIC OBSTRUCTIVE PULMONARY DISEASE, UNSPECIFIED (HCC): Status: ACTIVE | Noted: 2022-05-19

## 2022-05-19 PROCEDURE — 99213 OFFICE O/P EST LOW 20 MIN: CPT | Performed by: INTERNAL MEDICINE

## 2022-05-19 PROCEDURE — 71046 X-RAY EXAM CHEST 2 VIEWS: CPT | Performed by: INTERNAL MEDICINE

## 2022-05-19 RX ORDER — FLUTICASONE FUROATE AND VILANTEROL TRIFENATATE 100; 25 UG/1; UG/1
1 POWDER RESPIRATORY (INHALATION) DAILY
Qty: 1 EACH | Refills: 6 | Status: SHIPPED | OUTPATIENT
Start: 2022-05-19

## 2022-05-20 ENCOUNTER — NURSE ONLY (OUTPATIENT)
Dept: HEMATOLOGY/ONCOLOGY | Facility: HOSPITAL | Age: 77
End: 2022-05-20
Attending: INTERNAL MEDICINE
Payer: MEDICARE

## 2022-05-20 VITALS
SYSTOLIC BLOOD PRESSURE: 132 MMHG | HEIGHT: 71 IN | RESPIRATION RATE: 16 BRPM | OXYGEN SATURATION: 95 % | TEMPERATURE: 98 F | BODY MASS INDEX: 26.88 KG/M2 | WEIGHT: 192 LBS | DIASTOLIC BLOOD PRESSURE: 71 MMHG | HEART RATE: 66 BPM

## 2022-05-20 VITALS
WEIGHT: 192 LBS | OXYGEN SATURATION: 95 % | HEIGHT: 71 IN | RESPIRATION RATE: 16 BRPM | TEMPERATURE: 98 F | DIASTOLIC BLOOD PRESSURE: 71 MMHG | HEART RATE: 66 BPM | BODY MASS INDEX: 26.88 KG/M2 | SYSTOLIC BLOOD PRESSURE: 132 MMHG

## 2022-05-20 DIAGNOSIS — C34.92 MALIGNANT NEOPLASM OF LEFT LUNG, UNSPECIFIED PART OF LUNG (HCC): Primary | ICD-10-CM

## 2022-05-20 DIAGNOSIS — D50.9 IRON DEFICIENCY ANEMIA, UNSPECIFIED IRON DEFICIENCY ANEMIA TYPE: ICD-10-CM

## 2022-05-20 DIAGNOSIS — Z51.11 CHEMOTHERAPY MANAGEMENT, ENCOUNTER FOR: Primary | ICD-10-CM

## 2022-05-20 DIAGNOSIS — Z45.2 ENCOUNTER FOR CARE RELATED TO PORT-A-CATH: ICD-10-CM

## 2022-05-20 DIAGNOSIS — R06.00 DOE (DYSPNEA ON EXERTION): ICD-10-CM

## 2022-05-20 DIAGNOSIS — C34.12 CANCER OF UPPER LOBE OF LEFT LUNG (HCC): ICD-10-CM

## 2022-05-20 DIAGNOSIS — B44.9 ASPERGILLUS PNEUMONIA (HCC): ICD-10-CM

## 2022-05-20 DIAGNOSIS — C34.92 MALIGNANT NEOPLASM OF LEFT LUNG, UNSPECIFIED PART OF LUNG (HCC): ICD-10-CM

## 2022-05-20 LAB
ALBUMIN SERPL-MCNC: 3.4 G/DL (ref 3.4–5)
ALBUMIN/GLOB SERPL: 1.2 {RATIO} (ref 1–2)
ALP LIVER SERPL-CCNC: 69 U/L
ALT SERPL-CCNC: 26 U/L
ANION GAP SERPL CALC-SCNC: 6 MMOL/L (ref 0–18)
AST SERPL-CCNC: 28 U/L (ref 15–37)
BASOPHILS # BLD AUTO: 0.04 X10(3) UL (ref 0–0.2)
BASOPHILS NFR BLD AUTO: 0.5 %
BILIRUB SERPL-MCNC: 0.4 MG/DL (ref 0.1–2)
BUN BLD-MCNC: 25 MG/DL (ref 7–18)
BUN/CREAT SERPL: 23.4 (ref 10–20)
CALCIUM BLD-MCNC: 8.6 MG/DL (ref 8.5–10.1)
CHLORIDE SERPL-SCNC: 108 MMOL/L (ref 98–112)
CO2 SERPL-SCNC: 27 MMOL/L (ref 21–32)
CREAT BLD-MCNC: 1.07 MG/DL
DEPRECATED RDW RBC AUTO: 50.3 FL (ref 35.1–46.3)
EOSINOPHIL # BLD AUTO: 0.04 X10(3) UL (ref 0–0.7)
EOSINOPHIL NFR BLD AUTO: 0.5 %
ERYTHROCYTE [DISTWIDTH] IN BLOOD BY AUTOMATED COUNT: 13.5 % (ref 11–15)
GLOBULIN PLAS-MCNC: 2.9 G/DL (ref 2.8–4.4)
GLUCOSE BLD-MCNC: 93 MG/DL (ref 70–99)
HCT VFR BLD AUTO: 40.5 %
HGB BLD-MCNC: 13.4 G/DL
IMM GRANULOCYTES # BLD AUTO: 0.03 X10(3) UL (ref 0–1)
IMM GRANULOCYTES NFR BLD: 0.4 %
LYMPHOCYTES # BLD AUTO: 0.95 X10(3) UL (ref 1–4)
LYMPHOCYTES NFR BLD AUTO: 12.1 %
MCH RBC QN AUTO: 33.1 PG (ref 26–34)
MCHC RBC AUTO-ENTMCNC: 33.1 G/DL (ref 31–37)
MCV RBC AUTO: 100 FL
MONOCYTES # BLD AUTO: 0.76 X10(3) UL (ref 0.1–1)
MONOCYTES NFR BLD AUTO: 9.6 %
NEUTROPHILS # BLD AUTO: 6.06 X10 (3) UL (ref 1.5–7.7)
NEUTROPHILS # BLD AUTO: 6.06 X10(3) UL (ref 1.5–7.7)
NEUTROPHILS NFR BLD AUTO: 76.9 %
OSMOLALITY SERPL CALC.SUM OF ELEC: 296 MOSM/KG (ref 275–295)
PLATELET # BLD AUTO: 200 10(3)UL (ref 150–450)
POTASSIUM SERPL-SCNC: 3.6 MMOL/L (ref 3.5–5.1)
PROT SERPL-MCNC: 6.3 G/DL (ref 6.4–8.2)
RBC # BLD AUTO: 4.05 X10(6)UL
SODIUM SERPL-SCNC: 141 MMOL/L (ref 136–145)
TSI SER-ACNC: 0.92 MIU/ML (ref 0.36–3.74)
WBC # BLD AUTO: 7.9 X10(3) UL (ref 4–11)

## 2022-05-20 PROCEDURE — 99215 OFFICE O/P EST HI 40 MIN: CPT | Performed by: INTERNAL MEDICINE

## 2022-05-20 PROCEDURE — 96413 CHEMO IV INFUSION 1 HR: CPT

## 2022-05-20 PROCEDURE — 85025 COMPLETE CBC W/AUTO DIFF WBC: CPT

## 2022-05-20 PROCEDURE — 80053 COMPREHEN METABOLIC PANEL: CPT

## 2022-05-20 PROCEDURE — 84443 ASSAY THYROID STIM HORMONE: CPT

## 2022-05-20 RX ORDER — SODIUM CHLORIDE 9 MG/ML
10 INJECTION INTRAVENOUS ONCE
OUTPATIENT
Start: 2022-05-20

## 2022-05-20 RX ORDER — HEPARIN SODIUM (PORCINE) LOCK FLUSH IV SOLN 100 UNIT/ML 100 UNIT/ML
5 SOLUTION INTRAVENOUS ONCE
Status: COMPLETED | OUTPATIENT
Start: 2022-05-20 | End: 2022-05-20

## 2022-05-20 RX ORDER — HEPARIN SODIUM (PORCINE) LOCK FLUSH IV SOLN 100 UNIT/ML 100 UNIT/ML
SOLUTION INTRAVENOUS
Status: COMPLETED
Start: 2022-05-20 | End: 2022-05-20

## 2022-05-20 RX ORDER — HEPARIN SODIUM (PORCINE) LOCK FLUSH IV SOLN 100 UNIT/ML 100 UNIT/ML
5 SOLUTION INTRAVENOUS ONCE
OUTPATIENT
Start: 2022-05-20

## 2022-05-20 RX ADMIN — HEPARIN SODIUM (PORCINE) LOCK FLUSH IV SOLN 100 UNIT/ML 500 UNITS: 100 SOLUTION INTRAVENOUS at 12:28:00

## 2022-05-20 NOTE — PROGRESS NOTES
Pt to infusion for C19D1 Keytruda. Arrives Ambulating independently, accompanied by Spouse from MD visit           Modifications in dose or schedule: No    Patient denies possible pregnancy since last therapy cycle: No      Drugs/infusions dual verified for appearance and physical integrity: yes     PIV previously established in lab - present blood return noted. Frequency of blood return and site check throughout administration: Prior to administration, Prior to each drug and At completion of therapy   PIV removed, site covered with gauze and coban.  Discharged with future appointments scheduled, Ambulating independently, accompanied by:Spouse

## 2022-05-26 ENCOUNTER — TELEPHONE (OUTPATIENT)
Dept: NEPHROLOGY | Facility: CLINIC | Age: 77
End: 2022-05-26

## 2022-05-26 NOTE — TELEPHONE ENCOUNTER
----- Message from Wendi Katz MD sent at 5/24/2022  9:47 PM CDT -----  RN, please call the patient to let him know that his repeat chest x-ray a few days ago showed improvement in the right lung base.   Bonita Marion

## 2022-05-26 NOTE — TELEPHONE ENCOUNTER
Spoke with pt wife per Rob Mir chest xray showed improvement in the lung base. Pt wife voiced understanding.

## 2022-05-27 ENCOUNTER — TELEPHONE (OUTPATIENT)
Dept: PULMONOLOGY | Facility: CLINIC | Age: 77
End: 2022-05-27

## 2022-06-08 ENCOUNTER — TELEPHONE (OUTPATIENT)
Dept: PULMONOLOGY | Facility: CLINIC | Age: 77
End: 2022-06-08

## 2022-06-10 ENCOUNTER — NURSE ONLY (OUTPATIENT)
Dept: HEMATOLOGY/ONCOLOGY | Facility: HOSPITAL | Age: 77
End: 2022-06-10
Attending: INTERNAL MEDICINE
Payer: MEDICARE

## 2022-06-10 VITALS
HEIGHT: 71 IN | HEART RATE: 62 BPM | DIASTOLIC BLOOD PRESSURE: 53 MMHG | SYSTOLIC BLOOD PRESSURE: 123 MMHG | TEMPERATURE: 98 F | BODY MASS INDEX: 26.88 KG/M2 | OXYGEN SATURATION: 95 % | RESPIRATION RATE: 16 BRPM | WEIGHT: 192 LBS

## 2022-06-10 VITALS
WEIGHT: 192 LBS | SYSTOLIC BLOOD PRESSURE: 123 MMHG | RESPIRATION RATE: 16 BRPM | BODY MASS INDEX: 26.88 KG/M2 | DIASTOLIC BLOOD PRESSURE: 53 MMHG | OXYGEN SATURATION: 95 % | HEART RATE: 62 BPM | TEMPERATURE: 98 F | HEIGHT: 71 IN

## 2022-06-10 DIAGNOSIS — C34.12 CANCER OF UPPER LOBE OF LEFT LUNG (HCC): ICD-10-CM

## 2022-06-10 DIAGNOSIS — C34.92 MALIGNANT NEOPLASM OF LEFT LUNG, UNSPECIFIED PART OF LUNG (HCC): ICD-10-CM

## 2022-06-10 DIAGNOSIS — D50.9 IRON DEFICIENCY ANEMIA, UNSPECIFIED IRON DEFICIENCY ANEMIA TYPE: ICD-10-CM

## 2022-06-10 DIAGNOSIS — Z45.2 ENCOUNTER FOR CARE RELATED TO PORT-A-CATH: ICD-10-CM

## 2022-06-10 DIAGNOSIS — B44.9 ASPERGILLUS PNEUMONIA (HCC): ICD-10-CM

## 2022-06-10 DIAGNOSIS — Z51.12 ENCOUNTER FOR ANTINEOPLASTIC IMMUNOTHERAPY: Primary | ICD-10-CM

## 2022-06-10 DIAGNOSIS — J18.9 PNEUMONITIS: ICD-10-CM

## 2022-06-10 DIAGNOSIS — R53.1 WEAKNESS GENERALIZED: Primary | ICD-10-CM

## 2022-06-10 DIAGNOSIS — Z51.12 ENCOUNTER FOR ANTINEOPLASTIC IMMUNOTHERAPY: ICD-10-CM

## 2022-06-10 DIAGNOSIS — C34.92 MALIGNANT NEOPLASM OF LEFT LUNG, UNSPECIFIED PART OF LUNG (HCC): Primary | ICD-10-CM

## 2022-06-10 LAB
ALBUMIN SERPL-MCNC: 3.2 G/DL (ref 3.4–5)
ALBUMIN/GLOB SERPL: 1.1 {RATIO} (ref 1–2)
ALP LIVER SERPL-CCNC: 78 U/L
ALT SERPL-CCNC: 21 U/L
ANION GAP SERPL CALC-SCNC: 7 MMOL/L (ref 0–18)
AST SERPL-CCNC: 21 U/L (ref 15–37)
BASOPHILS # BLD AUTO: 0.02 X10(3) UL (ref 0–0.2)
BASOPHILS NFR BLD AUTO: 0.3 %
BILIRUB SERPL-MCNC: 0.2 MG/DL (ref 0.1–2)
BUN BLD-MCNC: 22 MG/DL (ref 7–18)
BUN/CREAT SERPL: 20.4 (ref 10–20)
CALCIUM BLD-MCNC: 8.7 MG/DL (ref 8.5–10.1)
CHLORIDE SERPL-SCNC: 107 MMOL/L (ref 98–112)
CO2 SERPL-SCNC: 28 MMOL/L (ref 21–32)
CREAT BLD-MCNC: 1.08 MG/DL
DEPRECATED RDW RBC AUTO: 50.7 FL (ref 35.1–46.3)
EOSINOPHIL # BLD AUTO: 0.06 X10(3) UL (ref 0–0.7)
EOSINOPHIL NFR BLD AUTO: 0.8 %
ERYTHROCYTE [DISTWIDTH] IN BLOOD BY AUTOMATED COUNT: 13.7 % (ref 11–15)
GLOBULIN PLAS-MCNC: 2.8 G/DL (ref 2.8–4.4)
GLUCOSE BLD-MCNC: 84 MG/DL (ref 70–99)
HCT VFR BLD AUTO: 40.4 %
HGB BLD-MCNC: 12.9 G/DL
IMM GRANULOCYTES # BLD AUTO: 0.06 X10(3) UL (ref 0–1)
IMM GRANULOCYTES NFR BLD: 0.8 %
LYMPHOCYTES # BLD AUTO: 0.68 X10(3) UL (ref 1–4)
LYMPHOCYTES NFR BLD AUTO: 9.5 %
MCH RBC QN AUTO: 32 PG (ref 26–34)
MCHC RBC AUTO-ENTMCNC: 31.9 G/DL (ref 31–37)
MCV RBC AUTO: 100.2 FL
MONOCYTES # BLD AUTO: 0.67 X10(3) UL (ref 0.1–1)
MONOCYTES NFR BLD AUTO: 9.4 %
NEUTROPHILS # BLD AUTO: 5.65 X10 (3) UL (ref 1.5–7.7)
NEUTROPHILS # BLD AUTO: 5.65 X10(3) UL (ref 1.5–7.7)
NEUTROPHILS NFR BLD AUTO: 79.2 %
OSMOLALITY SERPL CALC.SUM OF ELEC: 297 MOSM/KG (ref 275–295)
PLATELET # BLD AUTO: 213 10(3)UL (ref 150–450)
POTASSIUM SERPL-SCNC: 4.2 MMOL/L (ref 3.5–5.1)
PROT SERPL-MCNC: 6 G/DL (ref 6.4–8.2)
RBC # BLD AUTO: 4.03 X10(6)UL
SODIUM SERPL-SCNC: 142 MMOL/L (ref 136–145)
TSI SER-ACNC: 2.96 MIU/ML (ref 0.36–3.74)
WBC # BLD AUTO: 7.1 X10(3) UL (ref 4–11)

## 2022-06-10 PROCEDURE — 80053 COMPREHEN METABOLIC PANEL: CPT

## 2022-06-10 PROCEDURE — 96413 CHEMO IV INFUSION 1 HR: CPT

## 2022-06-10 PROCEDURE — 84443 ASSAY THYROID STIM HORMONE: CPT

## 2022-06-10 PROCEDURE — 99215 OFFICE O/P EST HI 40 MIN: CPT | Performed by: INTERNAL MEDICINE

## 2022-06-10 PROCEDURE — 85025 COMPLETE CBC W/AUTO DIFF WBC: CPT

## 2022-06-10 RX ORDER — HEPARIN SODIUM (PORCINE) LOCK FLUSH IV SOLN 100 UNIT/ML 100 UNIT/ML
5 SOLUTION INTRAVENOUS ONCE
OUTPATIENT
Start: 2022-06-10

## 2022-06-10 RX ORDER — HEPARIN SODIUM (PORCINE) LOCK FLUSH IV SOLN 100 UNIT/ML 100 UNIT/ML
5 SOLUTION INTRAVENOUS ONCE
Status: COMPLETED | OUTPATIENT
Start: 2022-06-10 | End: 2022-06-10

## 2022-06-10 RX ORDER — SODIUM CHLORIDE 9 MG/ML
10 INJECTION INTRAVENOUS ONCE
OUTPATIENT
Start: 2022-06-10

## 2022-06-10 RX ORDER — HEPARIN SODIUM (PORCINE) LOCK FLUSH IV SOLN 100 UNIT/ML 100 UNIT/ML
SOLUTION INTRAVENOUS
Status: COMPLETED
Start: 2022-06-10 | End: 2022-06-10

## 2022-06-10 RX ADMIN — HEPARIN SODIUM (PORCINE) LOCK FLUSH IV SOLN 100 UNIT/ML 500 UNITS: 100 SOLUTION INTRAVENOUS at 10:32:00

## 2022-06-10 NOTE — PATIENT INSTRUCTIONS
1. Proceed with cycle 20 pembrolizumab    2. Will discuss treatment length with Dr. Gabriel Gilbert and CT with Dr. Gabriel Gilbert    3. Check cortisol level on 7/1/22    4. Call as needed    5.   Follow-up in 3 weeks

## 2022-06-10 NOTE — PROGRESS NOTES
Pt to infusion for C20D1 Keytruda. Arrives Ambulating independently, accompanied by Spouse from MD visit           Modifications in dose or schedule: No    Patient denies possible pregnancy since last therapy cycle: NA    Pt still being treated for Aspergillus. He does c/o getting short of breath if he exerts himself too much. No other complaints. Drugs/infusions dual verified for appearance and physical integrity: yes     Port previously accessed in lab, good blood return noted. . Frequency of blood return and site check throughout administration: Prior to administration, Prior to each drug and At completion of therapy     Tolerated treatment well. Port flushed and de-accessed.      Discharged with future appointments scheduled, Ambulating independently, accompanied by:Spouse

## 2022-06-17 ENCOUNTER — IMMUNIZATION (OUTPATIENT)
Dept: LAB | Age: 77
End: 2022-06-17
Attending: EMERGENCY MEDICINE
Payer: MEDICARE

## 2022-06-17 DIAGNOSIS — Z23 NEED FOR VACCINATION: Primary | ICD-10-CM

## 2022-06-17 PROCEDURE — 0054A SARSCOV2 VAC 30MCG TRS SUCR: CPT

## 2022-07-01 ENCOUNTER — NURSE ONLY (OUTPATIENT)
Dept: HEMATOLOGY/ONCOLOGY | Facility: HOSPITAL | Age: 77
End: 2022-07-01
Attending: INTERNAL MEDICINE
Payer: MEDICARE

## 2022-07-01 VITALS
TEMPERATURE: 98 F | HEART RATE: 62 BPM | DIASTOLIC BLOOD PRESSURE: 50 MMHG | OXYGEN SATURATION: 95 % | RESPIRATION RATE: 18 BRPM | SYSTOLIC BLOOD PRESSURE: 130 MMHG | HEIGHT: 71 IN | BODY MASS INDEX: 26.79 KG/M2 | WEIGHT: 191.38 LBS

## 2022-07-01 VITALS
WEIGHT: 191.38 LBS | BODY MASS INDEX: 27 KG/M2 | DIASTOLIC BLOOD PRESSURE: 50 MMHG | TEMPERATURE: 98 F | OXYGEN SATURATION: 95 % | HEART RATE: 62 BPM | RESPIRATION RATE: 18 BRPM | SYSTOLIC BLOOD PRESSURE: 130 MMHG

## 2022-07-01 DIAGNOSIS — C34.92 MALIGNANT NEOPLASM OF LEFT LUNG, UNSPECIFIED PART OF LUNG (HCC): ICD-10-CM

## 2022-07-01 DIAGNOSIS — D50.9 IRON DEFICIENCY ANEMIA, UNSPECIFIED IRON DEFICIENCY ANEMIA TYPE: ICD-10-CM

## 2022-07-01 DIAGNOSIS — E78.5 DYSLIPIDEMIA: ICD-10-CM

## 2022-07-01 DIAGNOSIS — Z45.2 ENCOUNTER FOR CARE RELATED TO PORT-A-CATH: ICD-10-CM

## 2022-07-01 DIAGNOSIS — Z51.11 CHEMOTHERAPY MANAGEMENT, ENCOUNTER FOR: Primary | ICD-10-CM

## 2022-07-01 DIAGNOSIS — Z51.12 ENCOUNTER FOR ANTINEOPLASTIC IMMUNOTHERAPY: ICD-10-CM

## 2022-07-01 DIAGNOSIS — C34.12 CANCER OF UPPER LOBE OF LEFT LUNG (HCC): ICD-10-CM

## 2022-07-01 DIAGNOSIS — J18.9 PNEUMONITIS: ICD-10-CM

## 2022-07-01 DIAGNOSIS — C34.92 MALIGNANT NEOPLASM OF LEFT LUNG, UNSPECIFIED PART OF LUNG (HCC): Primary | ICD-10-CM

## 2022-07-01 DIAGNOSIS — R53.1 WEAKNESS GENERALIZED: Primary | ICD-10-CM

## 2022-07-01 DIAGNOSIS — B44.9 ASPERGILLUS PNEUMONIA (HCC): ICD-10-CM

## 2022-07-01 LAB
ALBUMIN SERPL-MCNC: 3.4 G/DL (ref 3.4–5)
ALBUMIN/GLOB SERPL: 1.3 {RATIO} (ref 1–2)
ALP LIVER SERPL-CCNC: 70 U/L
ALT SERPL-CCNC: 26 U/L
ANION GAP SERPL CALC-SCNC: 8 MMOL/L (ref 0–18)
AST SERPL-CCNC: 24 U/L (ref 15–37)
BASOPHILS # BLD AUTO: 0.03 X10(3) UL (ref 0–0.2)
BASOPHILS NFR BLD AUTO: 0.6 %
BILIRUB SERPL-MCNC: 0.4 MG/DL (ref 0.1–2)
BUN BLD-MCNC: 30 MG/DL (ref 7–18)
BUN/CREAT SERPL: 29.1 (ref 10–20)
CALCIUM BLD-MCNC: 8.2 MG/DL (ref 8.5–10.1)
CHLORIDE SERPL-SCNC: 108 MMOL/L (ref 98–112)
CHOLEST SERPL-MCNC: 184 MG/DL (ref ?–200)
CO2 SERPL-SCNC: 25 MMOL/L (ref 21–32)
CORTIS SERPL-MCNC: 6.1 UG/DL
CORTIS SERPL-MCNC: 6.6 UG/DL
CREAT BLD-MCNC: 1.03 MG/DL
DEPRECATED RDW RBC AUTO: 52 FL (ref 35.1–46.3)
EOSINOPHIL # BLD AUTO: 0.07 X10(3) UL (ref 0–0.7)
EOSINOPHIL NFR BLD AUTO: 1.3 %
ERYTHROCYTE [DISTWIDTH] IN BLOOD BY AUTOMATED COUNT: 13.9 % (ref 11–15)
GLOBULIN PLAS-MCNC: 2.7 G/DL (ref 2.8–4.4)
GLUCOSE BLD-MCNC: 96 MG/DL (ref 70–99)
HCT VFR BLD AUTO: 40.6 %
HDLC SERPL-MCNC: 49 MG/DL (ref 40–59)
HGB BLD-MCNC: 13 G/DL
IMM GRANULOCYTES # BLD AUTO: 0.03 X10(3) UL (ref 0–1)
IMM GRANULOCYTES NFR BLD: 0.6 %
LDLC SERPL CALC-MCNC: 110 MG/DL (ref ?–100)
LYMPHOCYTES # BLD AUTO: 0.64 X10(3) UL (ref 1–4)
LYMPHOCYTES NFR BLD AUTO: 11.9 %
MCH RBC QN AUTO: 32.3 PG (ref 26–34)
MCHC RBC AUTO-ENTMCNC: 32 G/DL (ref 31–37)
MCV RBC AUTO: 100.7 FL
MONOCYTES # BLD AUTO: 0.59 X10(3) UL (ref 0.1–1)
MONOCYTES NFR BLD AUTO: 10.9 %
NEUTROPHILS # BLD AUTO: 4.03 X10 (3) UL (ref 1.5–7.7)
NEUTROPHILS # BLD AUTO: 4.03 X10(3) UL (ref 1.5–7.7)
NEUTROPHILS NFR BLD AUTO: 74.7 %
NONHDLC SERPL-MCNC: 135 MG/DL (ref ?–130)
OSMOLALITY SERPL CALC.SUM OF ELEC: 298 MOSM/KG (ref 275–295)
PLATELET # BLD AUTO: 174 10(3)UL (ref 150–450)
POTASSIUM SERPL-SCNC: 3.6 MMOL/L (ref 3.5–5.1)
PROT SERPL-MCNC: 6.1 G/DL (ref 6.4–8.2)
RBC # BLD AUTO: 4.03 X10(6)UL
SODIUM SERPL-SCNC: 141 MMOL/L (ref 136–145)
TRIGL SERPL-MCNC: 142 MG/DL (ref 30–149)
TSI SER-ACNC: 3.11 MIU/ML (ref 0.36–3.74)
VLDLC SERPL CALC-MCNC: 24 MG/DL (ref 0–30)
WBC # BLD AUTO: 5.4 X10(3) UL (ref 4–11)

## 2022-07-01 PROCEDURE — 82533 TOTAL CORTISOL: CPT

## 2022-07-01 PROCEDURE — 84443 ASSAY THYROID STIM HORMONE: CPT

## 2022-07-01 PROCEDURE — 85025 COMPLETE CBC W/AUTO DIFF WBC: CPT

## 2022-07-01 PROCEDURE — 80053 COMPREHEN METABOLIC PANEL: CPT

## 2022-07-01 PROCEDURE — 80061 LIPID PANEL: CPT

## 2022-07-01 PROCEDURE — 96413 CHEMO IV INFUSION 1 HR: CPT

## 2022-07-01 PROCEDURE — 99215 OFFICE O/P EST HI 40 MIN: CPT | Performed by: INTERNAL MEDICINE

## 2022-07-01 RX ORDER — HEPARIN SODIUM (PORCINE) LOCK FLUSH IV SOLN 100 UNIT/ML 100 UNIT/ML
5 SOLUTION INTRAVENOUS ONCE
Status: COMPLETED | OUTPATIENT
Start: 2022-07-01 | End: 2022-07-01

## 2022-07-01 RX ORDER — SODIUM CHLORIDE 9 MG/ML
10 INJECTION INTRAVENOUS ONCE
OUTPATIENT
Start: 2022-07-01

## 2022-07-01 RX ORDER — HEPARIN SODIUM (PORCINE) LOCK FLUSH IV SOLN 100 UNIT/ML 100 UNIT/ML
SOLUTION INTRAVENOUS
Status: COMPLETED
Start: 2022-07-01 | End: 2022-07-01

## 2022-07-01 RX ORDER — HEPARIN SODIUM (PORCINE) LOCK FLUSH IV SOLN 100 UNIT/ML 100 UNIT/ML
5 SOLUTION INTRAVENOUS ONCE
OUTPATIENT
Start: 2022-07-01

## 2022-07-01 RX ADMIN — HEPARIN SODIUM (PORCINE) LOCK FLUSH IV SOLN 100 UNIT/ML 500 UNITS: 100 SOLUTION INTRAVENOUS at 10:36:00

## 2022-07-01 NOTE — PROGRESS NOTES
Pt to infusion for C21D1 Keytruda. Arrives Ambulating independently, accompanied by Self from MD visit. No complaints at this time. Modifications in dose or schedule: No    Patient denies possible pregnancy since last therapy cycle: NA    Drugs/infusions dual verified for appearance and physical integrity: yes     Port previously accessed in lab - present blood return noted. Frequency of blood return and site check throughout administration: Prior to administration, Prior to each drug and At completion of therapy     Tolerated treatment well. Port deaccessed and heparin locked - site covered with gauze and paper tape. Discharged with future appointments scheduled, Ambulating independently, accompanied by:Self.

## 2022-07-11 ENCOUNTER — TELEPHONE (OUTPATIENT)
Dept: PULMONOLOGY | Facility: CLINIC | Age: 77
End: 2022-07-11

## 2022-07-22 ENCOUNTER — OFFICE VISIT (OUTPATIENT)
Dept: HEMATOLOGY/ONCOLOGY | Facility: HOSPITAL | Age: 77
End: 2022-07-22
Attending: INTERNAL MEDICINE
Payer: MEDICARE

## 2022-07-22 VITALS
HEART RATE: 55 BPM | BODY MASS INDEX: 27 KG/M2 | DIASTOLIC BLOOD PRESSURE: 65 MMHG | OXYGEN SATURATION: 98 % | TEMPERATURE: 98 F | RESPIRATION RATE: 20 BRPM | WEIGHT: 192.38 LBS | SYSTOLIC BLOOD PRESSURE: 158 MMHG

## 2022-07-22 VITALS
DIASTOLIC BLOOD PRESSURE: 65 MMHG | OXYGEN SATURATION: 98 % | BODY MASS INDEX: 26.93 KG/M2 | RESPIRATION RATE: 20 BRPM | TEMPERATURE: 98 F | SYSTOLIC BLOOD PRESSURE: 158 MMHG | WEIGHT: 192.38 LBS | HEART RATE: 55 BPM | HEIGHT: 71 IN

## 2022-07-22 DIAGNOSIS — Z51.11 CHEMOTHERAPY MANAGEMENT, ENCOUNTER FOR: ICD-10-CM

## 2022-07-22 DIAGNOSIS — Z45.2 ENCOUNTER FOR CARE RELATED TO PORT-A-CATH: ICD-10-CM

## 2022-07-22 DIAGNOSIS — D50.9 IRON DEFICIENCY ANEMIA, UNSPECIFIED IRON DEFICIENCY ANEMIA TYPE: ICD-10-CM

## 2022-07-22 DIAGNOSIS — C34.92 MALIGNANT NEOPLASM OF LEFT LUNG, UNSPECIFIED PART OF LUNG (HCC): Primary | ICD-10-CM

## 2022-07-22 DIAGNOSIS — C34.12 CANCER OF UPPER LOBE OF LEFT LUNG (HCC): ICD-10-CM

## 2022-07-22 DIAGNOSIS — C34.92 MALIGNANT NEOPLASM OF LEFT LUNG, UNSPECIFIED PART OF LUNG (HCC): ICD-10-CM

## 2022-07-22 DIAGNOSIS — B44.9 ASPERGILLUS PNEUMONIA (HCC): ICD-10-CM

## 2022-07-22 DIAGNOSIS — Z51.12 ENCOUNTER FOR ANTINEOPLASTIC IMMUNOTHERAPY: Primary | ICD-10-CM

## 2022-07-22 DIAGNOSIS — J18.9 PNEUMONITIS: ICD-10-CM

## 2022-07-22 LAB
ALBUMIN SERPL-MCNC: 3.4 G/DL (ref 3.4–5)
ALBUMIN/GLOB SERPL: 1.2 {RATIO} (ref 1–2)
ALP LIVER SERPL-CCNC: 87 U/L
ALT SERPL-CCNC: 25 U/L
ANION GAP SERPL CALC-SCNC: 8 MMOL/L (ref 0–18)
AST SERPL-CCNC: 25 U/L (ref 15–37)
BASOPHILS # BLD AUTO: 0.02 X10(3) UL (ref 0–0.2)
BASOPHILS NFR BLD AUTO: 0.4 %
BILIRUB SERPL-MCNC: 0.3 MG/DL (ref 0.1–2)
BUN BLD-MCNC: 27 MG/DL (ref 7–18)
BUN/CREAT SERPL: 30 (ref 10–20)
CALCIUM BLD-MCNC: 8.8 MG/DL (ref 8.5–10.1)
CHLORIDE SERPL-SCNC: 109 MMOL/L (ref 98–112)
CO2 SERPL-SCNC: 24 MMOL/L (ref 21–32)
CREAT BLD-MCNC: 0.9 MG/DL
DEPRECATED RDW RBC AUTO: 52.6 FL (ref 35.1–46.3)
EOSINOPHIL # BLD AUTO: 0.08 X10(3) UL (ref 0–0.7)
EOSINOPHIL NFR BLD AUTO: 1.4 %
ERYTHROCYTE [DISTWIDTH] IN BLOOD BY AUTOMATED COUNT: 14.2 % (ref 11–15)
GLOBULIN PLAS-MCNC: 2.8 G/DL (ref 2.8–4.4)
GLUCOSE BLD-MCNC: 93 MG/DL (ref 70–99)
HCT VFR BLD AUTO: 40.1 %
HGB BLD-MCNC: 12.9 G/DL
IMM GRANULOCYTES # BLD AUTO: 0.03 X10(3) UL (ref 0–1)
IMM GRANULOCYTES NFR BLD: 0.5 %
LYMPHOCYTES # BLD AUTO: 0.87 X10(3) UL (ref 1–4)
LYMPHOCYTES NFR BLD AUTO: 15.4 %
MCH RBC QN AUTO: 32.2 PG (ref 26–34)
MCHC RBC AUTO-ENTMCNC: 32.2 G/DL (ref 31–37)
MCV RBC AUTO: 100 FL
MONOCYTES # BLD AUTO: 0.6 X10(3) UL (ref 0.1–1)
MONOCYTES NFR BLD AUTO: 10.6 %
NEUTROPHILS # BLD AUTO: 4.06 X10 (3) UL (ref 1.5–7.7)
NEUTROPHILS # BLD AUTO: 4.06 X10(3) UL (ref 1.5–7.7)
NEUTROPHILS NFR BLD AUTO: 71.7 %
OSMOLALITY SERPL CALC.SUM OF ELEC: 297 MOSM/KG (ref 275–295)
PLATELET # BLD AUTO: 181 10(3)UL (ref 150–450)
POTASSIUM SERPL-SCNC: 4 MMOL/L (ref 3.5–5.1)
PROT SERPL-MCNC: 6.2 G/DL (ref 6.4–8.2)
RBC # BLD AUTO: 4.01 X10(6)UL
SODIUM SERPL-SCNC: 141 MMOL/L (ref 136–145)
TSI SER-ACNC: 2.63 MIU/ML (ref 0.36–3.74)
WBC # BLD AUTO: 5.7 X10(3) UL (ref 4–11)

## 2022-07-22 PROCEDURE — 99215 OFFICE O/P EST HI 40 MIN: CPT | Performed by: INTERNAL MEDICINE

## 2022-07-22 PROCEDURE — 96413 CHEMO IV INFUSION 1 HR: CPT

## 2022-07-22 PROCEDURE — 84443 ASSAY THYROID STIM HORMONE: CPT

## 2022-07-22 PROCEDURE — 80053 COMPREHEN METABOLIC PANEL: CPT

## 2022-07-22 PROCEDURE — 85025 COMPLETE CBC W/AUTO DIFF WBC: CPT

## 2022-07-22 RX ORDER — HEPARIN SODIUM (PORCINE) LOCK FLUSH IV SOLN 100 UNIT/ML 100 UNIT/ML
5 SOLUTION INTRAVENOUS ONCE
OUTPATIENT
Start: 2022-07-22

## 2022-07-22 RX ORDER — HEPARIN SODIUM (PORCINE) LOCK FLUSH IV SOLN 100 UNIT/ML 100 UNIT/ML
SOLUTION INTRAVENOUS
Status: COMPLETED
Start: 2022-07-22 | End: 2022-07-22

## 2022-07-22 RX ORDER — SODIUM CHLORIDE 9 MG/ML
10 INJECTION INTRAVENOUS ONCE
OUTPATIENT
Start: 2022-07-22

## 2022-07-22 RX ORDER — HEPARIN SODIUM (PORCINE) LOCK FLUSH IV SOLN 100 UNIT/ML 100 UNIT/ML
5 SOLUTION INTRAVENOUS ONCE
Status: COMPLETED | OUTPATIENT
Start: 2022-07-22 | End: 2022-07-22

## 2022-07-22 RX ADMIN — HEPARIN SODIUM (PORCINE) LOCK FLUSH IV SOLN 100 UNIT/ML 500 UNITS: 100 SOLUTION INTRAVENOUS at 10:42:00

## 2022-07-22 NOTE — PROGRESS NOTES
Pt to infusion for C22D1 Keytruda. Arrives Ambulating independently, accompanied by Spouse from MD visit. No complaints at this time. Modifications in dose or schedule: No    Patient denies possible pregnancy since last therapy cycle: NA    Drugs/infusions dual verified for appearance and physical integrity: yes     Port previously accessed in lab - present blood return noted. Frequency of blood return and site check throughout administration: Prior to administration, Prior to each drug and At completion of therapy     Tolerated treatment well. Port deaccessed and heparin locked - site covered with gauze and paper tape. Discharged with future appointments scheduled, Ambulating independently, accompanied by:Self. Copy of AVS provided.

## 2022-08-09 ENCOUNTER — HOSPITAL ENCOUNTER (OUTPATIENT)
Dept: CT IMAGING | Facility: HOSPITAL | Age: 77
Discharge: HOME | End: 2022-08-09
Attending: NURSE PRACTITIONER
Payer: MEDICARE

## 2022-08-09 DIAGNOSIS — J18.9 PNEUMONITIS: ICD-10-CM

## 2022-08-09 DIAGNOSIS — B44.9 ASPERGILLUS PNEUMONIA (HCC): ICD-10-CM

## 2022-08-09 DIAGNOSIS — Z51.11 CHEMOTHERAPY MANAGEMENT, ENCOUNTER FOR: ICD-10-CM

## 2022-08-09 DIAGNOSIS — C34.92 MALIGNANT NEOPLASM OF LEFT LUNG, UNSPECIFIED PART OF LUNG (HCC): ICD-10-CM

## 2022-08-09 PROCEDURE — 74177 CT ABD & PELVIS W/CONTRAST: CPT | Performed by: NURSE PRACTITIONER

## 2022-08-09 PROCEDURE — 71260 CT THORAX DX C+: CPT | Performed by: NURSE PRACTITIONER

## 2022-08-09 RX ORDER — HEPARIN SODIUM (PORCINE) LOCK FLUSH IV SOLN 100 UNIT/ML 100 UNIT/ML
500 SOLUTION INTRAVENOUS AS NEEDED
Status: DISCONTINUED | OUTPATIENT
Start: 2022-08-09 | End: 2022-08-11

## 2022-08-09 RX ORDER — HEPARIN SODIUM (PORCINE) LOCK FLUSH IV SOLN 100 UNIT/ML 100 UNIT/ML
SOLUTION INTRAVENOUS
Status: COMPLETED
Start: 2022-08-09 | End: 2022-08-09

## 2022-08-09 RX ADMIN — HEPARIN SODIUM (PORCINE) LOCK FLUSH IV SOLN 100 UNIT/ML 500 UNITS: 100 SOLUTION INTRAVENOUS at 08:58:00

## 2022-08-10 ENCOUNTER — TELEPHONE (OUTPATIENT)
Dept: PULMONOLOGY | Facility: CLINIC | Age: 77
End: 2022-08-10

## 2022-08-10 DIAGNOSIS — Z85.118 HISTORY OF PRIMARY MALIGNANT NEOPLASM OF LEFT LUNG: Primary | ICD-10-CM

## 2022-08-10 NOTE — TELEPHONE ENCOUNTER
RN, I spoke to the patient.   Please add to the calendar a repeat CT scan of the chest at the 6-month interval.

## 2022-08-10 NOTE — TELEPHONE ENCOUNTER
Dr. Scott Renteria - Would you like patient to have repeat CT chest-abdomen-pelvis or CT chest with contrast at 6-moth interval?

## 2022-08-12 ENCOUNTER — OFFICE VISIT (OUTPATIENT)
Dept: HEMATOLOGY/ONCOLOGY | Facility: HOSPITAL | Age: 77
End: 2022-08-12
Attending: NURSE PRACTITIONER
Payer: MEDICARE

## 2022-08-12 ENCOUNTER — OFFICE VISIT (OUTPATIENT)
Dept: HEMATOLOGY/ONCOLOGY | Facility: HOSPITAL | Age: 77
End: 2022-08-12
Attending: INTERNAL MEDICINE
Payer: MEDICARE

## 2022-08-12 VITALS
HEART RATE: 76 BPM | DIASTOLIC BLOOD PRESSURE: 77 MMHG | OXYGEN SATURATION: 96 % | WEIGHT: 187 LBS | SYSTOLIC BLOOD PRESSURE: 128 MMHG | RESPIRATION RATE: 20 BRPM | BODY MASS INDEX: 26 KG/M2 | TEMPERATURE: 98 F

## 2022-08-12 VITALS
TEMPERATURE: 98 F | DIASTOLIC BLOOD PRESSURE: 77 MMHG | SYSTOLIC BLOOD PRESSURE: 128 MMHG | RESPIRATION RATE: 20 BRPM | WEIGHT: 187 LBS | BODY MASS INDEX: 26.18 KG/M2 | OXYGEN SATURATION: 96 % | HEIGHT: 71 IN | HEART RATE: 76 BPM

## 2022-08-12 DIAGNOSIS — D50.9 IRON DEFICIENCY ANEMIA, UNSPECIFIED IRON DEFICIENCY ANEMIA TYPE: ICD-10-CM

## 2022-08-12 DIAGNOSIS — C34.12 CANCER OF UPPER LOBE OF LEFT LUNG (HCC): ICD-10-CM

## 2022-08-12 DIAGNOSIS — C34.92 MALIGNANT NEOPLASM OF LEFT LUNG, UNSPECIFIED PART OF LUNG (HCC): ICD-10-CM

## 2022-08-12 DIAGNOSIS — Z51.12 ENCOUNTER FOR ANTINEOPLASTIC IMMUNOTHERAPY: Primary | ICD-10-CM

## 2022-08-12 DIAGNOSIS — D84.9 IMMUNOCOMPROMISED (HCC): ICD-10-CM

## 2022-08-12 DIAGNOSIS — Z45.2 ENCOUNTER FOR CARE RELATED TO PORT-A-CATH: ICD-10-CM

## 2022-08-12 DIAGNOSIS — J18.9 PNEUMONITIS: ICD-10-CM

## 2022-08-12 DIAGNOSIS — Z51.11 CHEMOTHERAPY MANAGEMENT, ENCOUNTER FOR: ICD-10-CM

## 2022-08-12 DIAGNOSIS — C34.92 MALIGNANT NEOPLASM OF LEFT LUNG, UNSPECIFIED PART OF LUNG (HCC): Primary | ICD-10-CM

## 2022-08-12 LAB
ALBUMIN SERPL-MCNC: 3.4 G/DL (ref 3.4–5)
ALBUMIN/GLOB SERPL: 1.2 {RATIO} (ref 1–2)
ALP LIVER SERPL-CCNC: 86 U/L
ALT SERPL-CCNC: 26 U/L
ANION GAP SERPL CALC-SCNC: 8 MMOL/L (ref 0–18)
AST SERPL-CCNC: 24 U/L (ref 15–37)
BASOPHILS # BLD AUTO: 0.03 X10(3) UL (ref 0–0.2)
BASOPHILS NFR BLD AUTO: 0.5 %
BILIRUB SERPL-MCNC: 0.4 MG/DL (ref 0.1–2)
BUN BLD-MCNC: 33 MG/DL (ref 7–18)
BUN/CREAT SERPL: 28.7 (ref 10–20)
CALCIUM BLD-MCNC: 8.6 MG/DL (ref 8.5–10.1)
CHLORIDE SERPL-SCNC: 109 MMOL/L (ref 98–112)
CO2 SERPL-SCNC: 25 MMOL/L (ref 21–32)
CREAT BLD-MCNC: 1.15 MG/DL
DEPRECATED RDW RBC AUTO: 53.2 FL (ref 35.1–46.3)
EOSINOPHIL # BLD AUTO: 0.09 X10(3) UL (ref 0–0.7)
EOSINOPHIL NFR BLD AUTO: 1.4 %
ERYTHROCYTE [DISTWIDTH] IN BLOOD BY AUTOMATED COUNT: 14.2 % (ref 11–15)
GFR SERPLBLD BASED ON 1.73 SQ M-ARVRAT: 65 ML/MIN/1.73M2 (ref 60–?)
GLOBULIN PLAS-MCNC: 2.8 G/DL (ref 2.8–4.4)
GLUCOSE BLD-MCNC: 86 MG/DL (ref 70–99)
HCT VFR BLD AUTO: 43.4 %
HGB BLD-MCNC: 13.6 G/DL
IMM GRANULOCYTES # BLD AUTO: 0.02 X10(3) UL (ref 0–1)
IMM GRANULOCYTES NFR BLD: 0.3 %
LYMPHOCYTES # BLD AUTO: 0.91 X10(3) UL (ref 1–4)
LYMPHOCYTES NFR BLD AUTO: 14.4 %
MCH RBC QN AUTO: 31.8 PG (ref 26–34)
MCHC RBC AUTO-ENTMCNC: 31.3 G/DL (ref 31–37)
MCV RBC AUTO: 101.4 FL
MONOCYTES # BLD AUTO: 0.66 X10(3) UL (ref 0.1–1)
MONOCYTES NFR BLD AUTO: 10.5 %
NEUTROPHILS # BLD AUTO: 4.59 X10 (3) UL (ref 1.5–7.7)
NEUTROPHILS # BLD AUTO: 4.59 X10(3) UL (ref 1.5–7.7)
NEUTROPHILS NFR BLD AUTO: 72.9 %
OSMOLALITY SERPL CALC.SUM OF ELEC: 301 MOSM/KG (ref 275–295)
PLATELET # BLD AUTO: 191 10(3)UL (ref 150–450)
POTASSIUM SERPL-SCNC: 4.1 MMOL/L (ref 3.5–5.1)
PROT SERPL-MCNC: 6.2 G/DL (ref 6.4–8.2)
RBC # BLD AUTO: 4.28 X10(6)UL
SODIUM SERPL-SCNC: 142 MMOL/L (ref 136–145)
TSI SER-ACNC: 3.35 MIU/ML (ref 0.36–3.74)
WBC # BLD AUTO: 6.3 X10(3) UL (ref 4–11)

## 2022-08-12 PROCEDURE — 99215 OFFICE O/P EST HI 40 MIN: CPT | Performed by: INTERNAL MEDICINE

## 2022-08-12 PROCEDURE — 85025 COMPLETE CBC W/AUTO DIFF WBC: CPT

## 2022-08-12 PROCEDURE — 96413 CHEMO IV INFUSION 1 HR: CPT

## 2022-08-12 PROCEDURE — 80053 COMPREHEN METABOLIC PANEL: CPT

## 2022-08-12 PROCEDURE — 84443 ASSAY THYROID STIM HORMONE: CPT

## 2022-08-12 RX ORDER — HEPARIN SODIUM (PORCINE) LOCK FLUSH IV SOLN 100 UNIT/ML 100 UNIT/ML
5 SOLUTION INTRAVENOUS ONCE
Status: COMPLETED | OUTPATIENT
Start: 2022-08-12 | End: 2022-08-12

## 2022-08-12 RX ORDER — HEPARIN SODIUM (PORCINE) LOCK FLUSH IV SOLN 100 UNIT/ML 100 UNIT/ML
5 SOLUTION INTRAVENOUS ONCE
OUTPATIENT
Start: 2022-08-12

## 2022-08-12 RX ORDER — SODIUM CHLORIDE 9 MG/ML
10 INJECTION INTRAVENOUS ONCE
OUTPATIENT
Start: 2022-08-12

## 2022-08-12 RX ORDER — SULFAMETHOXAZOLE AND TRIMETHOPRIM 800; 160 MG/1; MG/1
TABLET ORAL
Qty: 60 TABLET | Refills: 3 | Status: SHIPPED | OUTPATIENT
Start: 2022-08-12

## 2022-08-12 RX ORDER — HEPARIN SODIUM (PORCINE) LOCK FLUSH IV SOLN 100 UNIT/ML 100 UNIT/ML
SOLUTION INTRAVENOUS
Status: COMPLETED
Start: 2022-08-12 | End: 2022-08-12

## 2022-08-12 RX ORDER — PANTOPRAZOLE SODIUM 40 MG/1
40 TABLET, DELAYED RELEASE ORAL DAILY
Qty: 90 TABLET | Refills: 3 | Status: SHIPPED | OUTPATIENT
Start: 2022-08-12

## 2022-08-12 RX ADMIN — HEPARIN SODIUM (PORCINE) LOCK FLUSH IV SOLN 100 UNIT/ML 500 UNITS: 100 SOLUTION INTRAVENOUS at 10:57:00

## 2022-08-12 NOTE — PROGRESS NOTES
Pt here for Lizy[address]wilfred[address] 39. Arrives Ambulating independently, accompanied by Spouse from MD visit. Pregnancy screening: Not applicable    Modifications in dose or schedule: No    Drugs/infusions dual verified for appearance and physical integrity. IV pump settings were dual verified: yes     Port accessed previously in lab - present blood return noted. Frequency of blood return and site check throughout administration: Prior to administration, Prior to each drug and At completion of therapy   Appeared to tolerate treatment well. Port deaccessed and heparin locked - site covered with gauze and paper tape. Discharged to Home, Ambulating independently, accompanied by:Spouse with future appointments scheduled. Copy of AVS provided.       Education Record    Learner:  Patient and Spouse  Barriers / Limitations:  None  Method:  Brief focused and Discussion  Outcome:  Shows understanding  Comments:

## 2022-08-23 ENCOUNTER — PATIENT OUTREACH (OUTPATIENT)
Dept: CASE MANAGEMENT | Age: 77
End: 2022-08-23

## 2022-08-30 ENCOUNTER — OFFICE VISIT (OUTPATIENT)
Dept: PULMONOLOGY | Facility: CLINIC | Age: 77
End: 2022-08-30
Payer: MEDICARE

## 2022-08-30 VITALS
OXYGEN SATURATION: 96 % | RESPIRATION RATE: 17 BRPM | DIASTOLIC BLOOD PRESSURE: 65 MMHG | HEIGHT: 71 IN | SYSTOLIC BLOOD PRESSURE: 131 MMHG | BODY MASS INDEX: 26.6 KG/M2 | HEART RATE: 63 BPM | WEIGHT: 190 LBS

## 2022-08-30 DIAGNOSIS — C34.12 CANCER OF UPPER LOBE OF LEFT LUNG (HCC): Primary | ICD-10-CM

## 2022-08-30 DIAGNOSIS — G47.33 OSA (OBSTRUCTIVE SLEEP APNEA): ICD-10-CM

## 2022-08-30 PROCEDURE — 99213 OFFICE O/P EST LOW 20 MIN: CPT | Performed by: INTERNAL MEDICINE

## 2022-08-30 PROCEDURE — 1126F AMNT PAIN NOTED NONE PRSNT: CPT | Performed by: INTERNAL MEDICINE

## 2022-08-30 RX ORDER — ESCITALOPRAM OXALATE 10 MG/1
10 TABLET ORAL DAILY
Qty: 30 TABLET | Refills: 6 | Status: SHIPPED | OUTPATIENT
Start: 2022-08-30

## 2022-09-02 ENCOUNTER — APPOINTMENT (OUTPATIENT)
Dept: HEMATOLOGY/ONCOLOGY | Facility: HOSPITAL | Age: 77
End: 2022-09-02
Attending: INTERNAL MEDICINE
Payer: MEDICARE

## 2022-09-02 ENCOUNTER — OFFICE VISIT (OUTPATIENT)
Dept: HEMATOLOGY/ONCOLOGY | Facility: HOSPITAL | Age: 77
End: 2022-09-02
Attending: NURSE PRACTITIONER
Payer: MEDICARE

## 2022-09-02 VITALS
RESPIRATION RATE: 18 BRPM | OXYGEN SATURATION: 96 % | WEIGHT: 187 LBS | BODY MASS INDEX: 26.18 KG/M2 | SYSTOLIC BLOOD PRESSURE: 123 MMHG | DIASTOLIC BLOOD PRESSURE: 50 MMHG | TEMPERATURE: 98 F | HEART RATE: 53 BPM | HEIGHT: 71 IN

## 2022-09-02 VITALS
SYSTOLIC BLOOD PRESSURE: 123 MMHG | OXYGEN SATURATION: 96 % | DIASTOLIC BLOOD PRESSURE: 50 MMHG | RESPIRATION RATE: 18 BRPM | TEMPERATURE: 98 F | HEART RATE: 53 BPM

## 2022-09-02 DIAGNOSIS — C34.92 MALIGNANT NEOPLASM OF LEFT LUNG, UNSPECIFIED PART OF LUNG (HCC): ICD-10-CM

## 2022-09-02 DIAGNOSIS — Z51.12 ENCOUNTER FOR ANTINEOPLASTIC IMMUNOTHERAPY: Primary | ICD-10-CM

## 2022-09-02 DIAGNOSIS — J18.9 PNEUMONITIS: ICD-10-CM

## 2022-09-02 DIAGNOSIS — C34.92 MALIGNANT NEOPLASM OF LEFT LUNG, UNSPECIFIED PART OF LUNG (HCC): Primary | ICD-10-CM

## 2022-09-02 DIAGNOSIS — Z45.2 ENCOUNTER FOR CARE RELATED TO PORT-A-CATH: ICD-10-CM

## 2022-09-02 DIAGNOSIS — C34.12 CANCER OF UPPER LOBE OF LEFT LUNG (HCC): ICD-10-CM

## 2022-09-02 DIAGNOSIS — D50.9 IRON DEFICIENCY ANEMIA, UNSPECIFIED IRON DEFICIENCY ANEMIA TYPE: ICD-10-CM

## 2022-09-02 LAB
ALBUMIN SERPL-MCNC: 3.5 G/DL (ref 3.4–5)
ALBUMIN/GLOB SERPL: 1.4 {RATIO} (ref 1–2)
ALP LIVER SERPL-CCNC: 93 U/L
ALT SERPL-CCNC: 29 U/L
ANION GAP SERPL CALC-SCNC: 6 MMOL/L (ref 0–18)
AST SERPL-CCNC: 28 U/L (ref 15–37)
BASOPHILS # BLD AUTO: 0.02 X10(3) UL (ref 0–0.2)
BASOPHILS NFR BLD AUTO: 0.4 %
BILIRUB SERPL-MCNC: 0.5 MG/DL (ref 0.1–2)
BUN BLD-MCNC: 28 MG/DL (ref 7–18)
BUN/CREAT SERPL: 26.9 (ref 10–20)
CALCIUM BLD-MCNC: 8.7 MG/DL (ref 8.5–10.1)
CHLORIDE SERPL-SCNC: 111 MMOL/L (ref 98–112)
CO2 SERPL-SCNC: 26 MMOL/L (ref 21–32)
CREAT BLD-MCNC: 1.04 MG/DL
DEPRECATED RDW RBC AUTO: 54.2 FL (ref 35.1–46.3)
EOSINOPHIL # BLD AUTO: 0.09 X10(3) UL (ref 0–0.7)
EOSINOPHIL NFR BLD AUTO: 1.8 %
ERYTHROCYTE [DISTWIDTH] IN BLOOD BY AUTOMATED COUNT: 14.5 % (ref 11–15)
GFR SERPLBLD BASED ON 1.73 SQ M-ARVRAT: 73 ML/MIN/1.73M2 (ref 60–?)
GLOBULIN PLAS-MCNC: 2.5 G/DL (ref 2.8–4.4)
GLUCOSE BLD-MCNC: 84 MG/DL (ref 70–99)
HCT VFR BLD AUTO: 40.6 %
HGB BLD-MCNC: 12.9 G/DL
IMM GRANULOCYTES # BLD AUTO: 0.02 X10(3) UL (ref 0–1)
IMM GRANULOCYTES NFR BLD: 0.4 %
LYMPHOCYTES # BLD AUTO: 0.71 X10(3) UL (ref 1–4)
LYMPHOCYTES NFR BLD AUTO: 14.2 %
MCH RBC QN AUTO: 32.1 PG (ref 26–34)
MCHC RBC AUTO-ENTMCNC: 31.8 G/DL (ref 31–37)
MCV RBC AUTO: 101 FL
MONOCYTES # BLD AUTO: 0.61 X10(3) UL (ref 0.1–1)
MONOCYTES NFR BLD AUTO: 12.2 %
NEUTROPHILS # BLD AUTO: 3.55 X10 (3) UL (ref 1.5–7.7)
NEUTROPHILS # BLD AUTO: 3.55 X10(3) UL (ref 1.5–7.7)
NEUTROPHILS NFR BLD AUTO: 71 %
OSMOLALITY SERPL CALC.SUM OF ELEC: 301 MOSM/KG (ref 275–295)
PLATELET # BLD AUTO: 180 10(3)UL (ref 150–450)
POTASSIUM SERPL-SCNC: 4 MMOL/L (ref 3.5–5.1)
PROT SERPL-MCNC: 6 G/DL (ref 6.4–8.2)
RBC # BLD AUTO: 4.02 X10(6)UL
SODIUM SERPL-SCNC: 143 MMOL/L (ref 136–145)
TSI SER-ACNC: 2.52 MIU/ML (ref 0.36–3.74)
WBC # BLD AUTO: 5 X10(3) UL (ref 4–11)

## 2022-09-02 PROCEDURE — 85025 COMPLETE CBC W/AUTO DIFF WBC: CPT

## 2022-09-02 PROCEDURE — 99215 OFFICE O/P EST HI 40 MIN: CPT | Performed by: INTERNAL MEDICINE

## 2022-09-02 PROCEDURE — 96413 CHEMO IV INFUSION 1 HR: CPT

## 2022-09-02 PROCEDURE — 80053 COMPREHEN METABOLIC PANEL: CPT

## 2022-09-02 PROCEDURE — 84443 ASSAY THYROID STIM HORMONE: CPT

## 2022-09-02 RX ORDER — SODIUM CHLORIDE 9 MG/ML
10 INJECTION INTRAVENOUS ONCE
OUTPATIENT
Start: 2022-09-02

## 2022-09-02 RX ORDER — HEPARIN SODIUM (PORCINE) LOCK FLUSH IV SOLN 100 UNIT/ML 100 UNIT/ML
5 SOLUTION INTRAVENOUS ONCE
Status: COMPLETED | OUTPATIENT
Start: 2022-09-02 | End: 2022-09-02

## 2022-09-02 RX ORDER — HEPARIN SODIUM (PORCINE) LOCK FLUSH IV SOLN 100 UNIT/ML 100 UNIT/ML
5 SOLUTION INTRAVENOUS ONCE
OUTPATIENT
Start: 2022-09-02

## 2022-09-02 RX ORDER — HEPARIN SODIUM (PORCINE) LOCK FLUSH IV SOLN 100 UNIT/ML 100 UNIT/ML
SOLUTION INTRAVENOUS
Status: COMPLETED
Start: 2022-09-02 | End: 2022-09-02

## 2022-09-02 RX ADMIN — HEPARIN SODIUM (PORCINE) LOCK FLUSH IV SOLN 100 UNIT/ML 500 UNITS: 100 SOLUTION INTRAVENOUS at 11:17:00

## 2022-09-02 NOTE — PROGRESS NOTES
Pt here for Ryan Canas 59. Arrives Ambulating independently, accompanied by Spouse from MD visit. Pregnancy screening: Not applicable    Modifications in dose or schedule: No    Drugs/infusions dual verified for appearance and physical integrity. IV pump settings were dual verified: yes     Port accessed previously in lab - present blood return noted. Frequency of blood return and site check throughout administration: Prior to administration, Prior to each drug and At completion of therapy   Appeared to tolerate treatment well. Port deaccessed and heparin locked - site covered with gauze and paper tape. Discharged to Home, Ambulating independently, accompanied by:Spouse with future appointments scheduled. Copy of AVS provided.       Education Record    Learner:  Patient and Spouse  Barriers / Limitations:  None  Method:  Brief focused and Discussion  Outcome:  Shows understanding  Comments:

## 2022-09-02 NOTE — PATIENT INSTRUCTIONS
1. Proceed with cycle 24    2. Start thinking of activities to do in the winter to stay active    3. Call as needed    4.   Follow-up in 3 weeks

## 2022-09-23 ENCOUNTER — NURSE ONLY (OUTPATIENT)
Dept: HEMATOLOGY/ONCOLOGY | Facility: HOSPITAL | Age: 77
End: 2022-09-23
Attending: INTERNAL MEDICINE
Payer: MEDICARE

## 2022-09-23 ENCOUNTER — OFFICE VISIT (OUTPATIENT)
Dept: HEMATOLOGY/ONCOLOGY | Facility: HOSPITAL | Age: 77
End: 2022-09-23
Attending: NURSE PRACTITIONER
Payer: MEDICARE

## 2022-09-23 VITALS
WEIGHT: 190.63 LBS | TEMPERATURE: 98 F | HEIGHT: 71 IN | SYSTOLIC BLOOD PRESSURE: 107 MMHG | DIASTOLIC BLOOD PRESSURE: 89 MMHG | HEART RATE: 64 BPM | RESPIRATION RATE: 18 BRPM | OXYGEN SATURATION: 98 % | BODY MASS INDEX: 26.69 KG/M2

## 2022-09-23 VITALS
TEMPERATURE: 98 F | SYSTOLIC BLOOD PRESSURE: 107 MMHG | DIASTOLIC BLOOD PRESSURE: 89 MMHG | RESPIRATION RATE: 18 BRPM | OXYGEN SATURATION: 98 % | HEART RATE: 64 BPM

## 2022-09-23 DIAGNOSIS — C34.92 MALIGNANT NEOPLASM OF LEFT LUNG, UNSPECIFIED PART OF LUNG (HCC): ICD-10-CM

## 2022-09-23 DIAGNOSIS — Z51.12 ENCOUNTER FOR ANTINEOPLASTIC IMMUNOTHERAPY: ICD-10-CM

## 2022-09-23 DIAGNOSIS — Z45.2 ENCOUNTER FOR CARE RELATED TO PORT-A-CATH: ICD-10-CM

## 2022-09-23 DIAGNOSIS — Z51.11 CHEMOTHERAPY MANAGEMENT, ENCOUNTER FOR: Primary | ICD-10-CM

## 2022-09-23 DIAGNOSIS — B44.9 ASPERGILLUS PNEUMONIA (HCC): ICD-10-CM

## 2022-09-23 DIAGNOSIS — C34.12 CANCER OF UPPER LOBE OF LEFT LUNG (HCC): ICD-10-CM

## 2022-09-23 DIAGNOSIS — Z51.12 ENCOUNTER FOR ANTINEOPLASTIC IMMUNOTHERAPY: Primary | ICD-10-CM

## 2022-09-23 DIAGNOSIS — J18.9 PNEUMONITIS: ICD-10-CM

## 2022-09-23 DIAGNOSIS — D50.9 IRON DEFICIENCY ANEMIA, UNSPECIFIED IRON DEFICIENCY ANEMIA TYPE: Primary | ICD-10-CM

## 2022-09-23 DIAGNOSIS — Z92.89 HISTORY OF IMMUNOTHERAPY: ICD-10-CM

## 2022-09-23 LAB
ALBUMIN SERPL-MCNC: 3.4 G/DL (ref 3.4–5)
ALBUMIN/GLOB SERPL: 1.2 {RATIO} (ref 1–2)
ALP LIVER SERPL-CCNC: 110 U/L
ALT SERPL-CCNC: 33 U/L
ANION GAP SERPL CALC-SCNC: 8 MMOL/L (ref 0–18)
AST SERPL-CCNC: 32 U/L (ref 15–37)
BASOPHILS # BLD AUTO: 0.03 X10(3) UL (ref 0–0.2)
BASOPHILS NFR BLD AUTO: 0.4 %
BILIRUB SERPL-MCNC: 0.4 MG/DL (ref 0.1–2)
BUN BLD-MCNC: 30 MG/DL (ref 7–18)
BUN/CREAT SERPL: 31.3 (ref 10–20)
CALCIUM BLD-MCNC: 8.5 MG/DL (ref 8.5–10.1)
CHLORIDE SERPL-SCNC: 107 MMOL/L (ref 98–112)
CO2 SERPL-SCNC: 24 MMOL/L (ref 21–32)
CREAT BLD-MCNC: 0.96 MG/DL
DEPRECATED RDW RBC AUTO: 50.9 FL (ref 35.1–46.3)
EOSINOPHIL # BLD AUTO: 0.12 X10(3) UL (ref 0–0.7)
EOSINOPHIL NFR BLD AUTO: 1.7 %
ERYTHROCYTE [DISTWIDTH] IN BLOOD BY AUTOMATED COUNT: 14 % (ref 11–15)
GFR SERPLBLD BASED ON 1.73 SQ M-ARVRAT: 80 ML/MIN/1.73M2 (ref 60–?)
GLOBULIN PLAS-MCNC: 2.8 G/DL (ref 2.8–4.4)
GLUCOSE BLD-MCNC: 97 MG/DL (ref 70–99)
HCT VFR BLD AUTO: 43 %
HGB BLD-MCNC: 13.9 G/DL
IMM GRANULOCYTES # BLD AUTO: 0.04 X10(3) UL (ref 0–1)
IMM GRANULOCYTES NFR BLD: 0.6 %
LYMPHOCYTES # BLD AUTO: 0.78 X10(3) UL (ref 1–4)
LYMPHOCYTES NFR BLD AUTO: 11 %
MCH RBC QN AUTO: 31.9 PG (ref 26–34)
MCHC RBC AUTO-ENTMCNC: 32.3 G/DL (ref 31–37)
MCV RBC AUTO: 98.6 FL
MONOCYTES # BLD AUTO: 0.57 X10(3) UL (ref 0.1–1)
MONOCYTES NFR BLD AUTO: 8.1 %
NEUTROPHILS # BLD AUTO: 5.52 X10 (3) UL (ref 1.5–7.7)
NEUTROPHILS # BLD AUTO: 5.52 X10(3) UL (ref 1.5–7.7)
NEUTROPHILS NFR BLD AUTO: 78.2 %
OSMOLALITY SERPL CALC.SUM OF ELEC: 294 MOSM/KG (ref 275–295)
PLATELET # BLD AUTO: 157 10(3)UL (ref 150–450)
POTASSIUM SERPL-SCNC: 4.1 MMOL/L (ref 3.5–5.1)
PROT SERPL-MCNC: 6.2 G/DL (ref 6.4–8.2)
RBC # BLD AUTO: 4.36 X10(6)UL
SODIUM SERPL-SCNC: 139 MMOL/L (ref 136–145)
TSI SER-ACNC: 2.93 MIU/ML (ref 0.36–3.74)
WBC # BLD AUTO: 7.1 X10(3) UL (ref 4–11)

## 2022-09-23 PROCEDURE — 99215 OFFICE O/P EST HI 40 MIN: CPT | Performed by: INTERNAL MEDICINE

## 2022-09-23 PROCEDURE — 80053 COMPREHEN METABOLIC PANEL: CPT

## 2022-09-23 PROCEDURE — 96413 CHEMO IV INFUSION 1 HR: CPT

## 2022-09-23 PROCEDURE — 84443 ASSAY THYROID STIM HORMONE: CPT

## 2022-09-23 PROCEDURE — 85025 COMPLETE CBC W/AUTO DIFF WBC: CPT

## 2022-09-23 RX ORDER — HEPARIN SODIUM (PORCINE) LOCK FLUSH IV SOLN 100 UNIT/ML 100 UNIT/ML
5 SOLUTION INTRAVENOUS ONCE
Status: COMPLETED | OUTPATIENT
Start: 2022-09-23 | End: 2022-09-23

## 2022-09-23 RX ORDER — SODIUM CHLORIDE 9 MG/ML
10 INJECTION INTRAVENOUS ONCE
OUTPATIENT
Start: 2022-09-23

## 2022-09-23 RX ORDER — HEPARIN SODIUM (PORCINE) LOCK FLUSH IV SOLN 100 UNIT/ML 100 UNIT/ML
5 SOLUTION INTRAVENOUS ONCE
OUTPATIENT
Start: 2022-09-23

## 2022-09-23 RX ORDER — HEPARIN SODIUM (PORCINE) LOCK FLUSH IV SOLN 100 UNIT/ML 100 UNIT/ML
SOLUTION INTRAVENOUS
Status: COMPLETED
Start: 2022-09-23 | End: 2022-09-23

## 2022-09-23 RX ORDER — PREDNISONE 10 MG/1
TABLET ORAL
Qty: 30 TABLET | Refills: 5 | Status: SHIPPED | OUTPATIENT
Start: 2022-09-23

## 2022-09-23 RX ADMIN — HEPARIN SODIUM (PORCINE) LOCK FLUSH IV SOLN 100 UNIT/ML 500 UNITS: 100 SOLUTION INTRAVENOUS at 11:51:00

## 2022-09-23 NOTE — PROGRESS NOTES
Pt here for C25 keytruda  Arrives Ambulating independently, accompanied by Spouse           Modifications in dose or schedule: No, lab results appropriate for tx, had exam prior. Drugs/infusion dual verified for appearance and physical integrity: yes          Port accessed in the lab today with  positive blood return noted.  Frequency of blood return and site check throughout administration: Prior to each drug and At completion of therapy   Discharged with future appointments scheduled, Ambulating independently, accompanied by:Spouse    Outpatient Oncology Care Plan  Problem list:  none noted by patient, none noted by this nurse  Problems related to:    combined modality therapy  side effect of treatment  Interventions:  monitor effect of therapy  monitor lab values  Expected outcomes:  optimal lab values  patient supported/coping well  understands plan of care  Progress towards outcome:  making progress    Education Record    Learner:  Patient and Spouse  Barriers / Limitations:  None  Method:  Discussion  Outcome:  Shows understanding  Comments: reviewed plan of care for today

## 2022-10-04 ENCOUNTER — TELEPHONE (OUTPATIENT)
Dept: HEMATOLOGY/ONCOLOGY | Facility: HOSPITAL | Age: 77
End: 2022-10-04

## 2022-10-04 NOTE — TELEPHONE ENCOUNTER
Mrs. Kelley Lamar called in because they plan on being out of town for about 4 weeks in November so she asked me to schedule his next 2 infusions. He will come 10/14 and 11/4 as plan but after that he will return 12/2 making that four weeks from the previous cycle. This is just a FYI.  Thank you

## 2022-10-14 ENCOUNTER — OFFICE VISIT (OUTPATIENT)
Dept: HEMATOLOGY/ONCOLOGY | Facility: HOSPITAL | Age: 77
End: 2022-10-14
Attending: INTERNAL MEDICINE
Payer: MEDICARE

## 2022-10-14 VITALS
WEIGHT: 191 LBS | RESPIRATION RATE: 18 BRPM | BODY MASS INDEX: 26.74 KG/M2 | DIASTOLIC BLOOD PRESSURE: 72 MMHG | TEMPERATURE: 98 F | HEART RATE: 68 BPM | SYSTOLIC BLOOD PRESSURE: 138 MMHG | HEIGHT: 71 IN | OXYGEN SATURATION: 98 %

## 2022-10-14 DIAGNOSIS — B44.9 ASPERGILLUS PNEUMONIA (HCC): ICD-10-CM

## 2022-10-14 DIAGNOSIS — Z51.11 CHEMOTHERAPY MANAGEMENT, ENCOUNTER FOR: Primary | ICD-10-CM

## 2022-10-14 DIAGNOSIS — D50.9 IRON DEFICIENCY ANEMIA, UNSPECIFIED IRON DEFICIENCY ANEMIA TYPE: ICD-10-CM

## 2022-10-14 DIAGNOSIS — C34.12 CANCER OF UPPER LOBE OF LEFT LUNG (HCC): ICD-10-CM

## 2022-10-14 DIAGNOSIS — C34.92 MALIGNANT NEOPLASM OF LEFT LUNG, UNSPECIFIED PART OF LUNG (HCC): Primary | ICD-10-CM

## 2022-10-14 DIAGNOSIS — C34.92 MALIGNANT NEOPLASM OF LEFT LUNG, UNSPECIFIED PART OF LUNG (HCC): ICD-10-CM

## 2022-10-14 DIAGNOSIS — Z45.2 ENCOUNTER FOR CARE RELATED TO PORT-A-CATH: ICD-10-CM

## 2022-10-14 DIAGNOSIS — Z51.12 ENCOUNTER FOR ANTINEOPLASTIC IMMUNOTHERAPY: Primary | ICD-10-CM

## 2022-10-14 DIAGNOSIS — J18.9 PNEUMONITIS: ICD-10-CM

## 2022-10-14 LAB
ALBUMIN SERPL-MCNC: 3.3 G/DL (ref 3.4–5)
ALBUMIN/GLOB SERPL: 1.1 {RATIO} (ref 1–2)
ALP LIVER SERPL-CCNC: 105 U/L
ALT SERPL-CCNC: 32 U/L
ANION GAP SERPL CALC-SCNC: 6 MMOL/L (ref 0–18)
AST SERPL-CCNC: 35 U/L (ref 15–37)
BASOPHILS # BLD AUTO: 0.02 X10(3) UL (ref 0–0.2)
BASOPHILS NFR BLD AUTO: 0.3 %
BILIRUB SERPL-MCNC: 0.4 MG/DL (ref 0.1–2)
BUN BLD-MCNC: 22 MG/DL (ref 7–18)
BUN/CREAT SERPL: 22.7 (ref 10–20)
CALCIUM BLD-MCNC: 8.6 MG/DL (ref 8.5–10.1)
CHLORIDE SERPL-SCNC: 107 MMOL/L (ref 98–112)
CO2 SERPL-SCNC: 26 MMOL/L (ref 21–32)
CREAT BLD-MCNC: 0.97 MG/DL
DEPRECATED RDW RBC AUTO: 52.8 FL (ref 35.1–46.3)
EOSINOPHIL # BLD AUTO: 0.09 X10(3) UL (ref 0–0.7)
EOSINOPHIL NFR BLD AUTO: 1.3 %
ERYTHROCYTE [DISTWIDTH] IN BLOOD BY AUTOMATED COUNT: 14.4 % (ref 11–15)
GFR SERPLBLD BASED ON 1.73 SQ M-ARVRAT: 79 ML/MIN/1.73M2 (ref 60–?)
GLOBULIN PLAS-MCNC: 2.9 G/DL (ref 2.8–4.4)
GLUCOSE BLD-MCNC: 96 MG/DL (ref 70–99)
HCT VFR BLD AUTO: 40.9 %
HGB BLD-MCNC: 13.2 G/DL
IMM GRANULOCYTES # BLD AUTO: 0.03 X10(3) UL (ref 0–1)
IMM GRANULOCYTES NFR BLD: 0.4 %
LYMPHOCYTES # BLD AUTO: 0.55 X10(3) UL (ref 1–4)
LYMPHOCYTES NFR BLD AUTO: 7.8 %
MCH RBC QN AUTO: 32.1 PG (ref 26–34)
MCHC RBC AUTO-ENTMCNC: 32.3 G/DL (ref 31–37)
MCV RBC AUTO: 99.5 FL
MONOCYTES # BLD AUTO: 0.63 X10(3) UL (ref 0.1–1)
MONOCYTES NFR BLD AUTO: 8.9 %
NEUTROPHILS # BLD AUTO: 5.77 X10 (3) UL (ref 1.5–7.7)
NEUTROPHILS # BLD AUTO: 5.77 X10(3) UL (ref 1.5–7.7)
NEUTROPHILS NFR BLD AUTO: 81.3 %
OSMOLALITY SERPL CALC.SUM OF ELEC: 291 MOSM/KG (ref 275–295)
PLATELET # BLD AUTO: 184 10(3)UL (ref 150–450)
POTASSIUM SERPL-SCNC: 3.8 MMOL/L (ref 3.5–5.1)
PROT SERPL-MCNC: 6.2 G/DL (ref 6.4–8.2)
RBC # BLD AUTO: 4.11 X10(6)UL
SODIUM SERPL-SCNC: 139 MMOL/L (ref 136–145)
TSI SER-ACNC: 2.72 MIU/ML (ref 0.36–3.74)
WBC # BLD AUTO: 7.1 X10(3) UL (ref 4–11)

## 2022-10-14 PROCEDURE — 80053 COMPREHEN METABOLIC PANEL: CPT

## 2022-10-14 PROCEDURE — 96413 CHEMO IV INFUSION 1 HR: CPT

## 2022-10-14 PROCEDURE — 84443 ASSAY THYROID STIM HORMONE: CPT

## 2022-10-14 PROCEDURE — 85025 COMPLETE CBC W/AUTO DIFF WBC: CPT

## 2022-10-14 PROCEDURE — 99215 OFFICE O/P EST HI 40 MIN: CPT | Performed by: INTERNAL MEDICINE

## 2022-10-14 RX ORDER — HEPARIN SODIUM (PORCINE) LOCK FLUSH IV SOLN 100 UNIT/ML 100 UNIT/ML
SOLUTION INTRAVENOUS
Status: COMPLETED
Start: 2022-10-14 | End: 2022-10-14

## 2022-10-14 RX ORDER — SODIUM CHLORIDE 9 MG/ML
10 INJECTION INTRAVENOUS ONCE
OUTPATIENT
Start: 2022-10-14

## 2022-10-14 RX ORDER — HEPARIN SODIUM (PORCINE) LOCK FLUSH IV SOLN 100 UNIT/ML 100 UNIT/ML
5 SOLUTION INTRAVENOUS ONCE
OUTPATIENT
Start: 2022-10-14

## 2022-10-14 RX ORDER — HEPARIN SODIUM (PORCINE) LOCK FLUSH IV SOLN 100 UNIT/ML 100 UNIT/ML
5 SOLUTION INTRAVENOUS ONCE
Status: DISCONTINUED | OUTPATIENT
Start: 2022-10-14 | End: 2022-10-14

## 2022-10-14 RX ADMIN — HEPARIN SODIUM (PORCINE) LOCK FLUSH IV SOLN 100 UNIT/ML 500 UNITS: 100 SOLUTION INTRAVENOUS at 12:20:00

## 2022-10-14 NOTE — PROGRESS NOTES
Agree with note per HARPER Yi recurrent adenocarcinoma of the lung on pembrolizumab now with relatively good disease control also history of pneumonitis and Aspergillus pneumonia he is back on voriconazole as outlined above. We will proceed with next dose of pembrolizumab is continuing 10 mg daily of prednisone with pneumocystis prophylaxis also.

## 2022-10-14 NOTE — PROGRESS NOTES
Pt here for C26 keytruda  Arrives Ambulating independently, accompanied by Spouse           Modifications in dose or schedule: No, lab results appropriate for tx, had exam prior. Drugs/infusion dual verified for appearance and physical integrity: yes    Port accessed in the lab today with  positive blood return noted. Frequency of blood return and site check throughout administration: Prior to each drug and At completion of therapy. PAC hep locked and decannulated.    Discharged with future appointments scheduled, Ambulating independently, accompanied by:Spouse    Outpatient Oncology Care Plan  Problem list:  none noted by patient, none noted by this nurse  Problems related to:    combined modality therapy  side effect of treatment  Interventions:  monitor effect of therapy  monitor lab values  Expected outcomes:  optimal lab values  patient supported/coping well  understands plan of care  Progress towards outcome:  making progress    Education Record    Learner:  Patient and Spouse  Barriers / Limitations:  None  Method:  Discussion  Outcome:  Shows understanding  Comments: reviewed plan of care for today

## 2022-10-18 ENCOUNTER — IMMUNIZATION (OUTPATIENT)
Dept: LAB | Age: 77
End: 2022-10-18
Attending: EMERGENCY MEDICINE
Payer: MEDICARE

## 2022-10-18 DIAGNOSIS — Z23 NEED FOR VACCINATION: Primary | ICD-10-CM

## 2022-10-18 PROCEDURE — 90471 IMMUNIZATION ADMIN: CPT

## 2022-10-18 PROCEDURE — 90662 IIV NO PRSV INCREASED AG IM: CPT

## 2022-10-18 PROCEDURE — 0124A SARSCOV2 VAC BVL 30MCG/0.3ML: CPT

## 2022-10-31 ENCOUNTER — NURSE ONLY (OUTPATIENT)
Dept: HEMATOLOGY/ONCOLOGY | Facility: HOSPITAL | Age: 77
End: 2022-10-31
Attending: INTERNAL MEDICINE
Payer: MEDICARE

## 2022-10-31 ENCOUNTER — HOSPITAL ENCOUNTER (OUTPATIENT)
Dept: CT IMAGING | Facility: HOSPITAL | Age: 77
Discharge: HOME | End: 2022-10-31
Attending: INTERNAL MEDICINE
Payer: MEDICARE

## 2022-10-31 DIAGNOSIS — C34.12 CANCER OF UPPER LOBE OF LEFT LUNG (HCC): ICD-10-CM

## 2022-10-31 DIAGNOSIS — Z51.11 CHEMOTHERAPY MANAGEMENT, ENCOUNTER FOR: ICD-10-CM

## 2022-10-31 DIAGNOSIS — D50.9 IRON DEFICIENCY ANEMIA, UNSPECIFIED IRON DEFICIENCY ANEMIA TYPE: Primary | ICD-10-CM

## 2022-10-31 DIAGNOSIS — J18.9 PNEUMONITIS: ICD-10-CM

## 2022-10-31 DIAGNOSIS — Z45.2 ENCOUNTER FOR CARE RELATED TO PORT-A-CATH: ICD-10-CM

## 2022-10-31 DIAGNOSIS — B44.9 ASPERGILLUS PNEUMONIA (HCC): ICD-10-CM

## 2022-10-31 DIAGNOSIS — C34.92 MALIGNANT NEOPLASM OF LEFT LUNG, UNSPECIFIED PART OF LUNG (HCC): ICD-10-CM

## 2022-10-31 PROCEDURE — 96523 IRRIG DRUG DELIVERY DEVICE: CPT

## 2022-10-31 PROCEDURE — 71260 CT THORAX DX C+: CPT | Performed by: INTERNAL MEDICINE

## 2022-10-31 PROCEDURE — 74177 CT ABD & PELVIS W/CONTRAST: CPT | Performed by: INTERNAL MEDICINE

## 2022-10-31 RX ORDER — HEPARIN SODIUM (PORCINE) LOCK FLUSH IV SOLN 100 UNIT/ML 100 UNIT/ML
5 SOLUTION INTRAVENOUS ONCE
OUTPATIENT
Start: 2022-10-31

## 2022-10-31 RX ORDER — HEPARIN SODIUM (PORCINE) LOCK FLUSH IV SOLN 100 UNIT/ML 100 UNIT/ML
5 SOLUTION INTRAVENOUS ONCE
Status: COMPLETED | OUTPATIENT
Start: 2022-10-31 | End: 2022-10-31

## 2022-10-31 RX ORDER — SODIUM CHLORIDE 9 MG/ML
10 INJECTION INTRAVENOUS ONCE
OUTPATIENT
Start: 2022-10-31

## 2022-10-31 RX ADMIN — HEPARIN SODIUM (PORCINE) LOCK FLUSH IV SOLN 100 UNIT/ML 500 UNITS: 100 SOLUTION INTRAVENOUS at 07:50:00

## 2022-11-01 ENCOUNTER — TELEPHONE (OUTPATIENT)
Dept: PULMONOLOGY | Facility: CLINIC | Age: 77
End: 2022-11-01

## 2022-11-01 NOTE — TELEPHONE ENCOUNTER
I spoke to the patient regarding the results of the CT scan of the chest.  He feels perfectly well. He remains on the antifungal.  He has a follow-up CT scan of the chest planned for February 2023. I have already bronched him several times. We will manage conservatively with the follow-up CT scan prior to consideration of further bronchoscopy. He remains on immune therapy. Could have an incipient mycobacterial infection.

## 2022-11-01 NOTE — TELEPHONE ENCOUNTER
Patient's wife is calling to inform dr. Carmelita Arnold that patient did a CT and results are available to be seen .

## 2022-11-04 ENCOUNTER — OFFICE VISIT (OUTPATIENT)
Dept: HEMATOLOGY/ONCOLOGY | Facility: HOSPITAL | Age: 77
End: 2022-11-04
Attending: NURSE PRACTITIONER
Payer: MEDICARE

## 2022-11-04 ENCOUNTER — NURSE ONLY (OUTPATIENT)
Dept: HEMATOLOGY/ONCOLOGY | Facility: HOSPITAL | Age: 77
End: 2022-11-04
Attending: INTERNAL MEDICINE
Payer: MEDICARE

## 2022-11-04 VITALS
OXYGEN SATURATION: 97 % | RESPIRATION RATE: 18 BRPM | BODY MASS INDEX: 26.32 KG/M2 | HEIGHT: 71 IN | TEMPERATURE: 98 F | WEIGHT: 188 LBS | DIASTOLIC BLOOD PRESSURE: 51 MMHG | HEART RATE: 67 BPM | SYSTOLIC BLOOD PRESSURE: 135 MMHG

## 2022-11-04 DIAGNOSIS — Z51.12 ENCOUNTER FOR ANTINEOPLASTIC IMMUNOTHERAPY: ICD-10-CM

## 2022-11-04 DIAGNOSIS — R53.1 WEAKNESS GENERALIZED: Primary | ICD-10-CM

## 2022-11-04 DIAGNOSIS — C34.12 CANCER OF UPPER LOBE OF LEFT LUNG (HCC): ICD-10-CM

## 2022-11-04 DIAGNOSIS — B44.9 ASPERGILLUS PNEUMONIA (HCC): ICD-10-CM

## 2022-11-04 DIAGNOSIS — C34.92 MALIGNANT NEOPLASM OF LEFT LUNG, UNSPECIFIED PART OF LUNG (HCC): Primary | ICD-10-CM

## 2022-11-04 DIAGNOSIS — J18.9 PNEUMONITIS: ICD-10-CM

## 2022-11-04 DIAGNOSIS — C34.92 MALIGNANT NEOPLASM OF LEFT LUNG, UNSPECIFIED PART OF LUNG (HCC): ICD-10-CM

## 2022-11-04 DIAGNOSIS — D50.9 IRON DEFICIENCY ANEMIA, UNSPECIFIED IRON DEFICIENCY ANEMIA TYPE: ICD-10-CM

## 2022-11-04 DIAGNOSIS — Z45.2 ENCOUNTER FOR CARE RELATED TO PORT-A-CATH: ICD-10-CM

## 2022-11-04 LAB
ALBUMIN SERPL-MCNC: 3.4 G/DL (ref 3.4–5)
ALBUMIN/GLOB SERPL: 1.2 {RATIO} (ref 1–2)
ALP LIVER SERPL-CCNC: 115 U/L
ALT SERPL-CCNC: 27 U/L
ANION GAP SERPL CALC-SCNC: 7 MMOL/L (ref 0–18)
AST SERPL-CCNC: 26 U/L (ref 15–37)
BASOPHILS # BLD AUTO: 0.03 X10(3) UL (ref 0–0.2)
BASOPHILS NFR BLD AUTO: 0.5 %
BILIRUB SERPL-MCNC: 0.3 MG/DL (ref 0.1–2)
BUN BLD-MCNC: 27 MG/DL (ref 7–18)
BUN/CREAT SERPL: 23.5 (ref 10–20)
CALCIUM BLD-MCNC: 8.7 MG/DL (ref 8.5–10.1)
CHLORIDE SERPL-SCNC: 109 MMOL/L (ref 98–112)
CO2 SERPL-SCNC: 26 MMOL/L (ref 21–32)
CREAT BLD-MCNC: 1.15 MG/DL
DEPRECATED RDW RBC AUTO: 51 FL (ref 35.1–46.3)
EOSINOPHIL # BLD AUTO: 0.08 X10(3) UL (ref 0–0.7)
EOSINOPHIL NFR BLD AUTO: 1.4 %
ERYTHROCYTE [DISTWIDTH] IN BLOOD BY AUTOMATED COUNT: 14 % (ref 11–15)
FASTING STATUS PATIENT QL REPORTED: NO
GFR SERPLBLD BASED ON 1.73 SQ M-ARVRAT: 65 ML/MIN/1.73M2 (ref 60–?)
GLOBULIN PLAS-MCNC: 2.9 G/DL (ref 2.8–4.4)
GLUCOSE BLD-MCNC: 93 MG/DL (ref 70–99)
HCT VFR BLD AUTO: 40.9 %
HGB BLD-MCNC: 13.3 G/DL
IMM GRANULOCYTES # BLD AUTO: 0.04 X10(3) UL (ref 0–1)
IMM GRANULOCYTES NFR BLD: 0.7 %
LYMPHOCYTES # BLD AUTO: 0.84 X10(3) UL (ref 1–4)
LYMPHOCYTES NFR BLD AUTO: 14.6 %
MCH RBC QN AUTO: 32.3 PG (ref 26–34)
MCHC RBC AUTO-ENTMCNC: 32.5 G/DL (ref 31–37)
MCV RBC AUTO: 99.3 FL
MONOCYTES # BLD AUTO: 0.65 X10(3) UL (ref 0.1–1)
MONOCYTES NFR BLD AUTO: 11.3 %
NEUTROPHILS # BLD AUTO: 4.1 X10 (3) UL (ref 1.5–7.7)
NEUTROPHILS # BLD AUTO: 4.1 X10(3) UL (ref 1.5–7.7)
NEUTROPHILS NFR BLD AUTO: 71.5 %
OSMOLALITY SERPL CALC.SUM OF ELEC: 299 MOSM/KG (ref 275–295)
PLATELET # BLD AUTO: 191 10(3)UL (ref 150–450)
POTASSIUM SERPL-SCNC: 4.1 MMOL/L (ref 3.5–5.1)
PROT SERPL-MCNC: 6.3 G/DL (ref 6.4–8.2)
RBC # BLD AUTO: 4.12 X10(6)UL
SODIUM SERPL-SCNC: 142 MMOL/L (ref 136–145)
T4 FREE SERPL-MCNC: 1.1 NG/DL (ref 0.8–1.7)
TSI SER-ACNC: 3.86 MIU/ML (ref 0.36–3.74)
WBC # BLD AUTO: 5.7 X10(3) UL (ref 4–11)

## 2022-11-04 PROCEDURE — 85025 COMPLETE CBC W/AUTO DIFF WBC: CPT

## 2022-11-04 PROCEDURE — 99215 OFFICE O/P EST HI 40 MIN: CPT | Performed by: INTERNAL MEDICINE

## 2022-11-04 PROCEDURE — 80053 COMPREHEN METABOLIC PANEL: CPT

## 2022-11-04 PROCEDURE — 84439 ASSAY OF FREE THYROXINE: CPT

## 2022-11-04 PROCEDURE — 84443 ASSAY THYROID STIM HORMONE: CPT

## 2022-11-04 PROCEDURE — 96413 CHEMO IV INFUSION 1 HR: CPT

## 2022-11-04 RX ORDER — HEPARIN SODIUM (PORCINE) LOCK FLUSH IV SOLN 100 UNIT/ML 100 UNIT/ML
5 SOLUTION INTRAVENOUS ONCE
OUTPATIENT
Start: 2022-11-04

## 2022-11-04 RX ORDER — SODIUM CHLORIDE 9 MG/ML
10 INJECTION INTRAVENOUS ONCE
OUTPATIENT
Start: 2022-11-04

## 2022-11-04 RX ORDER — HEPARIN SODIUM (PORCINE) LOCK FLUSH IV SOLN 100 UNIT/ML 100 UNIT/ML
5 SOLUTION INTRAVENOUS ONCE
Status: COMPLETED | OUTPATIENT
Start: 2022-11-04 | End: 2022-11-04

## 2022-11-04 RX ORDER — HEPARIN SODIUM (PORCINE) LOCK FLUSH IV SOLN 100 UNIT/ML 100 UNIT/ML
SOLUTION INTRAVENOUS
Status: COMPLETED
Start: 2022-11-04 | End: 2022-11-04

## 2022-11-04 RX ADMIN — HEPARIN SODIUM (PORCINE) LOCK FLUSH IV SOLN 100 UNIT/ML 500 UNITS: 100 SOLUTION INTRAVENOUS at 11:09:00

## 2022-11-04 NOTE — PROGRESS NOTES
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 We will proceed with next dose of pembrolizumab is continuing 10 mg daily of prednisone with pneumocystis prophylaxis also.     Most recent imaging personally reviewed there are some right upper lobe lung changes of unclear significance no new infectious symptoms we will monitor this is also been reviewed by pulmonology

## 2022-11-04 NOTE — PROGRESS NOTES
Pt here for C27 keytruda  Arrives Ambulating independently, accompanied by Family member     No complaints today. Modifications in dose or schedule: No - will push back next cycle by 1 week d/t travel, already scheduled. Drugs/infusions dual verified for appearance and physical integrity. IV pump settings were dual verified: yes     Port accessed in the lab today with  positive blood return noted. Frequency of blood return and site check throughout administration: Prior to each drug and At completion of therapy     Tolerated treatment without complications.     Discharged with future appointments scheduled, Ambulating independently, accompanied by:Family member    Outpatient Oncology Care Plan  Problem list:  none noted by patient, none noted by this nurse  Problems related to:    combined modality therapy  side effect of treatment  Interventions:  monitor effect of therapy  monitor lab values  Expected outcomes:  optimal lab values  patient supported/coping well  understands plan of care  Progress towards outcome:  making progress    Education Record    Learner:  Patient and Spouse  Barriers / Limitations:  None  Method:  Discussion  Outcome:  Shows understanding  Comments: reviewed plan of care for today

## 2022-11-29 ENCOUNTER — TELEPHONE (OUTPATIENT)
Dept: HEMATOLOGY/ONCOLOGY | Facility: HOSPITAL | Age: 77
End: 2022-11-29

## 2022-11-29 NOTE — TELEPHONE ENCOUNTER
Patient called and said they are out of town. They think their flight may get cancelled to return. What would happen if the patient missed his appointments on Friday? Please call patient's wife.

## 2022-11-29 NOTE — TELEPHONE ENCOUNTER
Reassured that an interruption in his immunotherapy should not result in any ill effects, but to please keep us apprised of any cancellations for the appointment. I assured her we would get him in as soon as possible upon their return. She was appreciative of the call and will hopefully be able to fly back home on Thursday and will let us know if flight is cancelled.

## 2022-12-02 ENCOUNTER — NURSE ONLY (OUTPATIENT)
Dept: HEMATOLOGY/ONCOLOGY | Facility: HOSPITAL | Age: 77
End: 2022-12-02
Attending: INTERNAL MEDICINE
Payer: MEDICARE

## 2022-12-02 VITALS
HEIGHT: 71 IN | SYSTOLIC BLOOD PRESSURE: 151 MMHG | DIASTOLIC BLOOD PRESSURE: 65 MMHG | WEIGHT: 185.63 LBS | HEART RATE: 65 BPM | RESPIRATION RATE: 20 BRPM | TEMPERATURE: 98 F | OXYGEN SATURATION: 98 % | BODY MASS INDEX: 25.99 KG/M2

## 2022-12-02 VITALS
DIASTOLIC BLOOD PRESSURE: 58 MMHG | HEART RATE: 69 BPM | OXYGEN SATURATION: 96 % | RESPIRATION RATE: 28 BRPM | TEMPERATURE: 98 F | SYSTOLIC BLOOD PRESSURE: 137 MMHG

## 2022-12-02 DIAGNOSIS — D50.9 IRON DEFICIENCY ANEMIA, UNSPECIFIED IRON DEFICIENCY ANEMIA TYPE: ICD-10-CM

## 2022-12-02 DIAGNOSIS — Z51.12 ENCOUNTER FOR ANTINEOPLASTIC IMMUNOTHERAPY: Primary | ICD-10-CM

## 2022-12-02 DIAGNOSIS — Z45.2 ENCOUNTER FOR CARE RELATED TO PORT-A-CATH: ICD-10-CM

## 2022-12-02 DIAGNOSIS — C34.12 CANCER OF UPPER LOBE OF LEFT LUNG (HCC): ICD-10-CM

## 2022-12-02 DIAGNOSIS — C34.92 MALIGNANT NEOPLASM OF LEFT LUNG, UNSPECIFIED PART OF LUNG (HCC): ICD-10-CM

## 2022-12-02 DIAGNOSIS — J18.9 PNEUMONITIS: ICD-10-CM

## 2022-12-02 DIAGNOSIS — B44.9 ASPERGILLUS PNEUMONIA (HCC): ICD-10-CM

## 2022-12-02 LAB
ALBUMIN SERPL-MCNC: 3.2 G/DL (ref 3.4–5)
ALBUMIN/GLOB SERPL: 1.1 {RATIO} (ref 1–2)
ALP LIVER SERPL-CCNC: 118 U/L
ALT SERPL-CCNC: 32 U/L
ANION GAP SERPL CALC-SCNC: 6 MMOL/L (ref 0–18)
AST SERPL-CCNC: 22 U/L (ref 15–37)
BASOPHILS # BLD AUTO: 0.03 X10(3) UL (ref 0–0.2)
BASOPHILS NFR BLD AUTO: 0.5 %
BILIRUB SERPL-MCNC: 0.4 MG/DL (ref 0.1–2)
BUN BLD-MCNC: 19 MG/DL (ref 7–18)
BUN/CREAT SERPL: 19.2 (ref 10–20)
CALCIUM BLD-MCNC: 8.2 MG/DL (ref 8.5–10.1)
CHLORIDE SERPL-SCNC: 107 MMOL/L (ref 98–112)
CO2 SERPL-SCNC: 28 MMOL/L (ref 21–32)
CREAT BLD-MCNC: 0.99 MG/DL
DEPRECATED RDW RBC AUTO: 50 FL (ref 35.1–46.3)
EOSINOPHIL # BLD AUTO: 0.12 X10(3) UL (ref 0–0.7)
EOSINOPHIL NFR BLD AUTO: 2 %
ERYTHROCYTE [DISTWIDTH] IN BLOOD BY AUTOMATED COUNT: 13.7 % (ref 11–15)
GFR SERPLBLD BASED ON 1.73 SQ M-ARVRAT: 77 ML/MIN/1.73M2 (ref 60–?)
GLOBULIN PLAS-MCNC: 3 G/DL (ref 2.8–4.4)
GLUCOSE BLD-MCNC: 91 MG/DL (ref 70–99)
HCT VFR BLD AUTO: 42.6 %
HGB BLD-MCNC: 13.5 G/DL
IMM GRANULOCYTES # BLD AUTO: 0.04 X10(3) UL (ref 0–1)
IMM GRANULOCYTES NFR BLD: 0.7 %
LYMPHOCYTES # BLD AUTO: 0.84 X10(3) UL (ref 1–4)
LYMPHOCYTES NFR BLD AUTO: 13.8 %
MCH RBC QN AUTO: 31.5 PG (ref 26–34)
MCHC RBC AUTO-ENTMCNC: 31.7 G/DL (ref 31–37)
MCV RBC AUTO: 99.3 FL
MONOCYTES # BLD AUTO: 0.66 X10(3) UL (ref 0.1–1)
MONOCYTES NFR BLD AUTO: 10.8 %
NEUTROPHILS # BLD AUTO: 4.4 X10 (3) UL (ref 1.5–7.7)
NEUTROPHILS # BLD AUTO: 4.4 X10(3) UL (ref 1.5–7.7)
NEUTROPHILS NFR BLD AUTO: 72.2 %
OSMOLALITY SERPL CALC.SUM OF ELEC: 294 MOSM/KG (ref 275–295)
PLATELET # BLD AUTO: 196 10(3)UL (ref 150–450)
POTASSIUM SERPL-SCNC: 4.3 MMOL/L (ref 3.5–5.1)
PROT SERPL-MCNC: 6.2 G/DL (ref 6.4–8.2)
RBC # BLD AUTO: 4.29 X10(6)UL
SODIUM SERPL-SCNC: 141 MMOL/L (ref 136–145)
T4 FREE SERPL-MCNC: 1.1 NG/DL (ref 0.8–1.7)
TSI SER-ACNC: 3.92 MIU/ML (ref 0.36–3.74)
WBC # BLD AUTO: 6.1 X10(3) UL (ref 4–11)

## 2022-12-02 PROCEDURE — 84443 ASSAY THYROID STIM HORMONE: CPT

## 2022-12-02 PROCEDURE — 80053 COMPREHEN METABOLIC PANEL: CPT

## 2022-12-02 PROCEDURE — 85025 COMPLETE CBC W/AUTO DIFF WBC: CPT

## 2022-12-02 PROCEDURE — 96413 CHEMO IV INFUSION 1 HR: CPT

## 2022-12-02 PROCEDURE — 99215 OFFICE O/P EST HI 40 MIN: CPT | Performed by: INTERNAL MEDICINE

## 2022-12-02 PROCEDURE — 84439 ASSAY OF FREE THYROXINE: CPT

## 2022-12-02 RX ORDER — SODIUM CHLORIDE 9 MG/ML
10 INJECTION INTRAVENOUS ONCE
OUTPATIENT
Start: 2022-12-02

## 2022-12-02 RX ORDER — HEPARIN SODIUM (PORCINE) LOCK FLUSH IV SOLN 100 UNIT/ML 100 UNIT/ML
SOLUTION INTRAVENOUS
Status: DISPENSED
Start: 2022-12-02 | End: 2022-12-02

## 2022-12-02 RX ORDER — HEPARIN SODIUM (PORCINE) LOCK FLUSH IV SOLN 100 UNIT/ML 100 UNIT/ML
5 SOLUTION INTRAVENOUS ONCE
OUTPATIENT
Start: 2022-12-02

## 2022-12-02 RX ORDER — HEPARIN SODIUM (PORCINE) LOCK FLUSH IV SOLN 100 UNIT/ML 100 UNIT/ML
5 SOLUTION INTRAVENOUS ONCE
Status: COMPLETED | OUTPATIENT
Start: 2022-12-02 | End: 2022-12-02

## 2022-12-02 RX ADMIN — HEPARIN SODIUM (PORCINE) LOCK FLUSH IV SOLN 100 UNIT/ML 500 UNITS: 100 SOLUTION INTRAVENOUS at 12:30:00

## 2022-12-02 NOTE — PROGRESS NOTES
Pt here for C28 keytruda  Arrives Ambulating independently, accompanied by Family member     No complaints today. Modifications in dose or schedule: Yes - current cycle delayed by 1 week d/t travel,     Drugs/infusions dual verified for appearance and physical integrity. IV pump settings were dual verified: yes     Port accessed in the lab today with  positive blood return noted. Frequency of blood return and site check throughout administration: Prior to each drug and At completion of therapy     Tolerated treatment without complications.     Discharged with future appointments scheduled, Ambulating independently, accompanied by:Family member    Outpatient Oncology Care Plan  Problem list:  none noted by patient, none noted by this nurse  Problems related to:    combined modality therapy  side effect of treatment  Interventions:  monitor effect of therapy  monitor lab values  Expected outcomes:  optimal lab values  patient supported/coping well  understands plan of care  Progress towards outcome:  making progress    Education Record    Learner:  Patient and Spouse  Barriers / Limitations:  None  Method:  Discussion  Outcome:  Shows understanding  Comments: reviewed plan of care for today

## 2022-12-02 NOTE — PROGRESS NOTES
Agree with note per HARPER Rios recurrent adenocarcinoma of the lung on pembrolizumab now with relatively good disease control also history of pneumonitis and Aspergillus pneumonia he is back on voriconazole with pulm. We will proceed with next dose of pembrolizumab is continuing 10 mg daily of prednisone with pneumocystis prophylaxis also.

## 2022-12-12 ENCOUNTER — TELEPHONE (OUTPATIENT)
Facility: CLINIC | Age: 77
End: 2022-12-12

## 2022-12-21 ENCOUNTER — TELEPHONE (OUTPATIENT)
Dept: HEMATOLOGY/ONCOLOGY | Facility: HOSPITAL | Age: 77
End: 2022-12-21

## 2022-12-21 NOTE — TELEPHONE ENCOUNTER
Luis and his wife called in this morning to switch his appointments from this Friday to next Friday due to the upcoming expected weather. I've adjusted the appointments as he requested.  FYI only

## 2022-12-22 ENCOUNTER — OFFICE VISIT (OUTPATIENT)
Dept: OTOLARYNGOLOGY | Facility: CLINIC | Age: 77
End: 2022-12-22
Payer: MEDICARE

## 2022-12-22 VITALS — HEIGHT: 71 IN | WEIGHT: 185 LBS | BODY MASS INDEX: 25.9 KG/M2

## 2022-12-22 DIAGNOSIS — H61.23 BILATERAL IMPACTED CERUMEN: Primary | ICD-10-CM

## 2022-12-23 ENCOUNTER — APPOINTMENT (OUTPATIENT)
Dept: HEMATOLOGY/ONCOLOGY | Facility: HOSPITAL | Age: 77
End: 2022-12-23
Attending: INTERNAL MEDICINE
Payer: MEDICARE

## 2022-12-29 ENCOUNTER — TELEPHONE (OUTPATIENT)
Dept: PULMONOLOGY | Facility: CLINIC | Age: 77
End: 2022-12-29

## 2022-12-30 ENCOUNTER — OFFICE VISIT (OUTPATIENT)
Dept: HEMATOLOGY/ONCOLOGY | Facility: HOSPITAL | Age: 77
End: 2022-12-30
Attending: INTERNAL MEDICINE
Payer: MEDICARE

## 2022-12-30 VITALS
OXYGEN SATURATION: 93 % | HEART RATE: 85 BPM | TEMPERATURE: 98 F | HEIGHT: 71 IN | WEIGHT: 186 LBS | DIASTOLIC BLOOD PRESSURE: 72 MMHG | RESPIRATION RATE: 18 BRPM | SYSTOLIC BLOOD PRESSURE: 153 MMHG | BODY MASS INDEX: 26.04 KG/M2

## 2022-12-30 DIAGNOSIS — Z51.12 ENCOUNTER FOR ANTINEOPLASTIC IMMUNOTHERAPY: ICD-10-CM

## 2022-12-30 DIAGNOSIS — C34.92 MALIGNANT NEOPLASM OF LEFT LUNG, UNSPECIFIED PART OF LUNG (HCC): ICD-10-CM

## 2022-12-30 DIAGNOSIS — B44.9 ASPERGILLUS PNEUMONIA (HCC): ICD-10-CM

## 2022-12-30 DIAGNOSIS — C34.92 MALIGNANT NEOPLASM OF LEFT LUNG, UNSPECIFIED PART OF LUNG (HCC): Primary | ICD-10-CM

## 2022-12-30 DIAGNOSIS — Z51.12 ENCOUNTER FOR ANTINEOPLASTIC IMMUNOTHERAPY: Primary | ICD-10-CM

## 2022-12-30 DIAGNOSIS — D50.9 IRON DEFICIENCY ANEMIA, UNSPECIFIED IRON DEFICIENCY ANEMIA TYPE: ICD-10-CM

## 2022-12-30 DIAGNOSIS — J18.9 PNEUMONITIS: ICD-10-CM

## 2022-12-30 DIAGNOSIS — Z45.2 ENCOUNTER FOR CARE RELATED TO PORT-A-CATH: ICD-10-CM

## 2022-12-30 DIAGNOSIS — C34.12 CANCER OF UPPER LOBE OF LEFT LUNG (HCC): ICD-10-CM

## 2022-12-30 LAB
ALBUMIN SERPL-MCNC: 3.6 G/DL (ref 3.4–5)
ALBUMIN/GLOB SERPL: 1.2 {RATIO} (ref 1–2)
ALP LIVER SERPL-CCNC: 124 U/L
ALT SERPL-CCNC: 28 U/L
ANION GAP SERPL CALC-SCNC: 6 MMOL/L (ref 0–18)
AST SERPL-CCNC: 28 U/L (ref 15–37)
BASOPHILS # BLD AUTO: 0.03 X10(3) UL (ref 0–0.2)
BASOPHILS NFR BLD AUTO: 0.3 %
BILIRUB SERPL-MCNC: 0.4 MG/DL (ref 0.1–2)
BUN BLD-MCNC: 27 MG/DL (ref 7–18)
BUN/CREAT SERPL: 27.6 (ref 10–20)
CALCIUM BLD-MCNC: 8.7 MG/DL (ref 8.5–10.1)
CHLORIDE SERPL-SCNC: 106 MMOL/L (ref 98–112)
CO2 SERPL-SCNC: 26 MMOL/L (ref 21–32)
CREAT BLD-MCNC: 0.98 MG/DL
DEPRECATED RDW RBC AUTO: 50.5 FL (ref 35.1–46.3)
EOSINOPHIL # BLD AUTO: 0.01 X10(3) UL (ref 0–0.7)
EOSINOPHIL NFR BLD AUTO: 0.1 %
ERYTHROCYTE [DISTWIDTH] IN BLOOD BY AUTOMATED COUNT: 13.9 % (ref 11–15)
GFR SERPLBLD BASED ON 1.73 SQ M-ARVRAT: 78 ML/MIN/1.73M2 (ref 60–?)
GLOBULIN PLAS-MCNC: 3 G/DL (ref 2.8–4.4)
GLUCOSE BLD-MCNC: 121 MG/DL (ref 70–99)
HCT VFR BLD AUTO: 43.4 %
HGB BLD-MCNC: 14 G/DL
IMM GRANULOCYTES # BLD AUTO: 0.04 X10(3) UL (ref 0–1)
IMM GRANULOCYTES NFR BLD: 0.5 %
LYMPHOCYTES # BLD AUTO: 0.33 X10(3) UL (ref 1–4)
LYMPHOCYTES NFR BLD AUTO: 3.7 %
MCH RBC QN AUTO: 31.5 PG (ref 26–34)
MCHC RBC AUTO-ENTMCNC: 32.3 G/DL (ref 31–37)
MCV RBC AUTO: 97.7 FL
MONOCYTES # BLD AUTO: 0.36 X10(3) UL (ref 0.1–1)
MONOCYTES NFR BLD AUTO: 4.1 %
NEUTROPHILS # BLD AUTO: 8.07 X10 (3) UL (ref 1.5–7.7)
NEUTROPHILS # BLD AUTO: 8.07 X10(3) UL (ref 1.5–7.7)
NEUTROPHILS NFR BLD AUTO: 91.3 %
OSMOLALITY SERPL CALC.SUM OF ELEC: 292 MOSM/KG (ref 275–295)
PLATELET # BLD AUTO: 203 10(3)UL (ref 150–450)
POTASSIUM SERPL-SCNC: 4.3 MMOL/L (ref 3.5–5.1)
PROT SERPL-MCNC: 6.6 G/DL (ref 6.4–8.2)
RBC # BLD AUTO: 4.44 X10(6)UL
SODIUM SERPL-SCNC: 138 MMOL/L (ref 136–145)
TSI SER-ACNC: 1.83 MIU/ML (ref 0.36–3.74)
WBC # BLD AUTO: 8.8 X10(3) UL (ref 4–11)

## 2022-12-30 PROCEDURE — 85025 COMPLETE CBC W/AUTO DIFF WBC: CPT

## 2022-12-30 PROCEDURE — 99215 OFFICE O/P EST HI 40 MIN: CPT | Performed by: INTERNAL MEDICINE

## 2022-12-30 PROCEDURE — 80053 COMPREHEN METABOLIC PANEL: CPT

## 2022-12-30 PROCEDURE — 96413 CHEMO IV INFUSION 1 HR: CPT

## 2022-12-30 PROCEDURE — 84443 ASSAY THYROID STIM HORMONE: CPT

## 2022-12-30 RX ORDER — HEPARIN SODIUM (PORCINE) LOCK FLUSH IV SOLN 100 UNIT/ML 100 UNIT/ML
SOLUTION INTRAVENOUS
Status: COMPLETED
Start: 2022-12-30 | End: 2022-12-30

## 2022-12-30 RX ORDER — HEPARIN SODIUM (PORCINE) LOCK FLUSH IV SOLN 100 UNIT/ML 100 UNIT/ML
5 SOLUTION INTRAVENOUS ONCE
OUTPATIENT
Start: 2022-12-30

## 2022-12-30 RX ORDER — HEPARIN SODIUM (PORCINE) LOCK FLUSH IV SOLN 100 UNIT/ML 100 UNIT/ML
5 SOLUTION INTRAVENOUS ONCE
Status: COMPLETED | OUTPATIENT
Start: 2022-12-30 | End: 2022-12-30

## 2022-12-30 RX ORDER — SODIUM CHLORIDE 9 MG/ML
10 INJECTION INTRAVENOUS ONCE
OUTPATIENT
Start: 2022-12-30

## 2022-12-30 RX ADMIN — HEPARIN SODIUM (PORCINE) LOCK FLUSH IV SOLN 100 UNIT/ML 500 UNITS: 100 SOLUTION INTRAVENOUS at 16:41:00

## 2022-12-30 NOTE — PROGRESS NOTES
Agree with note per HARPER Raza Rather recurrent adenocarcinoma of the lung on pembrolizumab now with relatively good disease control also history of pneumonitis and Aspergillus pneumonia he is back on voriconazole with pulm. We will proceed with next dose of pembrolizumab is continuing 10 mg daily of prednisone with pneumocystis prophylaxis also.

## 2022-12-30 NOTE — PROGRESS NOTES
Pt here for C29 keytruda  Arrives Ambulating independently, accompanied by Family member     No complaints today. Modifications in dose or schedule: No     Drugs/infusions dual verified for appearance and physical integrity. IV pump settings were dual verified: yes     Port accessed in the lab today with  positive blood return noted. Frequency of blood return and site check throughout administration: Prior to each drug and At completion of therapy     Tolerated treatment without complications.     Discharged with future appointments scheduled, Ambulating independently, accompanied by:Family member    Outpatient Oncology Care Plan  Problem list:  none noted by patient, none noted by this nurse  Problems related to:    combined modality therapy  side effect of treatment  Interventions:  monitor effect of therapy  monitor lab values  Expected outcomes:  optimal lab values  patient supported/coping well  understands plan of care  Progress towards outcome:  making progress    Education Record    Learner:  Patient and Spouse  Barriers / Limitations:  None  Method:  Discussion  Outcome:  Shows understanding  Comments: reviewed plan of care for today

## 2023-01-03 ENCOUNTER — TELEPHONE (OUTPATIENT)
Dept: PULMONOLOGY | Facility: CLINIC | Age: 78
End: 2023-01-03

## 2023-01-03 NOTE — TELEPHONE ENCOUNTER
Received fax from [facility] requesting a signed prescription form for CPAP supplies. Placed in Dr. Esthela Khan folder for review.

## 2023-01-09 NOTE — TELEPHONE ENCOUNTER
Dr. Mohsen Rosado,    The patient reported his swallowing is improved but at times he has difficulty swallowing.     Your appointments     Date & Time Appointment Department [facility])    Jan 11, 2023  3:30 PM CST Follow Up Visit with Marta Munoz MD [facility], [address] [facility], [place] ([facility])                                                                                                      [facility], [address] [facility], [facility] [place] [address],[address] [facility]  [phone]

## 2023-01-10 ENCOUNTER — TELEPHONE (OUTPATIENT)
Dept: PULMONOLOGY | Facility: CLINIC | Age: 78
End: 2023-01-10

## 2023-01-10 DIAGNOSIS — R06.00 DYSPNEA, UNSPECIFIED TYPE: Primary | ICD-10-CM

## 2023-01-10 RX ORDER — VORICONAZOLE 200 MG/1
200 TABLET, FILM COATED ORAL 2 TIMES DAILY
Qty: 180 TABLET | Refills: 3 | Status: ON HOLD | OUTPATIENT
Start: 2023-01-10

## 2023-01-10 NOTE — TELEPHONE ENCOUNTER
Wife called in stating pt is having issues being out of breathe only when he walks or goes up the stairs please call

## 2023-01-10 NOTE — TELEPHONE ENCOUNTER
Dr. Briana Gaucher to patient who is requesting order for stress test. Reports dyspnea with exertion for the last few weeks, Sp02 remains in the upper 90's.

## 2023-01-10 NOTE — TELEPHONE ENCOUNTER
Patients wife Lewis Queen calling to inform that a  request script refill for rx Vorizonazole, 90 day script. Please call to update at [phone].   *[facility] [address] [facility], [address] [facility] [phone], [phone]

## 2023-01-10 NOTE — TELEPHONE ENCOUNTER
LOV: 8/30/2022  Last refill: 4/26/222    Dr. Tish Garcia review and sign pended prescription if agreeable.

## 2023-01-11 ENCOUNTER — OFFICE VISIT (OUTPATIENT)
Facility: CLINIC | Age: 78
End: 2023-01-11

## 2023-01-11 ENCOUNTER — TELEPHONE (OUTPATIENT)
Dept: PULMONOLOGY | Facility: CLINIC | Age: 78
End: 2023-01-11

## 2023-01-11 VITALS
BODY MASS INDEX: 26.33 KG/M2 | HEART RATE: 68 BPM | SYSTOLIC BLOOD PRESSURE: 132 MMHG | DIASTOLIC BLOOD PRESSURE: 79 MMHG | HEIGHT: 70 IN | WEIGHT: 183.88 LBS

## 2023-01-11 DIAGNOSIS — R13.19 ESOPHAGEAL DYSPHAGIA: Primary | ICD-10-CM

## 2023-01-11 PROCEDURE — 99204 OFFICE O/P NEW MOD 45 MIN: CPT | Performed by: INTERNAL MEDICINE

## 2023-01-11 PROCEDURE — 3075F SYST BP GE 130 - 139MM HG: CPT | Performed by: INTERNAL MEDICINE

## 2023-01-11 PROCEDURE — 3078F DIAST BP <80 MM HG: CPT | Performed by: INTERNAL MEDICINE

## 2023-01-11 PROCEDURE — 3008F BODY MASS INDEX DOCD: CPT | Performed by: INTERNAL MEDICINE

## 2023-01-11 NOTE — TELEPHONE ENCOUNTER
Received fax from [facility] requesting a signed prescription to replace patient's CPAP device. Placed in Dr. Mariely Gomez folder for review.

## 2023-01-12 ENCOUNTER — TELEPHONE (OUTPATIENT)
Dept: PULMONOLOGY | Facility: CLINIC | Age: 78
End: 2023-01-12

## 2023-01-12 NOTE — TELEPHONE ENCOUNTER
Received fax from [facility] [place] [facility] detailing approval for coverage of VORICONAZOLE 200 mg tablet. Authorization is good until 12/31/2023. Fax sent to scanning.

## 2023-01-12 NOTE — TELEPHONE ENCOUNTER
Spoke with [facility] and was told voriconazole needs PA, but patient filled it on 1/10/23. Patient has been on voriconazole for Aspergillus pneumonia (B44.9) since August 2018. Spoke with [employer] representative from medication PA line ([phone]) and completed PA over the phone and it was approved.      Reference/Auth number: [id number]

## 2023-01-13 ENCOUNTER — HOSPITAL ENCOUNTER (OUTPATIENT)
Dept: CV DIAGNOSTICS | Facility: HOSPITAL | Age: 78
Discharge: HOME | End: 2023-01-13
Attending: INTERNAL MEDICINE
Payer: MEDICARE

## 2023-01-13 ENCOUNTER — HOSPITAL ENCOUNTER (OUTPATIENT)
Dept: NUCLEAR MEDICINE | Facility: HOSPITAL | Age: 78
Discharge: HOME | End: 2023-01-13
Attending: INTERNAL MEDICINE
Payer: MEDICARE

## 2023-01-13 ENCOUNTER — APPOINTMENT (OUTPATIENT)
Dept: HEMATOLOGY/ONCOLOGY | Facility: HOSPITAL | Age: 78
End: 2023-01-13
Attending: INTERNAL MEDICINE
Payer: MEDICARE

## 2023-01-13 DIAGNOSIS — R06.00 DYSPNEA, UNSPECIFIED TYPE: ICD-10-CM

## 2023-01-13 LAB
% OF MAX PREDICTED HR: 100 %
MAX DIASTOLIC BP: 59 MMHG
MAX HEART RATE: 91 BPM
MAX PREDICTED HEART RATE: 141 BPM
MAX SYSTOLIC BP: 141 MMHG
MAX WORK LOAD: 10

## 2023-01-13 PROCEDURE — 93017 CV STRESS TEST TRACING ONLY: CPT | Performed by: INTERNAL MEDICINE

## 2023-01-13 PROCEDURE — 78452 HT MUSCLE IMAGE SPECT MULT: CPT | Performed by: INTERNAL MEDICINE

## 2023-01-13 PROCEDURE — 93016 CV STRESS TEST SUPVJ ONLY: CPT | Performed by: INTERNAL MEDICINE

## 2023-01-13 PROCEDURE — 93018 CV STRESS TEST I&R ONLY: CPT | Performed by: INTERNAL MEDICINE

## 2023-01-13 RX ORDER — REGADENOSON 0.08 MG/ML
INJECTION, SOLUTION INTRAVENOUS
Status: COMPLETED
Start: 2023-01-13 | End: 2023-01-13

## 2023-01-13 RX ADMIN — REGADENOSON 0.4 MG: 0.08 INJECTION, SOLUTION INTRAVENOUS at 08:56:00

## 2023-01-14 ENCOUNTER — HOSPITAL ENCOUNTER (OUTPATIENT)
Facility: HOSPITAL | Age: 78
Setting detail: OBSERVATION
LOS: 1 days | Discharge: HOME | DRG: 176 | End: 2023-01-19
Attending: EMERGENCY MEDICINE | Admitting: INTERNAL MEDICINE
Payer: MEDICARE

## 2023-01-14 ENCOUNTER — APPOINTMENT (OUTPATIENT)
Dept: ULTRASOUND IMAGING | Facility: HOSPITAL | Age: 78
DRG: 176 | End: 2023-01-14
Attending: INTERNAL MEDICINE
Payer: MEDICARE

## 2023-01-14 ENCOUNTER — APPOINTMENT (OUTPATIENT)
Dept: ULTRASOUND IMAGING | Facility: HOSPITAL | Age: 78
End: 2023-01-14
Attending: INTERNAL MEDICINE
Payer: MEDICARE

## 2023-01-14 ENCOUNTER — APPOINTMENT (OUTPATIENT)
Dept: CT IMAGING | Facility: HOSPITAL | Age: 78
DRG: 176 | End: 2023-01-14
Attending: EMERGENCY MEDICINE
Payer: MEDICARE

## 2023-01-14 ENCOUNTER — APPOINTMENT (OUTPATIENT)
Dept: CT IMAGING | Facility: HOSPITAL | Age: 78
End: 2023-01-14
Attending: EMERGENCY MEDICINE
Payer: MEDICARE

## 2023-01-14 ENCOUNTER — APPOINTMENT (OUTPATIENT)
Dept: GENERAL RADIOLOGY | Facility: HOSPITAL | Age: 78
DRG: 176 | End: 2023-01-14
Attending: EMERGENCY MEDICINE
Payer: MEDICARE

## 2023-01-14 ENCOUNTER — HOSPITAL ENCOUNTER (INPATIENT)
Facility: HOSPITAL | Age: 78
LOS: 5 days | Discharge: HOME | End: 2023-01-19
Attending: EMERGENCY MEDICINE | Admitting: INTERNAL MEDICINE
Payer: MEDICARE

## 2023-01-14 ENCOUNTER — TELEPHONE (OUTPATIENT)
Dept: HEMATOLOGY/ONCOLOGY | Facility: HOSPITAL | Age: 78
End: 2023-01-14

## 2023-01-14 ENCOUNTER — HOSPITAL ENCOUNTER (INPATIENT)
Facility: HOSPITAL | Age: 78
LOS: 5 days | Discharge: HOME | DRG: 176 | End: 2023-01-19
Attending: EMERGENCY MEDICINE | Admitting: INTERNAL MEDICINE
Payer: MEDICARE

## 2023-01-14 ENCOUNTER — APPOINTMENT (OUTPATIENT)
Dept: GENERAL RADIOLOGY | Facility: HOSPITAL | Age: 78
End: 2023-01-14
Attending: EMERGENCY MEDICINE
Payer: MEDICARE

## 2023-01-14 DIAGNOSIS — C34.92 MALIGNANT NEOPLASM OF LEFT LUNG, UNSPECIFIED PART OF LUNG (HCC): ICD-10-CM

## 2023-01-14 DIAGNOSIS — I26.94 MULTIPLE SUBSEGMENTAL PULMONARY EMBOLI WITHOUT ACUTE COR PULMONALE (HCC): Primary | ICD-10-CM

## 2023-01-14 LAB
ANION GAP SERPL CALC-SCNC: 9 MMOL/L (ref 0–18)
APTT PPP: 228.5 SECONDS (ref 23.3–35.6)
APTT PPP: 28.9 SECONDS (ref 23.3–35.6)
BASOPHILS # BLD AUTO: 0.02 X10(3) UL (ref 0–0.2)
BASOPHILS NFR BLD AUTO: 0.2 %
BUN BLD-MCNC: 26 MG/DL (ref 7–18)
BUN/CREAT SERPL: 24.8 (ref 10–20)
CALCIUM BLD-MCNC: 8.7 MG/DL (ref 8.5–10.1)
CHLORIDE SERPL-SCNC: 103 MMOL/L (ref 98–112)
CO2 SERPL-SCNC: 26 MMOL/L (ref 21–32)
CREAT BLD-MCNC: 1.05 MG/DL
DEPRECATED RDW RBC AUTO: 47.7 FL (ref 35.1–46.3)
EOSINOPHIL # BLD AUTO: 0.03 X10(3) UL (ref 0–0.7)
EOSINOPHIL NFR BLD AUTO: 0.3 %
ERYTHROCYTE [DISTWIDTH] IN BLOOD BY AUTOMATED COUNT: 13.4 % (ref 11–15)
FLUAV + FLUBV RNA SPEC NAA+PROBE: NEGATIVE
FLUAV + FLUBV RNA SPEC NAA+PROBE: NEGATIVE
GFR SERPLBLD BASED ON 1.73 SQ M-ARVRAT: 72 ML/MIN/1.73M2 (ref 60–?)
GLUCOSE BLD-MCNC: 109 MG/DL (ref 70–99)
HCT VFR BLD AUTO: 43.3 %
HGB BLD-MCNC: 14.2 G/DL
IMM GRANULOCYTES # BLD AUTO: 0.04 X10(3) UL (ref 0–1)
IMM GRANULOCYTES NFR BLD: 0.4 %
LYMPHOCYTES # BLD AUTO: 0.41 X10(3) UL (ref 1–4)
LYMPHOCYTES NFR BLD AUTO: 3.9 %
MCH RBC QN AUTO: 31.6 PG (ref 26–34)
MCHC RBC AUTO-ENTMCNC: 32.8 G/DL (ref 31–37)
MCV RBC AUTO: 96.2 FL
MONOCYTES # BLD AUTO: 0.76 X10(3) UL (ref 0.1–1)
MONOCYTES NFR BLD AUTO: 7.2 %
NEUTROPHILS # BLD AUTO: 9.35 X10 (3) UL (ref 1.5–7.7)
NEUTROPHILS # BLD AUTO: 9.35 X10(3) UL (ref 1.5–7.7)
NEUTROPHILS NFR BLD AUTO: 88 %
NT-PROBNP SERPL-MCNC: 202 PG/ML (ref ?–450)
OSMOLALITY SERPL CALC.SUM OF ELEC: 291 MOSM/KG (ref 275–295)
PLATELET # BLD AUTO: 213 10(3)UL (ref 150–450)
POTASSIUM SERPL-SCNC: 4.2 MMOL/L (ref 3.5–5.1)
RBC # BLD AUTO: 4.5 X10(6)UL
RSV RNA SPEC NAA+PROBE: NEGATIVE
SARS-COV-2 RNA RESP QL NAA+PROBE: NOT DETECTED
SODIUM SERPL-SCNC: 138 MMOL/L (ref 136–145)
TROPONIN I HIGH SENSITIVITY: 7 NG/L
WBC # BLD AUTO: 10.6 X10(3) UL (ref 4–11)

## 2023-01-14 PROCEDURE — 71260 CT THORAX DX C+: CPT | Performed by: EMERGENCY MEDICINE

## 2023-01-14 PROCEDURE — 93970 EXTREMITY STUDY: CPT | Performed by: INTERNAL MEDICINE

## 2023-01-14 RX ORDER — PREDNISONE 10 MG/1
10 TABLET ORAL DAILY
Status: DISCONTINUED | OUTPATIENT
Start: 2023-01-15 | End: 2023-01-19

## 2023-01-14 RX ORDER — ALBUTEROL SULFATE 90 UG/1
2 AEROSOL, METERED RESPIRATORY (INHALATION) EVERY 4 HOURS PRN
Status: DISCONTINUED | OUTPATIENT
Start: 2023-01-14 | End: 2023-01-19

## 2023-01-14 RX ORDER — MONTELUKAST SODIUM 10 MG/1
10 TABLET ORAL NIGHTLY PRN
Status: DISCONTINUED | OUTPATIENT
Start: 2023-01-14 | End: 2023-01-19

## 2023-01-14 RX ORDER — VORICONAZOLE 200 MG/1
200 TABLET, FILM COATED ORAL 2 TIMES DAILY
Status: DISCONTINUED | OUTPATIENT
Start: 2023-01-14 | End: 2023-01-19

## 2023-01-14 RX ORDER — HEPARIN SODIUM 1000 [USP'U]/ML
80 INJECTION, SOLUTION INTRAVENOUS; SUBCUTANEOUS ONCE
Status: COMPLETED | OUTPATIENT
Start: 2023-01-14 | End: 2023-01-14

## 2023-01-14 RX ORDER — MELATONIN
1000 DAILY
Status: DISCONTINUED | OUTPATIENT
Start: 2023-01-15 | End: 2023-01-19

## 2023-01-14 RX ORDER — CETIRIZINE HYDROCHLORIDE 10 MG/1
10 TABLET ORAL DAILY PRN
Status: DISCONTINUED | OUTPATIENT
Start: 2023-01-14 | End: 2023-01-19

## 2023-01-14 RX ORDER — HEPARIN SODIUM AND DEXTROSE 10000; 5 [USP'U]/100ML; G/100ML
18 INJECTION INTRAVENOUS ONCE
Status: COMPLETED | OUTPATIENT
Start: 2023-01-14 | End: 2023-01-14

## 2023-01-14 RX ORDER — SULFAMETHOXAZOLE AND TRIMETHOPRIM 800; 160 MG/1; MG/1
1 TABLET ORAL
Status: DISCONTINUED | OUTPATIENT
Start: 2023-01-16 | End: 2023-01-19

## 2023-01-14 RX ORDER — FLUTICASONE PROPIONATE 50 MCG
1 SPRAY, SUSPENSION (ML) NASAL 2 TIMES DAILY PRN
Status: DISCONTINUED | OUTPATIENT
Start: 2023-01-14 | End: 2023-01-19

## 2023-01-14 RX ORDER — FLUTICASONE FUROATE AND VILANTEROL 100; 25 UG/1; UG/1
1 POWDER RESPIRATORY (INHALATION) DAILY PRN
Status: DISCONTINUED | OUTPATIENT
Start: 2023-01-14 | End: 2023-01-19

## 2023-01-14 RX ORDER — ASCORBIC ACID 500 MG
500 TABLET ORAL DAILY
Status: DISCONTINUED | OUTPATIENT
Start: 2023-01-15 | End: 2023-01-19

## 2023-01-14 RX ORDER — HEPARIN SODIUM AND DEXTROSE 10000; 5 [USP'U]/100ML; G/100ML
INJECTION INTRAVENOUS CONTINUOUS
Status: DISCONTINUED | OUTPATIENT
Start: 2023-01-14 | End: 2023-01-18

## 2023-01-14 RX ORDER — PANTOPRAZOLE SODIUM 40 MG/1
40 TABLET, DELAYED RELEASE ORAL
Status: DISCONTINUED | OUTPATIENT
Start: 2023-01-15 | End: 2023-01-19

## 2023-01-14 NOTE — ED QUICK NOTES
Orders for admission, patient is aware of plan and ready to go upstairs. Any questions, please call ED RN Louis Gonzales at extension [phone].      Patient Covid vaccination status: Fully vaccinated and immunocompromised     COVID Test Ordered in ED: SARS-CoV-2/Flu A and B/RSV by PCR ([facility])    COVID Suspicion at Admission: N/A    Running Infusions:  None    Mental Status/LOC at time of transport: AOX4    Other pertinent information:   CIWA score: N/A   NIH score:  N/A

## 2023-01-14 NOTE — ED INITIAL ASSESSMENT (HPI)
Component      Latest Ref Rng & Units 7/19/2021   WBC      4.2 - 11.0 K/mcL 10.5   RBC      4.50 - 5.90 mil/mcL 5.46   HGB      13.0 - 17.0 g/dL 15.2   HCT      39.0 - 51.0 % 45.8   MCV      78.0 - 100.0 fl 83.9   MCH      26.0 - 34.0 pg 27.8   MCHC      32.0 - 36.5 g/dL 33.2   RDW-CV      11.0 - 15.0 % 13.8   RDW-SD      39.0 - 50.0 fL 41.3   PLT      140 - 450 K/mcL 173   NRBC      <=0 /100 WBC 0   Neutrophil      % 70   LYMPH      % 16   MONO      % 8   EOSIN      % 4   BASO      % 1   Immature Granulocytes      % 1   Absolute Neutrophil      1.8 - 7.7 K/mcL 7.5   Absolute Lymph      1.0 - 4.0 K/mcL 1.7   Absolute Mono      0.3 - 0.9 K/mcL 0.9   Absolute Eos      0.0 - 0.5 K/mcL 0.4   Absolute Baso      0.0 - 0.3 K/mcL 0.1   Absolute Immature Granulocytes      0.0 - 0.2 K/mcL 0.1   Fasting Status      Hours 12   Sodium      135 - 145 mmol/L 136   Potassium      3.4 - 5.1 mmol/L 3.4   Chloride      98 - 107 mmol/L 102   CO2      21 - 32 mmol/L 28   ANION GAP      10 - 20 mmol/L 9 (L)   Glucose      65 - 99 mg/dL 143 (H)   BUN      6 - 20 mg/dL 10   Creatinine      0.67 - 1.17 mg/dL 0.86   Glomerular Filtration Rate      >90 mL/min/1.73m2 90 (L)   BUN/CREATININE RATIO      7 - 25 12   CALCIUM      8.4 - 10.2 mg/dL 9.4   CHOLESTEROL      <=199 mg/dL 92   TRIGLYCERIDE      <=149 mg/dL 108   HDL      >=40 mg/dL 28 (L)   CALCULATED LDL      <=129 mg/dL 42   CALCULATED NON HDL      mg/dL 64   CHOL/HDL      <=4.4 3.3     Attempted to contact patient.  No answer, left detailed message as indicated per snapshot.   Provided call back number for further questions.        Stress test yesterday. Right rib pain starting at home yesterday evening. Worse when touching it or breathing. Unable to sleep d/t the patient states low o2 sats at home, 70's. 94% in triage in a wheelchair.

## 2023-01-14 NOTE — TELEPHONE ENCOUNTER
Pt with hx of called complaining of hypoxia, O2 sat at home high 80's-low 90's, normally mid-high 90's and sudden onset R sided pleuritic CP. Had stress test yesterday, pain may have started then but certainly more severe since this morning. Advised they head to ED to rule out PE, ACS, recurrent/progressive pneumonitis. They expressed an understanding.      Paulino Cardoza, [address] [place] [address]

## 2023-01-15 ENCOUNTER — APPOINTMENT (OUTPATIENT)
Dept: CV DIAGNOSTICS | Facility: HOSPITAL | Age: 78
DRG: 176 | End: 2023-01-15
Attending: INTERNAL MEDICINE
Payer: MEDICARE

## 2023-01-15 ENCOUNTER — APPOINTMENT (OUTPATIENT)
Dept: CV DIAGNOSTICS | Facility: HOSPITAL | Age: 78
End: 2023-01-15
Attending: INTERNAL MEDICINE
Payer: MEDICARE

## 2023-01-15 LAB
ALBUMIN SERPL-MCNC: 3 G/DL (ref 3.4–5)
ALBUMIN/GLOB SERPL: 1 {RATIO} (ref 1–2)
ALP LIVER SERPL-CCNC: 132 U/L
ALT SERPL-CCNC: 28 U/L
ANION GAP SERPL CALC-SCNC: 6 MMOL/L (ref 0–18)
APTT PPP: 131.4 SECONDS (ref 23.3–35.6)
APTT PPP: 61.7 SECONDS (ref 23.3–35.6)
APTT PPP: 66.2 SECONDS (ref 23.3–35.6)
AST SERPL-CCNC: 19 U/L (ref 15–37)
ATRIAL RATE: 87 BPM
BASOPHILS # BLD AUTO: 0.02 X10(3) UL (ref 0–0.2)
BASOPHILS NFR BLD AUTO: 0.3 %
BILIRUB SERPL-MCNC: 0.6 MG/DL (ref 0.1–2)
BUN BLD-MCNC: 22 MG/DL (ref 7–18)
BUN/CREAT SERPL: 26.2 (ref 10–20)
CALCIUM BLD-MCNC: 8.5 MG/DL (ref 8.5–10.1)
CHLORIDE SERPL-SCNC: 105 MMOL/L (ref 98–112)
CO2 SERPL-SCNC: 29 MMOL/L (ref 21–32)
CREAT BLD-MCNC: 0.84 MG/DL
DEPRECATED RDW RBC AUTO: 48.3 FL (ref 35.1–46.3)
EOSINOPHIL # BLD AUTO: 0.05 X10(3) UL (ref 0–0.7)
EOSINOPHIL NFR BLD AUTO: 0.7 %
ERYTHROCYTE [DISTWIDTH] IN BLOOD BY AUTOMATED COUNT: 13.6 % (ref 11–15)
GFR SERPLBLD BASED ON 1.73 SQ M-ARVRAT: 89 ML/MIN/1.73M2 (ref 60–?)
GLOBULIN PLAS-MCNC: 3.1 G/DL (ref 2.8–4.4)
GLUCOSE BLD-MCNC: 95 MG/DL (ref 70–99)
HCT VFR BLD AUTO: 40.7 %
HGB BLD-MCNC: 13.4 G/DL
IMM GRANULOCYTES # BLD AUTO: 0.05 X10(3) UL (ref 0–1)
IMM GRANULOCYTES NFR BLD: 0.7 %
LYMPHOCYTES # BLD AUTO: 0.53 X10(3) UL (ref 1–4)
LYMPHOCYTES NFR BLD AUTO: 7.5 %
MCH RBC QN AUTO: 31.5 PG (ref 26–34)
MCHC RBC AUTO-ENTMCNC: 32.9 G/DL (ref 31–37)
MCV RBC AUTO: 95.8 FL
MONOCYTES # BLD AUTO: 0.75 X10(3) UL (ref 0.1–1)
MONOCYTES NFR BLD AUTO: 10.6 %
NEUTROPHILS # BLD AUTO: 5.65 X10 (3) UL (ref 1.5–7.7)
NEUTROPHILS # BLD AUTO: 5.65 X10(3) UL (ref 1.5–7.7)
NEUTROPHILS NFR BLD AUTO: 80.2 %
OSMOLALITY SERPL CALC.SUM OF ELEC: 293 MOSM/KG (ref 275–295)
P AXIS: 62 DEGREES
P-R INTERVAL: 154 MS
PLATELET # BLD AUTO: 206 10(3)UL (ref 150–450)
POTASSIUM SERPL-SCNC: 3.9 MMOL/L (ref 3.5–5.1)
PROT SERPL-MCNC: 6.1 G/DL (ref 6.4–8.2)
Q-T INTERVAL: 382 MS
QRS DURATION: 90 MS
QTC CALCULATION (BEZET): 459 MS
R AXIS: -17 DEGREES
RBC # BLD AUTO: 4.25 X10(6)UL
SODIUM SERPL-SCNC: 140 MMOL/L (ref 136–145)
T AXIS: 79 DEGREES
VENTRICULAR RATE: 87 BPM
WBC # BLD AUTO: 7.1 X10(3) UL (ref 4–11)

## 2023-01-15 PROCEDURE — 93306 TTE W/DOPPLER COMPLETE: CPT | Performed by: INTERNAL MEDICINE

## 2023-01-15 PROCEDURE — 99223 1ST HOSP IP/OBS HIGH 75: CPT | Performed by: INTERNAL MEDICINE

## 2023-01-15 NOTE — PLAN OF CARE
Pt alert and oriented x4. Pt on room air. Heparin running per orders. Echo completed today. Pt up standby assist.   Problem: Patient Centered Care  Goal: Patient preferences are identified and integrated in the patient's plan of care  Description: Interventions:  - What would you like us to know as we care for you? I have a port   - Provide timely, complete, and accurate information to patient/family  - Incorporate patient and family knowledge, values, beliefs, and cultural backgrounds into the planning and delivery of care  - Encourage patient/family to participate in care and decision-making at the level they choose  - Honor patient and family perspectives and choices  Outcome: Progressing     Problem: CARDIOVASCULAR - ADULT  Goal: Maintains optimal cardiac output and hemodynamic stability  Description: INTERVENTIONS:  - Monitor vital signs, rhythm, and trends  - Monitor for bleeding, hypotension and signs of decreased cardiac output  - Evaluate effectiveness of vasoactive medications to optimize hemodynamic stability  - Monitor arterial and/or venous puncture sites for bleeding and/or hematoma  - Assess quality of pulses, skin color and temperature  - Assess for signs of decreased coronary artery perfusion - ex.  Angina  - Evaluate fluid balance, assess for edema, trend weights  Outcome: Progressing  Goal: Absence of cardiac arrhythmias or at baseline  Description: INTERVENTIONS:  - Continuous cardiac monitoring, monitor vital signs, obtain 12 lead EKG if indicated  - Evaluate effectiveness of antiarrhythmic and heart rate control medications as ordered  - Initiate emergency measures for life threatening arrhythmias  - Monitor electrolytes and administer replacement therapy as ordered  Outcome: Progressing     Problem: RESPIRATORY - ADULT  Goal: Achieves optimal ventilation and oxygenation  Description: INTERVENTIONS:  - Assess for changes in respiratory status  - Assess for changes in mentation and behavior  - Position to facilitate oxygenation and minimize respiratory effort  - Oxygen supplementation based on oxygen saturation or ABGs  - Provide Smoking Cessation handout, if applicable  - Encourage broncho-pulmonary hygiene including cough, deep breathe, Incentive Spirometry  - Assess the need for suctioning and perform as needed  - Assess and instruct to report SOB or any respiratory difficulty  - Respiratory Therapy support as indicated  - Manage/alleviate anxiety  - Monitor for signs/symptoms of CO2 retention  Outcome: Progressing     Problem: SKIN/TISSUE INTEGRITY - ADULT  Goal: Skin integrity remains intact  Description: INTERVENTIONS  - Assess and document risk factors for pressure ulcer development  - Assess and document skin integrity  - Monitor for areas of redness and/or skin breakdown  - Initiate interventions, skin care algorithm/standards of care as needed  Outcome: Progressing     Problem: HEMATOLOGIC - ADULT  Goal: Maintains hematologic stability  Description: INTERVENTIONS  - Assess for signs and symptoms of bleeding or hemorrhage  - Monitor labs and vital signs for trends  - Administer supportive blood products/factors, fluids and medications as ordered and appropriate  - Administer supportive blood products/factors as ordered and appropriate  Outcome: Progressing  Goal: Free from bleeding injury  Description: (Example usage: patient with low platelets)  INTERVENTIONS:  - Avoid intramuscular injections, enemas and rectal medication administration  - Ensure safe mobilization of patient  - Hold pressure on venipuncture sites to achieve adequate hemostasis  - Assess for signs and symptoms of internal bleeding  - Monitor lab trends  - Patient is to report abnormal signs of bleeding to staff  - Avoid use of toothpicks and dental floss  - Use electric shaver for shaving  - Use soft bristle tooth brush  - Limit straining and forceful nose blowing  Outcome: Progressing     Problem: Patient/Family Goals  Goal: Patient/Family Long Term Goal  Description: Patient's Long Term Goal: Resolution of the blood clots    Interventions:  - Transition to an oral anticoagulant  - Medication compliance  - Serial outpatient CT scans  - See additional Care Plan goals for specific interventions  Outcome: Progressing  Goal: Patient/Family Short Term Goal  Description: Patient's Short Term Goal: To return to level of function prior to blood clots without MILLAN.     Interventions:   - Heparin gtt  - O2 PRN  - Ambulation as tolerated  - Duplex US of BLE  - Echocardiogram    - See additional Care Plan goals for specific interventions  Outcome: Progressing

## 2023-01-15 NOTE — PLAN OF CARE
Pt. on Nursing Heparin IV Titration for Pulmonary Embolism/DVT Order Set, running at 12.5 ml/ hr. Next lab draw at 6am. Pt. is a unit draw via right chest port. Plan is for echo 01/15. Problem: Patient Centered Care  Goal: Patient preferences are identified and integrated in the patient's plan of care  Description: Interventions:  - What would you like us to know as we care for you? I have a port   - Provide timely, complete, and accurate information to patient/family  - Incorporate patient and family knowledge, values, beliefs, and cultural backgrounds into the planning and delivery of care  - Encourage patient/family to participate in care and decision-making at the level they choose  - Honor patient and family perspectives and choices  1/15/2023 0157 by Steve Christie RN  Outcome: Progressing  1/15/2023 0156 by Steve Christie RN  Outcome: Progressing     Problem: CARDIOVASCULAR - ADULT  Goal: Maintains optimal cardiac output and hemodynamic stability  Description: INTERVENTIONS:  - Monitor vital signs, rhythm, and trends  - Monitor for bleeding, hypotension and signs of decreased cardiac output  - Evaluate effectiveness of vasoactive medications to optimize hemodynamic stability  - Monitor arterial and/or venous puncture sites for bleeding and/or hematoma  - Assess quality of pulses, skin color and temperature  - Assess for signs of decreased coronary artery perfusion - ex.  Angina  - Evaluate fluid balance, assess for edema, trend weights  1/15/2023 0157 by Steve Christie RN  Outcome: Progressing  1/15/2023 0156 by Steve Christie RN  Outcome: Progressing  Goal: Absence of cardiac arrhythmias or at baseline  Description: INTERVENTIONS:  - Continuous cardiac monitoring, monitor vital signs, obtain 12 lead EKG if indicated  - Evaluate effectiveness of antiarrhythmic and heart rate control medications as ordered  - Initiate emergency measures for life threatening arrhythmias  - Monitor electrolytes and administer replacement therapy as ordered  1/15/2023 0157 by Kushal Garrido RN  Outcome: Progressing  1/15/2023 0156 by Kushal Garrido RN  Outcome: Progressing     Problem: RESPIRATORY - ADULT  Goal: Achieves optimal ventilation and oxygenation  Description: INTERVENTIONS:  - Assess for changes in respiratory status  - Assess for changes in mentation and behavior  - Position to facilitate oxygenation and minimize respiratory effort  - Oxygen supplementation based on oxygen saturation or ABGs  - Provide Smoking Cessation handout, if applicable  - Encourage broncho-pulmonary hygiene including cough, deep breathe, Incentive Spirometry  - Assess the need for suctioning and perform as needed  - Assess and instruct to report SOB or any respiratory difficulty  - Respiratory Therapy support as indicated  - Manage/alleviate anxiety  - Monitor for signs/symptoms of CO2 retention  1/15/2023 0157 by Kushal Garrido RN  Outcome: Progressing  1/15/2023 0156 by Kushal Garrido RN  Outcome: Progressing     Problem: SKIN/TISSUE INTEGRITY - ADULT  Goal: Skin integrity remains intact  Description: INTERVENTIONS  - Assess and document risk factors for pressure ulcer development  - Assess and document skin integrity  - Monitor for areas of redness and/or skin breakdown  - Initiate interventions, skin care algorithm/standards of care as needed  1/15/2023 0157 by Kushal Garrido RN  Outcome: Progressing  1/15/2023 0156 by Kushal Garrido RN  Outcome: Progressing     Problem: HEMATOLOGIC - ADULT  Goal: Maintains hematologic stability  Description: INTERVENTIONS  - Assess for signs and symptoms of bleeding or hemorrhage  - Monitor labs and vital signs for trends  - Administer supportive blood products/factors, fluids and medications as ordered and appropriate  - Administer supportive blood products/factors as ordered and appropriate  1/15/2023 0157 by Kushal Garrido RN  Outcome: Progressing  1/15/2023 0156 by Kushal Garrido RN  Outcome: Progressing  Goal: Free from bleeding injury  Description: (Example usage: patient with low platelets)  INTERVENTIONS:  - Avoid intramuscular injections, enemas and rectal medication administration  - Ensure safe mobilization of patient  - Hold pressure on venipuncture sites to achieve adequate hemostasis  - Assess for signs and symptoms of internal bleeding  - Monitor lab trends  - Patient is to report abnormal signs of bleeding to staff  - Avoid use of toothpicks and dental floss  - Use electric shaver for shaving  - Use soft bristle tooth brush  - Limit straining and forceful nose blowing  1/15/2023 0157 by Nallely Morse RN  Outcome: Progressing  1/15/2023 0156 by Nallely Morse RN  Outcome: Progressing     Problem: Patient/Family Goals  Goal: Patient/Family Long Term Goal  Description: Patient's Long Term Goal: Resolution of the blood clots    Interventions:  - Transition to an oral anticoagulant  - Medication compliance  - Serial outpatient CT scans  - See additional Care Plan goals for specific interventions  1/15/2023 0157 by Nallely Morse RN  Outcome: Progressing  1/15/2023 0156 by Nallely Morse RN  Outcome: Progressing  Goal: Patient/Family Short Term Goal  Description: Patient's Short Term Goal: To return to level of function prior to blood clots without MILLAN.     Interventions:   - Heparin gtt  - O2 PRN  - Ambulation as tolerated  - Duplex US of BLE  - Echocardiogram    - See additional Care Plan goals for specific interventions  1/15/2023 0157 by Nallely Morse RN  Outcome: Progressing  1/15/2023 0156 by Nallely Morse RN  Outcome: Progressing

## 2023-01-15 NOTE — PLAN OF CARE
Problem: Patient Centered Care  Goal: Patient preferences are identified and integrated in the patient's plan of care  Description: Interventions:  - What would you like us to know as we care for you? I have a port   - Provide timely, complete, and accurate information to patient/family  - Incorporate patient and family knowledge, values, beliefs, and cultural backgrounds into the planning and delivery of care  - Encourage patient/family to participate in care and decision-making at the level they choose  - Honor patient and family perspectives and choices  Outcome: Progressing     Problem: CARDIOVASCULAR - ADULT  Goal: Maintains optimal cardiac output and hemodynamic stability  Description: INTERVENTIONS:  - Monitor vital signs, rhythm, and trends  - Monitor for bleeding, hypotension and signs of decreased cardiac output  - Evaluate effectiveness of vasoactive medications to optimize hemodynamic stability  - Monitor arterial and/or venous puncture sites for bleeding and/or hematoma  - Assess quality of pulses, skin color and temperature  - Assess for signs of decreased coronary artery perfusion - ex.  Angina  - Evaluate fluid balance, assess for edema, trend weights  Outcome: Progressing  Goal: Absence of cardiac arrhythmias or at baseline  Description: INTERVENTIONS:  - Continuous cardiac monitoring, monitor vital signs, obtain 12 lead EKG if indicated  - Evaluate effectiveness of antiarrhythmic and heart rate control medications as ordered  - Initiate emergency measures for life threatening arrhythmias  - Monitor electrolytes and administer replacement therapy as ordered  Outcome: Progressing     Problem: RESPIRATORY - ADULT  Goal: Achieves optimal ventilation and oxygenation  Description: INTERVENTIONS:  - Assess for changes in respiratory status  - Assess for changes in mentation and behavior  - Position to facilitate oxygenation and minimize respiratory effort  - Oxygen supplementation based on oxygen saturation or ABGs  - Provide Smoking Cessation handout, if applicable  - Encourage broncho-pulmonary hygiene including cough, deep breathe, Incentive Spirometry  - Assess the need for suctioning and perform as needed  - Assess and instruct to report SOB or any respiratory difficulty  - Respiratory Therapy support as indicated  - Manage/alleviate anxiety  - Monitor for signs/symptoms of CO2 retention  Outcome: Progressing     Problem: SKIN/TISSUE INTEGRITY - ADULT  Goal: Skin integrity remains intact  Description: INTERVENTIONS  - Assess and document risk factors for pressure ulcer development  - Assess and document skin integrity  - Monitor for areas of redness and/or skin breakdown  - Initiate interventions, skin care algorithm/standards of care as needed  Outcome: Progressing     Problem: HEMATOLOGIC - ADULT  Goal: Maintains hematologic stability  Description: INTERVENTIONS  - Assess for signs and symptoms of bleeding or hemorrhage  - Monitor labs and vital signs for trends  - Administer supportive blood products/factors, fluids and medications as ordered and appropriate  - Administer supportive blood products/factors as ordered and appropriate  Outcome: Progressing  Goal: Free from bleeding injury  Description: (Example usage: patient with low platelets)  INTERVENTIONS:  - Avoid intramuscular injections, enemas and rectal medication administration  - Ensure safe mobilization of patient  - Hold pressure on venipuncture sites to achieve adequate hemostasis  - Assess for signs and symptoms of internal bleeding  - Monitor lab trends  - Patient is to report abnormal signs of bleeding to staff  - Avoid use of toothpicks and dental floss  - Use electric shaver for shaving  - Use soft bristle tooth brush  - Limit straining and forceful nose blowing  Outcome: Progressing    Patient A&O x4, up with standby assist.  Pt comfortable this evening, denies pain at the present time, C/O SOB with exertion, sating 94% on room air. IV heparin infusing @ 15ml/hr - next PTT @ 2320 tonight. Plan for 2D ECHO and bilateral Venous doppler of the lower legs.

## 2023-01-16 LAB
ANION GAP SERPL CALC-SCNC: 4 MMOL/L (ref 0–18)
APTT PPP: 80.3 SECONDS (ref 23.3–35.6)
BASOPHILS # BLD AUTO: 0.01 X10(3) UL (ref 0–0.2)
BASOPHILS NFR BLD AUTO: 0.1 %
BUN BLD-MCNC: 22 MG/DL (ref 7–18)
BUN/CREAT SERPL: 27.2 (ref 10–20)
CALCIUM BLD-MCNC: 8.6 MG/DL (ref 8.5–10.1)
CHLORIDE SERPL-SCNC: 107 MMOL/L (ref 98–112)
CO2 SERPL-SCNC: 28 MMOL/L (ref 21–32)
CREAT BLD-MCNC: 0.81 MG/DL
DEPRECATED RDW RBC AUTO: 47.3 FL (ref 35.1–46.3)
EOSINOPHIL # BLD AUTO: 0.05 X10(3) UL (ref 0–0.7)
EOSINOPHIL NFR BLD AUTO: 0.7 %
ERYTHROCYTE [DISTWIDTH] IN BLOOD BY AUTOMATED COUNT: 13.3 % (ref 11–15)
GFR SERPLBLD BASED ON 1.73 SQ M-ARVRAT: 90 ML/MIN/1.73M2 (ref 60–?)
GLUCOSE BLD-MCNC: 96 MG/DL (ref 70–99)
HCT VFR BLD AUTO: 39.1 %
HGB BLD-MCNC: 13 G/DL
IMM GRANULOCYTES # BLD AUTO: 0.04 X10(3) UL (ref 0–1)
IMM GRANULOCYTES NFR BLD: 0.6 %
LYMPHOCYTES # BLD AUTO: 0.55 X10(3) UL (ref 1–4)
LYMPHOCYTES NFR BLD AUTO: 8 %
MCH RBC QN AUTO: 31.8 PG (ref 26–34)
MCHC RBC AUTO-ENTMCNC: 33.2 G/DL (ref 31–37)
MCV RBC AUTO: 95.6 FL
MONOCYTES # BLD AUTO: 0.75 X10(3) UL (ref 0.1–1)
MONOCYTES NFR BLD AUTO: 11 %
NEUTROPHILS # BLD AUTO: 5.44 X10 (3) UL (ref 1.5–7.7)
NEUTROPHILS # BLD AUTO: 5.44 X10(3) UL (ref 1.5–7.7)
NEUTROPHILS NFR BLD AUTO: 79.6 %
OSMOLALITY SERPL CALC.SUM OF ELEC: 291 MOSM/KG (ref 275–295)
PLATELET # BLD AUTO: 203 10(3)UL (ref 150–450)
POTASSIUM SERPL-SCNC: 3.9 MMOL/L (ref 3.5–5.1)
RBC # BLD AUTO: 4.09 X10(6)UL
SODIUM SERPL-SCNC: 139 MMOL/L (ref 136–145)
WBC # BLD AUTO: 6.8 X10(3) UL (ref 4–11)

## 2023-01-16 PROCEDURE — 99232 SBSQ HOSP IP/OBS MODERATE 35: CPT | Performed by: INTERNAL MEDICINE

## 2023-01-16 NOTE — PLAN OF CARE
Continuing with heparin gtt. Patient does not complain of any chest pain or discomfort anywhere. Awaiting to decide on which PO anticoagulant to be put on. 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 Problem: Patient Centered Care  Goal: Patient preferences are identified and integrated in the patient's plan of care  Description: Interventions:  - What would you like us to know as we care for you? I have a port   - Provide timely, complete, and accurate information to patient/family  - Incorporate patient and family knowledge, values, beliefs, and cultural backgrounds into the planning and delivery of care  - Encourage patient/family to participate in care and decision-making at the level they choose  - Honor patient and family perspectives and choices  Outcome: Progressing     Problem: CARDIOVASCULAR - ADULT  Goal: Maintains optimal cardiac output and hemodynamic stability  Description: INTERVENTIONS:  - Monitor vital signs, rhythm, and trends  - Monitor for bleeding, hypotension and signs of decreased cardiac output  - Evaluate effectiveness of vasoactive medications to optimize hemodynamic stability  - Monitor arterial and/or venous puncture sites for bleeding and/or hematoma  - Assess quality of pulses, skin color and temperature  - Assess for signs of decreased coronary artery perfusion - ex.  Angina  - Evaluate fluid balance, assess for edema, trend weights  Outcome: Progressing  Goal: Absence of cardiac arrhythmias or at baseline  Description: INTERVENTIONS:  - Continuous cardiac monitoring, monitor vital signs, obtain 12 lead EKG if indicated  - Evaluate effectiveness of antiarrhythmic and heart rate control medications as ordered  - Initiate emergency measures for life threatening arrhythmias  - Monitor electrolytes and administer replacement therapy as ordered  Outcome: Progressing     Problem: RESPIRATORY - ADULT  Goal: Achieves optimal ventilation and oxygenation  Description: INTERVENTIONS:  - Assess for changes in respiratory status  - Assess for changes in mentation and behavior  - Position to facilitate oxygenation and minimize respiratory effort  - Oxygen supplementation based on oxygen saturation or ABGs  - Provide Smoking Cessation handout, if applicable  - Encourage broncho-pulmonary hygiene including cough, deep breathe, Incentive Spirometry  - Assess the need for suctioning and perform as needed  - Assess and instruct to report SOB or any respiratory difficulty  - Respiratory Therapy support as indicated  - Manage/alleviate anxiety  - Monitor for signs/symptoms of CO2 retention  Outcome: Progressing     Problem: SKIN/TISSUE INTEGRITY - ADULT  Goal: Skin integrity remains intact  Description: INTERVENTIONS  - Assess and document risk factors for pressure ulcer development  - Assess and document skin integrity  - Monitor for areas of redness and/or skin breakdown  - Initiate interventions, skin care algorithm/standards of care as needed  Outcome: Progressing     Problem: HEMATOLOGIC - ADULT  Goal: Maintains hematologic stability  Description: INTERVENTIONS  - Assess for signs and symptoms of bleeding or hemorrhage  - Monitor labs and vital signs for trends  - Administer supportive blood products/factors, fluids and medications as ordered and appropriate  - Administer supportive blood products/factors as ordered and appropriate  Outcome: Progressing  Goal: Free from bleeding injury  Description: (Example usage: patient with low platelets)  INTERVENTIONS:  - Avoid intramuscular injections, enemas and rectal medication administration  - Ensure safe mobilization of patient  - Hold pressure on venipuncture sites to achieve adequate hemostasis  - Assess for signs and symptoms of internal bleeding  - Monitor lab trends  - Patient is to report abnormal signs of bleeding to staff  - Avoid use of toothpicks and dental floss  - Use electric shaver for shaving  - Use soft bristle tooth brush  - Limit straining and forceful nose blowing  Outcome: Progressing     Problem: Patient/Family Goals  Goal: Patient/Family Long Term Goal  Description: Patient's Long Term Goal: Resolution of the blood clots    Interventions:  - Transition to an oral anticoagulant  - Medication compliance  - Serial outpatient CT scans  - See additional Care Plan goals for specific interventions  Outcome: Progressing  Goal: Patient/Family Short Term Goal  Description: Patient's Short Term Goal: To return to level of function prior to blood clots without MILLAN.     Interventions:   - Heparin gtt  - O2 PRN  - Ambulation as tolerated  - Duplex US of BLE  - Echocardiogram    - See additional Care Plan goals for specific interventions  Outcome: Progressing

## 2023-01-16 NOTE — PLAN OF CARE
Problem: Patient Centered Care  Goal: Patient preferences are identified and integrated in the patient's plan of care  Description: Interventions:  - What would you like us to know as we care for you? I have a port   - Provide timely, complete, and accurate information to patient/family  - Incorporate patient and family knowledge, values, beliefs, and cultural backgrounds into the planning and delivery of care  - Encourage patient/family to participate in care and decision-making at the level they choose  - Honor patient and family perspectives and choices  Outcome: Progressing     Problem: CARDIOVASCULAR - ADULT  Goal: Maintains optimal cardiac output and hemodynamic stability  Description: INTERVENTIONS:  - Monitor vital signs, rhythm, and trends  - Monitor for bleeding, hypotension and signs of decreased cardiac output  - Evaluate effectiveness of vasoactive medications to optimize hemodynamic stability  - Monitor arterial and/or venous puncture sites for bleeding and/or hematoma  - Assess quality of pulses, skin color and temperature  - Assess for signs of decreased coronary artery perfusion - ex.  Angina  - Evaluate fluid balance, assess for edema, trend weights  Outcome: Progressing  Goal: Absence of cardiac arrhythmias or at baseline  Description: INTERVENTIONS:  - Continuous cardiac monitoring, monitor vital signs, obtain 12 lead EKG if indicated  - Evaluate effectiveness of antiarrhythmic and heart rate control medications as ordered  - Initiate emergency measures for life threatening arrhythmias  - Monitor electrolytes and administer replacement therapy as ordered  Outcome: Progressing     Problem: RESPIRATORY - ADULT  Goal: Achieves optimal ventilation and oxygenation  Description: INTERVENTIONS:  - Assess for changes in respiratory status  - Assess for changes in mentation and behavior  - Position to facilitate oxygenation and minimize respiratory effort  - Oxygen supplementation based on oxygen saturation or ABGs  - Provide Smoking Cessation handout, if applicable  - Encourage broncho-pulmonary hygiene including cough, deep breathe, Incentive Spirometry  - Assess the need for suctioning and perform as needed  - Assess and instruct to report SOB or any respiratory difficulty  - Respiratory Therapy support as indicated  - Manage/alleviate anxiety  - Monitor for signs/symptoms of CO2 retention  Outcome: Progressing     Problem: SKIN/TISSUE INTEGRITY - ADULT  Goal: Skin integrity remains intact  Description: INTERVENTIONS  - Assess and document risk factors for pressure ulcer development  - Assess and document skin integrity  - Monitor for areas of redness and/or skin breakdown  - Initiate interventions, skin care algorithm/standards of care as needed  Outcome: Progressing     Problem: HEMATOLOGIC - ADULT  Goal: Maintains hematologic stability  Description: INTERVENTIONS  - Assess for signs and symptoms of bleeding or hemorrhage  - Monitor labs and vital signs for trends  - Administer supportive blood products/factors, fluids and medications as ordered and appropriate  - Administer supportive blood products/factors as ordered and appropriate  Outcome: Progressing  Goal: Free from bleeding injury  Description: (Example usage: patient with low platelets)  INTERVENTIONS:  - Avoid intramuscular injections, enemas and rectal medication administration  - Ensure safe mobilization of patient  - Hold pressure on venipuncture sites to achieve adequate hemostasis  - Assess for signs and symptoms of internal bleeding  - Monitor lab trends  - Patient is to report abnormal signs of bleeding to staff  - Avoid use of toothpicks and dental floss  - Use electric shaver for shaving  - Use soft bristle tooth brush  - Limit straining and forceful nose blowing  Outcome: Progressing     Problem: Patient/Family Goals  Goal: Patient/Family Long Term Goal  Description: Patient's Long Term Goal: Resolution of the blood clots    Interventions:  - Transition to an oral anticoagulant  - Medication compliance  - Serial outpatient CT scans  - See additional Care Plan goals for specific interventions  Outcome: Progressing  Goal: Patient/Family Short Term Goal  Description: Patient's Short Term Goal: To return to level of function prior to blood clots without MILLAN. Interventions:   - Heparin gtt  - O2 PRN  - Ambulation as tolerated  - Duplex US of BLE  - Echocardiogram    - See additional Care Plan goals for specific interventions  Outcome: Progressing     Patient vital signs stable. Call light and belongings within reach. Denies pain at this time. IV heparin gtt in progress. Ed locked and in lowest position, bed alarm on.

## 2023-01-16 NOTE — CM/SW NOTE
Home w/wife  PE, DVT  Hep gtt  RA  Self care-ambulatory. PLAN; TBD    CM/SW to remain available for support and/or discharge planning.     Samantha Cisneros RN, CCM    Ext.  [phone]

## 2023-01-16 NOTE — PATIENT INSTRUCTIONS
1.  Esophagus x-ray either as an outpatient or during your hospitalization. 2.  Further recommendations pending results of the above testing.

## 2023-01-17 ENCOUNTER — TELEPHONE (OUTPATIENT)
Dept: HEMATOLOGY/ONCOLOGY | Facility: HOSPITAL | Age: 78
End: 2023-01-17

## 2023-01-17 ENCOUNTER — TELEPHONE (OUTPATIENT)
Facility: CLINIC | Age: 78
End: 2023-01-17

## 2023-01-17 ENCOUNTER — APPOINTMENT (OUTPATIENT)
Dept: GENERAL RADIOLOGY | Facility: HOSPITAL | Age: 78
DRG: 176 | End: 2023-01-17
Attending: INTERNAL MEDICINE
Payer: MEDICARE

## 2023-01-17 ENCOUNTER — APPOINTMENT (OUTPATIENT)
Dept: GENERAL RADIOLOGY | Facility: HOSPITAL | Age: 78
End: 2023-01-17
Attending: INTERNAL MEDICINE
Payer: MEDICARE

## 2023-01-17 LAB
ANION GAP SERPL CALC-SCNC: 6 MMOL/L (ref 0–18)
APTT PPP: 95.8 SECONDS (ref 23.3–35.6)
BUN BLD-MCNC: 22 MG/DL (ref 7–18)
BUN/CREAT SERPL: 24.2 (ref 10–20)
CALCIUM BLD-MCNC: 8.7 MG/DL (ref 8.5–10.1)
CHLORIDE SERPL-SCNC: 109 MMOL/L (ref 98–112)
CO2 SERPL-SCNC: 26 MMOL/L (ref 21–32)
CREAT BLD-MCNC: 0.91 MG/DL
DEPRECATED RDW RBC AUTO: 47.4 FL (ref 35.1–46.3)
ERYTHROCYTE [DISTWIDTH] IN BLOOD BY AUTOMATED COUNT: 13.5 % (ref 11–15)
GFR SERPLBLD BASED ON 1.73 SQ M-ARVRAT: 86 ML/MIN/1.73M2 (ref 60–?)
GLUCOSE BLD-MCNC: 96 MG/DL (ref 70–99)
HCT VFR BLD AUTO: 37.2 %
HGB BLD-MCNC: 12.3 G/DL
MCH RBC QN AUTO: 31.4 PG (ref 26–34)
MCHC RBC AUTO-ENTMCNC: 33.1 G/DL (ref 31–37)
MCV RBC AUTO: 94.9 FL
OSMOLALITY SERPL CALC.SUM OF ELEC: 295 MOSM/KG (ref 275–295)
PLATELET # BLD AUTO: 212 10(3)UL (ref 150–450)
PLATELET # BLD AUTO: 212 10(3)UL (ref 150–450)
POTASSIUM SERPL-SCNC: 4 MMOL/L (ref 3.5–5.1)
RBC # BLD AUTO: 3.92 X10(6)UL
SODIUM SERPL-SCNC: 141 MMOL/L (ref 136–145)
WBC # BLD AUTO: 5.2 X10(3) UL (ref 4–11)

## 2023-01-17 PROCEDURE — 99223 1ST HOSP IP/OBS HIGH 75: CPT | Performed by: INTERNAL MEDICINE

## 2023-01-17 PROCEDURE — 99232 SBSQ HOSP IP/OBS MODERATE 35: CPT | Performed by: INTERNAL MEDICINE

## 2023-01-17 PROCEDURE — 74220 X-RAY XM ESOPHAGUS 1CNTRST: CPT | Performed by: INTERNAL MEDICINE

## 2023-01-17 NOTE — TELEPHONE ENCOUNTER
I spoke to the patient's wife and to her . We reviewed the esophagram results. Dr. Adams Alvarado has seen the patient in consultation with plans for an upper endoscopy tomorrow to exclude structural lesions of the esophagus. All questions were answered.

## 2023-01-17 NOTE — TELEPHONE ENCOUNTER
Call placed to spouse. Thanked her for calling and advised her that Dr Duran Rodriguez was not aware of the admission, but he knows now. She states that Dr Scot Ford was contacted on Saturday and he told them to go directly to the ED and Ld Carter was admitted with multiple blood clots in his lungs. I explained without a call for oncology consult, Kami would not have been notifed so thanked her for calling us. She stated she had called our call center yesterday re the admission and the upcoming appointments and was told \"not to change any appointments until seen by Dr Smith Staff". No record seen of this call. I apologized, explained to keep 1/20 appointments as planned and asked her to request Duran Rodriguez consult of the floor nurse. She confirms she will do so. Update provided to Dr Duran Rodriguez.

## 2023-01-17 NOTE — PLAN OF CARE
Bridge from IV to PO anticoagulation for Pe's and DVT's  Problem: Patient Centered Care  Goal: Patient preferences are identified and integrated in the patient's plan of care  Description: Interventions:  - What would you like us to know as we care for you? I have a port   - Provide timely, complete, and accurate information to patient/family  - Incorporate patient and family knowledge, values, beliefs, and cultural backgrounds into the planning and delivery of care  - Encourage patient/family to participate in care and decision-making at the level they choose  - Honor patient and family perspectives and choices  Outcome: Progressing     Problem: CARDIOVASCULAR - ADULT  Goal: Maintains optimal cardiac output and hemodynamic stability  Description: INTERVENTIONS:  - Monitor vital signs, rhythm, and trends  - Monitor for bleeding, hypotension and signs of decreased cardiac output  - Evaluate effectiveness of vasoactive medications to optimize hemodynamic stability  - Monitor arterial and/or venous puncture sites for bleeding and/or hematoma  - Assess quality of pulses, skin color and temperature  - Assess for signs of decreased coronary artery perfusion - ex.  Angina  - Evaluate fluid balance, assess for edema, trend weights  Outcome: Progressing  Goal: Absence of cardiac arrhythmias or at baseline  Description: INTERVENTIONS:  - Continuous cardiac monitoring, monitor vital signs, obtain 12 lead EKG if indicated  - Evaluate effectiveness of antiarrhythmic and heart rate control medications as ordered  - Initiate emergency measures for life threatening arrhythmias  - Monitor electrolytes and administer replacement therapy as ordered  Outcome: Progressing     Problem: RESPIRATORY - ADULT  Goal: Achieves optimal ventilation and oxygenation  Description: INTERVENTIONS:  - Assess for changes in respiratory status  - Assess for changes in mentation and behavior  - Position to facilitate oxygenation and minimize respiratory effort  - Oxygen supplementation based on oxygen saturation or ABGs  - Provide Smoking Cessation handout, if applicable  - Encourage broncho-pulmonary hygiene including cough, deep breathe, Incentive Spirometry  - Assess the need for suctioning and perform as needed  - Assess and instruct to report SOB or any respiratory difficulty  - Respiratory Therapy support as indicated  - Manage/alleviate anxiety  - Monitor for signs/symptoms of CO2 retention  Outcome: Progressing     Problem: SKIN/TISSUE INTEGRITY - ADULT  Goal: Skin integrity remains intact  Description: INTERVENTIONS  - Assess and document risk factors for pressure ulcer development  - Assess and document skin integrity  - Monitor for areas of redness and/or skin breakdown  - Initiate interventions, skin care algorithm/standards of care as needed  Outcome: Progressing     Problem: HEMATOLOGIC - ADULT  Goal: Maintains hematologic stability  Description: INTERVENTIONS  - Assess for signs and symptoms of bleeding or hemorrhage  - Monitor labs and vital signs for trends  - Administer supportive blood products/factors, fluids and medications as ordered and appropriate  - Administer supportive blood products/factors as ordered and appropriate  Outcome: Progressing  Goal: Free from bleeding injury  Description: (Example usage: patient with low platelets)  INTERVENTIONS:  - Avoid intramuscular injections, enemas and rectal medication administration  - Ensure safe mobilization of patient  - Hold pressure on venipuncture sites to achieve adequate hemostasis  - Assess for signs and symptoms of internal bleeding  - Monitor lab trends  - Patient is to report abnormal signs of bleeding to staff  - Avoid use of toothpicks and dental floss  - Use electric shaver for shaving  - Use soft bristle tooth brush  - Limit straining and forceful nose blowing  Outcome: Progressing     Problem: Patient/Family Goals  Goal: Patient/Family Long Term Goal  Description: Patient's Long Term Goal: Resolution of the blood clots    Interventions:  - Transition to an oral anticoagulant  - Medication compliance  - Serial outpatient CT scans  - See additional Care Plan goals for specific interventions  Outcome: Progressing  Goal: Patient/Family Short Term Goal  Description: Patient's Short Term Goal: To return to level of function prior to blood clots without MILLAN.     Interventions:   - Heparin gtt  - O2 PRN  - Ambulation as tolerated  - Duplex US of BLE  - Echocardiogram    - See additional Care Plan goals for specific interventions  Outcome: Progressing

## 2023-01-17 NOTE — TELEPHONE ENCOUNTER
Patients wife called because she wanted doctor Kami to know that  has been in the hospital since Saturday.

## 2023-01-18 ENCOUNTER — ANESTHESIA EVENT (OUTPATIENT)
Dept: ENDOSCOPY | Facility: HOSPITAL | Age: 78
End: 2023-01-18
Payer: MEDICARE

## 2023-01-18 ENCOUNTER — ANESTHESIA (OUTPATIENT)
Dept: ENDOSCOPY | Facility: HOSPITAL | Age: 78
End: 2023-01-18
Payer: MEDICARE

## 2023-01-18 LAB
APTT PPP: 96.8 SECONDS (ref 23.3–35.6)
PLATELET # BLD AUTO: 220 10(3)UL (ref 150–450)
SARS-COV-2 RNA RESP QL NAA+PROBE: NOT DETECTED

## 2023-01-18 PROCEDURE — 43235 EGD DIAGNOSTIC BRUSH WASH: CPT | Performed by: INTERNAL MEDICINE

## 2023-01-18 PROCEDURE — 0DJ08ZZ INSPECTION OF UPPER INTESTINAL TRACT, VIA NATURAL OR ARTIFICIAL OPENING ENDOSCOPIC: ICD-10-PCS | Performed by: INTERNAL MEDICINE

## 2023-01-18 PROCEDURE — 99232 SBSQ HOSP IP/OBS MODERATE 35: CPT | Performed by: INTERNAL MEDICINE

## 2023-01-18 RX ORDER — SODIUM CHLORIDE, SODIUM LACTATE, POTASSIUM CHLORIDE, CALCIUM CHLORIDE 600; 310; 30; 20 MG/100ML; MG/100ML; MG/100ML; MG/100ML
INJECTION, SOLUTION INTRAVENOUS CONTINUOUS PRN
Status: DISCONTINUED | OUTPATIENT
Start: 2023-01-18 | End: 2023-01-18 | Stop reason: SURG

## 2023-01-18 RX ADMIN — SODIUM CHLORIDE, SODIUM LACTATE, POTASSIUM CHLORIDE, CALCIUM CHLORIDE: 600; 310; 30; 20 INJECTION, SOLUTION INTRAVENOUS at 11:18:00

## 2023-01-18 RX ADMIN — SODIUM CHLORIDE, SODIUM LACTATE, POTASSIUM CHLORIDE, CALCIUM CHLORIDE: 600; 310; 30; 20 INJECTION, SOLUTION INTRAVENOUS at 11:32:00

## 2023-01-18 NOTE — PLAN OF CARE
Patient alert and oriented x 4. Vitals stable on room air. Patient denies pain. Patient ambulate frequently. Plan for patient to have EGD performed late tomorrow morning. Patient to be NPO at midnight. Patient on heparin drip at 11.5ml/hr. Heparin drip to be stopped 6 hours prior to procedure. Consent already signed. Call light within reach. Problem: Patient Centered Care  Goal: Patient preferences are identified and integrated in the patient's plan of care  Description: Interventions:  - What would you like us to know as we care for you? I have a port   - Provide timely, complete, and accurate information to patient/family  - Incorporate patient and family knowledge, values, beliefs, and cultural backgrounds into the planning and delivery of care  - Encourage patient/family to participate in care and decision-making at the level they choose  - Honor patient and family perspectives and choices  Outcome: Progressing     Problem: CARDIOVASCULAR - ADULT  Goal: Maintains optimal cardiac output and hemodynamic stability  Description: INTERVENTIONS:  - Monitor vital signs, rhythm, and trends  - Monitor for bleeding, hypotension and signs of decreased cardiac output  - Evaluate effectiveness of vasoactive medications to optimize hemodynamic stability  - Monitor arterial and/or venous puncture sites for bleeding and/or hematoma  - Assess quality of pulses, skin color and temperature  - Assess for signs of decreased coronary artery perfusion - ex.  Angina  - Evaluate fluid balance, assess for edema, trend weights  Outcome: Progressing  Goal: Absence of cardiac arrhythmias or at baseline  Description: INTERVENTIONS:  - Continuous cardiac monitoring, monitor vital signs, obtain 12 lead EKG if indicated  - Evaluate effectiveness of antiarrhythmic and heart rate control medications as ordered  - Initiate emergency measures for life threatening arrhythmias  - Monitor electrolytes and administer replacement therapy as ordered  Outcome: Progressing     Problem: RESPIRATORY - ADULT  Goal: Achieves optimal ventilation and oxygenation  Description: INTERVENTIONS:  - Assess for changes in respiratory status  - Assess for changes in mentation and behavior  - Position to facilitate oxygenation and minimize respiratory effort  - Oxygen supplementation based on oxygen saturation or ABGs  - Provide Smoking Cessation handout, if applicable  - Encourage broncho-pulmonary hygiene including cough, deep breathe, Incentive Spirometry  - Assess the need for suctioning and perform as needed  - Assess and instruct to report SOB or any respiratory difficulty  - Respiratory Therapy support as indicated  - Manage/alleviate anxiety  - Monitor for signs/symptoms of CO2 retention  Outcome: Progressing     Problem: SKIN/TISSUE INTEGRITY - ADULT  Goal: Skin integrity remains intact  Description: INTERVENTIONS  - Assess and document risk factors for pressure ulcer development  - Assess and document skin integrity  - Monitor for areas of redness and/or skin breakdown  - Initiate interventions, skin care algorithm/standards of care as needed  Outcome: Progressing     Problem: HEMATOLOGIC - ADULT  Goal: Maintains hematologic stability  Description: INTERVENTIONS  - Assess for signs and symptoms of bleeding or hemorrhage  - Monitor labs and vital signs for trends  - Administer supportive blood products/factors, fluids and medications as ordered and appropriate  - Administer supportive blood products/factors as ordered and appropriate  Outcome: Progressing  Goal: Free from bleeding injury  Description: (Example usage: patient with low platelets)  INTERVENTIONS:  - Avoid intramuscular injections, enemas and rectal medication administration  - Ensure safe mobilization of patient  - Hold pressure on venipuncture sites to achieve adequate hemostasis  - Assess for signs and symptoms of internal bleeding  - Monitor lab trends  - Patient is to report abnormal signs of bleeding to staff  - Avoid use of toothpicks and dental floss  - Use electric shaver for shaving  - Use soft bristle tooth brush  - Limit straining and forceful nose blowing  Outcome: Progressing     Problem: Patient/Family Goals  Goal: Patient/Family Long Term Goal  Description: Patient's Long Term Goal: Resolution of the blood clots    Interventions:  - Transition to an oral anticoagulant  - Medication compliance  - Serial outpatient CT scans  - See additional Care Plan goals for specific interventions  Outcome: Progressing  Goal: Patient/Family Short Term Goal  Description: Patient's Short Term Goal: To return to level of function prior to blood clots without MILLAN.     Interventions:   - Heparin gtt  - O2 PRN  - Ambulation as tolerated  - Duplex US of BLE  - Echocardiogram    - See additional Care Plan goals for specific interventions  Outcome: Progressing

## 2023-01-18 NOTE — INTERVAL H&P NOTE
Pre-op Diagnosis: dysphagia    The above referenced H&P was reviewed by Una Gonzalez MD on 1/18/2023, the patient was examined and no significant changes have occurred in the patient's condition since the H&P was performed. I discussed with the patient and/or legal representative the potential benefits, risks and side effects of this procedure; the likelihood of the patient achieving goals; and potential problems that might occur during recuperation. I discussed reasonable alternatives to the procedure, including risks, benefits and side effects related to the alternatives and risks related to not receiving this procedure. We will proceed with procedure as planned.

## 2023-01-18 NOTE — OPERATIVE REPORT
ESOPHAGOGASTRODUODENOSCOPY (EGD) [address]     1945 Age 76year old   PCP Virginia Kim MD Endoscopist Luis Gutiérrez MD     Date of procedure: 23    Procedure: EGD     Pre-operative diagnosis: dysphagia    Post-operative diagnosis: see impression    Medications: MAC    Complications: none    Procedure:  Informed consent was obtained from the patient after the risks of the procedure were discussed, including but not limited to bleeding, perforation, aspiration, infection, or possibility of a missed lesion. After discussions of the risks/benefits and alternatives to this procedure, as well as the planned sedation, the patient was placed in the left lateral decubitus position and begun on continuous blood pressure pulse oximetry and EKG monitoring and this was maintained throughout the procedure. Once an adequate level of sedation was obtained a bite block was placed. Then the lubricated tip of the [id number] diagnostic video upper endoscope was inserted and advanced using direct visualization into the posterior pharynx and ultimately into the esophagus, stomach, and duodenum. Complications: None    Findings:      1. Esophagus: The squamocolumnar junction was noted at 43 cm and appeared normal. The diaphragmatic pinch was noted noted at 44 cm from the incisors. Thus a small 1 cm sliding hiatal hernia. Prolonged observation in esophagus showed several spastic contractions. No rings or webs or strictures. There was no evidence of erosions, ulcerations, varices, or masses. 2. Stomach: We then entered the stomach. Gastric mucosa appeared normal in the forward view with no evidence of erythema, erosions, or ulcerations. There was no evidence of any luminal or submucosal masses. A retroflexed view allowed examination of the angularis, cardia and fundus and these areas also appeared normal with a patulous cardia.       3. Duodenum: The duodenal mucosa appeared normal in the 1st and 2nd portion of the duodenum. We then withdrew the instrument from the patient who tolerated the procedure well. Impression:   1. 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 No focal stricture or mass    Recommend:  1. Continue dysphagia diet  2. Follow-up with Dr Angy Vargas in office  3. May resume anticoagulation    >>>If tissue was sampled/removed and you have not received your pathology results either by phone or letter within 2 weeks, please call our office at [phone].     Specimens:  none    Blood loss: <1 ml      Carmen Gaitan MD  [facility], [facility]

## 2023-01-18 NOTE — PLAN OF CARE
Pt Aox4, RA, VSS, denies presence of pain. Heparin stopped at 0500 for EGD in the morning. Labs drawn as ordered. No new complaints over night, safety precautions in place, call light within reach. Problem: Patient Centered Care  Goal: Patient preferences are identified and integrated in the patient's plan of care  Description: Interventions:  - What would you like us to know as we care for you? I have a port   - Provide timely, complete, and accurate information to patient/family  - Incorporate patient and family knowledge, values, beliefs, and cultural backgrounds into the planning and delivery of care  - Encourage patient/family to participate in care and decision-making at the level they choose  - Honor patient and family perspectives and choices  Outcome: Progressing     Problem: CARDIOVASCULAR - ADULT  Goal: Maintains optimal cardiac output and hemodynamic stability  Description: INTERVENTIONS:  - Monitor vital signs, rhythm, and trends  - Monitor for bleeding, hypotension and signs of decreased cardiac output  - Evaluate effectiveness of vasoactive medications to optimize hemodynamic stability  - Monitor arterial and/or venous puncture sites for bleeding and/or hematoma  - Assess quality of pulses, skin color and temperature  - Assess for signs of decreased coronary artery perfusion - ex.  Angina  - Evaluate fluid balance, assess for edema, trend weights  Outcome: Progressing  Goal: Absence of cardiac arrhythmias or at baseline  Description: INTERVENTIONS:  - Continuous cardiac monitoring, monitor vital signs, obtain 12 lead EKG if indicated  - Evaluate effectiveness of antiarrhythmic and heart rate control medications as ordered  - Initiate emergency measures for life threatening arrhythmias  - Monitor electrolytes and administer replacement therapy as ordered  Outcome: Progressing     Problem: RESPIRATORY - ADULT  Goal: Achieves optimal ventilation and oxygenation  Description: INTERVENTIONS:  - Assess for changes in respiratory status  - Assess for changes in mentation and behavior  - Position to facilitate oxygenation and minimize respiratory effort  - Oxygen supplementation based on oxygen saturation or ABGs  - Provide Smoking Cessation handout, if applicable  - Encourage broncho-pulmonary hygiene including cough, deep breathe, Incentive Spirometry  - Assess the need for suctioning and perform as needed  - Assess and instruct to report SOB or any respiratory difficulty  - Respiratory Therapy support as indicated  - Manage/alleviate anxiety  - Monitor for signs/symptoms of CO2 retention  Outcome: Progressing     Problem: SKIN/TISSUE INTEGRITY - ADULT  Goal: Skin integrity remains intact  Description: INTERVENTIONS  - Assess and document risk factors for pressure ulcer development  - Assess and document skin integrity  - Monitor for areas of redness and/or skin breakdown  - Initiate interventions, skin care algorithm/standards of care as needed  Outcome: Progressing     Problem: HEMATOLOGIC - ADULT  Goal: Maintains hematologic stability  Description: INTERVENTIONS  - Assess for signs and symptoms of bleeding or hemorrhage  - Monitor labs and vital signs for trends  - Administer supportive blood products/factors, fluids and medications as ordered and appropriate  - Administer supportive blood products/factors as ordered and appropriate  Outcome: Progressing  Goal: Free from bleeding injury  Description: (Example usage: patient with low platelets)  INTERVENTIONS:  - Avoid intramuscular injections, enemas and rectal medication administration  - Ensure safe mobilization of patient  - Hold pressure on venipuncture sites to achieve adequate hemostasis  - Assess for signs and symptoms of internal bleeding  - Monitor lab trends  - Patient is to report abnormal signs of bleeding to staff  - Avoid use of toothpicks and dental floss  - Use electric shaver for shaving  - Use soft bristle tooth brush  - Limit straining and forceful nose blowing  Outcome: Progressing     Problem: Patient/Family Goals  Goal: Patient/Family Long Term Goal  Description: Patient's Long Term Goal: Resolution of the blood clots    Interventions:  - Transition to an oral anticoagulant  - Medication compliance  - Serial outpatient CT scans  - See additional Care Plan goals for specific interventions  Outcome: Progressing  Goal: Patient/Family Short Term Goal  Description: Patient's Short Term Goal: To return to level of function prior to blood clots without MILLAN.     Interventions:   - Heparin gtt  - O2 PRN  - Ambulation as tolerated  - Duplex US of BLE  - Echocardiogram    - See additional Care Plan goals for specific interventions  Outcome: Progressing

## 2023-01-18 NOTE — PLAN OF CARE
Problem: Patient Centered Care  Goal: Patient preferences are identified and integrated in the patient's plan of care  Description: Interventions:  - What would you like us to know as we care for you? I have a port   - Provide timely, complete, and accurate information to patient/family  - Incorporate patient and family knowledge, values, beliefs, and cultural backgrounds into the planning and delivery of care  - Encourage patient/family to participate in care and decision-making at the level they choose  - Honor patient and family perspectives and choices  Outcome: Progressing     Problem: CARDIOVASCULAR - ADULT  Goal: Maintains optimal cardiac output and hemodynamic stability  Description: INTERVENTIONS:  - Monitor vital signs, rhythm, and trends  - Monitor for bleeding, hypotension and signs of decreased cardiac output  - Evaluate effectiveness of vasoactive medications to optimize hemodynamic stability  - Monitor arterial and/or venous puncture sites for bleeding and/or hematoma  - Assess quality of pulses, skin color and temperature  - Assess for signs of decreased coronary artery perfusion - ex.  Angina  - Evaluate fluid balance, assess for edema, trend weights  Outcome: Progressing  Goal: Absence of cardiac arrhythmias or at baseline  Description: INTERVENTIONS:  - Continuous cardiac monitoring, monitor vital signs, obtain 12 lead EKG if indicated  - Evaluate effectiveness of antiarrhythmic and heart rate control medications as ordered  - Initiate emergency measures for life threatening arrhythmias  - Monitor electrolytes and administer replacement therapy as ordered  Outcome: Progressing     Problem: RESPIRATORY - ADULT  Goal: Achieves optimal ventilation and oxygenation  Description: INTERVENTIONS:  - Assess for changes in respiratory status  - Assess for changes in mentation and behavior  - Position to facilitate oxygenation and minimize respiratory effort  - Oxygen supplementation based on oxygen saturation or ABGs  - Provide Smoking Cessation handout, if applicable  - Encourage broncho-pulmonary hygiene including cough, deep breathe, Incentive Spirometry  - Assess the need for suctioning and perform as needed  - Assess and instruct to report SOB or any respiratory difficulty  - Respiratory Therapy support as indicated  - Manage/alleviate anxiety  - Monitor for signs/symptoms of CO2 retention  Outcome: Progressing     Problem: SKIN/TISSUE INTEGRITY - ADULT  Goal: Skin integrity remains intact  Description: INTERVENTIONS  - Assess and document risk factors for pressure ulcer development  - Assess and document skin integrity  - Monitor for areas of redness and/or skin breakdown  - Initiate interventions, skin care algorithm/standards of care as needed  Outcome: Progressing     Problem: HEMATOLOGIC - ADULT  Goal: Maintains hematologic stability  Description: INTERVENTIONS  - Assess for signs and symptoms of bleeding or hemorrhage  - Monitor labs and vital signs for trends  - Administer supportive blood products/factors, fluids and medications as ordered and appropriate  - Administer supportive blood products/factors as ordered and appropriate  Outcome: Progressing  Goal: Free from bleeding injury  Description: (Example usage: patient with low platelets)  INTERVENTIONS:  - Avoid intramuscular injections, enemas and rectal medication administration  - Ensure safe mobilization of patient  - Hold pressure on venipuncture sites to achieve adequate hemostasis  - Assess for signs and symptoms of internal bleeding  - Monitor lab trends  - Patient is to report abnormal signs of bleeding to staff  - Avoid use of toothpicks and dental floss  - Use electric shaver for shaving  - Use soft bristle tooth brush  - Limit straining and forceful nose blowing  Outcome: Progressing     Problem: Patient/Family Goals  Goal: Patient/Family Long Term Goal  Description: Patient's Long Term Goal: Resolution of the blood clots    Interventions:  - Transition to an oral anticoagulant  - Medication compliance  - Serial outpatient CT scans  - See additional Care Plan goals for specific interventions  Outcome: Progressing  Goal: Patient/Family Short Term Goal  Description: Patient's Short Term Goal: To return to level of function prior to blood clots without MILLAN. Interventions:   - Heparin gtt  - O2 PRN  - Ambulation as tolerated  - Duplex US of BLE  - Echocardiogram    - See additional Care Plan goals for specific interventions  Outcome: Progressing     Patient is alert and orientated x 4. Patient is on RA. EGD completed today. Patient started on xarelto.

## 2023-01-19 ENCOUNTER — TELEPHONE (OUTPATIENT)
Facility: CLINIC | Age: 78
End: 2023-01-19

## 2023-01-19 ENCOUNTER — TELEPHONE (OUTPATIENT)
Dept: HEMATOLOGY/ONCOLOGY | Facility: HOSPITAL | Age: 78
End: 2023-01-19

## 2023-01-19 VITALS
RESPIRATION RATE: 17 BRPM | HEIGHT: 70 IN | HEART RATE: 74 BPM | BODY MASS INDEX: 25.65 KG/M2 | SYSTOLIC BLOOD PRESSURE: 128 MMHG | DIASTOLIC BLOOD PRESSURE: 70 MMHG | WEIGHT: 179.19 LBS | OXYGEN SATURATION: 92 % | TEMPERATURE: 98 F

## 2023-01-19 LAB — PLATELET # BLD AUTO: 227 10(3)UL (ref 150–450)

## 2023-01-19 PROCEDURE — 99232 SBSQ HOSP IP/OBS MODERATE 35: CPT | Performed by: INTERNAL MEDICINE

## 2023-01-19 PROCEDURE — 99238 HOSP IP/OBS DSCHRG MGMT 30/<: CPT | Performed by: INTERNAL MEDICINE

## 2023-01-19 NOTE — TELEPHONE ENCOUNTER
How about Monday, 1/23/2023 at 11 AM.  The patient is scheduled at that time was admitted to the hospital and will likely not be coming.

## 2023-01-19 NOTE — DISCHARGE PLANNING
I called the pharmacy below to inquire about the copay of Xarelto before patient discharges. [facility]/pharmacy #[id number]- [address] [facility]      The patient's copay is 15mg tablets $839.99 for 21 day supply.     $599.99 for 30 days of 20 mg nightly (cost with the coupon). Primary care RN has 30 day free trial coupon to be given to patient at discharge. [address] [place] [address]: [phone]      Addendum:   Primary RN, CAPRI and attending updated. Primary RN asked to check on prescription coverage information from patient. Patient and his wife called [facility] pharmacy and updated prescription insurance information. Per patient, Xarelto is about $75 for a 30 day supply.        Lazarus Dublin RN, Discharge Leader [id number]

## 2023-01-19 NOTE — PLAN OF CARE
Pt Aox4, RA, VSS, denies the presence of pain. Morning labs accessed through port. Pt sleeps with a CPAP. No new complaints overnight, call light within reach. Problem: Patient Centered Care  Goal: Patient preferences are identified and integrated in the patient's plan of care  Description: Interventions:  - What would you like us to know as we care for you? I have a port   - Provide timely, complete, and accurate information to patient/family  - Incorporate patient and family knowledge, values, beliefs, and cultural backgrounds into the planning and delivery of care  - Encourage patient/family to participate in care and decision-making at the level they choose  - Honor patient and family perspectives and choices  Outcome: Progressing     Problem: CARDIOVASCULAR - ADULT  Goal: Maintains optimal cardiac output and hemodynamic stability  Description: INTERVENTIONS:  - Monitor vital signs, rhythm, and trends  - Monitor for bleeding, hypotension and signs of decreased cardiac output  - Evaluate effectiveness of vasoactive medications to optimize hemodynamic stability  - Monitor arterial and/or venous puncture sites for bleeding and/or hematoma  - Assess quality of pulses, skin color and temperature  - Assess for signs of decreased coronary artery perfusion - ex.  Angina  - Evaluate fluid balance, assess for edema, trend weights  Outcome: Progressing  Goal: Absence of cardiac arrhythmias or at baseline  Description: INTERVENTIONS:  - Continuous cardiac monitoring, monitor vital signs, obtain 12 lead EKG if indicated  - Evaluate effectiveness of antiarrhythmic and heart rate control medications as ordered  - Initiate emergency measures for life threatening arrhythmias  - Monitor electrolytes and administer replacement therapy as ordered  Outcome: Progressing     Problem: RESPIRATORY - ADULT  Goal: Achieves optimal ventilation and oxygenation  Description: INTERVENTIONS:  - Assess for changes in respiratory status  - Assess for changes in mentation and behavior  - Position to facilitate oxygenation and minimize respiratory effort  - Oxygen supplementation based on oxygen saturation or ABGs  - Provide Smoking Cessation handout, if applicable  - Encourage broncho-pulmonary hygiene including cough, deep breathe, Incentive Spirometry  - Assess the need for suctioning and perform as needed  - Assess and instruct to report SOB or any respiratory difficulty  - Respiratory Therapy support as indicated  - Manage/alleviate anxiety  - Monitor for signs/symptoms of CO2 retention  Outcome: Progressing     Problem: SKIN/TISSUE INTEGRITY - ADULT  Goal: Skin integrity remains intact  Description: INTERVENTIONS  - Assess and document risk factors for pressure ulcer development  - Assess and document skin integrity  - Monitor for areas of redness and/or skin breakdown  - Initiate interventions, skin care algorithm/standards of care as needed  Outcome: Progressing     Problem: HEMATOLOGIC - ADULT  Goal: Maintains hematologic stability  Description: INTERVENTIONS  - Assess for signs and symptoms of bleeding or hemorrhage  - Monitor labs and vital signs for trends  - Administer supportive blood products/factors, fluids and medications as ordered and appropriate  - Administer supportive blood products/factors as ordered and appropriate  Outcome: Progressing  Goal: Free from bleeding injury  Description: (Example usage: patient with low platelets)  INTERVENTIONS:  - Avoid intramuscular injections, enemas and rectal medication administration  - Ensure safe mobilization of patient  - Hold pressure on venipuncture sites to achieve adequate hemostasis  - Assess for signs and symptoms of internal bleeding  - Monitor lab trends  - Patient is to report abnormal signs of bleeding to staff  - Avoid use of toothpicks and dental floss  - Use electric shaver for shaving  - Use soft bristle tooth brush  - Limit straining and forceful nose blowing  Outcome: Progressing     Problem: Patient/Family Goals  Goal: Patient/Family Long Term Goal  Description: Patient's Long Term Goal: Resolution of the blood clots    Interventions:  - Transition to an oral anticoagulant  - Medication compliance  - Serial outpatient CT scans  - See additional Care Plan goals for specific interventions  Outcome: Progressing  Goal: Patient/Family Short Term Goal  Description: Patient's Short Term Goal: To return to level of function prior to blood clots without MILLAN.     Interventions:   - Heparin gtt  - O2 PRN  - Ambulation as tolerated  - Duplex US of BLE  - Echocardiogram    - See additional Care Plan goals for specific interventions  Outcome: Progressing

## 2023-01-19 NOTE — PLAN OF CARE
Patient alert and oriented. Ambulating independently in room, wife at the bedside. Cleared for discharge home. Discharge instructions reviewed with patient and wife, questions answered, verbalized understanding. IV removed and tele discontinued. Port de-accessed by [employer]. Patient will be transported home by wife. Problem: Patient Centered Care  Goal: Patient preferences are identified and integrated in the patient's plan of care  Description: Interventions:  - What would you like us to know as we care for you? I have a port   - Provide timely, complete, and accurate information to patient/family  - Incorporate patient and family knowledge, values, beliefs, and cultural backgrounds into the planning and delivery of care  - Encourage patient/family to participate in care and decision-making at the level they choose  - Honor patient and family perspectives and choices  Outcome: Completed     Problem: CARDIOVASCULAR - ADULT  Goal: Maintains optimal cardiac output and hemodynamic stability  Description: INTERVENTIONS:  - Monitor vital signs, rhythm, and trends  - Monitor for bleeding, hypotension and signs of decreased cardiac output  - Evaluate effectiveness of vasoactive medications to optimize hemodynamic stability  - Monitor arterial and/or venous puncture sites for bleeding and/or hematoma  - Assess quality of pulses, skin color and temperature  - Assess for signs of decreased coronary artery perfusion - ex.  Angina  - Evaluate fluid balance, assess for edema, trend weights  Outcome: Completed  Goal: Absence of cardiac arrhythmias or at baseline  Description: INTERVENTIONS:  - Continuous cardiac monitoring, monitor vital signs, obtain 12 lead EKG if indicated  - Evaluate effectiveness of antiarrhythmic and heart rate control medications as ordered  - Initiate emergency measures for life threatening arrhythmias  - Monitor electrolytes and administer replacement therapy as ordered  Outcome: Completed     Problem: RESPIRATORY - ADULT  Goal: Achieves optimal ventilation and oxygenation  Description: INTERVENTIONS:  - Assess for changes in respiratory status  - Assess for changes in mentation and behavior  - Position to facilitate oxygenation and minimize respiratory effort  - Oxygen supplementation based on oxygen saturation or ABGs  - Provide Smoking Cessation handout, if applicable  - Encourage broncho-pulmonary hygiene including cough, deep breathe, Incentive Spirometry  - Assess the need for suctioning and perform as needed  - Assess and instruct to report SOB or any respiratory difficulty  - Respiratory Therapy support as indicated  - Manage/alleviate anxiety  - Monitor for signs/symptoms of CO2 retention  Outcome: Completed     Problem: SKIN/TISSUE INTEGRITY - ADULT  Goal: Skin integrity remains intact  Description: INTERVENTIONS  - Assess and document risk factors for pressure ulcer development  - Assess and document skin integrity  - Monitor for areas of redness and/or skin breakdown  - Initiate interventions, skin care algorithm/standards of care as needed  Outcome: Completed     Problem: HEMATOLOGIC - ADULT  Goal: Maintains hematologic stability  Description: INTERVENTIONS  - Assess for signs and symptoms of bleeding or hemorrhage  - Monitor labs and vital signs for trends  - Administer supportive blood products/factors, fluids and medications as ordered and appropriate  - Administer supportive blood products/factors as ordered and appropriate  Outcome: Completed  Goal: Free from bleeding injury  Description: (Example usage: patient with low platelets)  INTERVENTIONS:  - Avoid intramuscular injections, enemas and rectal medication administration  - Ensure safe mobilization of patient  - Hold pressure on venipuncture sites to achieve adequate hemostasis  - Assess for signs and symptoms of internal bleeding  - Monitor lab trends  - Patient is to report abnormal signs of bleeding to staff  - Avoid use of toothpicks and dental floss  - Use electric shaver for shaving  - Use soft bristle tooth brush  - Limit straining and forceful nose blowing  Outcome: Completed     Problem: Patient/Family Goals  Goal: Patient/Family Long Term Goal  Description: Patient's Long Term Goal: Resolution of the blood clots    Interventions:  - Transition to an oral anticoagulant  - Medication compliance  - Serial outpatient CT scans  - See additional Care Plan goals for specific interventions  Outcome: Completed  Goal: Patient/Family Short Term Goal  Description: Patient's Short Term Goal: To return to level of function prior to blood clots without MILLAN.     Interventions:   - Heparin gtt  - O2 PRN  - Ambulation as tolerated  - Duplex US of BLE  - Echocardiogram    - See additional Care Plan goals for specific interventions  Outcome: Completed

## 2023-01-19 NOTE — TELEPHONE ENCOUNTER
I spoke to the pt and he accepted the appt.     Date time and [facility] location reviewed with the pt    Your Appointments    Monday January 23, 2023 11:00 AM  Follow Up Visit with Atif Cody MD  [address], [address] [facility],[address], [facility]) [address] [place] [address],[address]  [phone]

## 2023-01-19 NOTE — CDS QUERY
Clarification - Significance of Diagnosis   How To Answer This Query:  1)  Click \"Edit Button\" on the toolbar. 2)  Type an \"X\" in the bracket for the diagnosis that applies. (You may also add additional clinical details as you feel necessary to substantiate your response). 3)  Finally, click \"Sign\" to complete response. Thank you! Steive Moran    Dear Doctor:  Clinical information (provided below) suggests a diagnosis in the progress notes. For accurate ICD-10-CM code assignment to reflect severity of illness and risk of mortality,    PLEASE (X)  ALL THAT APPLY TO DIAGNOSIS:     PNEUMONITIS DUE TO Gwynda Pacer  ____________________   SELECTION BY PROVIDER ONLY      (  x)  Clinically Significant Diagnosis    (  )  Incidental Finding without Clinical Significance    (  )  Other (please specify):     (  )  Unable to Determine (please comment)         Documentation includes:    01/17 - Pulmonary infiltrates-prior history of pulmonary fungal disease and the patient remains on voriconazole. The infiltrates, however, are more typical for pneumonitis associated with Keytruda. 01/14 CT Chest:  CONCLUSION:   1. Positive for acute-appearing bilateral segmental branch pulmonary emboli, which predominantly involve the right upper, right lower, and left lower lobes. No CT evidence of right heart strain. 2. Extensive mixed interstitial and alveolar opacities throughout both lungs, with new involvement of the right upper/left lower lobes and to a lesser degree right lower lobe since comparison chest CT in October, 2013. Differential considerations   include any combination of multifocal infection/pneumonia (including fungal pneumonia), drug related pneumonitis, or pulmonary infarctions. Progressive neoplasm is less favored given overall appearance and configuration. Short interval post treatment   follow-up chest CT is advised to assess evolution. 3. History of left lung adenocarcinoma. 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 Stable moderate mediastinal and right hilar lymphadenopathy. 4. Aberrant retroesophageal right subclavian artery. 5. Trace dependent right pleural effusion, which is new since prior. 01/18 HEME/ONC Impression and Plan:  Hospitalized with acute pulmonary embolus now doing well on heparin. We reviewed the patient's CT findings with pulmonology. There are findings concerning for progression of pneumonitis. Agree that it is unlikely progression of aspergillosis given he is on voriconazole already. We will review findings closer at tumor conference. Of note his pulmonary status remains reasonably stable he is off oxygen    Meds:  01/15/2023 Prednisone 10 mg oral daily   01/14/2023 Vfend 200 mg 2x daily oral invasive fungal infection maintenance  01/16/2023 Bactrim -160 mg 1 tablet oral 3 X weekly MWF for Pneumocystis    PMH:  Lung Ca,  Aspergillus Pneumonia, Immune mediated pneumonitis    If you have any questions, please contact Clinical Documentation  Specialist:  SB Alvarado at [phone]     Thank You!      THIS FORM IS A PERMANENT PART OF THE MEDICAL RECORD

## 2023-01-20 ENCOUNTER — TELEPHONE (OUTPATIENT)
Dept: HEMATOLOGY/ONCOLOGY | Facility: HOSPITAL | Age: 78
End: 2023-01-20

## 2023-01-20 ENCOUNTER — PATIENT OUTREACH (OUTPATIENT)
Dept: CASE MANAGEMENT | Age: 78
End: 2023-01-20

## 2023-01-20 ENCOUNTER — APPOINTMENT (OUTPATIENT)
Dept: HEMATOLOGY/ONCOLOGY | Facility: HOSPITAL | Age: 78
End: 2023-01-20
Attending: INTERNAL MEDICINE
Payer: MEDICARE

## 2023-01-20 ENCOUNTER — TELEPHONE (OUTPATIENT)
Dept: PULMONOLOGY | Facility: CLINIC | Age: 78
End: 2023-01-20

## 2023-01-20 ENCOUNTER — APPOINTMENT (OUTPATIENT)
Dept: HEMATOLOGY/ONCOLOGY | Facility: HOSPITAL | Age: 78
End: 2023-01-20
Attending: NURSE PRACTITIONER
Payer: MEDICARE

## 2023-01-20 NOTE — PROGRESS NOTES
Left message on mailbox for pt to call NCM back for TCM. NCM contact information included in message. Follow up appointments:     Follow up With Specialties Details Why Contact Info   Beryle Byers, MD GASTROENTEROLOGY Follow up in 2 week(s)  [address] [facility]   [phone]    Effie Pagan MD PULMONARY DISEASES, Internal Medicine, Sleep Disorders Follow up in 3 week(s)  [address]   [phone]

## 2023-01-20 NOTE — PROGRESS NOTES
Left message on mailbox for pt to call NCM back for TCM. NCM contact information included in message.       Future Appointments   Date Time Provider Dia Malka   1/23/2023 11:00 AM Marta Munoz MD [facility] OF THE [facility]   2/10/2023  8:30 AM EM CC LAB1 [facility] CHEMO EMO   2/10/2023  9:30 AM BRYON Florian [facility] HEM ONC EMO   2/10/2023 10:30 AM EM CC INFRN 5 [facility] CHEMO EMO   3/3/2023  8:30 AM EM CC LAB1 [facility] CHEMO EMO   3/3/2023  9:30 AM BRYON Florian [facility] HEM ONC EMO   3/3/2023 10:30 AM EM CC INFRN 2 [facility] CHEMO EMO   5/8/2023  3:15 PM Brenda Hernandez MD [facility]

## 2023-01-23 ENCOUNTER — OFFICE VISIT (OUTPATIENT)
Facility: CLINIC | Age: 78
End: 2023-01-23

## 2023-01-23 VITALS
HEIGHT: 70 IN | HEART RATE: 72 BPM | BODY MASS INDEX: 26.41 KG/M2 | SYSTOLIC BLOOD PRESSURE: 147 MMHG | WEIGHT: 184.5 LBS | DIASTOLIC BLOOD PRESSURE: 82 MMHG

## 2023-01-23 DIAGNOSIS — R13.19 ESOPHAGEAL DYSPHAGIA: Primary | ICD-10-CM

## 2023-01-23 NOTE — PATIENT INSTRUCTIONS
1.  Continue pantoprazole in the interim. 2.  Please contact me if the difficulty swallowing returns and is problematic.

## 2023-01-24 ENCOUNTER — TELEPHONE (OUTPATIENT)
Dept: PULMONOLOGY | Facility: CLINIC | Age: 78
End: 2023-01-24

## 2023-01-24 NOTE — TELEPHONE ENCOUNTER
Pt's wife is calling because she received a notice in the mail from insurance stating that Dr. Scott Renteria has to approve the patients hospital stay

## 2023-01-25 NOTE — TELEPHONE ENCOUNTER
Spoke with patient's wife Bre Westbrook (ANA LUISA on file). Wife states she received a letter from insurance stating denial of medical coverage for inpatient hospital stay. Advised wife to contact financial counseling department to discuss issues of billing/insurance coverage. Provided number [phone]. Wife verbalized understanding.

## 2023-01-26 ENCOUNTER — NURSE ONLY (OUTPATIENT)
Dept: HEMATOLOGY/ONCOLOGY | Facility: HOSPITAL | Age: 78
End: 2023-01-26
Attending: INTERNAL MEDICINE
Payer: MEDICARE

## 2023-01-26 VITALS
BODY MASS INDEX: 25.62 KG/M2 | HEART RATE: 67 BPM | TEMPERATURE: 98 F | OXYGEN SATURATION: 97 % | HEIGHT: 71 IN | DIASTOLIC BLOOD PRESSURE: 59 MMHG | WEIGHT: 183 LBS | SYSTOLIC BLOOD PRESSURE: 118 MMHG | RESPIRATION RATE: 18 BRPM

## 2023-01-26 DIAGNOSIS — Z51.12 ENCOUNTER FOR ANTINEOPLASTIC IMMUNOTHERAPY: Primary | ICD-10-CM

## 2023-01-26 DIAGNOSIS — C34.92 MALIGNANT NEOPLASM OF LEFT LUNG, UNSPECIFIED PART OF LUNG (HCC): ICD-10-CM

## 2023-01-26 DIAGNOSIS — I26.99 PULMONARY EMBOLISM, OTHER, UNSPECIFIED CHRONICITY, UNSPECIFIED WHETHER ACUTE COR PULMONALE PRESENT (HCC): ICD-10-CM

## 2023-01-26 DIAGNOSIS — R53.1 WEAKNESS GENERALIZED: Primary | ICD-10-CM

## 2023-01-26 DIAGNOSIS — D50.9 IRON DEFICIENCY ANEMIA, UNSPECIFIED IRON DEFICIENCY ANEMIA TYPE: ICD-10-CM

## 2023-01-26 DIAGNOSIS — C34.92 MALIGNANT NEOPLASM OF LEFT LUNG, UNSPECIFIED PART OF LUNG (HCC): Primary | ICD-10-CM

## 2023-01-26 DIAGNOSIS — Z51.12 ENCOUNTER FOR ANTINEOPLASTIC IMMUNOTHERAPY: ICD-10-CM

## 2023-01-26 DIAGNOSIS — C34.12 CANCER OF UPPER LOBE OF LEFT LUNG (HCC): ICD-10-CM

## 2023-01-26 DIAGNOSIS — Z45.2 ENCOUNTER FOR CARE RELATED TO PORT-A-CATH: ICD-10-CM

## 2023-01-26 LAB
ALBUMIN SERPL-MCNC: 3.3 G/DL (ref 3.4–5)
ALBUMIN/GLOB SERPL: 1.1 {RATIO} (ref 1–2)
ALP LIVER SERPL-CCNC: 128 U/L
ALT SERPL-CCNC: 55 U/L
ANION GAP SERPL CALC-SCNC: 5 MMOL/L (ref 0–18)
AST SERPL-CCNC: 26 U/L (ref 15–37)
BASOPHILS # BLD AUTO: 0.04 X10(3) UL (ref 0–0.2)
BASOPHILS NFR BLD AUTO: 0.4 %
BILIRUB SERPL-MCNC: 0.3 MG/DL (ref 0.1–2)
BUN BLD-MCNC: 26 MG/DL (ref 7–18)
BUN/CREAT SERPL: 26.5 (ref 10–20)
CALCIUM BLD-MCNC: 8.7 MG/DL (ref 8.5–10.1)
CHLORIDE SERPL-SCNC: 107 MMOL/L (ref 98–112)
CO2 SERPL-SCNC: 27 MMOL/L (ref 21–32)
CREAT BLD-MCNC: 0.98 MG/DL
DEPRECATED RDW RBC AUTO: 48.8 FL (ref 35.1–46.3)
EOSINOPHIL # BLD AUTO: 0.04 X10(3) UL (ref 0–0.7)
EOSINOPHIL NFR BLD AUTO: 0.4 %
ERYTHROCYTE [DISTWIDTH] IN BLOOD BY AUTOMATED COUNT: 13.8 % (ref 11–15)
GFR SERPLBLD BASED ON 1.73 SQ M-ARVRAT: 78 ML/MIN/1.73M2 (ref 60–?)
GLOBULIN PLAS-MCNC: 2.9 G/DL (ref 2.8–4.4)
GLUCOSE BLD-MCNC: 112 MG/DL (ref 70–99)
HCT VFR BLD AUTO: 39.9 %
HGB BLD-MCNC: 13.3 G/DL
IMM GRANULOCYTES # BLD AUTO: 0.11 X10(3) UL (ref 0–1)
IMM GRANULOCYTES NFR BLD: 1 %
LYMPHOCYTES # BLD AUTO: 0.67 X10(3) UL (ref 1–4)
LYMPHOCYTES NFR BLD AUTO: 6.1 %
MCH RBC QN AUTO: 31.8 PG (ref 26–34)
MCHC RBC AUTO-ENTMCNC: 33.3 G/DL (ref 31–37)
MCV RBC AUTO: 95.5 FL
MONOCYTES # BLD AUTO: 0.69 X10(3) UL (ref 0.1–1)
MONOCYTES NFR BLD AUTO: 6.3 %
NEUTROPHILS # BLD AUTO: 9.47 X10 (3) UL (ref 1.5–7.7)
NEUTROPHILS # BLD AUTO: 9.47 X10(3) UL (ref 1.5–7.7)
NEUTROPHILS NFR BLD AUTO: 85.8 %
OSMOLALITY SERPL CALC.SUM OF ELEC: 294 MOSM/KG (ref 275–295)
PLATELET # BLD AUTO: 262 10(3)UL (ref 150–450)
POTASSIUM SERPL-SCNC: 3.9 MMOL/L (ref 3.5–5.1)
PROT SERPL-MCNC: 6.2 G/DL (ref 6.4–8.2)
RBC # BLD AUTO: 4.18 X10(6)UL
SODIUM SERPL-SCNC: 139 MMOL/L (ref 136–145)
TSI SER-ACNC: 2.05 MIU/ML (ref 0.36–3.74)
WBC # BLD AUTO: 11 X10(3) UL (ref 4–11)

## 2023-01-26 PROCEDURE — 96413 CHEMO IV INFUSION 1 HR: CPT

## 2023-01-26 PROCEDURE — 84443 ASSAY THYROID STIM HORMONE: CPT

## 2023-01-26 PROCEDURE — 80053 COMPREHEN METABOLIC PANEL: CPT

## 2023-01-26 PROCEDURE — 85025 COMPLETE CBC W/AUTO DIFF WBC: CPT

## 2023-01-26 PROCEDURE — 99215 OFFICE O/P EST HI 40 MIN: CPT | Performed by: INTERNAL MEDICINE

## 2023-01-26 RX ORDER — SODIUM CHLORIDE 9 MG/ML
10 INJECTION INTRAVENOUS ONCE
OUTPATIENT
Start: 2023-01-26

## 2023-01-26 RX ORDER — HEPARIN SODIUM (PORCINE) LOCK FLUSH IV SOLN 100 UNIT/ML 100 UNIT/ML
5 SOLUTION INTRAVENOUS ONCE
OUTPATIENT
Start: 2023-01-26

## 2023-01-26 RX ORDER — HEPARIN SODIUM (PORCINE) LOCK FLUSH IV SOLN 100 UNIT/ML 100 UNIT/ML
SOLUTION INTRAVENOUS
Status: COMPLETED
Start: 2023-01-26 | End: 2023-01-26

## 2023-01-26 RX ORDER — HEPARIN SODIUM (PORCINE) LOCK FLUSH IV SOLN 100 UNIT/ML 100 UNIT/ML
5 SOLUTION INTRAVENOUS ONCE
Status: COMPLETED | OUTPATIENT
Start: 2023-01-26 | End: 2023-01-26

## 2023-01-26 RX ADMIN — HEPARIN SODIUM (PORCINE) LOCK FLUSH IV SOLN 100 UNIT/ML 500 UNITS: 100 SOLUTION INTRAVENOUS at 14:31:00

## 2023-01-26 NOTE — PROGRESS NOTES
Pt here for C30D1.   Arrives Ambulating independently, accompanied by Spouse           Pregnancy screening: Not applicable    Modifications in dose or schedule: No    Frequency of blood return and site check throughout administration: Prior to administration and At completion of therapy   Discharged to Home, Ambulating independently, accompanied by:Spouse    Outpatient Oncology Care Plan  Problem list:  fatigue  Problems related to:  chemotherapy  side effect of treatment  Interventions:  encourage activity as tolerated  monitor effect of therapy  promoted rest  Expected outcomes:  adequate sleep/rest  symptoms relieved/minimized  Progress towards outcome:  making progress    Education Record    Learner:  Patient  Barriers / Limitations:  None  Method:  Discussion  Outcome:  Shows understanding  Comments:

## 2023-02-10 ENCOUNTER — APPOINTMENT (OUTPATIENT)
Dept: HEMATOLOGY/ONCOLOGY | Facility: HOSPITAL | Age: 78
End: 2023-02-10
Attending: NURSE PRACTITIONER
Payer: MEDICARE

## 2023-02-10 ENCOUNTER — APPOINTMENT (OUTPATIENT)
Dept: HEMATOLOGY/ONCOLOGY | Facility: HOSPITAL | Age: 78
End: 2023-02-10
Attending: INTERNAL MEDICINE
Payer: MEDICARE

## 2023-02-13 ENCOUNTER — OFFICE VISIT (OUTPATIENT)
Dept: PULMONOLOGY | Facility: CLINIC | Age: 78
End: 2023-02-13

## 2023-02-13 VITALS
OXYGEN SATURATION: 95 % | WEIGHT: 187 LBS | DIASTOLIC BLOOD PRESSURE: 63 MMHG | BODY MASS INDEX: 26.77 KG/M2 | RESPIRATION RATE: 20 BRPM | SYSTOLIC BLOOD PRESSURE: 121 MMHG | HEIGHT: 70 IN

## 2023-02-13 DIAGNOSIS — C34.92 MALIGNANT NEOPLASM OF LEFT LUNG, UNSPECIFIED PART OF LUNG (HCC): Primary | ICD-10-CM

## 2023-02-13 PROCEDURE — 3074F SYST BP LT 130 MM HG: CPT | Performed by: INTERNAL MEDICINE

## 2023-02-13 PROCEDURE — 99213 OFFICE O/P EST LOW 20 MIN: CPT | Performed by: INTERNAL MEDICINE

## 2023-02-13 PROCEDURE — 1111F DSCHRG MED/CURRENT MED MERGE: CPT | Performed by: INTERNAL MEDICINE

## 2023-02-13 PROCEDURE — 3008F BODY MASS INDEX DOCD: CPT | Performed by: INTERNAL MEDICINE

## 2023-02-13 PROCEDURE — 3078F DIAST BP <80 MM HG: CPT | Performed by: INTERNAL MEDICINE

## 2023-02-13 PROCEDURE — 1126F AMNT PAIN NOTED NONE PRSNT: CPT | Performed by: INTERNAL MEDICINE

## 2023-02-13 NOTE — PROGRESS NOTES
The patient is a 64-year-old male who I know well from prior evaluation comes in now for follow-up. He had venous thromboembolism and was started on Xarelto and he is breathing better. 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 This was reidentified on the same CT that identified a pulmonary embolism. 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 Review of Systems:  Vision normal. Ear nose and throat normal. Bowel normal. Bladder function normal. No depression. No thyroid disease. No lymphatic system concerns. No rash. Muscles and joints unremarkable. No weight loss no weight gain. Physical Examination:  Vital signs normal. HEENT examination is unremarkable with pupils equal round and reactive to light and accommodation. Neck without adenopathy, thyromegaly, JVD nor bruit. Lungs clear to auscultation and percussion. Cardiac regular rate and rhythm no murmur. Abdomen nontender, without hepatosplenomegaly and no mass appreciable. Extremities and Musculoskeletal without clubbing cyanosis nor edema, and mobility acceptable. Neurologic grossly intact with symmetric tone and strength and reflex. Assessment and plan:  1. Venous thromboembolism-lifelong Xarelto  2. Erythematous right eye-follows with ophthalmology and getting steroid drop  3. COPD-CPM  4. WALLACE- patient remains on CPAP  5. History of lung cancer-following with oncology. 6.  Abnormal CT scan of the chest-thought related to both pulmonary aspergillosis as well as immunotherapy associated pneumonitis. We will repeat CT scan of the chest May 2023.

## 2023-02-16 ENCOUNTER — OFFICE VISIT (OUTPATIENT)
Dept: HEMATOLOGY/ONCOLOGY | Facility: HOSPITAL | Age: 78
End: 2023-02-16
Attending: INTERNAL MEDICINE
Payer: MEDICARE

## 2023-02-16 ENCOUNTER — APPOINTMENT (OUTPATIENT)
Dept: HEMATOLOGY/ONCOLOGY | Facility: HOSPITAL | Age: 78
End: 2023-02-16
Attending: NURSE PRACTITIONER
Payer: MEDICARE

## 2023-02-16 VITALS
DIASTOLIC BLOOD PRESSURE: 87 MMHG | SYSTOLIC BLOOD PRESSURE: 121 MMHG | HEART RATE: 68 BPM | RESPIRATION RATE: 18 BRPM | WEIGHT: 187.5 LBS | BODY MASS INDEX: 26.25 KG/M2 | HEIGHT: 71 IN | TEMPERATURE: 98 F | OXYGEN SATURATION: 95 %

## 2023-02-16 DIAGNOSIS — J18.9 PNEUMONITIS: ICD-10-CM

## 2023-02-16 DIAGNOSIS — C34.92 MALIGNANT NEOPLASM OF LEFT LUNG, UNSPECIFIED PART OF LUNG (HCC): ICD-10-CM

## 2023-02-16 DIAGNOSIS — R53.1 WEAKNESS GENERALIZED: Primary | ICD-10-CM

## 2023-02-16 DIAGNOSIS — Z45.2 ENCOUNTER FOR CARE RELATED TO PORT-A-CATH: ICD-10-CM

## 2023-02-16 DIAGNOSIS — B44.9 ASPERGILLUS PNEUMONIA (HCC): ICD-10-CM

## 2023-02-16 DIAGNOSIS — Z51.12 ENCOUNTER FOR ANTINEOPLASTIC IMMUNOTHERAPY: Primary | ICD-10-CM

## 2023-02-16 DIAGNOSIS — D50.9 IRON DEFICIENCY ANEMIA, UNSPECIFIED IRON DEFICIENCY ANEMIA TYPE: ICD-10-CM

## 2023-02-16 DIAGNOSIS — C34.12 CANCER OF UPPER LOBE OF LEFT LUNG (HCC): ICD-10-CM

## 2023-02-16 DIAGNOSIS — Z51.12 ENCOUNTER FOR ANTINEOPLASTIC IMMUNOTHERAPY: ICD-10-CM

## 2023-02-16 DIAGNOSIS — I26.99 PULMONARY EMBOLISM, OTHER, UNSPECIFIED CHRONICITY, UNSPECIFIED WHETHER ACUTE COR PULMONALE PRESENT (HCC): ICD-10-CM

## 2023-02-16 DIAGNOSIS — C34.92 MALIGNANT NEOPLASM OF LEFT LUNG, UNSPECIFIED PART OF LUNG (HCC): Primary | ICD-10-CM

## 2023-02-16 LAB
ALBUMIN SERPL-MCNC: 3.4 G/DL (ref 3.4–5)
ALBUMIN/GLOB SERPL: 1.1 {RATIO} (ref 1–2)
ALP LIVER SERPL-CCNC: 104 U/L
ALT SERPL-CCNC: 35 U/L
ANION GAP SERPL CALC-SCNC: 7 MMOL/L (ref 0–18)
AST SERPL-CCNC: 28 U/L (ref 15–37)
BASOPHILS # BLD AUTO: 0.04 X10(3) UL (ref 0–0.2)
BASOPHILS NFR BLD AUTO: 0.6 %
BILIRUB SERPL-MCNC: 0.5 MG/DL (ref 0.1–2)
BUN BLD-MCNC: 24 MG/DL (ref 7–18)
BUN/CREAT SERPL: 24.2 (ref 10–20)
CALCIUM BLD-MCNC: 8.6 MG/DL (ref 8.5–10.1)
CHLORIDE SERPL-SCNC: 109 MMOL/L (ref 98–112)
CO2 SERPL-SCNC: 26 MMOL/L (ref 21–32)
CREAT BLD-MCNC: 0.99 MG/DL
DEPRECATED RDW RBC AUTO: 52.6 FL (ref 35.1–46.3)
EOSINOPHIL # BLD AUTO: 0.07 X10(3) UL (ref 0–0.7)
EOSINOPHIL NFR BLD AUTO: 1.1 %
ERYTHROCYTE [DISTWIDTH] IN BLOOD BY AUTOMATED COUNT: 14.6 % (ref 11–15)
GFR SERPLBLD BASED ON 1.73 SQ M-ARVRAT: 77 ML/MIN/1.73M2 (ref 60–?)
GLOBULIN PLAS-MCNC: 3.1 G/DL (ref 2.8–4.4)
GLUCOSE BLD-MCNC: 89 MG/DL (ref 70–99)
HCT VFR BLD AUTO: 41.7 %
HGB BLD-MCNC: 13.7 G/DL
IMM GRANULOCYTES # BLD AUTO: 0.03 X10(3) UL (ref 0–1)
IMM GRANULOCYTES NFR BLD: 0.5 %
LYMPHOCYTES # BLD AUTO: 0.56 X10(3) UL (ref 1–4)
LYMPHOCYTES NFR BLD AUTO: 8.7 %
MCH RBC QN AUTO: 31.8 PG (ref 26–34)
MCHC RBC AUTO-ENTMCNC: 32.9 G/DL (ref 31–37)
MCV RBC AUTO: 96.8 FL
MONOCYTES # BLD AUTO: 0.61 X10(3) UL (ref 0.1–1)
MONOCYTES NFR BLD AUTO: 9.5 %
NEUTROPHILS # BLD AUTO: 5.11 X10 (3) UL (ref 1.5–7.7)
NEUTROPHILS # BLD AUTO: 5.11 X10(3) UL (ref 1.5–7.7)
NEUTROPHILS NFR BLD AUTO: 79.6 %
OSMOLALITY SERPL CALC.SUM OF ELEC: 298 MOSM/KG (ref 275–295)
PLATELET # BLD AUTO: 169 10(3)UL (ref 150–450)
POTASSIUM SERPL-SCNC: 3.9 MMOL/L (ref 3.5–5.1)
PROT SERPL-MCNC: 6.5 G/DL (ref 6.4–8.2)
RBC # BLD AUTO: 4.31 X10(6)UL
SODIUM SERPL-SCNC: 142 MMOL/L (ref 136–145)
TSI SER-ACNC: 2.91 MIU/ML (ref 0.36–3.74)
WBC # BLD AUTO: 6.4 X10(3) UL (ref 4–11)

## 2023-02-16 PROCEDURE — 80053 COMPREHEN METABOLIC PANEL: CPT

## 2023-02-16 PROCEDURE — 84443 ASSAY THYROID STIM HORMONE: CPT

## 2023-02-16 PROCEDURE — 85025 COMPLETE CBC W/AUTO DIFF WBC: CPT

## 2023-02-16 PROCEDURE — 96413 CHEMO IV INFUSION 1 HR: CPT

## 2023-02-16 PROCEDURE — 99215 OFFICE O/P EST HI 40 MIN: CPT | Performed by: NURSE PRACTITIONER

## 2023-02-16 RX ORDER — HEPARIN SODIUM (PORCINE) LOCK FLUSH IV SOLN 100 UNIT/ML 100 UNIT/ML
5 SOLUTION INTRAVENOUS ONCE
Status: COMPLETED | OUTPATIENT
Start: 2023-02-16 | End: 2023-02-16

## 2023-02-16 RX ORDER — HEPARIN SODIUM (PORCINE) LOCK FLUSH IV SOLN 100 UNIT/ML 100 UNIT/ML
5 SOLUTION INTRAVENOUS ONCE
OUTPATIENT
Start: 2023-02-16

## 2023-02-16 RX ORDER — SODIUM CHLORIDE 9 MG/ML
10 INJECTION INTRAVENOUS ONCE
OUTPATIENT
Start: 2023-02-16

## 2023-02-16 RX ORDER — HEPARIN SODIUM (PORCINE) LOCK FLUSH IV SOLN 100 UNIT/ML 100 UNIT/ML
SOLUTION INTRAVENOUS
Status: DISPENSED
Start: 2023-02-16 | End: 2023-02-16

## 2023-02-16 RX ADMIN — HEPARIN SODIUM (PORCINE) LOCK FLUSH IV SOLN 100 UNIT/ML 500 UNITS: 100 SOLUTION INTRAVENOUS at 13:14:00

## 2023-02-16 NOTE — PROGRESS NOTES
Pt here for C3D1.   Arrives Ambulating independently, accompanied by Spouse           Pregnancy screening: Not applicable    Modifications in dose or schedule: No    Frequency of blood return and site check throughout administration: Prior to administration and At completion of therapy   Discharged to Home, Ambulating independently, accompanied by:Spouse    Outpatient Oncology Care Plan  Problem list:  fatigue  Problems related to:  chemotherapy  side effect of treatment  Interventions:  encourage activity as tolerated  monitor effect of therapy  promoted rest  Expected outcomes:  adequate sleep/rest  symptoms relieved/minimized  Progress towards outcome:  making progress    Education Record    Learner:  Patient  Barriers / Limitations:  None  Method:  Discussion  Outcome:  Shows understanding  Comments:

## 2023-02-21 ENCOUNTER — TELEPHONE (OUTPATIENT)
Facility: CLINIC | Age: 78
End: 2023-02-21

## 2023-02-21 NOTE — TELEPHONE ENCOUNTER
1st reminder letter sent out via mail and [employer] for the following:   XR ESOPHAGUS SINGLE CONTRAST (CPT=74220) (Order #[id number]) on 1/16/23

## 2023-02-22 RX ORDER — ESCITALOPRAM OXALATE 10 MG/1
TABLET ORAL
Qty: 90 TABLET | Refills: 2 | OUTPATIENT
Start: 2023-02-22

## 2023-02-22 NOTE — TELEPHONE ENCOUNTER
LOV: 2/13/2023    escitalopram (LEXAPRO) 10 MG Oral Tab, Take 1 tablet (10 mg total) by mouth daily. (Patient not taking: Reported on 9/23/2022), Disp: 30 tablet, Rfl: 6    Dr. Sarika Gilman review and sign pended prescription if agreeable.

## 2023-03-03 ENCOUNTER — APPOINTMENT (OUTPATIENT)
Dept: HEMATOLOGY/ONCOLOGY | Facility: HOSPITAL | Age: 78
End: 2023-03-03
Attending: INTERNAL MEDICINE
Payer: MEDICARE

## 2023-03-03 ENCOUNTER — APPOINTMENT (OUTPATIENT)
Dept: HEMATOLOGY/ONCOLOGY | Facility: HOSPITAL | Age: 78
End: 2023-03-03
Attending: NURSE PRACTITIONER
Payer: MEDICARE

## 2023-03-09 ENCOUNTER — OFFICE VISIT (OUTPATIENT)
Dept: HEMATOLOGY/ONCOLOGY | Facility: HOSPITAL | Age: 78
End: 2023-03-09
Attending: NURSE PRACTITIONER
Payer: MEDICARE

## 2023-03-09 ENCOUNTER — TELEPHONE (OUTPATIENT)
Dept: PULMONOLOGY | Facility: CLINIC | Age: 78
End: 2023-03-09

## 2023-03-09 ENCOUNTER — OFFICE VISIT (OUTPATIENT)
Dept: HEMATOLOGY/ONCOLOGY | Facility: HOSPITAL | Age: 78
End: 2023-03-09
Attending: INTERNAL MEDICINE
Payer: MEDICARE

## 2023-03-09 VITALS
WEIGHT: 190.19 LBS | BODY MASS INDEX: 27.23 KG/M2 | HEART RATE: 57 BPM | RESPIRATION RATE: 20 BRPM | DIASTOLIC BLOOD PRESSURE: 60 MMHG | HEIGHT: 70 IN | TEMPERATURE: 98 F | OXYGEN SATURATION: 96 % | SYSTOLIC BLOOD PRESSURE: 142 MMHG

## 2023-03-09 DIAGNOSIS — G47.33 OSA (OBSTRUCTIVE SLEEP APNEA): Primary | ICD-10-CM

## 2023-03-09 DIAGNOSIS — C34.92 MALIGNANT NEOPLASM OF LEFT LUNG, UNSPECIFIED PART OF LUNG (HCC): ICD-10-CM

## 2023-03-09 DIAGNOSIS — C34.12 CANCER OF UPPER LOBE OF LEFT LUNG (HCC): ICD-10-CM

## 2023-03-09 DIAGNOSIS — Z51.12 ENCOUNTER FOR ANTINEOPLASTIC IMMUNOTHERAPY: ICD-10-CM

## 2023-03-09 DIAGNOSIS — J18.9 PNEUMONITIS: ICD-10-CM

## 2023-03-09 DIAGNOSIS — C34.92 MALIGNANT NEOPLASM OF LEFT LUNG, UNSPECIFIED PART OF LUNG (HCC): Primary | ICD-10-CM

## 2023-03-09 DIAGNOSIS — Z51.12 ENCOUNTER FOR ANTINEOPLASTIC IMMUNOTHERAPY: Primary | ICD-10-CM

## 2023-03-09 DIAGNOSIS — D50.9 IRON DEFICIENCY ANEMIA, UNSPECIFIED IRON DEFICIENCY ANEMIA TYPE: ICD-10-CM

## 2023-03-09 DIAGNOSIS — Z45.2 ENCOUNTER FOR CARE RELATED TO PORT-A-CATH: ICD-10-CM

## 2023-03-09 LAB
ALBUMIN SERPL-MCNC: 3.4 G/DL (ref 3.4–5)
ALBUMIN/GLOB SERPL: 1.1 {RATIO} (ref 1–2)
ALP LIVER SERPL-CCNC: 104 U/L
ALT SERPL-CCNC: 31 U/L
ANION GAP SERPL CALC-SCNC: 7 MMOL/L (ref 0–18)
AST SERPL-CCNC: 26 U/L (ref 15–37)
BASOPHILS # BLD AUTO: 0.03 X10(3) UL (ref 0–0.2)
BASOPHILS NFR BLD AUTO: 0.5 %
BILIRUB SERPL-MCNC: 0.4 MG/DL (ref 0.1–2)
BUN BLD-MCNC: 23 MG/DL (ref 7–18)
BUN/CREAT SERPL: 21.1 (ref 10–20)
CALCIUM BLD-MCNC: 8.7 MG/DL (ref 8.5–10.1)
CHLORIDE SERPL-SCNC: 107 MMOL/L (ref 98–112)
CO2 SERPL-SCNC: 27 MMOL/L (ref 21–32)
CREAT BLD-MCNC: 1.09 MG/DL
DEPRECATED RDW RBC AUTO: 51.2 FL (ref 35.1–46.3)
EOSINOPHIL # BLD AUTO: 0.1 X10(3) UL (ref 0–0.7)
EOSINOPHIL NFR BLD AUTO: 1.6 %
ERYTHROCYTE [DISTWIDTH] IN BLOOD BY AUTOMATED COUNT: 14.3 % (ref 11–15)
GFR SERPLBLD BASED ON 1.73 SQ M-ARVRAT: 69 ML/MIN/1.73M2 (ref 60–?)
GLOBULIN PLAS-MCNC: 3 G/DL (ref 2.8–4.4)
GLUCOSE BLD-MCNC: 91 MG/DL (ref 70–99)
HCT VFR BLD AUTO: 41.4 %
HGB BLD-MCNC: 13.6 G/DL
IMM GRANULOCYTES # BLD AUTO: 0.04 X10(3) UL (ref 0–1)
IMM GRANULOCYTES NFR BLD: 0.7 %
LYMPHOCYTES # BLD AUTO: 0.99 X10(3) UL (ref 1–4)
LYMPHOCYTES NFR BLD AUTO: 16.1 %
MCH RBC QN AUTO: 31.9 PG (ref 26–34)
MCHC RBC AUTO-ENTMCNC: 32.9 G/DL (ref 31–37)
MCV RBC AUTO: 97 FL
MONOCYTES # BLD AUTO: 0.71 X10(3) UL (ref 0.1–1)
MONOCYTES NFR BLD AUTO: 11.6 %
NEUTROPHILS # BLD AUTO: 4.27 X10 (3) UL (ref 1.5–7.7)
NEUTROPHILS # BLD AUTO: 4.27 X10(3) UL (ref 1.5–7.7)
NEUTROPHILS NFR BLD AUTO: 69.5 %
OSMOLALITY SERPL CALC.SUM OF ELEC: 295 MOSM/KG (ref 275–295)
PLATELET # BLD AUTO: 208 10(3)UL (ref 150–450)
POTASSIUM SERPL-SCNC: 3.7 MMOL/L (ref 3.5–5.1)
PROT SERPL-MCNC: 6.4 G/DL (ref 6.4–8.2)
RBC # BLD AUTO: 4.27 X10(6)UL
SODIUM SERPL-SCNC: 141 MMOL/L (ref 136–145)
T4 FREE SERPL-MCNC: 1.1 NG/DL (ref 0.8–1.7)
TSI SER-ACNC: 4.16 MIU/ML (ref 0.36–3.74)
WBC # BLD AUTO: 6.1 X10(3) UL (ref 4–11)

## 2023-03-09 PROCEDURE — 99215 OFFICE O/P EST HI 40 MIN: CPT | Performed by: INTERNAL MEDICINE

## 2023-03-09 PROCEDURE — 80053 COMPREHEN METABOLIC PANEL: CPT

## 2023-03-09 PROCEDURE — 84439 ASSAY OF FREE THYROXINE: CPT

## 2023-03-09 PROCEDURE — 85025 COMPLETE CBC W/AUTO DIFF WBC: CPT

## 2023-03-09 PROCEDURE — 84443 ASSAY THYROID STIM HORMONE: CPT

## 2023-03-09 PROCEDURE — 96413 CHEMO IV INFUSION 1 HR: CPT

## 2023-03-09 RX ORDER — HEPARIN SODIUM (PORCINE) LOCK FLUSH IV SOLN 100 UNIT/ML 100 UNIT/ML
SOLUTION INTRAVENOUS
Status: COMPLETED
Start: 2023-03-09 | End: 2023-03-09

## 2023-03-09 RX ORDER — HEPARIN SODIUM (PORCINE) LOCK FLUSH IV SOLN 100 UNIT/ML 100 UNIT/ML
5 SOLUTION INTRAVENOUS ONCE
OUTPATIENT
Start: 2023-03-09

## 2023-03-09 RX ORDER — SODIUM CHLORIDE 9 MG/ML
10 INJECTION INTRAVENOUS ONCE
OUTPATIENT
Start: 2023-03-09

## 2023-03-09 RX ORDER — HEPARIN SODIUM (PORCINE) LOCK FLUSH IV SOLN 100 UNIT/ML 100 UNIT/ML
5 SOLUTION INTRAVENOUS ONCE
Status: COMPLETED | OUTPATIENT
Start: 2023-03-09 | End: 2023-03-09

## 2023-03-09 RX ADMIN — HEPARIN SODIUM (PORCINE) LOCK FLUSH IV SOLN 100 UNIT/ML 500 UNITS: 100 SOLUTION INTRAVENOUS at 10:25:00

## 2023-03-09 NOTE — PROGRESS NOTES
Agree with note per APN Guy Lanes recurrent adenocarcinoma of the lung on pembrolizumab now with relatively good disease control also history of pneumonitis and Aspergillus pneumonia he is back on voriconazole with pulm. We will proceed with next dose of pembrolizumab is continuing 10 mg daily of prednisone with pneumocystis prophylaxis also.

## 2023-03-09 NOTE — TELEPHONE ENCOUNTER
Edilson Bee states patient feels pressure setting for CPap is powerful and requesting a decrease. Please call. Thank you.

## 2023-03-09 NOTE — PROGRESS NOTES
Pt here for [address] [facility]. Arrives Ambulating independently, accompanied by Spouse           Pregnancy screening: Not applicable    Modifications in dose or schedule: No    Drugs/infusions dual verified for appearance and physical integrity.  IV pump settings were dual verified: yes     Frequency of blood return and site check throughout administration: Prior to administration and At completion of therapy   Discharged to Home, Ambulating independently, accompanied by:Spouse    Outpatient Oncology Care Plan  Problem list:  fatigue  Problems related to:  chemotherapy  side effect of treatment  Interventions:  promoted rest  provided general teaching  Expected outcomes:  adequate sleep/rest  understands plan of care  verbalize how to care for self  Progress towards outcome:  making progress    Education Record    Learner:  Patient  Barriers / Limitations:  None  Method:  Discussion  Outcome:  Shows understanding  Comments:

## 2023-03-10 NOTE — TELEPHONE ENCOUNTER
Dr. Radha Rosado - Patient feels pressure is too strong, requesting pressure decrease. Please see download data below.

## 2023-03-10 NOTE — TELEPHONE ENCOUNTER
CPAP pressure decrease order faxed to [facility] at [phone]. Fax confirmation received. Spoke with patient's wife, Frank Ramirezw, informed her of Dr. Ronni Dangelo order. Wife verbalized understanding.

## 2023-03-18 ENCOUNTER — APPOINTMENT (OUTPATIENT)
Dept: MRI IMAGING | Facility: HOSPITAL | Age: 78
End: 2023-03-18
Attending: EMERGENCY MEDICINE
Payer: MEDICARE

## 2023-03-18 ENCOUNTER — HOSPITAL ENCOUNTER (EMERGENCY)
Facility: HOSPITAL | Age: 78
Discharge: HOME | End: 2023-03-18
Attending: EMERGENCY MEDICINE
Payer: MEDICARE

## 2023-03-18 VITALS
BODY MASS INDEX: 27.2 KG/M2 | DIASTOLIC BLOOD PRESSURE: 53 MMHG | TEMPERATURE: 98 F | SYSTOLIC BLOOD PRESSURE: 126 MMHG | OXYGEN SATURATION: 93 % | RESPIRATION RATE: 20 BRPM | HEART RATE: 59 BPM | HEIGHT: 70 IN | WEIGHT: 190 LBS

## 2023-03-18 DIAGNOSIS — R42 VERTIGO: Primary | ICD-10-CM

## 2023-03-18 LAB
ANION GAP SERPL CALC-SCNC: 6 MMOL/L (ref 0–18)
ATRIAL RATE: 77 BPM
BASOPHILS # BLD AUTO: 0.02 X10(3) UL (ref 0–0.2)
BASOPHILS NFR BLD AUTO: 0.3 %
BUN BLD-MCNC: 24 MG/DL (ref 7–18)
BUN/CREAT SERPL: 21.8 (ref 10–20)
CALCIUM BLD-MCNC: 8.8 MG/DL (ref 8.5–10.1)
CHLORIDE SERPL-SCNC: 105 MMOL/L (ref 98–112)
CO2 SERPL-SCNC: 28 MMOL/L (ref 21–32)
CREAT BLD-MCNC: 1.1 MG/DL
DEPRECATED RDW RBC AUTO: 50.5 FL (ref 35.1–46.3)
EOSINOPHIL # BLD AUTO: 0.09 X10(3) UL (ref 0–0.7)
EOSINOPHIL NFR BLD AUTO: 1.3 %
ERYTHROCYTE [DISTWIDTH] IN BLOOD BY AUTOMATED COUNT: 14.3 % (ref 11–15)
GFR SERPLBLD BASED ON 1.73 SQ M-ARVRAT: 68 ML/MIN/1.73M2 (ref 60–?)
GLUCOSE BLD-MCNC: 112 MG/DL (ref 70–99)
HCT VFR BLD AUTO: 41.8 %
HGB BLD-MCNC: 13.6 G/DL
IMM GRANULOCYTES # BLD AUTO: 0.04 X10(3) UL (ref 0–1)
IMM GRANULOCYTES NFR BLD: 0.6 %
LYMPHOCYTES # BLD AUTO: 0.56 X10(3) UL (ref 1–4)
LYMPHOCYTES NFR BLD AUTO: 8 %
MCH RBC QN AUTO: 31.5 PG (ref 26–34)
MCHC RBC AUTO-ENTMCNC: 32.5 G/DL (ref 31–37)
MCV RBC AUTO: 96.8 FL
MONOCYTES # BLD AUTO: 0.63 X10(3) UL (ref 0.1–1)
MONOCYTES NFR BLD AUTO: 9.1 %
NEUTROPHILS # BLD AUTO: 5.62 X10 (3) UL (ref 1.5–7.7)
NEUTROPHILS # BLD AUTO: 5.62 X10(3) UL (ref 1.5–7.7)
NEUTROPHILS NFR BLD AUTO: 80.7 %
OSMOLALITY SERPL CALC.SUM OF ELEC: 293 MOSM/KG (ref 275–295)
P AXIS: 90 DEGREES
P-R INTERVAL: 146 MS
PLATELET # BLD AUTO: 188 10(3)UL (ref 150–450)
POTASSIUM SERPL-SCNC: 3.8 MMOL/L (ref 3.5–5.1)
Q-T INTERVAL: 380 MS
QRS DURATION: 84 MS
QTC CALCULATION (BEZET): 430 MS
R AXIS: 63 DEGREES
RBC # BLD AUTO: 4.32 X10(6)UL
SODIUM SERPL-SCNC: 139 MMOL/L (ref 136–145)
T AXIS: 73 DEGREES
VENTRICULAR RATE: 77 BPM
WBC # BLD AUTO: 7 X10(3) UL (ref 4–11)

## 2023-03-18 PROCEDURE — 70551 MRI BRAIN STEM W/O DYE: CPT | Performed by: EMERGENCY MEDICINE

## 2023-03-18 PROCEDURE — 99285 EMERGENCY DEPT VISIT HI MDM: CPT

## 2023-03-18 PROCEDURE — 85025 COMPLETE CBC W/AUTO DIFF WBC: CPT | Performed by: EMERGENCY MEDICINE

## 2023-03-18 PROCEDURE — 93005 ELECTROCARDIOGRAM TRACING: CPT

## 2023-03-18 PROCEDURE — 96374 THER/PROPH/DIAG INJ IV PUSH: CPT

## 2023-03-18 PROCEDURE — 80048 BASIC METABOLIC PNL TOTAL CA: CPT | Performed by: EMERGENCY MEDICINE

## 2023-03-18 PROCEDURE — 93010 ELECTROCARDIOGRAM REPORT: CPT

## 2023-03-18 RX ORDER — ONDANSETRON 2 MG/ML
4 INJECTION INTRAMUSCULAR; INTRAVENOUS ONCE
Status: COMPLETED | OUTPATIENT
Start: 2023-03-18 | End: 2023-03-18

## 2023-03-18 RX ORDER — HEPARIN SODIUM (PORCINE) LOCK FLUSH IV SOLN 100 UNIT/ML 100 UNIT/ML
5 SOLUTION INTRAVENOUS ONCE
Status: COMPLETED | OUTPATIENT
Start: 2023-03-18 | End: 2023-03-18

## 2023-03-18 RX ORDER — MECLIZINE HYDROCHLORIDE 25 MG/1
25 TABLET ORAL ONCE
Status: COMPLETED | OUTPATIENT
Start: 2023-03-18 | End: 2023-03-18

## 2023-03-18 RX ORDER — ONDANSETRON 4 MG/1
4 TABLET, ORALLY DISINTEGRATING ORAL EVERY 4 HOURS PRN
Qty: 10 TABLET | Refills: 0 | Status: SHIPPED | OUTPATIENT
Start: 2023-03-18 | End: 2023-03-25

## 2023-03-18 RX ORDER — MECLIZINE HYDROCHLORIDE 25 MG/1
25 TABLET ORAL 3 TIMES DAILY PRN
Qty: 10 TABLET | Refills: 0 | Status: SHIPPED | OUTPATIENT
Start: 2023-03-18

## 2023-03-18 NOTE — ED QUICK NOTES
Patient's Port access discontinued, pt tolerated well and covered with gauze and paper tape. Patient states his dizziness has improved and denies need for wheelchair. Pt A&OX4, pt denies dizziness/lightheadedness, cp, sob, n/v. Discharge paperwork, prescriptions, and follow-up discussed with pt, pt verbally understands them. Pt discharged ambulatory with steady gait.

## 2023-03-18 NOTE — ED INITIAL ASSESSMENT (HPI)
Pt. Reports dizziness starting this AM that almost made him fall over as well as redness in his R eye. Reports hx of Blood clots in his lungs in January 2023 and is taking blood thinner.

## 2023-03-20 ENCOUNTER — TELEPHONE (OUTPATIENT)
Dept: PULMONOLOGY | Facility: CLINIC | Age: 78
End: 2023-03-20

## 2023-03-20 NOTE — TELEPHONE ENCOUNTER
Patients wife wants Dr Nenita Hung to know that the patient was seen at the [facility] for Vertigo. Wife wants to know if the patient should follow up with Dr Nenita Hung or should the patient see Dr Zofia Cope?

## 2023-03-21 NOTE — TELEPHONE ENCOUNTER
Spoke with patient's wife Mayur Simmons, informed her of Dr. Shelli Whitman message. Patient's wife verbalized understanding.

## 2023-03-21 NOTE — TELEPHONE ENCOUNTER
Ashok Horton MD  You 16 hours ago (5:23 PM)     As I am out this week, have him follow-up with Dr. Manuela Marshall.

## 2023-03-31 ENCOUNTER — NURSE ONLY (OUTPATIENT)
Dept: HEMATOLOGY/ONCOLOGY | Facility: HOSPITAL | Age: 78
End: 2023-03-31
Attending: INTERNAL MEDICINE
Payer: MEDICARE

## 2023-03-31 VITALS
SYSTOLIC BLOOD PRESSURE: 133 MMHG | OXYGEN SATURATION: 97 % | HEART RATE: 63 BPM | RESPIRATION RATE: 18 BRPM | WEIGHT: 189 LBS | BODY MASS INDEX: 27.06 KG/M2 | TEMPERATURE: 98 F | HEIGHT: 70 IN | DIASTOLIC BLOOD PRESSURE: 64 MMHG

## 2023-03-31 DIAGNOSIS — C34.92 MALIGNANT NEOPLASM OF LEFT LUNG, UNSPECIFIED PART OF LUNG (HCC): ICD-10-CM

## 2023-03-31 DIAGNOSIS — Z45.2 ENCOUNTER FOR CARE RELATED TO PORT-A-CATH: ICD-10-CM

## 2023-03-31 DIAGNOSIS — C34.12 CANCER OF UPPER LOBE OF LEFT LUNG (HCC): ICD-10-CM

## 2023-03-31 DIAGNOSIS — Z51.12 ENCOUNTER FOR ANTINEOPLASTIC IMMUNOTHERAPY: Primary | ICD-10-CM

## 2023-03-31 DIAGNOSIS — D50.9 IRON DEFICIENCY ANEMIA, UNSPECIFIED IRON DEFICIENCY ANEMIA TYPE: ICD-10-CM

## 2023-03-31 DIAGNOSIS — B44.9 ASPERGILLUS PNEUMONIA (HCC): ICD-10-CM

## 2023-03-31 DIAGNOSIS — I26.99 PULMONARY EMBOLISM, OTHER, UNSPECIFIED CHRONICITY, UNSPECIFIED WHETHER ACUTE COR PULMONALE PRESENT (HCC): ICD-10-CM

## 2023-03-31 DIAGNOSIS — J18.9 PNEUMONITIS: ICD-10-CM

## 2023-03-31 LAB
ALBUMIN SERPL-MCNC: 3.3 G/DL (ref 3.4–5)
ALBUMIN/GLOB SERPL: 1.1 {RATIO} (ref 1–2)
ALP LIVER SERPL-CCNC: 117 U/L
ALT SERPL-CCNC: 76 U/L
ANION GAP SERPL CALC-SCNC: 5 MMOL/L (ref 0–18)
AST SERPL-CCNC: 29 U/L (ref 15–37)
BASOPHILS # BLD AUTO: 0.04 X10(3) UL (ref 0–0.2)
BASOPHILS NFR BLD AUTO: 0.6 %
BILIRUB SERPL-MCNC: 0.3 MG/DL (ref 0.1–2)
BUN BLD-MCNC: 27 MG/DL (ref 7–18)
BUN/CREAT SERPL: 27.6 (ref 10–20)
CALCIUM BLD-MCNC: 8.6 MG/DL (ref 8.5–10.1)
CHLORIDE SERPL-SCNC: 111 MMOL/L (ref 98–112)
CO2 SERPL-SCNC: 27 MMOL/L (ref 21–32)
CREAT BLD-MCNC: 0.98 MG/DL
DEPRECATED RDW RBC AUTO: 52.2 FL (ref 35.1–46.3)
EOSINOPHIL # BLD AUTO: 0.09 X10(3) UL (ref 0–0.7)
EOSINOPHIL NFR BLD AUTO: 1.4 %
ERYTHROCYTE [DISTWIDTH] IN BLOOD BY AUTOMATED COUNT: 14.6 % (ref 11–15)
GFR SERPLBLD BASED ON 1.73 SQ M-ARVRAT: 78 ML/MIN/1.73M2 (ref 60–?)
GLOBULIN PLAS-MCNC: 2.9 G/DL (ref 2.8–4.4)
GLUCOSE BLD-MCNC: 90 MG/DL (ref 70–99)
HCT VFR BLD AUTO: 40.1 %
HGB BLD-MCNC: 13.2 G/DL
IMM GRANULOCYTES # BLD AUTO: 0.04 X10(3) UL (ref 0–1)
IMM GRANULOCYTES NFR BLD: 0.6 %
LYMPHOCYTES # BLD AUTO: 0.57 X10(3) UL (ref 1–4)
LYMPHOCYTES NFR BLD AUTO: 8.9 %
MCH RBC QN AUTO: 31.9 PG (ref 26–34)
MCHC RBC AUTO-ENTMCNC: 32.9 G/DL (ref 31–37)
MCV RBC AUTO: 96.9 FL
MONOCYTES # BLD AUTO: 0.6 X10(3) UL (ref 0.1–1)
MONOCYTES NFR BLD AUTO: 9.3 %
NEUTROPHILS # BLD AUTO: 5.09 X10 (3) UL (ref 1.5–7.7)
NEUTROPHILS # BLD AUTO: 5.09 X10(3) UL (ref 1.5–7.7)
NEUTROPHILS NFR BLD AUTO: 79.2 %
OSMOLALITY SERPL CALC.SUM OF ELEC: 301 MOSM/KG (ref 275–295)
PLATELET # BLD AUTO: 184 10(3)UL (ref 150–450)
POTASSIUM SERPL-SCNC: 4 MMOL/L (ref 3.5–5.1)
PROT SERPL-MCNC: 6.2 G/DL (ref 6.4–8.2)
RBC # BLD AUTO: 4.14 X10(6)UL
SODIUM SERPL-SCNC: 143 MMOL/L (ref 136–145)
TSI SER-ACNC: 3.54 MIU/ML (ref 0.36–3.74)
WBC # BLD AUTO: 6.4 X10(3) UL (ref 4–11)

## 2023-03-31 PROCEDURE — 80053 COMPREHEN METABOLIC PANEL: CPT

## 2023-03-31 PROCEDURE — 99215 OFFICE O/P EST HI 40 MIN: CPT | Performed by: INTERNAL MEDICINE

## 2023-03-31 PROCEDURE — 96413 CHEMO IV INFUSION 1 HR: CPT

## 2023-03-31 PROCEDURE — 84443 ASSAY THYROID STIM HORMONE: CPT

## 2023-03-31 PROCEDURE — 85025 COMPLETE CBC W/AUTO DIFF WBC: CPT

## 2023-03-31 RX ORDER — SODIUM CHLORIDE 9 MG/ML
10 INJECTION INTRAVENOUS ONCE
OUTPATIENT
Start: 2023-03-31

## 2023-03-31 RX ORDER — HEPARIN SODIUM (PORCINE) LOCK FLUSH IV SOLN 100 UNIT/ML 100 UNIT/ML
5 SOLUTION INTRAVENOUS ONCE
OUTPATIENT
Start: 2023-03-31

## 2023-03-31 RX ORDER — HEPARIN SODIUM (PORCINE) LOCK FLUSH IV SOLN 100 UNIT/ML 100 UNIT/ML
SOLUTION INTRAVENOUS
Status: COMPLETED
Start: 2023-03-31 | End: 2023-03-31

## 2023-03-31 RX ORDER — HEPARIN SODIUM (PORCINE) LOCK FLUSH IV SOLN 100 UNIT/ML 100 UNIT/ML
5 SOLUTION INTRAVENOUS ONCE
Status: COMPLETED | OUTPATIENT
Start: 2023-03-31 | End: 2023-03-31

## 2023-03-31 RX ADMIN — HEPARIN SODIUM (PORCINE) LOCK FLUSH IV SOLN 100 UNIT/ML 500 UNITS: 100 SOLUTION INTRAVENOUS at 12:00:00

## 2023-03-31 NOTE — PROGRESS NOTES
Pt here for Margarita Guevara [address]. Arrives Ambulating independently, accompanied by Spouse           Pregnancy screening: Not applicable    Modifications in dose or schedule: No    Drugs/infusions dual verified for appearance and physical integrity.  IV pump settings were dual verified: yes     Frequency of blood return and site check throughout administration: Prior to administration and At completion of therapy   Discharged to Home, Ambulating independently, accompanied by:Spouse    Outpatient Oncology Care Plan  Problem list:  fatigue  Problems related to:  chemotherapy  side effect of treatment  Interventions:  promoted rest  provided general teaching  Expected outcomes:  adequate sleep/rest  understands plan of care  verbalize how to care for self  Progress towards outcome:  making progress    Education Record    Learner:  Patient  Barriers / Limitations:  None  Method:  Discussion  Outcome:  Shows understanding  Comments:

## 2023-03-31 NOTE — PROGRESS NOTES
Agree with note per APN Minus Hum recurrent adenocarcinoma of the lung on pembrolizumab now with relatively good disease control also history of pneumonitis and Aspergillus pneumonia he is back on voriconazole with pulm. We will proceed with next dose of pembrolizumab is continuing 10 mg daily of prednisone with pneumocystis prophylaxis also.

## 2023-04-21 ENCOUNTER — OFFICE VISIT (OUTPATIENT)
Dept: HEMATOLOGY/ONCOLOGY | Facility: HOSPITAL | Age: 78
End: 2023-04-21
Attending: INTERNAL MEDICINE
Payer: MEDICARE

## 2023-04-21 VITALS
HEIGHT: 70 IN | SYSTOLIC BLOOD PRESSURE: 124 MMHG | OXYGEN SATURATION: 97 % | BODY MASS INDEX: 26.77 KG/M2 | DIASTOLIC BLOOD PRESSURE: 51 MMHG | RESPIRATION RATE: 18 BRPM | TEMPERATURE: 98 F | HEART RATE: 59 BPM | WEIGHT: 187 LBS

## 2023-04-21 DIAGNOSIS — C34.92 MALIGNANT NEOPLASM OF LEFT LUNG, UNSPECIFIED PART OF LUNG (HCC): ICD-10-CM

## 2023-04-21 DIAGNOSIS — Z45.2 ENCOUNTER FOR CARE RELATED TO PORT-A-CATH: ICD-10-CM

## 2023-04-21 DIAGNOSIS — Z51.12 ENCOUNTER FOR ANTINEOPLASTIC IMMUNOTHERAPY: ICD-10-CM

## 2023-04-21 DIAGNOSIS — Z51.12 ENCOUNTER FOR ANTINEOPLASTIC IMMUNOTHERAPY: Primary | ICD-10-CM

## 2023-04-21 DIAGNOSIS — B44.9 ASPERGILLUS PNEUMONIA (HCC): ICD-10-CM

## 2023-04-21 DIAGNOSIS — C34.12 CANCER OF UPPER LOBE OF LEFT LUNG (HCC): ICD-10-CM

## 2023-04-21 DIAGNOSIS — C34.92 MALIGNANT NEOPLASM OF LEFT LUNG, UNSPECIFIED PART OF LUNG (HCC): Primary | ICD-10-CM

## 2023-04-21 DIAGNOSIS — Z92.89 HISTORY OF IMMUNOTHERAPY: ICD-10-CM

## 2023-04-21 DIAGNOSIS — I26.99 PULMONARY EMBOLISM, OTHER, UNSPECIFIED CHRONICITY, UNSPECIFIED WHETHER ACUTE COR PULMONALE PRESENT (HCC): ICD-10-CM

## 2023-04-21 DIAGNOSIS — D50.9 IRON DEFICIENCY ANEMIA, UNSPECIFIED IRON DEFICIENCY ANEMIA TYPE: Primary | ICD-10-CM

## 2023-04-21 DIAGNOSIS — J18.9 PNEUMONITIS: ICD-10-CM

## 2023-04-21 LAB
ALBUMIN SERPL-MCNC: 3.2 G/DL (ref 3.4–5)
ALBUMIN/GLOB SERPL: 1.1 {RATIO} (ref 1–2)
ALP LIVER SERPL-CCNC: 165 U/L
ALT SERPL-CCNC: 228 U/L
ANION GAP SERPL CALC-SCNC: 7 MMOL/L (ref 0–18)
AST SERPL-CCNC: 82 U/L (ref 15–37)
BASOPHILS # BLD AUTO: 0.02 X10(3) UL (ref 0–0.2)
BASOPHILS NFR BLD AUTO: 0.2 %
BILIRUB SERPL-MCNC: 0.4 MG/DL (ref 0.1–2)
BUN BLD-MCNC: 26 MG/DL (ref 7–18)
BUN/CREAT SERPL: 26.5 (ref 10–20)
CALCIUM BLD-MCNC: 8.6 MG/DL (ref 8.5–10.1)
CHLORIDE SERPL-SCNC: 108 MMOL/L (ref 98–112)
CO2 SERPL-SCNC: 26 MMOL/L (ref 21–32)
CREAT BLD-MCNC: 0.98 MG/DL
DEPRECATED RDW RBC AUTO: 51.1 FL (ref 35.1–46.3)
EOSINOPHIL # BLD AUTO: 0.03 X10(3) UL (ref 0–0.7)
EOSINOPHIL NFR BLD AUTO: 0.3 %
ERYTHROCYTE [DISTWIDTH] IN BLOOD BY AUTOMATED COUNT: 14.6 % (ref 11–15)
GFR SERPLBLD BASED ON 1.73 SQ M-ARVRAT: 78 ML/MIN/1.73M2 (ref 60–?)
GLOBULIN PLAS-MCNC: 3 G/DL (ref 2.8–4.4)
GLUCOSE BLD-MCNC: 129 MG/DL (ref 70–99)
HCT VFR BLD AUTO: 43 %
HGB BLD-MCNC: 14.1 G/DL
IMM GRANULOCYTES # BLD AUTO: 0.05 X10(3) UL (ref 0–1)
IMM GRANULOCYTES NFR BLD: 0.5 %
LYMPHOCYTES # BLD AUTO: 0.51 X10(3) UL (ref 1–4)
LYMPHOCYTES NFR BLD AUTO: 5.1 %
MCH RBC QN AUTO: 31.5 PG (ref 26–34)
MCHC RBC AUTO-ENTMCNC: 32.8 G/DL (ref 31–37)
MCV RBC AUTO: 96 FL
MONOCYTES # BLD AUTO: 0.44 X10(3) UL (ref 0.1–1)
MONOCYTES NFR BLD AUTO: 4.4 %
NEUTROPHILS # BLD AUTO: 9.04 X10 (3) UL (ref 1.5–7.7)
NEUTROPHILS # BLD AUTO: 9.04 X10(3) UL (ref 1.5–7.7)
NEUTROPHILS NFR BLD AUTO: 89.5 %
OSMOLALITY SERPL CALC.SUM OF ELEC: 298 MOSM/KG (ref 275–295)
PLATELET # BLD AUTO: 201 10(3)UL (ref 150–450)
POTASSIUM SERPL-SCNC: 4.1 MMOL/L (ref 3.5–5.1)
PROT SERPL-MCNC: 6.2 G/DL (ref 6.4–8.2)
RBC # BLD AUTO: 4.48 X10(6)UL
SODIUM SERPL-SCNC: 141 MMOL/L (ref 136–145)
TSI SER-ACNC: 1.8 MIU/ML (ref 0.36–3.74)
WBC # BLD AUTO: 10.1 X10(3) UL (ref 4–11)

## 2023-04-21 PROCEDURE — 36591 DRAW BLOOD OFF VENOUS DEVICE: CPT

## 2023-04-21 PROCEDURE — 84443 ASSAY THYROID STIM HORMONE: CPT

## 2023-04-21 PROCEDURE — 85025 COMPLETE CBC W/AUTO DIFF WBC: CPT

## 2023-04-21 PROCEDURE — 80053 COMPREHEN METABOLIC PANEL: CPT

## 2023-04-21 PROCEDURE — 99215 OFFICE O/P EST HI 40 MIN: CPT | Performed by: NURSE PRACTITIONER

## 2023-04-21 RX ORDER — HEPARIN SODIUM (PORCINE) LOCK FLUSH IV SOLN 100 UNIT/ML 100 UNIT/ML
5 SOLUTION INTRAVENOUS ONCE
OUTPATIENT
Start: 2023-04-21

## 2023-04-21 RX ORDER — HEPARIN SODIUM (PORCINE) LOCK FLUSH IV SOLN 100 UNIT/ML 100 UNIT/ML
SOLUTION INTRAVENOUS
Status: DISPENSED
Start: 2023-04-21 | End: 2023-04-22

## 2023-04-21 RX ORDER — HEPARIN SODIUM (PORCINE) LOCK FLUSH IV SOLN 100 UNIT/ML 100 UNIT/ML
5 SOLUTION INTRAVENOUS ONCE
Status: COMPLETED | OUTPATIENT
Start: 2023-04-21 | End: 2023-04-21

## 2023-04-21 RX ORDER — SODIUM CHLORIDE 9 MG/ML
10 INJECTION INTRAVENOUS ONCE
Status: DISCONTINUED | OUTPATIENT
Start: 2023-04-21 | End: 2023-04-21

## 2023-04-21 RX ORDER — PREDNISONE 10 MG/1
TABLET ORAL
Qty: 60 TABLET | Refills: 0 | Status: SHIPPED | OUTPATIENT
Start: 2023-04-21 | End: 2023-05-21

## 2023-04-21 RX ORDER — SODIUM CHLORIDE 9 MG/ML
10 INJECTION INTRAVENOUS ONCE
OUTPATIENT
Start: 2023-04-21

## 2023-04-21 RX ADMIN — HEPARIN SODIUM (PORCINE) LOCK FLUSH IV SOLN 100 UNIT/ML 500 UNITS: 100 SOLUTION INTRAVENOUS at 13:33:00

## 2023-04-21 NOTE — PATIENT INSTRUCTIONS
Hold treatment today, return in 3 weeks with CT imaging review and next dosing    HOLD d/t high liver enzymes, likely immunotherapy associated.     Start higher dose of prednisone and taper every 5 days as prescribed    Continue pantoprazole in AM to avoid increased gastric secretion discomfort from high steroids    Reassess labs for response at next visit

## 2023-04-25 ENCOUNTER — OFFICE VISIT (OUTPATIENT)
Dept: OTOLARYNGOLOGY | Facility: CLINIC | Age: 78
End: 2023-04-25

## 2023-04-25 VITALS — WEIGHT: 187 LBS | BODY MASS INDEX: 27 KG/M2

## 2023-04-25 DIAGNOSIS — H81.10 BENIGN PAROXYSMAL POSITIONAL VERTIGO, UNSPECIFIED LATERALITY: Primary | ICD-10-CM

## 2023-04-25 PROCEDURE — 99213 OFFICE O/P EST LOW 20 MIN: CPT | Performed by: OTOLARYNGOLOGY

## 2023-05-03 ENCOUNTER — TELEPHONE (OUTPATIENT)
Dept: HEMATOLOGY/ONCOLOGY | Facility: HOSPITAL | Age: 78
End: 2023-05-03

## 2023-05-03 NOTE — TELEPHONE ENCOUNTER
Dina is calling regarding the authorization on his appointment 5/12/23. His authorization expires 5/11/23. Please call back to discuss.  Reference#[id number]  Authorization#[id number] 1/26/23-5/11/23

## 2023-05-04 ENCOUNTER — NURSE ONLY (OUTPATIENT)
Dept: HEMATOLOGY/ONCOLOGY | Facility: HOSPITAL | Age: 78
End: 2023-05-04
Attending: INTERNAL MEDICINE
Payer: MEDICARE

## 2023-05-04 ENCOUNTER — HOSPITAL ENCOUNTER (OUTPATIENT)
Dept: CT IMAGING | Facility: HOSPITAL | Age: 78
Discharge: HOME | End: 2023-05-04
Attending: NURSE PRACTITIONER
Payer: MEDICARE

## 2023-05-04 DIAGNOSIS — Z45.2 ENCOUNTER FOR CARE RELATED TO PORT-A-CATH: ICD-10-CM

## 2023-05-04 DIAGNOSIS — I26.99 PULMONARY EMBOLISM, OTHER, UNSPECIFIED CHRONICITY, UNSPECIFIED WHETHER ACUTE COR PULMONALE PRESENT (HCC): ICD-10-CM

## 2023-05-04 DIAGNOSIS — Z51.12 ENCOUNTER FOR ANTINEOPLASTIC IMMUNOTHERAPY: ICD-10-CM

## 2023-05-04 DIAGNOSIS — C34.12 CANCER OF UPPER LOBE OF LEFT LUNG (HCC): ICD-10-CM

## 2023-05-04 DIAGNOSIS — J18.9 PNEUMONITIS: ICD-10-CM

## 2023-05-04 DIAGNOSIS — B44.9 ASPERGILLUS PNEUMONIA (HCC): ICD-10-CM

## 2023-05-04 DIAGNOSIS — D50.9 IRON DEFICIENCY ANEMIA, UNSPECIFIED IRON DEFICIENCY ANEMIA TYPE: Primary | ICD-10-CM

## 2023-05-04 DIAGNOSIS — C34.92 MALIGNANT NEOPLASM OF LEFT LUNG, UNSPECIFIED PART OF LUNG (HCC): ICD-10-CM

## 2023-05-04 PROCEDURE — 71260 CT THORAX DX C+: CPT | Performed by: NURSE PRACTITIONER

## 2023-05-04 PROCEDURE — 96523 IRRIG DRUG DELIVERY DEVICE: CPT

## 2023-05-04 PROCEDURE — 74177 CT ABD & PELVIS W/CONTRAST: CPT | Performed by: NURSE PRACTITIONER

## 2023-05-04 RX ORDER — HEPARIN SODIUM (PORCINE) LOCK FLUSH IV SOLN 100 UNIT/ML 100 UNIT/ML
5 SOLUTION INTRAVENOUS ONCE
Status: DISCONTINUED | OUTPATIENT
Start: 2023-05-04 | End: 2023-05-04

## 2023-05-04 RX ORDER — SODIUM CHLORIDE 9 MG/ML
10 INJECTION INTRAVENOUS ONCE
OUTPATIENT
Start: 2023-05-04

## 2023-05-04 RX ORDER — HEPARIN SODIUM (PORCINE) LOCK FLUSH IV SOLN 100 UNIT/ML 100 UNIT/ML
SOLUTION INTRAVENOUS
Status: DISPENSED
Start: 2023-05-04 | End: 2023-05-04

## 2023-05-04 RX ORDER — HEPARIN SODIUM (PORCINE) LOCK FLUSH IV SOLN 100 UNIT/ML 100 UNIT/ML
5 SOLUTION INTRAVENOUS ONCE
OUTPATIENT
Start: 2023-05-04

## 2023-05-04 RX ORDER — HEPARIN SODIUM (PORCINE) LOCK FLUSH IV SOLN 100 UNIT/ML 100 UNIT/ML
500 SOLUTION INTRAVENOUS ONCE
Status: DISCONTINUED | OUTPATIENT
Start: 2023-05-04 | End: 2023-05-06

## 2023-05-08 ENCOUNTER — OFFICE VISIT (OUTPATIENT)
Dept: PULMONOLOGY | Facility: CLINIC | Age: 78
End: 2023-05-08
Payer: MEDICARE

## 2023-05-08 VITALS
HEIGHT: 70 IN | DIASTOLIC BLOOD PRESSURE: 63 MMHG | RESPIRATION RATE: 18 BRPM | SYSTOLIC BLOOD PRESSURE: 111 MMHG | BODY MASS INDEX: 26.37 KG/M2 | HEART RATE: 65 BPM | WEIGHT: 184.19 LBS | OXYGEN SATURATION: 96 %

## 2023-05-08 DIAGNOSIS — J18.9 PNEUMONIA DUE TO INFECTIOUS ORGANISM, UNSPECIFIED LATERALITY, UNSPECIFIED PART OF LUNG: Primary | ICD-10-CM

## 2023-05-08 NOTE — PROGRESS NOTES
The patient is an 38-year-old male who I know well from prior evaluation who comes in now for follow-up. He has modest transaminitis associated with immune therapy which is on hold temporarily. His CT scan of the chest looked good with improved infiltrates suggesting that the aspergillosis is improved. He remains on Xarelto and has some bruising. The patient has completed his Medicare AHA AWV questionnaire without significant red flags. His vision is acceptable with glasses. He can hear a whisper and his recall of 3 items is perfect. Review of Systems:  Vision normal. Ear nose and throat normal. Bowel normal. Bladder function normal. No depression. No thyroid disease. No lymphatic system concerns. No rash. Muscles and joints unremarkable. No weight loss no weight gain. Physical Examination:  Vital signs normal. HEENT examination is unremarkable with pupils equal round and reactive to light and accommodation. Neck without adenopathy, thyromegaly, JVD nor bruit. Lungs clear to auscultation and percussion. Cardiac regular rate and rhythm no murmur. Abdomen nontender, without hepatosplenomegaly and no mass appreciable. Extremities and Musculoskeletal without clubbing cyanosis nor edema, and mobility acceptable. Neurologic grossly intact with symmetric tone and strength and reflex. Assessment and plan:  1. Venous thromboembolism-doing well clinically. To remain on lifelong Xarelto. 2.  General health- the patient should never drink and drive, always wear a safety belt, have a smoke detector and fire extiguisher in the house, avoid the use of candles in the home as they are the most common cause of housefire, and see me annually for complete examination. 3.  COPD-continue current management  4. Obstructive sleep apnea-vigilance with CPAP every night all night  5. 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 Lung cancer-to follow-up with oncology.

## 2023-05-12 ENCOUNTER — OFFICE VISIT (OUTPATIENT)
Dept: HEMATOLOGY/ONCOLOGY | Facility: HOSPITAL | Age: 78
End: 2023-05-12
Attending: NURSE PRACTITIONER
Payer: MEDICARE

## 2023-05-12 ENCOUNTER — NURSE ONLY (OUTPATIENT)
Dept: HEMATOLOGY/ONCOLOGY | Facility: HOSPITAL | Age: 78
End: 2023-05-12
Attending: INTERNAL MEDICINE
Payer: MEDICARE

## 2023-05-12 VITALS
DIASTOLIC BLOOD PRESSURE: 68 MMHG | SYSTOLIC BLOOD PRESSURE: 135 MMHG | TEMPERATURE: 98 F | HEART RATE: 63 BPM | BODY MASS INDEX: 26.63 KG/M2 | WEIGHT: 186 LBS | OXYGEN SATURATION: 97 % | HEIGHT: 70 IN | RESPIRATION RATE: 18 BRPM

## 2023-05-12 DIAGNOSIS — C34.12 CANCER OF UPPER LOBE OF LEFT LUNG (HCC): ICD-10-CM

## 2023-05-12 DIAGNOSIS — D50.9 IRON DEFICIENCY ANEMIA, UNSPECIFIED IRON DEFICIENCY ANEMIA TYPE: ICD-10-CM

## 2023-05-12 DIAGNOSIS — J18.9 PNEUMONITIS: ICD-10-CM

## 2023-05-12 DIAGNOSIS — C34.92 MALIGNANT NEOPLASM OF LEFT LUNG, UNSPECIFIED PART OF LUNG (HCC): ICD-10-CM

## 2023-05-12 DIAGNOSIS — Z51.12 ENCOUNTER FOR ANTINEOPLASTIC IMMUNOTHERAPY: Primary | ICD-10-CM

## 2023-05-12 DIAGNOSIS — I26.99 PULMONARY EMBOLISM, OTHER, UNSPECIFIED CHRONICITY, UNSPECIFIED WHETHER ACUTE COR PULMONALE PRESENT (HCC): ICD-10-CM

## 2023-05-12 DIAGNOSIS — Z45.2 ENCOUNTER FOR CARE RELATED TO PORT-A-CATH: ICD-10-CM

## 2023-05-12 DIAGNOSIS — B44.9 ASPERGILLUS PNEUMONIA (HCC): ICD-10-CM

## 2023-05-12 DIAGNOSIS — Z92.89 HISTORY OF IMMUNOTHERAPY: ICD-10-CM

## 2023-05-12 LAB
ALBUMIN SERPL-MCNC: 3.2 G/DL (ref 3.4–5)
ALBUMIN/GLOB SERPL: 1.1 {RATIO} (ref 1–2)
ALP LIVER SERPL-CCNC: 89 U/L
ALT SERPL-CCNC: 35 U/L
ANION GAP SERPL CALC-SCNC: 4 MMOL/L (ref 0–18)
AST SERPL-CCNC: 33 U/L (ref 15–37)
BASOPHILS # BLD AUTO: 0.02 X10(3) UL (ref 0–0.2)
BASOPHILS NFR BLD AUTO: 0.3 %
BILIRUB SERPL-MCNC: 0.4 MG/DL (ref 0.1–2)
BUN BLD-MCNC: 29 MG/DL (ref 7–18)
BUN/CREAT SERPL: 28.7 (ref 10–20)
CALCIUM BLD-MCNC: 8.7 MG/DL (ref 8.5–10.1)
CHLORIDE SERPL-SCNC: 108 MMOL/L (ref 98–112)
CO2 SERPL-SCNC: 27 MMOL/L (ref 21–32)
CREAT BLD-MCNC: 1.01 MG/DL
DEPRECATED RDW RBC AUTO: 52.6 FL (ref 35.1–46.3)
EOSINOPHIL # BLD AUTO: 0.07 X10(3) UL (ref 0–0.7)
EOSINOPHIL NFR BLD AUTO: 1.1 %
ERYTHROCYTE [DISTWIDTH] IN BLOOD BY AUTOMATED COUNT: 14.7 % (ref 11–15)
GFR SERPLBLD BASED ON 1.73 SQ M-ARVRAT: 75 ML/MIN/1.73M2 (ref 60–?)
GLOBULIN PLAS-MCNC: 2.8 G/DL (ref 2.8–4.4)
GLUCOSE BLD-MCNC: 89 MG/DL (ref 70–99)
HCT VFR BLD AUTO: 40.9 %
HGB BLD-MCNC: 13.2 G/DL
IMM GRANULOCYTES # BLD AUTO: 0.04 X10(3) UL (ref 0–1)
IMM GRANULOCYTES NFR BLD: 0.6 %
LYMPHOCYTES # BLD AUTO: 0.57 X10(3) UL (ref 1–4)
LYMPHOCYTES NFR BLD AUTO: 8.6 %
MCH RBC QN AUTO: 31.2 PG (ref 26–34)
MCHC RBC AUTO-ENTMCNC: 32.3 G/DL (ref 31–37)
MCV RBC AUTO: 96.7 FL
MONOCYTES # BLD AUTO: 0.62 X10(3) UL (ref 0.1–1)
MONOCYTES NFR BLD AUTO: 9.3 %
NEUTROPHILS # BLD AUTO: 5.33 X10 (3) UL (ref 1.5–7.7)
NEUTROPHILS # BLD AUTO: 5.33 X10(3) UL (ref 1.5–7.7)
NEUTROPHILS NFR BLD AUTO: 80.1 %
OSMOLALITY SERPL CALC.SUM OF ELEC: 293 MOSM/KG (ref 275–295)
PLATELET # BLD AUTO: 163 10(3)UL (ref 150–450)
POTASSIUM SERPL-SCNC: 3.9 MMOL/L (ref 3.5–5.1)
PROT SERPL-MCNC: 6 G/DL (ref 6.4–8.2)
RBC # BLD AUTO: 4.23 X10(6)UL
SODIUM SERPL-SCNC: 139 MMOL/L (ref 136–145)
T4 FREE SERPL-MCNC: 1.1 NG/DL (ref 0.8–1.7)
TSI SER-ACNC: 4.31 MIU/ML (ref 0.36–3.74)
WBC # BLD AUTO: 6.7 X10(3) UL (ref 4–11)

## 2023-05-12 PROCEDURE — 85025 COMPLETE CBC W/AUTO DIFF WBC: CPT

## 2023-05-12 PROCEDURE — 84443 ASSAY THYROID STIM HORMONE: CPT

## 2023-05-12 PROCEDURE — 80053 COMPREHEN METABOLIC PANEL: CPT

## 2023-05-12 PROCEDURE — 99215 OFFICE O/P EST HI 40 MIN: CPT | Performed by: INTERNAL MEDICINE

## 2023-05-12 PROCEDURE — 96413 CHEMO IV INFUSION 1 HR: CPT

## 2023-05-12 PROCEDURE — 84439 ASSAY OF FREE THYROXINE: CPT

## 2023-05-12 RX ORDER — HEPARIN SODIUM (PORCINE) LOCK FLUSH IV SOLN 100 UNIT/ML 100 UNIT/ML
5 SOLUTION INTRAVENOUS ONCE
OUTPATIENT
Start: 2023-05-12

## 2023-05-12 RX ORDER — SODIUM CHLORIDE 9 MG/ML
10 INJECTION INTRAVENOUS ONCE
OUTPATIENT
Start: 2023-05-12

## 2023-05-12 RX ORDER — HEPARIN SODIUM (PORCINE) LOCK FLUSH IV SOLN 100 UNIT/ML 100 UNIT/ML
5 SOLUTION INTRAVENOUS ONCE
Status: COMPLETED | OUTPATIENT
Start: 2023-05-12 | End: 2023-05-12

## 2023-05-12 RX ORDER — HEPARIN SODIUM (PORCINE) LOCK FLUSH IV SOLN 100 UNIT/ML 100 UNIT/ML
SOLUTION INTRAVENOUS
Status: COMPLETED
Start: 2023-05-12 | End: 2023-05-12

## 2023-05-12 RX ORDER — PREDNISONE 10 MG/1
10 TABLET ORAL DAILY
Qty: 30 TABLET | Refills: 5 | Status: SHIPPED | OUTPATIENT
Start: 2023-05-12

## 2023-05-12 RX ADMIN — HEPARIN SODIUM (PORCINE) LOCK FLUSH IV SOLN 100 UNIT/ML 500 UNITS: 100 SOLUTION INTRAVENOUS at 10:30:00

## 2023-05-12 NOTE — PROGRESS NOTES
Agree with note per HARPER Sims recurrent adenocarcinoma of the lung on pembrolizumab now with relatively good disease control also history of pneumonitis and Aspergillus pneumonia he is back on voriconazole with pulm. We will proceed with next dose of pembrolizumab is continuing 10 mg daily of prednisone with pneumocystis prophylaxis also. On exam comfortable nonlabored respirations.     Recent transaminitis improved

## 2023-05-12 NOTE — PROGRESS NOTES
Pt here for C34D1  Arrives Ambulating independently, accompanied by Spouse       Oral medications included in this regimen:  no    Patient confirms comprehension of cancer treatment schedule:  YES    Pregnancy screening:  Not applicable    Modifications in dose or schedule:  No    Pt going on vacation so treatment dates adjusted.   AVS given to pt    Medications appearance and physical integrity checked by RN:  yes     Frequency of blood return and site check throughout administration: Prior to administration, Prior to each drug and At completion of therapy     Infusion/treatment outcome:  patient tolerated treatment without incident    Education Record    Learner:  Patient and Spouse  Barriers / Limitations:  None  Method:  Discussion  Education / instructions given:  REINFORCED MGT OF POTENTIAL SIDE EFFECTS  Outcome:  Shows understanding    Discharged Home, Ambulating independently, accompanied by:Spouse    Patient/family verbalized understanding of future appointments: by printed AVS

## 2023-05-26 ENCOUNTER — ORDER TRANSCRIPTION (OUTPATIENT)
Dept: ADMINISTRATIVE | Facility: HOSPITAL | Age: 78
End: 2023-05-26

## 2023-05-26 DIAGNOSIS — Z13.9 ENCOUNTER FOR SCREENING: Primary | ICD-10-CM

## 2023-06-07 NOTE — TELEPHONE ENCOUNTER
LOV: 5/08/2023  Last refill: 1/19/2023    *confirmed dosage/frequency with patient and spouse    Dr. Allison Iverson review and sign pended prescription if agreeable.

## 2023-06-09 ENCOUNTER — APPOINTMENT (OUTPATIENT)
Dept: HEMATOLOGY/ONCOLOGY | Facility: HOSPITAL | Age: 78
End: 2023-06-09
Attending: INTERNAL MEDICINE
Payer: MEDICARE

## 2023-06-16 ENCOUNTER — NURSE ONLY (OUTPATIENT)
Dept: HEMATOLOGY/ONCOLOGY | Facility: HOSPITAL | Age: 78
End: 2023-06-16
Attending: INTERNAL MEDICINE
Payer: MEDICARE

## 2023-06-16 VITALS
DIASTOLIC BLOOD PRESSURE: 54 MMHG | HEIGHT: 70 IN | TEMPERATURE: 98 F | OXYGEN SATURATION: 97 % | RESPIRATION RATE: 18 BRPM | HEART RATE: 69 BPM | BODY MASS INDEX: 26.41 KG/M2 | SYSTOLIC BLOOD PRESSURE: 113 MMHG | WEIGHT: 184.5 LBS

## 2023-06-16 DIAGNOSIS — J18.9 PNEUMONITIS: ICD-10-CM

## 2023-06-16 DIAGNOSIS — Z51.12 ENCOUNTER FOR ANTINEOPLASTIC IMMUNOTHERAPY: Primary | ICD-10-CM

## 2023-06-16 DIAGNOSIS — I26.99 PULMONARY EMBOLISM, OTHER, UNSPECIFIED CHRONICITY, UNSPECIFIED WHETHER ACUTE COR PULMONALE PRESENT (HCC): ICD-10-CM

## 2023-06-16 DIAGNOSIS — C34.92 MALIGNANT NEOPLASM OF LEFT LUNG, UNSPECIFIED PART OF LUNG (HCC): ICD-10-CM

## 2023-06-16 DIAGNOSIS — R53.1 WEAKNESS GENERALIZED: Primary | ICD-10-CM

## 2023-06-16 DIAGNOSIS — D50.9 IRON DEFICIENCY ANEMIA, UNSPECIFIED IRON DEFICIENCY ANEMIA TYPE: ICD-10-CM

## 2023-06-16 DIAGNOSIS — Z45.2 ENCOUNTER FOR CARE RELATED TO PORT-A-CATH: ICD-10-CM

## 2023-06-16 DIAGNOSIS — Z51.12 ENCOUNTER FOR ANTINEOPLASTIC IMMUNOTHERAPY: ICD-10-CM

## 2023-06-16 DIAGNOSIS — C34.12 CANCER OF UPPER LOBE OF LEFT LUNG (HCC): ICD-10-CM

## 2023-06-16 LAB
ALBUMIN SERPL-MCNC: 3.2 G/DL (ref 3.4–5)
ALBUMIN/GLOB SERPL: 1.1 {RATIO} (ref 1–2)
ALP LIVER SERPL-CCNC: 105 U/L
ALT SERPL-CCNC: 35 U/L
ANION GAP SERPL CALC-SCNC: 6 MMOL/L (ref 0–18)
AST SERPL-CCNC: 27 U/L (ref 15–37)
BASOPHILS # BLD AUTO: 0.02 X10(3) UL (ref 0–0.2)
BASOPHILS NFR BLD AUTO: 0.2 %
BILIRUB SERPL-MCNC: 0.5 MG/DL (ref 0.1–2)
BUN BLD-MCNC: 26 MG/DL (ref 7–18)
BUN/CREAT SERPL: 25 (ref 10–20)
CALCIUM BLD-MCNC: 8.6 MG/DL (ref 8.5–10.1)
CHLORIDE SERPL-SCNC: 108 MMOL/L (ref 98–112)
CO2 SERPL-SCNC: 26 MMOL/L (ref 21–32)
CREAT BLD-MCNC: 1.04 MG/DL
DEPRECATED RDW RBC AUTO: 51.8 FL (ref 35.1–46.3)
EOSINOPHIL # BLD AUTO: 0.03 X10(3) UL (ref 0–0.7)
EOSINOPHIL NFR BLD AUTO: 0.3 %
ERYTHROCYTE [DISTWIDTH] IN BLOOD BY AUTOMATED COUNT: 14.6 % (ref 11–15)
GFR SERPLBLD BASED ON 1.73 SQ M-ARVRAT: 73 ML/MIN/1.73M2 (ref 60–?)
GLOBULIN PLAS-MCNC: 2.9 G/DL (ref 2.8–4.4)
GLUCOSE BLD-MCNC: 121 MG/DL (ref 70–99)
HCT VFR BLD AUTO: 39 %
HGB BLD-MCNC: 12.8 G/DL
IMM GRANULOCYTES # BLD AUTO: 0.03 X10(3) UL (ref 0–1)
IMM GRANULOCYTES NFR BLD: 0.3 %
LYMPHOCYTES # BLD AUTO: 0.55 X10(3) UL (ref 1–4)
LYMPHOCYTES NFR BLD AUTO: 6.3 %
MCH RBC QN AUTO: 31.8 PG (ref 26–34)
MCHC RBC AUTO-ENTMCNC: 32.8 G/DL (ref 31–37)
MCV RBC AUTO: 96.8 FL
MONOCYTES # BLD AUTO: 0.67 X10(3) UL (ref 0.1–1)
MONOCYTES NFR BLD AUTO: 7.6 %
NEUTROPHILS # BLD AUTO: 7.48 X10 (3) UL (ref 1.5–7.7)
NEUTROPHILS # BLD AUTO: 7.48 X10(3) UL (ref 1.5–7.7)
NEUTROPHILS NFR BLD AUTO: 85.3 %
OSMOLALITY SERPL CALC.SUM OF ELEC: 296 MOSM/KG (ref 275–295)
PLATELET # BLD AUTO: 200 10(3)UL (ref 150–450)
POTASSIUM SERPL-SCNC: 4.3 MMOL/L (ref 3.5–5.1)
PROT SERPL-MCNC: 6.1 G/DL (ref 6.4–8.2)
RBC # BLD AUTO: 4.03 X10(6)UL
SODIUM SERPL-SCNC: 140 MMOL/L (ref 136–145)
TSI SER-ACNC: 2.1 MIU/ML (ref 0.36–3.74)
WBC # BLD AUTO: 8.8 X10(3) UL (ref 4–11)

## 2023-06-16 PROCEDURE — 85025 COMPLETE CBC W/AUTO DIFF WBC: CPT

## 2023-06-16 PROCEDURE — 84443 ASSAY THYROID STIM HORMONE: CPT

## 2023-06-16 PROCEDURE — 80053 COMPREHEN METABOLIC PANEL: CPT

## 2023-06-16 PROCEDURE — 96413 CHEMO IV INFUSION 1 HR: CPT

## 2023-06-16 RX ORDER — HEPARIN SODIUM (PORCINE) LOCK FLUSH IV SOLN 100 UNIT/ML 100 UNIT/ML
5 SOLUTION INTRAVENOUS ONCE
Status: COMPLETED | OUTPATIENT
Start: 2023-06-16 | End: 2023-06-16

## 2023-06-16 RX ORDER — HEPARIN SODIUM (PORCINE) LOCK FLUSH IV SOLN 100 UNIT/ML 100 UNIT/ML
5 SOLUTION INTRAVENOUS ONCE
OUTPATIENT
Start: 2023-06-16

## 2023-06-16 RX ORDER — HEPARIN SODIUM (PORCINE) LOCK FLUSH IV SOLN 100 UNIT/ML 100 UNIT/ML
SOLUTION INTRAVENOUS
Status: COMPLETED
Start: 2023-06-16 | End: 2023-06-16

## 2023-06-16 RX ORDER — SODIUM CHLORIDE 9 MG/ML
10 INJECTION INTRAVENOUS ONCE
OUTPATIENT
Start: 2023-06-16

## 2023-06-16 RX ADMIN — HEPARIN SODIUM (PORCINE) LOCK FLUSH IV SOLN 100 UNIT/ML 500 UNITS: 100 SOLUTION INTRAVENOUS at 16:08:00

## 2023-06-16 NOTE — PROGRESS NOTES
Agree with note per REAGAN Rao recurrent adenocarcinoma of the lung on pembrolizumab now with relatively good disease control also history of pneumonitis and Aspergillus pneumonia he is back on voriconazole with pulm. We will proceed with next dose of pembrolizumab is continuing 10 mg daily of prednisone with pneumocystis prophylaxis also. On exam comfortable nonlabored respirations.     Recent transaminitis improved

## 2023-06-16 NOTE — PROGRESS NOTES
Pt here for [address] Arrives Ambulating independently, accompanied by Spouse       Oral medications included in this regimen:  no    Patient confirms comprehension of cancer treatment schedule:  yes    Pregnancy screening:  Not applicable    Modifications in dose or schedule:  No    Medications appearance and physical integrity checked by RN.  Chemotherapy IV pump settings verified by 2 RNs:  no     Frequency of blood return and site check throughout administration: Prior to administration and At completion of therapy     Infusion/treatment outcome:  patient tolerated treatment without incident    Education Record    Learner:  Patient and Spouse  Barriers / Limitations:  None  Method:  Discussion  Education / instructions given:  POC  Outcome:  Shows understanding    Discharged Home, Ambulating independently, accompanied by:Spouse    Patient/family verbalized understanding of future appointments: by printed AVS

## 2023-06-16 NOTE — TELEPHONE ENCOUNTER
----- Message from [facility] sent at 2/24/2020  1:44 PM CST -----  Regarding: Test Results Question  Contact: [phone]  Just wanted to let Dr. Padmaja Corbin know that I went for the CT Scan of the Chest 2/20/20 and the results are in. Al Edwards
Dr. Taylor Smith, CT chest results in system.  CT was ordered by Dr. Carlitos Chapa
RN, if he is taken it for over a year, he can stop.
RN, please call the patient let him know that I looked at the study and its great news that there is no evidence of residual cancer there.
Spoke with patient regarding CT results. Patient asking about the fungal infection and if he needs to stay on antifungal medication, voriconazole?
Spoke with patient. He's been taking antifungal for 1.5 years. Patient states he will finish the bottle he has left and then stop medication. Med list updated.
Detail Level: Detailed

## 2023-06-25 ENCOUNTER — HOSPITAL ENCOUNTER (OUTPATIENT)
Dept: ULTRASOUND IMAGING | Facility: HOSPITAL | Age: 78
Discharge: HOME | End: 2023-06-25
Attending: INTERNAL MEDICINE

## 2023-06-25 DIAGNOSIS — Z13.9 ENCOUNTER FOR SCREENING: ICD-10-CM

## 2023-07-07 ENCOUNTER — OFFICE VISIT (OUTPATIENT)
Dept: HEMATOLOGY/ONCOLOGY | Facility: HOSPITAL | Age: 78
End: 2023-07-07
Attending: INTERNAL MEDICINE
Payer: MEDICARE

## 2023-07-07 VITALS
TEMPERATURE: 98 F | DIASTOLIC BLOOD PRESSURE: 64 MMHG | HEART RATE: 66 BPM | HEIGHT: 70 IN | SYSTOLIC BLOOD PRESSURE: 144 MMHG | RESPIRATION RATE: 18 BRPM | OXYGEN SATURATION: 96 % | WEIGHT: 183 LBS | BODY MASS INDEX: 26.2 KG/M2

## 2023-07-07 DIAGNOSIS — Z51.11 CHEMOTHERAPY MANAGEMENT, ENCOUNTER FOR: ICD-10-CM

## 2023-07-07 DIAGNOSIS — Z45.2 ENCOUNTER FOR CARE RELATED TO PORT-A-CATH: ICD-10-CM

## 2023-07-07 DIAGNOSIS — R53.1 WEAKNESS GENERALIZED: ICD-10-CM

## 2023-07-07 DIAGNOSIS — D50.9 IRON DEFICIENCY ANEMIA, UNSPECIFIED IRON DEFICIENCY ANEMIA TYPE: ICD-10-CM

## 2023-07-07 DIAGNOSIS — C34.92 MALIGNANT NEOPLASM OF LEFT LUNG, UNSPECIFIED PART OF LUNG (HCC): Primary | ICD-10-CM

## 2023-07-07 DIAGNOSIS — Z51.12 ENCOUNTER FOR ANTINEOPLASTIC IMMUNOTHERAPY: ICD-10-CM

## 2023-07-07 DIAGNOSIS — C34.12 CANCER OF UPPER LOBE OF LEFT LUNG (HCC): ICD-10-CM

## 2023-07-07 DIAGNOSIS — J18.9 PNEUMONITIS: ICD-10-CM

## 2023-07-07 DIAGNOSIS — I26.99 PULMONARY EMBOLISM, OTHER, UNSPECIFIED CHRONICITY, UNSPECIFIED WHETHER ACUTE COR PULMONALE PRESENT (HCC): ICD-10-CM

## 2023-07-07 LAB
ALBUMIN SERPL-MCNC: 3.2 G/DL (ref 3.4–5)
ALBUMIN/GLOB SERPL: 1.1 {RATIO} (ref 1–2)
ALP LIVER SERPL-CCNC: 105 U/L
ALT SERPL-CCNC: 23 U/L
ANION GAP SERPL CALC-SCNC: 8 MMOL/L (ref 0–18)
AST SERPL-CCNC: 25 U/L (ref 15–37)
BASOPHILS # BLD AUTO: 0.03 X10(3) UL (ref 0–0.2)
BASOPHILS NFR BLD AUTO: 0.4 %
BILIRUB SERPL-MCNC: 0.4 MG/DL (ref 0.1–2)
BUN BLD-MCNC: 28 MG/DL (ref 7–18)
BUN/CREAT SERPL: 31.5 (ref 10–20)
CALCIUM BLD-MCNC: 8.6 MG/DL (ref 8.5–10.1)
CHLORIDE SERPL-SCNC: 107 MMOL/L (ref 98–112)
CO2 SERPL-SCNC: 25 MMOL/L (ref 21–32)
CREAT BLD-MCNC: 0.89 MG/DL
DEPRECATED RDW RBC AUTO: 54.3 FL (ref 35.1–46.3)
EOSINOPHIL # BLD AUTO: 0.11 X10(3) UL (ref 0–0.7)
EOSINOPHIL NFR BLD AUTO: 1.3 %
ERYTHROCYTE [DISTWIDTH] IN BLOOD BY AUTOMATED COUNT: 14.9 % (ref 11–15)
GFR SERPLBLD BASED ON 1.73 SQ M-ARVRAT: 87 ML/MIN/1.73M2 (ref 60–?)
GLOBULIN PLAS-MCNC: 3 G/DL (ref 2.8–4.4)
GLUCOSE BLD-MCNC: 104 MG/DL (ref 70–99)
HCT VFR BLD AUTO: 40 %
HGB BLD-MCNC: 12.8 G/DL
IMM GRANULOCYTES # BLD AUTO: 0.04 X10(3) UL (ref 0–1)
IMM GRANULOCYTES NFR BLD: 0.5 %
LYMPHOCYTES # BLD AUTO: 0.5 X10(3) UL (ref 1–4)
LYMPHOCYTES NFR BLD AUTO: 6 %
MCH RBC QN AUTO: 31.8 PG (ref 26–34)
MCHC RBC AUTO-ENTMCNC: 32 G/DL (ref 31–37)
MCV RBC AUTO: 99.3 FL
MONOCYTES # BLD AUTO: 0.61 X10(3) UL (ref 0.1–1)
MONOCYTES NFR BLD AUTO: 7.3 %
NEUTROPHILS # BLD AUTO: 7.1 X10 (3) UL (ref 1.5–7.7)
NEUTROPHILS # BLD AUTO: 7.1 X10(3) UL (ref 1.5–7.7)
NEUTROPHILS NFR BLD AUTO: 84.5 %
OSMOLALITY SERPL CALC.SUM OF ELEC: 296 MOSM/KG (ref 275–295)
PLATELET # BLD AUTO: 194 10(3)UL (ref 150–450)
POTASSIUM SERPL-SCNC: 3.7 MMOL/L (ref 3.5–5.1)
PROT SERPL-MCNC: 6.2 G/DL (ref 6.4–8.2)
RBC # BLD AUTO: 4.03 X10(6)UL
SODIUM SERPL-SCNC: 140 MMOL/L (ref 136–145)
TSI SER-ACNC: 3.12 MIU/ML (ref 0.36–3.74)
WBC # BLD AUTO: 8.4 X10(3) UL (ref 4–11)

## 2023-07-07 PROCEDURE — 99215 OFFICE O/P EST HI 40 MIN: CPT | Performed by: INTERNAL MEDICINE

## 2023-07-07 PROCEDURE — 85025 COMPLETE CBC W/AUTO DIFF WBC: CPT

## 2023-07-07 PROCEDURE — 80053 COMPREHEN METABOLIC PANEL: CPT

## 2023-07-07 PROCEDURE — 96413 CHEMO IV INFUSION 1 HR: CPT

## 2023-07-07 PROCEDURE — 84443 ASSAY THYROID STIM HORMONE: CPT

## 2023-07-07 RX ORDER — SODIUM CHLORIDE 9 MG/ML
10 INJECTION INTRAVENOUS ONCE
OUTPATIENT
Start: 2023-07-07

## 2023-07-07 RX ORDER — HEPARIN SODIUM (PORCINE) LOCK FLUSH IV SOLN 100 UNIT/ML 100 UNIT/ML
5 SOLUTION INTRAVENOUS ONCE
OUTPATIENT
Start: 2023-07-07

## 2023-07-07 RX ORDER — HEPARIN SODIUM (PORCINE) LOCK FLUSH IV SOLN 100 UNIT/ML 100 UNIT/ML
5 SOLUTION INTRAVENOUS ONCE
Status: COMPLETED | OUTPATIENT
Start: 2023-07-07 | End: 2023-07-07

## 2023-07-07 RX ADMIN — HEPARIN SODIUM (PORCINE) LOCK FLUSH IV SOLN 100 UNIT/ML 500 UNITS: 100 SOLUTION INTRAVENOUS at 13:22:00

## 2023-07-07 NOTE — PROGRESS NOTES
Pt here for [address] Arrives Ambulating independently, accompanied by Spouse from MD visit.       Oral medications included in this regimen:  no    Patient confirms comprehension of cancer treatment schedule:  yes    Pregnancy screening:  Not applicable    Modifications in dose or schedule:  No    Medications appearance and physical integrity checked by RN.:  yes     Frequency of blood return and site check throughout administration: Prior to administration, Prior to each drug, and At completion of therapy     Infusion/treatment outcome:  patient tolerated treatment without incident    Education Record    Learner:  Patient and Spouse  Barriers / Limitations:  None  Method:  Reinforcement  Education / instructions given:  Reinforced plan of care  Outcome:  Shows understanding    Discharged Home, Ambulating independently, accompanied by:Spouse    Patient/family verbalized understanding of future appointments: by [facility]

## 2023-07-07 NOTE — PROGRESS NOTES
Cancer Center Progress Note    Patient Name: Papa Joiner   YOB: 1945   Medical Record Number: [id number]   Attending Physician: Ly Renteria M.D. 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 He completed definitive concurrent chemoradiotherapy using cisplatin and etoposide early March 2017. He had low-grade anemia neutropenia on treatment. He subsequently has been followed on surveillance with no evidence of progression. The patient started on durvalumab in October 2017 given he had unresectable stage III disease with no evidence of progression following chemo radiotherapy    He has had occasional mild anemia on treatment. Laboratory follow-up in May 2018 showed severe iron deficiency. Received IV iron    He completed 13 cycles of durvalumab  however towards the end of his treatment he developed 2 separate instances of pneumonitis/transaminitis requiring separate courses of prednisone. 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 Started single-agent Pembrolizumab on 3/19/21. Diagnosed with immune-mediated pneumonitis/pneumonia on 4/1, completed abx and on a steroid taper. Recurrent of immune-mediated pneumonitis with CT imaging confirmation after C4 6/2021, but with favorable dx response. 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 PE/DVT admission 1/2023. Interval History:  Returns for routine follow-up and consideration of next immunotherapy dose. Doing ok overall. Still w/ fatigue post covid. Denies worsening of fatigue from last encounter.  His wife is also battling fatigue as well post covid- sxs are similar. Covid on . Breathing stable. Daily Rivaroxaban dosing. No active bleeding. No leg pain or swelling. No skin rash or pruritis. No abd pain. No diarrhea. Wife present for the encounter.        Performance Status:  ECOG 0    Past Medical History:  Past Medical History:   Diagnosis Date    Esophageal reflux     Exposure to medical diagnostic radiation     Hemorrhoids     Impotence     Lung cancer ([address]     NSCLCA stage IIIB    Necrotizing granulomatous inflammation of lung (HCC)     Obstructive sleep apnea     Personal history of antineoplastic chemotherapy     Sinus problem     Sinus problems    Sleep apnea     CPAP       Past Surgical History:  Past Surgical History:   Procedure Laterality Date    COLONOSCOPY      COLONOSCOPY N/A 2018    Procedure: COLONOSCOPY;  Surgeon: Teo Dietrich MD;  Location: [facility] ENDOSCOPY    COLONOSCOPY      PORT, INDWELLING, IMP         Family History:  Family History   Problem Relation Age of Onset    Cancer Father         Lung    Cancer Mother         Breast, ovarian       Social History:  Social History    Socioeconomic History      Marital status:       Spouse name: Not on file      Number of children: Not on file      Years of education: Not on file      Highest education level: Not on file    Occupational History      Not on file    Tobacco Use      Smoking status: Former        Packs/day: 1.00        Years: 30.00        Pack years: 30        Types: Cigarettes        Quit date: 2000        Years since quittin.0      Smokeless tobacco: Never    Vaping Use      Vaping Use: Never used    Substance and Sexual Activity      Alcohol use: Not Currently        Alcohol/week: 0.8 standard drinks of alcohol        Types: 1 Glasses of wine per week        Comment: rare no longer has his       Drug use: No      Sexual activity: Not on file    Other Topics      Concerns:         Service: Not Asked        Blood Transfusions: Not Asked        Caffeine Concern: Yes          coffee, 2 cups daily        Occupational Exposure: Not Asked        Hobby Hazards: Not Asked        Sleep Concern: Not Asked        Stress Concern: Not Asked        Weight Concern: Not Asked        Special Diet: Not Asked        Back Care: Not Asked        Exercise: Not Asked        Bike Helmet: Not Asked        Seat Belt: Not Asked        Self-Exams: Not Asked    Social History Narrative      Not on file    Social Determinants of Health  Financial Resource Strain: Not on file  Food Insecurity: Not on file  Transportation Needs: Not on file  Physical Activity: Not on file  Stress: Not on file  Social Connections: Not on file  Housing Stability: Not on file      Current Medications:    Current Outpatient Medications:     rivaroxaban (XARELTO) 20 MG Oral Tab, Take 1 tablet (20 mg total) by mouth nightly., Disp: 90 tablet, Rfl: 1    predniSONE 10 MG Oral Tab, Take 1 tablet (10 mg total) by mouth daily. , Disp: 60 tablet, Rfl: 5    Acetylcysteine ( OR), Take 600 mg by mouth daily. , Disp: , Rfl:     meclizine 25 MG Oral Tab, Take 1 tablet (25 mg total) by mouth 3 (three) times daily as needed. , Disp: 10 tablet, Rfl: 0    PEMBROLIZUMAB IV, Inject 1 Dose into the vein See Admin Instructions. EVERY 3 WEEKS, Disp: , Rfl:     voriconazole 200 MG Oral Tab, Take 1 tablet (200 mg total) by mouth 2 (two) times daily. , Disp: 180 tablet, Rfl: 3    pantoprazole 40 MG Oral Tab EC, Take 1 tablet (40 mg total) by mouth daily. , Disp: 90 tablet, Rfl: 3    sulfamethoxazole-trimethoprim -160 MG Oral Tab per tablet, One tablet PO BID on Monday, Wednesday, Friday, Disp: 60 tablet, Rfl: 3    fluticasone furoate-vilanterol (BREO ELLIPTA) 100-25 MCG/INH Inhalation Aerosol Powder, Breath Activated, Inhale 1 puff into the lungs daily.  Follow with mouth rinse and spit, Disp: 1 each, Rfl: 6    albuterol 108 (90 Base) MCG/ACT Inhalation Aero Soln, Inhale 2 puffs into the lungs every 4 (four) hours as needed for Wheezing. (Patient taking differently: Inhale 2 puffs into the lungs as needed for Wheezing.), Disp: 1 each, Rfl: 2    Montelukast Sodium 10 MG Oral Tab, Take 1 tablet (10 mg total) by mouth nightly., Disp: 30 tablet, Rfl: 3    loratadine 10 MG Oral Tab, Take 1 tablet (10 mg total) by mouth daily. , Disp: 30 tablet, Rfl: 3    Fluticasone Propionate 50 MCG/ACT Nasal Suspension, 1 spray by Nasal route 2 (two) times daily. , Disp: 1 Bottle, Rfl: 3    Cholecalciferol (VITAMIN D3) 1000 units Oral Cap, Take 1,000 Units by mouth daily. , Disp: , Rfl:     Vitamin C 500 MG Oral Tab, Take 1 tablet (500 mg total) by mouth daily. , Disp: , Rfl:     Allergies:  No Known Allergies     Review of Systems:  All other systems reviewed and negative x12    Vital Signs: There were no vitals taken for this visit. Physical Examination:  General: Patient is alert and oriented x 3, not in acute distress. Psych:  Mood and affect appropriate, in good spirits. HEENT: EOMs intact. Oropharynx is clear. Chest: non-labored breathing, CTA  Cardiovascular: Regular rate and rhythm. Normal S1S2  Extremities: No edema. Neurological: 5/5 motor x4.     Derm:  No rash    LABS:    CBC:    Lab Results   Component Value Date    WBC 8.4 07/07/2023    WBC 8.8 06/16/2023    WBC 6.7 05/12/2023     Lab Results   Component Value Date    HGB 12.8 (L) 07/07/2023    HGB 12.8 (L) 06/16/2023    HGB 13.2 05/12/2023      Lab Results   Component Value Date    .0 07/07/2023    .0 06/16/2023    .0 05/12/2023       Lab Results   Component Value Date     (H) 07/07/2023    BUN 28 (H) 07/07/2023    BUNCREA 31.5 (H) 07/07/2023    CREATSERUM 0.89 07/07/2023    ANIONGAP 8 07/07/2023    GFR >60 05/14/2016    GFRNAA 81 07/22/2022    GFRAA 94 07/22/2022    CA 8.6 07/07/2023    OSMOCALC 296 (H) 07/07/2023    ALKPHO 105 07/07/2023    AST 25 07/07/2023    ALT 23 07/07/2023    ALKPHOS 58 12/10/2011    BILT 0.4 07/07/2023 TP 6.2 (L) 07/07/2023    ALB 3.2 (L) 07/07/2023    GLOBULIN 3.0 07/07/2023     07/07/2023    K 3.7 07/07/2023     07/07/2023    CO2 25.0 07/07/2023       Radiology:  PEt/CT Chest       Cancer of upper lobe of left lung [facility])    Staging form: Lung AJCC V7      Clinical stage from 1/9/2017: Stage IIIB (T1a, N3, M0) - Signed by Wilfredo Ramos MD on 1/9/2017    Impression and Plan:  [de-identified]year oldmale former smoker with 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 He was diagnosed in December 2016 as above. He was treated with concurrent definitive chemoradiotherapy using cisplatin and etoposide finishing in early March 2017. He had low-grade neutropenia and anemia on chemotherapy. Given he has unresectable stage IIIb disease with no progression following initial chemotherapy we  added immunotherapy with durvalumab. Completed 13 cycles of durvalumab however developed recurrent pneumonitis/transaminitis. He has responded well to prednisone. He remains off of all cancer directed therapy as of the end of April 2018. Recurrent disease biopsy-proven mediastinal lymph nodes as of March 2021. Same histology. Given PD-L1 greater than 50% we will proceed with single agent pembrolizumab. Also discussed nivolumab/ipilimumab however with previous issues of pneumonitis and transaminitis will use single agent immunotherapy. Received Cycle #1 Pembrolizumab on 3/19/21, w/ recurrent pneumonitis as below. Delayed C2 d/t steroid taper, and was kept on 5mg Prednisone daily. Reimaging with CT after C4 with pneumonitis changes, decreased areas of lymphadenopathy r/t malignancy showing good tx response. Due to recurrence of clinically symptomatic pneumonitis below, HELD Cycle #5 Pembrolizumab for 2 weeks with below steroid taper. Indefinite Prednisone 10mg daily dosing controlled pneumonitis. Continue PCP prophy with Bactrim MWF BID.       Reimaging 9/2021 prior to C9 with ongoing stable disease and improvement in pneumonitis. Repeat imaging January 2022 with mixed response, increase in prior left lingula nodule and possible new right lung nodule suspicious for vy mets. Resolve of prior areas of pneumonitis and stable changes in left lung mass. Discussed at TB conference ok to continue treatment. Now s/p ACV on 2/23 with increased MILLAN and CXR w/ right consolidation to middle and lower lobe suggesting postobstructive PNA vs pneumonitis; completed 7-day course of Levaquin and 2-week taper of Prednisone. 6/9/23 tx held 2/2 G2 transaminitis. S/p prednisone taper with complete resolution. Continue with prednisone 10mg daily. Reimaging 5/2023 CT CAP with ongoing favorable treatment response. Here for pembro. -- fatigue likely post covid. Monitor for now. Consider increasing pred to 20mg daily to see if sxs help. --new gr 2 transaminitis without Tbili elevation 4/2023. Likely IO-mediated. HELD tx as above and completed increased Prednisone taper. Now normalized-ok to treat. Monitor. 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 Pneumocystis prophylaxis. - Pulmonary embolus now on rivaroxaban diagnosed January 2023. Down to 20mg daily as of 2/2023. Tolerating well. Monitor.     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 --Had new BPPV symptoms and went to ER for workup 3/17 without any other acute issues. MRI negative for malignancy or CVA. Seeing ENT. Medical decision making:  High risk active cancer therapy complications of systemic therapy for cancer.       Brenton Bai PA-C

## 2023-07-28 ENCOUNTER — APPOINTMENT (OUTPATIENT)
Dept: HEMATOLOGY/ONCOLOGY | Facility: HOSPITAL | Age: 78
End: 2023-07-28
Attending: INTERNAL MEDICINE
Payer: MEDICARE

## 2023-07-28 ENCOUNTER — HOSPITAL ENCOUNTER (INPATIENT)
Facility: HOSPITAL | Age: 78
LOS: 6 days | Discharge: HOME | End: 2023-08-03
Attending: EMERGENCY MEDICINE | Admitting: INTERNAL MEDICINE
Payer: MEDICARE

## 2023-07-28 ENCOUNTER — APPOINTMENT (OUTPATIENT)
Dept: CT IMAGING | Facility: HOSPITAL | Age: 78
End: 2023-07-28
Attending: EMERGENCY MEDICINE
Payer: MEDICARE

## 2023-07-28 VITALS
RESPIRATION RATE: 18 BRPM | HEART RATE: 79 BPM | DIASTOLIC BLOOD PRESSURE: 64 MMHG | TEMPERATURE: 98 F | OXYGEN SATURATION: 100 % | SYSTOLIC BLOOD PRESSURE: 120 MMHG | WEIGHT: 183 LBS | HEIGHT: 70 IN | BODY MASS INDEX: 26.2 KG/M2

## 2023-07-28 DIAGNOSIS — B44.9 ASPERGILLUS PNEUMONIA (HCC): ICD-10-CM

## 2023-07-28 DIAGNOSIS — J18.9 PNEUMONITIS: ICD-10-CM

## 2023-07-28 DIAGNOSIS — Z92.89 HISTORY OF IMMUNOTHERAPY: ICD-10-CM

## 2023-07-28 DIAGNOSIS — R09.02 HYPOXIA: ICD-10-CM

## 2023-07-28 DIAGNOSIS — R06.09 DOE (DYSPNEA ON EXERTION): ICD-10-CM

## 2023-07-28 DIAGNOSIS — J96.01 ACUTE HYPOXEMIC RESPIRATORY FAILURE (HCC): Primary | ICD-10-CM

## 2023-07-28 DIAGNOSIS — R53.1 WEAKNESS GENERALIZED: ICD-10-CM

## 2023-07-28 DIAGNOSIS — Z51.12 ENCOUNTER FOR ANTINEOPLASTIC IMMUNOTHERAPY: ICD-10-CM

## 2023-07-28 DIAGNOSIS — C34.92 MALIGNANT NEOPLASM OF LEFT LUNG, UNSPECIFIED PART OF LUNG (HCC): Primary | ICD-10-CM

## 2023-07-28 LAB
ALBUMIN SERPL-MCNC: 3.1 G/DL (ref 3.4–5)
ALBUMIN/GLOB SERPL: 0.9 {RATIO} (ref 1–2)
ALP LIVER SERPL-CCNC: 160 U/L
ALT SERPL-CCNC: 26 U/L
ANION GAP SERPL CALC-SCNC: 7 MMOL/L (ref 0–18)
AST SERPL-CCNC: 26 U/L (ref 15–37)
BASOPHILS # BLD AUTO: 0.02 X10(3) UL (ref 0–0.2)
BASOPHILS NFR BLD AUTO: 0.2 %
BILIRUB SERPL-MCNC: 0.5 MG/DL (ref 0.1–2)
BUN BLD-MCNC: 19 MG/DL (ref 7–18)
BUN/CREAT SERPL: 16.4 (ref 10–20)
CALCIUM BLD-MCNC: 8.5 MG/DL (ref 8.5–10.1)
CHLORIDE SERPL-SCNC: 109 MMOL/L (ref 98–112)
CO2 SERPL-SCNC: 23 MMOL/L (ref 21–32)
CREAT BLD-MCNC: 1.16 MG/DL
DEPRECATED RDW RBC AUTO: 51.1 FL (ref 35.1–46.3)
EGFRCR SERPLBLD CKD-EPI 2021: 64 ML/MIN/1.73M2 (ref 60–?)
EOSINOPHIL # BLD AUTO: 0.06 X10(3) UL (ref 0–0.7)
EOSINOPHIL NFR BLD AUTO: 0.6 %
ERYTHROCYTE [DISTWIDTH] IN BLOOD BY AUTOMATED COUNT: 14.2 % (ref 11–15)
GLOBULIN PLAS-MCNC: 3.3 G/DL (ref 2.8–4.4)
GLUCOSE BLD-MCNC: 133 MG/DL (ref 70–99)
HCT VFR BLD AUTO: 39.9 %
HGB BLD-MCNC: 13 G/DL
IMM GRANULOCYTES # BLD AUTO: 0.05 X10(3) UL (ref 0–1)
IMM GRANULOCYTES NFR BLD: 0.5 %
LYMPHOCYTES # BLD AUTO: 0.49 X10(3) UL (ref 1–4)
LYMPHOCYTES NFR BLD AUTO: 5.1 %
MCH RBC QN AUTO: 31.6 PG (ref 26–34)
MCHC RBC AUTO-ENTMCNC: 32.6 G/DL (ref 31–37)
MCV RBC AUTO: 96.8 FL
MONOCYTES # BLD AUTO: 0.67 X10(3) UL (ref 0.1–1)
MONOCYTES NFR BLD AUTO: 7 %
NEUTROPHILS # BLD AUTO: 8.28 X10 (3) UL (ref 1.5–7.7)
NEUTROPHILS # BLD AUTO: 8.28 X10(3) UL (ref 1.5–7.7)
NEUTROPHILS NFR BLD AUTO: 86.6 %
OSMOLALITY SERPL CALC.SUM OF ELEC: 292 MOSM/KG (ref 275–295)
PLATELET # BLD AUTO: 184 10(3)UL (ref 150–450)
POTASSIUM SERPL-SCNC: 4 MMOL/L (ref 3.5–5.1)
PROCALCITONIN SERPL-MCNC: 0.07 NG/ML (ref ?–0.16)
PROT SERPL-MCNC: 6.4 G/DL (ref 6.4–8.2)
RBC # BLD AUTO: 4.12 X10(6)UL
SARS-COV-2 RNA RESP QL NAA+PROBE: NOT DETECTED
SODIUM SERPL-SCNC: 139 MMOL/L (ref 136–145)
TSI SER-ACNC: 1.98 MIU/ML (ref 0.36–3.74)
WBC # BLD AUTO: 9.6 X10(3) UL (ref 4–11)

## 2023-07-28 PROCEDURE — 84443 ASSAY THYROID STIM HORMONE: CPT

## 2023-07-28 PROCEDURE — 85025 COMPLETE CBC W/AUTO DIFF WBC: CPT

## 2023-07-28 PROCEDURE — 99223 1ST HOSP IP/OBS HIGH 75: CPT | Performed by: INTERNAL MEDICINE

## 2023-07-28 PROCEDURE — 80053 COMPREHEN METABOLIC PANEL: CPT

## 2023-07-28 PROCEDURE — 71260 CT THORAX DX C+: CPT | Performed by: EMERGENCY MEDICINE

## 2023-07-28 RX ORDER — FLUTICASONE PROPIONATE 50 MCG
1 SPRAY, SUSPENSION (ML) NASAL 2 TIMES DAILY
Status: DISCONTINUED | OUTPATIENT
Start: 2023-07-28 | End: 2023-07-31

## 2023-07-28 RX ORDER — SULFAMETHOXAZOLE AND TRIMETHOPRIM 800; 160 MG/1; MG/1
1 TABLET ORAL DAILY
Status: DISCONTINUED | OUTPATIENT
Start: 2023-07-28 | End: 2023-08-03

## 2023-07-28 RX ORDER — FLUTICASONE FUROATE AND VILANTEROL 100; 25 UG/1; UG/1
1 POWDER RESPIRATORY (INHALATION) DAILY
Status: DISCONTINUED | OUTPATIENT
Start: 2023-07-28 | End: 2023-08-03

## 2023-07-28 RX ORDER — VORICONAZOLE 200 MG/1
200 TABLET, FILM COATED ORAL 2 TIMES DAILY
Status: DISCONTINUED | OUTPATIENT
Start: 2023-07-28 | End: 2023-08-03

## 2023-07-28 RX ORDER — ASCORBIC ACID 500 MG
500 TABLET ORAL DAILY
Status: DISCONTINUED | OUTPATIENT
Start: 2023-07-28 | End: 2023-08-03

## 2023-07-28 RX ORDER — AZITHROMYCIN 250 MG/1
500 TABLET, FILM COATED ORAL
Status: DISCONTINUED | OUTPATIENT
Start: 2023-07-29 | End: 2023-08-03

## 2023-07-28 RX ORDER — IPRATROPIUM BROMIDE AND ALBUTEROL SULFATE 2.5; .5 MG/3ML; MG/3ML
3 SOLUTION RESPIRATORY (INHALATION) 2 TIMES DAILY
Status: DISCONTINUED | OUTPATIENT
Start: 2023-07-28 | End: 2023-07-30

## 2023-07-28 RX ORDER — MELATONIN
1000 DAILY
Status: DISCONTINUED | OUTPATIENT
Start: 2023-07-28 | End: 2023-08-03

## 2023-07-28 RX ORDER — METHYLPREDNISOLONE SODIUM SUCCINATE 125 MG/2ML
60 INJECTION, POWDER, LYOPHILIZED, FOR SOLUTION INTRAMUSCULAR; INTRAVENOUS ONCE
Status: COMPLETED | OUTPATIENT
Start: 2023-07-28 | End: 2023-07-28

## 2023-07-28 RX ORDER — AZITHROMYCIN 250 MG/1
500 TABLET, FILM COATED ORAL ONCE
Status: COMPLETED | OUTPATIENT
Start: 2023-07-28 | End: 2023-07-28

## 2023-07-28 RX ORDER — PANTOPRAZOLE SODIUM 40 MG/1
40 TABLET, DELAYED RELEASE ORAL DAILY
Status: DISCONTINUED | OUTPATIENT
Start: 2023-07-28 | End: 2023-08-03

## 2023-07-28 RX ORDER — MECLIZINE HCL 12.5 MG/1
25 TABLET ORAL 3 TIMES DAILY PRN
Status: DISCONTINUED | OUTPATIENT
Start: 2023-07-28 | End: 2023-08-03

## 2023-07-28 RX ORDER — METHYLPREDNISOLONE SODIUM SUCCINATE 40 MG/ML
40 INJECTION, POWDER, LYOPHILIZED, FOR SOLUTION INTRAMUSCULAR; INTRAVENOUS EVERY 12 HOURS
Status: DISCONTINUED | OUTPATIENT
Start: 2023-07-29 | End: 2023-07-30

## 2023-07-28 NOTE — ED INITIAL ASSESSMENT (HPI)
Pt to the ed from Dr. Arcos Lobe office following increased MILLAN since Monday  Hx of lung ca, pneumonia, PE, and pneumonitis  Denies fevers, but reports having chills  SpO2 90-94% at rest in triage

## 2023-07-28 NOTE — ED QUICK NOTES
Orders for admission, patient is aware of plan and ready to go upstairs. Any questions, please call ED RN Dona Umaña at extension [phone].      Patient Covid vaccination status: Fully vaccinated and immunocompromised     COVID Test Ordered in ED: Rapid SARS-CoV-2 by PCR    COVID Suspicion at Admission: N/A    Running Infusions:  None    Mental Status/LOC at time of transport: ALERT    Other pertinent information: On 2L, no O2 at home, port at right chest, 20G IV R forearm    CIWA score: N/A   NIH score:  N/A

## 2023-07-29 LAB
ANION GAP SERPL CALC-SCNC: 9 MMOL/L (ref 0–18)
ATRIAL RATE: 71 BPM
BASOPHILS # BLD AUTO: 0 X10(3) UL (ref 0–0.2)
BASOPHILS NFR BLD AUTO: 0 %
BUN BLD-MCNC: 19 MG/DL (ref 7–18)
BUN/CREAT SERPL: 16.7 (ref 10–20)
CALCIUM BLD-MCNC: 9 MG/DL (ref 8.5–10.1)
CHLORIDE SERPL-SCNC: 108 MMOL/L (ref 98–112)
CO2 SERPL-SCNC: 23 MMOL/L (ref 21–32)
CREAT BLD-MCNC: 1.14 MG/DL
DEPRECATED RDW RBC AUTO: 51.5 FL (ref 35.1–46.3)
EGFRCR SERPLBLD CKD-EPI 2021: 65 ML/MIN/1.73M2 (ref 60–?)
EOSINOPHIL # BLD AUTO: 0 X10(3) UL (ref 0–0.7)
EOSINOPHIL NFR BLD AUTO: 0 %
ERYTHROCYTE [DISTWIDTH] IN BLOOD BY AUTOMATED COUNT: 14.3 % (ref 11–15)
GLUCOSE BLD-MCNC: 190 MG/DL (ref 70–99)
GLUCOSE BLDC GLUCOMTR-MCNC: 137 MG/DL (ref 70–99)
GLUCOSE BLDC GLUCOMTR-MCNC: 166 MG/DL (ref 70–99)
GLUCOSE BLDC GLUCOMTR-MCNC: 250 MG/DL (ref 70–99)
HCT VFR BLD AUTO: 41.7 %
HGB BLD-MCNC: 13.9 G/DL
IMM GRANULOCYTES # BLD AUTO: 0.02 X10(3) UL (ref 0–1)
IMM GRANULOCYTES NFR BLD: 0.5 %
L PNEUMO AG UR QL: NEGATIVE
LYMPHOCYTES # BLD AUTO: 0.34 X10(3) UL (ref 1–4)
LYMPHOCYTES NFR BLD AUTO: 7.7 %
MCH RBC QN AUTO: 32.5 PG (ref 26–34)
MCHC RBC AUTO-ENTMCNC: 33.3 G/DL (ref 31–37)
MCV RBC AUTO: 97.4 FL
MONOCYTES # BLD AUTO: 0.05 X10(3) UL (ref 0.1–1)
MONOCYTES NFR BLD AUTO: 1.1 %
NEUTROPHILS # BLD AUTO: 3.99 X10 (3) UL (ref 1.5–7.7)
NEUTROPHILS # BLD AUTO: 3.99 X10(3) UL (ref 1.5–7.7)
NEUTROPHILS NFR BLD AUTO: 90.7 %
OSMOLALITY SERPL CALC.SUM OF ELEC: 297 MOSM/KG (ref 275–295)
P AXIS: 67 DEGREES
P-R INTERVAL: 148 MS
PLATELET # BLD AUTO: 212 10(3)UL (ref 150–450)
POTASSIUM SERPL-SCNC: 4.1 MMOL/L (ref 3.5–5.1)
Q-T INTERVAL: 408 MS
QRS DURATION: 94 MS
QTC CALCULATION (BEZET): 443 MS
R AXIS: 5 DEGREES
RBC # BLD AUTO: 4.28 X10(6)UL
SODIUM SERPL-SCNC: 140 MMOL/L (ref 136–145)
STREP PNEUMO ANTIGEN, URINE: NEGATIVE
T AXIS: 69 DEGREES
VENTRICULAR RATE: 71 BPM
WBC # BLD AUTO: 4.4 X10(3) UL (ref 4–11)

## 2023-07-29 PROCEDURE — 99233 SBSQ HOSP IP/OBS HIGH 50: CPT | Performed by: INTERNAL MEDICINE

## 2023-07-29 NOTE — PLAN OF CARE
A/ox4, on 2L O2, CPAP overnight, up x1 SBA, voiding freely, no BM overnight, denies pain/nausea, ACHS, receiving IV Zosyn and IV steroids via right chest port, vital signs stable, no acute changes overnight. Call light within reach, safety measures in place, frequent rounding done, plan of care continued.           Problem: Patient Centered Care  Goal: Patient preferences are identified and integrated in the patient's plan of care  Description: Interventions:  - What would you like us to know as we care for you?   - Provide timely, complete, and accurate information to patient/family  - Incorporate patient and family knowledge, values, beliefs, and cultural backgrounds into the planning and delivery of care  - Encourage patient/family to participate in care and decision-making at the level they choose  - Honor patient and family perspectives and choices  Outcome: Progressing     Problem: PAIN - ADULT  Goal: Verbalizes/displays adequate comfort level or patient's stated pain goal  Description: INTERVENTIONS:  - Encourage pt to monitor pain and request assistance  - Assess pain using appropriate pain scale  - Administer analgesics based on type and severity of pain and evaluate response  - Implement non-pharmacological measures as appropriate and evaluate response  - Consider cultural and social influences on pain and pain management  - Manage/alleviate anxiety  - Utilize distraction and/or relaxation techniques  - Monitor for opioid side effects  - Notify MD/LIP if interventions unsuccessful or patient reports new pain  - Anticipate increased pain with activity and pre-medicate as appropriate  Outcome: Progressing     Problem: RISK FOR INFECTION - ADULT  Goal: Absence of fever/infection during anticipated neutropenic period  Description: INTERVENTIONS  - Monitor WBC  - Administer growth factors as ordered  - Implement neutropenic guidelines  Outcome: Progressing     Problem: SAFETY ADULT - FALL  Goal: Free from fall injury  Description: INTERVENTIONS:  - Assess pt frequently for physical needs  - Identify cognitive and physical deficits and behaviors that affect risk of falls.   - [place] fall precautions as indicated by assessment.  - Educate pt/family on patient safety including physical limitations  - Instruct pt to call for assistance with activity based on assessment  - Modify environment to reduce risk of injury  - Provide assistive devices as appropriate  - Consider OT/PT consult to assist with strengthening/mobility  - Encourage toileting schedule  Outcome: Progressing     Problem: DISCHARGE PLANNING  Goal: Discharge to home or other facility with appropriate resources  Description: INTERVENTIONS:  - Identify barriers to discharge w/pt and caregiver  - Include patient/family/discharge partner in discharge planning  - Arrange for needed discharge resources and transportation as appropriate  - Identify discharge learning needs (meds, wound care, etc)  - Arrange for interpreters to assist at discharge as needed  - Consider post-discharge preferences of patient/family/discharge partner  - Complete POLST form as appropriate  - Assess patient's ability to be responsible for managing their own health  - Refer to Case Management Department for coordinating discharge planning if the patient needs post-hospital services based on physician/LIP order or complex needs related to functional status, cognitive ability or social support system  Outcome: Progressing     Problem: RESPIRATORY - ADULT  Goal: Achieves optimal ventilation and oxygenation  Description: INTERVENTIONS:  - Assess for changes in respiratory status  - Assess for changes in mentation and behavior  - Position to facilitate oxygenation and minimize respiratory effort  - Oxygen supplementation based on oxygen saturation or ABGs  - Provide Smoking Cessation handout, if applicable  - Encourage broncho-pulmonary hygiene including cough, deep breathe, Incentive Spirometry  - Assess the need for suctioning and perform as needed  - Assess and instruct to report SOB or any respiratory difficulty  - Respiratory Therapy support as indicated  - Manage/alleviate anxiety  - Monitor for signs/symptoms of CO2 retention  Outcome: Progressing     Problem: GASTROINTESTINAL - ADULT  Goal: Minimal or absence of nausea and vomiting  Description: INTERVENTIONS:  - Maintain adequate hydration with IV or PO as ordered and tolerated  - Nasogastric tube to low intermittent suction as ordered  - Evaluate effectiveness of ordered antiemetic medications  - Provide nonpharmacologic comfort measures as appropriate  - Advance diet as tolerated, if ordered  - Obtain nutritional consult as needed  - Evaluate fluid balance  Outcome: Progressing     Problem: SKIN/TISSUE INTEGRITY - ADULT  Goal: Skin integrity remains intact  Description: INTERVENTIONS  - Assess and document risk factors for pressure ulcer development  - Assess and document skin integrity  - Monitor for areas of redness and/or skin breakdown  - Initiate interventions, skin care algorithm/standards of care as needed  Outcome: Progressing     Problem: MUSCULOSKELETAL - ADULT  Goal: Return mobility to safest level of function  Description: INTERVENTIONS:  - Assess patient stability and activity tolerance for standing, transferring and ambulating w/ or w/o assistive devices  - Assist with transfers and ambulation using safe patient handling equipment as needed  - Ensure adequate protection for wounds/incisions during mobilization  - Obtain PT/OT consults as needed  - Advance activity as appropriate  - Communicate ordered activity level and limitations with patient/family  Outcome: Progressing

## 2023-07-29 NOTE — PLAN OF CARE
Patient is alert and oriented x4. On 2L o2. Vital signs stable, afebrile. Voiding freely. Denies pain. Tolerating general diet. IV Abx continued. Saline locked. IV Solu-Medrol continued. ACHS accuchecks. No acute changes during shift.     Problem: Patient Centered Care  Goal: Patient preferences are identified and integrated in the patient's plan of care  Description: Interventions:  - What would you like us to know as we care for you?   - Provide timely, complete, and accurate information to patient/family  - Incorporate patient and family knowledge, values, beliefs, and cultural backgrounds into the planning and delivery of care  - Encourage patient/family to participate in care and decision-making at the level they choose  - Honor patient and family perspectives and choices  Outcome: Progressing     Problem: PAIN - ADULT  Goal: Verbalizes/displays adequate comfort level or patient's stated pain goal  Description: INTERVENTIONS:  - Encourage pt to monitor pain and request assistance  - Assess pain using appropriate pain scale  - Administer analgesics based on type and severity of pain and evaluate response  - Implement non-pharmacological measures as appropriate and evaluate response  - Consider cultural and social influences on pain and pain management  - Manage/alleviate anxiety  - Utilize distraction and/or relaxation techniques  - Monitor for opioid side effects  - Notify MD/LIP if interventions unsuccessful or patient reports new pain  - Anticipate increased pain with activity and pre-medicate as appropriate  Outcome: Progressing     Problem: RISK FOR INFECTION - ADULT  Goal: Absence of fever/infection during anticipated neutropenic period  Description: INTERVENTIONS  - Monitor WBC  - Administer growth factors as ordered  - Implement neutropenic guidelines  Outcome: Progressing     Problem: SAFETY ADULT - FALL  Goal: Free from fall injury  Description: INTERVENTIONS:  - Assess pt frequently for physical needs  - Identify cognitive and physical deficits and behaviors that affect risk of falls.   - [place] fall precautions as indicated by assessment.  - Educate pt/family on patient safety including physical limitations  - Instruct pt to call for assistance with activity based on assessment  - Modify environment to reduce risk of injury  - Provide assistive devices as appropriate  - Consider OT/PT consult to assist with strengthening/mobility  - Encourage toileting schedule  Outcome: Progressing     Problem: DISCHARGE PLANNING  Goal: Discharge to home or other facility with appropriate resources  Description: INTERVENTIONS:  - Identify barriers to discharge w/pt and caregiver  - Include patient/family/discharge partner in discharge planning  - Arrange for needed discharge resources and transportation as appropriate  - Identify discharge learning needs (meds, wound care, etc)  - Arrange for interpreters to assist at discharge as needed  - Consider post-discharge preferences of patient/family/discharge partner  - Complete POLST form as appropriate  - Assess patient's ability to be responsible for managing their own health  - Refer to Case Management Department for coordinating discharge planning if the patient needs post-hospital services based on physician/LIP order or complex needs related to functional status, cognitive ability or social support system  Outcome: Progressing     Problem: RESPIRATORY - ADULT  Goal: Achieves optimal ventilation and oxygenation  Description: INTERVENTIONS:  - Assess for changes in respiratory status  - Assess for changes in mentation and behavior  - Position to facilitate oxygenation and minimize respiratory effort  - Oxygen supplementation based on oxygen saturation or ABGs  - Provide Smoking Cessation handout, if applicable  - Encourage broncho-pulmonary hygiene including cough, deep breathe, Incentive Spirometry  - Assess the need for suctioning and perform as needed  - Assess and instruct to report SOB or any respiratory difficulty  - Respiratory Therapy support as indicated  - Manage/alleviate anxiety  - Monitor for signs/symptoms of CO2 retention  Outcome: Progressing     Problem: GASTROINTESTINAL - ADULT  Goal: Minimal or absence of nausea and vomiting  Description: INTERVENTIONS:  - Maintain adequate hydration with IV or PO as ordered and tolerated  - Nasogastric tube to low intermittent suction as ordered  - Evaluate effectiveness of ordered antiemetic medications  - Provide nonpharmacologic comfort measures as appropriate  - Advance diet as tolerated, if ordered  - Obtain nutritional consult as needed  - Evaluate fluid balance  Outcome: Progressing     Problem: SKIN/TISSUE INTEGRITY - ADULT  Goal: Skin integrity remains intact  Description: INTERVENTIONS  - Assess and document risk factors for pressure ulcer development  - Assess and document skin integrity  - Monitor for areas of redness and/or skin breakdown  - Initiate interventions, skin care algorithm/standards of care as needed  Outcome: Progressing     Problem: MUSCULOSKELETAL - ADULT  Goal: Return mobility to safest level of function  Description: INTERVENTIONS:  - Assess patient stability and activity tolerance for standing, transferring and ambulating w/ or w/o assistive devices  - Assist with transfers and ambulation using safe patient handling equipment as needed  - Ensure adequate protection for wounds/incisions during mobilization  - Obtain PT/OT consults as needed  - Advance activity as appropriate  - Communicate ordered activity level and limitations with patient/family  Outcome: Progressing

## 2023-07-30 PROBLEM — C34.90 ADENOCARCINOMA OF LUNG (HCC): Status: ACTIVE | Noted: 2023-07-30

## 2023-07-30 LAB
ANION GAP SERPL CALC-SCNC: 7 MMOL/L (ref 0–18)
BASOPHILS # BLD AUTO: 0.01 X10(3) UL (ref 0–0.2)
BASOPHILS NFR BLD AUTO: 0.1 %
BUN BLD-MCNC: 25 MG/DL (ref 7–18)
BUN/CREAT SERPL: 21.6 (ref 10–20)
CALCIUM BLD-MCNC: 9 MG/DL (ref 8.5–10.1)
CHLORIDE SERPL-SCNC: 110 MMOL/L (ref 98–112)
CO2 SERPL-SCNC: 25 MMOL/L (ref 21–32)
CREAT BLD-MCNC: 1.16 MG/DL
DEPRECATED RDW RBC AUTO: 51.8 FL (ref 35.1–46.3)
EGFRCR SERPLBLD CKD-EPI 2021: 64 ML/MIN/1.73M2 (ref 60–?)
EOSINOPHIL # BLD AUTO: 0 X10(3) UL (ref 0–0.7)
EOSINOPHIL NFR BLD AUTO: 0 %
ERYTHROCYTE [DISTWIDTH] IN BLOOD BY AUTOMATED COUNT: 14.3 % (ref 11–15)
GLUCOSE BLD-MCNC: 165 MG/DL (ref 70–99)
GLUCOSE BLDC GLUCOMTR-MCNC: 127 MG/DL (ref 70–99)
GLUCOSE BLDC GLUCOMTR-MCNC: 147 MG/DL (ref 70–99)
GLUCOSE BLDC GLUCOMTR-MCNC: 179 MG/DL (ref 70–99)
GLUCOSE BLDC GLUCOMTR-MCNC: 198 MG/DL (ref 70–99)
HCT VFR BLD AUTO: 36.7 %
HGB BLD-MCNC: 12.2 G/DL
IMM GRANULOCYTES # BLD AUTO: 0.06 X10(3) UL (ref 0–1)
IMM GRANULOCYTES NFR BLD: 0.5 %
LYMPHOCYTES # BLD AUTO: 0.25 X10(3) UL (ref 1–4)
LYMPHOCYTES NFR BLD AUTO: 2.2 %
MCH RBC QN AUTO: 32.6 PG (ref 26–34)
MCHC RBC AUTO-ENTMCNC: 33.2 G/DL (ref 31–37)
MCV RBC AUTO: 98.1 FL
MONOCYTES # BLD AUTO: 0.32 X10(3) UL (ref 0.1–1)
MONOCYTES NFR BLD AUTO: 2.9 %
NEUTROPHILS # BLD AUTO: 10.53 X10 (3) UL (ref 1.5–7.7)
NEUTROPHILS # BLD AUTO: 10.53 X10(3) UL (ref 1.5–7.7)
NEUTROPHILS NFR BLD AUTO: 94.3 %
OSMOLALITY SERPL CALC.SUM OF ELEC: 302 MOSM/KG (ref 275–295)
PLATELET # BLD AUTO: 221 10(3)UL (ref 150–450)
POTASSIUM SERPL-SCNC: 4.3 MMOL/L (ref 3.5–5.1)
RBC # BLD AUTO: 3.74 X10(6)UL
SODIUM SERPL-SCNC: 142 MMOL/L (ref 136–145)
WBC # BLD AUTO: 11.2 X10(3) UL (ref 4–11)

## 2023-07-30 PROCEDURE — 99232 SBSQ HOSP IP/OBS MODERATE 35: CPT | Performed by: STUDENT IN AN ORGANIZED HEALTH CARE EDUCATION/TRAINING PROGRAM

## 2023-07-30 PROCEDURE — 99232 SBSQ HOSP IP/OBS MODERATE 35: CPT | Performed by: INTERNAL MEDICINE

## 2023-07-30 RX ORDER — ENOXAPARIN SODIUM 100 MG/ML
1 INJECTION SUBCUTANEOUS EVERY 12 HOURS SCHEDULED
Status: DISCONTINUED | OUTPATIENT
Start: 2023-07-30 | End: 2023-07-31

## 2023-07-30 RX ORDER — METHYLPREDNISOLONE SODIUM SUCCINATE 40 MG/ML
40 INJECTION, POWDER, LYOPHILIZED, FOR SOLUTION INTRAMUSCULAR; INTRAVENOUS EVERY 24 HOURS
Status: DISCONTINUED | OUTPATIENT
Start: 2023-07-31 | End: 2023-08-03

## 2023-07-30 RX ORDER — IPRATROPIUM BROMIDE AND ALBUTEROL SULFATE 2.5; .5 MG/3ML; MG/3ML
3 SOLUTION RESPIRATORY (INHALATION) EVERY 6 HOURS PRN
Status: DISCONTINUED | OUTPATIENT
Start: 2023-07-30 | End: 2023-08-03

## 2023-07-30 NOTE — PLAN OF CARE
Patient is AOX4 on 2L O2 via NC. Cpap (noc). [place] neb treatment given overnight. SBA. Xarelto for DVT prophylaxis. R side port - dressing CDI. General diet. Voiding freely up to the bathroom. ACHS accucheck. IV methylprednisone and IV Zosyn given overnight. S.L. Plan pending.          Problem: Patient Centered Care  Goal: Patient preferences are identified and integrated in the patient's plan of care  Description: Interventions:  - What would you like us to know as we care for you?   - Provide timely, complete, and accurate information to patient/family  - Incorporate patient and family knowledge, values, beliefs, and cultural backgrounds into the planning and delivery of care  - Encourage patient/family to participate in care and decision-making at the level they choose  - Honor patient and family perspectives and choices  Outcome: Progressing     Problem: PAIN - ADULT  Goal: Verbalizes/displays adequate comfort level or patient's stated pain goal  Description: INTERVENTIONS:  - Encourage pt to monitor pain and request assistance  - Assess pain using appropriate pain scale  - Administer analgesics based on type and severity of pain and evaluate response  - Implement non-pharmacological measures as appropriate and evaluate response  - Consider cultural and social influences on pain and pain management  - Manage/alleviate anxiety  - Utilize distraction and/or relaxation techniques  - Monitor for opioid side effects  - Notify MD/LIP if interventions unsuccessful or patient reports new pain  - Anticipate increased pain with activity and pre-medicate as appropriate  Outcome: Progressing     Problem: RISK FOR INFECTION - ADULT  Goal: Absence of fever/infection during anticipated neutropenic period  Description: INTERVENTIONS  - Monitor WBC  - Administer growth factors as ordered  - Implement neutropenic guidelines  Outcome: Progressing     Problem: SAFETY ADULT - FALL  Goal: Free from fall injury  Description: INTERVENTIONS:  - Assess pt frequently for physical needs  - Identify cognitive and physical deficits and behaviors that affect risk of falls.   - [place] fall precautions as indicated by assessment.  - Educate pt/family on patient safety including physical limitations  - Instruct pt to call for assistance with activity based on assessment  - Modify environment to reduce risk of injury  - Provide assistive devices as appropriate  - Consider OT/PT consult to assist with strengthening/mobility  - Encourage toileting schedule  Outcome: Progressing     Problem: DISCHARGE PLANNING  Goal: Discharge to home or other facility with appropriate resources  Description: INTERVENTIONS:  - Identify barriers to discharge w/pt and caregiver  - Include patient/family/discharge partner in discharge planning  - Arrange for needed discharge resources and transportation as appropriate  - Identify discharge learning needs (meds, wound care, etc)  - Arrange for interpreters to assist at discharge as needed  - Consider post-discharge preferences of patient/family/discharge partner  - Complete POLST form as appropriate  - Assess patient's ability to be responsible for managing their own health  - Refer to Case Management Department for coordinating discharge planning if the patient needs post-hospital services based on physician/LIP order or complex needs related to functional status, cognitive ability or social support system  Outcome: Progressing     Problem: RESPIRATORY - ADULT  Goal: Achieves optimal ventilation and oxygenation  Description: INTERVENTIONS:  - Assess for changes in respiratory status  - Assess for changes in mentation and behavior  - Position to facilitate oxygenation and minimize respiratory effort  - Oxygen supplementation based on oxygen saturation or ABGs  - Provide Smoking Cessation handout, if applicable  - Encourage broncho-pulmonary hygiene including cough, deep breathe, Incentive Spirometry  - Assess the need for suctioning and perform as needed  - Assess and instruct to report SOB or any respiratory difficulty  - Respiratory Therapy support as indicated  - Manage/alleviate anxiety  - Monitor for signs/symptoms of CO2 retention  Outcome: Progressing     Problem: GASTROINTESTINAL - ADULT  Goal: Minimal or absence of nausea and vomiting  Description: INTERVENTIONS:  - Maintain adequate hydration with IV or PO as ordered and tolerated  - Nasogastric tube to low intermittent suction as ordered  - Evaluate effectiveness of ordered antiemetic medications  - Provide nonpharmacologic comfort measures as appropriate  - Advance diet as tolerated, if ordered  - Obtain nutritional consult as needed  - Evaluate fluid balance  Outcome: Progressing     Problem: SKIN/TISSUE INTEGRITY - ADULT  Goal: Skin integrity remains intact  Description: INTERVENTIONS  - Assess and document risk factors for pressure ulcer development  - Assess and document skin integrity  - Monitor for areas of redness and/or skin breakdown  - Initiate interventions, skin care algorithm/standards of care as needed  Outcome: Progressing     Problem: MUSCULOSKELETAL - ADULT  Goal: Return mobility to safest level of function  Description: INTERVENTIONS:  - Assess patient stability and activity tolerance for standing, transferring and ambulating w/ or w/o assistive devices  - Assist with transfers and ambulation using safe patient handling equipment as needed  - Ensure adequate protection for wounds/incisions during mobilization  - Obtain PT/OT consults as needed  - Advance activity as appropriate  - Communicate ordered activity level and limitations with patient/family  Outcome: Progressing

## 2023-07-30 NOTE — PLAN OF CARE
Patient is alert and oriented x4. On 2L o2. Vital signs stable, afebrile. Voiding freely. Denies pain. Tolerating general diet. IV Abx continued. Saline locked. ACHS accuchecks. No acute changes during shift. Plan for repeat CXR Tuesday.     Problem: Patient Centered Care  Goal: Patient preferences are identified and integrated in the patient's plan of care  Description: Interventions:  - What would you like us to know as we care for you?   - Provide timely, complete, and accurate information to patient/family  - Incorporate patient and family knowledge, values, beliefs, and cultural backgrounds into the planning and delivery of care  - Encourage patient/family to participate in care and decision-making at the level they choose  - Honor patient and family perspectives and choices  Outcome: Progressing     Problem: PAIN - ADULT  Goal: Verbalizes/displays adequate comfort level or patient's stated pain goal  Description: INTERVENTIONS:  - Encourage pt to monitor pain and request assistance  - Assess pain using appropriate pain scale  - Administer analgesics based on type and severity of pain and evaluate response  - Implement non-pharmacological measures as appropriate and evaluate response  - Consider cultural and social influences on pain and pain management  - Manage/alleviate anxiety  - Utilize distraction and/or relaxation techniques  - Monitor for opioid side effects  - Notify MD/LIP if interventions unsuccessful or patient reports new pain  - Anticipate increased pain with activity and pre-medicate as appropriate  Outcome: Progressing     Problem: RISK FOR INFECTION - ADULT  Goal: Absence of fever/infection during anticipated neutropenic period  Description: INTERVENTIONS  - Monitor WBC  - Administer growth factors as ordered  - Implement neutropenic guidelines  Outcome: Progressing     Problem: SAFETY ADULT - FALL  Goal: Free from fall injury  Description: INTERVENTIONS:  - Assess pt frequently for physical needs  - Identify cognitive and physical deficits and behaviors that affect risk of falls.   - [place] fall precautions as indicated by assessment.  - Educate pt/family on patient safety including physical limitations  - Instruct pt to call for assistance with activity based on assessment  - Modify environment to reduce risk of injury  - Provide assistive devices as appropriate  - Consider OT/PT consult to assist with strengthening/mobility  - Encourage toileting schedule  Outcome: Progressing     Problem: DISCHARGE PLANNING  Goal: Discharge to home or other facility with appropriate resources  Description: INTERVENTIONS:  - Identify barriers to discharge w/pt and caregiver  - Include patient/family/discharge partner in discharge planning  - Arrange for needed discharge resources and transportation as appropriate  - Identify discharge learning needs (meds, wound care, etc)  - Arrange for interpreters to assist at discharge as needed  - Consider post-discharge preferences of patient/family/discharge partner  - Complete POLST form as appropriate  - Assess patient's ability to be responsible for managing their own health  - Refer to Case Management Department for coordinating discharge planning if the patient needs post-hospital services based on physician/LIP order or complex needs related to functional status, cognitive ability or social support system  Outcome: Progressing     Problem: RESPIRATORY - ADULT  Goal: Achieves optimal ventilation and oxygenation  Description: INTERVENTIONS:  - Assess for changes in respiratory status  - Assess for changes in mentation and behavior  - Position to facilitate oxygenation and minimize respiratory effort  - Oxygen supplementation based on oxygen saturation or ABGs  - Provide Smoking Cessation handout, if applicable  - Encourage broncho-pulmonary hygiene including cough, deep breathe, Incentive Spirometry  - Assess the need for suctioning and perform as needed  - Assess and instruct to report SOB or any respiratory difficulty  - Respiratory Therapy support as indicated  - Manage/alleviate anxiety  - Monitor for signs/symptoms of CO2 retention  Outcome: Progressing     Problem: GASTROINTESTINAL - ADULT  Goal: Minimal or absence of nausea and vomiting  Description: INTERVENTIONS:  - Maintain adequate hydration with IV or PO as ordered and tolerated  - Nasogastric tube to low intermittent suction as ordered  - Evaluate effectiveness of ordered antiemetic medications  - Provide nonpharmacologic comfort measures as appropriate  - Advance diet as tolerated, if ordered  - Obtain nutritional consult as needed  - Evaluate fluid balance  Outcome: Progressing     Problem: SKIN/TISSUE INTEGRITY - ADULT  Goal: Skin integrity remains intact  Description: INTERVENTIONS  - Assess and document risk factors for pressure ulcer development  - Assess and document skin integrity  - Monitor for areas of redness and/or skin breakdown  - Initiate interventions, skin care algorithm/standards of care as needed  Outcome: Progressing     Problem: MUSCULOSKELETAL - ADULT  Goal: Return mobility to safest level of function  Description: INTERVENTIONS:  - Assess patient stability and activity tolerance for standing, transferring and ambulating w/ or w/o assistive devices  - Assist with transfers and ambulation using safe patient handling equipment as needed  - Ensure adequate protection for wounds/incisions during mobilization  - Obtain PT/OT consults as needed  - Advance activity as appropriate  - Communicate ordered activity level and limitations with patient/family  Outcome: Progressing

## 2023-07-31 LAB
GLUCOSE BLDC GLUCOMTR-MCNC: 119 MG/DL (ref 70–99)
GLUCOSE BLDC GLUCOMTR-MCNC: 125 MG/DL (ref 70–99)
GLUCOSE BLDC GLUCOMTR-MCNC: 129 MG/DL (ref 70–99)
GLUCOSE BLDC GLUCOMTR-MCNC: 190 MG/DL (ref 70–99)

## 2023-07-31 PROCEDURE — 99232 SBSQ HOSP IP/OBS MODERATE 35: CPT | Performed by: INTERNAL MEDICINE

## 2023-07-31 RX ORDER — FLUTICASONE PROPIONATE 50 MCG
1 SPRAY, SUSPENSION (ML) NASAL 2 TIMES DAILY PRN
Status: DISCONTINUED | OUTPATIENT
Start: 2023-07-31 | End: 2023-08-03

## 2023-07-31 NOTE — CDS QUERY
How to Answer this Query    1.) Click \"Edit Button\" on the toolbar  2.) Type an \"X\" in the bracket for the diagnosis that applies. (You may also add additional clinical details as you feel necessary to substantiate your response). 3.) Finally click \"Sign\" to complete response. Thank you     Pneumonia Specificity  CLINICAL DOCUMENTATION CLARIFICATION FORM      Dear Doctor Tony Miramontes:  Clinical information (provided below) indicates pneumonia. For accurate ICD-10-CM code assignment to reflect severity of illness and risk of mortality,      1. Please specify type of pneumonia in the progress notes:    (  )  Aspiration pneumonia                  Please document specific aspirate (food, liquids, etc.)    (  )  Bacterial (specify organism)_____?________    (  x) Bronchopneumonia (specify organism)    (    )Interstitial pneumonia      (  ) Viral pneumonia    (  ) Other pneumonia (specify organism or type)____________________         2. Please specify the organism causing the pneumonia, if known     Note: CAP (Community-acquired), HAP (Hospital-acquired), and HCAP (Healthcare-associated) indicate where the pneumonia was acquired, not a specific type.     _______________________________________________________________________   Clinical Indicators:  ED provider: Acute hypoxemic respiratory failure   H&P: acute right lower lobe pneumonia  Chest CT with new dense consolidation in the right lower lobe  history of stage III B lung adenocarcinoma  history of immune mediated pneumonitis  7-30 Dr Tony Miramontes: PN: Right lower lobe pneumonia-the patient also has bilateral patchy infiltrates and pulmonary nodules. Differential diagnosis includes bacterial infection versus fungal infection versus neoplasm  CT Chest: suspicious for multifocal pneumonia. Meds include zosyn IVPB, zithromax po, duonebs prn     If you have any questions, please contact Clinical :  Faye Garcia RN at [phone]     Thank You!      THIS FORM IS A PERMANENT PART OF THE MEDICAL RECORD

## 2023-07-31 NOTE — PLAN OF CARE
Patient is alert and oriented x4. On 2L o2. CPAP overnight. Vital signs stable, afebrile. Voiding freely. Denies pain. Lovenox subcutaneous for DVT prophylaxis. Given IV antibiotic coverage. Saline locked. ACHS accuchecks. No acute changes during shift. Plan for repeat CXR Tuesday.      Problem: Patient Centered Care  Goal: Patient preferences are identified and integrated in the patient's plan of care  Description: Interventions:  - What would you like us to know as we care for you?   - Provide timely, complete, and accurate information to patient/family  - Incorporate patient and family knowledge, values, beliefs, and cultural backgrounds into the planning and delivery of care  - Encourage patient/family to participate in care and decision-making at the level they choose  - Honor patient and family perspectives and choices  Outcome: Progressing     Problem: Patient/Family Goals  Goal: Patient/Family Long Term Goal  Description: Patient's Long Term Goal: Return home    Interventions:  - Follows plan of care  - Monitor labs  - Repeat chest x-ray  - See additional Care Plan goals for specific interventions  Outcome: Progressing     Problem: PAIN - ADULT  Goal: Verbalizes/displays adequate comfort level or patient's stated pain goal  Description: INTERVENTIONS:  - Encourage pt to monitor pain and request assistance  - Assess pain using appropriate pain scale  - Administer analgesics based on type and severity of pain and evaluate response  - Implement non-pharmacological measures as appropriate and evaluate response  - Consider cultural and social influences on pain and pain management  - Manage/alleviate anxiety  - Utilize distraction and/or relaxation techniques  - Monitor for opioid side effects  - Notify MD/LIP if interventions unsuccessful or patient reports new pain  - Anticipate increased pain with activity and pre-medicate as appropriate  Outcome: Progressing     Problem: RISK FOR INFECTION - ADULT  Goal: Absence of fever/infection during anticipated neutropenic period  Description: INTERVENTIONS  - Monitor WBC  - Administer growth factors as ordered  - Implement neutropenic guidelines  Outcome: Progressing     Problem: SAFETY ADULT - FALL  Goal: Free from fall injury  Description: INTERVENTIONS:  - Assess pt frequently for physical needs  - Identify cognitive and physical deficits and behaviors that affect risk of falls.   - [place] fall precautions as indicated by assessment.  - Educate pt/family on patient safety including physical limitations  - Instruct pt to call for assistance with activity based on assessment  - Modify environment to reduce risk of injury  - Provide assistive devices as appropriate  - Consider OT/PT consult to assist with strengthening/mobility  - Encourage toileting schedule  Outcome: Progressing     Problem: DISCHARGE PLANNING  Goal: Discharge to home or other facility with appropriate resources  Description: INTERVENTIONS:  - Identify barriers to discharge w/pt and caregiver  - Include patient/family/discharge partner in discharge planning  - Arrange for needed discharge resources and transportation as appropriate  - Identify discharge learning needs (meds, wound care, etc)  - Arrange for interpreters to assist at discharge as needed  - Consider post-discharge preferences of patient/family/discharge partner  - Complete POLST form as appropriate  - Assess patient's ability to be responsible for managing their own health  - Refer to Case Management Department for coordinating discharge planning if the patient needs post-hospital services based on physician/LIP order or complex needs related to functional status, cognitive ability or social support system  Outcome: Progressing     Problem: RESPIRATORY - ADULT  Goal: Achieves optimal ventilation and oxygenation  Description: INTERVENTIONS:  - Assess for changes in respiratory status  - Assess for changes in mentation and behavior  - Position to facilitate oxygenation and minimize respiratory effort  - Oxygen supplementation based on oxygen saturation or ABGs  - Provide Smoking Cessation handout, if applicable  - Encourage broncho-pulmonary hygiene including cough, deep breathe, Incentive Spirometry  - Assess the need for suctioning and perform as needed  - Assess and instruct to report SOB or any respiratory difficulty  - Respiratory Therapy support as indicated  - Manage/alleviate anxiety  - Monitor for signs/symptoms of CO2 retention  Outcome: Progressing     Problem: GASTROINTESTINAL - ADULT  Goal: Minimal or absence of nausea and vomiting  Description: INTERVENTIONS:  - Maintain adequate hydration with IV or PO as ordered and tolerated  - Nasogastric tube to low intermittent suction as ordered  - Evaluate effectiveness of ordered antiemetic medications  - Provide nonpharmacologic comfort measures as appropriate  - Advance diet as tolerated, if ordered  - Obtain nutritional consult as needed  - Evaluate fluid balance  Outcome: Progressing     Problem: SKIN/TISSUE INTEGRITY - ADULT  Goal: Skin integrity remains intact  Description: INTERVENTIONS  - Assess and document risk factors for pressure ulcer development  - Assess and document skin integrity  - Monitor for areas of redness and/or skin breakdown  - Initiate interventions, skin care algorithm/standards of care as needed  Outcome: Progressing     Problem: MUSCULOSKELETAL - ADULT  Goal: Return mobility to safest level of function  Description: INTERVENTIONS:  - Assess patient stability and activity tolerance for standing, transferring and ambulating w/ or w/o assistive devices  - Assist with transfers and ambulation using safe patient handling equipment as needed  - Ensure adequate protection for wounds/incisions during mobilization  - Obtain PT/OT consults as needed  - Advance activity as appropriate  - Communicate ordered activity level and limitations with patient/family  Outcome: Progressing

## 2023-07-31 NOTE — PLAN OF CARE
Pt A/O x4. Weaned off of O2. Tolerating diet, accu-checks AC/HS. Voiding freely. Denies pain. Up ad evelina. Denies SOB. IV abx continued. Fall precautions in place. Call light within reach. Calls appropriately. Frequent rounding.  Plan for CXR in the AM.     Problem: Patient Centered Care  Goal: Patient preferences are identified and integrated in the patient's plan of care  Description: Interventions:  - What would you like us to know as we care for you?   - Provide timely, complete, and accurate information to patient/family  - Incorporate patient and family knowledge, values, beliefs, and cultural backgrounds into the planning and delivery of care  - Encourage patient/family to participate in care and decision-making at the level they choose  - Honor patient and family perspectives and choices  Outcome: Progressing     Problem: Patient/Family Goals  Goal: Patient/Family Long Term Goal  Description: Patient's Long Term Goal:     Interventions:  -   - See additional Care Plan goals for specific interventions  Outcome: Progressing  Goal: Patient/Family Short Term Goal  Description: Patient's Short Term Goal:     Interventions:   -  - See additional Care Plan goals for specific interventions  Outcome: Progressing     Problem: PAIN - ADULT  Goal: Verbalizes/displays adequate comfort level or patient's stated pain goal  Description: INTERVENTIONS:  - Encourage pt to monitor pain and request assistance  - Assess pain using appropriate pain scale  - Administer analgesics based on type and severity of pain and evaluate response  - Implement non-pharmacological measures as appropriate and evaluate response  - Consider cultural and social influences on pain and pain management  - Manage/alleviate anxiety  - Utilize distraction and/or relaxation techniques  - Monitor for opioid side effects  - Notify MD/LIP if interventions unsuccessful or patient reports new pain  - Anticipate increased pain with activity and pre-medicate as appropriate  Outcome: Progressing     Problem: RISK FOR INFECTION - ADULT  Goal: Absence of fever/infection during anticipated neutropenic period  Description: INTERVENTIONS  - Monitor WBC  - Administer growth factors as ordered  - Implement neutropenic guidelines  Outcome: Progressing     Problem: SAFETY ADULT - FALL  Goal: Free from fall injury  Description: INTERVENTIONS:  - Assess pt frequently for physical needs  - Identify cognitive and physical deficits and behaviors that affect risk of falls.   - [place] fall precautions as indicated by assessment.  - Educate pt/family on patient safety including physical limitations  - Instruct pt to call for assistance with activity based on assessment  - Modify environment to reduce risk of injury  - Provide assistive devices as appropriate  - Consider OT/PT consult to assist with strengthening/mobility  - Encourage toileting schedule  Outcome: Progressing     Problem: DISCHARGE PLANNING  Goal: Discharge to home or other facility with appropriate resources  Description: INTERVENTIONS:  - Identify barriers to discharge w/pt and caregiver  - Include patient/family/discharge partner in discharge planning  - Arrange for needed discharge resources and transportation as appropriate  - Identify discharge learning needs (meds, wound care, etc)  - Arrange for interpreters to assist at discharge as needed  - Consider post-discharge preferences of patient/family/discharge partner  - Complete POLST form as appropriate  - Assess patient's ability to be responsible for managing their own health  - Refer to Case Management Department for coordinating discharge planning if the patient needs post-hospital services based on physician/LIP order or complex needs related to functional status, cognitive ability or social support system  Outcome: Progressing     Problem: RESPIRATORY - ADULT  Goal: Achieves optimal ventilation and oxygenation  Description: INTERVENTIONS:  - Assess for changes in respiratory status  - Assess for changes in mentation and behavior  - Position to facilitate oxygenation and minimize respiratory effort  - Oxygen supplementation based on oxygen saturation or ABGs  - Provide Smoking Cessation handout, if applicable  - Encourage broncho-pulmonary hygiene including cough, deep breathe, Incentive Spirometry  - Assess the need for suctioning and perform as needed  - Assess and instruct to report SOB or any respiratory difficulty  - Respiratory Therapy support as indicated  - Manage/alleviate anxiety  - Monitor for signs/symptoms of CO2 retention  Outcome: Progressing     Problem: GASTROINTESTINAL - ADULT  Goal: Minimal or absence of nausea and vomiting  Description: INTERVENTIONS:  - Maintain adequate hydration with IV or PO as ordered and tolerated  - Nasogastric tube to low intermittent suction as ordered  - Evaluate effectiveness of ordered antiemetic medications  - Provide nonpharmacologic comfort measures as appropriate  - Advance diet as tolerated, if ordered  - Obtain nutritional consult as needed  - Evaluate fluid balance  Outcome: Progressing     Problem: SKIN/TISSUE INTEGRITY - ADULT  Goal: Skin integrity remains intact  Description: INTERVENTIONS  - Assess and document risk factors for pressure ulcer development  - Assess and document skin integrity  - Monitor for areas of redness and/or skin breakdown  - Initiate interventions, skin care algorithm/standards of care as needed  Outcome: Progressing     Problem: MUSCULOSKELETAL - ADULT  Goal: Return mobility to safest level of function  Description: INTERVENTIONS:  - Assess patient stability and activity tolerance for standing, transferring and ambulating w/ or w/o assistive devices  - Assist with transfers and ambulation using safe patient handling equipment as needed  - Ensure adequate protection for wounds/incisions during mobilization  - Obtain PT/OT consults as needed  - Advance activity as appropriate  - Communicate ordered activity level and limitations with patient/family  Outcome: Progressing     Problem: Diabetes/Glucose Control  Goal: Glucose maintained within prescribed range  Description: INTERVENTIONS:  - Monitor Blood Glucose as ordered  - Assess for signs and symptoms of hyperglycemia and hypoglycemia  - Administer ordered medications to maintain glucose within target range  - Assess barriers to adequate nutritional intake and initiate nutrition consult as needed  - Instruct patient on self management of diabetes  Outcome: Progressing

## 2023-08-01 ENCOUNTER — APPOINTMENT (OUTPATIENT)
Dept: GENERAL RADIOLOGY | Facility: HOSPITAL | Age: 78
End: 2023-08-01
Attending: INTERNAL MEDICINE
Payer: MEDICARE

## 2023-08-01 LAB
GLUCOSE BLDC GLUCOMTR-MCNC: 124 MG/DL (ref 70–99)
GLUCOSE BLDC GLUCOMTR-MCNC: 137 MG/DL (ref 70–99)
GLUCOSE BLDC GLUCOMTR-MCNC: 148 MG/DL (ref 70–99)
GLUCOSE BLDC GLUCOMTR-MCNC: 148 MG/DL (ref 70–99)

## 2023-08-01 PROCEDURE — 71046 X-RAY EXAM CHEST 2 VIEWS: CPT | Performed by: INTERNAL MEDICINE

## 2023-08-01 PROCEDURE — 99232 SBSQ HOSP IP/OBS MODERATE 35: CPT | Performed by: INTERNAL MEDICINE

## 2023-08-01 NOTE — PLAN OF CARE
Appetite good, up to chair and ambulating in hallway, chest x-ray today, plan for bronchoscopy tomorrow, NPO at midnight, lovenox on hold, no complaint of pain, oral and iv abx, monitoring blood sugars. Patient and wife updated on care plan.     Problem: Patient Centered Care  Goal: Patient preferences are identified and integrated in the patient's plan of care  Description: Interventions:  - What would you like us to know as we care for you? -  - Provide timely, complete, and accurate information to patient/family  - Incorporate patient and family knowledge, values, beliefs, and cultural backgrounds into the planning and delivery of care  - Encourage patient/family to participate in care and decision-making at the level they choose  - Honor patient and family perspectives and choices  Outcome: Progressing     Problem: Patient/Family Goals  Goal: Patient/Family Long Term Goal  Description: Patient's Long Term Goal: -    Interventions:  - -  - See additional Care Plan goals for specific interventions  Outcome: Progressing  Goal: Patient/Family Short Term Goal  Description: Patient's Short Term Goal: -    Interventions:   - -  - See additional Care Plan goals for specific interventions  Outcome: Progressing     Problem: PAIN - ADULT  Goal: Verbalizes/displays adequate comfort level or patient's stated pain goal  Description: INTERVENTIONS:  - Encourage pt to monitor pain and request assistance  - Assess pain using appropriate pain scale  - Administer analgesics based on type and severity of pain and evaluate response  - Implement non-pharmacological measures as appropriate and evaluate response  - Consider cultural and social influences on pain and pain management  - Manage/alleviate anxiety  - Utilize distraction and/or relaxation techniques  - Monitor for opioid side effects  - Notify MD/LIP if interventions unsuccessful or patient reports new pain  - Anticipate increased pain with activity and pre-medicate as appropriate  Outcome: Progressing     Problem: RISK FOR INFECTION - ADULT  Goal: Absence of fever/infection during anticipated neutropenic period  Description: INTERVENTIONS  - Monitor WBC  - Administer growth factors as ordered  - Implement neutropenic guidelines  Outcome: Progressing     Problem: SAFETY ADULT - FALL  Goal: Free from fall injury  Description: INTERVENTIONS:  - Assess pt frequently for physical needs  - Identify cognitive and physical deficits and behaviors that affect risk of falls.   - [place] fall precautions as indicated by assessment.  - Educate pt/family on patient safety including physical limitations  - Instruct pt to call for assistance with activity based on assessment  - Modify environment to reduce risk of injury  - Provide assistive devices as appropriate  - Consider OT/PT consult to assist with strengthening/mobility  - Encourage toileting schedule  Outcome: Progressing     Problem: DISCHARGE PLANNING  Goal: Discharge to home or other facility with appropriate resources  Description: INTERVENTIONS:  - Identify barriers to discharge w/pt and caregiver  - Include patient/family/discharge partner in discharge planning  - Arrange for needed discharge resources and transportation as appropriate  - Identify discharge learning needs (meds, wound care, etc)  - Arrange for interpreters to assist at discharge as needed  - Consider post-discharge preferences of patient/family/discharge partner  - Complete POLST form as appropriate  - Assess patient's ability to be responsible for managing their own health  - Refer to Case Management Department for coordinating discharge planning if the patient needs post-hospital services based on physician/LIP order or complex needs related to functional status, cognitive ability or social support system  Outcome: Progressing     Problem: RESPIRATORY - ADULT  Goal: Achieves optimal ventilation and oxygenation  Description: INTERVENTIONS:  - Assess for changes in respiratory status  - Assess for changes in mentation and behavior  - Position to facilitate oxygenation and minimize respiratory effort  - Oxygen supplementation based on oxygen saturation or ABGs  - Provide Smoking Cessation handout, if applicable  - Encourage broncho-pulmonary hygiene including cough, deep breathe, Incentive Spirometry  - Assess the need for suctioning and perform as needed  - Assess and instruct to report SOB or any respiratory difficulty  - Respiratory Therapy support as indicated  - Manage/alleviate anxiety  - Monitor for signs/symptoms of CO2 retention  Outcome: Progressing     Problem: GASTROINTESTINAL - ADULT  Goal: Minimal or absence of nausea and vomiting  Description: INTERVENTIONS:  - Maintain adequate hydration with IV or PO as ordered and tolerated  - Nasogastric tube to low intermittent suction as ordered  - Evaluate effectiveness of ordered antiemetic medications  - Provide nonpharmacologic comfort measures as appropriate  - Advance diet as tolerated, if ordered  - Obtain nutritional consult as needed  - Evaluate fluid balance  Outcome: Progressing     Problem: SKIN/TISSUE INTEGRITY - ADULT  Goal: Skin integrity remains intact  Description: INTERVENTIONS  - Assess and document risk factors for pressure ulcer development  - Assess and document skin integrity  - Monitor for areas of redness and/or skin breakdown  - Initiate interventions, skin care algorithm/standards of care as needed  Outcome: Progressing     Problem: MUSCULOSKELETAL - ADULT  Goal: Return mobility to safest level of function  Description: INTERVENTIONS:  - Assess patient stability and activity tolerance for standing, transferring and ambulating w/ or w/o assistive devices  - Assist with transfers and ambulation using safe patient handling equipment as needed  - Ensure adequate protection for wounds/incisions during mobilization  - Obtain PT/OT consults as needed  - Advance activity as appropriate  - Communicate ordered activity level and limitations with patient/family  Outcome: Progressing     Problem: Diabetes/Glucose Control  Goal: Glucose maintained within prescribed range  Description: INTERVENTIONS:  - Monitor Blood Glucose as ordered  - Assess for signs and symptoms of hyperglycemia and hypoglycemia  - Administer ordered medications to maintain glucose within target range  - Assess barriers to adequate nutritional intake and initiate nutrition consult as needed  - Instruct patient on self management of diabetes  Outcome: Progressing

## 2023-08-01 NOTE — PLAN OF CARE
Pt is alert and oriented on RA. Wears CPAP at bedtime. Pt is voiding freely. Pt denies any pain. Pt is ambulating independently. Pt is ACHS. Pt is on General diet. IV abx are running as ordered other wise pt is saline locked. Repeat chest XR in the morning to determine if Bronchoscopy is needed on Wednesday. Call light is within reach and safety measures are in place. Problem: PAIN - ADULT  Goal: Verbalizes/displays adequate comfort level or patient's stated pain goal  Description: INTERVENTIONS:  - Encourage pt to monitor pain and request assistance  - Assess pain using appropriate pain scale  - Administer analgesics based on type and severity of pain and evaluate response  - Implement non-pharmacological measures as appropriate and evaluate response  - Consider cultural and social influences on pain and pain management  - Manage/alleviate anxiety  - Utilize distraction and/or relaxation techniques  - Monitor for opioid side effects  - Notify MD/LIP if interventions unsuccessful or patient reports new pain  - Anticipate increased pain with activity and pre-medicate as appropriate  Outcome: Progressing     Problem: RISK FOR INFECTION - ADULT  Goal: Absence of fever/infection during anticipated neutropenic period  Description: INTERVENTIONS  - Monitor WBC  - Administer growth factors as ordered  - Implement neutropenic guidelines  Outcome: Progressing     Problem: SAFETY ADULT - FALL  Goal: Free from fall injury  Description: INTERVENTIONS:  - Assess pt frequently for physical needs  - Identify cognitive and physical deficits and behaviors that affect risk of falls.   - [place] fall precautions as indicated by assessment.  - Educate pt/family on patient safety including physical limitations  - Instruct pt to call for assistance with activity based on assessment  - Modify environment to reduce risk of injury  - Provide assistive devices as appropriate  - Consider OT/PT consult to assist with strengthening/mobility  - Encourage toileting schedule  Outcome: Progressing     Problem: DISCHARGE PLANNING  Goal: Discharge to home or other facility with appropriate resources  Description: INTERVENTIONS:  - Identify barriers to discharge w/pt and caregiver  - Include patient/family/discharge partner in discharge planning  - Arrange for needed discharge resources and transportation as appropriate  - Identify discharge learning needs (meds, wound care, etc)  - Arrange for interpreters to assist at discharge as needed  - Consider post-discharge preferences of patient/family/discharge partner  - Complete POLST form as appropriate  - Assess patient's ability to be responsible for managing their own health  - Refer to Case Management Department for coordinating discharge planning if the patient needs post-hospital services based on physician/LIP order or complex needs related to functional status, cognitive ability or social support system  Outcome: Progressing     Problem: RESPIRATORY - ADULT  Goal: Achieves optimal ventilation and oxygenation  Description: INTERVENTIONS:  - Assess for changes in respiratory status  - Assess for changes in mentation and behavior  - Position to facilitate oxygenation and minimize respiratory effort  - Oxygen supplementation based on oxygen saturation or ABGs  - Provide Smoking Cessation handout, if applicable  - Encourage broncho-pulmonary hygiene including cough, deep breathe, Incentive Spirometry  - Assess the need for suctioning and perform as needed  - Assess and instruct to report SOB or any respiratory difficulty  - Respiratory Therapy support as indicated  - Manage/alleviate anxiety  - Monitor for signs/symptoms of CO2 retention  Outcome: Progressing     Problem: SKIN/TISSUE INTEGRITY - ADULT  Goal: Skin integrity remains intact  Description: INTERVENTIONS  - Assess and document risk factors for pressure ulcer development  - Assess and document skin integrity  - Monitor for areas of redness and/or skin breakdown  - Initiate interventions, skin care algorithm/standards of care as needed  Outcome: Progressing     Problem: MUSCULOSKELETAL - ADULT  Goal: Return mobility to safest level of function  Description: INTERVENTIONS:  - Assess patient stability and activity tolerance for standing, transferring and ambulating w/ or w/o assistive devices  - Assist with transfers and ambulation using safe patient handling equipment as needed  - Ensure adequate protection for wounds/incisions during mobilization  - Obtain PT/OT consults as needed  - Advance activity as appropriate  - Communicate ordered activity level and limitations with patient/family  Outcome: Progressing     Problem: Diabetes/Glucose Control  Goal: Glucose maintained within prescribed range  Description: INTERVENTIONS:  - Monitor Blood Glucose as ordered  - Assess for signs and symptoms of hyperglycemia and hypoglycemia  - Administer ordered medications to maintain glucose within target range  - Assess barriers to adequate nutritional intake and initiate nutrition consult as needed  - Instruct patient on self management of diabetes  Outcome: Progressing

## 2023-08-02 ENCOUNTER — APPOINTMENT (OUTPATIENT)
Dept: GENERAL RADIOLOGY | Facility: HOSPITAL | Age: 78
End: 2023-08-02
Attending: INTERNAL MEDICINE
Payer: MEDICARE

## 2023-08-02 ENCOUNTER — ANESTHESIA EVENT (OUTPATIENT)
Dept: ENDOSCOPY | Facility: HOSPITAL | Age: 78
End: 2023-08-02
Payer: MEDICARE

## 2023-08-02 ENCOUNTER — ANESTHESIA (OUTPATIENT)
Dept: ENDOSCOPY | Facility: HOSPITAL | Age: 78
End: 2023-08-02
Payer: MEDICARE

## 2023-08-02 LAB
BASOPHILS NFR BRONCH: 0 %
COLOR FLD: COLORLESS
EOSINOPHIL NFR BRONCH: 0 %
GLUCOSE BLDC GLUCOMTR-MCNC: 107 MG/DL (ref 70–99)
GLUCOSE BLDC GLUCOMTR-MCNC: 116 MG/DL (ref 70–99)
GLUCOSE BLDC GLUCOMTR-MCNC: 159 MG/DL (ref 70–99)
GLUCOSE BLDC GLUCOMTR-MCNC: 169 MG/DL (ref 70–99)
GLUCOSE BLDC GLUCOMTR-MCNC: 171 MG/DL (ref 70–99)
INR BLD: 1.07 (ref 0.85–1.16)
LYMPHOCYTES NFR BRONCH: 15 %
MONOS+MACROS NFR BRONCH: 73 %
NEUTROPHILS NFR BRONCH: 11 %
PROTHROMBIN TIME: 13.9 SECONDS (ref 11.6–14.8)
RBC # FLD: 1150 /CUMM (ref ?–1)
TOTAL CELLS COUNTED BRONCH: 35 /CUMM (ref ?–1)
TOTAL CELLS COUNTED FLD: 100
WBC CALC (IRIS) BRW: 35 /CUMM
WBC OTHER NFR BRONCH: 1 %

## 2023-08-02 PROCEDURE — 0BBF8ZX EXCISION OF RIGHT LOWER LUNG LOBE, VIA NATURAL OR ARTIFICIAL OPENING ENDOSCOPIC, DIAGNOSTIC: ICD-10-PCS | Performed by: INTERNAL MEDICINE

## 2023-08-02 PROCEDURE — 76000 FLUOROSCOPY <1 HR PHYS/QHP: CPT | Performed by: INTERNAL MEDICINE

## 2023-08-02 PROCEDURE — 31628 BRONCHOSCOPY/LUNG BX EACH: CPT | Performed by: INTERNAL MEDICINE

## 2023-08-02 PROCEDURE — 0BDF8ZX EXTRACTION OF RIGHT LOWER LUNG LOBE, VIA NATURAL OR ARTIFICIAL OPENING ENDOSCOPIC, DIAGNOSTIC: ICD-10-PCS | Performed by: INTERNAL MEDICINE

## 2023-08-02 PROCEDURE — 99232 SBSQ HOSP IP/OBS MODERATE 35: CPT | Performed by: INTERNAL MEDICINE

## 2023-08-02 PROCEDURE — 71045 X-RAY EXAM CHEST 1 VIEW: CPT | Performed by: INTERNAL MEDICINE

## 2023-08-02 PROCEDURE — 0B9F8ZX DRAINAGE OF RIGHT LOWER LUNG LOBE, VIA NATURAL OR ARTIFICIAL OPENING ENDOSCOPIC, DIAGNOSTIC: ICD-10-PCS | Performed by: INTERNAL MEDICINE

## 2023-08-02 RX ORDER — SODIUM CHLORIDE, SODIUM LACTATE, POTASSIUM CHLORIDE, CALCIUM CHLORIDE 600; 310; 30; 20 MG/100ML; MG/100ML; MG/100ML; MG/100ML
INJECTION, SOLUTION INTRAVENOUS CONTINUOUS PRN
Status: DISCONTINUED | OUTPATIENT
Start: 2023-08-02 | End: 2023-08-02 | Stop reason: SURG

## 2023-08-02 RX ORDER — ATROPINE SULFATE 1 MG/ML
1 INJECTION, SOLUTION INTRAMUSCULAR; INTRAVENOUS; SUBCUTANEOUS ONCE
Status: COMPLETED | OUTPATIENT
Start: 2023-08-02 | End: 2023-08-02

## 2023-08-02 RX ADMIN — SODIUM CHLORIDE, SODIUM LACTATE, POTASSIUM CHLORIDE, CALCIUM CHLORIDE: 600; 310; 30; 20 INJECTION, SOLUTION INTRAVENOUS at 10:28:00

## 2023-08-02 RX ADMIN — SODIUM CHLORIDE, SODIUM LACTATE, POTASSIUM CHLORIDE, CALCIUM CHLORIDE: 600; 310; 30; 20 INJECTION, SOLUTION INTRAVENOUS at 10:57:00

## 2023-08-02 NOTE — PLAN OF CARE
Problem: Patient Centered Care  Goal: Patient preferences are identified and integrated in the patient's plan of care  Description: Interventions:  - What would you like us to know as we care for you?   - Provide timely, complete, and accurate information to patient/family  - Incorporate patient and family knowledge, values, beliefs, and cultural backgrounds into the planning and delivery of care  - Encourage patient/family to participate in care and decision-making at the level they choose  - Honor patient and family perspectives and choices  Outcome: Progressing     Problem: Patient/Family Goals  Goal: Patient/Family Long Term Goal  Description: Patient's Long Term Goal:     Interventions:  -   - See additional Care Plan goals for specific interventions  Outcome: Progressing  Goal: Patient/Family Short Term Goal  Description: Patient's Short Term Goal:     Interventions:   - See additional Care Plan goals for specific interventions  Outcome: Progressing     Problem: PAIN - ADULT  Goal: Verbalizes/displays adequate comfort level or patient's stated pain goal  Description: INTERVENTIONS:  - Encourage pt to monitor pain and request assistance  - Assess pain using appropriate pain scale  - Administer analgesics based on type and severity of pain and evaluate response  - Implement non-pharmacological measures as appropriate and evaluate response  - Consider cultural and social influences on pain and pain management  - Manage/alleviate anxiety  - Utilize distraction and/or relaxation techniques  - Monitor for opioid side effects  - Notify MD/LIP if interventions unsuccessful or patient reports new pain  - Anticipate increased pain with activity and pre-medicate as appropriate  Outcome: Progressing     Problem: RISK FOR INFECTION - ADULT  Goal: Absence of fever/infection during anticipated neutropenic period  Description: INTERVENTIONS  - Monitor WBC  - Administer growth factors as ordered  - Implement neutropenic guidelines  Outcome: Progressing     Problem: SAFETY ADULT - FALL  Goal: Free from fall injury  Description: INTERVENTIONS:  - Assess pt frequently for physical needs  - Identify cognitive and physical deficits and behaviors that affect risk of falls.   - [place] fall precautions as indicated by assessment.  - Educate pt/family on patient safety including physical limitations  - Instruct pt to call for assistance with activity based on assessment  - Modify environment to reduce risk of injury  - Provide assistive devices as appropriate  - Consider OT/PT consult to assist with strengthening/mobility  - Encourage toileting schedule  Outcome: Progressing     Problem: DISCHARGE PLANNING  Goal: Discharge to home or other facility with appropriate resources  Description: INTERVENTIONS:  - Identify barriers to discharge w/pt and caregiver  - Include patient/family/discharge partner in discharge planning  - Arrange for needed discharge resources and transportation as appropriate  - Identify discharge learning needs (meds, wound care, etc)  - Arrange for interpreters to assist at discharge as needed  - Consider post-discharge preferences of patient/family/discharge partner  - Complete POLST form as appropriate  - Assess patient's ability to be responsible for managing their own health  - Refer to Case Management Department for coordinating discharge planning if the patient needs post-hospital services based on physician/LIP order or complex needs related to functional status, cognitive ability or social support system  Outcome: Progressing     Problem: RESPIRATORY - ADULT  Goal: Achieves optimal ventilation and oxygenation  Description: INTERVENTIONS:  - Assess for changes in respiratory status  - Assess for changes in mentation and behavior  - Position to facilitate oxygenation and minimize respiratory effort  - Oxygen supplementation based on oxygen saturation or ABGs  - Provide Smoking Cessation handout, if applicable  - Encourage broncho-pulmonary hygiene including cough, deep breathe, Incentive Spirometry  - Assess the need for suctioning and perform as needed  - Assess and instruct to report SOB or any respiratory difficulty  - Respiratory Therapy support as indicated  - Manage/alleviate anxiety  - Monitor for signs/symptoms of CO2 retention  Outcome: Progressing     Problem: GASTROINTESTINAL - ADULT  Goal: Minimal or absence of nausea and vomiting  Description: INTERVENTIONS:  - Maintain adequate hydration with IV or PO as ordered and tolerated  - Nasogastric tube to low intermittent suction as ordered  - Evaluate effectiveness of ordered antiemetic medications  - Provide nonpharmacologic comfort measures as appropriate  - Advance diet as tolerated, if ordered  - Obtain nutritional consult as needed  - Evaluate fluid balance  Outcome: Progressing     Problem: SKIN/TISSUE INTEGRITY - ADULT  Goal: Skin integrity remains intact  Description: INTERVENTIONS  - Assess and document risk factors for pressure ulcer development  - Assess and document skin integrity  - Monitor for areas of redness and/or skin breakdown  - Initiate interventions, skin care algorithm/standards of care as needed  Outcome: Progressing     Problem: MUSCULOSKELETAL - ADULT  Goal: Return mobility to safest level of function  Description: INTERVENTIONS:  - Assess patient stability and activity tolerance for standing, transferring and ambulating w/ or w/o assistive devices  - Assist with transfers and ambulation using safe patient handling equipment as needed  - Ensure adequate protection for wounds/incisions during mobilization  - Obtain PT/OT consults as needed  - Advance activity as appropriate  - Communicate ordered activity level and limitations with patient/family  Outcome: Progressing     Problem: Diabetes/Glucose Control  Goal: Glucose maintained within prescribed range  Description: INTERVENTIONS:  - Monitor Blood Glucose as ordered  - Assess for signs and symptoms of hyperglycemia and hypoglycemia  - Administer ordered medications to maintain glucose within target range  - Assess barriers to adequate nutritional intake and initiate nutrition consult as needed  - Instruct patient on self management of diabetes  Outcome: Progressing   A/Ox4. Vital signs stable on room air, CPAP at night. NPO at midnight for broncoscopy. Denies pain. Up independently. Voiding freely. Blood sugar monitored AC/HS. Antibiotics continued. Fall precautions in place, call light within reach, frequent rounding done.

## 2023-08-02 NOTE — PLAN OF CARE
Pt NPO for scheduled bronchoscopy this morning. Returned in stable condition. VSS. Denies pain/discomfort. No coughing noted. Restarted on general diet. Started 2 hrs post procedure as indicated. Ambulating in room independently. Voiding freely. IV/PO abx maintained. Remains on IV steroids. Pt is hopeful to DC home with wife soon.      Problem: Patient Centered Care  Goal: Patient preferences are identified and integrated in the patient's plan of care  Description: Interventions:  - What would you like us to know as we care for you?   - Provide timely, complete, and accurate information to patient/family  - Incorporate patient and family knowledge, values, beliefs, and cultural backgrounds into the planning and delivery of care  - Encourage patient/family to participate in care and decision-making at the level they choose  - Honor patient and family perspectives and choices  Outcome: Progressing        Problem: PAIN - ADULT  Goal: Verbalizes/displays adequate comfort level or patient's stated pain goal  Description: INTERVENTIONS:  - Encourage pt to monitor pain and request assistance  - Assess pain using appropriate pain scale  - Administer analgesics based on type and severity of pain and evaluate response  - Implement non-pharmacological measures as appropriate and evaluate response  - Consider cultural and social influences on pain and pain management  - Manage/alleviate anxiety  - Utilize distraction and/or relaxation techniques  - Monitor for opioid side effects  - Notify MD/LIP if interventions unsuccessful or patient reports new pain  - Anticipate increased pain with activity and pre-medicate as appropriate  Outcome: Progressing     Problem: RISK FOR INFECTION - ADULT  Goal: Absence of fever/infection during anticipated neutropenic period  Description: INTERVENTIONS  - Monitor WBC  - Administer growth factors as ordered  - Implement neutropenic guidelines  Outcome: Progressing     Problem: SAFETY ADULT - FALL  Goal: Free from fall injury  Description: INTERVENTIONS:  - Assess pt frequently for physical needs  - Identify cognitive and physical deficits and behaviors that affect risk of falls.   - [place] fall precautions as indicated by assessment.  - Educate pt/family on patient safety including physical limitations  - Instruct pt to call for assistance with activity based on assessment  - Modify environment to reduce risk of injury  - Provide assistive devices as appropriate  - Consider OT/PT consult to assist with strengthening/mobility  - Encourage toileting schedule  Outcome: Progressing     Problem: DISCHARGE PLANNING  Goal: Discharge to home or other facility with appropriate resources  Description: INTERVENTIONS:  - Identify barriers to discharge w/pt and caregiver  - Include patient/family/discharge partner in discharge planning  - Arrange for needed discharge resources and transportation as appropriate  - Identify discharge learning needs (meds, wound care, etc)  - Arrange for interpreters to assist at discharge as needed  - Consider post-discharge preferences of patient/family/discharge partner  - Complete POLST form as appropriate  - Assess patient's ability to be responsible for managing their own health  - Refer to Case Management Department for coordinating discharge planning if the patient needs post-hospital services based on physician/LIP order or complex needs related to functional status, cognitive ability or social support system  Outcome: Progressing     Problem: RESPIRATORY - ADULT  Goal: Achieves optimal ventilation and oxygenation  Description: INTERVENTIONS:  - Assess for changes in respiratory status  - Assess for changes in mentation and behavior  - Position to facilitate oxygenation and minimize respiratory effort  - Oxygen supplementation based on oxygen saturation or ABGs  - Provide Smoking Cessation handout, if applicable  - Encourage broncho-pulmonary hygiene including cough, deep breathe, Incentive Spirometry  - Assess the need for suctioning and perform as needed  - Assess and instruct to report SOB or any respiratory difficulty  - Respiratory Therapy support as indicated  - Manage/alleviate anxiety  - Monitor for signs/symptoms of CO2 retention  Outcome: Progressing     Problem: GASTROINTESTINAL - ADULT  Goal: Minimal or absence of nausea and vomiting  Description: INTERVENTIONS:  - Maintain adequate hydration with IV or PO as ordered and tolerated  - Nasogastric tube to low intermittent suction as ordered  - Evaluate effectiveness of ordered antiemetic medications  - Provide nonpharmacologic comfort measures as appropriate  - Advance diet as tolerated, if ordered  - Obtain nutritional consult as needed  - Evaluate fluid balance  Outcome: Progressing     Problem: SKIN/TISSUE INTEGRITY - ADULT  Goal: Skin integrity remains intact  Description: INTERVENTIONS  - Assess and document risk factors for pressure ulcer development  - Assess and document skin integrity  - Monitor for areas of redness and/or skin breakdown  - Initiate interventions, skin care algorithm/standards of care as needed  Outcome: Progressing     Problem: MUSCULOSKELETAL - ADULT  Goal: Return mobility to safest level of function  Description: INTERVENTIONS:  - Assess patient stability and activity tolerance for standing, transferring and ambulating w/ or w/o assistive devices  - Assist with transfers and ambulation using safe patient handling equipment as needed  - Ensure adequate protection for wounds/incisions during mobilization  - Obtain PT/OT consults as needed  - Advance activity as appropriate  - Communicate ordered activity level and limitations with patient/family  Outcome: Progressing     Problem: Diabetes/Glucose Control  Goal: Glucose maintained within prescribed range  Description: INTERVENTIONS:  - Monitor Blood Glucose as ordered  - Assess for signs and symptoms of hyperglycemia and hypoglycemia  - Administer ordered medications to maintain glucose within target range  - Assess barriers to adequate nutritional intake and initiate nutrition consult as needed  - Instruct patient on self management of diabetes  Outcome: Progressing

## 2023-08-02 NOTE — PROCEDURES
[facility]     Procedure Note  23    [address] [place] [address] Patient Status:  Inpatient    1945 MRN [id number]   Location [facility] ENDOSCOPY LAB SUITES Attending Linda Hammer MD   Hosp Day # 5 PCP No primary care provider on file. Procedure: Bronchoscopy with transbronchial biopsy right lower lobe using fluoroscopic guidance    Pre-Procedure Diagnosis: Pulmonary infiltrates    Post-Procedure Diagnosis: Friable airway without endobronchial lesion    Anesthesia:  MAC    Finding and procedure: The patient was anesthetized by CRNA. The small Olympus bronchoscope was inserted through an oropharyngeal bite block. The vocal cords moved symmetrically unremarkable. The trachea was unremarkable. Each of the bilateral mainstem bronchi and each of the segmental bronchi were completely imaged and were free of lesion. The superior and lateral lower lobe bronchi of the right lower lobe were brushed, 60 cc was lavaged of the superior segment of the right lower lobe and 8 biopsies were obtained. Mild bleeding occurred but no need for ice lavage.     Specimens: Sent    Blood Loss: 5 cc    Tourniquet Time: None  Complications:  None  Drains: None    Secondary Diagnosis: None    Maryann Berg MD  Medical Director, Critical Care, [address] [place] [address], [address] [place] [address]. [phone] B  Pager: [phone]

## 2023-08-03 VITALS
HEART RATE: 57 BPM | BODY MASS INDEX: 26.92 KG/M2 | DIASTOLIC BLOOD PRESSURE: 67 MMHG | SYSTOLIC BLOOD PRESSURE: 126 MMHG | RESPIRATION RATE: 18 BRPM | OXYGEN SATURATION: 96 % | TEMPERATURE: 98 F | HEIGHT: 70 IN | WEIGHT: 188 LBS

## 2023-08-03 LAB
GLUCOSE BLDC GLUCOMTR-MCNC: 103 MG/DL (ref 70–99)
GLUCOSE BLDC GLUCOMTR-MCNC: 188 MG/DL (ref 70–99)
M TB CMPLX RRNA SPEC QL PROBE: NOT DETECTED
NON GYNE INTERPRETATION: NEGATIVE

## 2023-08-03 PROCEDURE — 99233 SBSQ HOSP IP/OBS HIGH 50: CPT | Performed by: INTERNAL MEDICINE

## 2023-08-03 PROCEDURE — 99238 HOSP IP/OBS DSCHRG MGMT 30/<: CPT | Performed by: INTERNAL MEDICINE

## 2023-08-03 RX ORDER — PREDNISONE 10 MG/1
20 TABLET ORAL DAILY
Qty: 60 TABLET | Refills: 5 | Status: SHIPPED | OUTPATIENT
Start: 2023-08-03

## 2023-08-03 RX ORDER — AMOXICILLIN AND CLAVULANATE POTASSIUM 500; 125 MG/1; MG/1
1 TABLET, FILM COATED ORAL 2 TIMES DAILY
Qty: 6 TABLET | Refills: 0 | Status: SHIPPED | OUTPATIENT
Start: 2023-08-03

## 2023-08-03 NOTE — PLAN OF CARE
No acute medical changes during the shift. Patient is A&O x4. Denies pain and shortness of breath. CPAP at night. IV abx; right chest port. Ambulating independently. Safety precautions in place. Call light and belongings at bedside and within reach. Frequent monitoring. Problem: PAIN - ADULT  Goal: Verbalizes/displays adequate comfort level or patient's stated pain goal  Description: INTERVENTIONS:  - Encourage pt to monitor pain and request assistance  - Assess pain using appropriate pain scale  - Administer analgesics based on type and severity of pain and evaluate response  - Implement non-pharmacological measures as appropriate and evaluate response  - Consider cultural and social influences on pain and pain management  - Manage/alleviate anxiety  - Utilize distraction and/or relaxation techniques  - Monitor for opioid side effects  - Notify MD/LIP if interventions unsuccessful or patient reports new pain  - Anticipate increased pain with activity and pre-medicate as appropriate  Outcome: Progressing     Problem: RISK FOR INFECTION - ADULT  Goal: Absence of fever/infection during anticipated neutropenic period  Description: INTERVENTIONS  - Monitor WBC  - Administer growth factors as ordered  - Implement neutropenic guidelines  Outcome: Progressing     Problem: SAFETY ADULT - FALL  Goal: Free from fall injury  Description: INTERVENTIONS:  - Assess pt frequently for physical needs  - Identify cognitive and physical deficits and behaviors that affect risk of falls.   - [place] fall precautions as indicated by assessment.  - Educate pt/family on patient safety including physical limitations  - Instruct pt to call for assistance with activity based on assessment  - Modify environment to reduce risk of injury  - Provide assistive devices as appropriate  - Consider OT/PT consult to assist with strengthening/mobility  - Encourage toileting schedule  Outcome: Progressing     Problem: DISCHARGE PLANNING  Goal: Discharge to home or other facility with appropriate resources  Description: INTERVENTIONS:  - Identify barriers to discharge w/pt and caregiver  - Include patient/family/discharge partner in discharge planning  - Arrange for needed discharge resources and transportation as appropriate  - Identify discharge learning needs (meds, wound care, etc)  - Arrange for interpreters to assist at discharge as needed  - Consider post-discharge preferences of patient/family/discharge partner  - Complete POLST form as appropriate  - Assess patient's ability to be responsible for managing their own health  - Refer to Case Management Department for coordinating discharge planning if the patient needs post-hospital services based on physician/LIP order or complex needs related to functional status, cognitive ability or social support system  Outcome: Progressing     Problem: RESPIRATORY - ADULT  Goal: Achieves optimal ventilation and oxygenation  Description: INTERVENTIONS:  - Assess for changes in respiratory status  - Assess for changes in mentation and behavior  - Position to facilitate oxygenation and minimize respiratory effort  - Oxygen supplementation based on oxygen saturation or ABGs  - Provide Smoking Cessation handout, if applicable  - Encourage broncho-pulmonary hygiene including cough, deep breathe, Incentive Spirometry  - Assess the need for suctioning and perform as needed  - Assess and instruct to report SOB or any respiratory difficulty  - Respiratory Therapy support as indicated  - Manage/alleviate anxiety  - Monitor for signs/symptoms of CO2 retention  Outcome: Progressing     Problem: MUSCULOSKELETAL - ADULT  Goal: Return mobility to safest level of function  Description: INTERVENTIONS:  - Assess patient stability and activity tolerance for standing, transferring and ambulating w/ or w/o assistive devices  - Assist with transfers and ambulation using safe patient handling equipment as needed  - Ensure adequate protection for wounds/incisions during mobilization  - Obtain PT/OT consults as needed  - Advance activity as appropriate  - Communicate ordered activity level and limitations with patient/family  Outcome: Progressing     Problem: Diabetes/Glucose Control  Goal: Glucose maintained within prescribed range  Description: INTERVENTIONS:  - Monitor Blood Glucose as ordered  - Assess for signs and symptoms of hyperglycemia and hypoglycemia  - Administer ordered medications to maintain glucose within target range  - Assess barriers to adequate nutritional intake and initiate nutrition consult as needed  - Instruct patient on self management of diabetes  Outcome: Progressing

## 2023-08-03 NOTE — DISCHARGE INSTRUCTIONS
Okay to discharge home. See Dr. Justo Lofton in the office in a month. Explain to the patient that I want him to take 2 of the 10 mg tablets of prednisone for the next week and then go back to 10 mg daily. He will have to  a prescription for the Augmentin.

## 2023-08-03 NOTE — DISCHARGE SUMMARY
Discharge Summary    Date of Admission: 7/28/2023    Date of Discharge: 8/3/2023    Hospital Course: The patient was admitted to the hospital with dyspnea syndrome with dense right basilar infiltrate and patchy infiltrates on the CT scan with nodular changes. He received IV antibiotics and improved clinically; however, his clinical syndrome lingered to some extent and I proceeded to bronchoscopy which demonstrated clear airways. I did transbronchial biopsies of his right lower lobe and there was demonstration of carcinoma. There was no evidence of fungus. Multiple cultures were sent and are still pending. As the patient was feeling better, in discussion with oncology, we decided to discharge the patient and will increase his prednisone dosing in the short-term for the possibility of pneumonitis contributing. We will also extend short course of Augmentin. Patient will need a follow-up CT scan of the chest at the 3 to 4-week interval.  The patient was followed by oncology. Discharge Medications:     Medication List        START taking these medications      amoxicillin clavulanate 500-125 MG Tabs  Commonly known as: Augmentin  Take 1 tablet by mouth 2 (two) times daily. CHANGE how you take these medications      albuterol 108 (90 Base) MCG/ACT Aers  Commonly known as: Ventolin HFA  Inhale 2 puffs into the lungs every 4 (four) hours as needed for Wheezing. What changed: when to take this     predniSONE 10 MG Tabs  Commonly known as: Deltasone  Take 2 tablets (20 mg total) by mouth daily. What changed: how much to take            CONTINUE taking these medications      Breo Ellipta 100-25 MCG/ACT Aepb  Generic drug: fluticasone furoate-vilanterol  Inhale 1 puff into the lungs daily. Follow with mouth rinse and spit     fluticasone propionate 50 MCG/ACT Susp  Commonly known as: Flonase  1 spray by Nasal route 2 (two) times daily.      loratadine 10 MG Tabs  Commonly known as: Claritin  Take 1 tablet (10 mg total) by mouth daily. meclizine 25 MG Tabs  Commonly known as: Antivert  Take 1 tablet (25 mg total) by mouth 3 (three) times daily as needed. montelukast 10 MG Tabs  Commonly known as: Singulair  Take 1 tablet (10 mg total) by mouth nightly.  OR     pantoprazole 40 MG Tbec  Commonly known as: Protonix  Take 1 tablet (40 mg total) by mouth daily. PEMBROLIZUMAB IV     rivaroxaban 20 MG Tabs  Commonly known as: Xarelto  Take 1 tablet (20 mg total) by mouth nightly. sulfamethoxazole-trimethoprim -160 MG Tabs per tablet  Commonly known as: Bactrim DS  One tablet PO BID on Monday, Wednesday, Friday     Vitamin C 500 MG Tabs  Commonly known as: VITAMIN C     Vitamin D3 25 MCG (1000 UT) Caps     voriconazole 200 MG Tabs  Commonly known as: Vfend  Take 1 tablet (200 mg total) by mouth 2 (two) times daily.             STOP taking these medications      Paxlovid (300/100) 300-100 MG Tbpk  Generic drug: nirmatrelvir-ritonavir               Where to Get Your Medications        These medications were sent to [address] [place] [address], [address] [place] [address], [phone]  [facility], [address] [facility]      Phone: [phone]   amoxicillin clavulanate 500-125 MG Tabs  predniSONE 10 MG Tabs         Discharge Plans:  See me in the office at the 1 month interval.    Discharge Diagnosis:  Pneumonia, recurrent lung cancer, acute respiratory failure, coagulopathy, history of recurrent venous thrombosis, pulmonary nodules and infiltrates    Tigist Miranda MD  Medical Director, [phone] [facility]  Medical Director, [address] [place] [address] [address]  Pager: [phone]

## 2023-08-04 ENCOUNTER — PATIENT OUTREACH (OUTPATIENT)
Dept: CASE MANAGEMENT | Age: 78
End: 2023-08-04

## 2023-08-04 ENCOUNTER — TELEPHONE (OUTPATIENT)
Dept: PULMONOLOGY | Facility: CLINIC | Age: 78
End: 2023-08-04

## 2023-08-04 DIAGNOSIS — J96.01 ACUTE HYPOXEMIC RESPIRATORY FAILURE (HCC): ICD-10-CM

## 2023-08-04 DIAGNOSIS — Z02.9 ENCOUNTERS FOR ADMINISTRATIVE PURPOSE: Primary | ICD-10-CM

## 2023-08-04 DIAGNOSIS — C34.92 MALIGNANT NEOPLASM OF LEFT LUNG, UNSPECIFIED PART OF LUNG (HCC): ICD-10-CM

## 2023-08-04 DIAGNOSIS — R91.8 PULMONARY INFILTRATES: Primary | ICD-10-CM

## 2023-08-04 DIAGNOSIS — D50.9 IRON DEFICIENCY ANEMIA, UNSPECIFIED IRON DEFICIENCY ANEMIA TYPE: Primary | ICD-10-CM

## 2023-08-04 LAB
HSV 1 PCR BAL: NOT DETECTED COPIES/ML
HSV 2 PCR BAL: NOT DETECTED COPIES/ML
P. JIROVECI QPCR BAL: NOT DETECTED COPIES/ML

## 2023-08-04 PROCEDURE — 1159F MED LIST DOCD IN RCRD: CPT

## 2023-08-04 PROCEDURE — 1111F DSCHRG MED/CURRENT MED MERGE: CPT

## 2023-08-04 NOTE — PROGRESS NOTES
NCM attempted to reach the patient to complete a TCM/HFU call + Verify PCP. Left message to call back. [facility] provided direct contact info at [phone].        Discharge Diagnosis:  Pneumonia, recurrent lung cancer, acute respiratory failure, coagulopathy, history of recurrent venous thrombosis, pulmonary nodules and infiltrates

## 2023-08-04 NOTE — TELEPHONE ENCOUNTER
Dr. Joycelyn Bennett to schedule patient at 1 month interval with CT Chest?  Please sign pended orders.

## 2023-08-04 NOTE — PAYOR COMM NOTE
Discharge Notification    Patient Name: Michelle Dubose: Theresa Marinellidavey [employer]  Subscriber #: [id number]  Authorization Number: [id number]  Admit Date/Time: 7/28/2023 2:23 PM  Discharge Date/Time: 8/3/2023 7:03 PM

## 2023-08-05 LAB — CMV PCR BAL: NOT DETECTED IU/ML

## 2023-08-07 LAB — ASPERGILLUS AG BAL/SERUM: 0.12 INDEX

## 2023-08-07 NOTE — TELEPHONE ENCOUNTER
Spoke with patient's wife, Floyd Erwin, [facility] follow up appointment scheduled with Dr. Garry Barthel on 9/7/23 at 1:30pm.  Verified date, time, location & parking, wife verbalized understanding.

## 2023-08-08 ENCOUNTER — OFFICE VISIT (OUTPATIENT)
Dept: HEMATOLOGY/ONCOLOGY | Facility: HOSPITAL | Age: 78
End: 2023-08-08
Attending: INTERNAL MEDICINE
Payer: MEDICARE

## 2023-08-08 VITALS
HEIGHT: 70 IN | TEMPERATURE: 98 F | BODY MASS INDEX: 25.62 KG/M2 | WEIGHT: 179 LBS | DIASTOLIC BLOOD PRESSURE: 66 MMHG | HEART RATE: 71 BPM | SYSTOLIC BLOOD PRESSURE: 141 MMHG | OXYGEN SATURATION: 97 % | RESPIRATION RATE: 18 BRPM

## 2023-08-08 DIAGNOSIS — Z45.2 ENCOUNTER FOR CARE RELATED TO PORT-A-CATH: ICD-10-CM

## 2023-08-08 DIAGNOSIS — C34.92 MALIGNANT NEOPLASM OF LEFT LUNG, UNSPECIFIED PART OF LUNG (HCC): Primary | ICD-10-CM

## 2023-08-08 DIAGNOSIS — C34.12 CANCER OF UPPER LOBE OF LEFT LUNG (HCC): Primary | ICD-10-CM

## 2023-08-08 DIAGNOSIS — Z92.89 HISTORY OF IMMUNOTHERAPY: ICD-10-CM

## 2023-08-08 DIAGNOSIS — C34.92 MALIGNANT NEOPLASM OF LEFT LUNG, UNSPECIFIED PART OF LUNG (HCC): ICD-10-CM

## 2023-08-08 DIAGNOSIS — J18.9 PNEUMONITIS: ICD-10-CM

## 2023-08-08 DIAGNOSIS — D50.9 IRON DEFICIENCY ANEMIA, UNSPECIFIED IRON DEFICIENCY ANEMIA TYPE: ICD-10-CM

## 2023-08-08 DIAGNOSIS — B44.9 ASPERGILLUS PNEUMONIA (HCC): ICD-10-CM

## 2023-08-08 LAB
ALBUMIN SERPL-MCNC: 3.2 G/DL (ref 3.4–5)
ALBUMIN/GLOB SERPL: 1.1 {RATIO} (ref 1–2)
ALP LIVER SERPL-CCNC: 128 U/L
ALT SERPL-CCNC: 55 U/L
ANION GAP SERPL CALC-SCNC: 5 MMOL/L (ref 0–18)
AST SERPL-CCNC: 30 U/L (ref 15–37)
BASOPHILS # BLD AUTO: 0.05 X10(3) UL (ref 0–0.2)
BASOPHILS NFR BLD AUTO: 0.4 %
BILIRUB SERPL-MCNC: 0.4 MG/DL (ref 0.1–2)
BUN BLD-MCNC: 31 MG/DL (ref 7–18)
BUN/CREAT SERPL: 25.6 (ref 10–20)
CALCIUM BLD-MCNC: 8.3 MG/DL (ref 8.5–10.1)
CHLORIDE SERPL-SCNC: 108 MMOL/L (ref 98–112)
CO2 SERPL-SCNC: 26 MMOL/L (ref 21–32)
CREAT BLD-MCNC: 1.21 MG/DL
DEPRECATED RDW RBC AUTO: 52.5 FL (ref 35.1–46.3)
EGFRCR SERPLBLD CKD-EPI 2021: 61 ML/MIN/1.73M2 (ref 60–?)
EOSINOPHIL # BLD AUTO: 0.06 X10(3) UL (ref 0–0.7)
EOSINOPHIL NFR BLD AUTO: 0.5 %
ERYTHROCYTE [DISTWIDTH] IN BLOOD BY AUTOMATED COUNT: 14.6 % (ref 11–15)
GLOBULIN PLAS-MCNC: 3 G/DL (ref 2.8–4.4)
GLUCOSE BLD-MCNC: 101 MG/DL (ref 70–99)
HCT VFR BLD AUTO: 40.6 %
HGB BLD-MCNC: 13.2 G/DL
IMM GRANULOCYTES # BLD AUTO: 0.4 X10(3) UL (ref 0–1)
IMM GRANULOCYTES NFR BLD: 3.6 %
LYMPHOCYTES # BLD AUTO: 0.57 X10(3) UL (ref 1–4)
LYMPHOCYTES NFR BLD AUTO: 5.1 %
MCH RBC QN AUTO: 32 PG (ref 26–34)
MCHC RBC AUTO-ENTMCNC: 32.5 G/DL (ref 31–37)
MCV RBC AUTO: 98.3 FL
MONOCYTES # BLD AUTO: 0.86 X10(3) UL (ref 0.1–1)
MONOCYTES NFR BLD AUTO: 7.7 %
NEUTROPHILS # BLD AUTO: 9.18 X10 (3) UL (ref 1.5–7.7)
NEUTROPHILS # BLD AUTO: 9.18 X10(3) UL (ref 1.5–7.7)
NEUTROPHILS NFR BLD AUTO: 82.7 %
OSMOLALITY SERPL CALC.SUM OF ELEC: 295 MOSM/KG (ref 275–295)
PLATELET # BLD AUTO: 214 10(3)UL (ref 150–450)
POTASSIUM SERPL-SCNC: 4.3 MMOL/L (ref 3.5–5.1)
PROT SERPL-MCNC: 6.2 G/DL (ref 6.4–8.2)
RBC # BLD AUTO: 4.13 X10(6)UL
SODIUM SERPL-SCNC: 139 MMOL/L (ref 136–145)
WBC # BLD AUTO: 11.1 X10(3) UL (ref 4–11)

## 2023-08-08 PROCEDURE — 99215 OFFICE O/P EST HI 40 MIN: CPT | Performed by: INTERNAL MEDICINE

## 2023-08-08 PROCEDURE — 80053 COMPREHEN METABOLIC PANEL: CPT

## 2023-08-08 PROCEDURE — 36591 DRAW BLOOD OFF VENOUS DEVICE: CPT

## 2023-08-08 PROCEDURE — 85025 COMPLETE CBC W/AUTO DIFF WBC: CPT

## 2023-08-08 RX ORDER — SODIUM CHLORIDE 9 MG/ML
10 INJECTION INTRAVENOUS ONCE
OUTPATIENT
Start: 2023-08-08

## 2023-08-08 RX ORDER — HEPARIN SODIUM (PORCINE) LOCK FLUSH IV SOLN 100 UNIT/ML 100 UNIT/ML
5 SOLUTION INTRAVENOUS ONCE
Status: COMPLETED | OUTPATIENT
Start: 2023-08-08 | End: 2023-08-08

## 2023-08-08 RX ORDER — HEPARIN SODIUM (PORCINE) LOCK FLUSH IV SOLN 100 UNIT/ML 100 UNIT/ML
5 SOLUTION INTRAVENOUS ONCE
OUTPATIENT
Start: 2023-08-08

## 2023-08-08 RX ADMIN — HEPARIN SODIUM (PORCINE) LOCK FLUSH IV SOLN 100 UNIT/ML 500 UNITS: 100 SOLUTION INTRAVENOUS at 09:57:00

## 2023-08-08 NOTE — PROGRESS NOTES
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 recovering from pneumonitis again. Currently on pred 20mg daily. Hold pembrolizumab. Slow progresion. Will get ngs. On exam comfortable nonlabored respirations.     Recent transaminitis improved

## 2023-08-08 NOTE — PATIENT INSTRUCTIONS
Hold treatment. Continue prednisone taper-- stay on 20mg daily until Friday. Will check in with Mary on slow taper. Likely 15mg daily for 1 week, then 10mg daily. Sending genomics panel on biopsy through [facility]. Come back in 2 weeks to review. Pembrolizumab may not be out of Q in future, see how results are and how you feel. CT imaging with mary follow up in 1 month    Call if any worsening symptoms.  [phone]

## 2023-08-08 NOTE — PROGRESS NOTES
Cancer Center Progress Note    Patient Name: Jason Hendrickson   YOB: 1945   Medical Record Number: [id number]   Attending Physician: Lala Dancer, M.D. 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 He completed definitive concurrent chemoradiotherapy using cisplatin and etoposide early March 2017. He had low-grade anemia neutropenia on treatment. He subsequently has been followed on surveillance with no evidence of progression. The patient started on durvalumab in October 2017 given he had unresectable stage III disease with no evidence of progression following chemo radiotherapy    He has had occasional mild anemia on treatment. Laboratory follow-up in May 2018 showed severe iron deficiency. Received IV iron    He completed 13 cycles of durvalumab  however towards the end of his treatment he developed 2 separate instances of pneumonitis/transaminitis requiring separate courses of prednisone. 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 Started single-agent Pembrolizumab on 3/19/21. Diagnosed with immune-mediated pneumonitis/pneumonia on 4/1, completed abx and on a steroid taper. Recurrent of immune-mediated pneumonitis with CT imaging confirmation after C4 6/2021, but with favorable dx response. 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 PE/DVT admission 1/2023. Interval History:  Returns for routine follow-up and consideration of next immunotherapy dose. Feeling only fair today. 2-3 days of increased MILLAN, mild hypoxia on exertion that resolves with rest, generalized weakness.  On Daily Rivaroxaban dosing for hx PE. No active bleeding. No leg pain or swelling. On 10mg Prednisone for history recurrent IO-mediated pnuemonitis and voriconazole for hx fungal PNA. Felt when he was more compliant with his breo inhaler in past 2 days, he felt some improvement. Has not used prn ICS. No CP, tightness, cough, hemoptysis, fevers or chills. No other acute complaints. Wife present for the encounter.        Performance Status:  ECOG 0    Past Medical History:  Past Medical History:   Diagnosis Date    Esophageal reflux     Exposure to medical diagnostic radiation     Hemorrhoids     Impotence     Lung cancer ([facility] [address]     NSCLCA stage IIIB    Necrotizing granulomatous inflammation of lung (HCC)     Obstructive sleep apnea     Personal history of antineoplastic chemotherapy     Sinus problem     Sinus problems    Sleep apnea     CPAP       Past Surgical History:  Past Surgical History:   Procedure Laterality Date    COLONOSCOPY      COLONOSCOPY N/A 2018    Procedure: COLONOSCOPY;  Surgeon: Reina Delatorre MD;  Location: [facility] ENDOSCOPY    COLONOSCOPY      PORT, INDWELLING, IMP         Family History:  Family History   Problem Relation Age of Onset    Cancer Father         Lung    Cancer Mother         Breast, ovarian       Social History:  Social History    Socioeconomic History      Marital status:       Spouse name: Not on file      Number of children: Not on file      Years of education: Not on file      Highest education level: Not on file    Occupational History      Not on file    Tobacco Use      Smoking status: Former        Packs/day: 1.00        Years: 30.00        Pack years: 30        Types: Cigarettes        Quit date: 2000        Years since quittin.1      Smokeless tobacco: Never    Vaping Use      Vaping Use: Never used    Substance and Sexual Activity      Alcohol use: Not Currently        Alcohol/week: 0.8 standard drinks of alcohol        Types: 1 Glasses of wine per week        Comment: rare no longer has his       Drug use: No      Sexual activity: Not on file    Other Topics      Concerns:         Service: Not Asked        Blood Transfusions: Not Asked        Caffeine Concern: Yes          coffee, 2 cups daily        Occupational Exposure: Not Asked        Hobby Hazards: Not Asked        Sleep Concern: Not Asked        Stress Concern: Not Asked        Weight Concern: Not Asked        Special Diet: Not Asked        Back Care: Not Asked        Exercise: Not Asked        Bike Helmet: Not Asked        Seat Belt: Not Asked        Self-Exams: Not Asked    Social History Narrative      Not on file    Social Determinants of Health  Financial Resource Strain: Not on file  Food Insecurity: Not on file  Transportation Needs: Not on file  Physical Activity: Not on file  Stress: Not on file  Social Connections: Not on file  Housing Stability: Not on file      Current Medications:    Current Outpatient Medications:     predniSONE 10 MG Oral Tab, Take 2 tablets (20 mg total) by mouth daily. , Disp: 60 tablet, Rfl: 5    amoxicillin clavulanate 500-125 MG Oral Tab, Take 1 tablet by mouth 2 (two) times daily. , Disp: 6 tablet, Rfl: 0    rivaroxaban (XARELTO) 20 MG Oral Tab, Take 1 tablet (20 mg total) by mouth nightly., Disp: 90 tablet, Rfl: 1    Acetylcysteine ( OR), Take 600 mg by mouth daily. , Disp: , Rfl:     meclizine 25 MG Oral Tab, Take 1 tablet (25 mg total) by mouth 3 (three) times daily as needed. , Disp: 10 tablet, Rfl: 0    PEMBROLIZUMAB IV, Inject 1 Dose into the vein See Admin Instructions. EVERY 3 WEEKS, Disp: , Rfl:     voriconazole 200 MG Oral Tab, Take 1 tablet (200 mg total) by mouth 2 (two) times daily. , Disp: 180 tablet, Rfl: 3    pantoprazole 40 MG Oral Tab EC, Take 1 tablet (40 mg total) by mouth daily. , Disp: 90 tablet, Rfl: 3    sulfamethoxazole-trimethoprim -160 MG Oral Tab per tablet, One tablet PO BID on Monday, Wednesday, Friday, Disp: 60 tablet, Rfl: 3    fluticasone furoate-vilanterol (BREO ELLIPTA) 100-25 MCG/INH Inhalation Aerosol Powder, Breath Activated, Inhale 1 puff into the lungs daily. Follow with mouth rinse and spit, Disp: 1 each, Rfl: 6    albuterol 108 (90 Base) MCG/ACT Inhalation Aero Soln, Inhale 2 puffs into the lungs every 4 (four) hours as needed for Wheezing., Disp: 1 each, Rfl: 2    Montelukast Sodium 10 MG Oral Tab, Take 1 tablet (10 mg total) by mouth nightly., Disp: 30 tablet, Rfl: 3    loratadine 10 MG Oral Tab, Take 1 tablet (10 mg total) by mouth daily. , Disp: 30 tablet, Rfl: 3    Fluticasone Propionate 50 MCG/ACT Nasal Suspension, 1 spray by Nasal route 2 (two) times daily. , Disp: 1 Bottle, Rfl: 3    Cholecalciferol (VITAMIN D3) 1000 units Oral Cap, Take 1,000 Units by mouth daily. , Disp: , Rfl:     Vitamin C 500 MG Oral Tab, Take 1 tablet (500 mg total) by mouth daily. , Disp: , Rfl:     Allergies:  No Known Allergies     Review of Systems:  All other systems reviewed and negative x12    Vital Signs: There were no vitals taken for this visit. Physical Examination:  General: Patient is alert and oriented x 3, not in acute distress. Psych:  Mood and affect appropriate, in good spirits. HEENT: EOMs intact. Oropharynx is clear. Chest: non-labored breathing, CTA  Cardiovascular: Regular rate and rhythm. Normal S1S2  Extremities: No edema. Neurological: 5/5 motor x4.     Derm:  No rash    LABS:    CBC:    Lab Results   Component Value Date    WBC 11.1 (H) 08/08/2023    WBC 11.2 (H) 07/30/2023    WBC 4.4 07/29/2023     Lab Results   Component Value Date    HGB 13.2 08/08/2023    HGB 12.2 (L) 07/30/2023    HGB 13.9 07/29/2023      Lab Results   Component Value Date    .0 08/08/2023    .0 07/30/2023    .0 07/29/2023       Lab Results   Component Value Date     (H) 08/08/2023    BUN 31 (H) 08/08/2023    BUNCREA 25.6 (H) 08/08/2023    CREATSERUM 1.21 08/08/2023    ANIONGAP 5 08/08/2023    GFR >60 05/14/2016    GFRNAA 81 07/22/2022    GFRAA 94 07/22/2022    CA 8.3 (L) 08/08/2023    OSMOCALC 295 08/08/2023    ALKPHO 128 (H) 08/08/2023    AST 30 08/08/2023    ALT 55 08/08/2023    ALKPHOS 58 12/10/2011    BILT 0.4 08/08/2023    TP 6.2 (L) 08/08/2023    ALB 3.2 (L) 08/08/2023    GLOBULIN 3.0 08/08/2023     08/08/2023    K 4.3 08/08/2023     08/08/2023    CO2 26.0 08/08/2023       Radiology:  PEt/CT Chest       Cancer of upper lobe of left lung [facility])    Staging form: Lung AJCC V7      Clinical stage from 1/9/2017: Stage IIIB (T1a, N3, M0) - Signed by Jelena Graves MD on 1/9/2017    Impression and Plan:  [de-identified]year oldmale former smoker with 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 He was diagnosed in December 2016 as above. He was treated with concurrent definitive chemoradiotherapy using cisplatin and etoposide finishing in early March 2017. He had low-grade neutropenia and anemia on chemotherapy. Given he has unresectable stage IIIb disease with no progression following initial chemotherapy we  added immunotherapy with durvalumab. Completed 13 cycles of durvalumab however developed recurrent pneumonitis/transaminitis. He has responded well to prednisone. He remains off of all cancer directed therapy as of the end of April 2018. Recurrent disease biopsy-proven mediastinal lymph nodes as of March 2021. Same histology. Given PD-L1 greater than 50% we will proceed with single agent pembrolizumab. Also discussed nivolumab/ipilimumab however with previous issues of pneumonitis and transaminitis will use single agent immunotherapy. Received Cycle #1 Pembrolizumab on 3/19/21, w/ recurrent pneumonitis as below. Delayed C2 d/t steroid taper, and was kept on 5mg Prednisone daily. Reimaging with CT after C4 with pneumonitis changes, decreased areas of lymphadenopathy r/t malignancy showing good tx response.  Due to recurrence of clinically symptomatic pneumonitis below, HELD Cycle #5 Pembrolizumab for 2 weeks with below steroid taper. Had been on Indefinite Prednisone 10mg daily dosing controlled pneumonitis and PCP prophy with Bactrim MWF BID. Reimaging 9/2021 prior to C9 with ongoing stable disease and improvement in pneumonitis. Repeat imaging January 2022 with mixed response, increase in prior left lingula nodule and possible new right lung nodule suspicious for vy mets. Resolve of prior areas of pneumonitis and stable changes in left lung mass. Discussed at TB conference ok to continue treatment. Now s/p ACV on 2/23 with increased MILLAN and CXR w/ right consolidation to middle and lower lobe suggesting postobstructive PNA vs pneumonitis; completed 7-day course of Levaquin and 2-week taper of Prednisone.    -6/9/23 tx held 2/2 G2 transaminitis. S/p prednisone taper with complete resolution.   -Reimaging 5/2023 CT CAP with ongoing favorable treatment response.   -Admitted 7/2023 for MILLAN, hypoxia, CT imaging with evidence of worsening infiltrates and possible multifocal PNA, suspicious for pneumonitis; discharged after bronchoscopy 8/2 on abx and Prednisone taper with improvement of symptoms. Feeling clinically well today.  -Bx RLL +adenocarcinoma-will send [employer] panel-pending and hold treatment with immunotherapy. Unable to screen for [employer] study with telisotuzumab vedotin d/t pneumonitis history.  -Will discuss Prednisone slow taper with Pulm. Continue prednisone 20mg daily. Continue vori dosing and PcP prophy. -Repeat CT imaging with pulm follow-up 9/2023. RTC 2 weeks for review of genomics and planning      --new gr 2 transaminitis without Tbili elevation 4/2023. Likely IO-mediated. HELD tx as above and completed increased Prednisone taper. Now normalized-ok to treat. Monitor.    - Pulmonary embolus now on rivaroxaban diagnosed January 2023. Down to 20mg daily as of 2/2023. Tolerating well.   Monitor.     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 --Had new BPPV symptoms and went to ER for workup 3/17 without any other acute issues. MRI negative for malignancy or CVA. Seeing ENT. Medical decision making:  High risk active cancer therapy complications of systemic therapy for cancer.       BRYON Paulson

## 2023-08-11 ENCOUNTER — TELEPHONE (OUTPATIENT)
Dept: HEMATOLOGY/ONCOLOGY | Facility: HOSPITAL | Age: 78
End: 2023-08-11

## 2023-08-11 NOTE — TELEPHONE ENCOUNTER
LM regarding pred taper per pulmonology. Ok to decrease to prednisone 15mg daily x 1 week today, then 15mg every other day with 10mg every other day. Call with any acute changes in breathing, reviewed when to report to ER.   BRYON Hogan

## 2023-08-18 ENCOUNTER — APPOINTMENT (OUTPATIENT)
Dept: HEMATOLOGY/ONCOLOGY | Facility: HOSPITAL | Age: 78
End: 2023-08-18
Attending: INTERNAL MEDICINE
Payer: MEDICARE

## 2023-08-24 ENCOUNTER — TELEPHONE (OUTPATIENT)
Dept: HEMATOLOGY/ONCOLOGY | Facility: HOSPITAL | Age: 78
End: 2023-08-24

## 2023-08-24 DIAGNOSIS — C34.92 MALIGNANT NEOPLASM OF LEFT LUNG, UNSPECIFIED PART OF LUNG (HCC): Primary | ICD-10-CM

## 2023-08-24 DIAGNOSIS — Z92.89 HISTORY OF IMMUNOTHERAPY: ICD-10-CM

## 2023-08-24 NOTE — TELEPHONE ENCOUNTER
[facility] testing not yet back to review. LM offering to reschedule port lab/ and Smita appointment to day next week (Tues or Wed) as results not yet back to review. Will call in am since our phones have been forwarded.

## 2023-08-25 ENCOUNTER — APPOINTMENT (OUTPATIENT)
Dept: HEMATOLOGY/ONCOLOGY | Facility: HOSPITAL | Age: 78
End: 2023-08-25
Attending: NURSE PRACTITIONER
Payer: MEDICARE

## 2023-08-30 ENCOUNTER — TELEPHONE (OUTPATIENT)
Dept: PULMONOLOGY | Facility: CLINIC | Age: 78
End: 2023-08-30

## 2023-09-01 ENCOUNTER — NURSE ONLY (OUTPATIENT)
Dept: HEMATOLOGY/ONCOLOGY | Facility: HOSPITAL | Age: 78
End: 2023-09-01
Attending: NURSE PRACTITIONER
Payer: MEDICARE

## 2023-09-01 VITALS
OXYGEN SATURATION: 95 % | DIASTOLIC BLOOD PRESSURE: 60 MMHG | RESPIRATION RATE: 18 BRPM | HEIGHT: 70 IN | SYSTOLIC BLOOD PRESSURE: 149 MMHG | WEIGHT: 180 LBS | BODY MASS INDEX: 25.77 KG/M2 | TEMPERATURE: 98 F | HEART RATE: 63 BPM

## 2023-09-01 DIAGNOSIS — C34.12 CANCER OF UPPER LOBE OF LEFT LUNG (HCC): Primary | ICD-10-CM

## 2023-09-01 DIAGNOSIS — B44.9 ASPERGILLUS PNEUMONIA (HCC): ICD-10-CM

## 2023-09-01 DIAGNOSIS — D50.9 IRON DEFICIENCY ANEMIA, UNSPECIFIED IRON DEFICIENCY ANEMIA TYPE: ICD-10-CM

## 2023-09-01 DIAGNOSIS — C34.92 MALIGNANT NEOPLASM OF LEFT LUNG, UNSPECIFIED PART OF LUNG (HCC): ICD-10-CM

## 2023-09-01 DIAGNOSIS — J18.9 PNEUMONITIS: ICD-10-CM

## 2023-09-01 DIAGNOSIS — Z45.2 ENCOUNTER FOR CARE RELATED TO PORT-A-CATH: ICD-10-CM

## 2023-09-01 DIAGNOSIS — C34.92 MALIGNANT NEOPLASM OF LEFT LUNG, UNSPECIFIED PART OF LUNG (HCC): Primary | ICD-10-CM

## 2023-09-01 DIAGNOSIS — Z92.89 HISTORY OF IMMUNOTHERAPY: ICD-10-CM

## 2023-09-01 LAB
ALBUMIN SERPL-MCNC: 3.1 G/DL (ref 3.4–5)
ALBUMIN/GLOB SERPL: 1.1 {RATIO} (ref 1–2)
ALP LIVER SERPL-CCNC: 111 U/L
ALT SERPL-CCNC: 31 U/L
ANION GAP SERPL CALC-SCNC: 4 MMOL/L (ref 0–18)
AST SERPL-CCNC: 24 U/L (ref 15–37)
BASOPHILS # BLD AUTO: 0.04 X10(3) UL (ref 0–0.2)
BASOPHILS NFR BLD AUTO: 0.6 %
BILIRUB SERPL-MCNC: 0.3 MG/DL (ref 0.1–2)
BUN BLD-MCNC: 24 MG/DL (ref 7–18)
BUN/CREAT SERPL: 23.8 (ref 10–20)
CALCIUM BLD-MCNC: 8.3 MG/DL (ref 8.5–10.1)
CHLORIDE SERPL-SCNC: 109 MMOL/L (ref 98–112)
CO2 SERPL-SCNC: 28 MMOL/L (ref 21–32)
CREAT BLD-MCNC: 1.01 MG/DL
DEPRECATED RDW RBC AUTO: 54.2 FL (ref 35.1–46.3)
EGFRCR SERPLBLD CKD-EPI 2021: 75 ML/MIN/1.73M2 (ref 60–?)
EOSINOPHIL # BLD AUTO: 0.17 X10(3) UL (ref 0–0.7)
EOSINOPHIL NFR BLD AUTO: 2.6 %
ERYTHROCYTE [DISTWIDTH] IN BLOOD BY AUTOMATED COUNT: 14.9 % (ref 11–15)
GLOBULIN PLAS-MCNC: 2.8 G/DL (ref 2.8–4.4)
GLUCOSE BLD-MCNC: 108 MG/DL (ref 70–99)
HCT VFR BLD AUTO: 40.5 %
HGB BLD-MCNC: 13.3 G/DL
IMM GRANULOCYTES # BLD AUTO: 0.03 X10(3) UL (ref 0–1)
IMM GRANULOCYTES NFR BLD: 0.5 %
LYMPHOCYTES # BLD AUTO: 0.64 X10(3) UL (ref 1–4)
LYMPHOCYTES NFR BLD AUTO: 9.7 %
MCH RBC QN AUTO: 32.2 PG (ref 26–34)
MCHC RBC AUTO-ENTMCNC: 32.8 G/DL (ref 31–37)
MCV RBC AUTO: 98.1 FL
MONOCYTES # BLD AUTO: 0.67 X10(3) UL (ref 0.1–1)
MONOCYTES NFR BLD AUTO: 10.2 %
NEUTROPHILS # BLD AUTO: 5.05 X10 (3) UL (ref 1.5–7.7)
NEUTROPHILS # BLD AUTO: 5.05 X10(3) UL (ref 1.5–7.7)
NEUTROPHILS NFR BLD AUTO: 76.4 %
OSMOLALITY SERPL CALC.SUM OF ELEC: 297 MOSM/KG (ref 275–295)
PLATELET # BLD AUTO: 194 10(3)UL (ref 150–450)
POTASSIUM SERPL-SCNC: 3.9 MMOL/L (ref 3.5–5.1)
PROT SERPL-MCNC: 5.9 G/DL (ref 6.4–8.2)
RBC # BLD AUTO: 4.13 X10(6)UL
SODIUM SERPL-SCNC: 141 MMOL/L (ref 136–145)
TSI SER-ACNC: 3.48 MIU/ML (ref 0.36–3.74)
WBC # BLD AUTO: 6.6 X10(3) UL (ref 4–11)

## 2023-09-01 PROCEDURE — 85025 COMPLETE CBC W/AUTO DIFF WBC: CPT

## 2023-09-01 PROCEDURE — 84443 ASSAY THYROID STIM HORMONE: CPT

## 2023-09-01 PROCEDURE — 80053 COMPREHEN METABOLIC PANEL: CPT

## 2023-09-01 PROCEDURE — 99214 OFFICE O/P EST MOD 30 MIN: CPT | Performed by: INTERNAL MEDICINE

## 2023-09-01 PROCEDURE — 36591 DRAW BLOOD OFF VENOUS DEVICE: CPT

## 2023-09-01 RX ORDER — HEPARIN SODIUM (PORCINE) LOCK FLUSH IV SOLN 100 UNIT/ML 100 UNIT/ML
5 SOLUTION INTRAVENOUS ONCE
OUTPATIENT
Start: 2023-09-01

## 2023-09-01 RX ORDER — HEPARIN SODIUM (PORCINE) LOCK FLUSH IV SOLN 100 UNIT/ML 100 UNIT/ML
5 SOLUTION INTRAVENOUS ONCE
Status: COMPLETED | OUTPATIENT
Start: 2023-09-01 | End: 2023-09-01

## 2023-09-01 RX ORDER — SODIUM CHLORIDE 9 MG/ML
10 INJECTION INTRAVENOUS ONCE
OUTPATIENT
Start: 2023-09-01

## 2023-09-01 RX ADMIN — HEPARIN SODIUM (PORCINE) LOCK FLUSH IV SOLN 100 UNIT/ML 500 UNITS: 100 SOLUTION INTRAVENOUS at 09:27:00

## 2023-09-06 ENCOUNTER — HOSPITAL ENCOUNTER (OUTPATIENT)
Dept: CT IMAGING | Facility: HOSPITAL | Age: 78
Discharge: HOME | End: 2023-09-06
Attending: INTERNAL MEDICINE
Payer: MEDICARE

## 2023-09-06 ENCOUNTER — NURSE ONLY (OUTPATIENT)
Dept: HEMATOLOGY/ONCOLOGY | Facility: HOSPITAL | Age: 78
End: 2023-09-06
Attending: NURSE PRACTITIONER
Payer: MEDICARE

## 2023-09-06 DIAGNOSIS — D50.9 IRON DEFICIENCY ANEMIA, UNSPECIFIED IRON DEFICIENCY ANEMIA TYPE: ICD-10-CM

## 2023-09-06 DIAGNOSIS — R91.8 PULMONARY INFILTRATES: ICD-10-CM

## 2023-09-06 DIAGNOSIS — Z45.2 ENCOUNTER FOR CARE RELATED TO PORT-A-CATH: ICD-10-CM

## 2023-09-06 DIAGNOSIS — C34.12 CANCER OF UPPER LOBE OF LEFT LUNG (HCC): Primary | ICD-10-CM

## 2023-09-06 PROCEDURE — 96523 IRRIG DRUG DELIVERY DEVICE: CPT

## 2023-09-06 PROCEDURE — 71260 CT THORAX DX C+: CPT | Performed by: INTERNAL MEDICINE

## 2023-09-06 RX ORDER — HEPARIN SODIUM (PORCINE) LOCK FLUSH IV SOLN 100 UNIT/ML 100 UNIT/ML
5 SOLUTION INTRAVENOUS ONCE
Status: COMPLETED | OUTPATIENT
Start: 2023-09-06 | End: 2023-09-06

## 2023-09-06 RX ORDER — SODIUM CHLORIDE 9 MG/ML
10 INJECTION INTRAVENOUS ONCE
OUTPATIENT
Start: 2023-09-06

## 2023-09-06 RX ORDER — HEPARIN SODIUM (PORCINE) LOCK FLUSH IV SOLN 100 UNIT/ML 100 UNIT/ML
5 SOLUTION INTRAVENOUS ONCE
OUTPATIENT
Start: 2023-09-06

## 2023-09-06 RX ADMIN — HEPARIN SODIUM (PORCINE) LOCK FLUSH IV SOLN 100 UNIT/ML 500 UNITS: 100 SOLUTION INTRAVENOUS at 12:45:00

## 2023-09-07 ENCOUNTER — TELEPHONE (OUTPATIENT)
Dept: HEMATOLOGY/ONCOLOGY | Facility: HOSPITAL | Age: 78
End: 2023-09-07

## 2023-09-07 ENCOUNTER — OFFICE VISIT (OUTPATIENT)
Dept: PULMONOLOGY | Facility: CLINIC | Age: 78
End: 2023-09-07

## 2023-09-07 VITALS
DIASTOLIC BLOOD PRESSURE: 62 MMHG | OXYGEN SATURATION: 94 % | HEART RATE: 70 BPM | RESPIRATION RATE: 16 BRPM | WEIGHT: 182 LBS | SYSTOLIC BLOOD PRESSURE: 127 MMHG | BODY MASS INDEX: 26.05 KG/M2 | HEIGHT: 70 IN

## 2023-09-07 DIAGNOSIS — J96.01 ACUTE HYPOXEMIC RESPIRATORY FAILURE (HCC): Primary | ICD-10-CM

## 2023-09-07 DIAGNOSIS — R91.8 PULMONARY INFILTRATES: Primary | ICD-10-CM

## 2023-09-07 PROCEDURE — 1159F MED LIST DOCD IN RCRD: CPT | Performed by: INTERNAL MEDICINE

## 2023-09-07 PROCEDURE — 1111F DSCHRG MED/CURRENT MED MERGE: CPT | Performed by: INTERNAL MEDICINE

## 2023-09-07 PROCEDURE — 3074F SYST BP LT 130 MM HG: CPT | Performed by: INTERNAL MEDICINE

## 2023-09-07 PROCEDURE — 99213 OFFICE O/P EST LOW 20 MIN: CPT | Performed by: INTERNAL MEDICINE

## 2023-09-07 PROCEDURE — 3008F BODY MASS INDEX DOCD: CPT | Performed by: INTERNAL MEDICINE

## 2023-09-07 PROCEDURE — 3078F DIAST BP <80 MM HG: CPT | Performed by: INTERNAL MEDICINE

## 2023-09-07 PROCEDURE — 1126F AMNT PAIN NOTED NONE PRSNT: CPT | Performed by: INTERNAL MEDICINE

## 2023-09-07 NOTE — PROGRESS NOTES
The patient is an 53-year-old male who I know well from prior evaluation and comes in now for follow-up. He underwent bronchoscopy and recurrent carcinoma of the lung was identified. He was taken off the [facility] and his breathing is vastly improved. He remains on the voriconazole for aspergillosis. He also remains on Xarelto. Review of Systems:  Vision normal. Ear nose and throat normal. Bowel normal. Bladder function normal. No depression. No thyroid disease. No lymphatic system concerns. No rash. Muscles and joints unremarkable. No weight loss no weight gain. Physical Examination:  Vital signs normal. HEENT examination is unremarkable with pupils equal round and reactive to light and accommodation. Neck without adenopathy, thyromegaly, JVD nor bruit. Lungs clear to auscultation and percussion. Cardiac regular rate and rhythm no murmur. Abdomen nontender, without hepatosplenomegaly and no mass appreciable. Extremities and Musculoskeletal without clubbing cyanosis nor edema, and mobility acceptable. Neurologic grossly intact with symmetric tone and strength and reflex. Assessment and plan:  1. Pneumonia-vastly improved. He remains off the [facility] for the possibility that this pneumonitis was the main contributor. Bronchoscopy demonstrated neoplasm. Has aspergillosis. Recommendations: Continue current medical regime, see me in 3 months, contact me promptly if new trouble, remain on voriconazole. 2.  Lung cancer-awaiting tumor markers to clarify further therapeutic options as the patient is currently off the [facility]. As per oncology.

## 2023-09-07 NOTE — TELEPHONE ENCOUNTER
Testing from [facility] persistenly failed \"due to low tumor purity\" as per notification from [facility]. Patient will not be charged. Notice to be faxed into [facility]. Dr Storm Blizzard notified. Recommends patient return for labs MD visit and possible resumption of immunotherapy and [facility] testing. Spouse contacted and advised of above. Confirms understanding that infusion scheduling will reach out tomorrow to schedule prechemo labs, GUARDANT labs, MD and possible pembroluzimab for early next week.

## 2023-09-08 ENCOUNTER — APPOINTMENT (OUTPATIENT)
Dept: HEMATOLOGY/ONCOLOGY | Facility: HOSPITAL | Age: 78
End: 2023-09-08
Attending: INTERNAL MEDICINE
Payer: MEDICARE

## 2023-09-08 ENCOUNTER — TELEPHONE (OUTPATIENT)
Dept: PULMONOLOGY | Facility: CLINIC | Age: 78
End: 2023-09-08

## 2023-09-08 NOTE — TELEPHONE ENCOUNTER
Pt needs 3 mo f/u appt for December - no openings until February - she is also asking if pt can get an RSV shot

## 2023-09-08 NOTE — TELEPHONE ENCOUNTER
Spoke with patient's wife, Kemi Pérez, 3 month follow up appointment scheduled with Dr. Kristin Sosa on 12/7/23 at 12:30pm.  Verified date, time, location & parking. Dr. Kristin Sosa - Patient forgot to ask you if its ok to get Flu vaccine, covid vaccine and RSV together. Also asking if patient needs pneumonia vaccine (Prevnar 13 - 9/30/2020, Pneumovax 23 - 10/24/17).

## 2023-09-11 NOTE — TELEPHONE ENCOUNTER
Spoke with patient's wife, Tania Slater, informed her of Dr. Torrie Cordoba message, wife verbalized understanding.

## 2023-09-12 ENCOUNTER — OFFICE VISIT (OUTPATIENT)
Dept: HEMATOLOGY/ONCOLOGY | Facility: HOSPITAL | Age: 78
End: 2023-09-12
Attending: INTERNAL MEDICINE
Payer: MEDICARE

## 2023-09-12 ENCOUNTER — NURSE ONLY (OUTPATIENT)
Dept: HEMATOLOGY/ONCOLOGY | Facility: HOSPITAL | Age: 78
End: 2023-09-12
Attending: NURSE PRACTITIONER
Payer: MEDICARE

## 2023-09-12 VITALS
OXYGEN SATURATION: 97 % | RESPIRATION RATE: 18 BRPM | DIASTOLIC BLOOD PRESSURE: 57 MMHG | WEIGHT: 182 LBS | BODY MASS INDEX: 26.05 KG/M2 | HEART RATE: 71 BPM | TEMPERATURE: 98 F | HEIGHT: 70 IN | SYSTOLIC BLOOD PRESSURE: 119 MMHG

## 2023-09-12 DIAGNOSIS — Z51.12 ENCOUNTER FOR ANTINEOPLASTIC IMMUNOTHERAPY: ICD-10-CM

## 2023-09-12 DIAGNOSIS — Z45.2 ENCOUNTER FOR CARE RELATED TO PORT-A-CATH: ICD-10-CM

## 2023-09-12 DIAGNOSIS — C34.12 CANCER OF UPPER LOBE OF LEFT LUNG (HCC): Primary | ICD-10-CM

## 2023-09-12 DIAGNOSIS — D50.9 IRON DEFICIENCY ANEMIA, UNSPECIFIED IRON DEFICIENCY ANEMIA TYPE: ICD-10-CM

## 2023-09-12 DIAGNOSIS — J18.9 PNEUMONITIS: ICD-10-CM

## 2023-09-12 DIAGNOSIS — C34.92 MALIGNANT NEOPLASM OF LEFT LUNG, UNSPECIFIED PART OF LUNG (HCC): Primary | ICD-10-CM

## 2023-09-12 DIAGNOSIS — B44.9 ASPERGILLUS PNEUMONIA (HCC): ICD-10-CM

## 2023-09-12 DIAGNOSIS — R53.1 WEAKNESS GENERALIZED: ICD-10-CM

## 2023-09-12 LAB
ALBUMIN SERPL-MCNC: 2.8 G/DL (ref 3.4–5)
ALBUMIN/GLOB SERPL: 0.9 {RATIO} (ref 1–2)
ALP LIVER SERPL-CCNC: 119 U/L
ALT SERPL-CCNC: 27 U/L
ANION GAP SERPL CALC-SCNC: 6 MMOL/L (ref 0–18)
AST SERPL-CCNC: 21 U/L (ref 15–37)
BASOPHILS # BLD AUTO: 0.04 X10(3) UL (ref 0–0.2)
BASOPHILS NFR BLD AUTO: 0.3 %
BILIRUB SERPL-MCNC: 0.3 MG/DL (ref 0.1–2)
BUN BLD-MCNC: 22 MG/DL (ref 7–18)
BUN/CREAT SERPL: 21.8 (ref 10–20)
CALCIUM BLD-MCNC: 8.4 MG/DL (ref 8.5–10.1)
CHLORIDE SERPL-SCNC: 108 MMOL/L (ref 98–112)
CO2 SERPL-SCNC: 27 MMOL/L (ref 21–32)
CREAT BLD-MCNC: 1.01 MG/DL
DEPRECATED RDW RBC AUTO: 52.5 FL (ref 35.1–46.3)
EGFRCR SERPLBLD CKD-EPI 2021: 75 ML/MIN/1.73M2 (ref 60–?)
EOSINOPHIL # BLD AUTO: 0.38 X10(3) UL (ref 0–0.7)
EOSINOPHIL NFR BLD AUTO: 3.2 %
ERYTHROCYTE [DISTWIDTH] IN BLOOD BY AUTOMATED COUNT: 14.6 % (ref 11–15)
GLOBULIN PLAS-MCNC: 3 G/DL (ref 2.8–4.4)
GLUCOSE BLD-MCNC: 117 MG/DL (ref 70–99)
HCT VFR BLD AUTO: 38.5 %
HGB BLD-MCNC: 12.9 G/DL
IMM GRANULOCYTES # BLD AUTO: 0.07 X10(3) UL (ref 0–1)
IMM GRANULOCYTES NFR BLD: 0.6 %
LYMPHOCYTES # BLD AUTO: 0.35 X10(3) UL (ref 1–4)
LYMPHOCYTES NFR BLD AUTO: 2.9 %
MCH RBC QN AUTO: 32.5 PG (ref 26–34)
MCHC RBC AUTO-ENTMCNC: 33.5 G/DL (ref 31–37)
MCV RBC AUTO: 97 FL
MONOCYTES # BLD AUTO: 0.6 X10(3) UL (ref 0.1–1)
MONOCYTES NFR BLD AUTO: 5 %
NEUTROPHILS # BLD AUTO: 10.48 X10 (3) UL (ref 1.5–7.7)
NEUTROPHILS # BLD AUTO: 10.48 X10(3) UL (ref 1.5–7.7)
NEUTROPHILS NFR BLD AUTO: 88 %
OSMOLALITY SERPL CALC.SUM OF ELEC: 296 MOSM/KG (ref 275–295)
PLATELET # BLD AUTO: 219 10(3)UL (ref 150–450)
POTASSIUM SERPL-SCNC: 4 MMOL/L (ref 3.5–5.1)
PROT SERPL-MCNC: 5.8 G/DL (ref 6.4–8.2)
RBC # BLD AUTO: 3.97 X10(6)UL
SODIUM SERPL-SCNC: 141 MMOL/L (ref 136–145)
TSI SER-ACNC: 2.42 MIU/ML (ref 0.36–3.74)
WBC # BLD AUTO: 11.9 X10(3) UL (ref 4–11)

## 2023-09-12 PROCEDURE — 80053 COMPREHEN METABOLIC PANEL: CPT

## 2023-09-12 PROCEDURE — 85025 COMPLETE CBC W/AUTO DIFF WBC: CPT

## 2023-09-12 PROCEDURE — 84443 ASSAY THYROID STIM HORMONE: CPT

## 2023-09-12 PROCEDURE — 99215 OFFICE O/P EST HI 40 MIN: CPT | Performed by: INTERNAL MEDICINE

## 2023-09-12 PROCEDURE — 36591 DRAW BLOOD OFF VENOUS DEVICE: CPT

## 2023-09-12 RX ORDER — SODIUM CHLORIDE 9 MG/ML
10 INJECTION INTRAVENOUS ONCE
OUTPATIENT
Start: 2023-09-12

## 2023-09-12 RX ORDER — HEPARIN SODIUM (PORCINE) LOCK FLUSH IV SOLN 100 UNIT/ML 100 UNIT/ML
5 SOLUTION INTRAVENOUS ONCE
Status: COMPLETED | OUTPATIENT
Start: 2023-09-12 | End: 2023-09-12

## 2023-09-12 RX ORDER — HEPARIN SODIUM (PORCINE) LOCK FLUSH IV SOLN 100 UNIT/ML 100 UNIT/ML
5 SOLUTION INTRAVENOUS ONCE
OUTPATIENT
Start: 2023-09-12

## 2023-09-12 RX ADMIN — HEPARIN SODIUM (PORCINE) LOCK FLUSH IV SOLN 100 UNIT/ML 500 UNITS: 100 SOLUTION INTRAVENOUS at 13:10:00

## 2023-09-19 ENCOUNTER — TELEPHONE (OUTPATIENT)
Dept: HEMATOLOGY/ONCOLOGY | Facility: HOSPITAL | Age: 78
End: 2023-09-19

## 2023-09-19 NOTE — TELEPHONE ENCOUNTER
Eduardo Romero) [phone]  F) [phone]  We need clinical information on this patient. Hx& P, Diagnosis, medication, pathology reports, lab reports office notes  if genetic ordered  NPI and tax id. And  reason for order.  Thanks [employer]

## 2023-09-28 DIAGNOSIS — Z92.89 HISTORY OF IMMUNOTHERAPY: Primary | ICD-10-CM

## 2023-09-29 ENCOUNTER — APPOINTMENT (OUTPATIENT)
Dept: HEMATOLOGY/ONCOLOGY | Facility: HOSPITAL | Age: 78
End: 2023-09-29
Attending: INTERNAL MEDICINE
Payer: MEDICARE

## 2023-09-29 VITALS
HEIGHT: 70 IN | RESPIRATION RATE: 18 BRPM | BODY MASS INDEX: 25.77 KG/M2 | WEIGHT: 180 LBS | SYSTOLIC BLOOD PRESSURE: 114 MMHG | DIASTOLIC BLOOD PRESSURE: 59 MMHG | OXYGEN SATURATION: 94 % | HEART RATE: 71 BPM | TEMPERATURE: 99 F

## 2023-09-29 DIAGNOSIS — B44.9 ASPERGILLUS PNEUMONIA (HCC): ICD-10-CM

## 2023-09-29 DIAGNOSIS — C34.12 CANCER OF UPPER LOBE OF LEFT LUNG (HCC): Primary | ICD-10-CM

## 2023-09-29 DIAGNOSIS — D50.9 IRON DEFICIENCY ANEMIA, UNSPECIFIED IRON DEFICIENCY ANEMIA TYPE: ICD-10-CM

## 2023-09-29 DIAGNOSIS — Z45.2 ENCOUNTER FOR CARE RELATED TO PORT-A-CATH: ICD-10-CM

## 2023-09-29 DIAGNOSIS — Z92.89 HISTORY OF IMMUNOTHERAPY: ICD-10-CM

## 2023-09-29 DIAGNOSIS — Z51.12 ENCOUNTER FOR ANTINEOPLASTIC IMMUNOTHERAPY: ICD-10-CM

## 2023-09-29 DIAGNOSIS — C34.92 MALIGNANT NEOPLASM OF LEFT LUNG, UNSPECIFIED PART OF LUNG (HCC): Primary | ICD-10-CM

## 2023-09-29 LAB
ALBUMIN SERPL-MCNC: 3.3 G/DL (ref 3.4–5)
ALBUMIN/GLOB SERPL: 1.2 {RATIO} (ref 1–2)
ALP LIVER SERPL-CCNC: 140 U/L
ALT SERPL-CCNC: 30 U/L
ANION GAP SERPL CALC-SCNC: 8 MMOL/L (ref 0–18)
AST SERPL-CCNC: 24 U/L (ref 15–37)
BASOPHILS # BLD AUTO: 0.03 X10(3) UL (ref 0–0.2)
BASOPHILS NFR BLD AUTO: 0.3 %
BILIRUB SERPL-MCNC: 0.4 MG/DL (ref 0.1–2)
BUN BLD-MCNC: 27 MG/DL (ref 7–18)
BUN/CREAT SERPL: 24.1 (ref 10–20)
CALCIUM BLD-MCNC: 8.8 MG/DL (ref 8.5–10.1)
CHLORIDE SERPL-SCNC: 109 MMOL/L (ref 98–112)
CO2 SERPL-SCNC: 24 MMOL/L (ref 21–32)
CREAT BLD-MCNC: 1.12 MG/DL
DEPRECATED RDW RBC AUTO: 52.1 FL (ref 35.1–46.3)
EGFRCR SERPLBLD CKD-EPI 2021: 66 ML/MIN/1.73M2 (ref 60–?)
EOSINOPHIL # BLD AUTO: 0.01 X10(3) UL (ref 0–0.7)
EOSINOPHIL NFR BLD AUTO: 0.1 %
ERYTHROCYTE [DISTWIDTH] IN BLOOD BY AUTOMATED COUNT: 14.6 % (ref 11–15)
GLOBULIN PLAS-MCNC: 2.8 G/DL (ref 2.8–4.4)
GLUCOSE BLD-MCNC: 173 MG/DL (ref 70–99)
HCT VFR BLD AUTO: 41.2 %
HGB BLD-MCNC: 13.4 G/DL
IMM GRANULOCYTES # BLD AUTO: 0.06 X10(3) UL (ref 0–1)
IMM GRANULOCYTES NFR BLD: 0.5 %
LYMPHOCYTES # BLD AUTO: 0.35 X10(3) UL (ref 1–4)
LYMPHOCYTES NFR BLD AUTO: 3.1 %
MCH RBC QN AUTO: 31.5 PG (ref 26–34)
MCHC RBC AUTO-ENTMCNC: 32.5 G/DL (ref 31–37)
MCV RBC AUTO: 96.7 FL
MONOCYTES # BLD AUTO: 0.37 X10(3) UL (ref 0.1–1)
MONOCYTES NFR BLD AUTO: 3.3 %
NEUTROPHILS # BLD AUTO: 10.32 X10 (3) UL (ref 1.5–7.7)
NEUTROPHILS # BLD AUTO: 10.32 X10(3) UL (ref 1.5–7.7)
NEUTROPHILS NFR BLD AUTO: 92.7 %
OSMOLALITY SERPL CALC.SUM OF ELEC: 301 MOSM/KG (ref 275–295)
PLATELET # BLD AUTO: 216 10(3)UL (ref 150–450)
POTASSIUM SERPL-SCNC: 4.1 MMOL/L (ref 3.5–5.1)
PROT SERPL-MCNC: 6.1 G/DL (ref 6.4–8.2)
RBC # BLD AUTO: 4.26 X10(6)UL
SODIUM SERPL-SCNC: 141 MMOL/L (ref 136–145)
TSI SER-ACNC: 1.6 MIU/ML (ref 0.36–3.74)
WBC # BLD AUTO: 11.1 X10(3) UL (ref 4–11)

## 2023-09-29 PROCEDURE — 80053 COMPREHEN METABOLIC PANEL: CPT

## 2023-09-29 PROCEDURE — 85025 COMPLETE CBC W/AUTO DIFF WBC: CPT

## 2023-09-29 PROCEDURE — 99215 OFFICE O/P EST HI 40 MIN: CPT | Performed by: INTERNAL MEDICINE

## 2023-09-29 PROCEDURE — 36591 DRAW BLOOD OFF VENOUS DEVICE: CPT

## 2023-09-29 PROCEDURE — 84443 ASSAY THYROID STIM HORMONE: CPT

## 2023-09-29 RX ORDER — HEPARIN SODIUM (PORCINE) LOCK FLUSH IV SOLN 100 UNIT/ML 100 UNIT/ML
5 SOLUTION INTRAVENOUS ONCE
OUTPATIENT
Start: 2023-09-29

## 2023-09-29 RX ORDER — SODIUM CHLORIDE 9 MG/ML
10 INJECTION INTRAVENOUS ONCE
OUTPATIENT
Start: 2023-09-29

## 2023-09-29 RX ORDER — HEPARIN SODIUM (PORCINE) LOCK FLUSH IV SOLN 100 UNIT/ML 100 UNIT/ML
5 SOLUTION INTRAVENOUS ONCE
Status: COMPLETED | OUTPATIENT
Start: 2023-09-29 | End: 2023-09-29

## 2023-09-29 RX ADMIN — HEPARIN SODIUM (PORCINE) LOCK FLUSH IV SOLN 100 UNIT/ML 500 UNITS: 100 SOLUTION INTRAVENOUS at 13:57:00

## 2023-10-02 ENCOUNTER — TELEPHONE (OUTPATIENT)
Dept: HEMATOLOGY/ONCOLOGY | Facility: HOSPITAL | Age: 78
End: 2023-10-02

## 2023-10-02 DIAGNOSIS — C34.92 MALIGNANT NEOPLASM OF LEFT LUNG, UNSPECIFIED PART OF LUNG (HCC): Primary | ICD-10-CM

## 2023-10-02 NOTE — TELEPHONE ENCOUNTER
Call returned and process of filling and coverage explained regarding the two oral chemo drugs prescribed by Dr Rolan Aaron. Advised will assist with funding if needed. Advised to be aware of toll free calls coming in and to please answer calls. Have them confirm their identity before providing any sensitive information. Instructed to call clinic nurse when contacted by pharmacy to confirm affordability of the copay, confirm delivery of the medications and to set up education appt with [facility] to go over dose/drug and how to take and symptoms to minimize. Appreciative of call and understanding confirmed.

## 2023-10-02 NOTE — TELEPHONE ENCOUNTER
LM to please return call to clinic RN so that medication information can be provided. Awaiting call back.

## 2023-10-02 NOTE — TELEPHONE ENCOUNTER
Morning Lisseth [facility] ) returning your call would like a call back Thanks Olvin de guzman

## 2023-10-03 ENCOUNTER — APPOINTMENT (OUTPATIENT)
Dept: HEMATOLOGY/ONCOLOGY | Facility: HOSPITAL | Age: 78
End: 2023-10-03
Attending: NURSE PRACTITIONER
Payer: MEDICARE

## 2023-10-03 ENCOUNTER — TELEPHONE (OUTPATIENT)
Dept: HEMATOLOGY/ONCOLOGY | Facility: HOSPITAL | Age: 78
End: 2023-10-03

## 2023-10-03 DIAGNOSIS — C34.92 MALIGNANT NEOPLASM OF LEFT LUNG, UNSPECIFIED PART OF LUNG (HCC): ICD-10-CM

## 2023-10-03 NOTE — TELEPHONE ENCOUNTER
Pt's wife is calling to let the doctor know that the specialty pharmacy called them to authorize the new medication for the patient and would like a call back to discuss this-NL

## 2023-10-04 ENCOUNTER — TELEPHONE (OUTPATIENT)
Dept: HEMATOLOGY/ONCOLOGY | Facility: HOSPITAL | Age: 78
End: 2023-10-04

## 2023-10-04 NOTE — TELEPHONE ENCOUNTER
Call returned to [facility] and spouse, [place]. Prescription has been sent and rec'd by [facility] specialty pharmacy. Rec'd key today to complete [facility]. Explained above to patient and spouse. Advised to call our office if cost of drugs too high to cover and we will proceed with applying for assistance. They confirm understanding and aware Xiang Garcia out of office, Armaan Ray RN covering and aware.

## 2023-10-04 NOTE — TELEPHONE ENCOUNTER
Hannah Luong with [facility]  is calling to let us know she is faxing over over a prior auth for [employer] key # [id number] and also for medication Tafinlar key# [id number].  Please call [phone] with any questions-NL

## 2023-10-05 ENCOUNTER — IMMUNIZATION (OUTPATIENT)
Dept: LAB | Age: 78
End: 2023-10-05
Attending: EMERGENCY MEDICINE
Payer: MEDICARE

## 2023-10-05 DIAGNOSIS — Z23 NEED FOR VACCINATION: Primary | ICD-10-CM

## 2023-10-05 PROCEDURE — 90471 IMMUNIZATION ADMIN: CPT

## 2023-10-05 PROCEDURE — 90662 IIV NO PRSV INCREASED AG IM: CPT

## 2023-10-05 PROCEDURE — 90480 ADMN SARSCOV2 VAC 1/ONLY CMP: CPT

## 2023-10-06 ENCOUNTER — TELEPHONE (OUTPATIENT)
Dept: HEMATOLOGY/ONCOLOGY | Facility: HOSPITAL | Age: 78
End: 2023-10-06

## 2023-10-10 ENCOUNTER — TELEPHONE (OUTPATIENT)
Dept: HEMATOLOGY/ONCOLOGY | Facility: HOSPITAL | Age: 78
End: 2023-10-10

## 2023-10-10 NOTE — TELEPHONE ENCOUNTER
Nahomi See [phone]  waiting on call from nurse she was helping with getting this medication for Luis  dabrafenib mesylate 50 MG Oral Cap  and Trametinib Dimethyl Sulfoxide 2 MG Oral Tab. Would like a call back we having been trying to get information about this.  Thanks [employer]

## 2023-10-11 ENCOUNTER — TELEPHONE (OUTPATIENT)
Dept: HEMATOLOGY/ONCOLOGY | Facility: HOSPITAL | Age: 78
End: 2023-10-11

## 2023-10-11 DIAGNOSIS — C34.92 MALIGNANT NEOPLASM OF LEFT LUNG, UNSPECIFIED PART OF LUNG (HCC): ICD-10-CM

## 2023-10-11 NOTE — TELEPHONE ENCOUNTER
Spoke with Robyn and Luis. We have applied for free drug do to a high copay amount. Will keep them posted.

## 2023-10-13 ENCOUNTER — TELEPHONE (OUTPATIENT)
Dept: HEMATOLOGY/ONCOLOGY | Facility: HOSPITAL | Age: 78
End: 2023-10-13

## 2023-10-13 DIAGNOSIS — C34.12 CANCER OF UPPER LOBE OF LEFT LUNG (HCC): Primary | ICD-10-CM

## 2023-10-13 NOTE — TELEPHONE ENCOUNTER
Patient's wife called to let Dr Denise Moore know that the patient's medication will be delivered 10/19/23.

## 2023-10-13 NOTE — TELEPHONE ENCOUNTER
This RN called patient to schedule chemotherapy education. Patient asked this RN to call wife to schedule. Patient's wife contact - chemotherapy education set up for 10/23/23 at 2pm. Patient wife instructed to bring oral medications to the appointment. Patient's wife also instructed to contact the cancer center if medications don't arrive on 10/19/23.

## 2023-10-17 ENCOUNTER — TELEPHONE (OUTPATIENT)
Dept: HEMATOLOGY/ONCOLOGY | Facility: HOSPITAL | Age: 78
End: 2023-10-17

## 2023-10-23 ENCOUNTER — OFFICE VISIT (OUTPATIENT)
Dept: HEMATOLOGY/ONCOLOGY | Facility: HOSPITAL | Age: 78
End: 2023-10-23
Attending: INTERNAL MEDICINE
Payer: MEDICARE

## 2023-10-23 DIAGNOSIS — C34.92 MALIGNANT NEOPLASM OF LEFT LUNG, UNSPECIFIED PART OF LUNG (HCC): ICD-10-CM

## 2023-10-23 DIAGNOSIS — B44.9 ASPERGILLUS PNEUMONIA (HCC): ICD-10-CM

## 2023-10-23 DIAGNOSIS — Z92.89 HISTORY OF IMMUNOTHERAPY: ICD-10-CM

## 2023-10-23 DIAGNOSIS — Z51.11 CHEMOTHERAPY MANAGEMENT, ENCOUNTER FOR: Primary | ICD-10-CM

## 2023-10-23 DIAGNOSIS — J98.4 PNEUMONITIS: ICD-10-CM

## 2023-10-23 PROCEDURE — 99215 OFFICE O/P EST HI 40 MIN: CPT | Performed by: INTERNAL MEDICINE

## 2023-10-23 RX ORDER — PREDNISONE 10 MG/1
10 TABLET ORAL DAILY
Qty: 90 TABLET | Refills: 0 | OUTPATIENT
Start: 2023-10-23

## 2023-10-23 NOTE — PROGRESS NOTES
Cancer Center Progress Note    Patient Name: Juan Alberto Cobian   YOB: 1945   Medical Record Number: [id number]   Attending Physician: Brook Marie M.D. 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 He completed definitive concurrent chemoradiotherapy using cisplatin and etoposide early March 2017. He had low-grade anemia neutropenia on treatment. He subsequently has been followed on surveillance with no evidence of progression. The patient started on durvalumab in October 2017 given he had unresectable stage III disease with no evidence of progression following chemo radiotherapy    He has had occasional mild anemia on treatment. Laboratory follow-up in May 2018 showed severe iron deficiency. Received IV iron    He completed 13 cycles of durvalumab  however towards the end of his treatment he developed 2 separate instances of pneumonitis/transaminitis requiring separate courses of prednisone. 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 Started single-agent Pembrolizumab on 3/19/21. 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 PE/DVT admission 1/2023. Interval History:  Returns for routine follow-up.    Feels well      Performance Status:  ECOG 0    Past Medical History:  Past Medical History:   Diagnosis Date    Esophageal reflux     Exposure to medical diagnostic radiation     Hemorrhoids     Impotence     Lung cancer ([facility] [address]     NSCLCA stage IIIB    Necrotizing granulomatous inflammation of lung (HCC)     Obstructive sleep apnea     Personal history of antineoplastic chemotherapy     Sinus problem     Sinus problems    Sleep apnea     CPAP       Past Surgical History:  Past Surgical History:   Procedure Laterality Date    COLONOSCOPY      COLONOSCOPY N/A 2018    Procedure: COLONOSCOPY;  Surgeon: John Plummer MD;  Location: [facility] ENDOSCOPY    COLONOSCOPY      [place], [place]         Family History:  Family History   Problem Relation Age of Onset    Cancer Father         Lung    Cancer Mother         Breast, ovarian       Social History:  Social History    Socioeconomic History      Marital status:       Spouse name: Not on file      Number of children: Not on file      Years of education: Not on file      Highest education level: Not on file    Occupational History      Not on file    Tobacco Use      Smoking status: Former        Packs/day: 1.00        Years: 30.00        Additional pack years: 0.00        Total pack years: 30.00        Types: Cigarettes        Quit date: 2000        Years since quittin.3      Smokeless tobacco: Never    Vaping Use      Vaping Use: Never used    Substance and Sexual Activity      Alcohol use: Not Currently        Alcohol/week: 0.8 standard drinks of alcohol        Types: 1 Glasses of wine per week        Comment: rare no longer has his       Drug use: No      Sexual activity: Not on file    Other Topics      Concerns:         Service: Not Asked        Blood Transfusions: Not Asked        Caffeine Concern: Yes          coffee, 2 cups daily        Occupational Exposure: Not Asked        Hobby Hazards: Not Asked        Sleep Concern: Not Asked        Stress Concern: Not Asked        Weight Concern: Not Asked        Special Diet: Not Asked        Back Care: Not Asked        Exercise: Not Asked Bike Helmet: Not Asked        Seat Belt: Not Asked        Self-Exams: Not Asked    Social History Narrative      Not on file    Social Determinants of Health  Financial Resource Strain: Not on file  Food Insecurity: Not on file  Transportation Needs: Not on file  Physical Activity: Not on file  Stress: Not on file  Social Connections: Not on file  Housing Stability: Not on file      Current Medications:    Current Outpatient Medications:     predniSONE 10 MG Oral Tab, Take 1 tablet (10 mg total) by mouth daily. , Disp: 90 tablet, Rfl: 0    dabrafenib mesylate 50 MG Oral Cap, Take 3 capsules (150 mg total) by mouth 2 (two) times daily. Take THREE capsules at least 1 hour before or 2 hours after a meal, twice a day, Disp: 180 capsule, Rfl: 5    Trametinib Dimethyl Sulfoxide 2 MG Oral Tab, Take 2 mg by mouth daily. Take at least 1 hour before or at least 2 hours after a meal., Disp: 30 tablet, Rfl: 5    rivaroxaban (XARELTO) 20 MG Oral Tab, Take 1 tablet (20 mg total) by mouth nightly., Disp: 90 tablet, Rfl: 1    Acetylcysteine ( OR), Take 600 mg by mouth daily. , Disp: , Rfl:     PEMBROLIZUMAB IV, Inject 1 Dose into the vein See Admin Instructions. EVERY 3 WEEKS, Disp: , Rfl:     voriconazole 200 MG Oral Tab, Take 1 tablet (200 mg total) by mouth 2 (two) times daily. , Disp: 180 tablet, Rfl: 3    pantoprazole 40 MG Oral Tab EC, Take 1 tablet (40 mg total) by mouth daily. , Disp: 90 tablet, Rfl: 3    sulfamethoxazole-trimethoprim -160 MG Oral Tab per tablet, One tablet PO BID on Monday, Wednesday, Friday, Disp: 60 tablet, Rfl: 3    fluticasone furoate-vilanterol (BREO ELLIPTA) 100-25 MCG/INH Inhalation Aerosol Powder, Breath Activated, Inhale 1 puff into the lungs daily.  Follow with mouth rinse and spit, Disp: 1 each, Rfl: 6    albuterol 108 (90 Base) MCG/ACT Inhalation Aero Soln, Inhale 2 puffs into the lungs every 4 (four) hours as needed for Wheezing., Disp: 1 each, Rfl: 2    Fluticasone Propionate 50 MCG/ACT Nasal Suspension, 1 spray by Nasal route 2 (two) times daily. , Disp: 1 Bottle, Rfl: 3    Cholecalciferol (VITAMIN D3) 1000 units Oral Cap, Take 1,000 Units by mouth daily. , Disp: , Rfl:     Vitamin C 500 MG Oral Tab, Take 1 tablet (500 mg total) by mouth daily. , Disp: , Rfl:     Allergies:  No Known Allergies     Review of Systems:  All other systems reviewed and negative x12    Vital Signs: There were no vitals taken for this visit. Physical Examination:  General: Patient is alert and oriented x 3, not in acute distress. Psych:  Mood and affect appropriate, in good spirits. HEENT: EOMs intact. Oropharynx is clear. Chest: non-labored breathing, CTA  Cardiovascular: Regular rate and rhythm. Normal S1S2  Extremities: No edema. Neurological: 5/5 motor x4.     Derm:  No rash    LABS:    CBC:    Lab Results   Component Value Date    WBC 11.1 (H) 09/29/2023    WBC 11.9 (H) 09/12/2023    WBC 6.6 09/01/2023     Lab Results   Component Value Date    HGB 13.4 09/29/2023    HGB 12.9 (L) 09/12/2023    HGB 13.3 09/01/2023      Lab Results   Component Value Date    .0 09/29/2023    .0 09/12/2023    .0 09/01/2023       Lab Results   Component Value Date     (H) 09/29/2023    BUN 27 (H) 09/29/2023    BUNCREA 24.1 (H) 09/29/2023    CREATSERUM 1.12 09/29/2023    ANIONGAP 8 09/29/2023    GFR >60 05/14/2016    GFRNAA 81 07/22/2022    GFRAA 94 07/22/2022    CA 8.8 09/29/2023    OSMOCALC 301 (H) 09/29/2023    ALKPHO 140 (H) 09/29/2023    AST 24 09/29/2023    ALT 30 09/29/2023    ALKPHOS 58 12/10/2011    BILT 0.4 09/29/2023    TP 6.1 (L) 09/29/2023    ALB 3.3 (L) 09/29/2023    GLOBULIN 2.8 09/29/2023     09/29/2023    K 4.1 09/29/2023     09/29/2023    CO2 24.0 09/29/2023       Radiology:  PEt/CT Chest       Cancer of upper lobe of left lung [facility])    Staging form: Lung AJCC V7      Clinical stage from 1/9/2017: Stage IIIB (T1a, N3, M0) - Signed by Radha Malik MD on 1/9/2017    Impression and Plan:  [de-identified]year oldmale former smoker with 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 He was diagnosed in December 2016 as above. He was treated with concurrent definitive chemoradiotherapy using cisplatin and etoposide finishing in early March 2017. He had low-grade neutropenia and anemia on chemotherapy. Given he has unresectable stage IIIb disease with no progression following initial chemotherapy we  added immunotherapy with durvalumab. Completed 13 cycles of durvalumab however developed recurrent pneumonitis/transaminitis. He has responded well to prednisone. He remains off of all cancer directed therapy as of the end of April 2018. Recurrent disease biopsy-proven mediastinal lymph nodes as of March 2021. Same histology. Given PD-L1 greater than 50% we will proceed with single agent pembrolizumab. Also discussed nivolumab/ipilimumab however with previous issues of pneumonitis and transaminitis will use single agent immunotherapy. Received Cycle #1 Pembrolizumab on 3/19/21, to be good long-term disease control through summer 2023    BRAF V600 E peripheral blood sequencing September 2023  --  dabrafenib trametinib start 10/23/23    - Pulmonary embolus now on rivaroxaban diagnosed January 2023. Down to 20mg daily as of 2/2023. Tolerating well. Monitor.     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  - Recurrent pneumonitis now off pembolizumab will be able to taper his prednisone at future visit          Medical decision making:  High risk active cancer therapy complications of systemic therapy for cancer.

## 2023-10-25 ENCOUNTER — TELEPHONE (OUTPATIENT)
Dept: HEMATOLOGY/ONCOLOGY | Facility: HOSPITAL | Age: 78
End: 2023-10-25

## 2023-10-25 NOTE — TELEPHONE ENCOUNTER
Spoke with Conrado Geller and Robyn. Pt was approved for free drug from [facility] patient [facility]. They will be receiving a letter in the mail with instructions on who to call to set up delivery. Also let them know that it does  in December, but renewing shouldn't be an issue. Both verbalized understanding and thanked me for the call.

## 2023-10-28 DIAGNOSIS — Z51.11 CHEMOTHERAPY MANAGEMENT, ENCOUNTER FOR: ICD-10-CM

## 2023-10-28 DIAGNOSIS — C34.12 CANCER OF UPPER LOBE OF LEFT LUNG (HCC): ICD-10-CM

## 2023-10-28 DIAGNOSIS — J98.4 PNEUMONITIS: ICD-10-CM

## 2023-10-28 DIAGNOSIS — D84.9 IMMUNOCOMPROMISED (HCC): ICD-10-CM

## 2023-10-30 RX ORDER — SULFAMETHOXAZOLE AND TRIMETHOPRIM 800; 160 MG/1; MG/1
TABLET ORAL
Qty: 60 TABLET | Refills: 3 | Status: SHIPPED | OUTPATIENT
Start: 2023-10-30

## 2023-10-31 ENCOUNTER — NURSE ONLY (OUTPATIENT)
Dept: HEMATOLOGY/ONCOLOGY | Facility: HOSPITAL | Age: 78
End: 2023-10-31
Attending: INTERNAL MEDICINE
Payer: MEDICARE

## 2023-10-31 VITALS
BODY MASS INDEX: 26.39 KG/M2 | TEMPERATURE: 98 F | OXYGEN SATURATION: 95 % | HEIGHT: 70 IN | SYSTOLIC BLOOD PRESSURE: 150 MMHG | HEART RATE: 65 BPM | RESPIRATION RATE: 16 BRPM | WEIGHT: 184.31 LBS | DIASTOLIC BLOOD PRESSURE: 65 MMHG

## 2023-10-31 DIAGNOSIS — J98.4 PNEUMONITIS: ICD-10-CM

## 2023-10-31 DIAGNOSIS — C34.12 CANCER OF UPPER LOBE OF LEFT LUNG (HCC): Primary | ICD-10-CM

## 2023-10-31 DIAGNOSIS — D50.9 IRON DEFICIENCY ANEMIA, UNSPECIFIED IRON DEFICIENCY ANEMIA TYPE: ICD-10-CM

## 2023-10-31 DIAGNOSIS — D84.9 IMMUNOCOMPROMISED (HCC): ICD-10-CM

## 2023-10-31 DIAGNOSIS — Z51.11 CHEMOTHERAPY MANAGEMENT, ENCOUNTER FOR: ICD-10-CM

## 2023-10-31 DIAGNOSIS — R53.1 WEAKNESS GENERALIZED: ICD-10-CM

## 2023-10-31 DIAGNOSIS — Z45.2 ENCOUNTER FOR CARE RELATED TO PORT-A-CATH: ICD-10-CM

## 2023-10-31 LAB
ALBUMIN SERPL-MCNC: 3 G/DL (ref 3.2–4.8)
ALBUMIN/GLOB SERPL: 1 {RATIO} (ref 1–2)
ALP LIVER SERPL-CCNC: 118 U/L
ALT SERPL-CCNC: 21 U/L
ANION GAP SERPL CALC-SCNC: 5 MMOL/L (ref 0–18)
AST SERPL-CCNC: 22 U/L (ref ?–34)
BASOPHILS # BLD AUTO: 0.03 X10(3) UL (ref 0–0.2)
BASOPHILS NFR BLD AUTO: 0.5 %
BILIRUB SERPL-MCNC: 0.3 MG/DL (ref 0.2–1.1)
BUN BLD-MCNC: 24 MG/DL (ref 9–23)
BUN/CREAT SERPL: 27.6 (ref 10–20)
CALCIUM BLD-MCNC: 8.5 MG/DL (ref 8.7–10.4)
CHLORIDE SERPL-SCNC: 107 MMOL/L (ref 98–112)
CO2 SERPL-SCNC: 27 MMOL/L (ref 21–32)
CREAT BLD-MCNC: 0.87 MG/DL
DEPRECATED RDW RBC AUTO: 49 FL (ref 35.1–46.3)
EGFRCR SERPLBLD CKD-EPI 2021: 87 ML/MIN/1.73M2 (ref 60–?)
EOSINOPHIL # BLD AUTO: 0.04 X10(3) UL (ref 0–0.7)
EOSINOPHIL NFR BLD AUTO: 0.7 %
ERYTHROCYTE [DISTWIDTH] IN BLOOD BY AUTOMATED COUNT: 14.2 % (ref 11–15)
GLOBULIN PLAS-MCNC: 2.9 G/DL (ref 2.8–4.4)
GLUCOSE BLD-MCNC: 100 MG/DL (ref 70–99)
HCT VFR BLD AUTO: 39.1 %
HGB BLD-MCNC: 13.2 G/DL
IMM GRANULOCYTES # BLD AUTO: 0.03 X10(3) UL (ref 0–1)
IMM GRANULOCYTES NFR BLD: 0.5 %
LYMPHOCYTES # BLD AUTO: 1.1 X10(3) UL (ref 1–4)
LYMPHOCYTES NFR BLD AUTO: 18.2 %
MCH RBC QN AUTO: 31.9 PG (ref 26–34)
MCHC RBC AUTO-ENTMCNC: 33.8 G/DL (ref 31–37)
MCV RBC AUTO: 94.4 FL
MONOCYTES # BLD AUTO: 0.57 X10(3) UL (ref 0.1–1)
MONOCYTES NFR BLD AUTO: 9.4 %
NEUTROPHILS # BLD AUTO: 4.28 X10 (3) UL (ref 1.5–7.7)
NEUTROPHILS # BLD AUTO: 4.28 X10(3) UL (ref 1.5–7.7)
NEUTROPHILS NFR BLD AUTO: 70.7 %
OSMOLALITY SERPL CALC.SUM OF ELEC: 292 MOSM/KG (ref 275–295)
PLATELET # BLD AUTO: 220 10(3)UL (ref 150–450)
POTASSIUM SERPL-SCNC: 3.7 MMOL/L (ref 3.5–5.1)
PROT SERPL-MCNC: 5.9 G/DL (ref 5.7–8.2)
RBC # BLD AUTO: 4.14 X10(6)UL
SODIUM SERPL-SCNC: 139 MMOL/L (ref 136–145)
WBC # BLD AUTO: 6.1 X10(3) UL (ref 4–11)

## 2023-10-31 PROCEDURE — 85025 COMPLETE CBC W/AUTO DIFF WBC: CPT

## 2023-10-31 PROCEDURE — 80053 COMPREHEN METABOLIC PANEL: CPT

## 2023-10-31 PROCEDURE — 36591 DRAW BLOOD OFF VENOUS DEVICE: CPT

## 2023-10-31 PROCEDURE — 99214 OFFICE O/P EST MOD 30 MIN: CPT | Performed by: PHYSICIAN ASSISTANT

## 2023-10-31 RX ORDER — HEPARIN SODIUM (PORCINE) LOCK FLUSH IV SOLN 100 UNIT/ML 100 UNIT/ML
5 SOLUTION INTRAVENOUS ONCE
OUTPATIENT
Start: 2023-10-31

## 2023-10-31 RX ORDER — SODIUM CHLORIDE 9 MG/ML
10 INJECTION INTRAVENOUS ONCE
OUTPATIENT
Start: 2023-10-31

## 2023-10-31 RX ORDER — HEPARIN SODIUM (PORCINE) LOCK FLUSH IV SOLN 100 UNIT/ML 100 UNIT/ML
5 SOLUTION INTRAVENOUS ONCE
Status: COMPLETED | OUTPATIENT
Start: 2023-10-31 | End: 2023-10-31

## 2023-10-31 RX ADMIN — HEPARIN SODIUM (PORCINE) LOCK FLUSH IV SOLN 100 UNIT/ML 500 UNITS: 100 SOLUTION INTRAVENOUS at 09:19:00

## 2023-10-31 NOTE — PROGRESS NOTES
Cancer Center Progress Note    Patient Name: Blas Bonner   YOB: 1945   Medical Record Number: [id number]   Attending Physician: Autumn Arevalo M.D. 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 He completed definitive concurrent chemoradiotherapy using cisplatin and etoposide early March 2017. He had low-grade anemia neutropenia on treatment. He subsequently has been followed on surveillance with no evidence of progression. The patient started on durvalumab in October 2017 given he had unresectable stage III disease with no evidence of progression following chemo radiotherapy    He has had occasional mild anemia on treatment. Laboratory follow-up in May 2018 showed severe iron deficiency. Received IV iron    He completed 13 cycles of durvalumab  however towards the end of his treatment he developed 2 separate instances of pneumonitis/transaminitis requiring separate courses of prednisone. 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 Started single-agent Pembrolizumab on 3/19/21. 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 PE/DVT admission 1/2023. Interval History:  Returns for routine follow-up. Started trametinib dimethyl sulfoxide 2mg and dabrafenoib mesylate 150mg bid. Biggest c/o is progressive fatigue 3 days post starting meds.  Taking both chemo drugs tog in am and dabrafenib in the pm. Fatigue did taper off. No other acute issues. No n/v/dc. Denies cp/sob. Breathing overall stable. Wife present for the encounter.        Performance Status:  ECOG 0    Past Medical History:  Past Medical History:   Diagnosis Date    Esophageal reflux     Exposure to medical diagnostic radiation     Hemorrhoids     Impotence     Lung cancer ([facility] [address]     NSCLCA stage IIIB    Necrotizing granulomatous inflammation of lung (HCC)     Obstructive sleep apnea     Personal history of antineoplastic chemotherapy     Sinus problem     Sinus problems    Sleep apnea     CPAP       Past Surgical History:  Past Surgical History:   Procedure Laterality Date    COLONOSCOPY      COLONOSCOPY N/A 2018    Procedure: COLONOSCOPY;  Surgeon: Mallorie Farmer MD;  Location: [facility] ENDOSCOPY    COLONOSCOPY      [place], [place], [place]         Family History:  Family History   Problem Relation Age of Onset    Cancer Father         Lung    Cancer Mother         Breast, ovarian       Social History:  Social History    Socioeconomic History      Marital status:       Spouse name: Not on file      Number of children: Not on file      Years of education: Not on file      Highest education level: Not on file    Occupational History      Not on file    Tobacco Use      Smoking status: Former        Packs/day: 1.00        Years: 30.00        Additional pack years: 0.00        Total pack years: 30.00        Types: Cigarettes        Quit date: 2000        Years since quittin.3      Smokeless tobacco: Never    Vaping Use      Vaping Use: Never used    Substance and Sexual Activity      Alcohol use: Not Currently        Alcohol/week: 0.8 standard drinks of alcohol        Types: 1 Glasses of wine per week        Comment: rare no longer has his       Drug use: No      Sexual activity: Not on file    Other Topics      Concerns:         Service: Not Asked        Blood Transfusions: Not Asked        Caffeine Concern: Yes          coffee, 2 cups daily        Occupational Exposure: Not Asked        Hobby Hazards: Not Asked        Sleep Concern: Not Asked        Stress Concern: Not Asked        Weight Concern: Not Asked        Special Diet: Not Asked        Back Care: Not Asked        Exercise: Not Asked        Bike Helmet: Not Asked        Seat Belt: Not Asked        Self-Exams: Not Asked    Social History Narrative      Not on file    Social Determinants of Health  Financial Resource Strain: Not on file  Food Insecurity: Not on file  Transportation Needs: Not on file  Physical Activity: Not on file  Stress: Not on file  Social Connections: Not on file  Housing Stability: Not on file      Current Medications:    Current Outpatient Medications:     sulfamethoxazole-trimethoprim -160 MG Oral Tab per tablet, TAKE 1 TABLET BY MOUTH TWICE A DAY ON MONDAY, WEDNESDAY AND FRIDAY, Disp: 60 tablet, Rfl: 3    predniSONE 10 MG Oral Tab, Take 1 tablet (10 mg total) by mouth daily. , Disp: 90 tablet, Rfl: 0    dabrafenib mesylate 50 MG Oral Cap, Take 3 capsules (150 mg total) by mouth 2 (two) times daily. Take THREE capsules at least 1 hour before or 2 hours after a meal, twice a day, Disp: 180 capsule, Rfl: 5    Trametinib Dimethyl Sulfoxide 2 MG Oral Tab, Take 2 mg by mouth daily. Take at least 1 hour before or at least 2 hours after a meal., Disp: 30 tablet, Rfl: 5    rivaroxaban (XARELTO) 20 MG Oral Tab, Take 1 tablet (20 mg total) by mouth nightly., Disp: 90 tablet, Rfl: 1    Acetylcysteine ( OR), Take 600 mg by mouth daily. , Disp: , Rfl:     PEMBROLIZUMAB IV, Inject 1 Dose into the vein See Admin Instructions. EVERY 3 WEEKS, Disp: , Rfl:     voriconazole 200 MG Oral Tab, Take 1 tablet (200 mg total) by mouth 2 (two) times daily. , Disp: 180 tablet, Rfl: 3    pantoprazole 40 MG Oral Tab EC, Take 1 tablet (40 mg total) by mouth daily. , Disp: 90 tablet, Rfl: 3    fluticasone furoate-vilanterol (BREO ELLIPTA) 100-25 MCG/INH Inhalation Aerosol Powder, Breath Activated, Inhale 1 puff into the lungs daily. Follow with mouth rinse and spit, Disp: 1 each, Rfl: 6    albuterol 108 (90 Base) MCG/ACT Inhalation Aero Soln, Inhale 2 puffs into the lungs every 4 (four) hours as needed for Wheezing., Disp: 1 each, Rfl: 2    Fluticasone Propionate 50 MCG/ACT Nasal Suspension, 1 spray by Nasal route 2 (two) times daily. , Disp: 1 Bottle, Rfl: 3    Cholecalciferol (VITAMIN D3) 1000 units Oral Cap, Take 1,000 Units by mouth daily. , Disp: , Rfl:     Vitamin C 500 MG Oral Tab, Take 1 tablet (500 mg total) by mouth daily. , Disp: , Rfl:     Allergies:  No Known Allergies     Review of Systems:  All other systems reviewed and negative x12    Vital Signs: There were no vitals taken for this visit. Physical Examination:  General: Patient is alert and oriented x 3, not in acute distress. Psych:  Mood and affect appropriate, in good spirits. HEENT: EOMs intact. Oropharynx is clear. Chest: non-labored breathing, CTA  Cardiovascular: Regular rate and rhythm. Normal S1S2  Extremities: No edema. Neurological: 5/5 motor x4.     Derm:  No rash    LABS:    CBC:    Lab Results   Component Value Date    WBC 11.1 (H) 09/29/2023    WBC 11.9 (H) 09/12/2023    WBC 6.6 09/01/2023     Lab Results   Component Value Date    HGB 13.4 09/29/2023    HGB 12.9 (L) 09/12/2023    HGB 13.3 09/01/2023      Lab Results   Component Value Date    .0 09/29/2023    .0 09/12/2023    .0 09/01/2023       Lab Results   Component Value Date     (H) 09/29/2023    BUN 27 (H) 09/29/2023    BUNCREA 24.1 (H) 09/29/2023    CREATSERUM 1.12 09/29/2023    ANIONGAP 8 09/29/2023    GFR >60 05/14/2016    GFRNAA 81 07/22/2022    GFRAA 94 07/22/2022    CA 8.8 09/29/2023    OSMOCALC 301 (H) 09/29/2023    ALKPHO 140 (H) 09/29/2023    AST 24 09/29/2023    ALT 30 09/29/2023    ALKPHOS 58 12/10/2011    BILT 0.4 09/29/2023    TP 6.1 (L) 09/29/2023    ALB 3.3 (L) 09/29/2023    GLOBULIN 2.8 09/29/2023     09/29/2023    K 4.1 09/29/2023     09/29/2023    CO2 24.0 09/29/2023       Radiology:  PEt/CT Chest       Cancer of upper lobe of left lung [facility])    Staging form: Lung AJCC V7      Clinical stage from 1/9/2017: Stage IIIB (T1a, N3, M0) - Signed by Shahbaz Harris MD on 1/9/2017    Impression and Plan:  [de-identified]year oldmale former smoker with 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 He was diagnosed in December 2016 as above. He was treated with concurrent definitive chemoradiotherapy using cisplatin and etoposide finishing in early March 2017. He had low-grade neutropenia and anemia on chemotherapy. Given he has unresectable stage IIIb disease with no progression following initial chemotherapy we  added immunotherapy with durvalumab. Completed 13 cycles of durvalumab however developed recurrent pneumonitis/transaminitis. He has responded well to prednisone. He remains off of all cancer directed therapy as of the end of April 2018. Recurrent disease biopsy-proven mediastinal lymph nodes as of March 2021. Same histology. Given PD-L1 greater than 50% we will proceed with single agent pembrolizumab. Also discussed nivolumab/ipilimumab however with previous issues of pneumonitis and transaminitis will use single agent immunotherapy. Received Cycle #1 Pembrolizumab on 3/19/21, to be good long-term disease control through summer 2023    BRAF V600 E peripheral blood sequencing September 2023  --  dabrafenib trametinib start 10/23/23  --here for 1 week fu. Take trametinib dose 2 hours post lunch to split the dose. --chemo induced fatigue. Monitor for now. Modification of timing of meds. - Pulmonary embolus now on rivaroxaban diagnosed January 2023. Down to 20mg daily as of 2/2023. Tolerating well. Monitor.     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   - Recurrent pneumonitis now off pembolizumab will be able to taper his prednisone at future visit    FU in 1 week . No labs. Will then decrease freq of monitoring. MDM moderate, lab review. New/ongoing issues.     Jamie Quinones PA-C

## 2023-11-06 NOTE — PROGRESS NOTES
Cancer Center Progress Note    Patient Name: Debra Bruce   YOB: 1945   Medical Record Number: [id number]   Attending Physician: Elly Chaudhari M.D. 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 He completed definitive concurrent chemoradiotherapy using cisplatin and etoposide early March 2017. He had low-grade anemia neutropenia on treatment. He subsequently has been followed on surveillance with no evidence of progression. The patient started on durvalumab in October 2017 given he had unresectable stage III disease with no evidence of progression following chemo radiotherapy    He has had occasional mild anemia on treatment. Laboratory follow-up in May 2018 showed severe iron deficiency. Received IV iron    He completed 13 cycles of durvalumab  however towards the end of his treatment he developed 2 separate instances of pneumonitis/transaminitis requiring separate courses of prednisone. 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 Started single-agent Pembrolizumab on 3/19/21. 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 PE/DVT admission 1/2023. Interval History:  Returns for routine follow-up. Doing better. Still fatigued but has changed the timing of meds which help w/ fatigue. Sob w/ exertion - sxs stable. Compliant with xarelto.  Currently on pred 10mg daily. No other acute issues. No fever/chills. No diarrhea. No rash. Wife present for the encounter.        Performance Status:  ECOG 0    Past Medical History:  Past Medical History:   Diagnosis Date    Esophageal reflux     Exposure to medical diagnostic radiation     Hemorrhoids     Impotence     Lung cancer ([facility] [address]     NSCLCA stage IIIB    Necrotizing granulomatous inflammation of lung ([facility] [address]     Obstructive sleep apnea     Personal history of antineoplastic chemotherapy     Sinus problem     Sinus problems    Sleep apnea     CPAP       Past Surgical History:  Past Surgical History:   Procedure Laterality Date    COLONOSCOPY      COLONOSCOPY N/A 2018    Procedure: COLONOSCOPY;  Surgeon: Constantin Bonilla MD;  Location: [facility] ENDOSCOPY    COLONOSCOPY      PORT, INDWELLING, IMP         Family History:  Family History   Problem Relation Age of Onset    Cancer Father         Lung    Cancer Mother         Breast, ovarian       Social History:  Social History    Socioeconomic History      Marital status:       Spouse name: Not on file      Number of children: Not on file      Years of education: Not on file      Highest education level: Not on file    Occupational History      Not on file    Tobacco Use      Smoking status: Former        Packs/day: 1.00        Years: 30.00        Additional pack years: 0.00        Total pack years: 30.00        Types: Cigarettes        Quit date: 2000        Years since quittin.4      Smokeless tobacco: Never    Vaping Use      Vaping Use: Never used    Substance and Sexual Activity      Alcohol use: Not Currently        Alcohol/week: 0.8 standard drinks of alcohol        Types: 1 Glasses of wine per week        Comment: rare no longer has his       Drug use: No      Sexual activity: Not on file    Other Topics      Concerns:         Service: Not Asked        Blood Transfusions: Not Asked        Caffeine Concern: Yes          coffee, 2 cups daily Occupational Exposure: Not Asked        Hobby Hazards: Not Asked        Sleep Concern: Not Asked        Stress Concern: Not Asked        Weight Concern: Not Asked        Special Diet: Not Asked        Back Care: Not Asked        Exercise: Not Asked        Bike Helmet: Not Asked        Seat Belt: Not Asked        Self-Exams: Not Asked    Social History Narrative      Not on file    Social Determinants of Health  Financial Resource Strain: Not on file  Food Insecurity: Not on file  Transportation Needs: Not on file  Physical Activity: Not on file  Stress: Not on file  Social Connections: Not on file  Housing Stability: Not on file      Current Medications:    Current Outpatient Medications:     sulfamethoxazole-trimethoprim -160 MG Oral Tab per tablet, TAKE 1 TABLET BY MOUTH TWICE A DAY ON MONDAY, WEDNESDAY AND FRIDAY, Disp: 60 tablet, Rfl: 3    predniSONE 10 MG Oral Tab, Take 1 tablet (10 mg total) by mouth daily. , Disp: 90 tablet, Rfl: 0    dabrafenib mesylate 50 MG Oral Cap, Take 3 capsules (150 mg total) by mouth 2 (two) times daily. Take THREE capsules at least 1 hour before or 2 hours after a meal, twice a day, Disp: 180 capsule, Rfl: 5    Trametinib Dimethyl Sulfoxide 2 MG Oral Tab, Take 2 mg by mouth daily. Take at least 1 hour before or at least 2 hours after a meal., Disp: 30 tablet, Rfl: 5    rivaroxaban (XARELTO) 20 MG Oral Tab, Take 1 tablet (20 mg total) by mouth nightly., Disp: 90 tablet, Rfl: 1    Acetylcysteine ( OR), Take 600 mg by mouth daily. , Disp: , Rfl:     PEMBROLIZUMAB IV, Inject 1 Dose into the vein See Admin Instructions. EVERY 3 WEEKS, Disp: , Rfl:     voriconazole 200 MG Oral Tab, Take 1 tablet (200 mg total) by mouth 2 (two) times daily. , Disp: 180 tablet, Rfl: 3    pantoprazole 40 MG Oral Tab EC, Take 1 tablet (40 mg total) by mouth daily. , Disp: 90 tablet, Rfl: 3    fluticasone furoate-vilanterol (BREO ELLIPTA) 100-25 MCG/INH Inhalation Aerosol Powder, Breath Activated, Inhale 1 puff into the lungs daily. Follow with mouth rinse and spit, Disp: 1 each, Rfl: 6    albuterol 108 (90 Base) MCG/ACT Inhalation Aero Soln, Inhale 2 puffs into the lungs every 4 (four) hours as needed for Wheezing., Disp: 1 each, Rfl: 2    Fluticasone Propionate 50 MCG/ACT Nasal Suspension, 1 spray by Nasal route 2 (two) times daily. , Disp: 1 Bottle, Rfl: 3    Cholecalciferol (VITAMIN D3) 1000 units Oral Cap, Take 1,000 Units by mouth daily. , Disp: , Rfl:     Vitamin C 500 MG Oral Tab, Take 1 tablet (500 mg total) by mouth daily. , Disp: , Rfl:     Allergies:  No Known Allergies     Review of Systems:  All other systems reviewed and negative x12    Vital Signs: There were no vitals taken for this visit. Physical Examination:  General: Patient is alert and oriented x 3, not in acute distress. Psych:  Mood and affect appropriate, in good spirits. HEENT: EOMs intact. Oropharynx is clear. Chest: non-labored breathing, CTA  Cardiovascular: Regular rate and rhythm. Normal S1S2  Extremities: No edema. Neurological: 5/5 motor x4.     Derm:  No rash    LABS:    CBC:    Lab Results   Component Value Date    WBC 6.1 10/31/2023    WBC 11.1 (H) 09/29/2023    WBC 11.9 (H) 09/12/2023     Lab Results   Component Value Date    HGB 13.2 10/31/2023    HGB 13.4 09/29/2023    HGB 12.9 (L) 09/12/2023      Lab Results   Component Value Date    .0 10/31/2023    .0 09/29/2023    .0 09/12/2023       Lab Results   Component Value Date     (H) 10/31/2023    BUN 24 (H) 10/31/2023    BUNCREA 27.6 (H) 10/31/2023    CREATSERUM 0.87 10/31/2023    ANIONGAP 5 10/31/2023    GFR >60 05/14/2016    GFRNAA 81 07/22/2022    GFRAA 94 07/22/2022    CA 8.5 (L) 10/31/2023    OSMOCALC 292 10/31/2023    ALKPHO 118 (H) 10/31/2023    AST 22 10/31/2023    ALT 21 10/31/2023    ALKPHOS 58 12/10/2011    BILT 0.3 10/31/2023    TP 5.9 10/31/2023    ALB 3.0 (L) 10/31/2023    GLOBULIN 2.9 10/31/2023     10/31/2023    K 3.7 10/31/2023     10/31/2023    CO2 27.0 10/31/2023       Radiology:  PEt/CT Chest       Cancer of upper lobe of left lung [facility])    Staging form: Lung AJCC V7      Clinical stage from 1/9/2017: Stage IIIB (T1a, N3, M0) - Signed by Michelle Thrasher MD on 1/9/2017    Impression and Plan:  [de-identified]year oldmale former smoker with 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 He was diagnosed in December 2016 as above. He was treated with concurrent definitive chemoradiotherapy using cisplatin and etoposide finishing in early March 2017. He had low-grade neutropenia and anemia on chemotherapy. Given he has unresectable stage IIIb disease with no progression following initial chemotherapy we  added immunotherapy with durvalumab. Completed 13 cycles of durvalumab however developed recurrent pneumonitis/transaminitis. He has responded well to prednisone. He remains off of all cancer directed therapy as of the end of April 2018. Recurrent disease biopsy-proven mediastinal lymph nodes as of March 2021. Same histology. Given PD-L1 greater than 50% we will proceed with single agent pembrolizumab. Also discussed nivolumab/ipilimumab however with previous issues of pneumonitis and transaminitis will use single agent immunotherapy. Received Cycle #1 Pembrolizumab on 3/19/21, to be good long-term disease control through summer 2023    BRAF V600 E peripheral blood sequencing September 2023  --  dabrafenib trametinib start 10/23/23  --here for 2 week fu. Take trametinib dose 2 hours post lunch to split the dose. Consider taking  at night. --chemo induced fatigue. Monitor for now. Modification of timing of meds. - Pulmonary embolus now on rivaroxaban diagnosed January 2023. Down to 20mg daily as of 2/2023. Tolerating well.   Monitor.     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  - Recurrent pneumonitis now off pembolizumab will be able to taper his prednisone at future visit- currently on 10mg daily. Consider 5mg daily at next visit. MDM: high, malignancy, life threatening. Drug therapy requiring intensive monitoring for tox    Hermelinda Rao PA-C    Agree with and edited above seen and examined with PA.   Metastatic adenocarcinoma lung with BRAF mutation currently on dabrafenib trametinib continue to monitor response previous treatments as outlined above with pneumonitis from immunotherapy however good long-term control he is on prednisone we will eventually try to taper this further exam comfortable labored respirations

## 2023-11-07 ENCOUNTER — OFFICE VISIT (OUTPATIENT)
Dept: HEMATOLOGY/ONCOLOGY | Facility: HOSPITAL | Age: 78
End: 2023-11-07
Attending: PHYSICIAN ASSISTANT
Payer: MEDICARE

## 2023-11-07 ENCOUNTER — TELEPHONE (OUTPATIENT)
Dept: PULMONOLOGY | Facility: CLINIC | Age: 78
End: 2023-11-07

## 2023-11-07 VITALS
SYSTOLIC BLOOD PRESSURE: 151 MMHG | HEART RATE: 72 BPM | WEIGHT: 187.5 LBS | BODY MASS INDEX: 26.84 KG/M2 | TEMPERATURE: 97 F | RESPIRATION RATE: 18 BRPM | HEIGHT: 70 IN | DIASTOLIC BLOOD PRESSURE: 60 MMHG | OXYGEN SATURATION: 91 %

## 2023-11-07 DIAGNOSIS — C34.92 MALIGNANT NEOPLASM OF LEFT LUNG, UNSPECIFIED PART OF LUNG (HCC): ICD-10-CM

## 2023-11-07 DIAGNOSIS — Z51.11 CHEMOTHERAPY MANAGEMENT, ENCOUNTER FOR: Primary | ICD-10-CM

## 2023-11-07 DIAGNOSIS — Z92.89 HISTORY OF IMMUNOTHERAPY: ICD-10-CM

## 2023-11-07 DIAGNOSIS — J98.4 PNEUMONITIS: ICD-10-CM

## 2023-11-07 PROCEDURE — 99215 OFFICE O/P EST HI 40 MIN: CPT | Performed by: INTERNAL MEDICINE

## 2023-11-07 NOTE — TELEPHONE ENCOUNTER
Received fax from [address] [place] [address] with request for surgical clearance for removal of abscessed tooth with bone removal scheduled TBD. Clinic is asking:  -Is xarelto ok to hold preop?  -If yes, how long to hold for preop?  -when to restart?   Please respond on fax placed in your folder

## 2023-11-08 ENCOUNTER — TELEPHONE (OUTPATIENT)
Dept: HEMATOLOGY/ONCOLOGY | Facility: HOSPITAL | Age: 78
End: 2023-11-08

## 2023-11-08 NOTE — TELEPHONE ENCOUNTER
Call returned, Kayla at lunch. Office note including current labs faxed to [phone] as requested.

## 2023-11-08 NOTE — TELEPHONE ENCOUNTER
Dr. Kelsea Angel at   [phone] is calling regarding the Medical clearance letter returned back to Dr. Hudson Licona from [facility]. and she is requesting a few more questions. Please provide recent H&P and any labs showing blood content. Any need for premed from Oncology standpoint. Please call Kayla.

## 2023-11-09 ENCOUNTER — TELEPHONE (OUTPATIENT)
Dept: HEMATOLOGY/ONCOLOGY | Facility: HOSPITAL | Age: 78
End: 2023-11-09

## 2023-11-09 NOTE — TELEPHONE ENCOUNTER
Per Dr. Laurita Whalen, lila to hold x5 days and restart day after. Letter generated with above information and faxed to Dr. Huma Eckert with [facility] at fax# [phone] as requested. Confirmation received.

## 2023-11-09 NOTE — TELEPHONE ENCOUNTER
[facility]  [facility] [address] : [address])  [phone]  /  F) [phone]  WE have the  H&P, Labs, We need to know if [facility] needs to be premedicated prior to surgery if so what would be the best antibiotic  to give patient ?  Thanks [employer]

## 2023-11-13 ENCOUNTER — LAB REQUISITION (OUTPATIENT)
Dept: LAB | Facility: HOSPITAL | Age: 78
End: 2023-11-13
Payer: MEDICARE

## 2023-11-13 DIAGNOSIS — C34.91 MALIGNANT NEOPLASM OF UNSPECIFIED PART OF RIGHT BRONCHUS OR LUNG (HCC): ICD-10-CM

## 2023-11-13 PROCEDURE — 88363 XM ARCHIVE TISSUE MOLEC ANAL: CPT | Performed by: PATHOLOGY

## 2023-11-20 ENCOUNTER — APPOINTMENT (OUTPATIENT)
Dept: ULTRASOUND IMAGING | Facility: HOSPITAL | Age: 78
End: 2023-11-20
Attending: EMERGENCY MEDICINE
Payer: MEDICARE

## 2023-11-20 ENCOUNTER — APPOINTMENT (OUTPATIENT)
Dept: CT IMAGING | Facility: HOSPITAL | Age: 78
End: 2023-11-20
Attending: EMERGENCY MEDICINE
Payer: MEDICARE

## 2023-11-20 ENCOUNTER — APPOINTMENT (OUTPATIENT)
Dept: CT IMAGING | Facility: HOSPITAL | Age: 78
DRG: 445 | End: 2023-11-20
Attending: EMERGENCY MEDICINE
Payer: MEDICARE

## 2023-11-20 ENCOUNTER — HOSPITAL ENCOUNTER (INPATIENT)
Facility: HOSPITAL | Age: 78
LOS: 4 days | Discharge: HOME | End: 2023-11-24
Attending: EMERGENCY MEDICINE | Admitting: INTERNAL MEDICINE
Payer: MEDICARE

## 2023-11-20 ENCOUNTER — TELEPHONE (OUTPATIENT)
Dept: HEMATOLOGY/ONCOLOGY | Facility: HOSPITAL | Age: 78
End: 2023-11-20

## 2023-11-20 ENCOUNTER — HOSPITAL ENCOUNTER (INPATIENT)
Facility: HOSPITAL | Age: 78
LOS: 4 days | Discharge: HOME | DRG: 445 | End: 2023-11-24
Attending: EMERGENCY MEDICINE | Admitting: INTERNAL MEDICINE
Payer: MEDICARE

## 2023-11-20 ENCOUNTER — APPOINTMENT (OUTPATIENT)
Dept: ULTRASOUND IMAGING | Facility: HOSPITAL | Age: 78
DRG: 445 | End: 2023-11-20
Attending: EMERGENCY MEDICINE
Payer: MEDICARE

## 2023-11-20 DIAGNOSIS — K81.0 ACUTE CHOLECYSTITIS: Primary | ICD-10-CM

## 2023-11-20 DIAGNOSIS — K80.50 CHOLEDOCHOLITHIASIS: ICD-10-CM

## 2023-11-20 LAB
ALBUMIN SERPL-MCNC: 3.7 G/DL (ref 3.2–4.8)
ALP LIVER SERPL-CCNC: 265 U/L
ALT SERPL-CCNC: 165 U/L
ANION GAP SERPL CALC-SCNC: 4 MMOL/L (ref 0–18)
AST SERPL-CCNC: 200 U/L (ref ?–34)
BASOPHILS # BLD AUTO: 0.02 X10(3) UL (ref 0–0.2)
BASOPHILS NFR BLD AUTO: 0.2 %
BILIRUB DIRECT SERPL-MCNC: 0.7 MG/DL (ref ?–0.3)
BILIRUB SERPL-MCNC: 1.2 MG/DL (ref 0.2–1.1)
BILIRUB UR QL: NEGATIVE
BUN BLD-MCNC: 19 MG/DL (ref 9–23)
BUN/CREAT SERPL: 22.9 (ref 10–20)
CALCIUM BLD-MCNC: 8.5 MG/DL (ref 8.7–10.4)
CHLORIDE SERPL-SCNC: 106 MMOL/L (ref 98–112)
CLARITY UR: CLEAR
CO2 SERPL-SCNC: 26 MMOL/L (ref 21–32)
CREAT BLD-MCNC: 0.83 MG/DL
DEPRECATED RDW RBC AUTO: 49.5 FL (ref 35.1–46.3)
EGFRCR SERPLBLD CKD-EPI 2021: 88 ML/MIN/1.73M2 (ref 60–?)
EOSINOPHIL # BLD AUTO: 0.01 X10(3) UL (ref 0–0.7)
EOSINOPHIL NFR BLD AUTO: 0.1 %
ERYTHROCYTE [DISTWIDTH] IN BLOOD BY AUTOMATED COUNT: 14.3 % (ref 11–15)
GLUCOSE BLD-MCNC: 157 MG/DL (ref 70–99)
GLUCOSE UR-MCNC: NORMAL MG/DL
HCT VFR BLD AUTO: 39.9 %
HGB BLD-MCNC: 13 G/DL
HGB UR QL STRIP.AUTO: NEGATIVE
IMM GRANULOCYTES # BLD AUTO: 0.04 X10(3) UL (ref 0–1)
IMM GRANULOCYTES NFR BLD: 0.4 %
KETONES UR-MCNC: NEGATIVE MG/DL
LEUKOCYTE ESTERASE UR QL STRIP.AUTO: NEGATIVE
LIPASE SERPL-CCNC: 97 U/L (ref 13–75)
LYMPHOCYTES # BLD AUTO: 0.49 X10(3) UL (ref 1–4)
LYMPHOCYTES NFR BLD AUTO: 5.3 %
MCH RBC QN AUTO: 30.7 PG (ref 26–34)
MCHC RBC AUTO-ENTMCNC: 32.6 G/DL (ref 31–37)
MCV RBC AUTO: 94.1 FL
MONOCYTES # BLD AUTO: 0.61 X10(3) UL (ref 0.1–1)
MONOCYTES NFR BLD AUTO: 6.6 %
NEUTROPHILS # BLD AUTO: 8.08 X10 (3) UL (ref 1.5–7.7)
NEUTROPHILS # BLD AUTO: 8.08 X10(3) UL (ref 1.5–7.7)
NEUTROPHILS NFR BLD AUTO: 87.4 %
NITRITE UR QL STRIP.AUTO: NEGATIVE
OSMOLALITY SERPL CALC.SUM OF ELEC: 288 MOSM/KG (ref 275–295)
PH UR: 7 [PH] (ref 5–8)
PLATELET # BLD AUTO: 276 10(3)UL (ref 150–450)
POTASSIUM SERPL-SCNC: 3.7 MMOL/L (ref 3.5–5.1)
PROT SERPL-MCNC: 5.6 G/DL (ref 5.7–8.2)
PROT UR-MCNC: NEGATIVE MG/DL
RBC # BLD AUTO: 4.24 X10(6)UL
SODIUM SERPL-SCNC: 136 MMOL/L (ref 136–145)
SP GR UR STRIP: 1.01 (ref 1–1.03)
UROBILINOGEN UR STRIP-ACNC: NORMAL
WBC # BLD AUTO: 9.3 X10(3) UL (ref 4–11)

## 2023-11-20 PROCEDURE — 76705 ECHO EXAM OF ABDOMEN: CPT | Performed by: EMERGENCY MEDICINE

## 2023-11-20 PROCEDURE — 74177 CT ABD & PELVIS W/CONTRAST: CPT | Performed by: EMERGENCY MEDICINE

## 2023-11-20 PROCEDURE — 99223 1ST HOSP IP/OBS HIGH 75: CPT | Performed by: INTERNAL MEDICINE

## 2023-11-20 RX ORDER — SODIUM CHLORIDE 9 MG/ML
INJECTION, SOLUTION INTRAVENOUS CONTINUOUS
Status: DISCONTINUED | OUTPATIENT
Start: 2023-11-20 | End: 2023-11-24

## 2023-11-20 RX ORDER — METRONIDAZOLE 500 MG/100ML
500 INJECTION, SOLUTION INTRAVENOUS EVERY 8 HOURS
Status: DISCONTINUED | OUTPATIENT
Start: 2023-11-21 | End: 2023-11-24

## 2023-11-20 RX ORDER — HEPARIN SODIUM 5000 [USP'U]/ML
5000 INJECTION, SOLUTION INTRAVENOUS; SUBCUTANEOUS EVERY 12 HOURS SCHEDULED
Status: DISCONTINUED | OUTPATIENT
Start: 2023-11-20 | End: 2023-11-24

## 2023-11-20 RX ORDER — ONDANSETRON 2 MG/ML
4 INJECTION INTRAMUSCULAR; INTRAVENOUS EVERY 6 HOURS PRN
Status: DISCONTINUED | OUTPATIENT
Start: 2023-11-20 | End: 2023-11-24

## 2023-11-20 RX ORDER — MORPHINE SULFATE 2 MG/ML
0.5 INJECTION, SOLUTION INTRAMUSCULAR; INTRAVENOUS EVERY 2 HOUR PRN
Status: DISCONTINUED | OUTPATIENT
Start: 2023-11-20 | End: 2023-11-24

## 2023-11-20 RX ORDER — MORPHINE SULFATE 4 MG/ML
4 INJECTION, SOLUTION INTRAMUSCULAR; INTRAVENOUS ONCE
Status: COMPLETED | OUTPATIENT
Start: 2023-11-20 | End: 2023-11-20

## 2023-11-20 RX ORDER — MORPHINE SULFATE 2 MG/ML
2 INJECTION, SOLUTION INTRAMUSCULAR; INTRAVENOUS EVERY 2 HOUR PRN
Status: DISCONTINUED | OUTPATIENT
Start: 2023-11-20 | End: 2023-11-24

## 2023-11-20 RX ORDER — MORPHINE SULFATE 2 MG/ML
1 INJECTION, SOLUTION INTRAMUSCULAR; INTRAVENOUS EVERY 2 HOUR PRN
Status: DISCONTINUED | OUTPATIENT
Start: 2023-11-20 | End: 2023-11-24

## 2023-11-20 RX ORDER — FLUTICASONE FUROATE AND VILANTEROL 100; 25 UG/1; UG/1
1 POWDER RESPIRATORY (INHALATION) DAILY
Status: DISCONTINUED | OUTPATIENT
Start: 2023-11-20 | End: 2023-11-24

## 2023-11-20 RX ORDER — METRONIDAZOLE 500 MG/100ML
500 INJECTION, SOLUTION INTRAVENOUS ONCE
Status: COMPLETED | OUTPATIENT
Start: 2023-11-20 | End: 2023-11-20

## 2023-11-20 RX ORDER — SODIUM CHLORIDE 9 MG/ML
1000 INJECTION, SOLUTION INTRAVENOUS ONCE
Status: COMPLETED | OUTPATIENT
Start: 2023-11-20 | End: 2023-11-20

## 2023-11-20 RX ORDER — ALBUTEROL SULFATE 90 UG/1
2 AEROSOL, METERED RESPIRATORY (INHALATION) EVERY 4 HOURS PRN
Status: DISCONTINUED | OUTPATIENT
Start: 2023-11-20 | End: 2023-11-24

## 2023-11-20 NOTE — TELEPHONE ENCOUNTER
Dabrafenib Meslyate/Prednisone/Trametinib    Stomach upset/sob with exertion/weakness    Patient complaining of stomach upset, feels related to medication and how he takes them. He takes them as prescribed 1 hour before or 2 hours after eating. Others he takes with food as instructed. Having some increased sob with exertion especially in am when dressing but sob at other times as well and feeling like it is getting worse. PO today 90-91 % on RA. No pain with deep breaths. States feeling weak. Denies fevers, no chest pain, denies nausea or vomiting but having stomach upset. Denies diarrhea but stool a little loose. Feels he is eating well. Does have weakness and dizziness. No urinary complaints, stools brown in color. Please advise.

## 2023-11-20 NOTE — ED QUICK NOTES
Assumed care of Yurijoel Ely who arrived through triage with his Wife for c/o non-radiating mid-abdominal pain since this morning. States he was started on an oral chemotherapy drug approx 4 weeks ago which he believes is triggering the abdominal pain (he takes the medication on an empty stomach). Denies n/v or fever. No reported hx of bowel obstruction. CT compatible port-a-cath accessed without difficulty. 4mg IV Morphine admin as ordered. IVF bolus infusing via pump at 1hr rate. Care endorsed to MARY Carter.

## 2023-11-20 NOTE — TELEPHONE ENCOUNTER
Patients wife calling says patient is short of breath, oxygen level is at 91. abdomen hurts too. feels very weak. Please advise.

## 2023-11-20 NOTE — ED QUICK NOTES
Orders for admission, patient is aware of plan and ready to go upstairs. Any questions, please call ED RN Ian Mejias at extension [phone].      Patient Covid vaccination status: Fully vaccinated     COVID Test Ordered in ED: None    COVID Suspicion at Admission: N/A    Running Infusions:  None    Mental Status/LOC at time of transport: axox4    Other pertinent information: port a cath to right chest  CIWA score: N/A   NIH score:  N/A

## 2023-11-21 ENCOUNTER — APPOINTMENT (OUTPATIENT)
Dept: MRI IMAGING | Facility: HOSPITAL | Age: 78
End: 2023-11-21
Attending: EMERGENCY MEDICINE
Payer: MEDICARE

## 2023-11-21 ENCOUNTER — APPOINTMENT (OUTPATIENT)
Dept: MRI IMAGING | Facility: HOSPITAL | Age: 78
DRG: 445 | End: 2023-11-21
Attending: EMERGENCY MEDICINE
Payer: MEDICARE

## 2023-11-21 ENCOUNTER — APPOINTMENT (OUTPATIENT)
Dept: GENERAL RADIOLOGY | Facility: HOSPITAL | Age: 78
End: 2023-11-21
Attending: INTERNAL MEDICINE
Payer: MEDICARE

## 2023-11-21 ENCOUNTER — APPOINTMENT (OUTPATIENT)
Dept: GENERAL RADIOLOGY | Facility: HOSPITAL | Age: 78
DRG: 445 | End: 2023-11-21
Attending: INTERNAL MEDICINE
Payer: MEDICARE

## 2023-11-21 PROBLEM — K83.8 DILATED BILE DUCT: Status: ACTIVE | Noted: 2023-11-21

## 2023-11-21 PROBLEM — R10.9 ABDOMINAL DISCOMFORT: Status: ACTIVE | Noted: 2023-11-21

## 2023-11-21 LAB
ALBUMIN SERPL-MCNC: 3.2 G/DL (ref 3.2–4.8)
ALP LIVER SERPL-CCNC: 288 U/L
ALT SERPL-CCNC: 289 U/L
ANION GAP SERPL CALC-SCNC: 4 MMOL/L (ref 0–18)
AST SERPL-CCNC: 255 U/L (ref ?–34)
BASOPHILS # BLD AUTO: 0.01 X10(3) UL (ref 0–0.2)
BASOPHILS NFR BLD AUTO: 0.3 %
BILIRUB DIRECT SERPL-MCNC: 0.2 MG/DL (ref ?–0.3)
BILIRUB SERPL-MCNC: 0.5 MG/DL (ref 0.2–1.1)
BUN BLD-MCNC: 13 MG/DL (ref 9–23)
BUN/CREAT SERPL: 16.5 (ref 10–20)
CALCIUM BLD-MCNC: 8.2 MG/DL (ref 8.7–10.4)
CHLORIDE SERPL-SCNC: 108 MMOL/L (ref 98–112)
CO2 SERPL-SCNC: 28 MMOL/L (ref 21–32)
CREAT BLD-MCNC: 0.79 MG/DL
DEPRECATED RDW RBC AUTO: 48.7 FL (ref 35.1–46.3)
EGFRCR SERPLBLD CKD-EPI 2021: 90 ML/MIN/1.73M2 (ref 60–?)
EOSINOPHIL # BLD AUTO: 0.04 X10(3) UL (ref 0–0.7)
EOSINOPHIL NFR BLD AUTO: 1.1 %
ERYTHROCYTE [DISTWIDTH] IN BLOOD BY AUTOMATED COUNT: 14.2 % (ref 11–15)
GLUCOSE BLD-MCNC: 88 MG/DL (ref 70–99)
HCT VFR BLD AUTO: 36.6 %
HGB BLD-MCNC: 12 G/DL
IMM GRANULOCYTES # BLD AUTO: 0.02 X10(3) UL (ref 0–1)
IMM GRANULOCYTES NFR BLD: 0.6 %
LYMPHOCYTES # BLD AUTO: 0.53 X10(3) UL (ref 1–4)
LYMPHOCYTES NFR BLD AUTO: 15 %
MAGNESIUM SERPL-MCNC: 2 MG/DL (ref 1.6–2.6)
MCH RBC QN AUTO: 31 PG (ref 26–34)
MCHC RBC AUTO-ENTMCNC: 32.8 G/DL (ref 31–37)
MCV RBC AUTO: 94.6 FL
MONOCYTES # BLD AUTO: 0.37 X10(3) UL (ref 0.1–1)
MONOCYTES NFR BLD AUTO: 10.5 %
NEUTROPHILS # BLD AUTO: 2.57 X10 (3) UL (ref 1.5–7.7)
NEUTROPHILS # BLD AUTO: 2.57 X10(3) UL (ref 1.5–7.7)
NEUTROPHILS NFR BLD AUTO: 72.5 %
OSMOLALITY SERPL CALC.SUM OF ELEC: 290 MOSM/KG (ref 275–295)
PHOSPHATE SERPL-MCNC: 3.5 MG/DL (ref 2.4–5.1)
PLATELET # BLD AUTO: 252 10(3)UL (ref 150–450)
POTASSIUM SERPL-SCNC: 3.5 MMOL/L (ref 3.5–5.1)
PROT SERPL-MCNC: 4.9 G/DL (ref 5.7–8.2)
RBC # BLD AUTO: 3.87 X10(6)UL
SODIUM SERPL-SCNC: 140 MMOL/L (ref 136–145)
WBC # BLD AUTO: 3.5 X10(3) UL (ref 4–11)

## 2023-11-21 PROCEDURE — 74181 MRI ABDOMEN W/O CONTRAST: CPT | Performed by: EMERGENCY MEDICINE

## 2023-11-21 PROCEDURE — 76376 3D RENDER W/INTRP POSTPROCES: CPT | Performed by: EMERGENCY MEDICINE

## 2023-11-21 PROCEDURE — 71045 X-RAY EXAM CHEST 1 VIEW: CPT | Performed by: INTERNAL MEDICINE

## 2023-11-21 PROCEDURE — 99232 SBSQ HOSP IP/OBS MODERATE 35: CPT | Performed by: INTERNAL MEDICINE

## 2023-11-21 PROCEDURE — 99223 1ST HOSP IP/OBS HIGH 75: CPT | Performed by: INTERNAL MEDICINE

## 2023-11-21 PROCEDURE — 99233 SBSQ HOSP IP/OBS HIGH 50: CPT | Performed by: INTERNAL MEDICINE

## 2023-11-21 NOTE — PLAN OF CARE
No acute events. NPO for MRCP. Went this afternoon, awaiting results. IVF infusing through port. Ambulating frequently independently in room and hallways. Denies pain/discomfort/nausea. VSS. Wife at bedside, updated with plan of care.     Problem: Patient Centered Care  Goal: Patient preferences are identified and integrated in the patient's plan of care  Description: Interventions:  - What would you like us to know as we care for you?   - Provide timely, complete, and accurate information to patient/family  - Incorporate patient and family knowledge, values, beliefs, and cultural backgrounds into the planning and delivery of care  - Encourage patient/family to participate in care and decision-making at the level they choose  - Honor patient and family perspectives and choices  Outcome: Progressing

## 2023-11-21 NOTE — PROGRESS NOTES
Patient seen and examined  Feels a lot better  Awaiting MRCP  We will continue to follow    John Paul Rodriguez MD  Complex General Surgical Oncology  [facility]  Pager [phone]  [email or web address]

## 2023-11-22 ENCOUNTER — APPOINTMENT (OUTPATIENT)
Dept: GENERAL RADIOLOGY | Facility: HOSPITAL | Age: 78
End: 2023-11-22
Attending: INTERNAL MEDICINE
Payer: MEDICARE

## 2023-11-22 ENCOUNTER — ANESTHESIA (OUTPATIENT)
Dept: ENDOSCOPY | Facility: HOSPITAL | Age: 78
End: 2023-11-22
Payer: MEDICARE

## 2023-11-22 ENCOUNTER — ANESTHESIA EVENT (OUTPATIENT)
Dept: ENDOSCOPY | Facility: HOSPITAL | Age: 78
End: 2023-11-22
Payer: MEDICARE

## 2023-11-22 ENCOUNTER — APPOINTMENT (OUTPATIENT)
Dept: GENERAL RADIOLOGY | Facility: HOSPITAL | Age: 78
DRG: 445 | End: 2023-11-22
Attending: INTERNAL MEDICINE
Payer: MEDICARE

## 2023-11-22 DIAGNOSIS — K81.0 ACUTE CHOLECYSTITIS: Primary | ICD-10-CM

## 2023-11-22 LAB
ALBUMIN SERPL-MCNC: 3.3 G/DL (ref 3.2–4.8)
ALBUMIN/GLOB SERPL: 1.8 {RATIO} (ref 1–2)
ALP LIVER SERPL-CCNC: 270 U/L
ALT SERPL-CCNC: 213 U/L
ANION GAP SERPL CALC-SCNC: 7 MMOL/L (ref 0–18)
AST SERPL-CCNC: 127 U/L (ref ?–34)
BASOPHILS # BLD AUTO: 0.03 X10(3) UL (ref 0–0.2)
BASOPHILS NFR BLD AUTO: 0.6 %
BILIRUB SERPL-MCNC: 0.7 MG/DL (ref 0.2–1.1)
BUN BLD-MCNC: 13 MG/DL (ref 9–23)
BUN/CREAT SERPL: 15.3 (ref 10–20)
CALCIUM BLD-MCNC: 8.2 MG/DL (ref 8.7–10.4)
CHLORIDE SERPL-SCNC: 108 MMOL/L (ref 98–112)
CO2 SERPL-SCNC: 26 MMOL/L (ref 21–32)
CREAT BLD-MCNC: 0.85 MG/DL
DEPRECATED RDW RBC AUTO: 49.8 FL (ref 35.1–46.3)
EGFRCR SERPLBLD CKD-EPI 2021: 88 ML/MIN/1.73M2 (ref 60–?)
EOSINOPHIL # BLD AUTO: 0.08 X10(3) UL (ref 0–0.7)
EOSINOPHIL NFR BLD AUTO: 1.7 %
ERYTHROCYTE [DISTWIDTH] IN BLOOD BY AUTOMATED COUNT: 14.2 % (ref 11–15)
GLOBULIN PLAS-MCNC: 1.8 G/DL (ref 2.8–4.4)
GLUCOSE BLD-MCNC: 75 MG/DL (ref 70–99)
HCT VFR BLD AUTO: 39.3 %
HGB BLD-MCNC: 12.7 G/DL
IMM GRANULOCYTES # BLD AUTO: 0.03 X10(3) UL (ref 0–1)
IMM GRANULOCYTES NFR BLD: 0.6 %
INR BLD: 1.06 (ref 0.8–1.2)
LYMPHOCYTES # BLD AUTO: 0.94 X10(3) UL (ref 1–4)
LYMPHOCYTES NFR BLD AUTO: 19.4 %
MCH RBC QN AUTO: 30.9 PG (ref 26–34)
MCHC RBC AUTO-ENTMCNC: 32.3 G/DL (ref 31–37)
MCV RBC AUTO: 95.6 FL
MONOCYTES # BLD AUTO: 0.47 X10(3) UL (ref 0.1–1)
MONOCYTES NFR BLD AUTO: 9.7 %
NEUTROPHILS # BLD AUTO: 3.29 X10 (3) UL (ref 1.5–7.7)
NEUTROPHILS # BLD AUTO: 3.29 X10(3) UL (ref 1.5–7.7)
NEUTROPHILS NFR BLD AUTO: 68 %
OSMOLALITY SERPL CALC.SUM OF ELEC: 291 MOSM/KG (ref 275–295)
PLATELET # BLD AUTO: 272 10(3)UL (ref 150–450)
POTASSIUM SERPL-SCNC: 4.1 MMOL/L (ref 3.5–5.1)
PROT SERPL-MCNC: 5.1 G/DL (ref 5.7–8.2)
PROTHROMBIN TIME: 14.4 SECONDS (ref 11.6–14.8)
RBC # BLD AUTO: 4.11 X10(6)UL
SODIUM SERPL-SCNC: 141 MMOL/L (ref 136–145)
WBC # BLD AUTO: 4.8 X10(3) UL (ref 4–11)

## 2023-11-22 PROCEDURE — 0FC98ZZ EXTIRPATION OF MATTER FROM COMMON BILE DUCT, VIA NATURAL OR ARTIFICIAL OPENING ENDOSCOPIC: ICD-10-PCS | Performed by: INTERNAL MEDICINE

## 2023-11-22 PROCEDURE — 74328 X-RAY BILE DUCT ENDOSCOPY: CPT | Performed by: INTERNAL MEDICINE

## 2023-11-22 PROCEDURE — BF101ZZ FLUOROSCOPY OF BILE DUCTS USING LOW OSMOLAR CONTRAST: ICD-10-PCS | Performed by: INTERNAL MEDICINE

## 2023-11-22 PROCEDURE — 99232 SBSQ HOSP IP/OBS MODERATE 35: CPT | Performed by: INTERNAL MEDICINE

## 2023-11-22 PROCEDURE — 43264 ERCP REMOVE DUCT CALCULI: CPT | Performed by: INTERNAL MEDICINE

## 2023-11-22 RX ORDER — SODIUM CHLORIDE, SODIUM LACTATE, POTASSIUM CHLORIDE, CALCIUM CHLORIDE 600; 310; 30; 20 MG/100ML; MG/100ML; MG/100ML; MG/100ML
INJECTION, SOLUTION INTRAVENOUS CONTINUOUS
Status: DISCONTINUED | OUTPATIENT
Start: 2023-11-22 | End: 2023-11-24

## 2023-11-22 RX ORDER — SODIUM CHLORIDE, SODIUM LACTATE, POTASSIUM CHLORIDE, CALCIUM CHLORIDE 600; 310; 30; 20 MG/100ML; MG/100ML; MG/100ML; MG/100ML
INJECTION, SOLUTION INTRAVENOUS CONTINUOUS PRN
Status: DISCONTINUED | OUTPATIENT
Start: 2023-11-22 | End: 2023-11-22 | Stop reason: SURG

## 2023-11-22 RX ORDER — NALOXONE HYDROCHLORIDE 0.4 MG/ML
0.08 INJECTION, SOLUTION INTRAMUSCULAR; INTRAVENOUS; SUBCUTANEOUS ONCE AS NEEDED
Status: DISCONTINUED | OUTPATIENT
Start: 2023-11-22 | End: 2023-11-22 | Stop reason: HOSPADM

## 2023-11-22 RX ORDER — ONDANSETRON 2 MG/ML
INJECTION INTRAMUSCULAR; INTRAVENOUS AS NEEDED
Status: DISCONTINUED | OUTPATIENT
Start: 2023-11-22 | End: 2023-11-22 | Stop reason: SURG

## 2023-11-22 RX ORDER — PHENYLEPHRINE HCL 10 MG/ML
VIAL (ML) INJECTION AS NEEDED
Status: DISCONTINUED | OUTPATIENT
Start: 2023-11-22 | End: 2023-11-22 | Stop reason: SURG

## 2023-11-22 RX ORDER — INDOMETHACIN 100 MG
SUPPOSITORY, RECTAL RECTAL
Status: DISCONTINUED | OUTPATIENT
Start: 2023-11-22 | End: 2023-11-22 | Stop reason: HOSPADM

## 2023-11-22 RX ORDER — METHYLPREDNISOLONE SODIUM SUCCINATE 40 MG/ML
40 INJECTION, POWDER, LYOPHILIZED, FOR SOLUTION INTRAMUSCULAR; INTRAVENOUS ONCE
Status: COMPLETED | OUTPATIENT
Start: 2023-11-22 | End: 2023-11-22

## 2023-11-22 RX ORDER — ROCURONIUM BROMIDE 10 MG/ML
INJECTION, SOLUTION INTRAVENOUS AS NEEDED
Status: DISCONTINUED | OUTPATIENT
Start: 2023-11-22 | End: 2023-11-22 | Stop reason: SURG

## 2023-11-22 RX ADMIN — ROCURONIUM BROMIDE 10 MG: 10 INJECTION, SOLUTION INTRAVENOUS at 14:13:00

## 2023-11-22 RX ADMIN — PHENYLEPHRINE HCL 50 MCG: 10 MG/ML VIAL (ML) INJECTION at 14:13:00

## 2023-11-22 RX ADMIN — SODIUM CHLORIDE, SODIUM LACTATE, POTASSIUM CHLORIDE, CALCIUM CHLORIDE: 600; 310; 30; 20 INJECTION, SOLUTION INTRAVENOUS at 15:09:00

## 2023-11-22 RX ADMIN — ROCURONIUM BROMIDE 40 MG: 10 INJECTION, SOLUTION INTRAVENOUS at 14:17:00

## 2023-11-22 RX ADMIN — SODIUM CHLORIDE, SODIUM LACTATE, POTASSIUM CHLORIDE, CALCIUM CHLORIDE: 600; 310; 30; 20 INJECTION, SOLUTION INTRAVENOUS at 14:06:00

## 2023-11-22 RX ADMIN — ONDANSETRON 4 MG: 2 INJECTION INTRAMUSCULAR; INTRAVENOUS at 14:54:00

## 2023-11-22 NOTE — PLAN OF CARE
Patient is alert and oriented x4. Room air. Voiding freely. 1 BM. Tolerating CLD, denies nausea. IVF infusing. IV Abx continued. ERCP completed today.       Problem: Patient Centered Care  Goal: Patient preferences are identified and integrated in the patient's plan of care  Description: Interventions:  - What would you like us to know as we care for you?   - Provide timely, complete, and accurate information to patient/family  - Incorporate patient and family knowledge, values, beliefs, and cultural backgrounds into the planning and delivery of care  - Encourage patient/family to participate in care and decision-making at the level they choose  - Honor patient and family perspectives and choices  Outcome: Progressing      Problem: PAIN - ADULT  Goal: Verbalizes/displays adequate comfort level or patient's stated pain goal  Description: INTERVENTIONS:  - Encourage pt to monitor pain and request assistance  - Assess pain using appropriate pain scale  - Administer analgesics based on type and severity of pain and evaluate response  - Implement non-pharmacological measures as appropriate and evaluate response  - Consider cultural and social influences on pain and pain management  - Manage/alleviate anxiety  - Utilize distraction and/or relaxation techniques  - Monitor for opioid side effects  - Notify MD/LIP if interventions unsuccessful or patient reports new pain  - Anticipate increased pain with activity and pre-medicate as appropriate  Outcome: Progressing     Problem: RISK FOR INFECTION - ADULT  Goal: Absence of fever/infection during anticipated neutropenic period  Description: INTERVENTIONS  - Monitor WBC  - Administer growth factors as ordered  - Implement neutropenic guidelines  Outcome: Progressing     Problem: SAFETY ADULT - FALL  Goal: Free from fall injury  Description: INTERVENTIONS:  - Assess pt frequently for physical needs  - Identify cognitive and physical deficits and behaviors that affect risk of falls.   - [place] fall precautions as indicated by assessment.  - Educate pt/family on patient safety including physical limitations  - Instruct pt to call for assistance with activity based on assessment  - Modify environment to reduce risk of injury  - Provide assistive devices as appropriate  - Consider OT/PT consult to assist with strengthening/mobility  - Encourage toileting schedule  Outcome: Progressing     Problem: DISCHARGE PLANNING  Goal: Discharge to home or other facility with appropriate resources  Description: INTERVENTIONS:  - Identify barriers to discharge w/pt and caregiver  - Include patient/family/discharge partner in discharge planning  - Arrange for needed discharge resources and transportation as appropriate  - Identify discharge learning needs (meds, wound care, etc)  - Arrange for interpreters to assist at discharge as needed  - Consider post-discharge preferences of patient/family/discharge partner  - Complete POLST form as appropriate  - Assess patient's ability to be responsible for managing their own health  - Refer to Case Management Department for coordinating discharge planning if the patient needs post-hospital services based on physician/LIP order or complex needs related to functional status, cognitive ability or social support system  Outcome: Progressing

## 2023-11-22 NOTE — ANESTHESIA PROCEDURE NOTES
Airway  Date/Time: 11/22/2023 2:14 PM  Urgency: elective    Airway not difficult    General Information and Staff    Patient location during procedure: endo  Resident/CRNA: Greta Freitas CRNA  Performed: CRNA   Performed by: Greta Freitas CRNA  Authorized by: Greta Freitas CRNA      Indications and Patient Condition  Indications for airway management: airway protection  Spontaneous ventilation: present  Sedation level: deep  Preoxygenated: yes  Patient position: sniffing  Mask difficulty assessment: 0 - not attempted    Final Airway Details  Final airway type: endotracheal airway      Successful airway: ETT  Cuffed: yes   Successful intubation technique: Video laryngoscopy  Blade type: Lizbeth Force.   Blade size: #3  ETT size (mm): 7.5    Cormack-Lehane Classification: grade I - full view of glottis  Placement verified by: capnometry   Cuff volume (mL): 7  Measured from: lips  ETT to lips (cm): 23  Number of attempts at approach: 1  Number of other approaches attempted: 0    Additional Comments  Atraumatic intubation; dentition unchanged from pre op baseline

## 2023-11-22 NOTE — PLAN OF CARE
Pt A&Ox4. Room air, wearing CPAP overnight. No pain reported. NPO diet. IVF infusing, IV abx given per orders. Plan for ERCP today. Problem: Patient Centered Care  Goal: Patient preferences are identified and integrated in the patient's plan of care  Description: Interventions:  - What would you like us to know as we care for you?  From home with wife  - Provide timely, complete, and accurate information to patient/family  - Incorporate patient and family knowledge, values, beliefs, and cultural backgrounds into the planning and delivery of care  - Encourage patient/family to participate in care and decision-making at the level they choose  - Honor patient and family perspectives and choices  Outcome: Progressing     Problem: Patient/Family Goals  Goal: Patient/Family Long Term Goal  Description: Patient's Long Term Goal:     Interventions:  - See additional Care Plan goals for specific interventions  Outcome: Progressing  Goal: Patient/Family Short Term Goal  Description: Patient's Short Term Goal:     Interventions:   - See additional Care Plan goals for specific interventions  Outcome: Progressing

## 2023-11-23 PROBLEM — Z86.711 HISTORY OF PULMONARY EMBOLISM: Status: ACTIVE | Noted: 2023-11-23

## 2023-11-23 LAB
ALBUMIN SERPL-MCNC: 3.8 G/DL (ref 3.2–4.8)
ALP LIVER SERPL-CCNC: 288 U/L
ALT SERPL-CCNC: 182 U/L
AST SERPL-CCNC: 109 U/L (ref ?–34)
BILIRUB DIRECT SERPL-MCNC: 0.2 MG/DL (ref ?–0.3)
BILIRUB SERPL-MCNC: 0.6 MG/DL (ref 0.2–1.1)
DEPRECATED RDW RBC AUTO: 46.9 FL (ref 35.1–46.3)
ERYTHROCYTE [DISTWIDTH] IN BLOOD BY AUTOMATED COUNT: 13.6 % (ref 11–15)
HCT VFR BLD AUTO: 41.3 %
HGB BLD-MCNC: 13.7 G/DL
MCH RBC QN AUTO: 30.9 PG (ref 26–34)
MCHC RBC AUTO-ENTMCNC: 33.2 G/DL (ref 31–37)
MCV RBC AUTO: 93.2 FL
PLATELET # BLD AUTO: 317 10(3)UL (ref 150–450)
PROT SERPL-MCNC: 5.8 G/DL (ref 5.7–8.2)
RBC # BLD AUTO: 4.43 X10(6)UL
WBC # BLD AUTO: 8.9 X10(3) UL (ref 4–11)

## 2023-11-23 PROCEDURE — 99232 SBSQ HOSP IP/OBS MODERATE 35: CPT | Performed by: INTERNAL MEDICINE

## 2023-11-23 RX ORDER — METHYLPREDNISOLONE SODIUM SUCCINATE 40 MG/ML
40 INJECTION, POWDER, LYOPHILIZED, FOR SOLUTION INTRAMUSCULAR; INTRAVENOUS EVERY 24 HOURS
Status: DISCONTINUED | OUTPATIENT
Start: 2023-11-23 | End: 2023-11-24

## 2023-11-23 NOTE — PLAN OF CARE
Patient is alert and oriented x4. Room air. Remote tele. Vital signs stable. IV Abx continued. IVF infusing. Tolerating low fat diet. Denies pain. Denies nausea. Plan to discharge home tomorrow.     Problem: Patient Centered Care  Goal: Patient preferences are identified and integrated in the patient's plan of care  Description: Interventions:  - What would you like us to know as we care for you?   - Provide timely, complete, and accurate information to patient/family  - Incorporate patient and family knowledge, values, beliefs, and cultural backgrounds into the planning and delivery of care  - Encourage patient/family to participate in care and decision-making at the level they choose  - Honor patient and family perspectives and choices  Outcome: Progressing        Problem: PAIN - ADULT  Goal: Verbalizes/displays adequate comfort level or patient's stated pain goal  Description: INTERVENTIONS:  - Encourage pt to monitor pain and request assistance  - Assess pain using appropriate pain scale  - Administer analgesics based on type and severity of pain and evaluate response  - Implement non-pharmacological measures as appropriate and evaluate response  - Consider cultural and social influences on pain and pain management  - Manage/alleviate anxiety  - Utilize distraction and/or relaxation techniques  - Monitor for opioid side effects  - Notify MD/LIP if interventions unsuccessful or patient reports new pain  - Anticipate increased pain with activity and pre-medicate as appropriate  Outcome: Progressing     Problem: RISK FOR INFECTION - ADULT  Goal: Absence of fever/infection during anticipated neutropenic period  Description: INTERVENTIONS  - Monitor WBC  - Administer growth factors as ordered  - Implement neutropenic guidelines  Outcome: Progressing     Problem: SAFETY ADULT - FALL  Goal: Free from fall injury  Description: INTERVENTIONS:  - Assess pt frequently for physical needs  - Identify cognitive and physical deficits and behaviors that affect risk of falls.   - [place] fall precautions as indicated by assessment.  - Educate pt/family on patient safety including physical limitations  - Instruct pt to call for assistance with activity based on assessment  - Modify environment to reduce risk of injury  - Provide assistive devices as appropriate  - Consider OT/PT consult to assist with strengthening/mobility  - Encourage toileting schedule  Outcome: Progressing     Problem: DISCHARGE PLANNING  Goal: Discharge to home or other facility with appropriate resources  Description: INTERVENTIONS:  - Identify barriers to discharge w/pt and caregiver  - Include patient/family/discharge partner in discharge planning  - Arrange for needed discharge resources and transportation as appropriate  - Identify discharge learning needs (meds, wound care, etc)  - Arrange for interpreters to assist at discharge as needed  - Consider post-discharge preferences of patient/family/discharge partner  - Complete POLST form as appropriate  - Assess patient's ability to be responsible for managing their own health  - Refer to Case Management Department for coordinating discharge planning if the patient needs post-hospital services based on physician/LIP order or complex needs related to functional status, cognitive ability or social support system  Outcome: Progressing

## 2023-11-23 NOTE — PLAN OF CARE
Patient alert an oriented x4. CPAP at night. Tolerating clears. IV fluids infusing via R chest port. Continues on IV flagyl and rocephin. Voiding freely. No complains of pain or nausea. Call light within reach. Safety measures in place. Problem: PAIN - ADULT  Goal: Verbalizes/displays adequate comfort level or patient's stated pain goal  Description: INTERVENTIONS:  - Encourage pt to monitor pain and request assistance  - Assess pain using appropriate pain scale  - Administer analgesics based on type and severity of pain and evaluate response  - Implement non-pharmacological measures as appropriate and evaluate response  - Consider cultural and social influences on pain and pain management  - Manage/alleviate anxiety  - Utilize distraction and/or relaxation techniques  - Monitor for opioid side effects  - Notify MD/LIP if interventions unsuccessful or patient reports new pain  - Anticipate increased pain with activity and pre-medicate as appropriate  Outcome: Progressing     Problem: RISK FOR INFECTION - ADULT  Goal: Absence of fever/infection during anticipated neutropenic period  Description: INTERVENTIONS  - Monitor WBC  - Administer growth factors as ordered  - Implement neutropenic guidelines  Outcome: Progressing     Problem: SAFETY ADULT - FALL  Goal: Free from fall injury  Description: INTERVENTIONS:  - Assess pt frequently for physical needs  - Identify cognitive and physical deficits and behaviors that affect risk of falls.   - [place] fall precautions as indicated by assessment.  - Educate pt/family on patient safety including physical limitations  - Instruct pt to call for assistance with activity based on assessment  - Modify environment to reduce risk of injury  - Provide assistive devices as appropriate  - Consider OT/PT consult to assist with strengthening/mobility  - Encourage toileting schedule  Outcome: Progressing     Problem: DISCHARGE PLANNING  Goal: Discharge to home or other facility with appropriate resources  Description: INTERVENTIONS:  - Identify barriers to discharge w/pt and caregiver  - Include patient/family/discharge partner in discharge planning  - Arrange for needed discharge resources and transportation as appropriate  - Identify discharge learning needs (meds, wound care, etc)  - Arrange for interpreters to assist at discharge as needed  - Consider post-discharge preferences of patient/family/discharge partner  - Complete POLST form as appropriate  - Assess patient's ability to be responsible for managing their own health  - Refer to Case Management Department for coordinating discharge planning if the patient needs post-hospital services based on physician/LIP order or complex needs related to functional status, cognitive ability or social support system  Outcome: Progressing

## 2023-11-24 VITALS
DIASTOLIC BLOOD PRESSURE: 71 MMHG | WEIGHT: 188 LBS | BODY MASS INDEX: 26.92 KG/M2 | RESPIRATION RATE: 18 BRPM | HEIGHT: 70 IN | SYSTOLIC BLOOD PRESSURE: 141 MMHG | OXYGEN SATURATION: 93 % | TEMPERATURE: 98 F | HEART RATE: 72 BPM

## 2023-11-24 LAB
ALBUMIN SERPL-MCNC: 3.1 G/DL (ref 3.2–4.8)
ALP LIVER SERPL-CCNC: 221 U/L
ALT SERPL-CCNC: 131 U/L
AST SERPL-CCNC: 69 U/L (ref ?–34)
BILIRUB DIRECT SERPL-MCNC: 0.2 MG/DL (ref ?–0.3)
BILIRUB SERPL-MCNC: 0.4 MG/DL (ref 0.2–1.1)
PROT SERPL-MCNC: 4.8 G/DL (ref 5.7–8.2)

## 2023-11-24 PROCEDURE — 99232 SBSQ HOSP IP/OBS MODERATE 35: CPT | Performed by: INTERNAL MEDICINE

## 2023-11-24 RX ORDER — METRONIDAZOLE 250 MG/1
250 TABLET ORAL 3 TIMES DAILY
Qty: 18 TABLET | Refills: 0 | Status: SHIPPED | OUTPATIENT
Start: 2023-11-24 | End: 2023-11-24

## 2023-11-24 RX ORDER — METRONIDAZOLE 250 MG/1
250 TABLET ORAL 3 TIMES DAILY
Qty: 18 TABLET | Refills: 0 | Status: SHIPPED | OUTPATIENT
Start: 2023-11-24

## 2023-11-24 RX ORDER — LEVOFLOXACIN 500 MG/1
500 TABLET, FILM COATED ORAL DAILY
Qty: 6 TABLET | Refills: 0 | Status: SHIPPED | OUTPATIENT
Start: 2023-11-24 | End: 2023-11-24

## 2023-11-24 RX ORDER — LEVOFLOXACIN 500 MG/1
500 TABLET, FILM COATED ORAL DAILY
Qty: 6 TABLET | Refills: 0 | Status: SHIPPED | OUTPATIENT
Start: 2023-11-24

## 2023-11-24 NOTE — PLAN OF CARE
Problem: Patient Centered Care  Goal: Patient preferences are identified and integrated in the patient's plan of care  Description: Interventions:  - What would you like us to know as we care for you?   - Provide timely, complete, and accurate information to patient/family  - Incorporate patient and family knowledge, values, beliefs, and cultural backgrounds into the planning and delivery of care  - Encourage patient/family to participate in care and decision-making at the level they choose  - Honor patient and family perspectives and choices  Outcome: Progressing     Problem: Patient/Family Goals  Goal: Patient/Family Long Term Goal  Description: Patient's Long Term Goal:     Interventions:  - See additional Care Plan goals for specific interventions  Outcome: Progressing  Goal: Patient/Family Short Term Goal  Description: Patient's Short Term Goal:     Interventions:   - See additional Care Plan goals for specific interventions  Outcome: Progressing     Problem: PAIN - ADULT  Goal: Verbalizes/displays adequate comfort level or patient's stated pain goal  Description: INTERVENTIONS:  - Encourage pt to monitor pain and request assistance  - Assess pain using appropriate pain scale  - Administer analgesics based on type and severity of pain and evaluate response  - Implement non-pharmacological measures as appropriate and evaluate response  - Consider cultural and social influences on pain and pain management  - Manage/alleviate anxiety  - Utilize distraction and/or relaxation techniques  - Monitor for opioid side effects  - Notify MD/LIP if interventions unsuccessful or patient reports new pain  - Anticipate increased pain with activity and pre-medicate as appropriate  Outcome: Progressing     Problem: RISK FOR INFECTION - ADULT  Goal: Absence of fever/infection during anticipated neutropenic period  Description: INTERVENTIONS  - Monitor WBC  - Administer growth factors as ordered  - Implement neutropenic guidelines  Outcome: Progressing     Problem: SAFETY ADULT - FALL  Goal: Free from fall injury  Description: INTERVENTIONS:  - Assess pt frequently for physical needs  - Identify cognitive and physical deficits and behaviors that affect risk of falls. - [place] fall precautions as indicated by assessment.  - Educate pt/family on patient safety including physical limitations  - Instruct pt to call for assistance with activity based on assessment  - Modify environment to reduce risk of injury  - Provide assistive devices as appropriate  - Consider OT/PT consult to assist with strengthening/mobility  - Encourage toileting schedule  Outcome: Progressing     Problem: DISCHARGE PLANNING  Goal: Discharge to home or other facility with appropriate resources  Description: INTERVENTIONS:  - Identify barriers to discharge w/pt and caregiver  - Include patient/family/discharge partner in discharge planning  - Arrange for needed discharge resources and transportation as appropriate  - Identify discharge learning needs (meds, wound care, etc)  - Arrange for interpreters to assist at discharge as needed  - Consider post-discharge preferences of patient/family/discharge partner  - Complete POLST form as appropriate  - Assess patient's ability to be responsible for managing their own health  - Refer to Case Management Department for coordinating discharge planning if the patient needs post-hospital services based on physician/LIP order or complex needs related to functional status, cognitive ability or social support system  Outcome: Progressing     No acute changes today. No pain or nausea, tolerating general diet. IV rocephin and fluids infusing. Up independently. Plans to discharge home today, outpatient [facility] scheduled in a few weeks. Family at bedside. Call light within reach, frequent rounding.

## 2023-11-24 NOTE — PLAN OF CARE
Pt A&Ox4. Room air, wearing CPAP overnight. No pain reported overnight. Tolerated low fiber/soft dinner. No reports n/v. IVF infusing, IV abx given per orders. Plan for [facility] inpatient vs. Outpatient. Problem: Patient Centered Care  Goal: Patient preferences are identified and integrated in the patient's plan of care  Description: Interventions:  - What would you like us to know as we care for you?  From home with wife  - Provide timely, complete, and accurate information to patient/family  - Incorporate patient and family knowledge, values, beliefs, and cultural backgrounds into the planning and delivery of care  - Encourage patient/family to participate in care and decision-making at the level they choose  - Honor patient and family perspectives and choices  Outcome: Progressing     Problem: Patient/Family Goals  Goal: Patient/Family Long Term Goal  Description: Patient's Long Term Goal:     Interventions:  - See additional Care Plan goals for specific interventions  Outcome: Progressing  Goal: Patient/Family Short Term Goal  Description: Patient's Short Term Goal:     Interventions:   - See additional Care Plan goals for specific interventions  Outcome: Progressing     Problem: PAIN - ADULT  Goal: Verbalizes/displays adequate comfort level or patient's stated pain goal  Description: INTERVENTIONS:  - Encourage pt to monitor pain and request assistance  - Assess pain using appropriate pain scale  - Administer analgesics based on type and severity of pain and evaluate response  - Implement non-pharmacological measures as appropriate and evaluate response  - Consider cultural and social influences on pain and pain management  - Manage/alleviate anxiety  - Utilize distraction and/or relaxation techniques  - Monitor for opioid side effects  - Notify MD/LIP if interventions unsuccessful or patient reports new pain  - Anticipate increased pain with activity and pre-medicate as appropriate  Outcome: Progressing Problem: RISK FOR INFECTION - ADULT  Goal: Absence of fever/infection during anticipated neutropenic period  Description: INTERVENTIONS  - Monitor WBC  - Administer growth factors as ordered  - Implement neutropenic guidelines  Outcome: Progressing     Problem: SAFETY ADULT - FALL  Goal: Free from fall injury  Description: INTERVENTIONS:  - Assess pt frequently for physical needs  - Identify cognitive and physical deficits and behaviors that affect risk of falls.   - [place] fall precautions as indicated by assessment.  - Educate pt/family on patient safety including physical limitations  - Instruct pt to call for assistance with activity based on assessment  - Modify environment to reduce risk of injury  - Provide assistive devices as appropriate  - Consider OT/PT consult to assist with strengthening/mobility  - Encourage toileting schedule  Outcome: Progressing     Problem: DISCHARGE PLANNING  Goal: Discharge to home or other facility with appropriate resources  Description: INTERVENTIONS:  - Identify barriers to discharge w/pt and caregiver  - Include patient/family/discharge partner in discharge planning  - Arrange for needed discharge resources and transportation as appropriate  - Identify discharge learning needs (meds, wound care, etc)  - Arrange for interpreters to assist at discharge as needed  - Consider post-discharge preferences of patient/family/discharge partner  - Complete POLST form as appropriate  - Assess patient's ability to be responsible for managing their own health  - Refer to Case Management Department for coordinating discharge planning if the patient needs post-hospital services based on physician/LIP order or complex needs related to functional status, cognitive ability or social support system  Outcome: Progressing

## 2023-11-27 ENCOUNTER — PATIENT OUTREACH (OUTPATIENT)
Dept: CASE MANAGEMENT | Age: 78
End: 2023-11-27

## 2023-11-27 ENCOUNTER — TELEPHONE (OUTPATIENT)
Dept: PULMONOLOGY | Facility: CLINIC | Age: 78
End: 2023-11-27

## 2023-11-27 DIAGNOSIS — Z98.890 S/P ERCP: ICD-10-CM

## 2023-11-27 DIAGNOSIS — K81.0 ACUTE CHOLECYSTITIS: ICD-10-CM

## 2023-11-27 DIAGNOSIS — K80.50 CHOLEDOCHOLITHIASIS: ICD-10-CM

## 2023-11-27 DIAGNOSIS — Z02.9 ENCOUNTERS FOR UNSPECIFIED ADMINISTRATIVE PURPOSE: Primary | ICD-10-CM

## 2023-11-27 PROCEDURE — 1111F DSCHRG MED/CURRENT MED MERGE: CPT

## 2023-11-27 PROCEDURE — 1159F MED LIST DOCD IN RCRD: CPT

## 2023-11-27 NOTE — PAYOR COMM NOTE
--------------  DISCHARGE REVIEW    Payor: [employer] PPO  Subscriber #:  [id number]  Authorization Number: [id number]    Admit date: 11/20/23  Admit time:   8:25 PM  Discharge Date: 11/24/2023 12:16 PM     Admitting Physician: Cheri Miner MD  Attending Physician:  No att. providers found  Primary Care Physician: Cheri Miner MD

## 2023-11-27 NOTE — PROGRESS NOTES
NCM placed call to patient for TCM, LM requesting a call back to [phone] with a condition update.     Discharge Dx:   Acute Cholecystitis  Choledocholithiasis  S/P ERCP on 11/22/23  HX: DVT  Non-small cell lung cancer, stage IIIb  Pulmonary aspergillosis  Obstructive sleep apnea-CPAP

## 2023-11-27 NOTE — TELEPHONE ENCOUNTER
Spoke to patient for TCM today. TCM appointment recommended by 12/1/2023 as patient is a High risk for readmission. Please advise. Patient has an appointment on 12/7/2023 at 12:30, however, it is a 3 month follow up, message sent to MD's office to see if this can be changed to a TCM. BOOK BY DATE (last date for TCM): 12/8/2023    Clinical staff:  Please discuss with Dr Jessica Esparza to see if this appointment is soon enough and long enough for a TCM appointment. If not then please call the patient to schedule a TCM appointment within the TCM timeframe. Thank you! [Annual] : an annual visit.

## 2023-11-28 ENCOUNTER — OFFICE VISIT (OUTPATIENT)
Dept: SURGERY | Facility: CLINIC | Age: 78
End: 2023-11-28
Payer: MEDICARE

## 2023-11-28 ENCOUNTER — OFFICE VISIT (OUTPATIENT)
Dept: HEMATOLOGY/ONCOLOGY | Facility: HOSPITAL | Age: 78
End: 2023-11-28
Attending: INTERNAL MEDICINE
Payer: MEDICARE

## 2023-11-28 VITALS
TEMPERATURE: 98 F | SYSTOLIC BLOOD PRESSURE: 132 MMHG | OXYGEN SATURATION: 94 % | HEIGHT: 70 IN | DIASTOLIC BLOOD PRESSURE: 73 MMHG | RESPIRATION RATE: 18 BRPM | WEIGHT: 180 LBS | BODY MASS INDEX: 25.77 KG/M2 | HEART RATE: 71 BPM

## 2023-11-28 VITALS
TEMPERATURE: 98 F | BODY MASS INDEX: 25.77 KG/M2 | DIASTOLIC BLOOD PRESSURE: 73 MMHG | OXYGEN SATURATION: 94 % | HEART RATE: 71 BPM | HEIGHT: 70 IN | WEIGHT: 180 LBS | SYSTOLIC BLOOD PRESSURE: 132 MMHG | RESPIRATION RATE: 18 BRPM

## 2023-11-28 DIAGNOSIS — Z51.11 CHEMOTHERAPY MANAGEMENT, ENCOUNTER FOR: ICD-10-CM

## 2023-11-28 DIAGNOSIS — Z92.89 HISTORY OF IMMUNOTHERAPY: ICD-10-CM

## 2023-11-28 DIAGNOSIS — B44.9 ASPERGILLUS PNEUMONIA (HCC): ICD-10-CM

## 2023-11-28 DIAGNOSIS — K80.50 CHOLEDOCHOLITHIASIS: Primary | ICD-10-CM

## 2023-11-28 DIAGNOSIS — C34.92 MALIGNANT NEOPLASM OF LEFT LUNG, UNSPECIFIED PART OF LUNG (HCC): Primary | ICD-10-CM

## 2023-11-28 PROCEDURE — 3075F SYST BP GE 130 - 139MM HG: CPT | Performed by: INTERNAL MEDICINE

## 2023-11-28 PROCEDURE — 1126F AMNT PAIN NOTED NONE PRSNT: CPT | Performed by: INTERNAL MEDICINE

## 2023-11-28 PROCEDURE — 99215 OFFICE O/P EST HI 40 MIN: CPT | Performed by: INTERNAL MEDICINE

## 2023-11-28 PROCEDURE — 3008F BODY MASS INDEX DOCD: CPT | Performed by: INTERNAL MEDICINE

## 2023-11-28 PROCEDURE — 3078F DIAST BP <80 MM HG: CPT | Performed by: INTERNAL MEDICINE

## 2023-11-28 PROCEDURE — 1111F DSCHRG MED/CURRENT MED MERGE: CPT | Performed by: INTERNAL MEDICINE

## 2023-11-28 NOTE — PATIENT INSTRUCTIONS
Surgery: XI Robot-assisted, laparoscopic, possible open, cholecystectomy     Date of Surgery: 12/11/2023    Surgery Length:  1.5 hours    Anesthesia: [facility]:    [address] [facility]   Phone: [phone]    This is an inpatient procedure. Use the provided Chlorhexadine surgical soap(instructions attached) to shower the night before and morning of your procedure. Do not apply powders, creams, lotions or deodorant after showering. Do not apply any kind of makeup and make sure to remove nail polish prior to your surgery. For faster recovery from anesthesia and surgery please follow the instructions below regarding your pre-op diet:  12 hours prior to your surgery time you are to drink one 10oz bottle of Ensure Pre-Surgery Drink. You are to have NO solid food or water after 11pm the night before your surgery EXCEPT one additional 10oz bottle of Ensure Pre-Surgery Drink. You need to finish drinking this 4 hours prior to surgery time. Bring your picture ID and insurance card with you. Wear comfortable clothing that can easily be removed. Preferably, something that zips, snaps, or buttons up the front. You will be contacted by the hospital the day prior to your surgery to confirm details and give you specific instructions about when and where to arrive the day of your procedure. If you are taking blood thinners including: Plavix, Eliquis, Coumadin you will need to contact the prescribing provider for specific instructions on holding these medications for your procedure  Motrin, Advil, Ibuprofen, Aspirin, Baby Aspirin and Fish Oil are also blood thinners and need to be held at least one week prior to your procedure. It is okay to take Tylenol. Inform your primary care physician of your surgery and ask if him/her will need to see you prior to surgery.       Pre-Operative Testing  x CBC x CMP  BMP    PT, PTT, INR  UA x EKG    Chest X-Ray  Cardiac Clearance x [facility] Clearance     Does patient have pacemaker: [] YES [x] No Does patient have WALLACE:  [x] YES [] No  Is patient diabetic?   [] YES    [x] NO    Dr. Isela Oviedo nurse direct line:  [phone]  Monday through Friday 8:30 am to 4:30 pm    For Dr. Isela Oviedo office: [phone]/ Fax: [phone]  After hours you will reach the answering service     Central Schedulin[address] [place] [address] Records:   [phone]

## 2023-11-29 ENCOUNTER — TELEPHONE (OUTPATIENT)
Dept: SURGERY | Facility: CLINIC | Age: 78
End: 2023-11-29

## 2023-11-29 DIAGNOSIS — K81.0 ACUTE CHOLECYSTITIS: Primary | ICD-10-CM

## 2023-11-29 NOTE — DISCHARGE SUMMARY
Kenneth Huerta 40 Patient Status:  Inpatient    1945 MRN [id number]   Location [facility] 4W/SW/SE Attending No att. providers found   Hosp Day # 4 PCP Ricardo Stevenson MD     Date of Admission: 2023    Date of Discharge: 2023 12:16 PM    Admitting Diagnosis: Acute cholecystitis [K81.0]  Choledocholithiasis [K80.50]    Discharge Diagnosis:   Patient Active Problem List   Diagnosis    Arthritis    Cancer of upper lobe of left lung ([address]    Encounter for care related to Port-a-Cath    Medication monitoring encounter    Iron deficiency anemia    Malabsorption    Pneumonia of right lung due to infectious organism    Malignant neoplasm of left lung ([address]    Pneumonia due to infectious organism    Anemia    S/P total knee arthroplasty, left    S/P total knee arthroplasty, right    Sensorineural hearing loss, bilateral    Rash    Chronic obstructive pulmonary disease, unspecified ([address]    Weakness generalized    Encounter for antineoplastic immunotherapy    Multiple subsegmental pulmonary emboli without acute cor pulmonale (HCC)    Malignant neoplasm of left lung, unspecified part of lung (HCC)    Esophageal dysphagia    Pneumonitis    Pulmonary embolus (HCC)    Dysphagia    Aspergillus pneumonia (HCC)    Malignant neoplasm of lung (HCC)    Acute hypoxemic respiratory failure (HCC)    History of immunotherapy    Aspergillus (HCC)    Adenocarcinoma of lung (HCC)    Acute cholecystitis    Choledocholithiasis    Dilated bile duct    Abdominal discomfort    History of pulmonary embolism       Hospital Course: Patient is a 78-year-old male who presented with chief complaint of epigastric discomfort. Work-up during hospital course revealed evidence of choledocholithiasis. Underwent ERCP with stone removal.  Evaluated by GI with advancing diet which he tolerated. Patient had been on antibiotic therapy during hospital course.   Discharged on Levaquin and Flagyl per GI recommendations. Plan for follow-up with general surgery with tentatively surgery scheduled for 12/11/2023.     Disposition: Home or [address], DO  11/29/2023  12:55 PM

## 2023-11-29 NOTE — TELEPHONE ENCOUNTER
Called patient regarding Dr. Francoise Del Real recommendations below. Patient confirmed he is scheduled for appointment with Dr. Kristin Sosa on 12/7. Notified patient of and medical clearance letter faxed to Dr. Kristin Sosa. All questions answered. Advised to call back regarding any other questions or concerns. Pt verbalized understanding. Spoke to Dr. Sam Lux regarding medical request to hold Xarelto prior to scheduled surgery on 12/11/23. Dr. Gilles Gillis states patient can hold Xarelto for 2 days prior to surgery.

## 2023-12-01 ENCOUNTER — TELEPHONE (OUTPATIENT)
Dept: PULMONOLOGY | Facility: CLINIC | Age: 78
End: 2023-12-01

## 2023-12-01 NOTE — TELEPHONE ENCOUNTER
Received fax from [facility] surgical oncology for medical clearance. Dr Radha Rosado- patient needs pre-op labs and/or clearance prior to surgery(cholecystectomy). CBC, CMP, EKG, Hx and physical, medical clearance are needed.  Fax placed in your folder for reference

## 2023-12-04 ENCOUNTER — TELEPHONE (OUTPATIENT)
Dept: PULMONOLOGY | Facility: CLINIC | Age: 78
End: 2023-12-04

## 2023-12-04 DIAGNOSIS — Z01.818 PRE-OP TESTING: Primary | ICD-10-CM

## 2023-12-04 NOTE — TELEPHONE ENCOUNTER
Pt wife is calling.   Pt is scheduled for a follow up on 12/7/23.  Pt is also needing a surgical clearance at the same time.  She is requesting any lab orders for clearance to be placed so that he can have them drawn.  She is requesting a call back after orders are placed.  Please call

## 2023-12-04 NOTE — TELEPHONE ENCOUNTER
RN, please facilitate CMP, CBC, EKG and generate a note stating that the patient is cleared to go to the operating room.  If you find out what time he is coming in to have his surgery, I can do the H&P when I see him preoperatively.

## 2023-12-05 ENCOUNTER — TELEPHONE (OUTPATIENT)
Dept: HEMATOLOGY/ONCOLOGY | Facility: HOSPITAL | Age: 78
End: 2023-12-05

## 2023-12-05 RX ORDER — ACETAMINOPHEN 500 MG
500 TABLET ORAL EVERY 6 HOURS PRN
COMMUNITY

## 2023-12-05 NOTE — TELEPHONE ENCOUNTER
Spoke with patient and wife and informed of Dr. Hernandez's message/orders below. Wife states patient does not have time yet of surgery and would like to keep appointment with Dr. Hernandez on 12/7/23.     Dr. Hernandez; SETH

## 2023-12-05 NOTE — TELEPHONE ENCOUNTER
Luis and wife calling asking if he should stop cancer drugs prior to surgery on Monday 12/11.  He knows to stop xeralto 12/08. Katharine Samuels

## 2023-12-07 ENCOUNTER — NURSE ONLY (OUTPATIENT)
Dept: HEMATOLOGY/ONCOLOGY | Facility: HOSPITAL | Age: 78
End: 2023-12-07
Attending: INTERNAL MEDICINE
Payer: MEDICARE

## 2023-12-07 ENCOUNTER — EKG ENCOUNTER (OUTPATIENT)
Dept: LAB | Facility: HOSPITAL | Age: 78
End: 2023-12-07
Attending: INTERNAL MEDICINE
Payer: MEDICARE

## 2023-12-07 ENCOUNTER — OFFICE VISIT (OUTPATIENT)
Dept: PULMONOLOGY | Facility: CLINIC | Age: 78
End: 2023-12-07
Payer: MEDICARE

## 2023-12-07 VITALS
WEIGHT: 184.19 LBS | RESPIRATION RATE: 14 BRPM | HEIGHT: 70 IN | DIASTOLIC BLOOD PRESSURE: 71 MMHG | SYSTOLIC BLOOD PRESSURE: 129 MMHG | BODY MASS INDEX: 26.37 KG/M2 | OXYGEN SATURATION: 95 % | HEART RATE: 70 BPM

## 2023-12-07 DIAGNOSIS — C34.12 CANCER OF UPPER LOBE OF LEFT LUNG (HCC): Primary | ICD-10-CM

## 2023-12-07 DIAGNOSIS — C34.90 ADENOCARCINOMA OF LUNG, UNSPECIFIED LATERALITY (HCC): Primary | ICD-10-CM

## 2023-12-07 DIAGNOSIS — N50.89 SCROTAL MASS: ICD-10-CM

## 2023-12-07 DIAGNOSIS — Z01.818 PRE-OP TESTING: ICD-10-CM

## 2023-12-07 DIAGNOSIS — Z45.2 ENCOUNTER FOR CARE RELATED TO PORT-A-CATH: ICD-10-CM

## 2023-12-07 DIAGNOSIS — D50.9 IRON DEFICIENCY ANEMIA, UNSPECIFIED IRON DEFICIENCY ANEMIA TYPE: ICD-10-CM

## 2023-12-07 LAB
ALBUMIN SERPL-MCNC: 3.7 G/DL (ref 3.2–4.8)
ALBUMIN/GLOB SERPL: 1.9 {RATIO} (ref 1–2)
ALP LIVER SERPL-CCNC: 137 U/L
ALT SERPL-CCNC: 28 U/L
ANION GAP SERPL CALC-SCNC: 6 MMOL/L (ref 0–18)
ANTIBODY SCREEN: NEGATIVE
AST SERPL-CCNC: 29 U/L (ref ?–34)
ATRIAL RATE: 71 BPM
BASOPHILS # BLD AUTO: 0.03 X10(3) UL (ref 0–0.2)
BASOPHILS NFR BLD AUTO: 0.4 %
BILIRUB SERPL-MCNC: 0.4 MG/DL (ref 0.2–1.1)
BUN BLD-MCNC: 21 MG/DL (ref 9–23)
BUN/CREAT SERPL: 20.6 (ref 10–20)
CALCIUM BLD-MCNC: 9 MG/DL (ref 8.7–10.4)
CHLORIDE SERPL-SCNC: 106 MMOL/L (ref 98–112)
CO2 SERPL-SCNC: 26 MMOL/L (ref 21–32)
CREAT BLD-MCNC: 1.02 MG/DL
DEPRECATED RDW RBC AUTO: 53.6 FL (ref 35.1–46.3)
EGFRCR SERPLBLD CKD-EPI 2021: 74 ML/MIN/1.73M2 (ref 60–?)
EOSINOPHIL # BLD AUTO: 0.05 X10(3) UL (ref 0–0.7)
EOSINOPHIL NFR BLD AUTO: 0.7 %
ERYTHROCYTE [DISTWIDTH] IN BLOOD BY AUTOMATED COUNT: 14.8 % (ref 11–15)
GLOBULIN PLAS-MCNC: 1.9 G/DL (ref 2.8–4.4)
GLUCOSE BLD-MCNC: 115 MG/DL (ref 70–99)
HCT VFR BLD AUTO: 44.5 %
HGB BLD-MCNC: 14.1 G/DL
IMM GRANULOCYTES # BLD AUTO: 0.03 X10(3) UL (ref 0–1)
IMM GRANULOCYTES NFR BLD: 0.4 %
LYMPHOCYTES # BLD AUTO: 0.58 X10(3) UL (ref 1–4)
LYMPHOCYTES NFR BLD AUTO: 8.2 %
MCH RBC QN AUTO: 30.9 PG (ref 26–34)
MCHC RBC AUTO-ENTMCNC: 31.7 G/DL (ref 31–37)
MCV RBC AUTO: 97.6 FL
MONOCYTES # BLD AUTO: 0.73 X10(3) UL (ref 0.1–1)
MONOCYTES NFR BLD AUTO: 10.3 %
NEUTROPHILS # BLD AUTO: 5.68 X10 (3) UL (ref 1.5–7.7)
NEUTROPHILS # BLD AUTO: 5.68 X10(3) UL (ref 1.5–7.7)
NEUTROPHILS NFR BLD AUTO: 80 %
OSMOLALITY SERPL CALC.SUM OF ELEC: 290 MOSM/KG (ref 275–295)
P AXIS: 69 DEGREES
P-R INTERVAL: 142 MS
PLATELET # BLD AUTO: 214 10(3)UL (ref 150–450)
POTASSIUM SERPL-SCNC: 3.9 MMOL/L (ref 3.5–5.1)
PROT SERPL-MCNC: 5.6 G/DL (ref 5.7–8.2)
Q-T INTERVAL: 392 MS
QRS DURATION: 98 MS
QTC CALCULATION (BEZET): 425 MS
R AXIS: -6 DEGREES
RBC # BLD AUTO: 4.56 X10(6)UL
RH BLOOD TYPE: POSITIVE
RH BLOOD TYPE: POSITIVE
SODIUM SERPL-SCNC: 138 MMOL/L (ref 136–145)
T AXIS: 75 DEGREES
VENTRICULAR RATE: 71 BPM
WBC # BLD AUTO: 7.1 X10(3) UL (ref 4–11)

## 2023-12-07 PROCEDURE — 1111F DSCHRG MED/CURRENT MED MERGE: CPT | Performed by: INTERNAL MEDICINE

## 2023-12-07 PROCEDURE — 93010 ELECTROCARDIOGRAM REPORT: CPT | Performed by: STUDENT IN AN ORGANIZED HEALTH CARE EDUCATION/TRAINING PROGRAM

## 2023-12-07 PROCEDURE — 3008F BODY MASS INDEX DOCD: CPT | Performed by: INTERNAL MEDICINE

## 2023-12-07 PROCEDURE — 86901 BLOOD TYPING SEROLOGIC RH(D): CPT

## 2023-12-07 PROCEDURE — 86850 RBC ANTIBODY SCREEN: CPT

## 2023-12-07 PROCEDURE — 1159F MED LIST DOCD IN RCRD: CPT | Performed by: INTERNAL MEDICINE

## 2023-12-07 PROCEDURE — 99213 OFFICE O/P EST LOW 20 MIN: CPT | Performed by: INTERNAL MEDICINE

## 2023-12-07 PROCEDURE — 3074F SYST BP LT 130 MM HG: CPT | Performed by: INTERNAL MEDICINE

## 2023-12-07 PROCEDURE — 3078F DIAST BP <80 MM HG: CPT | Performed by: INTERNAL MEDICINE

## 2023-12-07 PROCEDURE — 93005 ELECTROCARDIOGRAM TRACING: CPT

## 2023-12-07 PROCEDURE — 86900 BLOOD TYPING SEROLOGIC ABO: CPT

## 2023-12-07 PROCEDURE — 85025 COMPLETE CBC W/AUTO DIFF WBC: CPT

## 2023-12-07 PROCEDURE — 36591 DRAW BLOOD OFF VENOUS DEVICE: CPT

## 2023-12-07 PROCEDURE — 1126F AMNT PAIN NOTED NONE PRSNT: CPT | Performed by: INTERNAL MEDICINE

## 2023-12-07 PROCEDURE — 80053 COMPREHEN METABOLIC PANEL: CPT

## 2023-12-07 RX ORDER — HEPARIN SODIUM (PORCINE) LOCK FLUSH IV SOLN 100 UNIT/ML 100 UNIT/ML
5 SOLUTION INTRAVENOUS ONCE
Status: COMPLETED | OUTPATIENT
Start: 2023-12-07 | End: 2023-12-07

## 2023-12-07 RX ORDER — HEPARIN SODIUM (PORCINE) LOCK FLUSH IV SOLN 100 UNIT/ML 100 UNIT/ML
5 SOLUTION INTRAVENOUS ONCE
OUTPATIENT
Start: 2023-12-07

## 2023-12-07 RX ORDER — SODIUM CHLORIDE 9 MG/ML
10 INJECTION INTRAVENOUS ONCE
OUTPATIENT
Start: 2023-12-07

## 2023-12-07 RX ADMIN — HEPARIN SODIUM (PORCINE) LOCK FLUSH IV SOLN 100 UNIT/ML 500 UNITS: 100 SOLUTION INTRAVENOUS at 10:40:00

## 2023-12-07 NOTE — PROGRESS NOTES
Preoperative history and physical:    The patient is an 75-year-old male who I know well from prior evaluation who is now going to undergo a cholecystectomy. HISTORY OF PRESENT ILLNESS: The patient manifest with biliary obstruction with choledocholithiasis a few weeks ago. He is now being electively admitted for completion surgery with cholecystectomy. His abdominal pains have completely resolved. 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 He has done well clinically from a respiratory perspective. There is a history of lung cancer and he is followed closely by oncology. PAST MEDICAL AND SURGICAL HISTORY:   1.  Lung cancer, COPD, pneumonia, Keytruda associated pneumonitis, choledocholithiasis status post ERCP, pulmonary embolism on Xarelto, DVT, history of pulmonary aspergillosis 6    SOCIAL HISTORY: , 2 kids, quit smoking in the year 2000 after 1 pack/day for 30 years, 1 glass of wine per week, retired    FAMILY HISTORY: Noncontributory    ALLERGIES TO MEDICATIONS: Guaifenesin dextromethorphan ibuprofen phenylephrine pseudoephedrine    MEDICATIONS: Vitamins albuterol Breo pantoprazole voriconazole prednisone 10 mg Bactrim rivaroxaban which is being held for several days prior to procedure, voriconazole    REVIEW OF SYSTEMS: Review of Systems:  Vision normal. Ear nose and throat normal. Bowel normal. Bladder function normal. No depression. No thyroid disease. No lymphatic system concerns. No rash. Muscles and joints unremarkable. No weight loss no weight gain. PHYSICAL EXAMINATION: Physical Examination:  Vital signs normal. HEENT examination is unremarkable with pupils equal round and reactive to light and accommodation. Neck without adenopathy, thyromegaly, JVD nor bruit. Lungs diminished with few basilar crackles to auscultation and percussion. Cardiac regular rate and rhythm no murmur.  Abdomen nontender, without hepatosplenomegaly and no mass appreciable. Extremities and Musculoskeletal without clubbing cyanosis nor edema, and mobility acceptable. Neurologic grossly intact with symmetric tone and strength and reflex. LABORATORY: Chest x-ray-mild bibasilar haziness. CBC, CMP unremarkable. EKG with occasional PVC. Lexiscan stress test earlier this year-normal    ASSESSMENT AND PLAN:  PROBLEM 1. Preoperative evaluation for cholecystectomy-the patient is an acceptable candidate to go to the operating room from my perspective. He will always be at moderate risk for perioperative pulmonary compromise due to his COPD, sleep apnea, prior history of pneumonitis associated with Keytruda, history of pulmonary embolism, history of lung cancer and pulmonary aspergillosis. However, he is otherwise optimized from a pulmonary perspective and okay to go the OR. Recent symptomatology of choledocholithiasis now resolved. RECOMMENDATIONS:  1.  Okay to OR from my perspective  2. Patient can bring his CPAP device with him at the time of surgery in case it is needed immediately postop  3. Aggressive antipain, entire thrombosis and antiatelectasis measures  4. Holding Xarelto before surgery. PROBLEM 2. History of venous thromboembolism    RECOMMENDATIONS:  1. Holding Xarelto several days prior to surgery, to resume thereafter. PROBLEM 3. History of lung cancer    RECOMMENDATIONS:  1. Follow-up oncology    PROBLEM 4.  History of pulmonary aspergillosis-continue voriconazole    RECOMMENDATIONS:  1. Voriconazole    I am delighted to assist in Luis's care.             With warmest regards,     Clementine Cifuentes MD  Medical Director, [address] [facility]  Medical Director, [facility]

## 2023-12-10 ENCOUNTER — ANESTHESIA EVENT (OUTPATIENT)
Dept: SURGERY | Facility: HOSPITAL | Age: 78
End: 2023-12-10
Payer: MEDICARE

## 2023-12-11 ENCOUNTER — HOSPITAL ENCOUNTER (OUTPATIENT)
Facility: HOSPITAL | Age: 78
Discharge: HOME | End: 2023-12-12
Attending: SURGERY | Admitting: SURGERY
Payer: MEDICARE

## 2023-12-11 ENCOUNTER — ANESTHESIA (OUTPATIENT)
Dept: SURGERY | Facility: HOSPITAL | Age: 78
End: 2023-12-11
Payer: MEDICARE

## 2023-12-11 DIAGNOSIS — Z01.818 PRE-OP TESTING: Primary | ICD-10-CM

## 2023-12-11 DIAGNOSIS — K81.0 ACUTE CHOLECYSTITIS: ICD-10-CM

## 2023-12-11 PROBLEM — K82.8 GALLBLADDER MASS: Status: ACTIVE | Noted: 2023-12-11

## 2023-12-11 PROCEDURE — 99214 OFFICE O/P EST MOD 30 MIN: CPT | Performed by: INTERNAL MEDICINE

## 2023-12-11 PROCEDURE — BF4CZZZ ULTRASONOGRAPHY OF HEPATOBILIARY SYSTEM, ALL: ICD-10-PCS | Performed by: SURGERY

## 2023-12-11 PROCEDURE — 8E0W4CZ ROBOTIC ASSISTED PROCEDURE OF TRUNK REGION, PERCUTANEOUS ENDOSCOPIC APPROACH: ICD-10-PCS | Performed by: SURGERY

## 2023-12-11 PROCEDURE — 0FB04ZZ EXCISION OF LIVER, PERCUTANEOUS ENDOSCOPIC APPROACH: ICD-10-PCS | Performed by: SURGERY

## 2023-12-11 PROCEDURE — 0FT44ZZ RESECTION OF GALLBLADDER, PERCUTANEOUS ENDOSCOPIC APPROACH: ICD-10-PCS | Performed by: SURGERY

## 2023-12-11 RX ORDER — OXYCODONE HYDROCHLORIDE 5 MG/1
5 TABLET ORAL EVERY 4 HOURS PRN
Status: DISCONTINUED | OUTPATIENT
Start: 2023-12-11 | End: 2023-12-12

## 2023-12-11 RX ORDER — NALOXONE HYDROCHLORIDE 0.4 MG/ML
80 INJECTION, SOLUTION INTRAMUSCULAR; INTRAVENOUS; SUBCUTANEOUS AS NEEDED
Status: DISCONTINUED | OUTPATIENT
Start: 2023-12-11 | End: 2023-12-11 | Stop reason: HOSPADM

## 2023-12-11 RX ORDER — HYDROMORPHONE HYDROCHLORIDE 1 MG/ML
0.2 INJECTION, SOLUTION INTRAMUSCULAR; INTRAVENOUS; SUBCUTANEOUS EVERY 5 MIN PRN
Status: DISCONTINUED | OUTPATIENT
Start: 2023-12-11 | End: 2023-12-11 | Stop reason: HOSPADM

## 2023-12-11 RX ORDER — SODIUM CHLORIDE, SODIUM LACTATE, POTASSIUM CHLORIDE, CALCIUM CHLORIDE 600; 310; 30; 20 MG/100ML; MG/100ML; MG/100ML; MG/100ML
INJECTION, SOLUTION INTRAVENOUS CONTINUOUS PRN
Status: DISCONTINUED | OUTPATIENT
Start: 2023-12-11 | End: 2023-12-11 | Stop reason: SURG

## 2023-12-11 RX ORDER — MORPHINE SULFATE 10 MG/ML
6 INJECTION, SOLUTION INTRAMUSCULAR; INTRAVENOUS EVERY 10 MIN PRN
Status: DISCONTINUED | OUTPATIENT
Start: 2023-12-11 | End: 2023-12-11 | Stop reason: HOSPADM

## 2023-12-11 RX ORDER — CEFAZOLIN SODIUM/WATER 2 G/20 ML
2 SYRINGE (ML) INTRAVENOUS ONCE
Status: COMPLETED | OUTPATIENT
Start: 2023-12-11 | End: 2023-12-11

## 2023-12-11 RX ORDER — OXYCODONE HYDROCHLORIDE 5 MG/1
10 TABLET ORAL EVERY 4 HOURS PRN
Status: DISCONTINUED | OUTPATIENT
Start: 2023-12-11 | End: 2023-12-12

## 2023-12-11 RX ORDER — PREDNISONE 5 MG/1
10 TABLET ORAL EVERY MORNING
Status: DISCONTINUED | OUTPATIENT
Start: 2023-12-11 | End: 2023-12-12

## 2023-12-11 RX ORDER — MORPHINE SULFATE 4 MG/ML
2 INJECTION, SOLUTION INTRAMUSCULAR; INTRAVENOUS EVERY 10 MIN PRN
Status: DISCONTINUED | OUTPATIENT
Start: 2023-12-11 | End: 2023-12-11 | Stop reason: HOSPADM

## 2023-12-11 RX ORDER — HEPARIN SODIUM 5000 [USP'U]/ML
5000 INJECTION, SOLUTION INTRAVENOUS; SUBCUTANEOUS ONCE
Status: COMPLETED | OUTPATIENT
Start: 2023-12-11 | End: 2023-12-11

## 2023-12-11 RX ORDER — ALBUTEROL SULFATE 90 UG/1
2 AEROSOL, METERED RESPIRATORY (INHALATION) EVERY 4 HOURS PRN
Status: DISCONTINUED | OUTPATIENT
Start: 2023-12-11 | End: 2023-12-12

## 2023-12-11 RX ORDER — MORPHINE SULFATE 4 MG/ML
4 INJECTION, SOLUTION INTRAMUSCULAR; INTRAVENOUS EVERY 10 MIN PRN
Status: DISCONTINUED | OUTPATIENT
Start: 2023-12-11 | End: 2023-12-11 | Stop reason: HOSPADM

## 2023-12-11 RX ORDER — LIDOCAINE HYDROCHLORIDE 10 MG/ML
INJECTION, SOLUTION EPIDURAL; INFILTRATION; INTRACAUDAL; PERINEURAL AS NEEDED
Status: DISCONTINUED | OUTPATIENT
Start: 2023-12-11 | End: 2023-12-11 | Stop reason: SURG

## 2023-12-11 RX ORDER — SODIUM CHLORIDE 9 MG/ML
INJECTION, SOLUTION INTRAVENOUS CONTINUOUS
Status: DISCONTINUED | OUTPATIENT
Start: 2023-12-11 | End: 2023-12-12

## 2023-12-11 RX ORDER — DEXAMETHASONE SODIUM PHOSPHATE 4 MG/ML
VIAL (ML) INJECTION AS NEEDED
Status: DISCONTINUED | OUTPATIENT
Start: 2023-12-11 | End: 2023-12-11 | Stop reason: SURG

## 2023-12-11 RX ORDER — ACETAMINOPHEN 500 MG
1000 TABLET ORAL ONCE
Status: COMPLETED | OUTPATIENT
Start: 2023-12-11 | End: 2023-12-11

## 2023-12-11 RX ORDER — SODIUM CHLORIDE, SODIUM LACTATE, POTASSIUM CHLORIDE, CALCIUM CHLORIDE 600; 310; 30; 20 MG/100ML; MG/100ML; MG/100ML; MG/100ML
INJECTION, SOLUTION INTRAVENOUS CONTINUOUS
Status: DISCONTINUED | OUTPATIENT
Start: 2023-12-11 | End: 2023-12-11 | Stop reason: HOSPADM

## 2023-12-11 RX ORDER — OXYCODONE HYDROCHLORIDE 5 MG/1
7.5 TABLET ORAL EVERY 4 HOURS PRN
Status: DISCONTINUED | OUTPATIENT
Start: 2023-12-11 | End: 2023-12-12

## 2023-12-11 RX ORDER — HYDROMORPHONE HYDROCHLORIDE 1 MG/ML
0.6 INJECTION, SOLUTION INTRAMUSCULAR; INTRAVENOUS; SUBCUTANEOUS EVERY 5 MIN PRN
Status: DISCONTINUED | OUTPATIENT
Start: 2023-12-11 | End: 2023-12-11 | Stop reason: HOSPADM

## 2023-12-11 RX ORDER — ACETAMINOPHEN 500 MG
1000 TABLET ORAL ONCE AS NEEDED
Status: DISCONTINUED | OUTPATIENT
Start: 2023-12-11 | End: 2023-12-11 | Stop reason: HOSPADM

## 2023-12-11 RX ORDER — GLYCOPYRROLATE 0.2 MG/ML
INJECTION, SOLUTION INTRAMUSCULAR; INTRAVENOUS AS NEEDED
Status: DISCONTINUED | OUTPATIENT
Start: 2023-12-11 | End: 2023-12-11 | Stop reason: SURG

## 2023-12-11 RX ORDER — ACETAMINOPHEN 500 MG
500 TABLET ORAL EVERY 6 HOURS PRN
Status: DISCONTINUED | OUTPATIENT
Start: 2023-12-11 | End: 2023-12-12

## 2023-12-11 RX ORDER — ONDANSETRON 2 MG/ML
4 INJECTION INTRAMUSCULAR; INTRAVENOUS EVERY 6 HOURS PRN
Status: DISCONTINUED | OUTPATIENT
Start: 2023-12-11 | End: 2023-12-11 | Stop reason: HOSPADM

## 2023-12-11 RX ORDER — ROCURONIUM BROMIDE 10 MG/ML
INJECTION, SOLUTION INTRAVENOUS AS NEEDED
Status: DISCONTINUED | OUTPATIENT
Start: 2023-12-11 | End: 2023-12-11 | Stop reason: SURG

## 2023-12-11 RX ORDER — METOCLOPRAMIDE HYDROCHLORIDE 5 MG/ML
10 INJECTION INTRAMUSCULAR; INTRAVENOUS EVERY 8 HOURS PRN
Status: DISCONTINUED | OUTPATIENT
Start: 2023-12-11 | End: 2023-12-11 | Stop reason: HOSPADM

## 2023-12-11 RX ORDER — PANTOPRAZOLE SODIUM 40 MG/1
40 TABLET, DELAYED RELEASE ORAL
Status: DISCONTINUED | OUTPATIENT
Start: 2023-12-11 | End: 2023-12-12

## 2023-12-11 RX ORDER — BUPIVACAINE HYDROCHLORIDE 5 MG/ML
INJECTION, SOLUTION EPIDURAL; INTRACAUDAL AS NEEDED
Status: DISCONTINUED | OUTPATIENT
Start: 2023-12-11 | End: 2023-12-11 | Stop reason: HOSPADM

## 2023-12-11 RX ORDER — EPHEDRINE SULFATE 50 MG/ML
INJECTION, SOLUTION INTRAVENOUS AS NEEDED
Status: DISCONTINUED | OUTPATIENT
Start: 2023-12-11 | End: 2023-12-11 | Stop reason: SURG

## 2023-12-11 RX ORDER — LIDOCAINE HYDROCHLORIDE AND EPINEPHRINE 10; 10 MG/ML; UG/ML
INJECTION, SOLUTION INFILTRATION; PERINEURAL AS NEEDED
Status: DISCONTINUED | OUTPATIENT
Start: 2023-12-11 | End: 2023-12-11 | Stop reason: HOSPADM

## 2023-12-11 RX ORDER — ONDANSETRON 2 MG/ML
INJECTION INTRAMUSCULAR; INTRAVENOUS AS NEEDED
Status: DISCONTINUED | OUTPATIENT
Start: 2023-12-11 | End: 2023-12-11 | Stop reason: SURG

## 2023-12-11 RX ORDER — FLUTICASONE FUROATE AND VILANTEROL 100; 25 UG/1; UG/1
1 POWDER RESPIRATORY (INHALATION) EVERY MORNING
Status: DISCONTINUED | OUTPATIENT
Start: 2023-12-11 | End: 2023-12-12

## 2023-12-11 RX ORDER — HYDROMORPHONE HYDROCHLORIDE 1 MG/ML
0.4 INJECTION, SOLUTION INTRAMUSCULAR; INTRAVENOUS; SUBCUTANEOUS EVERY 5 MIN PRN
Status: DISCONTINUED | OUTPATIENT
Start: 2023-12-11 | End: 2023-12-11 | Stop reason: HOSPADM

## 2023-12-11 RX ADMIN — ONDANSETRON 4 MG: 2 INJECTION INTRAMUSCULAR; INTRAVENOUS at 10:28:00

## 2023-12-11 RX ADMIN — GLYCOPYRROLATE 0.2 MG: 0.2 INJECTION, SOLUTION INTRAMUSCULAR; INTRAVENOUS at 10:28:00

## 2023-12-11 RX ADMIN — ROCURONIUM BROMIDE 20 MG: 10 INJECTION, SOLUTION INTRAVENOUS at 11:58:00

## 2023-12-11 RX ADMIN — EPHEDRINE SULFATE 5 MG: 50 INJECTION, SOLUTION INTRAVENOUS at 10:41:00

## 2023-12-11 RX ADMIN — CEFAZOLIN SODIUM/WATER 2 G: 2 G/20 ML SYRINGE (ML) INTRAVENOUS at 10:31:00

## 2023-12-11 RX ADMIN — SODIUM CHLORIDE, SODIUM LACTATE, POTASSIUM CHLORIDE, CALCIUM CHLORIDE: 600; 310; 30; 20 INJECTION, SOLUTION INTRAVENOUS at 10:22:00

## 2023-12-11 RX ADMIN — LIDOCAINE HYDROCHLORIDE 50 MG: 10 INJECTION, SOLUTION EPIDURAL; INFILTRATION; INTRACAUDAL; PERINEURAL at 10:28:00

## 2023-12-11 RX ADMIN — EPHEDRINE SULFATE 5 MG: 50 INJECTION, SOLUTION INTRAVENOUS at 10:52:00

## 2023-12-11 RX ADMIN — ROCURONIUM BROMIDE 30 MG: 10 INJECTION, SOLUTION INTRAVENOUS at 10:45:00

## 2023-12-11 RX ADMIN — ROCURONIUM BROMIDE 50 MG: 10 INJECTION, SOLUTION INTRAVENOUS at 10:28:00

## 2023-12-11 RX ADMIN — EPHEDRINE SULFATE 5 MG: 50 INJECTION, SOLUTION INTRAVENOUS at 10:43:00

## 2023-12-11 RX ADMIN — SODIUM CHLORIDE, SODIUM LACTATE, POTASSIUM CHLORIDE, CALCIUM CHLORIDE: 600; 310; 30; 20 INJECTION, SOLUTION INTRAVENOUS at 12:15:00

## 2023-12-11 RX ADMIN — ROCURONIUM BROMIDE 20 MG: 10 INJECTION, SOLUTION INTRAVENOUS at 11:00:00

## 2023-12-11 RX ADMIN — DEXAMETHASONE SODIUM PHOSPHATE 8 MG: 4 MG/ML VIAL (ML) INJECTION at 10:28:00

## 2023-12-11 NOTE — ANESTHESIA PROCEDURE NOTES
Airway  Date/Time: 12/11/2023 10:30 AM  Urgency: Elective    Airway not difficult    General Information and Staff    Patient location during procedure: OR  Resident/CRNA: Concepcion Crawford CRNA  Performed: CRNA   Performed by: Concepcion Crawford CRNA  Authorized by: Aicha Godfrey MD      Indications and Patient Condition  Indications for airway management: anesthesia  Spontaneous Ventilation: absent  Sedation level: deep  Preoxygenated: yes  Patient position: sniffing  MILS maintained throughout  Mask difficulty assessment: 1 - vent by mask  No planned trial extubation    Final Airway Details  Final airway type: endotracheal airway      Successful airway: ETT  Cuffed: yes   Successful intubation technique: direct laryngoscopy  Facilitating devices/methods: intubating stylet  Endotracheal tube insertion site: oral  Blade: Javon  Blade size: #3  ETT size (mm): 7.5    Cormack-Lehane Classification: grade I - full view of glottis  Placement verified by: capnometry   Measured from: teeth  ETT to teeth (cm): 22  Number of attempts at approach: 1  Number of other approaches attempted: 0

## 2023-12-11 NOTE — BRIEF OP NOTE
Pre-Operative Diagnosis: Acute cholecystitis [K81.0]     Post-Operative Diagnosis: Acute cholecystitis [K81.0]      Procedure Performed:   XI Robot-assisted, laparoscopic  radical cholecystectomy, partial hepatectomy sections 4b/5, intraoperative ultrasound    Surgeon(s) and Role:     * Racquel Kaplan MD - Primary    Assistant(s):  Surgical Assistant.: Bell Rawls     Surgical Findings: see dictation      Specimen: see dictation     Estimated Blood Loss: No data recorded    Dictation Number:  NA    John Paul Rodriguez MD  12/11/2023  12:27 PM

## 2023-12-11 NOTE — INTERVAL H&P NOTE
Patient seen and examined. No changes to the attached history and physical are noted. [de-identified]year old male presenting today for planned robotic possible open cholecystecomy and US. Bill Aaron MD  [facility] General Surgical Oncology  [facility] 112  Pager [phone]  [email or web address]. [email or web address]

## 2023-12-11 NOTE — ANESTHESIA POSTPROCEDURE EVALUATION
Patient: Tika Branch    Procedure Summary       Date: 12/11/23 Room / Location: [address] [place] [address] / [address] [place] [address] MAIN OR    Anesthesia Start: 1022 Anesthesia Stop:     Procedure: XI Robot-assisted, laparoscopic  radical cholecystectomy, partial hepatectomy sections 4b/5, intraoperative ultrasound (Abdomen) Diagnosis:       Acute cholecystitis      (Acute cholecystitis [K81.0])    Surgeons: Sherice Josue MD Anesthesiologist: Danica Hdez MD    Anesthesia Type: general ASA Status: 3            Anesthesia Type: general    Vitals Value Taken Time   /56 12/11/23 1231   Temp 98 12/11/23 1231   Pulse 67 12/11/23 1230   Resp 15 12/11/23 1230   SpO2 94 % 12/11/23 1230   Vitals shown include unfiled device data.     [address] [place] [address] AN Post Evaluation:   Patient Evaluated in PACU  Patient Participation: complete - patient participated  Level of Consciousness: awake  Pain Score: 0  Pain Management: adequate  Airway Patency:patent  Dental exam unchanged from preop  Yes    Cardiovascular Status: acceptable  Respiratory Status: acceptable and nasal cannula      Jeff Joe CRNA  12/11/2023 12:31 PM

## 2023-12-11 NOTE — OPERATIVE REPORT
Date of operation 12/11/2023    Preoperative diagnosis:  1. Gallbladder mass    Operations performed:  1.  Robotic assisted radical cholecystectomy [partial hepatectomy segment 4B and 5] and intraoperative ultrasound    Postoperative diagnosis:  1. Same    Operating Surgeon:  1. Bill Aaron MD    Assistant: 1. Surgical Assistant.: Bell Taylor     Indications:  Very pleasant 78-year-old gentleman who presented he presents for definitiveWith choledocholithiasis. He underwent ERCP and clearance of his stones. An MRCP demonstrated a gallbladder mass. Management. This is of the operation:  Patient was brought to the operating room and laid supine the operating table. General tracheal anesthesia was induced without complications. All pressure points were padded appropriately. The arms were tucked. Hernandez's catheter was inserted under sterile conditions. The abdomen was clipped prepped and draped in standard sterile manner. A time was completed verifying the patient's name, procedure be performed and the administration of antibiotics. Everyone in the room was in agreement. A nick in the skin was made in the left upper quadrant after rest needle was introduced. Pneumoperitoneum was established. Additional 8 mm working ports were placed in a straight line from the left upper quadrant towards the right lower quadrant. The left umbilical port was exchanged for a 12. Patient was then repositioned into reverse Trendelenburg. The robot was docked per usual.  Attention was directed towards the right upper quadrant. The gallbladder was grasped and retracted cephalad. An ultrasound was performed. There appeared to be abnormal enhancement along the posterior gallbladder wall without clear invasion of the liver. I marked that with electrocautery. Decision was made to proceed with a partial hepatectomy and managed to remove that mass at the same time.   Dissection then commenced identifying the cystic duct and cystic artery. Critical view of safety was obtained. The cystic duct was doubly clipped and divided. The cystic artery was clipped and divided. Sean's capsule was then scored with electrocautery. I prepared parenchymal transection by encircling the hepatoduodenal ligament with an 18 Liberian red rubber catheter in the event there was bleeding [Corydon maneuver]. I then proceeded to take a portion of segments 4B and 5. Parenchymal transection proceeded per usual using a modified clamp crush technique. Small bleeders and veins were coagulated or clipped as appropriate. The dissection was carried and met towards the lateral aspect of the gallbladder and segment 5. The remainder of the gallbladder wall attachments were taken down with electrocautery. The specimen was free. Hemostasis was excellent. The abdomen was thoroughly irrigated. The specimen was retrieved through the 12 mm port site. The 12 mm fascia was closed in a running manner using 0 Vicryl suture per usual.  Hemostasis was excellent. Ports were withdrawn under direct vision and there was no bleeding. The skin subcu tissues were irrigated. The skin was approximated in a subcuticular manner with 4-0 Vicryl suture. The patient tolerated the procedure well was taken to the postoperative strictures in a stable state. I was present for the entire case. Bill Aaron MD  [facility] General Surgical Oncology  [facility]. [email or web address]

## 2023-12-12 ENCOUNTER — DOCUMENTATION ONLY (OUTPATIENT)
Dept: HEMATOLOGY/ONCOLOGY | Facility: HOSPITAL | Age: 78
End: 2023-12-12

## 2023-12-12 VITALS
HEIGHT: 70 IN | OXYGEN SATURATION: 96 % | HEART RATE: 68 BPM | WEIGHT: 187 LBS | DIASTOLIC BLOOD PRESSURE: 85 MMHG | BODY MASS INDEX: 26.77 KG/M2 | RESPIRATION RATE: 18 BRPM | SYSTOLIC BLOOD PRESSURE: 137 MMHG | TEMPERATURE: 98 F

## 2023-12-12 LAB
ANION GAP SERPL CALC-SCNC: 4 MMOL/L (ref 0–18)
BASOPHILS # BLD AUTO: 0.01 X10(3) UL (ref 0–0.2)
BASOPHILS NFR BLD AUTO: 0.1 %
BUN BLD-MCNC: 14 MG/DL (ref 9–23)
BUN/CREAT SERPL: 18.4 (ref 10–20)
CALCIUM BLD-MCNC: 8.6 MG/DL (ref 8.7–10.4)
CHLORIDE SERPL-SCNC: 103 MMOL/L (ref 98–112)
CO2 SERPL-SCNC: 30 MMOL/L (ref 21–32)
CREAT BLD-MCNC: 0.76 MG/DL
DEPRECATED RDW RBC AUTO: 51 FL (ref 35.1–46.3)
EGFRCR SERPLBLD CKD-EPI 2021: 91 ML/MIN/1.73M2 (ref 60–?)
EOSINOPHIL # BLD AUTO: 0 X10(3) UL (ref 0–0.7)
EOSINOPHIL NFR BLD AUTO: 0 %
ERYTHROCYTE [DISTWIDTH] IN BLOOD BY AUTOMATED COUNT: 14.4 % (ref 11–15)
GLUCOSE BLD-MCNC: 97 MG/DL (ref 70–99)
HCT VFR BLD AUTO: 38.7 %
HGB BLD-MCNC: 12.6 G/DL
IMM GRANULOCYTES # BLD AUTO: 0.03 X10(3) UL (ref 0–1)
IMM GRANULOCYTES NFR BLD: 0.4 %
LYMPHOCYTES # BLD AUTO: 0.53 X10(3) UL (ref 1–4)
LYMPHOCYTES NFR BLD AUTO: 7.9 %
MCH RBC QN AUTO: 31.1 PG (ref 26–34)
MCHC RBC AUTO-ENTMCNC: 32.6 G/DL (ref 31–37)
MCV RBC AUTO: 95.6 FL
MONOCYTES # BLD AUTO: 0.56 X10(3) UL (ref 0.1–1)
MONOCYTES NFR BLD AUTO: 8.3 %
NEUTROPHILS # BLD AUTO: 5.62 X10 (3) UL (ref 1.5–7.7)
NEUTROPHILS # BLD AUTO: 5.62 X10(3) UL (ref 1.5–7.7)
NEUTROPHILS NFR BLD AUTO: 83.3 %
OSMOLALITY SERPL CALC.SUM OF ELEC: 284 MOSM/KG (ref 275–295)
PLATELET # BLD AUTO: 214 10(3)UL (ref 150–450)
POTASSIUM SERPL-SCNC: 3.9 MMOL/L (ref 3.5–5.1)
RBC # BLD AUTO: 4.05 X10(6)UL
SODIUM SERPL-SCNC: 137 MMOL/L (ref 136–145)
WBC # BLD AUTO: 6.8 X10(3) UL (ref 4–11)

## 2023-12-12 PROCEDURE — 99213 OFFICE O/P EST LOW 20 MIN: CPT | Performed by: INTERNAL MEDICINE

## 2023-12-12 RX ORDER — OXYCODONE HYDROCHLORIDE 5 MG/1
5 TABLET ORAL EVERY 4 HOURS PRN
Qty: 20 TABLET | Refills: 0 | Status: SHIPPED | OUTPATIENT
Start: 2023-12-12

## 2023-12-12 NOTE — PROGRESS NOTES
Re enrollment application for dabrafinib and tramatinib completed and signed by physician and patient today and faxed to [employer]. Faxed receipt obtained.

## 2023-12-13 ENCOUNTER — TELEPHONE (OUTPATIENT)
Dept: SURGERY | Facility: CLINIC | Age: 78
End: 2023-12-13

## 2023-12-13 NOTE — TELEPHONE ENCOUNTER
Post op call made to patient. Patient states he is doing \"pretty good. \" Denies fevers, no nausea or vomiting. States pain is 3 out 10, mild with PRN medication. Patient is tolerating activity without complaints. No concerns with surgical sites. Wound care instructions provided. Path is still pending. Post op appointment scheduled with Dr. Anatoly Valdes on Tuesday, 12/26 at 9:15 am at [facility].    Patient agrees to call if any problems.

## 2023-12-20 ENCOUNTER — DOCUMENTATION ONLY (OUTPATIENT)
Dept: HEMATOLOGY/ONCOLOGY | Facility: HOSPITAL | Age: 78
End: 2023-12-20

## 2023-12-20 NOTE — PROGRESS NOTES
Letter rec'd from [facility] patient assistance foundation denying eligibility due to income.    Silvia MONTOYA, notified to assist and application along with denial letter routed to prior [facility] department attn [address].

## 2023-12-26 ENCOUNTER — HOSPITAL ENCOUNTER (OUTPATIENT)
Age: 78
Discharge: ANOTHER HOSPITAL | End: 2023-12-26
Payer: MEDICARE

## 2023-12-26 ENCOUNTER — OFFICE VISIT (OUTPATIENT)
Dept: SURGERY | Facility: CLINIC | Age: 78
End: 2023-12-26
Payer: MEDICARE

## 2023-12-26 ENCOUNTER — APPOINTMENT (OUTPATIENT)
Dept: CT IMAGING | Facility: HOSPITAL | Age: 78
End: 2023-12-26
Attending: EMERGENCY MEDICINE
Payer: MEDICARE

## 2023-12-26 ENCOUNTER — HOSPITAL ENCOUNTER (EMERGENCY)
Facility: HOSPITAL | Age: 78
Discharge: HOME | End: 2023-12-27
Attending: EMERGENCY MEDICINE
Payer: MEDICARE

## 2023-12-26 VITALS
BODY MASS INDEX: 26 KG/M2 | RESPIRATION RATE: 16 BRPM | TEMPERATURE: 98 F | SYSTOLIC BLOOD PRESSURE: 129 MMHG | HEART RATE: 65 BPM | DIASTOLIC BLOOD PRESSURE: 66 MMHG | WEIGHT: 182 LBS | OXYGEN SATURATION: 92 %

## 2023-12-26 VITALS
DIASTOLIC BLOOD PRESSURE: 75 MMHG | OXYGEN SATURATION: 100 % | HEART RATE: 70 BPM | RESPIRATION RATE: 17 BRPM | TEMPERATURE: 97 F | SYSTOLIC BLOOD PRESSURE: 160 MMHG

## 2023-12-26 DIAGNOSIS — S01.81XA FACIAL LACERATION, INITIAL ENCOUNTER: Primary | ICD-10-CM

## 2023-12-26 DIAGNOSIS — S09.90XA INJURY OF HEAD, INITIAL ENCOUNTER: Primary | ICD-10-CM

## 2023-12-26 DIAGNOSIS — K80.50 CHOLEDOCHOLITHIASIS: Primary | ICD-10-CM

## 2023-12-26 DIAGNOSIS — S09.93XA FACIAL INJURY, INITIAL ENCOUNTER: ICD-10-CM
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
## 2023-12-26 DIAGNOSIS — S00.12XA PERIORBITAL ECCHYMOSIS OF LEFT EYE, INITIAL ENCOUNTER: ICD-10-CM

## 2023-12-26 PROCEDURE — 3078F DIAST BP <80 MM HG: CPT | Performed by: NURSE PRACTITIONER

## 2023-12-26 PROCEDURE — 99285 EMERGENCY DEPT VISIT HI MDM: CPT

## 2023-12-26 PROCEDURE — 1111F DSCHRG MED/CURRENT MED MERGE: CPT | Performed by: NURSE PRACTITIONER

## 2023-12-26 PROCEDURE — 3074F SYST BP LT 130 MM HG: CPT | Performed by: PHYSICIAN ASSISTANT

## 2023-12-26 PROCEDURE — 3078F DIAST BP <80 MM HG: CPT | Performed by: PHYSICIAN ASSISTANT

## 2023-12-26 PROCEDURE — 99024 POSTOP FOLLOW-UP VISIT: CPT | Performed by: PHYSICIAN ASSISTANT

## 2023-12-26 PROCEDURE — 99213 OFFICE O/P EST LOW 20 MIN: CPT | Performed by: NURSE PRACTITIONER

## 2023-12-26 PROCEDURE — 1126F AMNT PAIN NOTED NONE PRSNT: CPT | Performed by: PHYSICIAN ASSISTANT

## 2023-12-26 PROCEDURE — 1111F DSCHRG MED/CURRENT MED MERGE: CPT | Performed by: PHYSICIAN ASSISTANT

## 2023-12-26 PROCEDURE — 1159F MED LIST DOCD IN RCRD: CPT | Performed by: PHYSICIAN ASSISTANT

## 2023-12-26 PROCEDURE — 12011 RPR F/E/E/N/L/M 2.5 CM/<: CPT

## 2023-12-26 PROCEDURE — 70450 CT HEAD/BRAIN W/O DYE: CPT | Performed by: EMERGENCY MEDICINE

## 2023-12-26 PROCEDURE — 1160F RVW MEDS BY RX/DR IN RCRD: CPT | Performed by: PHYSICIAN ASSISTANT

## 2023-12-26 PROCEDURE — 3077F SYST BP >= 140 MM HG: CPT | Performed by: NURSE PRACTITIONER

## 2023-12-27 ENCOUNTER — APPOINTMENT (OUTPATIENT)
Dept: HEMATOLOGY/ONCOLOGY | Facility: HOSPITAL | Age: 78
End: 2023-12-27
Attending: INTERNAL MEDICINE
Payer: MEDICARE

## 2023-12-27 ENCOUNTER — APPOINTMENT (OUTPATIENT)
Dept: CT IMAGING | Facility: HOSPITAL | Age: 78
End: 2023-12-27
Attending: EMERGENCY MEDICINE
Payer: MEDICARE

## 2023-12-27 VITALS
DIASTOLIC BLOOD PRESSURE: 70 MMHG | WEIGHT: 181 LBS | HEIGHT: 70 IN | TEMPERATURE: 98 F | SYSTOLIC BLOOD PRESSURE: 133 MMHG | OXYGEN SATURATION: 94 % | HEART RATE: 79 BPM | BODY MASS INDEX: 25.91 KG/M2 | RESPIRATION RATE: 18 BRPM

## 2023-12-27 VITALS
DIASTOLIC BLOOD PRESSURE: 2 MMHG | HEIGHT: 70 IN | WEIGHT: 182 LBS | BODY MASS INDEX: 26.05 KG/M2 | RESPIRATION RATE: 18 BRPM | SYSTOLIC BLOOD PRESSURE: 133 MMHG | OXYGEN SATURATION: 94 % | TEMPERATURE: 98 F | HEART RATE: 61 BPM

## 2023-12-27 DIAGNOSIS — Z51.11 CHEMOTHERAPY MANAGEMENT, ENCOUNTER FOR: ICD-10-CM

## 2023-12-27 DIAGNOSIS — Z45.2 ENCOUNTER FOR CARE RELATED TO PORT-A-CATH: ICD-10-CM

## 2023-12-27 DIAGNOSIS — I26.99 PULMONARY EMBOLISM, OTHER, UNSPECIFIED CHRONICITY, UNSPECIFIED WHETHER ACUTE COR PULMONALE PRESENT (HCC): ICD-10-CM

## 2023-12-27 DIAGNOSIS — D50.9 IRON DEFICIENCY ANEMIA, UNSPECIFIED IRON DEFICIENCY ANEMIA TYPE: ICD-10-CM

## 2023-12-27 DIAGNOSIS — J98.4 PNEUMONITIS: ICD-10-CM

## 2023-12-27 DIAGNOSIS — C34.92 MALIGNANT NEOPLASM OF LEFT LUNG, UNSPECIFIED PART OF LUNG (HCC): Primary | ICD-10-CM

## 2023-12-27 DIAGNOSIS — Z92.89 HISTORY OF IMMUNOTHERAPY: ICD-10-CM

## 2023-12-27 DIAGNOSIS — C34.92 MALIGNANT NEOPLASM OF LEFT LUNG, UNSPECIFIED PART OF LUNG (HCC): ICD-10-CM

## 2023-12-27 DIAGNOSIS — C34.12 CANCER OF UPPER LOBE OF LEFT LUNG (HCC): Primary | ICD-10-CM

## 2023-12-27 LAB
ALBUMIN SERPL-MCNC: 3.9 G/DL (ref 3.2–4.8)
ALBUMIN/GLOB SERPL: 2.1 {RATIO} (ref 1–2)
ALP LIVER SERPL-CCNC: 123 U/L
ALT SERPL-CCNC: 22 U/L
ANION GAP SERPL CALC-SCNC: 6 MMOL/L (ref 0–18)
AST SERPL-CCNC: 33 U/L (ref ?–34)
BASOPHILS # BLD AUTO: 0.02 X10(3) UL (ref 0–0.2)
BASOPHILS NFR BLD AUTO: 0.3 %
BILIRUB SERPL-MCNC: 0.4 MG/DL (ref 0.2–1.1)
BUN BLD-MCNC: 19 MG/DL (ref 9–23)
BUN/CREAT SERPL: 20.4 (ref 10–20)
CALCIUM BLD-MCNC: 8.8 MG/DL (ref 8.7–10.4)
CHLORIDE SERPL-SCNC: 107 MMOL/L (ref 98–112)
CO2 SERPL-SCNC: 26 MMOL/L (ref 21–32)
CREAT BLD-MCNC: 0.93 MG/DL
DEPRECATED RDW RBC AUTO: 50.2 FL (ref 35.1–46.3)
EGFRCR SERPLBLD CKD-EPI 2021: 83 ML/MIN/1.73M2 (ref 60–?)
EOSINOPHIL # BLD AUTO: 0.16 X10(3) UL (ref 0–0.7)
EOSINOPHIL NFR BLD AUTO: 2.2 %
ERYTHROCYTE [DISTWIDTH] IN BLOOD BY AUTOMATED COUNT: 14.4 % (ref 11–15)
GLOBULIN PLAS-MCNC: 1.9 G/DL (ref 2.8–4.4)
GLUCOSE BLD-MCNC: 174 MG/DL (ref 70–99)
HCT VFR BLD AUTO: 41.1 %
HGB BLD-MCNC: 13.4 G/DL
IMM GRANULOCYTES # BLD AUTO: 0.02 X10(3) UL (ref 0–1)
IMM GRANULOCYTES NFR BLD: 0.3 %
LYMPHOCYTES # BLD AUTO: 0.39 X10(3) UL (ref 1–4)
LYMPHOCYTES NFR BLD AUTO: 5.3 %
MCH RBC QN AUTO: 30.7 PG (ref 26–34)
MCHC RBC AUTO-ENTMCNC: 32.6 G/DL (ref 31–37)
MCV RBC AUTO: 94.3 FL
MONOCYTES # BLD AUTO: 0.78 X10(3) UL (ref 0.1–1)
MONOCYTES NFR BLD AUTO: 10.7 %
NEUTROPHILS # BLD AUTO: 5.92 X10 (3) UL (ref 1.5–7.7)
NEUTROPHILS # BLD AUTO: 5.92 X10(3) UL (ref 1.5–7.7)
NEUTROPHILS NFR BLD AUTO: 81.2 %
OSMOLALITY SERPL CALC.SUM OF ELEC: 294 MOSM/KG (ref 275–295)
PLATELET # BLD AUTO: 270 10(3)UL (ref 150–450)
POTASSIUM SERPL-SCNC: 4 MMOL/L (ref 3.5–5.1)
PROT SERPL-MCNC: 5.8 G/DL (ref 5.7–8.2)
RBC # BLD AUTO: 4.36 X10(6)UL
SODIUM SERPL-SCNC: 139 MMOL/L (ref 136–145)
WBC # BLD AUTO: 7.3 X10(3) UL (ref 4–11)

## 2023-12-27 PROCEDURE — 72125 CT NECK SPINE W/O DYE: CPT | Performed by: EMERGENCY MEDICINE

## 2023-12-27 PROCEDURE — 70486 CT MAXILLOFACIAL W/O DYE: CPT | Performed by: EMERGENCY MEDICINE

## 2023-12-27 PROCEDURE — 99215 OFFICE O/P EST HI 40 MIN: CPT | Performed by: INTERNAL MEDICINE

## 2023-12-27 PROCEDURE — 36591 DRAW BLOOD OFF VENOUS DEVICE: CPT

## 2023-12-27 PROCEDURE — 85025 COMPLETE CBC W/AUTO DIFF WBC: CPT

## 2023-12-27 PROCEDURE — 80053 COMPREHEN METABOLIC PANEL: CPT

## 2023-12-27 RX ORDER — SODIUM CHLORIDE 9 MG/ML
10 INJECTION INTRAVENOUS ONCE
OUTPATIENT
Start: 2023-12-27

## 2023-12-27 RX ORDER — HEPARIN SODIUM (PORCINE) LOCK FLUSH IV SOLN 100 UNIT/ML 100 UNIT/ML
5 SOLUTION INTRAVENOUS ONCE
Status: COMPLETED | OUTPATIENT
Start: 2023-12-27 | End: 2023-12-27

## 2023-12-27 RX ORDER — TRIAMCINOLONE ACETONIDE 1 MG/G
CREAM TOPICAL 2 TIMES DAILY
Qty: 80 G | Refills: 2 | Status: SHIPPED | OUTPATIENT
Start: 2023-12-27

## 2023-12-27 RX ORDER — HEPARIN SODIUM (PORCINE) LOCK FLUSH IV SOLN 100 UNIT/ML 100 UNIT/ML
5 SOLUTION INTRAVENOUS ONCE
OUTPATIENT
Start: 2023-12-27

## 2023-12-27 RX ADMIN — HEPARIN SODIUM (PORCINE) LOCK FLUSH IV SOLN 100 UNIT/ML 500 UNITS: 100 SOLUTION INTRAVENOUS at 13:37:00

## 2023-12-27 NOTE — DISCHARGE INSTRUCTIONS
Return to the emergency department if you develop fevers, if you see pus coming from the wound, or if you develop redness around the wound that extends beyond 1 inch from the wound edges as these can be signs of wound infection.

## 2023-12-27 NOTE — ED INITIAL ASSESSMENT (HPI)
Tripped and fell into the wall about 7pm. Black eye noted to the left face and a laceration, minimal bleeding. -LOC. C/o of 3/10 in pain has ice pack. +blood thinners.

## 2023-12-27 NOTE — PROGRESS NOTES
Cancer Center Progress Note    Patient Name: Leticia Chavez   YOB: 1945   Medical Record Number: [id number]   Attending Physician: Sean Cardoso M.D. 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 He completed definitive concurrent chemoradiotherapy using cisplatin and etoposide early March 2017. He had low-grade anemia neutropenia on treatment. He subsequently has been followed on surveillance with no evidence of progression. The patient started on durvalumab in October 2017 given he had unresectable stage III disease with no evidence of progression following chemo radiotherapy    He has had occasional mild anemia on treatment. Laboratory follow-up in May 2018 showed severe iron deficiency. Received IV iron    He completed 13 cycles of durvalumab  however towards the end of his treatment he developed 2 separate instances of pneumonitis/transaminitis requiring separate courses of prednisone. 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 Started single-agent Pembrolizumab on 3/19/21. 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 PE/DVT admission 1/2023. Interval History:  Returns for routine follow-up. Feel well. Overall. No dyspnea. No fevers/chills. No changes in bowel or bladder habits.  Mild pruritic skin rash to lower back, slightly responding to HC cream. Recovering well from lap harjinder. Good oral intake. Tripped on a missed step yesterday and slammed head into brick wall, s/p ER last night for imaging, all negative for acute head bleed. +face laceration/hematoma near left orbit. No focal neurological deficits.       Performance Status:  ECOG 0    Past Medical History:  Past Medical History:   Diagnosis Date    Deep vein thrombosis (HCC)     Dysphagia     Esophageal reflux     Exposure to medical diagnostic radiation     Hemorrhoids     History of blood transfusion     Pt on Immunotherapy    History of immunotherapy 2023    Impotence     Lung cancer ([facility]     NSCLCA stage IIIB    Necrotizing granulomatous inflammation of lung (HCC)     Obstructive sleep apnea     Personal history of antineoplastic chemotherapy     Pneumonia due to organism     Pulmonary embolism ([facility]     Pulmonary embolus (HCC)     Sinus problem     Sinus problems    Sleep apnea     CPAP    Visual impairment     wears eyeglasses       Past Surgical History:  Past Surgical History:   Procedure Laterality Date    COLONOSCOPY      COLONOSCOPY N/A 2018    Procedure: COLONOSCOPY;  Surgeon: Marta Munoz MD;  Location: [address] [place] [address] ENDOSCOPY    COLONOSCOPY      PORT, INDWELLING, IMP Right     ROTATOR CUFF REPAIR Left     TOTAL KNEE REPLACEMENT Bilateral        Family History:  Family History   Problem Relation Age of Onset    Cancer Father         Lung    Cancer Mother         Breast, ovarian       Social History:  Social History     Socioeconomic History    Marital status:      Spouse name: Not on file    Number of children: Not on file    Years of education: Not on file    Highest education level: Not on file   Occupational History    Not on file   Tobacco Use    Smoking status: Former     Packs/day: 1.00     Years: 30.00     Additional pack years: 0.00     Total pack years: 30.00     Types: Cigarettes     Quit date: 2000     Years since quittin.5    Smokeless tobacco: Never   Vaping Use    Vaping Use: Never used   Substance and Sexual Activity    Alcohol use: Not Currently     Alcohol/week: 0.8 standard drinks of alcohol     Types: 1 Glasses of wine per week     Comment: very rarely    Drug use: No    Sexual activity: Not on file   Other Topics Concern     Service Not Asked    Blood Transfusions Not Asked    Caffeine Concern Yes     Comment: coffee, 2 cups daily    Occupational Exposure Not Asked    Hobby Hazards Not Asked    Sleep Concern Not Asked    Stress Concern Not Asked    Weight Concern Not Asked    Special Diet Not Asked    Back Care Not Asked    Exercise Not Asked    Bike Helmet Not Asked    Seat Belt Not Asked    Self-Exams Not Asked   Social History Narrative    Not on file     Social Determinants of Health     Financial Resource Strain: Low Risk  (11/27/2023)    Financial Resource Strain     Difficulty of Paying Living Expenses: Not hard at all     Med Affordability: No   Food Insecurity: No Food Insecurity (12/11/2023)    Food Insecurity     Food Insecurity: Never true   Transportation Needs: No Transportation Needs (12/11/2023)    Transportation Needs     Lack of Transportation: No   Physical Activity: Not on file   Stress: Not on file   Social Connections: Not on file   Housing Stability: [address] [place] [address]  (12/11/2023)    Housing Stability     Housing Instability: No     Housing Instability Emergency: Not on file         Current Medications:    Current Outpatient Medications:     oxyCODONE 5 MG Oral Tab, Take 1 tablet (5 mg total) by mouth every 4 (four) hours as needed. (Patient not taking: Reported on 12/26/2023), Disp: 20 tablet, Rfl: 0    acetaminophen 500 MG Oral Tab, Take 1 tablet (500 mg total) by mouth every 6 (six) hours as needed for Pain.  (Patient not taking: Reported on 12/26/2023), Disp: , Rfl:     sulfamethoxazole-trimethoprim -160 MG Oral Tab per tablet, TAKE 1 TABLET BY MOUTH TWICE A DAY ON MONDAY, WEDNESDAY AND FRIDAY (Patient taking differently: Take 1 tablet by mouth 2 (two) times daily. TAKE 1 TABLET BY MOUTH TWICE A DAY ON MONDAY, WEDNESDAY AND FRIDAY), Disp: 60 tablet, Rfl: 3    predniSONE 10 MG Oral Tab, Take 1 tablet (10 mg total) by mouth daily. (Patient taking differently: Take 1 tablet (10 mg total) by mouth every morning.), Disp: 90 tablet, Rfl: 0    dabrafenib mesylate 50 MG Oral Cap, Take 3 capsules (150 mg total) by mouth 2 (two) times daily. Take THREE capsules at least 1 hour before or 2 hours after a meal, twice a day, Disp: 180 capsule, Rfl: 5    Trametinib Dimethyl Sulfoxide 2 MG Oral Tab, Take 2 mg by mouth daily. Take at least 1 hour before or at least 2 hours after a meal. (Patient taking differently: Take 2 mg by mouth daily with lunch. Take at least 1 hour before or at least 2 hours after a meal.), Disp: 30 tablet, Rfl: 5    voriconazole 200 MG Oral Tab, Take 1 tablet (200 mg total) by mouth 2 (two) times daily. , Disp: 180 tablet, Rfl: 3    pantoprazole 40 MG Oral Tab EC, Take 1 tablet (40 mg total) by mouth daily. (Patient taking differently: Take 1 tablet (40 mg total) by mouth every morning before breakfast.), Disp: 90 tablet, Rfl: 3    fluticasone furoate-vilanterol (BREO ELLIPTA) 100-25 MCG/INH Inhalation Aerosol Powder, Breath Activated, Inhale 1 puff into the lungs daily. Follow with mouth rinse and spit (Patient taking differently: Inhale 1 puff into the lungs every morning. Follow with mouth rinse and spit), Disp: 1 each, Rfl: 6    albuterol 108 (90 Base) MCG/ACT Inhalation Aero Soln, Inhale 2 puffs into the lungs every 4 (four) hours as needed for Wheezing., Disp: 1 each, Rfl: 2    Fluticasone Propionate 50 MCG/ACT Nasal Suspension, 1 spray by Nasal route 2 (two) times daily.  (Patient taking differently: 1 spray by Nasal route as needed.), Disp: 1 Bottle, Rfl: 3    cholecalciferol 50 MCG (2000 UT) Oral Tab, Take 0.5 tablets (1,000 Units total) by mouth every morning., Disp: , Rfl:     Vitamin C 500 MG Oral Tab, Take 1 tablet (500 mg total) by mouth every morning., Disp: , Rfl:     Allergies:  No Known Allergies     Review of Systems:  All other systems reviewed and negative x12    Vital Signs: There were no vitals taken for this visit. Physical Examination:  General: Patient is alert and oriented x 3, not in acute distress. Psych:  Mood and affect appropriate, in good spirits. HEENT: EOMs intact. Oropharynx is clear. Chest: non-labored breathing, CTA  Cardiovascular: Regular rate and rhythm. Normal S1S2  Extremities: No edema. Neurological: 5/5 motor x4. Derm:  No rash    LABS:    CBC:    Lab Results   Component Value Date    WBC 7.3 12/27/2023    WBC 6.8 12/12/2023    WBC 7.1 12/07/2023     Lab Results   Component Value Date    HGB 13.4 12/27/2023    HGB 12.6 (L) 12/12/2023    HGB 14.1 12/07/2023      Lab Results   Component Value Date    .0 12/27/2023    .0 12/12/2023    .0 12/07/2023       Lab Results   Component Value Date     (H) 12/27/2023    BUN 19 12/27/2023    BUNCREA 20.4 (H) 12/27/2023    CREATSERUM 0.93 12/27/2023    ANIONGAP 6 12/27/2023    GFR >60 05/14/2016    GFRNAA 81 07/22/2022    GFRAA 94 07/22/2022    CA 8.8 12/27/2023    OSMOCALC 294 12/27/2023    ALKPHO 123 (H) 12/27/2023    AST 33 12/27/2023    ALT 22 12/27/2023    ALKPHOS 58 12/10/2011    BILT 0.4 12/27/2023    TP 5.8 12/27/2023    ALB 3.9 12/27/2023    GLOBULIN 1.9 (L) 12/27/2023     12/27/2023    K 4.0 12/27/2023     12/27/2023    CO2 26.0 12/27/2023       Radiology:  PEt/CT Chest       Cancer of upper lobe of left lung [facility])    Staging form: Lung AJCC V7      Clinical stage from 1/9/2017: Stage IIIB (T1a, N3, M0) - Signed by Miguel Ross MD on 1/9/2017    Impression and Plan:  [de-identified]year oldmale former smoker with 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 He was diagnosed in December 2016 as above.   He was treated with concurrent definitive chemoradiotherapy using cisplatin and etoposide finishing in early March 2017. He had low-grade neutropenia and anemia on chemotherapy. Given he has unresectable stage IIIb disease with no progression following initial chemotherapy we  added immunotherapy with durvalumab. Completed 13 cycles of durvalumab however developed recurrent pneumonitis/transaminitis. He has responded well to prednisone. He remains off of all cancer directed therapy as of the end of April 2018. Recurrent disease biopsy-proven mediastinal lymph nodes as of March 2021. Same histology. Given PD-L1 greater than 50% we will proceed with single agent pembrolizumab. Also discussed nivolumab/ipilimumab however with previous issues of pneumonitis and transaminitis will use single agent immunotherapy. Received Cycle #1 Pembrolizumab on 3/19/21, to be good long-term disease control through summer 2023    BRAF V600 E peripheral blood sequencing September 2023  --  dabrafenib trametinib start 10/23/23  --tolerating. continue . Rtc 4 weeks with labs/MDV. Next CT imaging due Feb/March 2024.      - Pulmonary embolus now on rivaroxaban diagnosed January 2023. 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  - Recurrent pneumonitis now off pembolizumab will be able to taper his prednisone at future visit- currently on 10mg daily. Pulm f/u.      --choledocholithiasis 11/2023 presented to ER for abd pain. He did well clinically. He underwent ERCP extraction of the common bile duct stones. lap cholecystectomy 12/11. Abnormality was seen at the adjacent liver site and segmental hepatectomy performed as well. Symptoms resolved and recovered well. Path benign. --dermatitis with pruritus minimal to area of lower back. Unclear if tx related. Will trial topical steroid. Sent to pharmacy. Monitor. MDM: high, malignancy, life threatening.  Drug therapy requiring intensive monitoring for tox    BRYON Easton    Seen and examined with HARPER De La Cruz See agree with note above metastatic adenocarcinoma lung status post previous lines of therapy now on dabrafenib and trametinib given BRAF V600 E.   Tolerating treatment well we will continue on exam comfortable labored respirations history of pneumonitis he remains on low-dose prednisone for pulmonary embolus he remains on prophylactic dose rivaroxaban

## 2023-12-27 NOTE — ED INITIAL ASSESSMENT (HPI)
Pt stated missed a step while holding boxes fell into wall and now c/o neck pain + eye redness and abrasion

## 2024-01-02 ENCOUNTER — TELEPHONE (OUTPATIENT)
Dept: PULMONOLOGY | Facility: CLINIC | Age: 79
End: 2024-01-02

## 2024-01-02 DIAGNOSIS — C34.92 MALIGNANT NEOPLASM OF LEFT LUNG, UNSPECIFIED PART OF LUNG (HCC): ICD-10-CM

## 2024-01-02 RX ORDER — PANTOPRAZOLE SODIUM 40 MG/1
40 TABLET, DELAYED RELEASE ORAL
Qty: 30 TABLET | Refills: 5 | Status: SHIPPED | OUTPATIENT
Start: 2024-01-02

## 2024-01-02 NOTE — TELEPHONE ENCOUNTER
----- Message from Luis Villasenor sent at 12/31/2023 11:58 AM CST -----  Regarding: Fall on 12/26/23 follow up  Contact: [phone]  I took a fall on Tuesday 12/26/23, hit my head on a brick wall with cut under my eyebrow that ER glued but it seems that it is opening up. Also had a scrape on my cheek.  Was checked out at ER  for concussion and brain bleed.  Was told to follow up with you. Please let me know if you need to see me and if you need to check out the cut.  Thank you  Happy New Year!  Mike

## 2024-01-02 NOTE — TELEPHONE ENCOUNTER
----- Message from Luis Villasenor sent at 12/31/2023 11:58 AM CST -----  Regarding: Fall on 12/26/23 follow up  Contact: [phone]  I took a fall on Tuesday 12/26/23, hit my head on a brick wall with cut under my eyebrow that ER glued but it seems that it is opening up. Also had a scrape on my cheek.  Was checked out at ER  for concussion and brain bleed.  Was told to follow up with you. Please let me know if you need to see me and if you need to check out the cut.  Thank you  Happy New Year!  Mike

## 2024-01-02 NOTE — TELEPHONE ENCOUNTER
Pts wife states that pt fell and hit his head on 12/26/23 and had wound glued in ER.  Pt was told to follow up with Dr. Hernandez in office - wound has opened up and she would like to know if pt should be seen.  Please call.      See also 12/31/23 Patient Message.

## 2024-01-02 NOTE — TELEPHONE ENCOUNTER
I spoke to the patient and they minimized symptoms and noted that the wound was only open very minimally.  They will return as needed.

## 2024-01-03 DIAGNOSIS — C34.92 MALIGNANT NEOPLASM OF LEFT LUNG, UNSPECIFIED PART OF LUNG (HCC): ICD-10-CM

## 2024-01-03 RX ORDER — PANTOPRAZOLE SODIUM 40 MG/1
40 TABLET, DELAYED RELEASE ORAL
Qty: 30 TABLET | Refills: 5 | OUTPATIENT
Start: 2024-01-03

## 2024-01-07 ENCOUNTER — HOSPITAL ENCOUNTER (OUTPATIENT)
Dept: ULTRASOUND IMAGING | Age: 79
End: 2024-01-07
Attending: INTERNAL MEDICINE
Payer: MEDICARE

## 2024-01-07 ENCOUNTER — HOSPITAL ENCOUNTER (OUTPATIENT)
Dept: ULTRASOUND IMAGING | Age: 79
Discharge: HOME | End: 2024-01-07
Attending: INTERNAL MEDICINE
Payer: MEDICARE

## 2024-01-07 DIAGNOSIS — N50.89 SCROTAL MASS: ICD-10-CM

## 2024-01-07 PROCEDURE — 93975 VASCULAR STUDY: CPT | Performed by: INTERNAL MEDICINE

## 2024-01-07 PROCEDURE — 76870 US EXAM SCROTUM: CPT | Performed by: INTERNAL MEDICINE

## 2024-01-10 ENCOUNTER — TELEPHONE (OUTPATIENT)
Dept: PULMONOLOGY | Facility: CLINIC | Age: 79
End: 2024-01-10

## 2024-01-10 DIAGNOSIS — E04.1 THYROID NODULE: Primary | ICD-10-CM

## 2024-01-10 NOTE — TELEPHONE ENCOUNTER
----- Message from Luis Villasenor sent at 1/9/2024  5:25 PM CST -----  Regarding: New Test Results  Contact: [phone]  I know you will get back to me on the ultrasound of the testicles and I also wanted you to know.   I had a CT of the spine on 12/27/23 and would like to know what you think of the comment in the results:  There is a right-sided thyroid nodule measuring 0.8 cm in diameter.  Thank you  Mike

## 2024-01-10 NOTE — TELEPHONE ENCOUNTER
Spoke to patient/spouse and informed and will schedule Thyroid ultrasound. Phone number provided for central scheduling.

## 2024-01-10 NOTE — TELEPHONE ENCOUNTER
Dr. Hernandez- Patient wanted to update you that he had a CT spine 12/27/23 which notes right-sided thyroid nodule measuring 0.8 cm in diameter. Patient is asking for your input.

## 2024-01-11 ENCOUNTER — TELEPHONE (OUTPATIENT)
Dept: HEMATOLOGY/ONCOLOGY | Facility: HOSPITAL | Age: 79
End: 2024-01-11

## 2024-01-12 NOTE — TELEPHONE ENCOUNTER
Received trametinib and Dabrafenib yesterday-started back on both medications today 1/12.   Will plan for labs/follow up in ~3 weeks. Will adjust appts with . Wife agreeable. No acute concerns.

## 2024-01-16 ENCOUNTER — TELEPHONE (OUTPATIENT)
Dept: HEMATOLOGY/ONCOLOGY | Facility: HOSPITAL | Age: 79
End: 2024-01-16

## 2024-01-16 NOTE — TELEPHONE ENCOUNTER
Called [facility] pharmacy back  [phone]. Pharmacist wanted Prescriber to know there was a drug interaction with Voriconzaole and Dabrafenib. Dabrafenib may decrease effect of Voriconzaole and Voriconzaole may increase effect of Dabrafenib.     Jose Smita is aware of the interaction and she will continue to monitor the patient.

## 2024-01-16 NOTE — TELEPHONE ENCOUNTER
[facility] -[phone] we need clarification on the following medication :  TAFINLAR / DABRAFENIB MESYLATE  fax [phone] . Thanks Paula

## 2024-01-18 ENCOUNTER — OFFICE VISIT (OUTPATIENT)
Dept: OTOLARYNGOLOGY | Facility: CLINIC | Age: 79
End: 2024-01-18
Payer: MEDICARE

## 2024-01-18 VITALS — BODY MASS INDEX: 25.91 KG/M2 | HEIGHT: 70 IN | WEIGHT: 181 LBS | TEMPERATURE: 97 F

## 2024-01-18 DIAGNOSIS — H61.23 BILATERAL IMPACTED CERUMEN: Primary | ICD-10-CM

## 2024-01-18 PROCEDURE — 3008F BODY MASS INDEX DOCD: CPT | Performed by: OTOLARYNGOLOGY

## 2024-01-18 PROCEDURE — 1160F RVW MEDS BY RX/DR IN RCRD: CPT | Performed by: OTOLARYNGOLOGY

## 2024-01-18 PROCEDURE — 69210 REMOVE IMPACTED EAR WAX UNI: CPT | Performed by: OTOLARYNGOLOGY

## 2024-01-18 PROCEDURE — 1159F MED LIST DOCD IN RCRD: CPT | Performed by: OTOLARYNGOLOGY

## 2024-01-18 NOTE — PROGRESS NOTES
Luis Villasenor is a 78 year old male.    Chief Complaint   Patient presents with    Ear Wax     Routine ear cleaning.    Ear Problem     Pt states itchiness in left ear.    Nose Problem     Pt states he's noticed when blowing his nose.       HISTORY OF PRESENT ILLNESS    Patient presents for cerumen removal. No other complaints or concerns at this time    Social History     Socioeconomic History    Marital status:    Tobacco Use    Smoking status: Former     Packs/day: 1.00     Years: 30.00     Additional pack years: 0.00     Total pack years: 30.00     Types: Cigarettes     Quit date: 2000     Years since quittin.6    Smokeless tobacco: Never   Vaping Use    Vaping Use: Never used   Substance and Sexual Activity    Alcohol use: Not Currently     Alcohol/week: 0.8 standard drinks of alcohol     Types: 1 Glasses of wine per week     Comment: very rarely    Drug use: No   Other Topics Concern    Caffeine Concern Yes     Comment: coffee, 2 cups daily       Family History   Problem Relation Age of Onset    Cancer Father         Lung    Cancer Mother         Breast, ovarian       Past Medical History:   Diagnosis Date    Deep vein thrombosis (HCC)     Dysphagia     Esophageal reflux     Exposure to medical diagnostic radiation     Hemorrhoids     History of blood transfusion     Pt on Immunotherapy    History of immunotherapy 2023    Impotence     Lung cancer (HCC)     NSCLCA stage IIIB    Necrotizing granulomatous inflammation of lung (HCC)     Obstructive sleep apnea     Personal history of antineoplastic chemotherapy     Pneumonia due to organism     Pulmonary embolism (HCC)     Pulmonary embolus (HCC)     Sinus problem     Sinus problems    Sleep apnea     CPAP    Visual impairment     wears eyeglasses       Past Surgical History:   Procedure Laterality Date    COLONOSCOPY      COLONOSCOPY N/A 2018    Procedure: COLONOSCOPY;  Surgeon: Cesar Beckman MD;  Location: [facility]  ENDOSCOPY    COLONOSCOPY      PORT, INDWELLING, IMP Right 2017    ROTATOR CUFF REPAIR Left     TOTAL KNEE REPLACEMENT Bilateral 2020       REVIEW OF SYSTEMS    System Neg/Pos Details   Constitutional Negative Fatigue, fever and weight loss.   ENMT Negative Drooling.   Eyes Negative Blurred vision and vision changes.   Respiratory Negative Dyspnea and wheezing.   Cardio Negative Chest pain, irregular heartbeat/palpitations and syncope.   GI Negative Abdominal pain and diarrhea.   Endocrine Negative Cold intolerance and heat intolerance.   Neuro Negative Tremors.   Psych Negative Anxiety and depression.   Integumentary Negative Frequent skin infections, pigment change and rash.   Hema/Lymph Negative Easy bleeding and easy bruising.           PHYSICAL EXAM    Temp 97.1 °F (36.2 °C) (Tympanic)   Ht 5' 10\" (1.778 m)   Wt 181 lb (82.1 kg)   BMI 25.97 kg/m²        Constitutional Normal Overall appearance - Normal.        Neck Exam Normal Inspection - Normal. Palpation - Normal. Parotid gland - Normal. Thyroid gland - Normal.             Head/Face Normal Facial features - Normal. Eyebrows - Normal. Skull - Normal.             Ears Normal Inspection - Right: Normal, Left: Normal. Canal - Right: Normal, Left: Normal. TM - Right: Normal, Left: Normal.   Skin Normal Inspection - Normal.                              Canals:  Right: Canal reveals cerumen impaction,   Left: Canal reveals cerumen impaction,     Tympanic Membranes:  Right: Normal tympanic membrane.   Left: Normal tympanic membrane.     TM Visualized Method:   Right TM examined via otomicroscopy.    Left TM examined via otomicroscopy.      PROCEDURE:    Removal of cerumen impaction   The cerumen impaction was completely removed using microscopy.   Removal was completed by using acurette and/or suction.   Comments: Return to clinic as needed.  Avoid q-tips, water precautions and use over the counter wax remedies as needed.      Current Outpatient Medications:      pantoprazole 40 MG Oral Tab EC, Take 1 tablet (40 mg total) by mouth every morning before breakfast., Disp: 30 tablet, Rfl: 5    triamcinolone 0.1 % External Cream, Apply topically 2 (two) times daily. To areas of rash or itching. Hold if no rash or itching. Use moisturizer., Disp: 80 g, Rfl: 2    oxyCODONE 5 MG Oral Tab, Take 1 tablet (5 mg total) by mouth every 4 (four) hours as needed. (Patient not taking: Reported on 12/26/2023), Disp: 20 tablet, Rfl: 0    acetaminophen 500 MG Oral Tab, Take 1 tablet (500 mg total) by mouth every 6 (six) hours as needed for Pain. (Patient not taking: Reported on 12/26/2023), Disp: , Rfl:     sulfamethoxazole-trimethoprim -160 MG Oral Tab per tablet, TAKE 1 TABLET BY MOUTH TWICE A DAY ON MONDAY, WEDNESDAY AND FRIDAY (Patient taking differently: Take 1 tablet by mouth 2 (two) times daily. TAKE 1 TABLET BY MOUTH TWICE A DAY ON MONDAY, WEDNESDAY AND FRIDAY), Disp: 60 tablet, Rfl: 3    predniSONE 10 MG Oral Tab, Take 1 tablet (10 mg total) by mouth daily. (Patient taking differently: Take 1 tablet (10 mg total) by mouth every morning.), Disp: 90 tablet, Rfl: 0    dabrafenib mesylate 50 MG Oral Cap, Take 3 capsules (150 mg total) by mouth 2 (two) times daily. Take THREE capsules at least 1 hour before or 2 hours after a meal, twice a day, Disp: 180 capsule, Rfl: 5    Trametinib Dimethyl Sulfoxide 2 MG Oral Tab, Take 2 mg by mouth daily. Take at least 1 hour before or at least 2 hours after a meal. (Patient taking differently: Take 2 mg by mouth daily with lunch. Take at least 1 hour before or at least 2 hours after a meal.), Disp: 30 tablet, Rfl: 5    voriconazole 200 MG Oral Tab, Take 1 tablet (200 mg total) by mouth 2 (two) times daily., Disp: 180 tablet, Rfl: 3    fluticasone furoate-vilanterol (BREO ELLIPTA) 100-25 MCG/INH Inhalation Aerosol Powder, Breath Activated, Inhale 1 puff into the lungs daily. Follow with mouth rinse and spit (Patient taking differently: Inhale 1  puff into the lungs every morning. Follow with mouth rinse and spit), Disp: 1 each, Rfl: 6    albuterol 108 (90 Base) MCG/ACT Inhalation Aero Soln, Inhale 2 puffs into the lungs every 4 (four) hours as needed for Wheezing., Disp: 1 each, Rfl: 2    Fluticasone Propionate 50 MCG/ACT Nasal Suspension, 1 spray by Nasal route 2 (two) times daily. (Patient taking differently: 1 spray by Nasal route as needed.), Disp: 1 Bottle, Rfl: 3    cholecalciferol 50 MCG (2000 UT) Oral Tab, Take 0.5 tablets (1,000 Units total) by mouth every morning., Disp: , Rfl:     Vitamin C 500 MG Oral Tab, Take 1 tablet (500 mg total) by mouth every morning., Disp: , Rfl:   ASSESSMENT AND PLAN    1. Bilateral impacted cerumen    - REMOVAL IMPACTED CERUMEN REQUIRING INSTRUMENTATION, UNILATERAL      All cerumen was removed using microscopy. I have asked the patient to return to see me as needed for repeat cerumen removal in the future.      Christo Kc MD    1/18/2024    10:45 AM

## 2024-01-21 ENCOUNTER — HOSPITAL ENCOUNTER (OUTPATIENT)
Dept: ULTRASOUND IMAGING | Age: 79
Discharge: HOME | End: 2024-01-21
Attending: INTERNAL MEDICINE
Payer: MEDICARE

## 2024-01-21 ENCOUNTER — HOSPITAL ENCOUNTER (OUTPATIENT)
Dept: ULTRASOUND IMAGING | Age: 79
End: 2024-01-21
Attending: INTERNAL MEDICINE
Payer: MEDICARE

## 2024-01-21 DIAGNOSIS — E04.1 THYROID NODULE: ICD-10-CM

## 2024-01-21 PROCEDURE — 76536 US EXAM OF HEAD AND NECK: CPT | Performed by: INTERNAL MEDICINE

## 2024-01-28 ENCOUNTER — HOSPITAL ENCOUNTER (INPATIENT)
Facility: HOSPITAL | Age: 79
LOS: 4 days | Discharge: HOME | End: 2024-02-01
Attending: STUDENT IN AN ORGANIZED HEALTH CARE EDUCATION/TRAINING PROGRAM | Admitting: INTERNAL MEDICINE
Payer: MEDICARE

## 2024-01-28 ENCOUNTER — APPOINTMENT (OUTPATIENT)
Dept: GENERAL RADIOLOGY | Facility: HOSPITAL | Age: 79
End: 2024-01-28
Attending: STUDENT IN AN ORGANIZED HEALTH CARE EDUCATION/TRAINING PROGRAM
Payer: MEDICARE

## 2024-01-28 ENCOUNTER — APPOINTMENT (OUTPATIENT)
Dept: CT IMAGING | Facility: HOSPITAL | Age: 79
End: 2024-01-28
Attending: STUDENT IN AN ORGANIZED HEALTH CARE EDUCATION/TRAINING PROGRAM
Payer: MEDICARE

## 2024-01-28 ENCOUNTER — TELEPHONE (OUTPATIENT)
Dept: HEMATOLOGY/ONCOLOGY | Facility: HOSPITAL | Age: 79
End: 2024-01-28

## 2024-01-28 DIAGNOSIS — J18.9 COMMUNITY ACQUIRED PNEUMONIA OF RIGHT UPPER LOBE OF LUNG: Primary | ICD-10-CM

## 2024-01-28 LAB
ADENOVIRUS PCR:: NOT DETECTED
ANION GAP SERPL CALC-SCNC: 7 MMOL/L (ref 0–18)
B PARAPERT DNA SPEC QL NAA+PROBE: NOT DETECTED
B PERT DNA SPEC QL NAA+PROBE: NOT DETECTED
BASOPHILS # BLD AUTO: 0.01 X10(3) UL (ref 0–0.2)
BASOPHILS NFR BLD AUTO: 0.1 %
BILIRUB UR QL: NEGATIVE
BUN BLD-MCNC: 20 MG/DL (ref 9–23)
BUN/CREAT SERPL: 21.5 (ref 10–20)
C PNEUM DNA SPEC QL NAA+PROBE: NOT DETECTED
CALCIUM BLD-MCNC: 8.4 MG/DL (ref 8.7–10.4)
CHLORIDE SERPL-SCNC: 103 MMOL/L (ref 98–112)
CLARITY UR: CLEAR
CO2 SERPL-SCNC: 26 MMOL/L (ref 21–32)
CORONAVIRUS 229E PCR:: NOT DETECTED
CORONAVIRUS HKU1 PCR:: NOT DETECTED
CORONAVIRUS NL63 PCR:: NOT DETECTED
CORONAVIRUS OC43 PCR:: NOT DETECTED
CREAT BLD-MCNC: 0.93 MG/DL
DEPRECATED RDW RBC AUTO: 50.3 FL (ref 35.1–46.3)
EGFRCR SERPLBLD CKD-EPI 2021: 83 ML/MIN/1.73M2 (ref 60–?)
EOSINOPHIL # BLD AUTO: 0 X10(3) UL (ref 0–0.7)
EOSINOPHIL NFR BLD AUTO: 0 %
ERYTHROCYTE [DISTWIDTH] IN BLOOD BY AUTOMATED COUNT: 14.5 % (ref 11–15)
FLUAV + FLUBV RNA SPEC NAA+PROBE: NEGATIVE
FLUAV + FLUBV RNA SPEC NAA+PROBE: NEGATIVE
FLUAV RNA SPEC QL NAA+PROBE: NOT DETECTED
FLUBV RNA SPEC QL NAA+PROBE: NOT DETECTED
GLUCOSE BLD-MCNC: 135 MG/DL (ref 70–99)
GLUCOSE UR-MCNC: NORMAL MG/DL
HCT VFR BLD AUTO: 40 %
HGB BLD-MCNC: 12.9 G/DL
HGB UR QL STRIP.AUTO: NEGATIVE
IMM GRANULOCYTES # BLD AUTO: 0.03 X10(3) UL (ref 0–1)
IMM GRANULOCYTES NFR BLD: 0.4 %
KETONES UR-MCNC: NEGATIVE MG/DL
LACTATE SERPL-SCNC: 1.4 MMOL/L (ref 0.5–2)
LEUKOCYTE ESTERASE UR QL STRIP.AUTO: NEGATIVE
LYMPHOCYTES # BLD AUTO: 0.26 X10(3) UL (ref 1–4)
LYMPHOCYTES NFR BLD AUTO: 3.1 %
MCH RBC QN AUTO: 30.4 PG (ref 26–34)
MCHC RBC AUTO-ENTMCNC: 32.3 G/DL (ref 31–37)
MCV RBC AUTO: 94.1 FL
METAPNEUMOVIRUS PCR:: NOT DETECTED
MONOCYTES # BLD AUTO: 0.82 X10(3) UL (ref 0.1–1)
MONOCYTES NFR BLD AUTO: 9.7 %
MYCOPLASMA PNEUMONIA PCR:: NOT DETECTED
NEUTROPHILS # BLD AUTO: 7.33 X10 (3) UL (ref 1.5–7.7)
NEUTROPHILS # BLD AUTO: 7.33 X10(3) UL (ref 1.5–7.7)
NEUTROPHILS NFR BLD AUTO: 86.7 %
NITRITE UR QL STRIP.AUTO: NEGATIVE
OSMOLALITY SERPL CALC.SUM OF ELEC: 287 MOSM/KG (ref 275–295)
PARAINFLUENZA 1 PCR:: NOT DETECTED
PARAINFLUENZA 2 PCR:: NOT DETECTED
PARAINFLUENZA 3 PCR:: NOT DETECTED
PARAINFLUENZA 4 PCR:: NOT DETECTED
PH UR: 5.5 [PH] (ref 5–8)
PLATELET # BLD AUTO: 244 10(3)UL (ref 150–450)
POTASSIUM SERPL-SCNC: 3.6 MMOL/L (ref 3.5–5.1)
PROCALCITONIN SERPL-MCNC: 2.11 NG/ML (ref ?–0.05)
PROCALCITONIN SERPL-MCNC: 3.45 NG/ML (ref ?–0.05)
PROT UR-MCNC: NEGATIVE MG/DL
RBC # BLD AUTO: 4.25 X10(6)UL
RHINOVIRUS/ENTERO PCR:: NOT DETECTED
RSV RNA SPEC NAA+PROBE: NEGATIVE
RSV RNA SPEC QL NAA+PROBE: NOT DETECTED
SARS-COV-2 RNA NPH QL NAA+NON-PROBE: NOT DETECTED
SARS-COV-2 RNA RESP QL NAA+PROBE: NOT DETECTED
SODIUM SERPL-SCNC: 136 MMOL/L (ref 136–145)
SP GR UR STRIP: 1.02 (ref 1–1.03)
UROBILINOGEN UR STRIP-ACNC: NORMAL
WBC # BLD AUTO: 8.5 X10(3) UL (ref 4–11)

## 2024-01-28 PROCEDURE — 99223 1ST HOSP IP/OBS HIGH 75: CPT | Performed by: INTERNAL MEDICINE

## 2024-01-28 PROCEDURE — 71260 CT THORAX DX C+: CPT | Performed by: STUDENT IN AN ORGANIZED HEALTH CARE EDUCATION/TRAINING PROGRAM

## 2024-01-28 PROCEDURE — 5A0945Z ASSISTANCE WITH RESPIRATORY VENTILATION, 24-96 CONSECUTIVE HOURS: ICD-10-PCS | Performed by: INTERNAL MEDICINE

## 2024-01-28 PROCEDURE — 71045 X-RAY EXAM CHEST 1 VIEW: CPT | Performed by: STUDENT IN AN ORGANIZED HEALTH CARE EDUCATION/TRAINING PROGRAM

## 2024-01-28 RX ORDER — VORICONAZOLE 200 MG/1
200 TABLET, FILM COATED ORAL 2 TIMES DAILY
Status: DISCONTINUED | OUTPATIENT
Start: 2024-01-28 | End: 2024-02-01

## 2024-01-28 RX ORDER — IPRATROPIUM BROMIDE AND ALBUTEROL SULFATE 2.5; .5 MG/3ML; MG/3ML
3 SOLUTION RESPIRATORY (INHALATION)
Status: DISCONTINUED | OUTPATIENT
Start: 2024-01-28 | End: 2024-02-01

## 2024-01-28 RX ORDER — PREDNISONE 10 MG/1
10 TABLET ORAL DAILY
Status: DISCONTINUED | OUTPATIENT
Start: 2024-01-29 | End: 2024-01-29

## 2024-01-28 RX ORDER — FLUTICASONE FUROATE AND VILANTEROL 100; 25 UG/1; UG/1
1 POWDER RESPIRATORY (INHALATION) DAILY
Status: DISCONTINUED | OUTPATIENT
Start: 2024-01-28 | End: 2024-02-01

## 2024-01-28 RX ORDER — PANTOPRAZOLE SODIUM 40 MG/1
40 TABLET, DELAYED RELEASE ORAL
Status: DISCONTINUED | OUTPATIENT
Start: 2024-01-29 | End: 2024-02-01

## 2024-01-28 RX ORDER — AZITHROMYCIN 250 MG/1
500 TABLET, FILM COATED ORAL
Status: DISCONTINUED | OUTPATIENT
Start: 2024-01-29 | End: 2024-01-29

## 2024-01-28 RX ORDER — SODIUM CHLORIDE 9 MG/ML
INJECTION, SOLUTION INTRAVENOUS CONTINUOUS
Status: DISCONTINUED | OUTPATIENT
Start: 2024-01-28 | End: 2024-02-01

## 2024-01-28 RX ORDER — AZITHROMYCIN 250 MG/1
500 TABLET, FILM COATED ORAL ONCE
Status: COMPLETED | OUTPATIENT
Start: 2024-01-28 | End: 2024-01-28

## 2024-01-28 RX ORDER — ACETAMINOPHEN 325 MG/1
650 TABLET ORAL EVERY 6 HOURS PRN
Status: DISCONTINUED | OUTPATIENT
Start: 2024-01-28 | End: 2024-02-01

## 2024-01-28 NOTE — CONSULTS
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    Patient presented with acute fever and dyspnea and mild cough and chest x-ray showed new right upper lobe infiltrate  Denied abdominal pain or vomiting or diarrhea  Denied lower extremity edema or pain or calf tenderness  Generalized fatigue with no focal weakness or numbness        History     Past Medical History:   Diagnosis Date    Deep vein thrombosis (HCC)     Dysphagia     Esophageal reflux     Exposure to medical diagnostic radiation     Hemorrhoids     History of blood transfusion     Pt on Immunotherapy    History of immunotherapy 2023    Impotence     Lung cancer (HCC)     NSCLCA stage IIIB    Necrotizing granulomatous inflammation of lung (HCC)     Obstructive sleep apnea     Personal history of antineoplastic chemotherapy     Pneumonia due to organism     Pulmonary embolism (HCC)     Pulmonary embolus (HCC)     Sinus problem     Sinus problems    Sleep apnea     CPAP    Visual impairment     wears eyeglasses     Past Surgical History:   Procedure Laterality Date    COLONOSCOPY      COLONOSCOPY N/A 2018    Procedure: COLONOSCOPY;  Surgeon: Cesar Beckman MD;  Location: [facility] ENDOSCOPY     COLONOSCOPY      PORT, INDWELLING, IMP Right 2017    ROTATOR CUFF REPAIR Left     TOTAL KNEE REPLACEMENT Bilateral      Family History   Problem Relation Age of Onset    Cancer Father         Lung    Cancer Mother         Breast, ovarian     Social History:  Social History     Socioeconomic History    Marital status:    Tobacco Use    Smoking status: Former     Packs/day: 1.00     Years: 30.00     Additional pack years: 0.00     Total pack years: 30.00     Types: Cigarettes     Quit date: 2000     Years since quittin.6    Smokeless tobacco: Never   Vaping Use    Vaping Use: Never used   Substance and Sexual Activity    Alcohol use: Not Currently     Alcohol/week: 0.8 standard drinks of alcohol     Types: 1 Glasses of wine per week     Comment: very rarely    Drug use: No   Other Topics Concern    Caffeine Concern Yes     Comment: coffee, 2 cups daily     Social Determinants of Health     Financial Resource Strain: Low Risk  (2023)    Financial Resource Strain     Difficulty of Paying Living Expenses: Not hard at all     Med Affordability: No   Food Insecurity: No Food Insecurity (2023)    Food Insecurity     Food Insecurity: Never true   Transportation Needs: No Transportation Needs (2023)    Transportation Needs     Lack of Transportation: No   Housing Stability: Low Risk  (2023)    Housing Stability     Housing Instability: No     Allergies/Medications:   Allergies: No Known Allergies  (Not in a hospital admission)      Review of Systems:     Constitutional:  Positive for chills, fatigue and fever.   HENT:  Negative for congestion.    Respiratory:  Positive for cough and shortness of breath.    Cardiovascular: Negative.    Gastrointestinal: Negative.    Musculoskeletal: Negative.    Skin: Negative.    Neurological: Negative.    Hematological: Negative.    Psychiatric/Behavioral: Negative.         Physical Exam:   Vital Signs:   height is 5' 10\" (1.778 m) and weight is  185 lb (83.9 kg). His temporal temperature is 99.2 °F (37.3 °C). His blood pressure is 105/52 and his pulse is 64. His respiration is 24 and oxygen saturation is 94%.   Physical Exam  Constitutional:       General: He is not in acute distress.     Appearance: He is ill-appearing.   HENT:      Head: Atraumatic.      Mouth/Throat:      Mouth: Mucous membranes are moist.   Eyes:      General: No scleral icterus.     Pupils: Pupils are equal, round, and reactive to light.   Cardiovascular:      Rate and Rhythm: Bradycardia present.      Heart sounds:      No gallop.   Pulmonary:      Effort: No respiratory distress.      Breath sounds: No stridor. Rales present. No wheezing or rhonchi.   Abdominal:      General: Abdomen is flat. Bowel sounds are normal. There is no distension.      Palpations: Abdomen is soft. There is no mass.      Tenderness: There is no guarding.   Musculoskeletal:      Right lower leg: No edema.      Left lower leg: No edema.   Skin:     General: Skin is dry.   Neurological:      General: No focal deficit present.      Mental Status: He is oriented to person, place, and time. Mental status is at baseline.         Results:     Lab Results   Component Value Date    WBC 8.5 01/28/2024    HGB 12.9 (L) 01/28/2024    HCT 40.0 01/28/2024    .0 01/28/2024    CREATSERUM 0.93 01/28/2024    BUN 20 01/28/2024     01/28/2024    K 3.6 01/28/2024     01/28/2024    CO2 26.0 01/28/2024     (H) 01/28/2024    CA 8.4 (L) 01/28/2024    ALB 3.9 12/27/2023    ALKPHO 123 (H) 12/27/2023    BILT 0.4 12/27/2023    TP 5.8 12/27/2023    AST 33 12/27/2023    ALT 22 12/27/2023    PTT 96.8 (H) 01/18/2023    INR 1.06 11/22/2023    T4F 1.1 05/12/2023    TSH 1.600 09/29/2023    LIP 97 (H) 11/20/2023    PSA 2.8 08/08/2018    ESRML 99 (H) 08/18/2018    MG 2.0 11/21/2023    PHOS 3.5 11/21/2023    TROPHS 7 01/14/2023    B12 >1,500 (H) 05/22/2018     XR CHEST AP PORTABLE  (CPT=71045)    Result Date:  1/28/2024  CONCLUSION:  1. Patchy right perihilar/upper lobe opacities are new or more conspicuous since comparison chest radiograph from November, 2023. Given clinical context, findings are suspicious for pneumonia.  Radiographic follow-up to resolution is advised. 2. Other previously noted bibasilar hazy opacities appear improved since prior and likely relate to resolution of infection/pneumonia at these sites. 3. Stable streaky left perihilar opacities, which likely represent treatment related fibrosis. 4. Lesser incidental findings as above.     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

## 2024-01-28 NOTE — ED PROVIDER NOTES
[facility] Emergency Department Note  Patient: Luis Villasenor Age: 78 year old Sex: male      MRN: [id number]  : 1945    Patient Seen in: [facility] Emergency Department    History     Chief Complaint   Patient presents with    Difficulty Breathing    Fever     Stated Complaint: hypoxia    History obtained from: Patient and patient's wife    Patient is an 78-year-old male with past medical history of metastatic non-small cell lung cancer with BRAF mutation on dabrafenib trametinib presenting to the emergency department for evaluation of chills, fever, dyspnea and cough at home.  States that symptoms started at approximately 9 AM this morning.  States that he had a temperature of one 1.2 at home.  Endorses feeling mildly dyspneic and having occasionally productive cough.  He states that he uses home pulse oximeter and noticed O2 saturations to be in the 80s prompting him to come to the emergency department for evaluation. He states that he does have a history of pneumonitis requiring hospitalizations in the past.    Review of Systems:  Review of Systems  Positive for stated complaint: hypoxia. Constitutional and vital signs reviewed. All other systems reviewed and negative except as noted above.    Patient History:  Past Medical History:   Diagnosis Date    Deep vein thrombosis (HCC)     Dysphagia     Esophageal reflux     Exposure to medical diagnostic radiation     Hemorrhoids     History of blood transfusion     Pt on Immunotherapy    History of immunotherapy 2023    Impotence     Lung cancer (HCC)     NSCLCA stage IIIB    Necrotizing granulomatous inflammation of lung (HCC)     Obstructive sleep apnea     Personal history of antineoplastic chemotherapy     Pneumonia due to organism     Pulmonary embolism (HCC)     Pulmonary embolus (HCC)     Sinus problem     Sinus problems    Sleep apnea     CPAP    Visual impairment     wears eyeglasses       Past Surgical History:   Procedure Laterality  Date    COLONOSCOPY      COLONOSCOPY N/A 2018    Procedure: COLONOSCOPY;  Surgeon: Cesar Beckman MD;  Location: [facility] ENDOSCOPY    COLONOSCOPY      PORT, INDWELLING, IMP Right     ROTATOR CUFF REPAIR Left     TOTAL KNEE REPLACEMENT Bilateral         Family History   Problem Relation Age of Onset    Cancer Father         Lung    Cancer Mother         Breast, ovarian       Specific Social Determinants of Health:   Social History     Socioeconomic History    Marital status:    Tobacco Use    Smoking status: Former     Packs/day: 1.00     Years: 30.00     Additional pack years: 0.00     Total pack years: 30.00     Types: Cigarettes     Quit date: 2000     Years since quittin.6    Smokeless tobacco: Never   Vaping Use    Vaping Use: Never used   Substance and Sexual Activity    Alcohol use: Not Currently     Alcohol/week: 0.8 standard drinks of alcohol     Types: 1 Glasses of wine per week     Comment: very rarely    Drug use: No   Other Topics Concern    Caffeine Concern Yes     Comment: coffee, 2 cups daily     Social Determinants of Health     Financial Resource Strain: Low Risk  (2023)    Financial Resource Strain     Difficulty of Paying Living Expenses: Not hard at all     Med Affordability: No   Food Insecurity: No Food Insecurity (2023)    Food Insecurity     Food Insecurity: Never true   Transportation Needs: No Transportation Needs (2023)    Transportation Needs     Lack of Transportation: No   Housing Stability: Low Risk  (2023)    Housing Stability     Housing Instability: No           PSFH elements reviewed from today and agreed except as otherwise stated in HPI.    Physical Exam     ED Triage Vitals [24 1251]   /69   Pulse 83   Resp 20   Temp 98.1 °F (36.7 °C)   Temp src Oral   SpO2 92 %   O2 Device None (Room air)       Current:/51   Pulse 67   Temp 99.2 °F (37.3 °C) (Temporal)   Resp 22   Ht 177.8 cm (5' 10\")   Wt 83.9  kg   SpO2 95%   BMI 26.54 kg/m²         Physical Exam  Constitutional:       Appearance: He is well-developed.   HENT:      Head: Normocephalic and atraumatic.      Right Ear: External ear normal.      Left Ear: External ear normal.      Nose: Nose normal.   Eyes:      Conjunctiva/sclera: Conjunctivae normal.      Pupils: Pupils are equal, round, and reactive to light.   Cardiovascular:      Rate and Rhythm: Normal rate and regular rhythm.      Heart sounds: Normal heart sounds.   Pulmonary:      Effort: Pulmonary effort is normal.      Breath sounds: Examination of the left-middle field reveals rhonchi. Examination of the left-lower field reveals rhonchi. Rhonchi present.   Abdominal:      General: Bowel sounds are normal.      Palpations: Abdomen is soft.      Tenderness: There is no abdominal tenderness.   Musculoskeletal:         General: Normal range of motion.      Cervical back: Normal range of motion and neck supple.   Skin:     General: Skin is warm and dry.      Findings: No rash.   Neurological:      General: No focal deficit present.      Mental Status: He is alert and oriented to person, place, and time.      Deep Tendon Reflexes: Reflexes are normal and symmetric.   Psychiatric:         Mood and Affect: Mood normal.         Behavior: Behavior normal.         ED Course   Labs:   Labs Reviewed   BASIC METABOLIC PANEL (8) - Abnormal; Notable for the following components:       Result Value    Glucose 135 (*)     BUN/CREA Ratio 21.5 (*)     Calcium, Total 8.4 (*)     All other components within normal limits   CBC W/ DIFFERENTIAL - Abnormal; Notable for the following components:    HGB 12.9 (*)     RDW-SD 50.3 (*)     Lymphocyte Absolute 0.26 (*)     All other components within normal limits   LACTIC ACID, PLASMA - Normal   SARS-COV-2/FLU A AND B/RSV BY PCR (GENEXPERT) - Normal    Narrative:     This test is intended for the qualitative detection and differentiation of SARS-CoV-2, influenza A, influenza  B, and respiratory syncytial virus (RSV) viral RNA in nasopharyngeal or nares swabs from individuals suspected of respiratory viral infection consistent with COVID-19 by their healthcare provider. Signs and symptoms of respiratory viral infection due to SARS-CoV-2, influenza, and RSV can be similar.    Test performed using the Xpert Xpress SARS-CoV-2/FLU/RSV (real time RT-PCR)  assay on the GeneXpert instrument, [employer], [place] [address].   This test is being used under the Food and Drug Administration's Emergency Use Authorization.    The authorized Fact Sheet for Healthcare Providers for this assay is available upon request from the laboratory.   CBC WITH DIFFERENTIAL WITH PLATELET    Narrative:     The following orders were created for panel order CBC With Differential With Platelet.  Procedure                               Abnormality         Status                     ---------                               -----------         ------                     CBC W/ DIFFERENTIAL[[id number]]          Abnormal            Final result                 Please view results for these tests on the individual orders.   URINALYSIS WITH CULTURE REFLEX   PROCALCITONIN   BLOOD CULTURE   BLOOD CULTURE     Radiology findings:  I personally reviewed the images.   XR CHEST AP PORTABLE  (CPT=71045)    Result Date: 1/28/2024  CONCLUSION:  1. Patchy right perihilar/upper lobe opacities are new or more conspicuous since comparison chest radiograph from November, 2023. Given clinical context, findings are suspicious for pneumonia.  Radiographic follow-up to resolution is advised. 2. Other previously noted bibasilar hazy opacities appear improved since prior and likely relate to resolution of infection/pneumonia at these sites. 3. Stable streaky left perihilar opacities, which likely represent treatment related fibrosis. 4. Lesser incidental findings as above.     Dictated by (CST): Dennis Cuenca MD on 1/28/2024 at 2:32 PM      Finalized by (CST): Dennis Cuenca MD on 1/28/2024 at 2:36 PM           Cardiac Monitor: Interpreted by me.   Pulse Readings from Last 1 Encounters:   01/28/24 67   , sinus,     External non-ED records reviewed independently by me: Prior CT chest from 9/6/2023 reviewed showing left upper lobe postradiation fibrosis in the central aspect of the left lung with unchanged smooth interlobular septal thickening throughout the left lung reading peripherally from the region of fibrosis related to lymphangitic carcinomatosis.  Moderate right hilar and subcarinal lymphadenopathy relating to vy metastatic disease.  Also with 1 cm nodule in the lingula and 3 mm subpleural nodule in the anterior segment of the right upper lobe.    MDM   78-year-old male with a past medical history of metastatic non-small cell lung carcinoma currently on immunotherapy and daily oral chemotherapy presenting today for evaluation of shortness of breath, cough, fevers and chills.  Noted to be hypoxic with O2 saturations in the mid 80s at home.  Upon arrival, he is saturating at 90% on room air.  Rhonchi noted in the left lateral lung base.  Took Tylenol prior to arrival.    Differential diagnoses considered includes, but is not limited to: Pneumonia, postobstructive pneumonia, pulmonary embolism, pneumonitis, viral syndrome    Will obtain the following tests: CBC, BMP, urinalysis, lactic acid, blood culture, COVID/flu/RSV panel, chest x-ray, CT PE  Please see ED course for my independent review of these tests/imaging results.    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  Labs overall reassuring  with no significant leukocytosis.  Lactic acid is not elevated.  He has no evidence of sepsis, severe sepsis, or septic shock.  COVID/flu/RSV negative.  I independently reviewed the chest x-ray images that show evidence of a new right upper lobe pneumonia.  Agree with radiology read above.  Started on Rocephin and azithromycin.  I endorsed the patient's case to Dr. Justice, who accepted the patient for admission.  Also discussed with Dr. Hernández, oncology, who was made aware of new consult.    Patient's case signed out to oncoming ED physician with CT PE images pending at this time to rule out pulmonary embolism.  Patient is otherwise stable for transfer to floor at this time.    Disposition and Plan     Clinical Impression:  1. Community acquired pneumonia of right upper lobe of lung        Disposition:  Admit    Follow-up:  No follow-up provider specified.    Medications Prescribed:  Current Discharge Medication List          Hospital Problems       Present on Admission  Date Reviewed: 1/18/2024            ICD-10-CM Noted POA    * (Principal) Community acquired pneumonia, unspecified laterality J18.9 1/28/2024 Unknown        This note may have been created using voice dictation technology and may include inadvertent errors.      Luz Marina Sullivan MD  Emergency Medicine

## 2024-01-28 NOTE — ED QUICK NOTES
Luis arrived through triage with his Wife for c/o fever up to 101 with associated fatigue. States home pulse oximeter reading 84% on RA. He is currently in treatment for lung cancer (oral chemo only). Reported hx of pneumonitis.     CT compatible port-a cath accessed without difficulty. 1st set of cultures drawn from the port. 2L/nc initiated for consistent saturations between 89-91%.

## 2024-01-28 NOTE — ED QUICK NOTES
Orders for admission, patient is aware of plan and ready to go upstairs. Any questions, please call ED RN radha at extension [phone].     Patient Covid vaccination status: Fully vaccinated     COVID Test Ordered in ED: SARS-CoV-2/Flu A and B/RSV by PCR (GeneXpert)    COVID Suspicion at Admission: N/A    Running Infusions:      Mental Status/LOC at time of transport: x4    Other pertinent information:   CIWA score: N/A   NIH score:  N/A

## 2024-01-28 NOTE — TELEPHONE ENCOUNTER
Patient called this morning around 9 AM complaining of chills, feeling cold, and fever.  Also feeling little short of breath and O2 sat according to their monitor at home in the low to mid 80s.    He has metastatic lung cancer with BRAF mutation on dabrafenib trametinib, febrile illness is a common side effect but he had not experienced this yet.  In light of hypoxia I recommended ER evaluation.  They were hesitant at first but called back a few hours later as his sats remain in the mid 80s they were headed to the emergency department.    Rule out infection, clinically some concern for pneumonia.  He does have a history of pneumonitis as well will likely need supplemental O2.  Would likely hold oral chemotherapy as well.    Justin Hernández DO  [facility]  [facility]

## 2024-01-28 NOTE — ED INITIAL ASSESSMENT (HPI)
Patient presents to ER with complaints of fever and hypoxia.   Patient is a [place] patient, currently receiving treatment.   Tylenol taken at 930am

## 2024-01-29 PROBLEM — R09.02 HYPOXIA: Status: ACTIVE | Noted: 2024-01-29

## 2024-01-29 LAB
ATRIAL RATE: 78 BPM
P AXIS: 47 DEGREES
P-R INTERVAL: 160 MS
Q-T INTERVAL: 390 MS
QRS DURATION: 96 MS
QTC CALCULATION (BEZET): 444 MS
R AXIS: -15 DEGREES
T AXIS: 51 DEGREES
VENTRICULAR RATE: 78 BPM

## 2024-01-29 PROCEDURE — 99223 1ST HOSP IP/OBS HIGH 75: CPT | Performed by: INTERNAL MEDICINE

## 2024-01-29 PROCEDURE — 99232 SBSQ HOSP IP/OBS MODERATE 35: CPT | Performed by: INTERNAL MEDICINE

## 2024-01-29 RX ORDER — METHYLPREDNISOLONE SODIUM SUCCINATE 40 MG/ML
40 INJECTION, POWDER, LYOPHILIZED, FOR SOLUTION INTRAMUSCULAR; INTRAVENOUS EVERY 12 HOURS
Status: DISCONTINUED | OUTPATIENT
Start: 2024-01-29 | End: 2024-02-01

## 2024-01-29 RX ORDER — AZITHROMYCIN 250 MG/1
500 TABLET, FILM COATED ORAL
Status: COMPLETED | OUTPATIENT
Start: 2024-01-29 | End: 2024-01-30

## 2024-01-29 RX ORDER — VANCOMYCIN HYDROCHLORIDE 125 MG/1
125 CAPSULE ORAL DAILY
Status: DISCONTINUED | OUTPATIENT
Start: 2024-01-29 | End: 2024-02-01

## 2024-01-29 NOTE — PLAN OF CARE
Patient came up from ED. Patient alert and oriented x 4. Up independently. Voiding freely. Patients diet is general. Vital signs monitored. Cough and deep breathe. 2L o2. Bed in lowest position, call light within reach, and non skid socks in place for fall precautions. All needs within reach. wife at bedside. Rounding done by nursing staff. Admission for pt is done.       Problem: Patient Centered Care  Goal: Patient preferences are identified and integrated in the patient's plan of care  Description: Interventions:  - What would you like us to know as we care for you? I had surgery last month   - Provide timely, complete, and accurate information to patient/family  - Incorporate patient and family knowledge, values, beliefs, and cultural backgrounds into the planning and delivery of care  - Encourage patient/family to participate in care and decision-making at the level they choose  - Honor patient and family perspectives and choices  Outcome: Progressing     Problem: PAIN - ADULT  Goal: Verbalizes/displays adequate comfort level or patient's stated pain goal  Description: INTERVENTIONS:  - Encourage pt to monitor pain and request assistance  - Assess pain using appropriate pain scale  - Administer analgesics based on type and severity of pain and evaluate response  - Implement non-pharmacological measures as appropriate and evaluate response  - Consider cultural and social influences on pain and pain management  - Manage/alleviate anxiety  - Utilize distraction and/or relaxation techniques  - Monitor for opioid side effects  - Notify MD/LIP if interventions unsuccessful or patient reports new pain  - Anticipate increased pain with activity and pre-medicate as appropriate  Outcome: Progressing     Problem: RISK FOR INFECTION - ADULT  Goal: Absence of fever/infection during anticipated neutropenic period  Description: INTERVENTIONS  - Monitor WBC  - Administer growth factors as ordered  - Implement neutropenic  guidelines  Outcome: Progressing     Problem: SAFETY ADULT - FALL  Goal: Free from fall injury  Description: INTERVENTIONS:  - Assess pt frequently for physical needs  - Identify cognitive and physical deficits and behaviors that affect risk of falls.  - [place] fall precautions as indicated by assessment.  - Educate pt/family on patient safety including physical limitations  - Instruct pt to call for assistance with activity based on assessment  - Modify environment to reduce risk of injury  - Provide assistive devices as appropriate  - Consider OT/PT consult to assist with strengthening/mobility  - Encourage toileting schedule  Outcome: Progressing     Problem: DISCHARGE PLANNING  Goal: Discharge to home or other facility with appropriate resources  Description: INTERVENTIONS:  - Identify barriers to discharge w/pt and caregiver  - Include patient/family/discharge partner in discharge planning  - Arrange for needed discharge resources and transportation as appropriate  - Identify discharge learning needs (meds, wound care, etc)  - Arrange for interpreters to assist at discharge as needed  - Consider post-discharge preferences of patient/family/discharge partner  - Complete POLST form as appropriate  - Assess patient's ability to be responsible for managing their own health  - Refer to Case Management Department for coordinating discharge planning if the patient needs post-hospital services based on physician/LIP order or complex needs related to functional status, cognitive ability or social support system  Outcome: Progressing     Problem: Patient/Family Goals  Goal: Patient/Family Long Term Goal  Description: Patient's Long Term Goal: To be discharged    Interventions:  - get clearance   - See additional Care Plan goals for specific interventions  Outcome: Progressing  Goal: Patient/Family Short Term Goal  Description: Patient's Short Term Goal: Manage pain    Interventions:   - monitor and assess pain   - See  additional Care Plan goals for specific interventions  Outcome: Progressing

## 2024-01-29 NOTE — PROGRESS NOTES
[facility]    Progress Note      Assessment and Plan:   1.  Acute on chronic respiratory failure-differential includes community-acquired bacterial pneumonia, lingering fungal infection, pneumonitis associated with immunotherapy.  Patient was started on empiric antibiotic.  He remains on voriconazole.  He has underlying lung cancer but this seems to have improved radiographically with decrease in size of the lymph nodes.    Recommendations:  1.  Empiric antibiotic  2.  Voriconazole  3.  Will initiate methylprednisolone temporarily  4.  We will follow clinically.    2.  DVT prophylaxis-resume Xarelto    3.  Lung cancer-to follow-up oncology    4.  Status postcholecystectomy    Subjective:   Luis Villasenor is a(n) 78 year old male who is breathing a little bit more comfortably    Objective:   Blood pressure 109/54, pulse 74, temperature 100.2 °F (37.9 °C), temperature source Oral, resp. rate 18, height 5' 10\" (1.778 m), weight 185 lb (83.9 kg), SpO2 92%.    Physical Exam alert white male  HEENT examination is unremarkable with pupils equal round and reactive to light and accommodation.   Neck without adenopathy, thyromegaly, JVD nor bruit.   Lungs slightly diminished to auscultation and percussion.  Cardiac regular rate and rhythm no murmur.   Abdomen nontender, without hepatosplenomegaly and no mass appreciable.   Extremities without clubbing cyanosis nor edema.   Neurologic grossly intact with symmetric tone and strength and reflex.  Skin without gross abnormality     Results:        Chris Hernandez MD  Medical Director, Critical Care, [facility]  Medical Director, [facility]  Pager: [phone]

## 2024-01-29 NOTE — PROGRESS NOTES
[facility]    CDI Prediction Tool Protocol (Vancomycin Initiated)    OVP (oral vancomycin prophylaxis) 125 mg PO Daily is being started in this patient based on a score of 13.      Score Breakdown:  High risk antibiotic use (5 points)  Malignancy (3 points)     Age >/= 80 years (3 points)  PPI use in hospital (1 point)  Recently hospitalized: within 90 days (1 point)    This patient is currently at high risk for developing CDI due to his/her score being >/=13 points and is being started on prophylactic oral vancomycin to prevent Cdiff.     This patient does not have C. difficile infection. All measures taken within this protocol are to decrease the risk of CDI development.    Fariba Trevino, PharmD  1/29/2024  8:05 AM  [facility]  Pharmacy Extension: [phone]

## 2024-01-29 NOTE — PLAN OF CARE
Problem: Patient Centered Care  Goal: Patient preferences are identified and integrated in the patient's plan of care  Description: Interventions:  - What would you like us to know as we care for you? I had surgery last month   - Provide timely, complete, and accurate information to patient/family  - Incorporate patient and family knowledge, values, beliefs, and cultural backgrounds into the planning and delivery of care  - Encourage patient/family to participate in care and decision-making at the level they choose  - Honor patient and family perspectives and choices  Outcome: Progressing     Problem: PAIN - ADULT  Goal: Verbalizes/displays adequate comfort level or patient's stated pain goal  Description: INTERVENTIONS:  - Encourage pt to monitor pain and request assistance  - Assess pain using appropriate pain scale  - Administer analgesics based on type and severity of pain and evaluate response  - Implement non-pharmacological measures as appropriate and evaluate response  - Consider cultural and social influences on pain and pain management  - Manage/alleviate anxiety  - Utilize distraction and/or relaxation techniques  - Monitor for opioid side effects  - Notify MD/LIP if interventions unsuccessful or patient reports new pain  - Anticipate increased pain with activity and pre-medicate as appropriate  Outcome: Progressing     Problem: RISK FOR INFECTION - ADULT  Goal: Absence of fever/infection during anticipated neutropenic period  Description: INTERVENTIONS  - Monitor WBC  - Administer growth factors as ordered  - Implement neutropenic guidelines  Outcome: Progressing     Problem: SAFETY ADULT - FALL  Goal: Free from fall injury  Description: INTERVENTIONS:  - Assess pt frequently for physical needs  - Identify cognitive and physical deficits and behaviors that affect risk of falls.  - [place] fall precautions as indicated by assessment.  - Educate pt/family on patient safety including physical  limitations  - Instruct pt to call for assistance with activity based on assessment  - Modify environment to reduce risk of injury  - Provide assistive devices as appropriate  - Consider OT/PT consult to assist with strengthening/mobility  - Encourage toileting schedule  Outcome: Progressing     Problem: DISCHARGE PLANNING  Goal: Discharge to home or other facility with appropriate resources  Description: INTERVENTIONS:  - Identify barriers to discharge w/pt and caregiver  - Include patient/family/discharge partner in discharge planning  - Arrange for needed discharge resources and transportation as appropriate  - Identify discharge learning needs (meds, wound care, etc)  - Arrange for interpreters to assist at discharge as needed  - Consider post-discharge preferences of patient/family/discharge partner  - Complete POLST form as appropriate  - Assess patient's ability to be responsible for managing their own health  - Refer to Case Management Department for coordinating discharge planning if the patient needs post-hospital services based on physician/LIP order or complex needs related to functional status, cognitive ability or social support system  Outcome: Progressing     Problem: Patient/Family Goals  Goal: Patient/Family Long Term Goal  Description: Patient's Long Term Goal: To be discharged    Interventions:  - get clearance   - See additional Care Plan goals for specific interventions  Outcome: Progressing  Goal: Patient/Family Short Term Goal  Description: Patient's Short Term Goal: Manage pain    Interventions:   - monitor and assess pain   - See additional Care Plan goals for specific interventions  Outcome: Progressing     Luis is is alert/oriented x4. Vital signs stable on 2L O2. CPAP worn overnight. Febrile, tmax 101.4. Dr. Justice notified. Temp improved with PRN tylenol. Respiratory panel rechecked. Tolerating diet. Ambulating independently. Pt educated on safety and fall precautions. IVF infusing.   Voiding freely. Fall precautions in place: nonskid socks on, bed low, bed alarm active. Call light within reach, pt calls appropriately. Pt's wife and daughter at bedside in evening, updated on POC.

## 2024-01-30 LAB
ANION GAP SERPL CALC-SCNC: 3 MMOL/L (ref 0–18)
BASOPHILS # BLD AUTO: 0 X10(3) UL (ref 0–0.2)
BASOPHILS NFR BLD AUTO: 0 %
BUN BLD-MCNC: 14 MG/DL (ref 9–23)
BUN/CREAT SERPL: 20.6 (ref 10–20)
CALCIUM BLD-MCNC: 8.4 MG/DL (ref 8.7–10.4)
CHLORIDE SERPL-SCNC: 109 MMOL/L (ref 98–112)
CO2 SERPL-SCNC: 27 MMOL/L (ref 21–32)
CREAT BLD-MCNC: 0.68 MG/DL
DEPRECATED RDW RBC AUTO: 48.3 FL (ref 35.1–46.3)
EGFRCR SERPLBLD CKD-EPI 2021: 94 ML/MIN/1.73M2 (ref 60–?)
EOSINOPHIL # BLD AUTO: 0 X10(3) UL (ref 0–0.7)
EOSINOPHIL NFR BLD AUTO: 0 %
ERYTHROCYTE [DISTWIDTH] IN BLOOD BY AUTOMATED COUNT: 14.2 % (ref 11–15)
GLUCOSE BLD-MCNC: 156 MG/DL (ref 70–99)
HCT VFR BLD AUTO: 36.8 %
HGB BLD-MCNC: 12.4 G/DL
IMM GRANULOCYTES # BLD AUTO: 0.01 X10(3) UL (ref 0–1)
IMM GRANULOCYTES NFR BLD: 0.6 %
LYMPHOCYTES # BLD AUTO: 0.32 X10(3) UL (ref 1–4)
LYMPHOCYTES NFR BLD AUTO: 18.2 %
MCH RBC QN AUTO: 31.3 PG (ref 26–34)
MCHC RBC AUTO-ENTMCNC: 33.7 G/DL (ref 31–37)
MCV RBC AUTO: 92.9 FL
MONOCYTES # BLD AUTO: 0.11 X10(3) UL (ref 0.1–1)
MONOCYTES NFR BLD AUTO: 6.3 %
NEUTROPHILS # BLD AUTO: 1.32 X10 (3) UL (ref 1.5–7.7)
NEUTROPHILS # BLD AUTO: 1.32 X10(3) UL (ref 1.5–7.7)
NEUTROPHILS NFR BLD AUTO: 74.9 %
OSMOLALITY SERPL CALC.SUM OF ELEC: 292 MOSM/KG (ref 275–295)
PLATELET # BLD AUTO: 199 10(3)UL (ref 150–450)
POTASSIUM SERPL-SCNC: 4 MMOL/L (ref 3.5–5.1)
RBC # BLD AUTO: 3.96 X10(6)UL
SODIUM SERPL-SCNC: 139 MMOL/L (ref 136–145)
WBC # BLD AUTO: 1.8 X10(3) UL (ref 4–11)

## 2024-01-30 PROCEDURE — 99232 SBSQ HOSP IP/OBS MODERATE 35: CPT | Performed by: INTERNAL MEDICINE

## 2024-01-30 PROCEDURE — 99233 SBSQ HOSP IP/OBS HIGH 50: CPT | Performed by: INTERNAL MEDICINE

## 2024-01-30 NOTE — PLAN OF CARE
IV fluids infusing. On 1.5LO2, wean as able. Solumedrol IV given. Nebulizers scheduled.  Rocephin IV and voriconazole PO administered. Ambulating the hallways independently with his family. Good appetite. BM this morning. xarelto to restart this evening. Calling appropriately for assistance. Showered this afternoon. No c/o pain. Plan of care discussed with patient, wife and daughter. Home with family when medically cleared.     Problem: Patient Centered Care  Goal: Patient preferences are identified and integrated in the patient's plan of care  Description: Interventions:  - What would you like us to know as we care for you? I had surgery last month   - Provide timely, complete, and accurate information to patient/family  - Incorporate patient and family knowledge, values, beliefs, and cultural backgrounds into the planning and delivery of care  - Encourage patient/family to participate in care and decision-making at the level they choose  - Honor patient and family perspectives and choices  1/29/2024 1813 by Ronel Petersen, RN  Outcome: Progressing  1/29/2024 1812 by Ronel Petersen, RN  Outcome: Progressing     Problem: RISK FOR INFECTION - ADULT  Goal: Absence of fever/infection during anticipated neutropenic period  Description: INTERVENTIONS  - Monitor WBC  - Administer growth factors as ordered  - Implement neutropenic guidelines  1/29/2024 1813 by Ronel Petersen, RN  Outcome: Progressing  1/29/2024 1812 by Ronel Petersen, RN  Outcome: Progressing     Problem: SAFETY ADULT - FALL  Goal: Free from fall injury  Description: INTERVENTIONS:  - Assess pt frequently for physical needs  - Identify cognitive and physical deficits and behaviors that affect risk of falls.  - [place] fall precautions as indicated by assessment.  - Educate pt/family on patient safety including physical limitations  - Instruct pt to call for assistance with activity based on assessment  - Modify environment to reduce risk of  injury  - Provide assistive devices as appropriate  - Consider OT/PT consult to assist with strengthening/mobility  - Encourage toileting schedule  1/29/2024 1813 by Ronel Petersen, RN  Outcome: Progressing  1/29/2024 1812 by Ronel Petersen, RN  Outcome: Progressing     Problem: DISCHARGE PLANNING  Goal: Discharge to home or other facility with appropriate resources  Description: INTERVENTIONS:  - Identify barriers to discharge w/pt and caregiver  - Include patient/family/discharge partner in discharge planning  - Arrange for needed discharge resources and transportation as appropriate  - Identify discharge learning needs (meds, wound care, etc)  - Arrange for interpreters to assist at discharge as needed  - Consider post-discharge preferences of patient/family/discharge partner  - Complete POLST form as appropriate  - Assess patient's ability to be responsible for managing their own health  - Refer to Case Management Department for coordinating discharge planning if the patient needs post-hospital services based on physician/LIP order or complex needs related to functional status, cognitive ability or social support system  1/29/2024 1813 by Ronel Petersen, RN  Outcome: Progressing  1/29/2024 1812 by Ronel Petersen, RN  Outcome: Progressing     Problem: Patient/Family Goals  Goal: Patient/Family Short Term Goal  Description: Patient's Short Term Goal: Manage pain    Interventions:   - monitor and assess pain   - See additional Care Plan goals for specific interventions  Outcome: Progressing     Problem: RESPIRATORY - ADULT  Goal: Achieves optimal ventilation and oxygenation  Description: INTERVENTIONS:  - Assess for changes in respiratory status  - Assess for changes in mentation and behavior  - Position to facilitate oxygenation and minimize respiratory effort  - Oxygen supplementation based on oxygen saturation or ABGs  - Provide Smoking Cessation handout, if applicable  - Encourage broncho-pulmonary  hygiene including cough, deep breathe, Incentive Spirometry  - Assess the need for suctioning and perform as needed  - Assess and instruct to report SOB or any respiratory difficulty  - Respiratory Therapy support as indicated  - Manage/alleviate anxiety  - Monitor for signs/symptoms of CO2 retention  1/29/2024 1813 by Ronel Petersen, RN  Outcome: Progressing  1/29/2024 1813 by Ronel Petersen, RN  Outcome: Progressing

## 2024-01-30 NOTE — PLAN OF CARE
No acute changes over night.Pt is alert and oriented on 1.5L NC. Remote tele in place. Pt voiding freely. No BM over night. Pt denies pain. Pt tolerating a general diet. Pt receiving scheduled nebulizer's. IVF and abx running as ordered. Pt is up independently. Call light is within reach and safety measures are in place.    Problem: PAIN - ADULT  Goal: Verbalizes/displays adequate comfort level or patient's stated pain goal  Description: INTERVENTIONS:  - Encourage pt to monitor pain and request assistance  - Assess pain using appropriate pain scale  - Administer analgesics based on type and severity of pain and evaluate response  - Implement non-pharmacological measures as appropriate and evaluate response  - Consider cultural and social influences on pain and pain management  - Manage/alleviate anxiety  - Utilize distraction and/or relaxation techniques  - Monitor for opioid side effects  - Notify MD/LIP if interventions unsuccessful or patient reports new pain  - Anticipate increased pain with activity and pre-medicate as appropriate  Outcome: Progressing     Problem: RISK FOR INFECTION - ADULT  Goal: Absence of fever/infection during anticipated neutropenic period  Description: INTERVENTIONS  - Monitor WBC  - Administer growth factors as ordered  - Implement neutropenic guidelines  Outcome: Progressing     Problem: SAFETY ADULT - FALL  Goal: Free from fall injury  Description: INTERVENTIONS:  - Assess pt frequently for physical needs  - Identify cognitive and physical deficits and behaviors that affect risk of falls.  - [place] fall precautions as indicated by assessment.  - Educate pt/family on patient safety including physical limitations  - Instruct pt to call for assistance with activity based on assessment  - Modify environment to reduce risk of injury  - Provide assistive devices as appropriate  - Consider OT/PT consult to assist with strengthening/mobility  - Encourage toileting schedule  Outcome:  Progressing     Problem: DISCHARGE PLANNING  Goal: Discharge to home or other facility with appropriate resources  Description: INTERVENTIONS:  - Identify barriers to discharge w/pt and caregiver  - Include patient/family/discharge partner in discharge planning  - Arrange for needed discharge resources and transportation as appropriate  - Identify discharge learning needs (meds, wound care, etc)  - Arrange for interpreters to assist at discharge as needed  - Consider post-discharge preferences of patient/family/discharge partner  - Complete POLST form as appropriate  - Assess patient's ability to be responsible for managing their own health  - Refer to Case Management Department for coordinating discharge planning if the patient needs post-hospital services based on physician/LIP order or complex needs related to functional status, cognitive ability or social support system  Outcome: Progressing     Problem: RESPIRATORY - ADULT  Goal: Achieves optimal ventilation and oxygenation  Description: INTERVENTIONS:  - Assess for changes in respiratory status  - Assess for changes in mentation and behavior  - Position to facilitate oxygenation and minimize respiratory effort  - Oxygen supplementation based on oxygen saturation or ABGs  - Provide Smoking Cessation handout, if applicable  - Encourage broncho-pulmonary hygiene including cough, deep breathe, Incentive Spirometry  - Assess the need for suctioning and perform as needed  - Assess and instruct to report SOB or any respiratory difficulty  - Respiratory Therapy support as indicated  - Manage/alleviate anxiety  - Monitor for signs/symptoms of CO2 retention  Outcome: Progressing

## 2024-01-30 NOTE — PROGRESS NOTES
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  He completed definitive concurrent chemoradiotherapy using cisplatin and etoposide early March 2017.  He had low-grade anemia neutropenia on treatment.    He subsequently has been followed on surveillance with no evidence of progression.    The patient started on durvalumab in October 2017 given he had unresectable stage III disease with no evidence of progression following chemo radiotherapy    He has had occasional mild anemia on treatment.  Laboratory follow-up in May 2018 showed severe iron deficiency.  Received IV iron    He completed 13 cycles of durvalumab  however towards the end of his treatment he developed 2 separate instances of pneumonitis/transaminitis requiring separate courses of prednisone.  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     Started single-agent Pembrolizumab on 3/19/21.  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  He has been seen by pulmonology in consultation started on antibiotics and methylprednisolone    Somewhat better today      Performance Status:  ECOG 0    Past Medical History:  Past Medical History:   Diagnosis Date    Deep vein thrombosis (HCC)     Dysphagia     Esophageal reflux     Exposure to medical diagnostic radiation     Hemorrhoids     History of blood transfusion     Pt on Immunotherapy    History of immunotherapy 07/28/2023    Impotence      Lung cancer (HCC)     NSCLCA stage IIIB    Necrotizing granulomatous inflammation of lung (HCC)     Obstructive sleep apnea     Personal history of antineoplastic chemotherapy     Pneumonia due to organism     Pulmonary embolism (HCC)     Pulmonary embolus (HCC)     Sinus problem     Sinus problems    Sleep apnea     CPAP    Visual impairment     wears eyeglasses       Past Surgical History:  Past Surgical History:   Procedure Laterality Date    COLONOSCOPY      COLONOSCOPY N/A 2018    Procedure: COLONOSCOPY;  Surgeon: Cesar Beckman MD;  Location: [facility] ENDOSCOPY    COLONOSCOPY      PORT, INDWELLING, IMP Right 2017    ROTATOR CUFF REPAIR Left     TOTAL KNEE REPLACEMENT Bilateral 2020       Family History:  Family History   Problem Relation Age of Onset    Cancer Father         Lung    Cancer Mother         Breast, ovarian       Social History:  Social History     Socioeconomic History    Marital status:      Spouse name: Not on file    Number of children: Not on file    Years of education: Not on file    Highest education level: Not on file   Occupational History    Not on file   Tobacco Use    Smoking status: Former     Packs/day: 1.00     Years: 30.00     Additional pack years: 0.00     Total pack years: 30.00     Types: Cigarettes     Quit date: 2000     Years since quittin.6    Smokeless tobacco: Never   Vaping Use    Vaping Use: Never used   Substance and Sexual Activity    Alcohol use: Not Currently     Alcohol/week: 0.8 standard drinks of alcohol     Types: 1 Glasses of wine per week     Comment: very rarely    Drug use: No    Sexual activity: Not on file   Other Topics Concern     Service Not Asked    Blood Transfusions Not Asked    Caffeine Concern Yes     Comment: coffee, 2 cups daily    Occupational Exposure Not Asked    Hobby Hazards Not Asked    Sleep Concern Not Asked    Stress Concern Not Asked    Weight Concern Not Asked    Special Diet Not Asked    Back  Care Not Asked    Exercise Not Asked    Bike Helmet Not Asked    Seat Belt Not Asked    Self-Exams Not Asked   Social History Narrative    Not on file     Social Determinants of Health     Financial Resource Strain: Low Risk  (11/27/2023)    Financial Resource Strain     Difficulty of Paying Living Expenses: Not hard at all     Med Affordability: No   Food Insecurity: No Food Insecurity (1/28/2024)    Food Insecurity     Food Insecurity: Never true   Transportation Needs: No Transportation Needs (1/28/2024)    Transportation Needs     Lack of Transportation: No   Physical Activity: Not on file   Stress: Not on file   Social Connections: Not on file   Housing Stability: Low Risk  (1/28/2024)    Housing Stability     Housing Instability: No     Housing Instability Emergency: Not on file         Current Medications:  No current outpatient medications on file.    Allergies:  No Known Allergies     Review of Systems:  All other systems reviewed and negative x12    Vital Signs:  /67 (BP Location: Right arm)   Pulse 67   Temp 97.5 °F (36.4 °C) (Oral)   Resp 18   Ht 1.778 m (5' 10\")   Wt 83.9 kg (185 lb)   SpO2 98%   BMI 26.54 kg/m²     Physical Examination:  General: Patient is alert and oriented x 3, not in acute distress.  Psych:  Mood and affect appropriate, in good spirits.   HEENT: EOMs intact. Oropharynx is clear.   Chest: non-labored breathing, CTA  Cardiovascular: Regular rate and rhythm. Normal S1S2  Extremities: No edema.   Neurological: 5/5 motor x4.    Derm:  No rash    LABS:    CBC:    Lab Results   Component Value Date    WBC 1.8 (L) 01/30/2024    WBC 8.5 01/28/2024    WBC 7.3 12/27/2023     Lab Results   Component Value Date    HGB 12.4 (L) 01/30/2024    HGB 12.9 (L) 01/28/2024    HGB 13.4 12/27/2023      Lab Results   Component Value Date    .0 01/30/2024    .0 01/28/2024    .0 12/27/2023       Lab Results   Component Value Date     (H) 01/30/2024    BUN 14  01/30/2024    BUNCREA 20.6 (H) 01/30/2024    CREATSERUM 0.68 (L) 01/30/2024    ANIONGAP 3 01/30/2024    GFR >60 05/14/2016    GFRNAA 81 07/22/2022    GFRAA 94 07/22/2022    CA 8.4 (L) 01/30/2024    OSMOCALC 292 01/30/2024    ALKPHO 123 (H) 12/27/2023    AST 33 12/27/2023    ALT 22 12/27/2023    ALKPHOS 58 12/10/2011    BILT 0.4 12/27/2023    TP 5.8 12/27/2023    ALB 3.9 12/27/2023    GLOBULIN 1.9 (L) 12/27/2023     01/30/2024    K 4.0 01/30/2024     01/30/2024    CO2 27.0 01/30/2024             Cancer of upper lobe of left lung (HCC)    Staging form: Lung AJCC V7      Clinical stage from 1/9/2017: Stage IIIB (T1a, N3, M0) - Signed by Mike Mcclure MD on 1/9/2017    Impression and Plan:  78 year oldmale former smoker with 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  He was diagnosed in December 2016 originally treated for stage IIIb disease with chemoradiotherapy followed by durvalumab.    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  He has bilateral radiographic changes on chest imaging.  Pneumonitis is a possibility however of note he is off of his immunotherapy for more than 4 months and he is now only on targeted therapy    Monitor clinically continue antibiotics steroids. voriconazole for history of Aspergillus    Hx of pulmonary embolus this is remote okay to hold anticoagulation      Risk Assessment: High advanced cancer on systemic therapy with hypoxia and pneumonia

## 2024-01-30 NOTE — CONSULTS
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  He completed definitive concurrent chemoradiotherapy using cisplatin and etoposide early March 2017.  He had low-grade anemia neutropenia on treatment.    He subsequently has been followed on surveillance with no evidence of progression.    The patient started on durvalumab in October 2017 given he had unresectable stage III disease with no evidence of progression following chemo radiotherapy    He has had occasional mild anemia on treatment.  Laboratory follow-up in May 2018 showed severe iron deficiency.  Received IV iron    He completed 13 cycles of durvalumab  however towards the end of his treatment he developed 2 separate instances of pneumonitis/transaminitis requiring separate courses of prednisone.  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     Started single-agent Pembrolizumab on 3/19/21.  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  He has been seen by pulmonology in consultation started on antibiotics and methylprednisolone      Performance Status:  ECOG 0    Past Medical History:  Past Medical History:   Diagnosis Date    Deep vein thrombosis (HCC)     Dysphagia     Esophageal reflux     Exposure to medical diagnostic radiation     Hemorrhoids     History of blood transfusion     Pt on Immunotherapy    History of immunotherapy 07/28/2023    Impotence     Lung  cancer (HCC)     NSCLCA stage IIIB    Necrotizing granulomatous inflammation of lung (HCC)     Obstructive sleep apnea     Personal history of antineoplastic chemotherapy     Pneumonia due to organism     Pulmonary embolism (HCC)     Pulmonary embolus (HCC)     Sinus problem     Sinus problems    Sleep apnea     CPAP    Visual impairment     wears eyeglasses       Past Surgical History:  Past Surgical History:   Procedure Laterality Date    COLONOSCOPY      COLONOSCOPY N/A 2018    Procedure: COLONOSCOPY;  Surgeon: Cesar Beckman MD;  Location[facility] ENDOSCOPY    COLONOSCOPY      PORT, INDWELLING, IMP Right 2017    ROTATOR CUFF REPAIR Left     TOTAL KNEE REPLACEMENT Bilateral        Family History:  Family History   Problem Relation Age of Onset    Cancer Father         Lung    Cancer Mother         Breast, ovarian       Social History:  Social History     Socioeconomic History    Marital status:      Spouse name: Not on file    Number of children: Not on file    Years of education: Not on file    Highest education level: Not on file   Occupational History    Not on file   Tobacco Use    Smoking status: Former     Packs/day: 1.00     Years: 30.00     Additional pack years: 0.00     Total pack years: 30.00     Types: Cigarettes     Quit date: 2000     Years since quittin.6    Smokeless tobacco: Never   Vaping Use    Vaping Use: Never used   Substance and Sexual Activity    Alcohol use: Not Currently     Alcohol/week: 0.8 standard drinks of alcohol     Types: 1 Glasses of wine per week     Comment: very rarely    Drug use: No    Sexual activity: Not on file   Other Topics Concern     Service Not Asked    Blood Transfusions Not Asked    Caffeine Concern Yes     Comment: coffee, 2 cups daily    Occupational Exposure Not Asked    Hobby Hazards Not Asked    Sleep Concern Not Asked    Stress Concern Not Asked    Weight Concern Not Asked    Special Diet Not Asked    Back Care Not  Asked    Exercise Not Asked    Bike Helmet Not Asked    Seat Belt Not Asked    Self-Exams Not Asked   Social History Narrative    Not on file     Social Determinants of Health     Financial Resource Strain: Low Risk  (11/27/2023)    Financial Resource Strain     Difficulty of Paying Living Expenses: Not hard at all     Med Affordability: No   Food Insecurity: No Food Insecurity (1/28/2024)    Food Insecurity     Food Insecurity: Never true   Transportation Needs: No Transportation Needs (1/28/2024)    Transportation Needs     Lack of Transportation: No   Physical Activity: Not on file   Stress: Not on file   Social Connections: Not on file   Housing Stability: Low Risk  (1/28/2024)    Housing Stability     Housing Instability: No     Housing Instability Emergency: Not on file         Current Medications:  No current outpatient medications on file.    Allergies:  No Known Allergies     Review of Systems:  All other systems reviewed and negative x12    Vital Signs:  /62 (BP Location: Right arm)   Pulse 74   Temp 98.7 °F (37.1 °C) (Oral)   Resp 18   Ht 1.778 m (5' 10\")   Wt 83.9 kg (185 lb)   SpO2 93%   BMI 26.54 kg/m²     Physical Examination:  General: Patient is alert and oriented x 3, not in acute distress.  Psych:  Mood and affect appropriate, in good spirits.   HEENT: EOMs intact. Oropharynx is clear.   Chest: non-labored breathing, CTA  Cardiovascular: Regular rate and rhythm. Normal S1S2  Extremities: No edema.   Neurological: 5/5 motor x4.    Derm:  No rash    LABS:    CBC:    Lab Results   Component Value Date    WBC 8.5 01/28/2024    WBC 7.3 12/27/2023    WBC 6.8 12/12/2023     Lab Results   Component Value Date    HGB 12.9 (L) 01/28/2024    HGB 13.4 12/27/2023    HGB 12.6 (L) 12/12/2023      Lab Results   Component Value Date    .0 01/28/2024    .0 12/27/2023    .0 12/12/2023       Lab Results   Component Value Date     (H) 01/28/2024    BUN 20 01/28/2024    BUNCREA  21.5 (H) 01/28/2024    CREATSERUM 0.93 01/28/2024    ANIONGAP 7 01/28/2024    GFR >60 05/14/2016    GFRNAA 81 07/22/2022    GFRAA 94 07/22/2022    CA 8.4 (L) 01/28/2024    OSMOCALC 287 01/28/2024    ALKPHO 123 (H) 12/27/2023    AST 33 12/27/2023    ALT 22 12/27/2023    ALKPHOS 58 12/10/2011    BILT 0.4 12/27/2023    TP 5.8 12/27/2023    ALB 3.9 12/27/2023    GLOBULIN 1.9 (L) 12/27/2023     01/28/2024    K 3.6 01/28/2024     01/28/2024    CO2 26.0 01/28/2024             Cancer of upper lobe of left lung (HCC)    Staging form: Lung AJCC V7      Clinical stage from 1/9/2017: Stage IIIB (T1a, N3, M0) - Signed by Mike Mcclure MD on 1/9/2017    Impression and Plan:  78 year oldmale former smoker with 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  He was diagnosed in December 2016 originally treated for stage IIIb disease with chemoradiotherapy followed by durvalumab.    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  He has bilateral radiographic changes on chest imaging.  Pneumonitis is a possibility however of note he is off of his immunotherapy for more than 4 months and he is now only on targeted therapy    Monitor clinically continue antibiotics steroids. voriconazole for history of Aspergillus    Hx of pulmonary embolus this is remote okay to hold anticoagulation      Risk Assessment: High advanced cancer on systemic therapy with hypoxia and pneumonia

## 2024-01-30 NOTE — PAYOR COMM NOTE
--------------  ADMISSION REVIEW     Payor: [employer] AD[employer] PPO  Subscriber #:  [id number]  Authorization Number: [id number]    Admit date: 24  Admit time:  6:09 PM       REVIEW DOCUMENTATION:     ED Provider Notes        ED Provider Notes signed by Luz Marina Sullivan MD at 2024  4:06 PM       Author: Luz Marina Sullivan MD Service: -- Author Type: Physician    Filed: 2024  4:06 PM Date of Service: 2024  3:53 PM Status: Signed    : Luz Marina Sullivan MD (Physician)         [place] Emergency Department Note  Patient: Luis Villasenor Age: 78 year old Sex: male      MRN: [id number]  : 1945    Patient Seen in: [facility] Emergency Department    History     Chief Complaint   Patient presents with    Difficulty Breathing    Fever     Stated Complaint: hypoxia    History obtained from: Patient and patient's wife    Patient is an 78-year-old male with past medical history of metastatic non-small cell lung cancer with BRAF mutation on dabrafenib trametinib presenting to the emergency department for evaluation of chills, fever, dyspnea and cough at home.  States that symptoms started at approximately 9 AM this morning.  States that he had a temperature of one 1.2 at home.  Endorses feeling mildly dyspneic and having occasionally productive cough.  He states that he uses home pulse oximeter and noticed O2 saturations to be in the 80s prompting him to come to the emergency department for evaluation. He states that he does have a history of pneumonitis requiring hospitalizations in the past.    Review of Systems:  Review of Systems  Positive for stated complaint: hypoxia. Constitutional and vital signs reviewed. All other systems reviewed and negative except as noted above.    Patient History:  Past Medical History:   Diagnosis Date    Deep vein thrombosis (HCC)     Dysphagia     Esophageal reflux     Exposure to medical diagnostic radiation     Hemorrhoids     History of blood transfusion     Pt on  Immunotherapy    History of immunotherapy 2023    Impotence     Lung cancer (HCC)     NSCLCA stage IIIB    Necrotizing granulomatous inflammation of lung (HCC)     Obstructive sleep apnea     Personal history of antineoplastic chemotherapy     Pneumonia due to organism     Pulmonary embolism (HCC)     Pulmonary embolus (HCC)     Sinus problem     Sinus problems    Sleep apnea     CPAP    Visual impairment     wears eyeglasses       Past Surgical History:   Procedure Laterality Date    COLONOSCOPY      COLONOSCOPY N/A 2018    Procedure: COLONOSCOPY;  Surgeon: Cesar Beckman MD;  Location: [facility] ENDOSCOPY    COLONOSCOPY      PORT, INDWELLING, IMP Right 2017    ROTATOR CUFF REPAIR Left     TOTAL KNEE REPLACEMENT Bilateral         Family History   Problem Relation Age of Onset    Cancer Father         Lung    Cancer Mother         Breast, ovarian       Specific Social Determinants of Health:   Social History     Socioeconomic History    Marital status:    Tobacco Use    Smoking status: Former     Packs/day: 1.00     Years: 30.00     Additional pack years: 0.00     Total pack years: 30.00     Types: Cigarettes     Quit date: 2000     Years since quittin.6    Smokeless tobacco: Never   Vaping Use    Vaping Use: Never used   Substance and Sexual Activity    Alcohol use: Not Currently     Alcohol/week: 0.8 standard drinks of alcohol     Types: 1 Glasses of wine per week     Comment: very rarely    Drug use: No   Other Topics Concern    Caffeine Concern Yes     Comment: coffee, 2 cups daily     Social Determinants of Health     Financial Resource Strain: Low Risk  (2023)    Financial Resource Strain     Difficulty of Paying Living Expenses: Not hard at all     Med Affordability: No   Food Insecurity: No Food Insecurity (2023)    Food Insecurity     Food Insecurity: Never true   Transportation Needs: No Transportation Needs (2023)    Transportation Needs     Lack of  Transportation: No   Housing Stability: Low Risk  (12/11/2023)    Housing Stability     Housing Instability: No           PSFH elements reviewed from today and agreed except as otherwise stated in HPI.    Physical Exam     ED Triage Vitals [01/28/24 1251]   /69   Pulse 83   Resp 20   Temp 98.1 °F (36.7 °C)   Temp src Oral   SpO2 92 %   O2 Device None (Room air)       Current:/51   Pulse 67   Temp 99.2 °F (37.3 °C) (Temporal)   Resp 22   Ht 177.8 cm (5' 10\")   Wt 83.9 kg   SpO2 95%   BMI 26.54 kg/m²         Physical Exam  Constitutional:       Appearance: He is well-developed.   HENT:      Head: Normocephalic and atraumatic.      Right Ear: External ear normal.      Left Ear: External ear normal.      Nose: Nose normal.   Eyes:      Conjunctiva/sclera: Conjunctivae normal.      Pupils: Pupils are equal, round, and reactive to light.   Cardiovascular:      Rate and Rhythm: Normal rate and regular rhythm.      Heart sounds: Normal heart sounds.   Pulmonary:      Effort: Pulmonary effort is normal.      Breath sounds: Examination of the left-middle field reveals rhonchi. Examination of the left-lower field reveals rhonchi. Rhonchi present.   Abdominal:      General: Bowel sounds are normal.      Palpations: Abdomen is soft.      Tenderness: There is no abdominal tenderness.   Musculoskeletal:         General: Normal range of motion.      Cervical back: Normal range of motion and neck supple.   Skin:     General: Skin is warm and dry.      Findings: No rash.   Neurological:      General: No focal deficit present.      Mental Status: He is alert and oriented to person, place, and time.      Deep Tendon Reflexes: Reflexes are normal and symmetric.   Psychiatric:         Mood and Affect: Mood normal.         Behavior: Behavior normal.         ED Course   Labs:   Labs Reviewed   BASIC METABOLIC PANEL (8) - Abnormal; Notable for the following components:       Result Value    Glucose 135 (*)     BUN/CREA  Ratio 21.5 (*)     Calcium, Total 8.4 (*)     All other components within normal limits   CBC W/ DIFFERENTIAL - Abnormal; Notable for the following components:    HGB 12.9 (*)     RDW-SD 50.3 (*)     Lymphocyte Absolute 0.26 (*)     All other components within normal limits   LACTIC ACID, PLASMA - Normal   SARS-COV-2/FLU A AND B/RSV BY PCR (GENEXPERT) - Normal    Narrative:     This test is intended for the qualitative detection and differentiation of SARS-CoV-2, influenza A, influenza B, and respiratory syncytial virus (RSV) viral RNA in nasopharyngeal or nares swabs from individuals suspected of respiratory viral infection consistent with COVID-19 by their healthcare provider. Signs and symptoms of respiratory viral infection due to SARS-CoV-2, influenza, and RSV can be similar.    Test performed using the Xpert Xpress SARS-CoV-2/FLU/RSV (real time RT-PCR)  assay on the [employer] instrument, [employer], [place] [address].   This test is being used under the Food and Drug Administration's Emergency Use Authorization.    The authorized Fact Sheet for Healthcare Providers for this assay is available upon request from the laboratory.   CBC WITH DIFFERENTIAL WITH PLATELET    Narrative:     The following orders were created for panel order CBC With Differential With Platelet.  Procedure                               Abnormality         Status                     ---------                               -----------         ------                     CBC W/ DIFFERENTIAL[[id number]]          Abnormal            Final result                 Please view results for these tests on the individual orders.   URINALYSIS WITH CULTURE REFLEX   PROCALCITONIN   BLOOD CULTURE   BLOOD CULTURE     Radiology findings:  I personally reviewed the images.   XR CHEST AP PORTABLE  (CPT=71045)    Result Date: 1/28/2024  CONCLUSION:  1. Patchy right perihilar/upper lobe opacities are new or more conspicuous since comparison chest radiograph from  November, 2023. Given clinical context, findings are suspicious for pneumonia.  Radiographic follow-up to resolution is advised. 2. Other previously noted bibasilar hazy opacities appear improved since prior and likely relate to resolution of infection/pneumonia at these sites. 3. Stable streaky left perihilar opacities, which likely represent treatment related fibrosis. 4. Lesser incidental findings as above.     Dictated by (CST): Dennis Cuenca MD on 1/28/2024 at 2:32 PM     Finalized by (CST): Dennis Cuenca MD on 1/28/2024 at 2:36 PM           Cardiac Monitor: Interpreted by me.   Pulse Readings from Last 1 Encounters:   01/28/24 67   , sinus,     External non-ED records reviewed independently by me: Prior CT chest from 9/6/2023 reviewed showing left upper lobe postradiation fibrosis in the central aspect of the left lung with unchanged smooth interlobular septal thickening throughout the left lung reading peripherally from the region of fibrosis related to lymphangitic carcinomatosis.  Moderate right hilar and subcarinal lymphadenopathy relating to vy metastatic disease.  Also with 1 cm nodule in the lingula and 3 mm subpleural nodule in the anterior segment of the right upper lobe.    MDM   78-year-old male with a past medical history of metastatic non-small cell lung carcinoma currently on immunotherapy and daily oral chemotherapy presenting today for evaluation of shortness of breath, cough, fevers and chills.  Noted to be hypoxic with O2 saturations in the mid 80s at home.  Upon arrival, he is saturating at 90% on room air.  Rhonchi noted in the left lateral lung base.  Took Tylenol prior to arrival.    Differential diagnoses considered includes, but is not limited to: Pneumonia, postobstructive pneumonia, pulmonary embolism, pneumonitis, viral syndrome    Will obtain the following tests: CBC, BMP, urinalysis, lactic acid, blood culture, COVID/flu/RSV panel, chest x-ray, CT PE  Please see ED course  for my independent review of these tests/imaging results.    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  Labs overall reassuring with no significant leukocytosis.  Lactic acid is not elevated.  He has no evidence of sepsis, severe sepsis, or septic shock.  COVID/flu/RSV negative.  I independently reviewed the chest x-ray images that show evidence of a new right upper lobe pneumonia.  Agree with radiology read above.  Started on Rocephin and azithromycin.  I endorsed the patient's case to Dr. Justice, who accepted the patient for admission.  Also discussed with Dr. Hernández, oncology, who was made aware of new consult.    Patient's case signed out to oncoming ED physician with CT PE images pending at this time to rule out pulmonary embolism.  Patient is otherwise stable for transfer to floor at this time.    Disposition and Plan     Clinical Impression:  1. Community acquired pneumonia of right upper lobe of lung        Disposition:  Admit    Follow-up:  No follow-up provider specified.    Medications Prescribed:  Current Discharge Medication List          Hospital Problems       Present on Admission  Date Reviewed: 1/18/2024            ICD-10-CM Noted POA    * (Principal) Community acquired pneumonia, unspecified laterality J18.9 1/28/2024 Unknown        This note may have been created using voice dictation technology and may include inadvertent errors.      Luz Marina Sullivan MD  Emergency Medicine          Signed by Luz Marina Sullivan MD on 1/28/2024  4:06 PM         H&P Note    No notes of this type exist for this encounter.       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  He was diagnosed in December 2016 originally treated for stage IIIb disease with chemoradiotherapy followed by durvalumab.     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  He has bilateral radiographic changes on chest imaging.  Pneumonitis is a possibility however of note he is off of his immunotherapy for more than 4 months and he is now only on targeted therapy     Monitor clinically continue antibiotics steroids. voriconazole for history of Aspergillus     Hx of pulmonary embolus this is remote okay to hold anticoagulation        Risk Assessment: High advanced cancer on systemic therapy with hypoxia and pneumonia    MEDICATIONS ADMINISTERED IN LAST 1 DAY:  azithromycin (Zithromax) tab 500 mg       Date Action Dose Route User    1/29/2024 1519 Given 500 mg Oral Ronel Petersen, RN          cefTRIAXone (Rocephin) 1 g in D5W 100  mL IVPB-ADD       Date Action Dose Route User    1/29/2024 1519 New Bag 1 g Intravenous Ronel Petersen RN          fluticasone furoate-vilanterol (Breo Ellipta) 100-25 MCG/ACT inhaler 1 puff       Date Action Dose Route User    1/30/2024 0824 Given 1 puff Inhalation Gunjan Stevens RN          ipratropium-albuterol (Duoneb) 0.5-2.5 (3) MG/3ML inhalation solution 3 mL       Date Action Dose Route User    1/30/2024 0826 Given 3 mL Nebulization Low Singh          methylPREDNISolone sodium succinate (Solu-MEDROL) injection 40 mg       Date Action Dose Route User    1/30/2024 0247 Given 40 mg Intravenous Sarah Wilder RN    1/29/2024 1520 Given 40 mg Intravenous Ronel Petersen RN          pantoprazole (Protonix) DR tab 40 mg       Date Action Dose Route User    1/30/2024 0559 Given 40 mg Oral Sarah Wilder RN          rivaroxaban (Xarelto) tab 10 mg       Date Action Dose Route User    1/29/2024 2117 Given 10 mg Oral Sarah Wilder RN          sodium chloride 0.9% infusion       Date Action Dose Route User    1/30/2024 1011 New Bag (none) Intravenous Gunjan Stevens RN    1/29/2024 2136 New Bag (none) Intravenous Sarah Wilder RN          vancomycin (Vancocin) cap 125 mg       Date Action Dose Route User    1/30/2024 0820 Given 125 mg Oral Gunjan Stevens RN          voriconazole (Vfend) tab 200 mg       Date Action Dose Route User    1/30/2024 0820 Given 200 mg Oral Gunjan Stevens RN    1/29/2024 2117 Given 200 mg Oral Sarah Wilder RN            Vitals (last day)       Date/Time Temp Pulse Resp BP SpO2 Weight O2 Device O2 Flow Rate (L/min) [facility]    01/29/24 2026 98.7 °F (37.1 °C) -- 18 125/62 93 % -- Nasal cannula 1.5 L/min     01/29/24 1254 100.2 °F (37.9 °C) 74 18 109/54 92 % -- Nasal cannula 2 L/min     01/29/24 0900 -- -- -- -- 94 % -- Nasal cannula 1.5 L/min LS    01/29/24 0833 -- -- -- -- 94 % -- Nasal cannula 2 L/min LS    01/29/24 0026 96.6 °F (35.9 °C) 64 -- 129/79 94  % -- CPAP -- MN          CIWA Scores (since admission)       None            PLEASE FAX DAYS CERTIFIED AND NEXT REVIEW DATE [phone]

## 2024-01-31 ENCOUNTER — APPOINTMENT (OUTPATIENT)
Dept: GENERAL RADIOLOGY | Facility: HOSPITAL | Age: 79
End: 2024-01-31
Attending: INTERNAL MEDICINE
Payer: MEDICARE

## 2024-01-31 PROCEDURE — 71045 X-RAY EXAM CHEST 1 VIEW: CPT | Performed by: INTERNAL MEDICINE

## 2024-01-31 PROCEDURE — 99232 SBSQ HOSP IP/OBS MODERATE 35: CPT | Performed by: INTERNAL MEDICINE

## 2024-01-31 NOTE — PROGRESS NOTES
[facility]    Progress Note      Assessment and Plan:   1.  Acute on chronic respiratory failure-differential includes community-acquired bacterial pneumonia, lingering fungal infection, pneumonitis associated with immunotherapy.  Patient was started on empiric antibiotic.  He remains on voriconazole.  He has underlying lung cancer but this seems to have improved radiographically with decrease in size of the lymph nodes.    The patient continues to improve clinically, but not near baseline self.  Will continue 1 more day of empiric therapies in the hospital and hopefully home tomorrow.    Recommendations:  1.  Empiric antibiotic  2.  Voriconazole  3.  Will initiate methylprednisolone temporarily  4.  We will follow clinically.    2.  DVT prophylaxis-resume Xarelto    3.  Lung cancer-to follow-up oncology    4.  Status postcholecystectomy    Subjective:   Luis Villasenor is a(n) 78 year old male who is breathing better today    Objective:   Blood pressure 117/64, pulse 60, temperature 97.5 °F (36.4 °C), temperature source Axillary, resp. rate 18, height 5' 10\" (1.778 m), weight 185 lb (83.9 kg), SpO2 94%.    Physical Exam alert white male  HEENT examination is unremarkable with pupils equal round and reactive to light and accommodation.   Neck without adenopathy, thyromegaly, JVD nor bruit.   Lungs slightly diminished to auscultation and percussion.  Cardiac regular rate and rhythm no murmur.   Abdomen nontender, without hepatosplenomegaly and no mass appreciable.   Extremities without clubbing cyanosis nor edema.   Neurologic grossly intact with symmetric tone and strength and reflex.  Skin without gross abnormality     Results:            Chris Hernandez MD  Medical Director, Critical Care, [facility]  Medical Director, [facility]  Pager: [phone]

## 2024-01-31 NOTE — PLAN OF CARE
Received patient alert and oriented x 4, resting in bed. No family at bedside. CMS intact. Room air, tolerated CPAP overnight. CXR completed. Tele in place. Heart rate 45-50 BPM while sleeping, asymptomatic. MD notified. Xarelto therapy. Tolerated diet. LBM 1/30/24. Voiding without complications. Up independently. Fall precautions discussed. Bed locked in low position with call light and belongings within reach. IVF. Right chest port. Vitals stable. Afebrile. IV/PO ABT. Plan is home with wife pending clearance.   Problem: Patient Centered Care  Goal: Patient preferences are identified and integrated in the patient's plan of care  Description: Interventions:  - What would you like us to know as we care for you? I had surgery last month   - Provide timely, complete, and accurate information to patient/family  - Incorporate patient and family knowledge, values, beliefs, and cultural backgrounds into the planning and delivery of care  - Encourage patient/family to participate in care and decision-making at the level they choose  - Honor patient and family perspectives and choices  Outcome: Progressing     Problem: PAIN - ADULT  Goal: Verbalizes/displays adequate comfort level or patient's stated pain goal  Description: INTERVENTIONS:  - Encourage pt to monitor pain and request assistance  - Assess pain using appropriate pain scale  - Administer analgesics based on type and severity of pain and evaluate response  - Implement non-pharmacological measures as appropriate and evaluate response  - Consider cultural and social influences on pain and pain management  - Manage/alleviate anxiety  - Utilize distraction and/or relaxation techniques  - Monitor for opioid side effects  - Notify MD/LIP if interventions unsuccessful or patient reports new pain  - Anticipate increased pain with activity and pre-medicate as appropriate  Outcome: Progressing     Problem: RISK FOR INFECTION - ADULT  Goal: Absence of fever/infection during  anticipated neutropenic period  Description: INTERVENTIONS  - Monitor WBC  - Administer growth factors as ordered  - Implement neutropenic guidelines  Outcome: Progressing     Problem: SAFETY ADULT - FALL  Goal: Free from fall injury  Description: INTERVENTIONS:  - Assess pt frequently for physical needs  - Identify cognitive and physical deficits and behaviors that affect risk of falls.  - [place] fall precautions as indicated by assessment.  - Educate pt/family on patient safety including physical limitations  - Instruct pt to call for assistance with activity based on assessment  - Modify environment to reduce risk of injury  - Provide assistive devices as appropriate  - Consider OT/PT consult to assist with strengthening/mobility  - Encourage toileting schedule  Outcome: Progressing     Problem: DISCHARGE PLANNING  Goal: Discharge to home or other facility with appropriate resources  Description: INTERVENTIONS:  - Identify barriers to discharge w/pt and caregiver  - Include patient/family/discharge partner in discharge planning  - Arrange for needed discharge resources and transportation as appropriate  - Identify discharge learning needs (meds, wound care, etc)  - Arrange for interpreters to assist at discharge as needed  - Consider post-discharge preferences of patient/family/discharge partner  - Complete POLST form as appropriate  - Assess patient's ability to be responsible for managing their own health  - Refer to Case Management Department for coordinating discharge planning if the patient needs post-hospital services based on physician/LIP order or complex needs related to functional status, cognitive ability or social support system  Outcome: Progressing     Problem: Patient/Family Goals  Goal: Patient/Family Long Term Goal  Description: Patient's Long Term Goal: To be discharged    Interventions:  - get clearance   - See additional Care Plan goals for specific interventions  Outcome:  Progressing  Goal: Patient/Family Short Term Goal  Description: Patient's Short Term Goal: Manage pain    Interventions:   - monitor and assess pain   - See additional Care Plan goals for specific interventions  Outcome: Progressing     Problem: RESPIRATORY - ADULT  Goal: Achieves optimal ventilation and oxygenation  Description: INTERVENTIONS:  - Assess for changes in respiratory status  - Assess for changes in mentation and behavior  - Position to facilitate oxygenation and minimize respiratory effort  - Oxygen supplementation based on oxygen saturation or ABGs  - Provide Smoking Cessation handout, if applicable  - Encourage broncho-pulmonary hygiene including cough, deep breathe, Incentive Spirometry  - Assess the need for suctioning and perform as needed  - Assess and instruct to report SOB or any respiratory difficulty  - Respiratory Therapy support as indicated  - Manage/alleviate anxiety  - Monitor for signs/symptoms of CO2 retention  Outcome: Progressing     Problem: CARDIOVASCULAR - ADULT  Goal: Maintains optimal cardiac output and hemodynamic stability  Description: INTERVENTIONS:  - Monitor vital signs, rhythm, and trends  - Monitor for bleeding, hypotension and signs of decreased cardiac output  - Evaluate effectiveness of vasoactive medications to optimize hemodynamic stability  - Monitor arterial and/or venous puncture sites for bleeding and/or hematoma  - Assess quality of pulses, skin color and temperature  - Assess for signs of decreased coronary artery perfusion - ex. Angina  - Evaluate fluid balance, assess for edema, trend weights  Outcome: Progressing  Goal: Absence of cardiac arrhythmias or at baseline  Description: INTERVENTIONS:  - Continuous cardiac monitoring, monitor vital signs, obtain 12 lead EKG if indicated  - Evaluate effectiveness of antiarrhythmic and heart rate control medications as ordered  - Initiate emergency measures for life threatening arrhythmias  - Monitor electrolytes and  administer replacement therapy as ordered  Outcome: Progressing     Problem: METABOLIC/FLUID AND ELECTROLYTES - ADULT  Goal: Electrolytes maintained within normal limits  Description: INTERVENTIONS:  - Monitor labs and rhythm and assess patient for signs and symptoms of electrolyte imbalances  - Administer electrolyte replacement as ordered  - Monitor response to electrolyte replacements, including rhythm and repeat lab results as appropriate  - Fluid restriction as ordered  - Instruct patient on fluid and nutrition restrictions as appropriate  Outcome: Progressing     Problem: SKIN/TISSUE INTEGRITY - ADULT  Goal: Skin integrity remains intact  Description: INTERVENTIONS  - Assess and document risk factors for pressure ulcer development  - Assess and document skin integrity  - Monitor for areas of redness and/or skin breakdown  - Initiate interventions, skin care algorithm/standards of care as needed  Outcome: Progressing

## 2024-01-31 NOTE — PLAN OF CARE
Patient alert and oriented x4. Room air. Voids freely. Ambulates on halls. R side chest port. Tele in place. Tolerating general diet well. Call light with a reach.   Problem: Patient Centered Care  Goal: Patient preferences are identified and integrated in the patient's plan of care  Description: Interventions:  - What would you like us to know as we care for you? I had surgery last month   - Provide timely, complete, and accurate information to patient/family  - Incorporate patient and family knowledge, values, beliefs, and cultural backgrounds into the planning and delivery of care  - Encourage patient/family to participate in care and decision-making at the level they choose  - Honor patient and family perspectives and choices  Outcome: Progressing

## 2024-01-31 NOTE — PROGRESS NOTES
[facility]    Progress Note      Assessment and Plan:   1.  Acute on chronic respiratory failure-differential includes community-acquired bacterial pneumonia, lingering fungal infection, pneumonitis associated with immunotherapy.  Patient was started on empiric antibiotic.  He remains on voriconazole.  He has underlying lung cancer but this seems to have improved radiographically with decrease in size of the lymph nodes.    The patient is breathing much better today.  Will continue current therapy.    Recommendations:  1.  Empiric antibiotic  2.  Voriconazole  3.  Will initiate methylprednisolone temporarily  4.  We will follow clinically.    2.  DVT prophylaxis-resume Xarelto    3.  Lung cancer-to follow-up oncology    4.  Status postcholecystectomy    Subjective:   Luis Villasenor is a(n) 78 year old male who is breathing better today    Objective:   Blood pressure 111/67, pulse 67, temperature 97.5 °F (36.4 °C), temperature source Oral, resp. rate 18, height 5' 10\" (1.778 m), weight 185 lb (83.9 kg), SpO2 98%.    Physical Exam alert white male  HEENT examination is unremarkable with pupils equal round and reactive to light and accommodation.   Neck without adenopathy, thyromegaly, JVD nor bruit.   Lungs slightly diminished to auscultation and percussion.  Cardiac regular rate and rhythm no murmur.   Abdomen nontender, without hepatosplenomegaly and no mass appreciable.   Extremities without clubbing cyanosis nor edema.   Neurologic grossly intact with symmetric tone and strength and reflex.  Skin without gross abnormality     Results:     Lab Results   Component Value Date    WBC 1.8 01/30/2024    HGB 12.4 01/30/2024    HCT 36.8 01/30/2024    .0 01/30/2024    CREATSERUM 0.68 01/30/2024    BUN 14 01/30/2024     01/30/2024    K 4.0 01/30/2024     01/30/2024    CO2 27.0 01/30/2024     01/30/2024    CA 8.4 01/30/2024       Chris Hernandez MD  Medical Director, Critical  Care, [facility]  Medical Director, [facility]  Pager: [phone]

## 2024-01-31 NOTE — PAYOR COMM NOTE
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  He was diagnosed in December 2016 originally treated for stage IIIb disease with chemoradiotherapy followed by durvalumab.     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  He has bilateral radiographic changes on chest imaging.  Pneumonitis is a possibility however of note he is off of his immunotherapy for more than 4 months and he is now only on targeted therapy     Monitor clinically continue antibiotics steroids. voriconazole for history of Aspergillus     Hx of pulmonary embolus this is remote okay to hold anticoagulation             1/30 Pulmonary  Assessment and Plan:   1.  Acute on chronic respiratory failure-differential includes community-acquired bacterial pneumonia, lingering fungal infection, pneumonitis associated with immunotherapy.  Patient was started on empiric antibiotic.  He remains on voriconazole.  He has underlying lung cancer but this seems to have improved radiographically with decrease in size of the lymph nodes.     The patient is breathing much better today.  Will  continue current therapy.     Recommendations:  1.  Empiric antibiotic  2.  Voriconazole  3.  Will initiate methylprednisolone temporarily  4.  We will follow clinically.     2.  DVT prophylaxis-resume Xarelto     3.  Lung cancer-to follow-up oncology     4.  Status postcholecystectomy    Objective:   Blood pressure 111/67, pulse 67, temperature 97.5 °F (36.4 °C), temperature source Oral, resp. rate 18, height 5' 10\" (1.778 m), weight 185 lb (83.9 kg), SpO2 98%.     Physical Exam alert white male  HEENT examination is unremarkable with pupils equal round and reactive to light and accommodation.   Neck without adenopathy, thyromegaly, JVD nor bruit.   Lungs slightly diminished to auscultation and percussion.  Cardiac regular rate and rhythm no murmur.   Abdomen nontender, without hepatosplenomegaly and no mass appreciable.   Extremities without clubbing cyanosis nor edema.   Neurologic grossly intact with symmetric tone and strength and reflex.  Skin without gross abnormality     Results:            Lab Results   Component Value Date     WBC 1.8 01/30/2024     HGB 12.4 01/30/2024     HCT 36.8 01/30/2024     .0 01/30/2024     CREATSERUM 0.68 01/30/2024     BUN 14 01/30/2024      01/30/2024     K 4.0 01/30/2024      01/30/2024     CO2 27.0 01/30/2024      01/30/2024     CA 8.4 01/30/2024          MEDICATIONS ADMINISTERED IN LAST 1 DAY:  azithromycin (Zithromax) tab 500 mg       Date Action Dose Route User    1/30/2024 1443 Given 500 mg Oral Gunjan Stevens RN          cefTRIAXone (Rocephin) 1 g in D5W 100 mL IVPB-ADD       Date Action Dose Route User    1/30/2024 1443 New Bag 1 g Intravenous Gunjan Stevens RN          fluticasone furoate-vilanterol (Breo Ellipta) 100-25 MCG/ACT inhaler 1 puff       Date Action Dose Route User    1/31/2024 0833 Given 1 puff Inhalation Lainey Min RN          ipratropium-albuterol (Duoneb) 0.5-2.5 (3) MG/3ML inhalation solution 3 mL       Date  Action Dose Route User    1/31/2024 0825 Given 3 mL Nebulization Reid, Patt SAMUELS RCP    1/30/2024 2003 Given 3 mL Nebulization Joann Roche RCP          methylPREDNISolone sodium succinate (Solu-MEDROL) injection 40 mg       Date Action Dose Route User    1/31/2024 0143 Given 40 mg Intravenous Renetta Corley RN    1/30/2024 1443 Given 40 mg Intravenous Gunjan Stevens RN          pantoprazole (Protonix) DR tab 40 mg       Date Action Dose Route User    1/31/2024 0634 Given 40 mg Oral Renetta Corley RN          rivaroxaban (Xarelto) tab 10 mg       Date Action Dose Route User    1/30/2024 2101 Given 10 mg Oral Renetta Corley RN          sodium chloride 0.9% infusion       Date Action Dose Route User    1/31/2024 0635 New Bag (none) Intravenous Renetta Corley RN    1/30/2024 2102 New Bag (none) Intravenous Renetta Corley RN    1/30/2024 1011 New Bag (none) Intravenous Gunjan Stevens RN          vancomycin (Vancocin) cap 125 mg       Date Action Dose Route User    1/31/2024 0833 Given 125 mg Oral Lainey Min RN          voriconazole (Vfend) tab 200 mg       Date Action Dose Route User    1/31/2024 0833 Given 200 mg Oral Lainey Min RN    1/30/2024 2101 Given 200 mg Oral Renetta Corley RN            Vitals (last day)       Date/Time Temp Pulse Resp BP SpO2 Weight O2 Device O2 Flow Rate (L/min) [facility]    01/31/24 0833 -- -- -- -- 95 % -- None (Room air) -- KELIN    01/31/24 0329 97.8 °F (36.6 °C) -- 18 147/69 93 % -- None (Room air) -- AQ    01/31/24 0033 -- 60 -- 139/87 100 % -- -- -- AQ    01/30/24 2317 -- -- -- -- -- -- CPAP -- LH    01/30/24 2215 -- 62 -- -- 96 % -- -- -- AJ    01/30/24 2050 98 °F (36.7 °C) -- 18 127/63 95 % -- None (Room air) -- AQ    01/30/24 2050 -- 69 -- -- -- -- -- -- LH    01/30/24 1229 97.5 °F (36.4 °C) 67 18 111/67 98 % -- None (Room air) -- KA    01/30/24 0818 97.4 °F (36.3 °C) 76 18 139/79 96 % -- None (Room air) -- KW    01/30/24 0004 -- -- -- -- 97 % -- --  -- SN

## 2024-02-01 ENCOUNTER — TELEPHONE (OUTPATIENT)
Dept: PULMONOLOGY | Facility: CLINIC | Age: 79
End: 2024-02-01

## 2024-02-01 VITALS
TEMPERATURE: 98 F | HEIGHT: 70 IN | SYSTOLIC BLOOD PRESSURE: 160 MMHG | DIASTOLIC BLOOD PRESSURE: 85 MMHG | HEART RATE: 66 BPM | WEIGHT: 185 LBS | BODY MASS INDEX: 26.48 KG/M2 | RESPIRATION RATE: 18 BRPM | OXYGEN SATURATION: 92 %

## 2024-02-01 DIAGNOSIS — C34.92 SQUAMOUS CARCINOMA OF LUNG, LEFT (HCC): Primary | ICD-10-CM

## 2024-02-01 DIAGNOSIS — J18.9 UNRESOLVED PNEUMONIA: ICD-10-CM

## 2024-02-01 DIAGNOSIS — J44.9 CHRONIC OBSTRUCTIVE PULMONARY DISEASE, UNSPECIFIED COPD TYPE (HCC): Primary | ICD-10-CM

## 2024-02-01 PROCEDURE — 99232 SBSQ HOSP IP/OBS MODERATE 35: CPT | Performed by: INTERNAL MEDICINE

## 2024-02-01 PROCEDURE — 99238 HOSP IP/OBS DSCHRG MGMT 30/<: CPT | Performed by: INTERNAL MEDICINE

## 2024-02-01 RX ORDER — IPRATROPIUM BROMIDE AND ALBUTEROL SULFATE 2.5; .5 MG/3ML; MG/3ML
3 SOLUTION RESPIRATORY (INHALATION) EVERY 6 HOURS PRN
Qty: 90 EACH | Refills: 5 | Status: SHIPPED | OUTPATIENT
Start: 2024-02-01

## 2024-02-01 RX ORDER — PREDNISONE 20 MG/1
TABLET ORAL
Qty: 10 TABLET | Refills: 0 | Status: SHIPPED | OUTPATIENT
Start: 2024-02-01

## 2024-02-01 NOTE — TELEPHONE ENCOUNTER
2/27/2024 at 12:15   Spoke to patient. Order placed for chest xray and patient will have same day.         Discharge Plans:  See me in the office in 3 weeks with chest x-ray on the same day.  Will set up with a home nebulizer.  Follow-up with urology for the scrotal cyst.

## 2024-02-01 NOTE — PLAN OF CARE
Patient alert and oriented. Denies pain, denies nausea, tolerating diet. IVF. Up ad evelina. Call light within reach, using appropriately.     Problem: PAIN - ADULT  Goal: Verbalizes/displays adequate comfort level or patient's stated pain goal  Description: INTERVENTIONS:  - Encourage pt to monitor pain and request assistance  - Assess pain using appropriate pain scale  - Administer analgesics based on type and severity of pain and evaluate response  - Implement non-pharmacological measures as appropriate and evaluate response  - Consider cultural and social influences on pain and pain management  - Manage/alleviate anxiety  - Utilize distraction and/or relaxation techniques  - Monitor for opioid side effects  - Notify MD/LIP if interventions unsuccessful or patient reports new pain  - Anticipate increased pain with activity and pre-medicate as appropriate  Outcome: Progressing     Problem: RISK FOR INFECTION - ADULT  Goal: Absence of fever/infection during anticipated neutropenic period  Description: INTERVENTIONS  - Monitor WBC  - Administer growth factors as ordered  - Implement neutropenic guidelines  Outcome: Progressing     Problem: SAFETY ADULT - FALL  Goal: Free from fall injury  Description: INTERVENTIONS:  - Assess pt frequently for physical needs  - Identify cognitive and physical deficits and behaviors that affect risk of falls.  - [place] fall precautions as indicated by assessment.  - Educate pt/family on patient safety including physical limitations  - Instruct pt to call for assistance with activity based on assessment  - Modify environment to reduce risk of injury  - Provide assistive devices as appropriate  - Consider OT/PT consult to assist with strengthening/mobility  - Encourage toileting schedule  Outcome: Progressing     Problem: DISCHARGE PLANNING  Goal: Discharge to home or other facility with appropriate resources  Description: INTERVENTIONS:  - Identify barriers to discharge w/pt and  caregiver  - Include patient/family/discharge partner in discharge planning  - Arrange for needed discharge resources and transportation as appropriate  - Identify discharge learning needs (meds, wound care, etc)  - Arrange for interpreters to assist at discharge as needed  - Consider post-discharge preferences of patient/family/discharge partner  - Complete POLST form as appropriate  - Assess patient's ability to be responsible for managing their own health  - Refer to Case Management Department for coordinating discharge planning if the patient needs post-hospital services based on physician/LIP order or complex needs related to functional status, cognitive ability or social support system  Outcome: Progressing     Problem: RESPIRATORY - ADULT  Goal: Achieves optimal ventilation and oxygenation  Description: INTERVENTIONS:  - Assess for changes in respiratory status  - Assess for changes in mentation and behavior  - Position to facilitate oxygenation and minimize respiratory effort  - Oxygen supplementation based on oxygen saturation or ABGs  - Provide Smoking Cessation handout, if applicable  - Encourage broncho-pulmonary hygiene including cough, deep breathe, Incentive Spirometry  - Assess the need for suctioning and perform as needed  - Assess and instruct to report SOB or any respiratory difficulty  - Respiratory Therapy support as indicated  - Manage/alleviate anxiety  - Monitor for signs/symptoms of CO2 retention  Outcome: Progressing     Problem: CARDIOVASCULAR - ADULT  Goal: Maintains optimal cardiac output and hemodynamic stability  Description: INTERVENTIONS:  - Monitor vital signs, rhythm, and trends  - Monitor for bleeding, hypotension and signs of decreased cardiac output  - Evaluate effectiveness of vasoactive medications to optimize hemodynamic stability  - Monitor arterial and/or venous puncture sites for bleeding and/or hematoma  - Assess quality of pulses, skin color and temperature  - Assess for  signs of decreased coronary artery perfusion - ex. Angina  - Evaluate fluid balance, assess for edema, trend weights  Outcome: Progressing  Goal: Absence of cardiac arrhythmias or at baseline  Description: INTERVENTIONS:  - Continuous cardiac monitoring, monitor vital signs, obtain 12 lead EKG if indicated  - Evaluate effectiveness of antiarrhythmic and heart rate control medications as ordered  - Initiate emergency measures for life threatening arrhythmias  - Monitor electrolytes and administer replacement therapy as ordered  Outcome: Progressing     Problem: METABOLIC/FLUID AND ELECTROLYTES - ADULT  Goal: Electrolytes maintained within normal limits  Description: INTERVENTIONS:  - Monitor labs and rhythm and assess patient for signs and symptoms of electrolyte imbalances  - Administer electrolyte replacement as ordered  - Monitor response to electrolyte replacements, including rhythm and repeat lab results as appropriate  - Fluid restriction as ordered  - Instruct patient on fluid and nutrition restrictions as appropriate  Outcome: Progressing     Problem: SKIN/TISSUE INTEGRITY - ADULT  Goal: Skin integrity remains intact  Description: INTERVENTIONS  - Assess and document risk factors for pressure ulcer development  - Assess and document skin integrity  - Monitor for areas of redness and/or skin breakdown  - Initiate interventions, skin care algorithm/standards of care as needed  Outcome: Progressing

## 2024-02-01 NOTE — TELEPHONE ENCOUNTER
fluticasone furoate-vilanterol (BREO ELLIPTA) 100-25 MCG/INH Inhalation Aerosol Powder, Breath Activated Inhale 1 puff into the lungs daily. Follow with mouth rinse and spit (Patient taking differently: Inhale 1 puff into the lungs every morning. Follow with mouth rinse and spit) 1 each 6

## 2024-02-01 NOTE — PLAN OF CARE
Patient alert and oriented. Denies pain, denies nausea. Up ad evelina. Call light within reach, using appropriately. Discussed discharge information, medications, prescriptions, follow ups and questions addressed. Daughter and spouse at bedside, agreeable to discharge.     Problem: PAIN - ADULT  Goal: Verbalizes/displays adequate comfort level or patient's stated pain goal  Description: INTERVENTIONS:  - Encourage pt to monitor pain and request assistance  - Assess pain using appropriate pain scale  - Administer analgesics based on type and severity of pain and evaluate response  - Implement non-pharmacological measures as appropriate and evaluate response  - Consider cultural and social influences on pain and pain management  - Manage/alleviate anxiety  - Utilize distraction and/or relaxation techniques  - Monitor for opioid side effects  - Notify MD/LIP if interventions unsuccessful or patient reports new pain  - Anticipate increased pain with activity and pre-medicate as appropriate  Outcome: Adequate for Discharge     Problem: RISK FOR INFECTION - ADULT  Goal: Absence of fever/infection during anticipated neutropenic period  Description: INTERVENTIONS  - Monitor WBC  - Administer growth factors as ordered  - Implement neutropenic guidelines  Outcome: Adequate for Discharge     Problem: SAFETY ADULT - FALL  Goal: Free from fall injury  Description: INTERVENTIONS:  - Assess pt frequently for physical needs  - Identify cognitive and physical deficits and behaviors that affect risk of falls.  - [place] fall precautions as indicated by assessment.  - Educate pt/family on patient safety including physical limitations  - Instruct pt to call for assistance with activity based on assessment  - Modify environment to reduce risk of injury  - Provide assistive devices as appropriate  - Consider OT/PT consult to assist with strengthening/mobility  - Encourage toileting schedule  Outcome: Adequate for Discharge     Problem:  DISCHARGE PLANNING  Goal: Discharge to home or other facility with appropriate resources  Description: INTERVENTIONS:  - Identify barriers to discharge w/pt and caregiver  - Include patient/family/discharge partner in discharge planning  - Arrange for needed discharge resources and transportation as appropriate  - Identify discharge learning needs (meds, wound care, etc)  - Arrange for interpreters to assist at discharge as needed  - Consider post-discharge preferences of patient/family/discharge partner  - Complete POLST form as appropriate  - Assess patient's ability to be responsible for managing their own health  - Refer to Case Management Department for coordinating discharge planning if the patient needs post-hospital services based on physician/LIP order or complex needs related to functional status, cognitive ability or social support system  Outcome: Adequate for Discharge     Problem: Patient/Family Goals  Goal: Patient/Family Long Term Goal  Description: Patient's Long Term Goal: To be discharged    Interventions:  - get clearance   - See additional Care Plan goals for specific interventions  Outcome: Adequate for Discharge  Goal: Patient/Family Short Term Goal  Description: Patient's Short Term Goal: Manage pain    Interventions:   - monitor and assess pain   - See additional Care Plan goals for specific interventions  Outcome: Adequate for Discharge     Problem: RESPIRATORY - ADULT  Goal: Achieves optimal ventilation and oxygenation  Description: INTERVENTIONS:  - Assess for changes in respiratory status  - Assess for changes in mentation and behavior  - Position to facilitate oxygenation and minimize respiratory effort  - Oxygen supplementation based on oxygen saturation or ABGs  - Provide Smoking Cessation handout, if applicable  - Encourage broncho-pulmonary hygiene including cough, deep breathe, Incentive Spirometry  - Assess the need for suctioning and perform as needed  - Assess and instruct to  report SOB or any respiratory difficulty  - Respiratory Therapy support as indicated  - Manage/alleviate anxiety  - Monitor for signs/symptoms of CO2 retention  Outcome: Adequate for Discharge     Problem: CARDIOVASCULAR - ADULT  Goal: Maintains optimal cardiac output and hemodynamic stability  Description: INTERVENTIONS:  - Monitor vital signs, rhythm, and trends  - Monitor for bleeding, hypotension and signs of decreased cardiac output  - Evaluate effectiveness of vasoactive medications to optimize hemodynamic stability  - Monitor arterial and/or venous puncture sites for bleeding and/or hematoma  - Assess quality of pulses, skin color and temperature  - Assess for signs of decreased coronary artery perfusion - ex. Angina  - Evaluate fluid balance, assess for edema, trend weights  Outcome: Adequate for Discharge  Goal: Absence of cardiac arrhythmias or at baseline  Description: INTERVENTIONS:  - Continuous cardiac monitoring, monitor vital signs, obtain 12 lead EKG if indicated  - Evaluate effectiveness of antiarrhythmic and heart rate control medications as ordered  - Initiate emergency measures for life threatening arrhythmias  - Monitor electrolytes and administer replacement therapy as ordered  Outcome: Adequate for Discharge     Problem: METABOLIC/FLUID AND ELECTROLYTES - ADULT  Goal: Electrolytes maintained within normal limits  Description: INTERVENTIONS:  - Monitor labs and rhythm and assess patient for signs and symptoms of electrolyte imbalances  - Administer electrolyte replacement as ordered  - Monitor response to electrolyte replacements, including rhythm and repeat lab results as appropriate  - Fluid restriction as ordered  - Instruct patient on fluid and nutrition restrictions as appropriate  Outcome: Adequate for Discharge     Problem: SKIN/TISSUE INTEGRITY - ADULT  Goal: Skin integrity remains intact  Description: INTERVENTIONS  - Assess and document risk factors for pressure ulcer development  -  Assess and document skin integrity  - Monitor for areas of redness and/or skin breakdown  - Initiate interventions, skin care algorithm/standards of care as needed  Outcome: Adequate for Discharge

## 2024-02-01 NOTE — DISCHARGE SUMMARY
Discharge Summary    Date of Admission: 1/28/2024    Date of Discharge: 2/1/24    Hospital Course:   The patient was hospitalized with dyspnea.  He was treated for possible pneumonia.  He got better very quickly with steroids and empiric antibiotic.  He was treated for COPD as well.  By the time of discharge, he was moving air well, no longer requiring oxygen and had improved significantly.  I will discontinue the antibiotic at discharge but continue a short course of steroids.  I am more concerned that the prior history of immune therapy could yet have been contributing to his dyspnea syndrome with an associated pneumonitis.  He remained covered with voriconazole for Aspergillus.  He was evaluated and followed in the hospital by his oncologist.  The patient has a scrotal cyst and will follow-up with his urologist.    Discharge Medications:     Medication List        START taking these medications      ipratropium-albuterol 0.5-2.5 (3) MG/3ML Soln  Commonly known as: Duoneb  Take 3 mL by nebulization every 6 (six) hours as needed.            CHANGE how you take these medications      Breo Ellipta 100-25 MCG/INH Aepb  Generic drug: fluticasone furoate-vilanterol  Inhale 1 puff into the lungs daily. Follow with mouth rinse and spit  What changed: when to take this     * predniSONE 10 MG Tabs  Commonly known as: Deltasone  Take 1 tablet (10 mg total) by mouth daily.  What changed: when to take this     * predniSONE 20 MG Tabs  Commonly known as: Deltasone  2 tabs daily for 3 days then one tab daily until completed  What changed: You were already taking a medication with the same name, and this prescription was added. Make sure you understand how and when to take each.     sulfamethoxazole-trimethoprim -160 MG Tabs per tablet  Commonly known as: Bactrim DS  TAKE 1 TABLET BY MOUTH TWICE A DAY ON MONDAY, WEDNESDAY AND FRIDAY  What changed:   how much to take  how to take this  when to take this     Trametinib  Dimethyl Sulfoxide 2 MG Tabs  Take 2 mg by mouth daily. Take at least 1 hour before or at least 2 hours after a meal.  What changed: when to take this           * This list has 2 medication(s) that are the same as other medications prescribed for you. Read the directions carefully, and ask your doctor or other care provider to review them with you.                CONTINUE taking these medications      acetaminophen 500 MG Tabs  Commonly known as: Tylenol Extra Strength     albuterol 108 (90 Base) MCG/ACT Aers  Commonly known as: Ventolin HFA  Inhale 2 puffs into the lungs every 4 (four) hours as needed for Wheezing.     cholecalciferol 50 MCG (2000 UT) Tabs     dabrafenib mesylate 50 MG Caps  Commonly known as: TAFINLAR  Take 3 capsules (150 mg total) by mouth 2 (two) times daily. Take THREE capsules at least 1 hour before or 2 hours after a meal, twice a day     pantoprazole 40 MG Tbec  Commonly known as: Protonix  Take 1 tablet (40 mg total) by mouth every morning before breakfast.     triamcinolone 0.1 % Crea  Commonly known as: Kenalog  Apply topically 2 (two) times daily. To areas of rash or itching. Hold if no rash or itching. Use moisturizer.     Vitamin C 500 MG Tabs  Commonly known as: VITAMIN C     voriconazole 200 MG Tabs  Commonly known as: Vfend  Take 1 tablet (200 mg total) by mouth 2 (two) times daily.     Xarelto 10 MG Tabs  Generic drug: rivaroxaban            STOP taking these medications      fluticasone propionate 50 MCG/ACT Susp  Commonly known as: Flonase     oxyCODONE 5 MG Tabs               Where to Get Your Medications        These medications were sent to [facility]/pharmacy #[id number] - [place], [place] - [address] [place] [address] AT across from Justin Dockery, [phone], [phone]  [address] [place] [id number]      Phone: [phone]   ipratropium-albuterol 0.5-2.5 (3) MG/3ML Soln  predniSONE 20 MG Tabs         Discharge Plans:  See me in the office in 3 weeks with chest x-ray on the same day.  Will  set up with a home nebulizer.  Follow-up with urology for the scrotal cyst.    Discharge Diagnosis:  Acute respiratory failure, pneumonia, immunotherapy associated pneumonitis, Aspergillus lung infection, COPD, lung cancer    Chris Hernandez MD  Medical Director, Critical Care, [facility]  Medical Director, [facility]  Pager: [phone]

## 2024-02-01 NOTE — DISCHARGE INSTRUCTIONS
See Dr Hernandez in the office in 3 weeks with chest x-ray on the same day.  Will set up with a home nebulizer.  Follow-up with urology for the scrotal cyst.

## 2024-02-01 NOTE — PLAN OF CARE
Pt is alert and oriented x4. Tolerating general diet. CPAP at night otherwise on room air. Remote tele in place. Denies any pain. IVF infusing to port. Up independently. Call light within reach. Fall precautions maintained.    Problem: Patient Centered Care  Goal: Patient preferences are identified and integrated in the patient's plan of care  Description: Interventions:  - What would you like us to know as we care for you? I had surgery last month   - Provide timely, complete, and accurate information to patient/family  - Incorporate patient and family knowledge, values, beliefs, and cultural backgrounds into the planning and delivery of care  - Encourage patient/family to participate in care and decision-making at the level they choose  - Honor patient and family perspectives and choices  Outcome: Progressing     Problem: PAIN - ADULT  Goal: Verbalizes/displays adequate comfort level or patient's stated pain goal  Description: INTERVENTIONS:  - Encourage pt to monitor pain and request assistance  - Assess pain using appropriate pain scale  - Administer analgesics based on type and severity of pain and evaluate response  - Implement non-pharmacological measures as appropriate and evaluate response  - Consider cultural and social influences on pain and pain management  - Manage/alleviate anxiety  - Utilize distraction and/or relaxation techniques  - Monitor for opioid side effects  - Notify MD/LIP if interventions unsuccessful or patient reports new pain  - Anticipate increased pain with activity and pre-medicate as appropriate  Outcome: Progressing     Problem: RISK FOR INFECTION - ADULT  Goal: Absence of fever/infection during anticipated neutropenic period  Description: INTERVENTIONS  - Monitor WBC  - Administer growth factors as ordered  - Implement neutropenic guidelines  Outcome: Progressing     Problem: SAFETY ADULT - FALL  Goal: Free from fall injury  Description: INTERVENTIONS:  - Assess pt frequently for  physical needs  - Identify cognitive and physical deficits and behaviors that affect risk of falls.  - [place] fall precautions as indicated by assessment.  - Educate pt/family on patient safety including physical limitations  - Instruct pt to call for assistance with activity based on assessment  - Modify environment to reduce risk of injury  - Provide assistive devices as appropriate  - Consider OT/PT consult to assist with strengthening/mobility  - Encourage toileting schedule  Outcome: Progressing     Problem: DISCHARGE PLANNING  Goal: Discharge to home or other facility with appropriate resources  Description: INTERVENTIONS:  - Identify barriers to discharge w/pt and caregiver  - Include patient/family/discharge partner in discharge planning  - Arrange for needed discharge resources and transportation as appropriate  - Identify discharge learning needs (meds, wound care, etc)  - Arrange for interpreters to assist at discharge as needed  - Consider post-discharge preferences of patient/family/discharge partner  - Complete POLST form as appropriate  - Assess patient's ability to be responsible for managing their own health  - Refer to Case Management Department for coordinating discharge planning if the patient needs post-hospital services based on physician/LIP order or complex needs related to functional status, cognitive ability or social support system  Outcome: Progressing     Problem: Patient/Family Goals  Goal: Patient/Family Long Term Goal  Description: Patient's Long Term Goal: To be discharged    Interventions:  - get clearance   - See additional Care Plan goals for specific interventions  Outcome: Progressing  Goal: Patient/Family Short Term Goal  Description: Patient's Short Term Goal: Manage pain    Interventions:   - monitor and assess pain   - See additional Care Plan goals for specific interventions  Outcome: Progressing     Problem: RESPIRATORY - ADULT  Goal: Achieves optimal ventilation and  oxygenation  Description: INTERVENTIONS:  - Assess for changes in respiratory status  - Assess for changes in mentation and behavior  - Position to facilitate oxygenation and minimize respiratory effort  - Oxygen supplementation based on oxygen saturation or ABGs  - Provide Smoking Cessation handout, if applicable  - Encourage broncho-pulmonary hygiene including cough, deep breathe, Incentive Spirometry  - Assess the need for suctioning and perform as needed  - Assess and instruct to report SOB or any respiratory difficulty  - Respiratory Therapy support as indicated  - Manage/alleviate anxiety  - Monitor for signs/symptoms of CO2 retention  Outcome: Progressing     Problem: CARDIOVASCULAR - ADULT  Goal: Maintains optimal cardiac output and hemodynamic stability  Description: INTERVENTIONS:  - Monitor vital signs, rhythm, and trends  - Monitor for bleeding, hypotension and signs of decreased cardiac output  - Evaluate effectiveness of vasoactive medications to optimize hemodynamic stability  - Monitor arterial and/or venous puncture sites for bleeding and/or hematoma  - Assess quality of pulses, skin color and temperature  - Assess for signs of decreased coronary artery perfusion - ex. Angina  - Evaluate fluid balance, assess for edema, trend weights  Outcome: Progressing  Goal: Absence of cardiac arrhythmias or at baseline  Description: INTERVENTIONS:  - Continuous cardiac monitoring, monitor vital signs, obtain 12 lead EKG if indicated  - Evaluate effectiveness of antiarrhythmic and heart rate control medications as ordered  - Initiate emergency measures for life threatening arrhythmias  - Monitor electrolytes and administer replacement therapy as ordered  Outcome: Progressing     Problem: METABOLIC/FLUID AND ELECTROLYTES - ADULT  Goal: Electrolytes maintained within normal limits  Description: INTERVENTIONS:  - Monitor labs and rhythm and assess patient for signs and symptoms of electrolyte imbalances  -  Administer electrolyte replacement as ordered  - Monitor response to electrolyte replacements, including rhythm and repeat lab results as appropriate  - Fluid restriction as ordered  - Instruct patient on fluid and nutrition restrictions as appropriate  Outcome: Progressing     Problem: SKIN/TISSUE INTEGRITY - ADULT  Goal: Skin integrity remains intact  Description: INTERVENTIONS  - Assess and document risk factors for pressure ulcer development  - Assess and document skin integrity  - Monitor for areas of redness and/or skin breakdown  - Initiate interventions, skin care algorithm/standards of care as needed  Outcome: Progressing

## 2024-02-01 NOTE — PROGRESS NOTES
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  He completed definitive concurrent chemoradiotherapy using cisplatin and etoposide early March 2017.  He had low-grade anemia neutropenia on treatment.    He subsequently has been followed on surveillance with no evidence of progression.    The patient started on durvalumab in October 2017 given he had unresectable stage III disease with no evidence of progression following chemo radiotherapy    He has had occasional mild anemia on treatment.  Laboratory follow-up in May 2018 showed severe iron deficiency.  Received IV iron    He completed 13 cycles of durvalumab  however towards the end of his treatment he developed 2 separate instances of pneumonitis/transaminitis requiring separate courses of prednisone.  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     Started single-agent Pembrolizumab on 3/19/21.  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  He has been seen by pulmonology in consultation started on antibiotics and methylprednisolone    Somewhat better today      Performance Status:  ECOG 0    Past Medical History:  Past Medical History:   Diagnosis Date    Deep vein thrombosis (HCC)     Dysphagia     Esophageal reflux     Exposure to medical diagnostic radiation     Hemorrhoids     History of blood transfusion     Pt on Immunotherapy    History of immunotherapy 07/28/2023    Impotence      Lung cancer (HCC)     NSCLCA stage IIIB    Necrotizing granulomatous inflammation of lung (HCC)     Obstructive sleep apnea     Personal history of antineoplastic chemotherapy     Pneumonia due to organism     Pulmonary embolism (HCC)     Pulmonary embolus (HCC)     Sinus problem     Sinus problems    Sleep apnea     CPAP    Visual impairment     wears eyeglasses       Past Surgical History:  Past Surgical History:   Procedure Laterality Date    COLONOSCOPY      COLONOSCOPY N/A 2018    Procedure: COLONOSCOPY;  Surgeon: Cesar Beckman MD;  Location: [facility] ENDOSCOPY    COLONOSCOPY      PORT, INDWELLING, IMP Right 2017    ROTATOR CUFF REPAIR Left     TOTAL KNEE REPLACEMENT Bilateral 2020       Family History:  Family History   Problem Relation Age of Onset    Cancer Father         Lung    Cancer Mother         Breast, ovarian       Social History:  Social History     Socioeconomic History    Marital status:      Spouse name: Not on file    Number of children: Not on file    Years of education: Not on file    Highest education level: Not on file   Occupational History    Not on file   Tobacco Use    Smoking status: Former     Packs/day: 1.00     Years: 30.00     Additional pack years: 0.00     Total pack years: 30.00     Types: Cigarettes     Quit date: 2000     Years since quittin.6    Smokeless tobacco: Never   Vaping Use    Vaping Use: Never used   Substance and Sexual Activity    Alcohol use: Not Currently     Alcohol/week: 0.8 standard drinks of alcohol     Types: 1 Glasses of wine per week     Comment: very rarely    Drug use: No    Sexual activity: Not on file   Other Topics Concern     Service Not Asked    Blood Transfusions Not Asked    Caffeine Concern Yes     Comment: coffee, 2 cups daily    Occupational Exposure Not Asked    Hobby Hazards Not Asked    Sleep Concern Not Asked    Stress Concern Not Asked    Weight Concern Not Asked    Special Diet Not Asked    Back  Care Not Asked    Exercise Not Asked    Bike Helmet Not Asked    Seat Belt Not Asked    Self-Exams Not Asked   Social History Narrative    Not on file     Social Determinants of Health     Financial Resource Strain: Low Risk  (11/27/2023)    Financial Resource Strain     Difficulty of Paying Living Expenses: Not hard at all     Med Affordability: No   Food Insecurity: No Food Insecurity (1/28/2024)    Food Insecurity     Food Insecurity: Never true   Transportation Needs: No Transportation Needs (1/28/2024)    Transportation Needs     Lack of Transportation: No   Physical Activity: Not on file   Stress: Not on file   Social Connections: Not on file   Housing Stability: Low Risk  (1/28/2024)    Housing Stability     Housing Instability: No     Housing Instability Emergency: Not on file         Current Medications:    Current Outpatient Medications:     ipratropium-albuterol 0.5-2.5 (3) MG/3ML Inhalation Solution, Take 3 mL by nebulization every 6 (six) hours as needed., Disp: 90 each, Rfl: 5    predniSONE 20 MG Oral Tab, 2 tabs daily for 3 days then one tab daily until completed, Disp: 10 tablet, Rfl: 0    Allergies:  No Known Allergies     Review of Systems:  All other systems reviewed and negative x12    Vital Signs:  /85 (BP Location: Right arm)   Pulse 66   Temp 97.8 °F (36.6 °C) (Oral)   Resp 18   Ht 1.778 m (5' 10\")   Wt 83.9 kg (185 lb)   SpO2 92%   BMI 26.54 kg/m²     Physical Examination:  General: Patient is alert and oriented x 3, not in acute distress.  Psych:  Mood and affect appropriate, in good spirits.   HEENT: EOMs intact. Oropharynx is clear.   Chest: non-labored breathing, CTA  Cardiovascular: Regular rate and rhythm. Normal S1S2  Extremities: No edema.   Neurological: 5/5 motor x4.    Derm:  No rash    LABS:    CBC:    Lab Results   Component Value Date    WBC 1.8 (L) 01/30/2024    WBC 8.5 01/28/2024    WBC 7.3 12/27/2023     Lab Results   Component Value Date    HGB 12.4 (L)  01/30/2024    HGB 12.9 (L) 01/28/2024    HGB 13.4 12/27/2023      Lab Results   Component Value Date    .0 01/30/2024    .0 01/28/2024    .0 12/27/2023       Lab Results   Component Value Date     (H) 01/30/2024    BUN 14 01/30/2024    BUNCREA 20.6 (H) 01/30/2024    CREATSERUM 0.68 (L) 01/30/2024    ANIONGAP 3 01/30/2024    GFR >60 05/14/2016    GFRNAA 81 07/22/2022    GFRAA 94 07/22/2022    CA 8.4 (L) 01/30/2024    OSMOCALC 292 01/30/2024    ALKPHO 123 (H) 12/27/2023    AST 33 12/27/2023    ALT 22 12/27/2023    ALKPHOS 58 12/10/2011    BILT 0.4 12/27/2023    TP 5.8 12/27/2023    ALB 3.9 12/27/2023    GLOBULIN 1.9 (L) 12/27/2023     01/30/2024    K 4.0 01/30/2024     01/30/2024    CO2 27.0 01/30/2024             Cancer of upper lobe of left lung (HCC)    Staging form: Lung AJCC V7      Clinical stage from 1/9/2017: Stage IIIB (T1a, N3, M0) - Signed by Mike Mcclure MD on 1/9/2017    Impression and Plan:  78 year oldmale former smoker with 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  He was diagnosed in December 2016 originally treated for stage IIIb disease with chemoradiotherapy followed by durvalumab.    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  He has bilateral radiographic changes on chest imaging.  Pneumonitis is a possibility however of note he is off of his immunotherapy for more than 4 months and he is now only on targeted therapy    Monitor clinically continue antibiotics steroids. voriconazole for  history of Aspergillus    Hx of pulmonary embolus this is remote okay to hold anticoagulation      Dc today

## 2024-02-02 ENCOUNTER — PATIENT OUTREACH (OUTPATIENT)
Dept: CASE MANAGEMENT | Age: 79
End: 2024-02-02

## 2024-02-02 ENCOUNTER — APPOINTMENT (OUTPATIENT)
Dept: HEMATOLOGY/ONCOLOGY | Facility: HOSPITAL | Age: 79
End: 2024-02-02
Attending: INTERNAL MEDICINE
Payer: MEDICARE

## 2024-02-02 DIAGNOSIS — J44.9 CHRONIC OBSTRUCTIVE PULMONARY DISEASE, UNSPECIFIED COPD TYPE (HCC): Primary | ICD-10-CM

## 2024-02-02 DIAGNOSIS — Z02.9 ENCOUNTERS FOR UNSPECIFIED ADMINISTRATIVE PURPOSE: ICD-10-CM

## 2024-02-02 PROCEDURE — 1111F DSCHRG MED/CURRENT MED MERGE: CPT

## 2024-02-02 PROCEDURE — 1159F MED LIST DOCD IN RCRD: CPT

## 2024-02-02 RX ORDER — FLUTICASONE FUROATE AND VILANTEROL 100; 25 UG/1; UG/1
1 POWDER RESPIRATORY (INHALATION) DAILY
Qty: 1 EACH | Refills: 5 | Status: SHIPPED | OUTPATIENT
Start: 2024-02-02

## 2024-02-02 NOTE — PROGRESS NOTES
Attempted to reach the patient to complete a [facility]Hospital FU call. Left a message for the pt to call the [facility] back at, [phone].

## 2024-02-02 NOTE — PAYOR COMM NOTE
--------------  DISCHARGE REVIEW    Payor: [employer] PPO  Subscriber #:  [id number]  Authorization Number: [id number]    Admit date: 1/28/24  Admit time:   6:09 PM  Discharge Date: 2/1/2024 12:40 PM     Admitting Physician: Kori Justice MD  Attending Physician:  No att. providers found  Primary Care Physician: Chris Hernandez MD          Discharge Summary Notes        Discharge Summary signed by Chris Hernandez MD at 2/1/2024 10:42 AM       Author: Chris Hernandez MD Specialty: PULMONARY DISEASES, Internal Medicine, Sleep Disorders Author Type: Physician    Filed: 2/1/2024 10:42 AM Date of Service: 2/1/2024 10:37 AM Status: Addendum    : Chris Hernandez MD (Physician)    Related Notes: Original Note by Chris Hernandez MD (Physician) filed at 2/1/2024 10:41 AM         Discharge Summary    Date of Admission: 1/28/2024    Date of Discharge: 2/1/24    Hospital Course:   The patient was hospitalized with dyspnea.  He was treated for possible pneumonia.  He got better very quickly with steroids and empiric antibiotic.  He was treated for COPD as well.  By the time of discharge, he was moving air well, no longer requiring oxygen and had improved significantly.  I will discontinue the antibiotic at discharge but continue a short course of steroids.  I am more concerned that the prior history of immune therapy could yet have been contributing to his dyspnea syndrome with an associated pneumonitis.  He remained covered with voriconazole for Aspergillus.  He was evaluated and followed in the hospital by his oncologist.  The patient has a scrotal cyst and will follow-up with his urologist.    Discharge Medications:     Medication List        START taking these medications      ipratropium-albuterol 0.5-2.5 (3) MG/3ML Soln  Commonly known as: Duoneb  Take 3 mL by nebulization every 6 (six) hours as needed.            CHANGE how you take these medications      Breo Ellipta 100-25 MCG/INH Aepb  Generic  drug: fluticasone furoate-vilanterol  Inhale 1 puff into the lungs daily. Follow with mouth rinse and spit  What changed: when to take this     * predniSONE 10 MG Tabs  Commonly known as: Deltasone  Take 1 tablet (10 mg total) by mouth daily.  What changed: when to take this     * predniSONE 20 MG Tabs  Commonly known as: Deltasone  2 tabs daily for 3 days then one tab daily until completed  What changed: You were already taking a medication with the same name, and this prescription was added. Make sure you understand how and when to take each.     sulfamethoxazole-trimethoprim -160 MG Tabs per tablet  Commonly known as: Bactrim DS  TAKE 1 TABLET BY MOUTH TWICE A DAY ON MONDAY, WEDNESDAY AND FRIDAY  What changed:   how much to take  how to take this  when to take this     Trametinib Dimethyl Sulfoxide 2 MG Tabs  Take 2 mg by mouth daily. Take at least 1 hour before or at least 2 hours after a meal.  What changed: when to take this           * This list has 2 medication(s) that are the same as other medications prescribed for you. Read the directions carefully, and ask your doctor or other care provider to review them with you.                CONTINUE taking these medications      acetaminophen 500 MG Tabs  Commonly known as: Tylenol Extra Strength     albuterol 108 (90 Base) MCG/ACT Aers  Commonly known as: Ventolin HFA  Inhale 2 puffs into the lungs every 4 (four) hours as needed for Wheezing.     cholecalciferol 50 MCG (2000 UT) Tabs     dabrafenib mesylate 50 MG Caps  Commonly known as: TAFINLAR  Take 3 capsules (150 mg total) by mouth 2 (two) times daily. Take THREE capsules at least 1 hour before or 2 hours after a meal, twice a day     pantoprazole 40 MG Tbec  Commonly known as: Protonix  Take 1 tablet (40 mg total) by mouth every morning before breakfast.     triamcinolone 0.1 % Crea  Commonly known as: Kenalog  Apply topically 2 (two) times daily. To areas of rash or itching. Hold if no rash or  itching. Use moisturizer.     Vitamin C 500 MG Tabs  Commonly known as: VITAMIN C     voriconazole 200 MG Tabs  Commonly known as: Vfend  Take 1 tablet (200 mg total) by mouth 2 (two) times daily.     Xarelto 10 MG Tabs  Generic drug: rivaroxaban            STOP taking these medications      fluticasone propionate 50 MCG/ACT Susp  Commonly known as: Flonase     oxyCODONE 5 MG Tabs               Where to Get Your Medications        These medications were sent to [facility]/pharmacy #[id number] - [address] AT [facility] from [facility], [phone], [phone]  [address] [place] [address]      Phone: [phone]   ipratropium-albuterol 0.5-2.5 (3) MG/3ML Soln  predniSONE 20 MG Tabs         Discharge Plans:  See me in the office in 3 weeks with chest x-ray on the same day.  Will set up with a home nebulizer.  Follow-up with urology for the scrotal cyst.    Discharge Diagnosis:  Acute respiratory failure, pneumonia, immunotherapy associated pneumonitis, Aspergillus lung infection, COPD, lung cancer    Chris Hernandez MD  Medical Director, Critical Care, [facility]  Medical Director, [facility]  Pager: [phone]      Electronically signed by Chris Hernandez MD on 2/1/2024 10:42 AM         REVIEWER COMMENTS

## 2024-02-02 NOTE — PROGRESS NOTES
Initial Post Discharge Follow Up   Discharge Date: 2/1/24  Contact Date: 2/2/2024    Consent Verification:  Assessment Completed With: Patient  Spouse: Satinder Permission received per patient?  written  HIPAA Verified?  Yes    Discharge Dx:   Acute respiratory failure, pneumonia, immunotherapy associated pneumonitis, Aspergillus lung infection, COPD, lung cancer     General:   How have you been since your discharge from the hospital? Pt feeling much better, since hospital discharge--tolerating diet, independent with ADLs, using nebulizer, as directed. Patient denies fever, chills, headache, vision changes, dizziness, nausea, vomiting, diarrhea, chest pain or shortness of breath at this time. Pt speaking in full, clear sentences.  Do you have any pain since discharge?  No    How well was your pain managed while in the hospital?   On a scale of 1-5   1- Very Poor and 5- Very well   Very Well  When you were leaving the hospital were your discharge instructions reviewed with you? Yes  How well were your discharge instructions explained to you?   On a scale of 1-5   1- Very Poor and 5- Very well   Very Well  Do you have any questions about your discharge instructions?  No  Before leaving the hospital was your diagnoses explained to you? Yes  Do you have any questions about your diagnoses? No  Are you able to perform normal daily activities of living as you have prior to your hospital stay (dressing, bathing, ambulating to the bathroom, etc)? yes  (NCM) Was patient given a different diet per AVS? no    Medications:   Current Outpatient Medications   Medication Sig Dispense Refill    ipratropium-albuterol 0.5-2.5 (3) MG/3ML Inhalation Solution Take 3 mL by nebulization every 6 (six) hours as needed. 90 each 5    predniSONE 20 MG Oral Tab 2 tabs daily for 3 days then one tab daily until completed 10 tablet 0    rivaroxaban (XARELTO) 10 MG Oral Tab Take 1 tablet (10 mg total) by mouth at bedtime.      pantoprazole 40 MG  Oral Tab EC Take 1 tablet (40 mg total) by mouth every morning before breakfast. 30 tablet 5    triamcinolone 0.1 % External Cream Apply topically 2 (two) times daily. To areas of rash or itching. Hold if no rash or itching. Use moisturizer. 80 g 2    acetaminophen 500 MG Oral Tab Take 1 tablet (500 mg total) by mouth every 6 (six) hours as needed for Pain.      sulfamethoxazole-trimethoprim -160 MG Oral Tab per tablet TAKE 1 TABLET BY MOUTH TWICE A DAY ON MONDAY, WEDNESDAY AND FRIDAY (Patient taking differently: Take 1 tablet by mouth 2 (two) times daily. TAKE 1 TABLET BY MOUTH TWICE A DAY ON MONDAY, WEDNESDAY AND FRIDAY) 60 tablet 3    predniSONE 10 MG Oral Tab Take 1 tablet (10 mg total) by mouth daily. (Patient taking differently: Take 1 tablet (10 mg total) by mouth every morning.) 90 tablet 0    dabrafenib mesylate 50 MG Oral Cap Take 3 capsules (150 mg total) by mouth 2 (two) times daily. Take THREE capsules at least 1 hour before or 2 hours after a meal, twice a day 180 capsule 5    Trametinib Dimethyl Sulfoxide 2 MG Oral Tab Take 2 mg by mouth daily. Take at least 1 hour before or at least 2 hours after a meal. (Patient taking differently: Take 2 mg by mouth daily with lunch. Take at least 1 hour before or at least 2 hours after a meal.) 30 tablet 5    voriconazole 200 MG Oral Tab Take 1 tablet (200 mg total) by mouth 2 (two) times daily. 180 tablet 3    fluticasone furoate-vilanterol (BREO ELLIPTA) 100-25 MCG/INH Inhalation Aerosol Powder, Breath Activated Inhale 1 puff into the lungs daily. Follow with mouth rinse and spit (Patient taking differently: Inhale 1 puff into the lungs every morning. Follow with mouth rinse and spit) 1 each 6    albuterol 108 (90 Base) MCG/ACT Inhalation Aero Soln Inhale 2 puffs into the lungs every 4 (four) hours as needed for Wheezing. 1 each 2    cholecalciferol 50 MCG (2000 UT) Oral Tab Take 0.5 tablets (1,000 Units total) by mouth every morning.      Vitamin C 500 MG  Oral Tab Take 1 tablet (500 mg total) by mouth every morning.       Were there any changes to your current medication(s) noted on the AVS? Yes  START taking:  ipratropium-albuterol (Duoneb)  CHANGE how you take:  predniSONE (Deltasone)  STOP taking:  fluticasone propionate 50 MCG/ACT Susp (Flonase)  oxyCODONE 5 MG Tabs  If so, were these medication changes discussed with you prior to leaving the hospital? Yes  If a new medication was prescribed:    Was the new medication's purpose & side effects reviewed? Yes  Do you have any questions about your new medication? No  Did you  your discharge medications when you left the hospital? Yes  Let's go over your medications together to make sure we are not missing anything. Medications Reviewed  Are there any reasons that keep you from taking your medication as prescribed? No  Are you having any concerns with constipation? No  Did patient receive their flu shot (Sept-March)? Yes--10/05/2023    Discharge medications reviewed/discussed/and reconciled against outpatient medications with patient.  Any changes or updates to medications sent to PCP.  Patient Acknowledged     Referrals/orders at D/C:  Referrals/orders placed at D/C? no  DME ordered at D/C? Yes  What? nebulizer  From where? [facility]  Have you received your (DME)? yes    Discharge orders, AVS reviewed and discussed with patient. Any changes or updates to orders sent to PCP.  Patient Acknowledged      SDOH:   Transportation:   Transportation Needs: No Transportation Needs (2/2/2024)    Transportation Needs     Lack of Transportation: No     Financial Strain:    Financial Resource Strain: Low Risk  (2/2/2024)    Financial Resource Strain     Difficulty of Paying Living Expenses: Not very hard     Med Affordability: No       Diagnosis specifics:   COPD:COPD:  Have you participated in a pulmonary rehab program?   yes  We reviewed your medications/inhalers, how often are you using your inhaler? As prescribed  Are you  currently on oxygen?no   Are you familiar with the signs and symptoms of worsening COPD?Yes       Who do you call with worsening COPD symptoms?Dr. Hernandez   Do you know when to call with COPD symptoms? Yes   Do you have any of the following potential risk factors for COPD in your home environment?  Primary or secondary tobacco smoke   no  Occupational dusts and chemicals organic and inorganic    no  Indoor air pollution from heating and cooking with poor ventilation   no    Mold      no    Pets        no    Extreme heat no    Other: n/a  Pneumonia: Pneumonia:   Tell me what you understand about the signs and symptoms of pneumonia reoccurrence?   (guide to discuss: fever, chills, shaking, chest pain, productive cough, difficulty breathing)    Comments: pt denies any of the above symptoms--will monitor and return to the ER, if suddenly worsens    Follow up appointments:    Follow-up Information    Follow up With Specialties Details Why Contact Info   Chris Hernandez MD PULMONARY DISEASES, Internal Medicine, Sleep Disorders Schedule an appointment as soon as possible for a visit in 3 week(s)  [address]  [facility] [address]  [phone]     Your appointments       Date & Time Appointment Department (Center)    Feb 09, 2024 10:00 AM CST Lab Visit with [facility] RESOURCE [facility] Hematology Oncology ([facility])        Feb 09, 2024 11:00 AM CST HEMATOLOGY ONCOLOGY FOLLOW UP with Christine Lopez APRN [facility] Hematology Oncology ([facility])        Feb 27, 2024 12:15 PM CST Follow Up Visit with Chris Hernandez MD Critical access hospital ([facility])        Feb 28, 2024  4:30 PM CST Exam - Established with Lnonie Bonilla MD [facility] ([facility])              [facility] Hematology Oncology  [facility]  [address]  [address]  [phone] [facility], [facility]  [address]  [facility] [id number] [phone] [facility], [facility]  [address]  [facility] [id number] [phone]            TCC  Was TCC ordered: No    PCP (If no TCC appointment)  Does patient already have a PCP appointment scheduled? Yes  NCM Confirmed 2/27/2024 PCP office HFU appointment with patient     Specialist    Does the patient have any other follow up appointment(s) needing to be scheduled? No    Is there any reason as to why you cannot make your appointment(s)?  No     Needs post D/C:   Now that you are home, are there any needs or concerns you need addressed before your next visit with your PCP?  (DME, meds, questions, etc.): No--spouse aware Breo refill in process--awaiting PCP response/approval    Interventions by NCM:   Discussed diet, activity, medications and need for f/u visits. Pt confirms all appts and repeat CXR, as scheduled.  Patient aware when to contact PCP/specialists and when to seek emergency care. No further questions/concerns at this time.    Overall Rating:   How would you rate the care you received while in the hospital? excellent    CCM referral placed:    No    BOOK BY DATE: 2/15/2024

## 2024-02-07 DIAGNOSIS — C34.12 CANCER OF UPPER LOBE OF LEFT LUNG (HCC): ICD-10-CM

## 2024-02-07 DIAGNOSIS — C34.92 MALIGNANT NEOPLASM OF LEFT LUNG, UNSPECIFIED PART OF LUNG (HCC): Primary | ICD-10-CM

## 2024-02-09 ENCOUNTER — OFFICE VISIT (OUTPATIENT)
Dept: HEMATOLOGY/ONCOLOGY | Facility: HOSPITAL | Age: 79
End: 2024-02-09
Attending: INTERNAL MEDICINE
Payer: MEDICARE

## 2024-02-09 VITALS
TEMPERATURE: 98 F | SYSTOLIC BLOOD PRESSURE: 128 MMHG | DIASTOLIC BLOOD PRESSURE: 56 MMHG | WEIGHT: 184 LBS | BODY MASS INDEX: 26.34 KG/M2 | HEIGHT: 70 IN | HEART RATE: 67 BPM | OXYGEN SATURATION: 96 % | RESPIRATION RATE: 18 BRPM

## 2024-02-09 DIAGNOSIS — I26.99 PULMONARY EMBOLISM, OTHER, UNSPECIFIED CHRONICITY, UNSPECIFIED WHETHER ACUTE COR PULMONALE PRESENT (HCC): ICD-10-CM

## 2024-02-09 DIAGNOSIS — J98.4 PNEUMONITIS: ICD-10-CM

## 2024-02-09 DIAGNOSIS — C34.12 CANCER OF UPPER LOBE OF LEFT LUNG (HCC): ICD-10-CM

## 2024-02-09 DIAGNOSIS — C34.92 MALIGNANT NEOPLASM OF LEFT LUNG, UNSPECIFIED PART OF LUNG (HCC): Primary | ICD-10-CM

## 2024-02-09 DIAGNOSIS — Z51.11 CHEMOTHERAPY MANAGEMENT, ENCOUNTER FOR: ICD-10-CM

## 2024-02-09 DIAGNOSIS — B44.9 ASPERGILLUS PNEUMONIA (HCC): ICD-10-CM

## 2024-02-09 DIAGNOSIS — C34.92 MALIGNANT NEOPLASM OF LEFT LUNG, UNSPECIFIED PART OF LUNG (HCC): ICD-10-CM

## 2024-02-09 LAB
ALBUMIN SERPL-MCNC: 3.8 G/DL (ref 3.2–4.8)
ALBUMIN/GLOB SERPL: 1.8 {RATIO} (ref 1–2)
ALP LIVER SERPL-CCNC: 121 U/L
ALT SERPL-CCNC: 44 U/L
ANION GAP SERPL CALC-SCNC: 7 MMOL/L (ref 0–18)
AST SERPL-CCNC: 30 U/L (ref ?–34)
BASOPHILS # BLD AUTO: 0.02 X10(3) UL (ref 0–0.2)
BASOPHILS NFR BLD AUTO: 0.3 %
BILIRUB SERPL-MCNC: 0.5 MG/DL (ref 0.2–1.1)
BUN BLD-MCNC: 30 MG/DL (ref 9–23)
BUN/CREAT SERPL: 32.3 (ref 10–20)
CALCIUM BLD-MCNC: 8.9 MG/DL (ref 8.7–10.4)
CHLORIDE SERPL-SCNC: 105 MMOL/L (ref 98–112)
CO2 SERPL-SCNC: 29 MMOL/L (ref 21–32)
CREAT BLD-MCNC: 0.93 MG/DL
DEPRECATED RDW RBC AUTO: 52.9 FL (ref 35.1–46.3)
EGFRCR SERPLBLD CKD-EPI 2021: 83 ML/MIN/1.73M2 (ref 60–?)
EOSINOPHIL # BLD AUTO: 0.04 X10(3) UL (ref 0–0.7)
EOSINOPHIL NFR BLD AUTO: 0.7 %
ERYTHROCYTE [DISTWIDTH] IN BLOOD BY AUTOMATED COUNT: 15.3 % (ref 11–15)
FASTING STATUS PATIENT QL REPORTED: NO
GLOBULIN PLAS-MCNC: 2.1 G/DL (ref 2.8–4.4)
GLUCOSE BLD-MCNC: 98 MG/DL (ref 70–99)
HCT VFR BLD AUTO: 42.6 %
HGB BLD-MCNC: 13.6 G/DL
IMM GRANULOCYTES # BLD AUTO: 0.13 X10(3) UL (ref 0–1)
IMM GRANULOCYTES NFR BLD: 2.2 %
LYMPHOCYTES # BLD AUTO: 0.84 X10(3) UL (ref 1–4)
LYMPHOCYTES NFR BLD AUTO: 14 %
MCH RBC QN AUTO: 30.2 PG (ref 26–34)
MCHC RBC AUTO-ENTMCNC: 31.9 G/DL (ref 31–37)
MCV RBC AUTO: 94.5 FL
MONOCYTES # BLD AUTO: 0.89 X10(3) UL (ref 0.1–1)
MONOCYTES NFR BLD AUTO: 14.8 %
NEUTROPHILS # BLD AUTO: 4.09 X10 (3) UL (ref 1.5–7.7)
NEUTROPHILS # BLD AUTO: 4.09 X10(3) UL (ref 1.5–7.7)
NEUTROPHILS NFR BLD AUTO: 68 %
OSMOLALITY SERPL CALC.SUM OF ELEC: 298 MOSM/KG (ref 275–295)
PLATELET # BLD AUTO: 209 10(3)UL (ref 150–450)
POTASSIUM SERPL-SCNC: 4.1 MMOL/L (ref 3.5–5.1)
PROT SERPL-MCNC: 5.9 G/DL (ref 5.7–8.2)
RBC # BLD AUTO: 4.51 X10(6)UL
SODIUM SERPL-SCNC: 141 MMOL/L (ref 136–145)
WBC # BLD AUTO: 6 X10(3) UL (ref 4–11)

## 2024-02-09 PROCEDURE — 36415 COLL VENOUS BLD VENIPUNCTURE: CPT

## 2024-02-09 PROCEDURE — 99211 OFF/OP EST MAY X REQ PHY/QHP: CPT

## 2024-02-09 PROCEDURE — 80053 COMPREHEN METABOLIC PANEL: CPT

## 2024-02-09 PROCEDURE — 85025 COMPLETE CBC W/AUTO DIFF WBC: CPT

## 2024-02-09 NOTE — PROGRESS NOTES
Cancer Center Progress Note    Patient Name: Luis Villasenor   YOB: 1945   Medical Record Number: [id number]   Attending Physician: Mike Mcclure M.D.       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  He completed definitive concurrent chemoradiotherapy using cisplatin and etoposide early March 2017.  He had low-grade anemia neutropenia on treatment.    He subsequently has been followed on surveillance with no evidence of progression.    The patient started on durvalumab in October 2017 given he had unresectable stage III disease with no evidence of progression following chemo radiotherapy    He has had occasional mild anemia on treatment.  Laboratory follow-up in May 2018 showed severe iron deficiency.  Received IV iron    He completed 13 cycles of durvalumab  however towards the end of his treatment he developed 2 separate instances of pneumonitis/transaminitis requiring separate courses of prednisone.  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     Started single-agent Pembrolizumab on 3/19/21.  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    PE/DVT admission 1/2023.    1/28-2/1 admission for hypoxia, dyspnea, fevers. Treated for pneumonia/pneumonitis. Discharged on steroid taper per Pulmonology.      Interval History:  Returns for routine follow-up, feeling well since discharge on 2/1,  restarted Trametinib and Dabrafenib on . No acute complaints. No dyspnea. No fevers/chills. No changes in bowel or bladder habits. Good oral intake. No skin changes. No bleeding. No focal neurological deficits.      Performance Status:  ECOG 0    Past Medical History:  Past Medical History:   Diagnosis Date    Deep vein thrombosis (HCC)     Dysphagia     Esophageal reflux     Exposure to medical diagnostic radiation     Hemorrhoids     History of blood transfusion     Pt on Immunotherapy    History of immunotherapy 2023    Impotence     Lung cancer (HCC)     NSCLCA stage IIIB    Necrotizing granulomatous inflammation of lung (HCC)     Obstructive sleep apnea     Personal history of antineoplastic chemotherapy     Pneumonia due to organism     Pulmonary embolism (HCC)     Pulmonary embolus (HCC)     Sinus problem     Sinus problems    Sleep apnea     CPAP    Visual impairment     wears eyeglasses       Past Surgical History:  Past Surgical History:   Procedure Laterality Date    COLONOSCOPY      COLONOSCOPY N/A 2018    Procedure: COLONOSCOPY;  Surgeon: Cesar Beckman MD;  [facility] ENDOSCOPY    COLONOSCOPY      PORT, INDWELLING, IMP Right 2017    ROTATOR CUFF REPAIR Left     TOTAL KNEE REPLACEMENT Bilateral        Family History:  Family History   Problem Relation Age of Onset    Cancer Father         Lung    Cancer Mother         Breast, ovarian       Social History:  Social History     Socioeconomic History    Marital status:      Spouse name: Not on file    Number of children: Not on file    Years of education: Not on file    Highest education level: Not on file   Occupational History    Not on file   Tobacco Use    Smoking status: Former     Packs/day: 1.00     Years: 30.00     Additional pack years: 0.00     Total pack years: 30.00     Types: Cigarettes     Quit date: 2000     Years since quittin.6    Smokeless tobacco: Never   Vaping Use    Vaping Use: Never  used   Substance and Sexual Activity    Alcohol use: Not Currently     Alcohol/week: 0.8 standard drinks of alcohol     Types: 1 Glasses of wine per week     Comment: very rarely    Drug use: No    Sexual activity: Not on file   Other Topics Concern     Service Not Asked    Blood Transfusions Not Asked    Caffeine Concern Yes     Comment: coffee, 2 cups daily    Occupational Exposure Not Asked    Hobby Hazards Not Asked    Sleep Concern Not Asked    Stress Concern Not Asked    Weight Concern Not Asked    Special Diet Not Asked    Back Care Not Asked    Exercise Not Asked    Bike Helmet Not Asked    Seat Belt Not Asked    Self-Exams Not Asked   Social History Narrative    Not on file     Social Determinants of Health     Financial Resource Strain: Low Risk  (2/2/2024)    Financial Resource Strain     Difficulty of Paying Living Expenses: Not very hard     Med Affordability: No   Food Insecurity: No Food Insecurity (1/28/2024)    Food Insecurity     Food Insecurity: Never true   Transportation Needs: No Transportation Needs (2/2/2024)    Transportation Needs     Lack of Transportation: No   Physical Activity: Not on file   Stress: Not on file   Social Connections: Not on file   Housing Stability: Low Risk  (1/28/2024)    Housing Stability     Housing Instability: No     Housing Instability Emergency: Not on file         Current Medications:    Current Outpatient Medications:     fluticasone furoate-vilanterol (BREO ELLIPTA) 100-25 MCG/ACT Inhalation Aerosol Powder, Breath Activated, Inhale 1 puff into the lungs daily. Rinse your mouth with water without swallowing after using BREO to help reduce your chance of getting thrush., Disp: 1 each, Rfl: 5    ipratropium-albuterol 0.5-2.5 (3) MG/3ML Inhalation Solution, Take 3 mL by nebulization every 6 (six) hours as needed., Disp: 90 each, Rfl: 5    predniSONE 20 MG Oral Tab, 2 tabs daily for 3 days then one tab daily until completed, Disp: 10 tablet, Rfl: 0     rivaroxaban (XARELTO) 10 MG Oral Tab, Take 1 tablet (10 mg total) by mouth at bedtime., Disp: , Rfl:     pantoprazole 40 MG Oral Tab EC, Take 1 tablet (40 mg total) by mouth every morning before breakfast., Disp: 30 tablet, Rfl: 5    triamcinolone 0.1 % External Cream, Apply topically 2 (two) times daily. To areas of rash or itching. Hold if no rash or itching. Use moisturizer., Disp: 80 g, Rfl: 2    acetaminophen 500 MG Oral Tab, Take 1 tablet (500 mg total) by mouth every 6 (six) hours as needed for Pain., Disp: , Rfl:     sulfamethoxazole-trimethoprim -160 MG Oral Tab per tablet, TAKE 1 TABLET BY MOUTH TWICE A DAY ON MONDAY, WEDNESDAY AND FRIDAY (Patient taking differently: Take 1 tablet by mouth 2 (two) times daily. TAKE 1 TABLET BY MOUTH TWICE A DAY ON MONDAY, WEDNESDAY AND FRIDAY), Disp: 60 tablet, Rfl: 3    predniSONE 10 MG Oral Tab, Take 1 tablet (10 mg total) by mouth daily. (Patient taking differently: Take 1 tablet (10 mg total) by mouth every morning.), Disp: 90 tablet, Rfl: 0    dabrafenib mesylate 50 MG Oral Cap, Take 3 capsules (150 mg total) by mouth 2 (two) times daily. Take THREE capsules at least 1 hour before or 2 hours after a meal, twice a day, Disp: 180 capsule, Rfl: 5    Trametinib Dimethyl Sulfoxide 2 MG Oral Tab, Take 2 mg by mouth daily. Take at least 1 hour before or at least 2 hours after a meal. (Patient taking differently: Take 2 mg by mouth daily with lunch. Take at least 1 hour before or at least 2 hours after a meal.), Disp: 30 tablet, Rfl: 5    voriconazole 200 MG Oral Tab, Take 1 tablet (200 mg total) by mouth 2 (two) times daily., Disp: 180 tablet, Rfl: 3    fluticasone furoate-vilanterol (BREO ELLIPTA) 100-25 MCG/INH Inhalation Aerosol Powder, Breath Activated, Inhale 1 puff into the lungs daily. Follow with mouth rinse and spit (Patient taking differently: Inhale 1 puff into the lungs every morning. Follow with mouth rinse and spit), Disp: 1 each, Rfl: 6    albuterol 108  (90 Base) MCG/ACT Inhalation Aero Soln, Inhale 2 puffs into the lungs every 4 (four) hours as needed for Wheezing., Disp: 1 each, Rfl: 2    cholecalciferol 50 MCG (2000 UT) Oral Tab, Take 0.5 tablets (1,000 Units total) by mouth every morning., Disp: , Rfl:     Vitamin C 500 MG Oral Tab, Take 1 tablet (500 mg total) by mouth every morning., Disp: , Rfl:     Allergies:  No Known Allergies     Review of Systems:  All other systems reviewed and negative x12    Vital Signs:  /56 (BP Location: Left arm, Patient Position: Sitting, Cuff Size: adult)   Pulse 67   Temp 98 °F (36.7 °C) (Oral)   Resp 18   Ht 1.778 m (5' 10\")   Wt 83.5 kg (184 lb)   SpO2 96%   BMI 26.40 kg/m²     Physical Examination:  General: Patient is alert and oriented x 3, not in acute distress.  Psych:  Mood and affect appropriate, in good spirits.   HEENT: EOMs intact. Oropharynx is clear.   Chest: non-labored breathing, CTA  Cardiovascular: Regular rate and rhythm. Normal S1S2  Extremities: No edema.   Neurological: 5/5 motor x4.    Derm:  No rash    LABS:    CBC:    Lab Results   Component Value Date    WBC 6.0 02/09/2024    WBC 1.8 (L) 01/30/2024    WBC 8.5 01/28/2024     Lab Results   Component Value Date    HGB 13.6 02/09/2024    HGB 12.4 (L) 01/30/2024    HGB 12.9 (L) 01/28/2024      Lab Results   Component Value Date    .0 02/09/2024    .0 01/30/2024    .0 01/28/2024       Lab Results   Component Value Date    GLU 98 02/09/2024    BUN 30 (H) 02/09/2024    BUNCREA 32.3 (H) 02/09/2024    CREATSERUM 0.93 02/09/2024    ANIONGAP 7 02/09/2024    GFR >60 05/14/2016    GFRNAA 81 07/22/2022    GFRAA 94 07/22/2022    CA 8.9 02/09/2024    OSMOCALC 298 (H) 02/09/2024    ALKPHO 121 (H) 02/09/2024    AST 30 02/09/2024    ALT 44 02/09/2024    ALKPHOS 58 12/10/2011    BILT 0.5 02/09/2024    TP 5.9 02/09/2024    ALB 3.8 02/09/2024    GLOBULIN 2.1 (L) 02/09/2024     02/09/2024    K 4.1 02/09/2024     02/09/2024    CO2  29.0 02/09/2024       Radiology:  PEt/CT Chest       Cancer of upper lobe of left lung (HCC)    Staging form: Lung AJCC V7      Clinical stage from 1/9/2017: Stage IIIB (T1a, N3, M0) - Signed by Mike Mcclure MD on 1/9/2017    Impression and Plan:  78 year oldmale former smoker with 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  He was diagnosed in December 2016 as above.  He was treated with concurrent definitive chemoradiotherapy using cisplatin and etoposide finishing in early March 2017.  He had low-grade neutropenia and anemia on chemotherapy. Given he has unresectable stage IIIb disease with no progression following initial chemotherapy we  added immunotherapy with durvalumab. Completed 13 cycles of durvalumab however developed recurrent pneumonitis/transaminitis.  He has responded well to prednisone. He remains off of all cancer directed therapy as of the end of April 2018.    Recurrent disease biopsy-proven mediastinal lymph nodes as of March 2021.  Same histology.  Given PD-L1 greater than 50% we will proceed with single agent pembrolizumab.  Also discussed nivolumab/ipilimumab however with previous issues of pneumonitis and transaminitis will use single agent immunotherapy.   Received Cycle #1 Pembrolizumab on 3/19/21, to be good long-term disease control through summer 2023.    BRAF V600 E peripheral blood sequencing September 2023  --  dabrafenib trametinib start 10/23/23  --tolerating.  Continue.   CT imaging 1/28 in ER with favorable tx response. Admission as below.  Rtc ~6 weeks with labs/MDV.     -1/28-2/1 admission for hypoxia, dyspnea, fevers. Treated for pneumonia/ possible pneumonitis. Discharged on steroid taper per Pulmonology-currently back on prednisone 10mg. Restarted treatment 2/2.    - Pulmonary embolus now on rivaroxaban diagnosed January 2023.  Ok to now take proph dose 10mg daily     - Recurrent aspergillus pneumonia now on  voriconazole, managed by Pulmonology.  - Recurrent pneumonitis now off pembrolizumab, ER admission 1/2024 as above, Prednisone per Pulmonology as above.      --choledocholithiasis 11/2023 presented to ER for abd pain. He did well clinically.  He underwent ERCP extraction of the common bile duct stones.  lap cholecystectomy 12/11. Abnormality was seen at the adjacent liver site and segmental hepatectomy performed as well. Symptoms resolved and recovered well. Path benign.     --dermatitis with pruritus minimal to area of lower back. Unclear if tx related. prn topical steroid. Currently resolved. Monitor.    MDM: high, malignancy, life threatening. Drug therapy requiring intensive monitoring for tox    BRYON Celis    Seen and examined with HARPER Lopez agree with note above metastatic adenocarcinoma lung status post previous lines of therapy now on dabrafenib and trametinib given BRAF V600 E.  Tolerating treatment well we will continue on exam comfortable labored respirations history of pneumonitis he remains on low-dose prednisone for pulmonary embolus he remains on prophylactic dose rivaroxaban     we will continue to be focal point of care in setting of malignancy undergoing ongoing treatment.   Management plan for cancer treatment leading high risk MDM was formulated by physician.

## 2024-02-14 ENCOUNTER — OFFICE VISIT (OUTPATIENT)
Dept: SURGERY | Facility: CLINIC | Age: 79
End: 2024-02-14
Payer: MEDICARE

## 2024-02-14 DIAGNOSIS — R39.89 OTHER SYMPTOMS AND SIGNS INVOLVING THE GENITOURINARY SYSTEM: ICD-10-CM

## 2024-02-14 DIAGNOSIS — Z51.81 MONITORING FOR ANTICOAGULANT USE: ICD-10-CM

## 2024-02-14 DIAGNOSIS — N43.42 SPERMATOCELE OF EPIDIDYMIS, MULTIPLE: Primary | ICD-10-CM

## 2024-02-14 DIAGNOSIS — Z79.01 CHRONIC ANTICOAGULATION: ICD-10-CM

## 2024-02-14 DIAGNOSIS — Z79.01 MONITORING FOR ANTICOAGULANT USE: ICD-10-CM

## 2024-02-14 DIAGNOSIS — N50.3 EPIDIDYMAL CYST: ICD-10-CM

## 2024-02-14 PROCEDURE — 1111F DSCHRG MED/CURRENT MED MERGE: CPT | Performed by: UROLOGY

## 2024-02-14 PROCEDURE — 99214 OFFICE O/P EST MOD 30 MIN: CPT | Performed by: UROLOGY

## 2024-02-14 PROCEDURE — 1159F MED LIST DOCD IN RCRD: CPT | Performed by: UROLOGY

## 2024-02-14 PROCEDURE — 1160F RVW MEDS BY RX/DR IN RCRD: CPT | Performed by: UROLOGY

## 2024-02-14 NOTE — H&P
[facility]  Follow-Up Visit    HPI: Luis Villasenor is a 78 year old male presents for a follow up visit. Patient was last seen on 3/16/2022.  He is accompanied by his wife.    INTERVAL HISTORY: Patient following up regarding a right spermatocele/epididymal cyst.    He was initially seen March 2022 for a painless swelling in his right hemiscrotum.  No associated symptoms.  Findings consistent with an epididymal cyst/spermatocele.  He elected watchful waiting.    He reports increase in size over the past year or so with increasing associated discomfort and disfigurement.    He had a scrotal ultrasound completed 1/7/2024 revealing a large partially septated benign right epididymal cyst measuring 7.4 cm.  Smaller epididymal head cyst measuring 1.6 cm.    He would like to discuss further management.    He denies fevers or chills, chest pain or shortness of breath.  No gross hematuria or dysuria.      PAST MEDICAL HISTORY: Stage III lung cancer treated with chemo RT, WALLACE, hemorrhoids, ED. pulmonary embolism 1/2023.     PAST SURGICAL HISTORY: Bilateral knee replacement surgery, left shoulder rotator cuff surgery.  Robotic cholecystectomy 12/2023.     SOCIAL HISTORY:  and has 2 grownup children.  Former smoker 1 pack/day for 30 years and quit in 2000. Rare social alcohol.  He is a retired [employer].     Reviewed past medical, surgical, family, and social history.  Reviewed med list and allergies.      REVIEW OF SYSTEMS:  Pertinent positives and negatives per HPI. A 10-point ROS was performed and is otherwise negative.       EXAM:  There were no vitals taken for this visit.    Physical Exam  Constitutional:       Appearance: He is well-developed.   HENT:      Head: Normocephalic.   Eyes:      General: No scleral icterus.  Cardiovascular:      Rate and Rhythm: Normal rate.   Pulmonary:      Effort: Pulmonary effort is normal.   Abdominal:      General: There is no distension.       Palpations: Abdomen is soft.      Tenderness: There is no abdominal tenderness.   Genitourinary:     Penis: Uncircumcised. Phimosis present.       Comments: There is a soft right epididymal cyst measuring about 6 to 7 cm in size.  Testes nontender bilaterally and without masses.  Skin:     General: Skin is warm and dry.   Neurological:      Mental Status: He is alert and oriented to person, place, and time.   Psychiatric:         Mood and Affect: Mood normal.         Behavior: Behavior normal.       PATHOLOGY:  No results found.      LABS:  No results found.      IMAGING:    US SCROTUM W/ DOPPLER (1/8/2024): 1. Large partially septated benign epididymal cyst measures 7.4 x 4.7 x 7.4 cm superior to the testicle.  Smaller epididymal head cyst measuring 1.6 x 1.3 cm. 2. Normal bilateral testicles.  Tubular ectasia involving the rete testes right testicle, normal variation. 3. Small calcified scrotolith within a small left scrotal hydrocele. 4. Symmetric Doppler flow to the bilateral testicles without torsion.         UROLOGY PROCEDURE:  None performed today.      IMPRESSION:  78 year old male with an enlarging, now symptomatic right epididymal cyst/spermatocele.    Findings reviewed with patient accompanying wife at length.  We discussed relevant anatomy and physiology.      Discussed likely benign nature of epididymal cysts.  Management options discussed including conservative management or surgical resection.  Indications for surgical management discussed including pain/discomfort and/or disfigurement.    Patient is interested in surgical resection of the epididymal cyst.    Procedure discussed in detail including rationale, approach, benefits, risks, possible complications, and alternatives.    We discussed surgical risks including not limited to medical and anesthetic complications, bleeding and hematoma formation, infection, damage to surrounding organs or structures, and possible need for additional procedures.   We discussed the expected postoperative course of recovery.    We discussed that he will need to hold his blood thinners before surgery and seek preoperative medical optimization, assessment, and clearance for surgery by the medical consultant.    He verbalized understanding and wishes to proceed with right spermatocelectomy/epididymal cyst excision.    PLAN:  1.  Patient will be scheduled for right spermatocelectomy/epididymal cyst excision.    Routine preop testing.  Patient will seek preoperative medical optimization and clearance for surgery through his PCP.  Cardiac clearance to be left at primary care physician's discretion.  Patient will need to hold Xarelto for 3 days before the surgery.      MDM complexity: Moderate.    Lonnie Bonilla MD  2/14/2024

## 2024-02-14 NOTE — H&P (VIEW-ONLY)
[facility]  Follow-Up Visit    HPI: Luis Villasenor is a 78 year old male presents for a follow up visit. Patient was last seen on 3/16/2022.  He is accompanied by his wife.    INTERVAL HISTORY: Patient following up regarding a right spermatocele/epididymal cyst.    He was initially seen March 2022 for a painless swelling in his right hemiscrotum.  No associated symptoms.  Findings consistent with an epididymal cyst/spermatocele.  He elected watchful waiting.    He reports increase in size over the past year or so with increasing associated discomfort and disfigurement.    He had a scrotal ultrasound completed 1/7/2024 revealing a large partially septated benign right epididymal cyst measuring 7.4 cm.  Smaller epididymal head cyst measuring 1.6 cm.    He would like to discuss further management.    He denies fevers or chills, chest pain or shortness of breath.  No gross hematuria or dysuria.      PAST MEDICAL HISTORY: Stage III lung cancer treated with chemo RT, WALLACE, hemorrhoids, ED. pulmonary embolism 1/2023.     PAST SURGICAL HISTORY: Bilateral knee replacement surgery, left shoulder rotator cuff surgery.  Robotic cholecystectomy 12/2023.     SOCIAL HISTORY:  and has 2 grownup children.  Former smoker 1 pack/day for 30 years and quit in 2000. Rare social alcohol.  He is a retired [employer].     Reviewed past medical, surgical, family, and social history.  Reviewed med list and allergies.      REVIEW OF SYSTEMS:  Pertinent positives and negatives per HPI. A 10-point ROS was performed and is otherwise negative.       EXAM:  There were no vitals taken for this visit.    Physical Exam  Constitutional:       Appearance: He is well-developed.   HENT:      Head: Normocephalic.   Eyes:      General: No scleral icterus.  Cardiovascular:      Rate and Rhythm: Normal rate.   Pulmonary:      Effort: Pulmonary effort is normal.   Abdominal:      General: There is no distension.       Palpations: Abdomen is soft.      Tenderness: There is no abdominal tenderness.   Genitourinary:     Penis: Uncircumcised. Phimosis present.       Comments: There is a soft right epididymal cyst measuring about 6 to 7 cm in size.  Testes nontender bilaterally and without masses.  Skin:     General: Skin is warm and dry.   Neurological:      Mental Status: He is alert and oriented to person, place, and time.   Psychiatric:         Mood and Affect: Mood normal.         Behavior: Behavior normal.       PATHOLOGY:  No results found.      LABS:  No results found.      IMAGING:    US SCROTUM W/ DOPPLER (1/8/2024): 1. Large partially septated benign epididymal cyst measures 7.4 x 4.7 x 7.4 cm superior to the testicle.  Smaller epididymal head cyst measuring 1.6 x 1.3 cm. 2. Normal bilateral testicles.  Tubular ectasia involving the rete testes right testicle, normal variation. 3. Small calcified scrotolith within a small left scrotal hydrocele. 4. Symmetric Doppler flow to the bilateral testicles without torsion.         UROLOGY PROCEDURE:  None performed today.      IMPRESSION:  78 year old male with an enlarging, now symptomatic right epididymal cyst/spermatocele.    Findings reviewed with patient accompanying wife at length.  We discussed relevant anatomy and physiology.      Discussed likely benign nature of epididymal cysts.  Management options discussed including conservative management or surgical resection.  Indications for surgical management discussed including pain/discomfort and/or disfigurement.    Patient is interested in surgical resection of the epididymal cyst.    Procedure discussed in detail including rationale, approach, benefits, risks, possible complications, and alternatives.    We discussed surgical risks including not limited to medical and anesthetic complications, bleeding and hematoma formation, infection, damage to surrounding organs or structures, and possible need for additional procedures.   We discussed the expected postoperative course of recovery.    We discussed that he will need to hold his blood thinners before surgery and seek preoperative medical optimization, assessment, and clearance for surgery by the medical consultant.    He verbalized understanding and wishes to proceed with right spermatocelectomy/epididymal cyst excision.    PLAN:  1.  Patient will be scheduled for right spermatocelectomy/epididymal cyst excision.    Routine preop testing.  Patient will seek preoperative medical optimization and clearance for surgery through his PCP.  Cardiac clearance to be left at primary care physician's discretion.  Patient will need to hold Xarelto for 3 days before the surgery.      MDM complexity: Moderate.    Lonnie Bonilla MD  2/14/2024

## 2024-02-15 ENCOUNTER — TELEPHONE (OUTPATIENT)
Dept: PULMONOLOGY | Facility: CLINIC | Age: 79
End: 2024-02-15

## 2024-02-15 NOTE — TELEPHONE ENCOUNTER
RE: pcp medical clearance  Received: Today  Chris Hernandez MD [place], Tonya; [facility] Clinical Staff         Previous Messages       ----- Message -----  From: Marlene Rouse  Sent: 2/15/2024  12:18 PM CST  To: Chris Hernandez MD  Subject: pcp medical clearance                            Abhi Landon, this is to inform, patient is scheduled Friday 03/08/2024, right spermatocelectomy.    ' requesting medical clearance prior to scheduled surgery.    Thanks in advance

## 2024-02-15 NOTE — PROGRESS NOTES
Spoke with patient, scheduled right spermatocelectomy, Friday 03/08/2024, went over pre-op/labs, reviewed in my chart, informed to please have all labs done 7-10 days prior to scheduled surgery.

## 2024-02-27 ENCOUNTER — APPOINTMENT (OUTPATIENT)
Dept: ULTRASOUND IMAGING | Facility: HOSPITAL | Age: 79
End: 2024-02-27
Attending: INTERNAL MEDICINE
Payer: MEDICARE

## 2024-02-27 ENCOUNTER — LAB ENCOUNTER (OUTPATIENT)
Dept: LAB | Facility: HOSPITAL | Age: 79
End: 2024-02-27
Attending: INTERNAL MEDICINE
Payer: MEDICARE

## 2024-02-27 ENCOUNTER — OFFICE VISIT (OUTPATIENT)
Dept: PULMONOLOGY | Facility: CLINIC | Age: 79
End: 2024-02-27
Payer: MEDICARE

## 2024-02-27 ENCOUNTER — HOSPITAL ENCOUNTER (OUTPATIENT)
Dept: GENERAL RADIOLOGY | Facility: HOSPITAL | Age: 79
Discharge: HOME | End: 2024-02-27
Attending: INTERNAL MEDICINE
Payer: MEDICARE

## 2024-02-27 ENCOUNTER — NURSE ONLY (OUTPATIENT)
Dept: HEMATOLOGY/ONCOLOGY | Facility: HOSPITAL | Age: 79
End: 2024-02-27
Attending: INTERNAL MEDICINE
Payer: MEDICARE

## 2024-02-27 VITALS
OXYGEN SATURATION: 93 % | BODY MASS INDEX: 26.34 KG/M2 | SYSTOLIC BLOOD PRESSURE: 120 MMHG | HEART RATE: 76 BPM | WEIGHT: 184 LBS | DIASTOLIC BLOOD PRESSURE: 76 MMHG | HEIGHT: 70 IN

## 2024-02-27 DIAGNOSIS — C34.12 CANCER OF UPPER LOBE OF LEFT LUNG (HCC): Primary | ICD-10-CM

## 2024-02-27 DIAGNOSIS — J18.9 UNRESOLVED PNEUMONIA: ICD-10-CM

## 2024-02-27 DIAGNOSIS — Z79.01 MONITORING FOR ANTICOAGULANT USE: ICD-10-CM

## 2024-02-27 DIAGNOSIS — Z86.718 PERSONAL HISTORY OF VENOUS THROMBOSIS AND EMBOLISM: ICD-10-CM

## 2024-02-27 DIAGNOSIS — Z45.2 ENCOUNTER FOR CARE RELATED TO PORT-A-CATH: ICD-10-CM

## 2024-02-27 DIAGNOSIS — D50.9 IRON DEFICIENCY ANEMIA, UNSPECIFIED IRON DEFICIENCY ANEMIA TYPE: ICD-10-CM

## 2024-02-27 DIAGNOSIS — Z51.81 MONITORING FOR ANTICOAGULANT USE: ICD-10-CM

## 2024-02-27 LAB
APTT PPP: 42.8 SECONDS (ref 23.3–35.6)
INR BLD: 1.06 (ref 0.8–1.2)
PROTHROMBIN TIME: 14.5 SECONDS (ref 11.6–14.8)

## 2024-02-27 PROCEDURE — 36591 DRAW BLOOD OFF VENOUS DEVICE: CPT

## 2024-02-27 PROCEDURE — 1125F AMNT PAIN NOTED PAIN PRSNT: CPT | Performed by: INTERNAL MEDICINE

## 2024-02-27 PROCEDURE — 87086 URINE CULTURE/COLONY COUNT: CPT | Performed by: UROLOGY

## 2024-02-27 PROCEDURE — 71046 X-RAY EXAM CHEST 2 VIEWS: CPT | Performed by: INTERNAL MEDICINE

## 2024-02-27 PROCEDURE — 3008F BODY MASS INDEX DOCD: CPT | Performed by: INTERNAL MEDICINE

## 2024-02-27 PROCEDURE — 99213 OFFICE O/P EST LOW 20 MIN: CPT | Performed by: INTERNAL MEDICINE

## 2024-02-27 PROCEDURE — 87186 SC STD MICRODIL/AGAR DIL: CPT | Performed by: UROLOGY

## 2024-02-27 PROCEDURE — 85730 THROMBOPLASTIN TIME PARTIAL: CPT

## 2024-02-27 PROCEDURE — 3074F SYST BP LT 130 MM HG: CPT | Performed by: INTERNAL MEDICINE

## 2024-02-27 PROCEDURE — 1111F DSCHRG MED/CURRENT MED MERGE: CPT | Performed by: INTERNAL MEDICINE

## 2024-02-27 PROCEDURE — 1159F MED LIST DOCD IN RCRD: CPT | Performed by: INTERNAL MEDICINE

## 2024-02-27 PROCEDURE — 87077 CULTURE AEROBIC IDENTIFY: CPT | Performed by: UROLOGY

## 2024-02-27 PROCEDURE — 85610 PROTHROMBIN TIME: CPT

## 2024-02-27 PROCEDURE — 3078F DIAST BP <80 MM HG: CPT | Performed by: INTERNAL MEDICINE

## 2024-02-27 RX ORDER — HEPARIN SODIUM (PORCINE) LOCK FLUSH IV SOLN 100 UNIT/ML 100 UNIT/ML
5 SOLUTION INTRAVENOUS ONCE
OUTPATIENT
Start: 2024-02-27

## 2024-02-27 RX ORDER — SODIUM CHLORIDE 9 MG/ML
10 INJECTION, SOLUTION INTRAMUSCULAR; INTRAVENOUS; SUBCUTANEOUS ONCE
OUTPATIENT
Start: 2024-02-27

## 2024-02-27 RX ORDER — HEPARIN SODIUM (PORCINE) LOCK FLUSH IV SOLN 100 UNIT/ML 100 UNIT/ML
5 SOLUTION INTRAVENOUS ONCE
Status: COMPLETED | OUTPATIENT
Start: 2024-02-27 | End: 2024-02-27

## 2024-02-27 RX ADMIN — HEPARIN SODIUM (PORCINE) LOCK FLUSH IV SOLN 100 UNIT/ML 500 UNITS: 100 SOLUTION INTRAVENOUS at 09:42:00

## 2024-02-27 NOTE — TELEPHONE ENCOUNTER
Current Outpatient Medications   Medication Sig Dispense Refill    rivaroxaban (XARELTO) 10 MG Oral Tab Take 1 tablet (10 mg total) by mouth at bedtime.

## 2024-02-27 NOTE — PROGRESS NOTES
The patient is an 78-year-old male who I know well from prior evaluation comes in now for follow-up.  Chest x-ray today is vastly improved with near complete resolution of previously seen bilateral parenchymal abnormality.  The patient is breathing more comfortably.  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  He is going to have surgery for his scrotal cyst.  The patient is on 2 oral chemotherapeutics for the lung cancer.    Review of Systems:  Vision normal. Ear nose and throat normal. Bowel normal. Bladder function normal. No depression. No thyroid disease. No lymphatic system concerns.  No rash. Muscles and joints unremarkable. No weight loss no weight gain.    Physical Examination:  Vital signs normal. HEENT examination is unremarkable with pupils equal round and reactive to light and accommodation. Neck without adenopathy, thyromegaly, JVD nor bruit. Lungs diminished to auscultation and percussion. Cardiac regular rate and rhythm no murmur. Abdomen nontender, without hepatosplenomegaly and no mass appreciable. Extremities and Musculoskeletal without clubbing cyanosis nor edema, and mobility acceptable. Neurologic grossly intact with symmetric tone and strength and reflex.    Assessment and plan:  1.  Community-acquired pneumonia-resolved  2.  Pneumonitis associated with immune therapy-resolved  3.  Scrotal lesion-patient will need to stop the Xarelto for 3 days prior to surgery.  However, he has some swelling of the right lower extremity and a year ago had a right popliteal thrombus.  He is currently on Xarelto.  Will repeat lower extremity venous ultrasound now.  4.  COPD-continue current management  5.  Aspergillus lung infection-long-term antifungal and the patient to see me again at the 3 to 4-month interval or sooner if needed and contact me promptly if new trouble.

## 2024-02-28 ENCOUNTER — HOSPITAL ENCOUNTER (OUTPATIENT)
Dept: ULTRASOUND IMAGING | Facility: HOSPITAL | Age: 79
Discharge: HOME | End: 2024-02-28
Attending: INTERNAL MEDICINE
Payer: MEDICARE

## 2024-02-28 ENCOUNTER — TELEPHONE (OUTPATIENT)
Dept: SURGERY | Facility: CLINIC | Age: 79
End: 2024-02-28

## 2024-02-28 DIAGNOSIS — Z86.718 PERSONAL HISTORY OF VENOUS THROMBOSIS AND EMBOLISM: ICD-10-CM

## 2024-02-28 PROCEDURE — 93970 EXTREMITY STUDY: CPT | Performed by: INTERNAL MEDICINE

## 2024-02-28 RX ORDER — CEPHALEXIN 500 MG/1
500 CAPSULE ORAL 2 TIMES DAILY
Qty: 14 CAPSULE | Refills: 0 | Status: SHIPPED | OUTPATIENT
Start: 2024-02-28 | End: 2024-03-06

## 2024-02-28 NOTE — TELEPHONE ENCOUNTER
----- Message from Christiana Larson PA-C sent at 2/28/2024  4:31 PM CST -----  Please call and notify patient that:   Your urine culture returned positive for Klebsiella. Sensitivity to antibiotics hasn't returned. I will start you on keflex until it returns, if their is resistance we will contact you to change the antibiotics.   Christiana Larson PA-C

## 2024-02-28 NOTE — TELEPHONE ENCOUNTER
Last office visit: 2/27/24  Upcoming appointment: 7/23/24  Last refill: 11/28/23    Confirmed with patient's wife Satinder (ANA LUISA on file) that patient is taking medication.     Dr. Hernandez: Please review/sign pended refill request if agreeable.

## 2024-02-29 ENCOUNTER — TELEPHONE (OUTPATIENT)
Dept: PULMONOLOGY | Facility: CLINIC | Age: 79
End: 2024-02-29

## 2024-02-29 NOTE — TELEPHONE ENCOUNTER
Patients wife calling regarding results from [place] Venous Doppler. Patient has upcoming surgery scheduled and wants clearance. Please call at [phone],thanks.

## 2024-03-01 NOTE — TELEPHONE ENCOUNTER
Pt had US venous doppler leg bilat 2/28/24. LOV 2/27/24. Pt requesting surgical clearance (scrotal lesion).

## 2024-03-01 NOTE — TELEPHONE ENCOUNTER
Call was transferred by the call center. I spoke to the patient and his wife. I confirmed that the patient is taking the antibiotics and confirmed that the culture showed that the medication is treating the infection. Pt stated that he started taking the antibiotics 2 days ago.     I told the patient that the infection should be resolved by the surgery date on 3/8/24

## 2024-03-05 ENCOUNTER — TELEPHONE (OUTPATIENT)
Dept: SURGERY | Facility: CLINIC | Age: 79
End: 2024-03-05

## 2024-03-05 ENCOUNTER — APPOINTMENT (OUTPATIENT)
Dept: HEMATOLOGY/ONCOLOGY | Facility: HOSPITAL | Age: 79
End: 2024-03-05
Attending: NURSE PRACTITIONER
Payer: MEDICARE

## 2024-03-05 PROBLEM — Z92.89 HISTORY OF IMMUNOTHERAPY: Status: RESOLVED | Noted: 2023-07-28 | Resolved: 2024-03-05

## 2024-03-05 NOTE — TELEPHONE ENCOUNTER
Spoke with pre-admit, and patient' wife, informed that I sent  a message in regards to reaching out to  on holding xarelto/aspirin, also imaging.    I will await recommendations.

## 2024-03-05 NOTE — TELEPHONE ENCOUNTER
US VENOUS DOPPLER LEG BILAT - DIAG IMG (CPT=93970): Result Notes     Chris Hernandez MD  3/1/2024  3:33 PM CST       I spoke to the patient and his wife regarding the results of the ultrasound.  He has an upcoming surgery.  I have texted the surgeon to further discuss.  The patient would have to be off Xarelto for 3 days.  Perhaps he could take aspirin as opposed to the Xarelto in the interim.  Will discuss.    Chris Hernandez MD  2/28/2024  5:21 PM CST       Left message to call back

## 2024-03-05 NOTE — PAT NURSING NOTE
Pt. States he has a \"small residual blood clot\" in his calf and was supposed to receive further instructions from Dr. Bonilla or Dr. Hernandez regarding possibly starting on aspirin preoperatively and holding his Xarelto.  Instructed to notify Dr. Bonilla and inform he is waiting for instructions.

## 2024-03-05 NOTE — TELEPHONE ENCOUNTER
ANA LUISA on file for spouse. Satinder is aware of Dr. Hernandez's response below. Reassured her that MD texted surgeon to further discuss per his result notes below from US Venous Doppler Leg Bilat. Encouraged her to let us know if we may be of further assistance. She voiced understanding.

## 2024-03-07 NOTE — DISCHARGE INSTRUCTIONS
HOME INSTRUCTIONS  Put an ice pack or cold compress on the scrotum as directed to help reduce swelling. Do this for no longer than 15 minutes at a time. To make an ice pack, put ice cubes in a plastic bag that seals at the top. Wrap the bag in a clean, thin towel or cloth. Never put ice or an ice pack right on the skin. Continue using the cold pack for 2 days or until swelling improves.  Take prescribed pain medicine as directed.  Care for your incision as instructed.  You may shower on Fareed 3/10/24. Shower with running water and soap. Do not scrub the incision. Pat it to dry. Don't swim, bathe, use a hot tub, or do other things that will cover the incision with water for 4 weeks.  Wear a jockstrap or snug underwear as directed.  Don't lift anything heavier than 10 lbs for 4 weeks.   Don't have sex for 4 weeks, or as directed.  Don't drive until you are no longer taking pain medicine and your provider says it’s OK.  YOU MAY RESTART YOUR ELIQUIS ON MONDAY 3/11/2024.  When to call your healthcare provider  Call your healthcare provider right away if you have any of the following:   Fever of 100.4° F ( 38°C ) or higher, or as directed by your healthcare provider  Symptoms of infection at the incision site such as:  More redness or swelling  Warmth  Pain that gets worse  Bad-smelling drainage  Bleeding from the incision site  Pain that gets worse or is not eased by pain medicine  More pain or swelling in the scrotum or groin area  Call 911  Call 911 right away if you have:   Chest pain  Trouble breathing  AMBSURG HOME CARE INSTRUCTIONS: POST-OP ANESTHESIA  The medication that you received for sedation or general anesthesia can last up to 24 hours. Your judgment and reflexes may be altered, even if you feel like your normal self.      We Recommend:   Do not drive any motor vehicle or bicycle   Avoid mowing the lawn, playing sports, or working with power tools/applicances (power saws, electric knives or mixers)   That  you have someone stay with you on your first night home   Do not drink alcohol or take sleeping pills or tranquilizers   Do not sign legal documents within 24 hours of your procedure   If you had a nerve block for your surgery, take extra care not to put any pressure on your arm or hand for 24 hours    It is normal:  For you to have a sore throat if you had a breathing tube during surgery (while you were asleep!). The sore throat should get better within 48 hours. You can gargle with warm salt water (1/2 tsp in 4 oz warm water) or use a throat lozenge for comfort  To feel muscle aches or soreness especially in the abdomen, chest or neck. The achy feeling should go away in the next 24 hours  To feel weak, sleepy or \"wiped out\". Your should start feeling better in the next 24 hours.   To experience mild discomforts such as sore lip or tongue, headache, cramps, gas pains or a bloated feeling in your abdomen.   To experience mild back pain or soreness for a day or two if you had spinal or epidural anesthesia.   If you had laparoscopic surgery, to feel shoulder pain or discomfort on the day of surgery.   For some patients to have nausea after surgery/anesthesia    If you feel nausea or experience vomiting:   Try to move around less.   Eat less than usual or drink only liquids until the next morning   Nausea should resolve in about 24 hours    If you have a problem when you are at home:    Call your surgeons office   Discharge Instructions: After Your Surgery  You’ve just had surgery. During surgery, you were given medicine called anesthesia to keep you relaxed and free of pain. After surgery, you may have some pain or nausea. This is common. Here are some tips for feeling better and getting well after surgery.   Going home  Your healthcare provider will show you how to take care of yourself when you go home. They'll also answer your questions. Have an adult family member or friend drive you home. For the first 24 hours  after your surgery:   Don't drive or use heavy equipment.  Don't make important decisions or sign legal papers.  Take medicines as directed.  Don't drink alcohol.  Have someone stay with you, if needed. They can watch for problems and help keep you safe.  Be sure to go to all follow-up visits with your healthcare provider. And rest after your surgery for as long as your provider tells you to.   Coping with pain  If you have pain after surgery, pain medicine will help you feel better. Take it as directed, before pain becomes severe. Also, ask your healthcare provider or pharmacist about other ways to control pain. This might be with heat, ice, or relaxation. And follow any other instructions your surgeon or nurse gives you.      Stay on schedule with your medicine.     Tips for taking pain medicine  To get the best relief possible, remember these points:   Pain medicines can upset your stomach. Taking them with a little food may help.  Most pain relievers taken by mouth need at least 20 to 30 minutes to start to work.  Don't wait till your pain becomes severe before you take your medicine. Try to time your medicine so that you can take it before starting an activity. This might be before you get dressed, go for a walk, or sit down for dinner.  Constipation is a common side effect of some pain medicines. Call your healthcare provider before taking any medicines such as laxatives or stool softeners to help ease constipation. Also ask if you should skip any foods. Drinking lots of fluids and eating foods such as fruits and vegetables that are high in fiber can also help. Remember, don't take laxatives unless your surgeon has prescribed them.  Drinking alcohol and taking pain medicine can cause dizziness and slow your breathing. It can even be deadly. Don't drink alcohol while taking pain medicine.  Pain medicine can make you react more slowly to things. Don't drive or run machinery while taking pain medicine.  Your  healthcare provider may tell you to take acetaminophen to help ease your pain. Ask them how much you're supposed to take each day. Acetaminophen or other pain relievers may interact with your prescription medicines or other over-the-counter (OTC) medicines. Some prescription medicines have acetaminophen and other ingredients in them. Using both prescription and OTC acetaminophen for pain can cause you to accidentally overdose. Read the labels on your OTC medicines with care. This will help you to clearly know the list of ingredients, how much to take, and any warnings. It may also help you not take too much acetaminophen. If you have questions or don't understand the information, ask your pharmacist or healthcare provider to explain it to you before you take the OTC medicine.   Managing nausea  Some people have an upset stomach (nausea) after surgery. This is often because of anesthesia, pain, or pain medicine, less movement of food in the stomach, or the stress of surgery. These tips will help you handle nausea and eat healthy foods as you get better. If you were on a special food plan before surgery, ask your healthcare provider if you should follow it while you get better. Check with your provider on how your eating should progress. It may depend on the surgery you had. These general tips may help:   Don't push yourself to eat. Your body will tell you when to eat and how much.  Start off with clear liquids and soup. They're easier to digest.  Next try semi-solid foods as you feel ready. These include mashed potatoes, applesauce, and gelatin.  Slowly move to solid foods. Don’t eat fatty, rich, or spicy foods at first.  Don't force yourself to have 3 large meals a day. Instead eat smaller amounts more often.  Take pain medicines with a small amount of solid food, such as crackers or toast. This helps prevent nausea.  When to call your healthcare provider  Call your healthcare provider right away if you have any of  these:   You still have too much pain, or the pain gets worse, after taking the medicine. The medicine may not be strong enough. Or there may be a complication from the surgery.  You feel too sleepy, dizzy, or groggy. The medicine may be too strong.  Side effects such as nausea or vomiting. Your healthcare provider may advise taking other medicines to .  Skin changes such as rash, itching, or hives. This may mean you have an allergic reaction. Your provider may advise taking other medicines.  The incision looks different (for instance, part of it opens up).  Bleeding or fluid leaking from the incision site, and weren't told to expect that.  Fever of 100.4°F (38°C) or higher, or as directed by your provider.  Call 911  Call 911 right away if you have:   Trouble breathing  Facial swelling    If you have obstructive sleep apnea   You were given anesthesia medicine during surgery to keep you comfortable and free of pain. After surgery, you may have more apnea spells because of this medicine and other medicines you were given. The spells may last longer than normal.    At home:  Keep using the continuous positive airway pressure (CPAP) device when you sleep. Unless your healthcare provider tells you not to, use it when you sleep, day or night. CPAP is a common device used to treat obstructive sleep apnea.  Talk with your provider before taking any pain medicine, muscle relaxants, or sedatives. Your provider will tell you about the possible dangers of taking these medicines.  Contact your provider if your sleeping changes a lot even when taking medicines as directed.  [employer] last reviewed this educational content on 10/1/2021  © 5818-5027 [employer]. All rights reserved. This information is not intended as a substitute for professional medical care. Always follow your healthcare professional's instructions.

## 2024-03-08 ENCOUNTER — ANESTHESIA EVENT (OUTPATIENT)
Dept: SURGERY | Facility: HOSPITAL | Age: 79
End: 2024-03-08
Payer: MEDICARE

## 2024-03-08 ENCOUNTER — HOSPITAL ENCOUNTER (OUTPATIENT)
Facility: HOSPITAL | Age: 79
Setting detail: HOSPITAL OUTPATIENT SURGERY
Discharge: HOME | End: 2024-03-08
Attending: UROLOGY | Admitting: UROLOGY
Payer: MEDICARE

## 2024-03-08 ENCOUNTER — ANESTHESIA (OUTPATIENT)
Dept: SURGERY | Facility: HOSPITAL | Age: 79
End: 2024-03-08
Payer: MEDICARE

## 2024-03-08 VITALS
RESPIRATION RATE: 16 BRPM | BODY MASS INDEX: 26.92 KG/M2 | OXYGEN SATURATION: 95 % | DIASTOLIC BLOOD PRESSURE: 59 MMHG | HEART RATE: 59 BPM | HEIGHT: 70 IN | SYSTOLIC BLOOD PRESSURE: 127 MMHG | TEMPERATURE: 98 F | WEIGHT: 188 LBS

## 2024-03-08 DIAGNOSIS — N43.42 SPERMATOCELE OF EPIDIDYMIS, MULTIPLE: Primary | ICD-10-CM

## 2024-03-08 PROCEDURE — 54840 REMOVE EPIDIDYMIS LESION: CPT | Performed by: UROLOGY

## 2024-03-08 PROCEDURE — 0VBJ0ZZ EXCISION OF RIGHT EPIDIDYMIS, OPEN APPROACH: ICD-10-PCS | Performed by: UROLOGY

## 2024-03-08 RX ORDER — MORPHINE SULFATE 4 MG/ML
2 INJECTION, SOLUTION INTRAMUSCULAR; INTRAVENOUS EVERY 10 MIN PRN
Status: DISCONTINUED | OUTPATIENT
Start: 2024-03-08 | End: 2024-03-08

## 2024-03-08 RX ORDER — HYDROMORPHONE HYDROCHLORIDE 1 MG/ML
0.2 INJECTION, SOLUTION INTRAMUSCULAR; INTRAVENOUS; SUBCUTANEOUS EVERY 5 MIN PRN
Status: DISCONTINUED | OUTPATIENT
Start: 2024-03-08 | End: 2024-03-08

## 2024-03-08 RX ORDER — SODIUM CHLORIDE, SODIUM LACTATE, POTASSIUM CHLORIDE, CALCIUM CHLORIDE 600; 310; 30; 20 MG/100ML; MG/100ML; MG/100ML; MG/100ML
INJECTION, SOLUTION INTRAVENOUS CONTINUOUS
Status: DISCONTINUED | OUTPATIENT
Start: 2024-03-08 | End: 2024-03-08

## 2024-03-08 RX ORDER — ONDANSETRON 2 MG/ML
4 INJECTION INTRAMUSCULAR; INTRAVENOUS EVERY 6 HOURS PRN
Status: DISCONTINUED | OUTPATIENT
Start: 2024-03-08 | End: 2024-03-08

## 2024-03-08 RX ORDER — HYDROMORPHONE HYDROCHLORIDE 1 MG/ML
0.4 INJECTION, SOLUTION INTRAMUSCULAR; INTRAVENOUS; SUBCUTANEOUS EVERY 5 MIN PRN
Status: DISCONTINUED | OUTPATIENT
Start: 2024-03-08 | End: 2024-03-08

## 2024-03-08 RX ORDER — ONDANSETRON 2 MG/ML
INJECTION INTRAMUSCULAR; INTRAVENOUS AS NEEDED
Status: DISCONTINUED | OUTPATIENT
Start: 2024-03-08 | End: 2024-03-08 | Stop reason: SURG

## 2024-03-08 RX ORDER — BUPIVACAINE HYDROCHLORIDE 5 MG/ML
INJECTION, SOLUTION EPIDURAL; INTRACAUDAL AS NEEDED
Status: DISCONTINUED | OUTPATIENT
Start: 2024-03-08 | End: 2024-03-08 | Stop reason: HOSPADM

## 2024-03-08 RX ORDER — PHENYLEPHRINE HCL 10 MG/ML
VIAL (ML) INJECTION AS NEEDED
Status: DISCONTINUED | OUTPATIENT
Start: 2024-03-08 | End: 2024-03-08 | Stop reason: SURG

## 2024-03-08 RX ORDER — ACETAMINOPHEN 500 MG
1000 TABLET ORAL ONCE
Status: COMPLETED | OUTPATIENT
Start: 2024-03-08 | End: 2024-03-08

## 2024-03-08 RX ORDER — NALOXONE HYDROCHLORIDE 0.4 MG/ML
0.08 INJECTION, SOLUTION INTRAMUSCULAR; INTRAVENOUS; SUBCUTANEOUS AS NEEDED
Status: DISCONTINUED | OUTPATIENT
Start: 2024-03-08 | End: 2024-03-08

## 2024-03-08 RX ORDER — MORPHINE SULFATE 4 MG/ML
4 INJECTION, SOLUTION INTRAMUSCULAR; INTRAVENOUS EVERY 10 MIN PRN
Status: DISCONTINUED | OUTPATIENT
Start: 2024-03-08 | End: 2024-03-08

## 2024-03-08 RX ORDER — HYDRALAZINE HYDROCHLORIDE 20 MG/ML
5 INJECTION INTRAMUSCULAR; INTRAVENOUS
OUTPATIENT
Start: 2024-03-08

## 2024-03-08 RX ORDER — SENNA AND DOCUSATE SODIUM 50; 8.6 MG/1; MG/1
2 TABLET, FILM COATED ORAL NIGHTLY PRN
Qty: 14 TABLET | Refills: 0 | Status: SHIPPED | OUTPATIENT
Start: 2024-03-08

## 2024-03-08 RX ORDER — LIDOCAINE HYDROCHLORIDE 10 MG/ML
INJECTION, SOLUTION EPIDURAL; INFILTRATION; INTRACAUDAL; PERINEURAL AS NEEDED
Status: DISCONTINUED | OUTPATIENT
Start: 2024-03-08 | End: 2024-03-08 | Stop reason: SURG

## 2024-03-08 RX ORDER — METOCLOPRAMIDE HYDROCHLORIDE 5 MG/ML
10 INJECTION INTRAMUSCULAR; INTRAVENOUS EVERY 8 HOURS PRN
Status: DISCONTINUED | OUTPATIENT
Start: 2024-03-08 | End: 2024-03-08

## 2024-03-08 RX ORDER — EPHEDRINE SULFATE 50 MG/ML
INJECTION INTRAVENOUS AS NEEDED
Status: DISCONTINUED | OUTPATIENT
Start: 2024-03-08 | End: 2024-03-08 | Stop reason: SURG

## 2024-03-08 RX ORDER — DEXAMETHASONE SODIUM PHOSPHATE 4 MG/ML
VIAL (ML) INJECTION AS NEEDED
Status: DISCONTINUED | OUTPATIENT
Start: 2024-03-08 | End: 2024-03-08 | Stop reason: SURG

## 2024-03-08 RX ORDER — CEFAZOLIN SODIUM/WATER 2 G/20 ML
2 SYRINGE (ML) INTRAVENOUS ONCE
Status: COMPLETED | OUTPATIENT
Start: 2024-03-08 | End: 2024-03-08

## 2024-03-08 RX ORDER — HYDROCODONE BITARTRATE AND ACETAMINOPHEN 5; 325 MG/1; MG/1
1 TABLET ORAL EVERY 8 HOURS PRN
Qty: 20 TABLET | Refills: 0 | Status: SHIPPED | OUTPATIENT
Start: 2024-03-08

## 2024-03-08 RX ORDER — LABETALOL HYDROCHLORIDE 5 MG/ML
5 INJECTION, SOLUTION INTRAVENOUS EVERY 5 MIN PRN
Status: DISCONTINUED | OUTPATIENT
Start: 2024-03-08 | End: 2024-03-08

## 2024-03-08 RX ADMIN — LIDOCAINE HYDROCHLORIDE 30 MG: 10 INJECTION, SOLUTION EPIDURAL; INFILTRATION; INTRACAUDAL; PERINEURAL at 11:47:00

## 2024-03-08 RX ADMIN — EPHEDRINE SULFATE 5 MG: 50 INJECTION INTRAVENOUS at 12:25:00

## 2024-03-08 RX ADMIN — ONDANSETRON 4 MG: 2 INJECTION INTRAMUSCULAR; INTRAVENOUS at 13:11:00

## 2024-03-08 RX ADMIN — PHENYLEPHRINE HCL 160 MCG: 10 MG/ML VIAL (ML) INJECTION at 12:28:00

## 2024-03-08 RX ADMIN — PHENYLEPHRINE HCL 80 MCG: 10 MG/ML VIAL (ML) INJECTION at 12:15:00

## 2024-03-08 RX ADMIN — DEXAMETHASONE SODIUM PHOSPHATE 4 MG: 4 MG/ML VIAL (ML) INJECTION at 11:55:00

## 2024-03-08 RX ADMIN — EPHEDRINE SULFATE 5 MG: 50 INJECTION INTRAVENOUS at 11:53:00

## 2024-03-08 RX ADMIN — SODIUM CHLORIDE, SODIUM LACTATE, POTASSIUM CHLORIDE, CALCIUM CHLORIDE: 600; 310; 30; 20 INJECTION, SOLUTION INTRAVENOUS at 13:29:00

## 2024-03-08 RX ADMIN — EPHEDRINE SULFATE 5 MG: 50 INJECTION INTRAVENOUS at 12:06:00

## 2024-03-08 RX ADMIN — CEFAZOLIN SODIUM/WATER 2 G: 2 G/20 ML SYRINGE (ML) INTRAVENOUS at 11:50:00

## 2024-03-08 NOTE — ANESTHESIA PREPROCEDURE EVALUATION
Anesthesia PreOp Note    HPI:     Luis Villasenor is a 78 year old male who presents for preoperative consultation requested by: Lonnie Bonilla MD    Date of Surgery: 3/8/2024    Procedure(s):  Right spermatocelectomy  Indication: Spermatocele of epididymis, multiple [N43.42]    Relevant Problems   No relevant active problems       NPO:                         History Review:  Patient Active Problem List    Diagnosis Date Noted    Spermatocele of epididymis, multiple 02/14/2024    Hypoxia 01/29/2024    Community acquired pneumonia, unspecified laterality 01/28/2024    Community acquired pneumonia of right upper lobe of lung 01/28/2024    Gallbladder mass 12/11/2023    History of pulmonary embolism 11/23/2023    Dilated bile duct 11/21/2023    Abdominal discomfort 11/21/2023    Acute cholecystitis 11/20/2023    Choledocholithiasis 11/20/2023    Adenocarcinoma of lung (HCC) 07/30/2023    Acute hypoxemic respiratory failure (HCC) 07/28/2023    Aspergillus (HCC) 07/28/2023    Malignant neoplasm of lung (HCC)     Esophageal dysphagia     Pneumonitis     Pulmonary embolus (HCC)     Dysphagia     Aspergillus pneumonia (HCC)     Multiple subsegmental pulmonary emboli without acute cor pulmonale (HCC) 01/14/2023    Malignant neoplasm of left lung, unspecified part of lung (HCC) 01/14/2023    Weakness generalized 06/10/2022    Encounter for antineoplastic immunotherapy 06/10/2022    Chronic obstructive pulmonary disease, unspecified (HCC) 05/19/2022    Rash 06/08/2021    Sensorineural hearing loss, bilateral 11/05/2020    S/P total knee arthroplasty, right 08/10/2020    S/P total knee arthroplasty, left 06/08/2020    Anemia     Pneumonia of right lung due to infectious organism 07/31/2018    Malignant neoplasm of left lung (HCC)     Pneumonia due to infectious organism     Malabsorption (HCC) 05/24/2018    Iron deficiency anemia 05/22/2018    Medication monitoring encounter 11/13/2017    Encounter for care related to  Port-a-Cath 10/25/2017    Cancer of upper lobe of left lung (HCC) 01/09/2017    Arthritis 06/16/2015       Past Medical History:   Diagnosis Date    Deep vein thrombosis (HCC)     CURRENTLY HAS RIGHT BLOOD CLOT    Dysphagia     Esophageal reflux     Exposure to medical diagnostic radiation     COMPLETED    Hemorrhoids     History of blood transfusion     Pt on Immunotherapy    History of immunotherapy 07/28/2023    Impotence     Lung cancer (HCC)     NSCLCA stage IIIB    Necrotizing granulomatous inflammation of lung (HCC)     Obstructive sleep apnea     Osteoarthritis     Personal history of antineoplastic chemotherapy     Pneumonia due to organism     Pulmonary embolism (HCC)     Pulmonary embolus (HCC)     Sinus problem     Sinus problems    Sleep apnea     CPAP    Visual impairment     wears eyeglasses       Past Surgical History:   Procedure Laterality Date    ARTHROSCOPY OF JOINT UNLISTED      CHOLECYSTECTOMY      COLONOSCOPY  2010    COLONOSCOPY N/A 06/21/2018    Procedure: COLONOSCOPY;  Surgeon: Cesar Beckman MD;  Location: [facility] ENDOSCOPY    COLONOSCOPY      PORT, INDWELLING, IMP Right 2017    ROTATOR CUFF REPAIR Left     TOTAL KNEE REPLACEMENT Bilateral 2020    VEIN LIGATION AND STRIPPING         No medications prior to admission.     No current Epic-ordered facility-administered medications on file.     Current Outpatient Medications Ordered in Epic   Medication Sig Dispense Refill    rivaroxaban (XARELTO) 10 MG Oral Tab Take 1 tablet (10 mg total) by mouth at bedtime. 90 tablet 1    ipratropium-albuterol 0.5-2.5 (3) MG/3ML Inhalation Solution Take 3 mL by nebulization every 6 (six) hours as needed. 90 each 5    pantoprazole 40 MG Oral Tab EC Take 1 tablet (40 mg total) by mouth every morning before breakfast. 30 tablet 5    sulfamethoxazole-trimethoprim -160 MG Oral Tab per tablet TAKE 1 TABLET BY MOUTH TWICE A DAY ON MONDAY, WEDNESDAY AND FRIDAY (Patient taking differently: Take 1 tablet by  mouth 2 (two) times daily. TAKE 1 TABLET BY MOUTH TWICE A DAY ON MONDAY, WEDNESDAY AND FRIDAY) 60 tablet 3    predniSONE 10 MG Oral Tab Take 1 tablet (10 mg total) by mouth daily. (Patient taking differently: Take 1 tablet (10 mg total) by mouth every morning.) 90 tablet 0    dabrafenib mesylate 50 MG Oral Cap Take 3 capsules (150 mg total) by mouth 2 (two) times daily. Take THREE capsules at least 1 hour before or 2 hours after a meal, twice a day 180 capsule 5    Trametinib Dimethyl Sulfoxide 2 MG Oral Tab Take 2 mg by mouth daily. Take at least 1 hour before or at least 2 hours after a meal. (Patient taking differently: Take 2 mg by mouth daily with lunch. Take at least 1 hour before or at least 2 hours after a meal.) 30 tablet 5    voriconazole 200 MG Oral Tab Take 1 tablet (200 mg total) by mouth 2 (two) times daily. 180 tablet 3    fluticasone furoate-vilanterol (BREO ELLIPTA) 100-25 MCG/INH Inhalation Aerosol Powder, Breath Activated Inhale 1 puff into the lungs daily. Follow with mouth rinse and spit (Patient taking differently: Inhale 1 puff into the lungs every morning. Follow with mouth rinse and spit) 1 each 6    albuterol 108 (90 Base) MCG/ACT Inhalation Aero Soln Inhale 2 puffs into the lungs every 4 (four) hours as needed for Wheezing. 1 each 2    cholecalciferol 50 MCG (2000 UT) Oral Tab Take 0.5 tablets (1,000 Units total) by mouth every morning.      Vitamin C 500 MG Oral Tab Take 1 tablet (500 mg total) by mouth every morning.         No Known Allergies    Family History   Problem Relation Age of Onset    Cancer Father         Lung    Cancer Mother         Breast, ovarian     Social History     Socioeconomic History    Marital status:    Tobacco Use    Smoking status: Former     Packs/day: 1.00     Years: 30.00     Additional pack years: 0.00     Total pack years: 30.00     Types: Cigarettes     Quit date: 2000     Years since quittin.7    Smokeless tobacco: Never   Vaping Use     Vaping Use: Never used   Substance and Sexual Activity    Alcohol use: Not Currently     Alcohol/week: 0.8 standard drinks of alcohol     Types: 1 Glasses of wine per week     Comment: very rarely    Drug use: No   Other Topics Concern    Caffeine Concern Yes     Comment: coffee, 2 cups daily       Available pre-op labs reviewed.  Lab Results   Component Value Date    WBC 6.0 02/09/2024    RBC 4.51 02/09/2024    HGB 13.6 02/09/2024    HCT 42.6 02/09/2024    MCV 94.5 02/09/2024    MCH 30.2 02/09/2024    MCHC 31.9 02/09/2024    RDW 15.3 (H) 02/09/2024    .0 02/09/2024     Lab Results   Component Value Date     02/09/2024    K 4.1 02/09/2024     02/09/2024    CO2 29.0 02/09/2024    BUN 30 (H) 02/09/2024    CREATSERUM 0.93 02/09/2024    GLU 98 02/09/2024    CA 8.9 02/09/2024     Lab Results   Component Value Date    INR 1.06 02/27/2024       Vital Signs:  Body mass index is 26.4 kg/m².   height is 1.778 m (5' 10\") and weight is 83.5 kg (184 lb).   Vitals:    03/05/24 1015   Weight: 83.5 kg (184 lb)   Height: 1.778 m (5' 10\")        Anesthesia Evaluation     Patient summary reviewed and Nursing notes reviewed    Airway   Mallampati: II  TM distance: >3 FB  Neck ROM: full  Dental    (+) partials    Pulmonary     breath sounds clear to auscultation  (+) pneumonia, COPD, sleep apnea    ROS comment: H/O left lung ca, H/O right lung pneumonia,   Cardiovascular   Exercise tolerance: poor  (+) CAD    Rhythm: irregular  Rate: normal    Neuro/Psych      GI/Hepatic/Renal      Endo/Other    Abdominal                  Anesthesia Plan:   ASA:  3  Plan:   General  Airway:  LMA  Informed Consent Plan and Risks Discussed With:  Patient and spouse  Discussed plan with:  Attending      I have informed Luis Villasenor and/or legal guardian or family member of the nature of the anesthetic plan, benefits, risks including possible dental damage if relevant, major complications, and any alternative forms of anesthetic  management.   All of the patient's questions were answered to the best of my ability. The patient desires the anesthetic management as planned.  Christo Robles MD  3/8/2024 8:42 AM  Present on Admission:  **None**

## 2024-03-08 NOTE — ANESTHESIA PROCEDURE NOTES
Airway  Date/Time: 3/8/2024 11:49 AM  Urgency: elective    Airway not difficult    General Information and Staff    Patient location during procedure: OR  Anesthesiologist: Christo Robles MD  Performed: anesthesiologist   Performed by: Christo Robles MD  Authorized by: Christo Robles MD      Indications and Patient Condition  Indications for airway management: anesthesia  Sedation level: deep  Preoxygenated: yes  Patient position: sniffing  MILS maintained throughout  Mask difficulty assessment: 1 - vent by mask    Final Airway Details  Final airway type: supraglottic airway      Successful airway: classic  Size 5       Number of attempts at approach: 1

## 2024-03-08 NOTE — OPERATIVE REPORT
[facility]  part of [facility]  Urology Operative Note         Luis Villasenor Location: OR   [facility] [id number] MRN [id number]   Admission Date 3/8/2024 Operation Date 3/8/2024   Attending Physician Lonnie Bonilla MD       Patient Name: Luis Villasenor     Preoperative Diagnosis: Spermatocele of epididymis, multiple [N43.42]     Postoperative Diagnosis: Spermatocele of epididymis, multiple [N43.42]     Procedure(s): Right spermatocelectomy.    Primary Surgeon: Lonnie Bonilla MD     Anesthesia: General LMA    Specimen:   ID Type Source Tests Collected by Time Destination   1 : 1. RIGHT SPERMATOCELE Tissue Tissue SURGICAL PATHOLOGY TISSUE Lonnie Bonilla MD 3/8/2024 12:14 PM       Estimated Blood Loss: 5 mL.  Complications: None.     Indications for procedure: Patient is a pleasant 78-year-old male with a symptomatic 7.4 cm right epididymal head cyst/spermatocele.  Additional right epididymal cyst measuring 1.6 cm.  This is increased in size since prior and has been causing discomfort.  Management options were discussed with the patient who elected to proceed with the above-mentioned procedure.  Procedure was discussed in detail including rationale, approach, benefits, risks, possible complications, and reasonable alternatives to treatment.   We discussed surgical risks including not limited to medical and anesthetic complications, bleeding and hematoma formation, infection, damage to surrounding organs or structures, and possible need for additional procedures.  We discussed the expected postoperative course of recovery.  He verbalized understanding and wishes to proceed.  He was preoperatively medically optimized and cleared for surgery by the medical consultant.    Surgical Findings: Right spermatocele/epididymal cyst, excised intact.  Hemostasis satisfactory.    Operative Summary: The patient was brought to operating room #7 in the Main OR and identified by their wristband including  their name, medical record number and date of birth.  He was transferred to the operating room table and remained supine for the procedure. Appropriate monitoring devices were connected to the patient. Successful induction of general level LMA anesthesia was achieved. IV antibiotics were administered for surgical prophylaxis. The surgical site was shaved, prepped and draped in standard sterile fashion.  A full surgical timeout was performed with agreement upon all of its components.     A transverse incision was made in the right hemiscrotum measuring along Cynthia's lines. This was taken down through the skin, dartos and subcutaneous tissues using electrocautery. The space around testicle was bluntly developed and the testicle with the spermatocele / cyst was delivered to the incision intact. A 7-8 cm spermatocele/epididymal cyst was noted. Tunica vaginalis was opened to facilitate the dissection. The spermatocele/epididymal cyst was then carefully dissected using blunt and sharp dissection, establishing a plane between the spermatocele and the epididymis. We kept the spermatocele intact. We identified a neck of the spermatocele, which was ligated using 3-0 silk and divided, thus freeing up the specimen. This was sent for pathologic section. The testicle itself appeared normal. the vas and spermatic cord were intact. The tunica vaginalis was then ran continuously using 3-0 Vicryl.  Hemostasis was assured.  The inner scrotal lining was inverted and point electrocautery was used to achieve hemostasis.      The testicle was placed back in the scrotum in its normal anatomical position. The wound was irrigated. Dartos layer was closed using running 3-0 Vicryl suture. Skin was closed using running 4-0 chromic catgut suture. 10 cc of 0.5% Marcaine were used to infiltrate the skin for postoperative analgesia. Bacitracin was applied to the wound. A piece of Telfa was placed. Fluff gauze and a scrotal support were applied.  This concluded our procedure.     General anesthesia was reversed, the patient was successfully extubated and transported to the recovery room in a stable condition.  He tolerated the procedure well without any immediate complications.     All instrument, lap, sponge, and needle counts were correct x2 at the conclusion of the procedure.     Implants: * No implants in log *     Drains: None.      Condition: Extubated and stable to PACU.      I was present, scrubbed, and performed the procedure in its entirety.  At patient's request, findings were discussed with his wife following the procedure.      Lonnie Bonilla MD

## 2024-03-08 NOTE — ANESTHESIA POSTPROCEDURE EVALUATION
Patient: Luis Villasenor    Procedure Summary       Date: 03/08/24 Room / Location: [facility] MAIN OR  / [facility] MAIN OR    Anesthesia Start: 1142 Anesthesia Stop: 1329    Procedure: Right spermatocelectomy (Right) Diagnosis:       Spermatocele of epididymis, multiple      (Spermatocele of epididymis, multiple [N43.42])    Surgeons: Lonnie Bonilla MD Anesthesiologist: Christo Robles MD    Anesthesia Type: general ASA Status: 3            Anesthesia Type: No value filed.    Vitals Value Taken Time   /70 03/08/24 1348   Temp 97.8 °F (36.6 °C) 03/08/24 1328   Pulse 63 03/08/24 1400   Resp 14 03/08/24 1400   SpO2 97 % 03/08/24 1400   Vitals shown include unfiled device data.    [facility] AN Post Evaluation:   Patient Evaluated in PACU  Patient Participation: complete - patient participated  Level of Consciousness: awake and alert  Pain Score: 0  Pain Management: adequate  Airway Patency:patent  Dental exam unchanged from preop  Yes    Nausea/Vomiting: none  Cardiovascular Status: acceptable  Respiratory Status: acceptable  Postoperative Hydration acceptable      Christo Robles MD  3/8/2024 2:01 PM

## 2024-03-08 NOTE — INTERVAL H&P NOTE
Pre-op Diagnosis: Spermatocele of epididymis, multiple [N43.42]    The above referenced H&P was reviewed by Lonnie Bonilla MD on 3/8/2024, the patient was examined and no significant changes have occurred in the patient's condition since the H&P was performed.  I discussed with the patient and/or legal representative the potential benefits, risks and side effects of this procedure; the likelihood of the patient achieving goals; and potential problems that might occur during recuperation.  I discussed reasonable alternatives to the procedure, including risks, benefits and side effects related to the alternatives and risks related to not receiving this procedure.  We will proceed with procedure as planned.

## 2024-03-11 ENCOUNTER — TELEPHONE (OUTPATIENT)
Dept: SURGERY | Facility: CLINIC | Age: 79
End: 2024-03-11

## 2024-03-11 NOTE — TELEPHONE ENCOUNTER
Patients wife calling to request 2 week post-op follow up appointment, informed no openings. Please call at [phone],thanks.

## 2024-03-12 NOTE — TELEPHONE ENCOUNTER
I called and s/w pt's spouse who has signed ANA LUISA on file and I offered an appt for Wed. 3/27 at 1:45 pm and she accepted for pt.

## 2024-03-19 NOTE — TELEPHONE ENCOUNTER
Dr. Temi Workman, patient wants you to review chest CT from 6/15/21 ordered by other provider.
Drew Singleton is calling and also wants  to know that pt had a CT scan and the results are in.
I discussed results of the CAT scan with the patient. I looked at the images of the CAT scan and it looks to me more like pneumonitis than recurrent cancer those infiltrates are very extensive and it seems to for the development of this extensive burden of injury due to cancer. It might be reasonable to hold on the immune therapy and increase the prednisone dosing to 20 mg daily. The patient is going to see oncology tomorrow. They will further discuss.
Lewis Queen states patient's shingles are doing better - all scabbed up. For additional questions please call. Thank you.
Noted
Pt responding - Tania Slater states patient's shingles are doing better - all scabbed up    Routed to provider    LOV 6/8/21 -\"Patient to keep the area clean and dry, give me a call in a week to let me know if the lesions are resolving\"
Ambulatory

## 2024-03-21 RX ORDER — VORICONAZOLE 200 MG/1
200 TABLET, FILM COATED ORAL 2 TIMES DAILY
Qty: 180 TABLET | Refills: 3 | Status: SHIPPED | OUTPATIENT
Start: 2024-03-21

## 2024-03-21 NOTE — TELEPHONE ENCOUNTER
LOV 2/27/24  Per Dr. Hernandez's progress notes 2/27/24 \"aspergillus lung infection-long-term antifungal\". Dr. Hernandez- pt has had voriconazole since what appears to be 8/2018. Do you want to refill voriconazole 200 mg oral tab?

## 2024-03-22 ENCOUNTER — NURSE ONLY (OUTPATIENT)
Dept: HEMATOLOGY/ONCOLOGY | Facility: HOSPITAL | Age: 79
End: 2024-03-22
Attending: INTERNAL MEDICINE
Payer: MEDICARE

## 2024-03-22 ENCOUNTER — APPOINTMENT (OUTPATIENT)
Dept: HEMATOLOGY/ONCOLOGY | Facility: HOSPITAL | Age: 79
End: 2024-03-22
Attending: NURSE PRACTITIONER
Payer: MEDICARE

## 2024-03-22 VITALS
WEIGHT: 189.81 LBS | SYSTOLIC BLOOD PRESSURE: 143 MMHG | DIASTOLIC BLOOD PRESSURE: 61 MMHG | HEIGHT: 70 IN | HEART RATE: 63 BPM | OXYGEN SATURATION: 97 % | TEMPERATURE: 98 F | BODY MASS INDEX: 27.17 KG/M2 | RESPIRATION RATE: 18 BRPM

## 2024-03-22 DIAGNOSIS — I26.99 PULMONARY EMBOLISM, OTHER, UNSPECIFIED CHRONICITY, UNSPECIFIED WHETHER ACUTE COR PULMONALE PRESENT (HCC): ICD-10-CM

## 2024-03-22 DIAGNOSIS — C34.92 MALIGNANT NEOPLASM OF LEFT LUNG, UNSPECIFIED PART OF LUNG (HCC): Primary | ICD-10-CM

## 2024-03-22 DIAGNOSIS — C34.12 CANCER OF UPPER LOBE OF LEFT LUNG (HCC): Primary | ICD-10-CM

## 2024-03-22 DIAGNOSIS — Z51.11 CHEMOTHERAPY MANAGEMENT, ENCOUNTER FOR: ICD-10-CM

## 2024-03-22 DIAGNOSIS — B44.9 ASPERGILLUS PNEUMONIA (HCC): ICD-10-CM

## 2024-03-22 DIAGNOSIS — D50.9 IRON DEFICIENCY ANEMIA, UNSPECIFIED IRON DEFICIENCY ANEMIA TYPE: ICD-10-CM

## 2024-03-22 DIAGNOSIS — Z45.2 ENCOUNTER FOR CARE RELATED TO PORT-A-CATH: ICD-10-CM

## 2024-03-22 DIAGNOSIS — C34.92 MALIGNANT NEOPLASM OF LEFT LUNG, UNSPECIFIED PART OF LUNG (HCC): ICD-10-CM

## 2024-03-22 LAB
ALBUMIN SERPL-MCNC: 3.9 G/DL (ref 3.2–4.8)
ALBUMIN/GLOB SERPL: 2.1 {RATIO} (ref 1–2)
ALP LIVER SERPL-CCNC: 130 U/L
ALT SERPL-CCNC: 15 U/L
ANION GAP SERPL CALC-SCNC: 5 MMOL/L (ref 0–18)
AST SERPL-CCNC: 23 U/L (ref ?–34)
BASOPHILS # BLD AUTO: 0.03 X10(3) UL (ref 0–0.2)
BASOPHILS NFR BLD AUTO: 0.6 %
BILIRUB SERPL-MCNC: 0.3 MG/DL (ref 0.2–1.1)
BUN BLD-MCNC: 31 MG/DL (ref 9–23)
BUN/CREAT SERPL: 34.4 (ref 10–20)
CALCIUM BLD-MCNC: 9.4 MG/DL (ref 8.7–10.4)
CHLORIDE SERPL-SCNC: 109 MMOL/L (ref 98–112)
CO2 SERPL-SCNC: 26 MMOL/L (ref 21–32)
CREAT BLD-MCNC: 0.9 MG/DL
DEPRECATED RDW RBC AUTO: 55.1 FL (ref 35.1–46.3)
EGFRCR SERPLBLD CKD-EPI 2021: 86 ML/MIN/1.73M2 (ref 60–?)
EOSINOPHIL # BLD AUTO: 0.07 X10(3) UL (ref 0–0.7)
EOSINOPHIL NFR BLD AUTO: 1.5 %
ERYTHROCYTE [DISTWIDTH] IN BLOOD BY AUTOMATED COUNT: 15.7 % (ref 11–15)
GLOBULIN PLAS-MCNC: 1.9 G/DL (ref 2.8–4.4)
GLUCOSE BLD-MCNC: 104 MG/DL (ref 70–99)
HCT VFR BLD AUTO: 38.8 %
HGB BLD-MCNC: 12.3 G/DL
IMM GRANULOCYTES # BLD AUTO: 0.02 X10(3) UL (ref 0–1)
IMM GRANULOCYTES NFR BLD: 0.4 %
LYMPHOCYTES # BLD AUTO: 1.07 X10(3) UL (ref 1–4)
LYMPHOCYTES NFR BLD AUTO: 22.3 %
MCH RBC QN AUTO: 30.3 PG (ref 26–34)
MCHC RBC AUTO-ENTMCNC: 31.7 G/DL (ref 31–37)
MCV RBC AUTO: 95.6 FL
MONOCYTES # BLD AUTO: 0.49 X10(3) UL (ref 0.1–1)
MONOCYTES NFR BLD AUTO: 10.2 %
NEUTROPHILS # BLD AUTO: 3.12 X10 (3) UL (ref 1.5–7.7)
NEUTROPHILS # BLD AUTO: 3.12 X10(3) UL (ref 1.5–7.7)
NEUTROPHILS NFR BLD AUTO: 65 %
OSMOLALITY SERPL CALC.SUM OF ELEC: 297 MOSM/KG (ref 275–295)
PLATELET # BLD AUTO: 207 10(3)UL (ref 150–450)
POTASSIUM SERPL-SCNC: 3.8 MMOL/L (ref 3.5–5.1)
PROT SERPL-MCNC: 5.8 G/DL (ref 5.7–8.2)
RBC # BLD AUTO: 4.06 X10(6)UL
SODIUM SERPL-SCNC: 140 MMOL/L (ref 136–145)
WBC # BLD AUTO: 4.8 X10(3) UL (ref 4–11)

## 2024-03-22 PROCEDURE — 85025 COMPLETE CBC W/AUTO DIFF WBC: CPT

## 2024-03-22 PROCEDURE — 36591 DRAW BLOOD OFF VENOUS DEVICE: CPT

## 2024-03-22 PROCEDURE — 80053 COMPREHEN METABOLIC PANEL: CPT

## 2024-03-22 RX ORDER — HEPARIN SODIUM (PORCINE) LOCK FLUSH IV SOLN 100 UNIT/ML 100 UNIT/ML
5 SOLUTION INTRAVENOUS ONCE
OUTPATIENT
Start: 2024-03-22

## 2024-03-22 RX ORDER — SODIUM CHLORIDE 9 MG/ML
10 INJECTION, SOLUTION INTRAMUSCULAR; INTRAVENOUS; SUBCUTANEOUS ONCE
OUTPATIENT
Start: 2024-03-22

## 2024-03-22 RX ORDER — HEPARIN SODIUM (PORCINE) LOCK FLUSH IV SOLN 100 UNIT/ML 100 UNIT/ML
5 SOLUTION INTRAVENOUS ONCE
Status: COMPLETED | OUTPATIENT
Start: 2024-03-22 | End: 2024-03-22

## 2024-03-22 RX ADMIN — HEPARIN SODIUM (PORCINE) LOCK FLUSH IV SOLN 100 UNIT/ML 500 UNITS: 100 SOLUTION INTRAVENOUS at 08:56:00

## 2024-03-22 NOTE — PROGRESS NOTES
Cancer Center Progress Note    Patient Name: Luis Villasenor   YOB: 1945   Medical Record Number: [id number]   Attending Physician: Mike Mcclure M.D.       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  He completed definitive concurrent chemoradiotherapy using cisplatin and etoposide early March 2017.  He had low-grade anemia neutropenia on treatment.    He subsequently has been followed on surveillance with no evidence of progression.    The patient started on durvalumab in October 2017 given he had unresectable stage III disease with no evidence of progression following chemo radiotherapy    He has had occasional mild anemia on treatment.  Laboratory follow-up in May 2018 showed severe iron deficiency.  Received IV iron    He completed 13 cycles of durvalumab  however towards the end of his treatment he developed 2 separate instances of pneumonitis/transaminitis requiring separate courses of prednisone.  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     Started single-agent Pembrolizumab on 3/19/21.  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    PE/DVT admission 1/2023.    1/28-2/1 admission for hypoxia, dyspnea, fevers. Treated for pneumonia/pneumonitis. Discharged on steroid taper per Pulmonology.    Interval History:  Returns for six week follow-up.  Feels well.  Denies any new concerns.   Watery eyes is not new and not bothersome.  Fatigue continues to be an issue.  Keeping active/finishing projects makes him happy.  He does have a temper and gets irritable easily.  Inquiring about prior antidepressant Dr. Hernandez prescribed.  Only took for 2-3 days then stopped - tolerated.      Denies anorexia, headache, mouth sores, cough, dyspnea, abdominal pain, nausea/vomiting, diarrhea, constipation, peripheral edema, bone pain, rash, dysuria and insomnia.      Performance Status:  ECOG 0    Past Medical History:  Past Medical History:   Diagnosis Date    Deep vein thrombosis (HCC)     CURRENTLY HAS RIGHT BLOOD CLOT    Dysphagia     Esophageal reflux     Exposure to medical diagnostic radiation     COMPLETED    Hemorrhoids     History of blood transfusion     Pt on Immunotherapy    History of immunotherapy 07/28/2023    Impotence     Lung cancer (HCC)     NSCLCA stage IIIB    Necrotizing granulomatous inflammation of lung (HCC)     Obstructive sleep apnea     Osteoarthritis     Personal history of antineoplastic chemotherapy     Pneumonia due to organism     Pulmonary embolism (HCC)     Pulmonary embolus (HCC)     Sinus problem     Sinus problems    Sleep apnea     CPAP    Visual impairment     wears eyeglasses       Past Surgical History:  Past Surgical History:   Procedure Laterality Date    ARTHROSCOPY OF JOINT UNLISTED      CHOLECYSTECTOMY      COLONOSCOPY  2010    COLONOSCOPY N/A 06/21/2018    Procedure: COLONOSCOPY;  Surgeon: Cesar Beckman MD;  Location: [facility] ENDOSCOPY    COLONOSCOPY      PORT, INDWELLING, IMP Right 2017    ROTATOR CUFF REPAIR Left     TOTAL KNEE REPLACEMENT Bilateral 2020    VEIN LIGATION AND STRIPPING         Family History:  Family History   Problem Relation Age of Onset    Cancer Father         Lung    Cancer Mother         Breast, ovarian       Social History:  Social History     Socioeconomic History    Marital status:      Spouse name: Not on file    Number of  children: Not on file    Years of education: Not on file    Highest education level: Not on file   Occupational History    Not on file   Tobacco Use    Smoking status: Former     Packs/day: 1.00     Years: 30.00     Additional pack years: 0.00     Total pack years: 30.00     Types: Cigarettes     Quit date: 2000     Years since quittin.7    Smokeless tobacco: Never   Vaping Use    Vaping Use: Never used   Substance and Sexual Activity    Alcohol use: Not Currently     Alcohol/week: 0.8 standard drinks of alcohol     Types: 1 Glasses of wine per week     Comment: very rarely    Drug use: No    Sexual activity: Not on file   Other Topics Concern     Service Not Asked    Blood Transfusions Not Asked    Caffeine Concern Yes     Comment: coffee, 2 cups daily    Occupational Exposure Not Asked    Hobby Hazards Not Asked    Sleep Concern Not Asked    Stress Concern Not Asked    Weight Concern Not Asked    Special Diet Not Asked    Back Care Not Asked    Exercise Not Asked    Bike Helmet Not Asked    Seat Belt Not Asked    Self-Exams Not Asked   Social History Narrative    Not on file     Social Determinants of Health     Financial Resource Strain: Low Risk  (2024)    Financial Resource Strain     Difficulty of Paying Living Expenses: Not very hard     Med Affordability: No   Food Insecurity: No Food Insecurity (2024)    Food Insecurity     Food Insecurity: Never true   Transportation Needs: No Transportation Needs (2024)    Transportation Needs     Lack of Transportation: No   Physical Activity: Not on file   Stress: Not on file   Social Connections: Not on file   Housing Stability: Low Risk  (2024)    Housing Stability     Housing Instability: No     Housing Instability Emergency: Not on file         Current Medications:    Current Outpatient Medications:     voriconazole 200 MG Oral Tab, Take 1 tablet (200 mg total) by mouth 2 (two) times daily., Disp: 180 tablet, Rfl: 3    rivaroxaban  (XARELTO) 10 MG Oral Tab, Take 1 tablet (10 mg total) by mouth at bedtime., Disp: 90 tablet, Rfl: 1    ipratropium-albuterol 0.5-2.5 (3) MG/3ML Inhalation Solution, Take 3 mL by nebulization every 6 (six) hours as needed., Disp: 90 each, Rfl: 5    pantoprazole 40 MG Oral Tab EC, Take 1 tablet (40 mg total) by mouth every morning before breakfast., Disp: 30 tablet, Rfl: 5    sulfamethoxazole-trimethoprim -160 MG Oral Tab per tablet, TAKE 1 TABLET BY MOUTH TWICE A DAY ON MONDAY, WEDNESDAY AND FRIDAY (Patient taking differently: Take 1 tablet by mouth 2 (two) times daily. TAKE 1 TABLET BY MOUTH TWICE A DAY ON MONDAY, WEDNESDAY AND FRIDAY), Disp: 60 tablet, Rfl: 3    predniSONE 10 MG Oral Tab, Take 1 tablet (10 mg total) by mouth daily. (Patient taking differently: Take 1 tablet (10 mg total) by mouth every morning.), Disp: 90 tablet, Rfl: 0    dabrafenib mesylate 50 MG Oral Cap, Take 3 capsules (150 mg total) by mouth 2 (two) times daily. Take THREE capsules at least 1 hour before or 2 hours after a meal, twice a day, Disp: 180 capsule, Rfl: 5    Trametinib Dimethyl Sulfoxide 2 MG Oral Tab, Take 2 mg by mouth daily. Take at least 1 hour before or at least 2 hours after a meal. (Patient taking differently: Take 2 mg by mouth daily with lunch. Take at least 1 hour before or at least 2 hours after a meal.), Disp: 30 tablet, Rfl: 5    fluticasone furoate-vilanterol (BREO ELLIPTA) 100-25 MCG/INH Inhalation Aerosol Powder, Breath Activated, Inhale 1 puff into the lungs daily. Follow with mouth rinse and spit (Patient taking differently: Inhale 1 puff into the lungs every morning. Follow with mouth rinse and spit), Disp: 1 each, Rfl: 6    albuterol 108 (90 Base) MCG/ACT Inhalation Aero Soln, Inhale 2 puffs into the lungs every 4 (four) hours as needed for Wheezing., Disp: 1 each, Rfl: 2    cholecalciferol 50 MCG (2000 UT) Oral Tab, Take 0.5 tablets (1,000 Units total) by mouth every morning., Disp: , Rfl:     Vitamin C  500 MG Oral Tab, Take 1 tablet (500 mg total) by mouth every morning., Disp: , Rfl:     HYDROcodone-acetaminophen 5-325 MG Oral Tab, Take 1 tablet by mouth every 8 (eight) hours as needed for Pain. (Patient not taking: Reported on 3/22/2024), Disp: 20 tablet, Rfl: 0    senna-docusate 8.6-50 MG Oral Tab, Take 2 tablets by mouth nightly as needed for constipation. (Patient not taking: Reported on 3/22/2024), Disp: 14 tablet, Rfl: 0    Allergies:  No Known Allergies     Review of Systems:  All other systems reviewed and negative x12    Vital Signs:  /61 (BP Location: Right arm, Patient Position: Sitting, Cuff Size: adult)   Pulse 63   Temp 97.7 °F (36.5 °C) (Oral)   Resp 18   Ht 1.778 m (5' 10\")   Wt 86.1 kg (189 lb 12.8 oz)   SpO2 97%   BMI 27.23 kg/m²     Physical Examination:  General: Patient is alert and oriented x 3, not in acute distress.  Psych:  Mood and affect appropriate, in good spirits.   HEENT: EOMs intact. Watery eyes.  Anicteric.  Oropharynx is clear.   Nec:  Supple, non tender, no LAD  Chest: non-labored breathing, CTA  Cardiovascular: Regular rate and rhythm. Normal S1S2  Extremities: No edema.   Neurological: 5/5 motor x4.    Derm:  No rash    LABS:    CBC:    Lab Results   Component Value Date    WBC 4.8 03/22/2024    WBC 6.0 02/09/2024    WBC 1.8 (L) 01/30/2024     Lab Results   Component Value Date    HGB 12.3 (L) 03/22/2024    HGB 13.6 02/09/2024    HGB 12.4 (L) 01/30/2024      Lab Results   Component Value Date    .0 03/22/2024    .0 02/09/2024    .0 01/30/2024       Lab Results   Component Value Date     (H) 03/22/2024    BUN 31 (H) 03/22/2024    BUNCREA 34.4 (H) 03/22/2024    CREATSERUM 0.90 03/22/2024    ANIONGAP 5 03/22/2024    GFR >60 05/14/2016    GFRNAA 81 07/22/2022    GFRAA 94 07/22/2022    CA 9.4 03/22/2024    OSMOCALC 297 (H) 03/22/2024    ALKPHO 130 (H) 03/22/2024    AST 23 03/22/2024    ALT 15 03/22/2024    ALKPHOS 58 12/10/2011    BILT 0.3  03/22/2024    TP 5.8 03/22/2024    ALB 3.9 03/22/2024    GLOBULIN 1.9 (L) 03/22/2024     03/22/2024    K 3.8 03/22/2024     03/22/2024    CO2 26.0 03/22/2024       Radiology:  PEt/CT Chest       Cancer of upper lobe of left lung (HCC)    Staging form: Lung AJCC V7      Clinical stage from 1/9/2017: Stage IIIB (T1a, N3, M0) - Signed by Mike Mcclure MD on 1/9/2017    Impression and Plan:  78 year oldmale former smoker with 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  He was diagnosed in December 2016 as above.  He was treated with concurrent definitive chemoradiotherapy using cisplatin and etoposide finishing in early March 2017.  He had low-grade neutropenia and anemia on chemotherapy. Given he has unresectable stage IIIb disease with no progression following initial chemotherapy we  added immunotherapy with durvalumab. Completed 13 cycles of durvalumab however developed recurrent pneumonitis/transaminitis.  He has responded well to prednisone. He remains off of all cancer directed therapy as of the end of April 2018.    Recurrent disease biopsy-proven mediastinal lymph nodes as of March 2021.  Same histology.  Given PD-L1 greater than 50% we will proceed with single agent pembrolizumab.  Also discussed nivolumab/ipilimumab however with previous issues of pneumonitis and transaminitis will use single agent immunotherapy.   Received Cycle #1 Pembrolizumab on 3/19/21, to be good long-term disease control through summer 2023.    BRAF V600 E peripheral blood sequencing September 2023  --  dabrafenib trametinib start 10/23/23  -- tolerating.  Continue.   CT imaging 1/28 in ER with favorable tx response. Admission as below.  Rtc ~6 weeks with labs, restaging CT and MDV.     -1/28-2/1 admission for hypoxia, dyspnea, fevers. Treated for pneumonia/ possible pneumonitis. Discharged on steroid taper per Pulmonology-currently back on prednisone 10mg. Restarted  treatment 2/2.    - Pulmonary embolus now on rivaroxaban diagnosed January 2023.  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  - Recurrent pneumonitis now off pembrolizumab, ER admission 1/2024 as above, Prednisone per Pulmonology as above.      --choledocholithiasis 11/2023 presented to ER for abd pain. He did well clinically.  He underwent ERCP extraction of the common bile duct stones.  lap cholecystectomy 12/11. Abnormality was seen at the adjacent liver site and segmental hepatectomy performed as well. Symptoms resolved and recovered well. Path benign.     --dermatitis with pruritus minimal to area of lower back. Unclear if tx related. prn topical steroid. Currently resolved. Monitor.    --depression:  Interested in resuming antidepressant Dr. Hernandez prescribed.  Instructed to continue for 3-4 weeks to assess response.  Call if not tolerating.     MDM: high, malignancy, life threatening. Drug therapy requiring intensive monitoring for tox    Олег Almanza PA-C    Seen and examined with PA agree with note above metastatic adenocarcinoma lung status post previous lines of therapy now on dabrafenib and trametinib given BRAF V600 E.  Tolerating treatment well we will continue on exam comfortable labored respirations history of pneumonitis he remains on low-dose prednisone for pulmonary embolus he remains on prophylactic dose rivaroxaban    We will continue to be focal point of care in setting of malignancy undergoing ongoing treatment.   Management plan for cancer treatment leading high risk MDM was formulated by physician.

## 2024-03-26 ENCOUNTER — TELEPHONE (OUTPATIENT)
Dept: HEMATOLOGY/ONCOLOGY | Facility: HOSPITAL | Age: 79
End: 2024-03-26

## 2024-03-26 DIAGNOSIS — C34.12 CANCER OF UPPER LOBE OF LEFT LUNG (HCC): Primary | ICD-10-CM

## 2024-03-26 DIAGNOSIS — Z51.81 MEDICATION MONITORING ENCOUNTER: ICD-10-CM

## 2024-03-26 RX ORDER — FLUTICASONE FUROATE AND VILANTEROL 100; 25 UG/1; UG/1
1 POWDER RESPIRATORY (INHALATION) DAILY
Qty: 60 EACH | Refills: 5 | Status: SHIPPED | OUTPATIENT
Start: 2024-03-26

## 2024-03-26 NOTE — TELEPHONE ENCOUNTER
Satinder Villasenor stated that she spoke to you today Олег and she would like to talk to you again, regarding some of the medication they forgot to tell you. She would like a call back to discuss this matter. Called 3/26/24

## 2024-03-27 ENCOUNTER — OFFICE VISIT (OUTPATIENT)
Dept: SURGERY | Facility: CLINIC | Age: 79
End: 2024-03-27
Payer: MEDICARE

## 2024-03-27 ENCOUNTER — TELEPHONE (OUTPATIENT)
Dept: HEMATOLOGY/ONCOLOGY | Facility: HOSPITAL | Age: 79
End: 2024-03-27

## 2024-03-27 DIAGNOSIS — N43.42 SPERMATOCELE OF EPIDIDYMIS, MULTIPLE: Primary | ICD-10-CM

## 2024-03-27 PROCEDURE — 1159F MED LIST DOCD IN RCRD: CPT | Performed by: UROLOGY

## 2024-03-27 PROCEDURE — 99024 POSTOP FOLLOW-UP VISIT: CPT | Performed by: UROLOGY

## 2024-03-27 NOTE — PROGRESS NOTES
[facility]  Follow-Up Visit    HPI: Luis Villasenor is a 78 year old male presents for a follow up visit. Patient was last seen on 2/14/2024.  He is accompanied by his wife.    INTERVAL HISTORY: Patient following up regarding a right spermatocele/epididymal cyst.    He was initially seen March 2022 for a painless swelling in his right hemiscrotum.  No associated symptoms.  Findings consistent with an epididymal cyst/spermatocele.  He elected watchful waiting.    He reports increase in size over the past year or so with increasing associated discomfort and disfigurement.    He had a scrotal ultrasound completed 1/7/2024 revealing a large partially septated benign right epididymal cyst measuring 7.4 cm.  Smaller epididymal head cyst measuring 1.6 cm.    He is now status post right spermatocelectomy on 3/8/2024.  Pathology revealed an epididymis with spermatocele measuring 9.5 cm in greatest dimension.    He is doing well from a postoperative course of recovery standpoint.  He denies pain or swelling.  Reports mild erythema to the incision site.  No discharge or drainage.  No fevers or chills.      PAST MEDICAL HISTORY: Stage III lung cancer treated with chemo RT, WALLACE, hemorrhoids, ED. pulmonary embolism 1/2023.     PAST SURGICAL HISTORY: Bilateral knee replacement surgery, left shoulder rotator cuff surgery.  Robotic cholecystectomy 12/2023.  Right spermatocelectomy 3/8/2024.     SOCIAL HISTORY:  and has 2 grownup children.  Former smoker 1 pack/day for 30 years and quit in 2000. Rare social alcohol.  He is a retired [employer].     Reviewed past medical, surgical, family, and social history.  Reviewed med list and allergies.      REVIEW OF SYSTEMS:  Pertinent positives and negatives per HPI. A 10-point ROS was performed and is otherwise negative.       EXAM:  There were no vitals taken for this visit.    Physical Exam  Constitutional:       Appearance: He is well-developed.   HENT:       Head: Normocephalic.   Eyes:      General: No scleral icterus.  Cardiovascular:      Rate and Rhythm: Normal rate.   Pulmonary:      Effort: Pulmonary effort is normal.   Abdominal:      General: There is no distension.      Palpations: Abdomen is soft.      Tenderness: There is no abdominal tenderness.   Genitourinary:     Penis: Uncircumcised. Phimosis present.       Comments: Right spermatocele has resolved.  Incision clean, dry, intact, and healing well without signs of infection.  Testicle nonswollen and nontender.  Mild right spermatic cord thickening which is expected.  No recurrent or residual spermatocele.  Skin:     General: Skin is warm and dry.   Neurological:      Mental Status: He is alert and oriented to person, place, and time.   Psychiatric:         Mood and Affect: Mood normal.         Behavior: Behavior normal.       PATHOLOGY:    Provider:  Lonnie Bonilla MD       Collected:           03/08/2024     Right spermatocele; spermatocelectomy:   Epididymis with spermatocele measuring 9.5 cm in greatest dimension.     Electronically signed by Linnea Maria MD on 3/9/2024 at 10:39 AM        Clinical Information      N43.42 Spermatocele Of Epididymis, Multiple.        Gross Description      The specimen is submitted in formalin labeled “Sgaramella, right spermatocele” and consists of an intact, pink-tan, cystic lesion filled with clear fluid measuring 9.5 x 7.9 x 5.0 cm. The inner lining is smooth, without papillary excrescences or solid areas identified. Representative sections of the cyst wall are submitted in cassettes A1 and A2 (three per cassette). (jq)     Linnea Maria M.D./[facility] [place]         LABS:  No results found.      IMAGING:    US SCROTUM W/ DOPPLER (1/8/2024): 1. Large partially septated benign epididymal cyst measures 7.4 x 4.7 x 7.4 cm superior to the testicle.  Smaller epididymal head cyst measuring 1.6 x 1.3 cm. 2. Normal bilateral testicles.  Tubular ectasia involving the rete testes  right testicle, normal variation. 3. Small calcified scrotolith within a small left scrotal hydrocele. 4. Symmetric Doppler flow to the bilateral testicles without torsion.         UROLOGY PROCEDURE:  None performed today.      IMPRESSION:  78 year old male with an enlarging, symptomatic right epididymal cyst/spermatocele.    Status post right spermatocelectomy 3/20/2024.    Doing well from a postoperative course of recovery standpoint.  Incision healing well.    Pathology results reviewed with patient and wife.    Recommend continued physical activity limitation for another week after which he may return to his usual preoperative activity levels.    All questions answered.      PLAN:  1.  Activity restrictions for another week.    RTC for follow-up as needed.      Lonnie Bonilla MD  3/27/2024

## 2024-03-27 NOTE — TELEPHONE ENCOUNTER
Patient's wife answered phone and she is on patient's verbal release form. Patient's wife notified that there are coverage issues with the Breo inhaler and they need to follow up with Dr. Hernandez's office. Patient's wife stated that it was sent to [facility] pharmacy and they normally have prescriptions sent to [facility]. Patient's wife stated they have a current refill at home with them and do not need additional refills at this time. Patient's wife notified to contact Dr. Hernandez's office for any additional inhaler needs. Patient's wife verbalized understanding.

## 2024-03-27 NOTE — TELEPHONE ENCOUNTER
Received an overnight message from [facility] stating that the inhaler Dr Mcclure prescribed for the patient is not covered by insurance

## 2024-04-02 ENCOUNTER — TELEPHONE (OUTPATIENT)
Dept: PULMONOLOGY | Facility: CLINIC | Age: 79
End: 2024-04-02

## 2024-04-02 NOTE — TELEPHONE ENCOUNTER
[facility] /Josi called regarding prior auth for:      Current Outpatient Medications:       voriconazole 200 MG Oral Tab, Take 1 tablet (200 mg total) by mouth 2 (two) times daily., Disp: 180 tablet, Rfl: 3    Ref # is [id number]    Please call.

## 2024-04-03 NOTE — TELEPHONE ENCOUNTER
Spoke with Sol at [facility] Clinical Pharm Review to initiate prior authorization. Per Sol- Response time can take up to 24-72 hours and will be received via fax. Provided fax number. Ref #[id number].

## 2024-04-03 NOTE — TELEPHONE ENCOUNTER
Received fax from [facility] Review, Voriconazole 200mg tablets approved until 12/31/24.    Updated pharmacy of approval.     Approval letter sent to HIM for scanning.

## 2024-04-29 ENCOUNTER — HOSPITAL ENCOUNTER (OUTPATIENT)
Dept: CT IMAGING | Facility: HOSPITAL | Age: 79
Discharge: HOME | End: 2024-04-29
Attending: PHYSICIAN ASSISTANT
Payer: MEDICARE

## 2024-04-29 ENCOUNTER — TELEPHONE (OUTPATIENT)
Dept: PULMONOLOGY | Facility: CLINIC | Age: 79
End: 2024-04-29

## 2024-04-29 ENCOUNTER — NURSE ONLY (OUTPATIENT)
Dept: HEMATOLOGY/ONCOLOGY | Facility: HOSPITAL | Age: 79
End: 2024-04-29
Attending: INTERNAL MEDICINE
Payer: MEDICARE

## 2024-04-29 DIAGNOSIS — C34.92 MALIGNANT NEOPLASM OF LEFT LUNG, UNSPECIFIED PART OF LUNG (HCC): ICD-10-CM

## 2024-04-29 DIAGNOSIS — C34.12 CANCER OF UPPER LOBE OF LEFT LUNG (HCC): ICD-10-CM

## 2024-04-29 DIAGNOSIS — Z45.2 ENCOUNTER FOR CARE RELATED TO PORT-A-CATH: Primary | ICD-10-CM

## 2024-04-29 DIAGNOSIS — Z51.11 CHEMOTHERAPY MANAGEMENT, ENCOUNTER FOR: ICD-10-CM

## 2024-04-29 DIAGNOSIS — C34.92 MALIGNANT NEOPLASM OF LEFT LUNG, UNSPECIFIED PART OF LUNG (HCC): Primary | ICD-10-CM

## 2024-04-29 DIAGNOSIS — D50.9 IRON DEFICIENCY ANEMIA, UNSPECIFIED IRON DEFICIENCY ANEMIA TYPE: ICD-10-CM

## 2024-04-29 LAB
ALBUMIN SERPL-MCNC: 3.9 G/DL (ref 3.2–4.8)
ALBUMIN/GLOB SERPL: 2 {RATIO} (ref 1–2)
ALP LIVER SERPL-CCNC: 120 U/L
ALT SERPL-CCNC: 29 U/L
ANION GAP SERPL CALC-SCNC: 6 MMOL/L (ref 0–18)
AST SERPL-CCNC: 43 U/L (ref ?–34)
BASOPHILS # BLD AUTO: 0.02 X10(3) UL (ref 0–0.2)
BASOPHILS NFR BLD AUTO: 0.5 %
BILIRUB SERPL-MCNC: 0.4 MG/DL (ref 0.2–1.1)
BUN BLD-MCNC: 24 MG/DL (ref 9–23)
BUN/CREAT SERPL: 27.9 (ref 10–20)
CALCIUM BLD-MCNC: 9.3 MG/DL (ref 8.7–10.4)
CHLORIDE SERPL-SCNC: 108 MMOL/L (ref 98–112)
CO2 SERPL-SCNC: 27 MMOL/L (ref 21–32)
CREAT BLD-MCNC: 0.8 MG/DL
CREAT BLD-MCNC: 0.86 MG/DL
DEPRECATED RDW RBC AUTO: 50.6 FL (ref 35.1–46.3)
EGFRCR SERPLBLD CKD-EPI 2021: 87 ML/MIN/1.73M2 (ref 60–?)
EGFRCR SERPLBLD CKD-EPI 2021: 89 ML/MIN/1.73M2 (ref 60–?)
EOSINOPHIL # BLD AUTO: 0.03 X10(3) UL (ref 0–0.7)
EOSINOPHIL NFR BLD AUTO: 0.7 %
ERYTHROCYTE [DISTWIDTH] IN BLOOD BY AUTOMATED COUNT: 14.9 % (ref 11–15)
GLOBULIN PLAS-MCNC: 2 G/DL (ref 2.8–4.4)
GLUCOSE BLD-MCNC: 105 MG/DL (ref 70–99)
HCT VFR BLD AUTO: 37.9 %
HGB BLD-MCNC: 13.2 G/DL
IMM GRANULOCYTES # BLD AUTO: 0.01 X10(3) UL (ref 0–1)
IMM GRANULOCYTES NFR BLD: 0.2 %
LYMPHOCYTES # BLD AUTO: 1.4 X10(3) UL (ref 1–4)
LYMPHOCYTES NFR BLD AUTO: 33 %
MCH RBC QN AUTO: 32.4 PG (ref 26–34)
MCHC RBC AUTO-ENTMCNC: 34.8 G/DL (ref 31–37)
MCV RBC AUTO: 92.9 FL
MONOCYTES # BLD AUTO: 0.44 X10(3) UL (ref 0.1–1)
MONOCYTES NFR BLD AUTO: 10.4 %
NEUTROPHILS # BLD AUTO: 2.34 X10 (3) UL (ref 1.5–7.7)
NEUTROPHILS # BLD AUTO: 2.34 X10(3) UL (ref 1.5–7.7)
NEUTROPHILS NFR BLD AUTO: 55.2 %
OSMOLALITY SERPL CALC.SUM OF ELEC: 296 MOSM/KG (ref 275–295)
PLATELET # BLD AUTO: 192 10(3)UL (ref 150–450)
POTASSIUM SERPL-SCNC: 3.9 MMOL/L (ref 3.5–5.1)
PROT SERPL-MCNC: 5.9 G/DL (ref 5.7–8.2)
RBC # BLD AUTO: 4.08 X10(6)UL
SODIUM SERPL-SCNC: 141 MMOL/L (ref 136–145)
WBC # BLD AUTO: 4.2 X10(3) UL (ref 4–11)

## 2024-04-29 PROCEDURE — 80053 COMPREHEN METABOLIC PANEL: CPT

## 2024-04-29 PROCEDURE — 82565 ASSAY OF CREATININE: CPT

## 2024-04-29 PROCEDURE — 36591 DRAW BLOOD OFF VENOUS DEVICE: CPT

## 2024-04-29 PROCEDURE — 74160 CT ABDOMEN W/CONTRAST: CPT | Performed by: PHYSICIAN ASSISTANT

## 2024-04-29 PROCEDURE — 85025 COMPLETE CBC W/AUTO DIFF WBC: CPT

## 2024-04-29 PROCEDURE — 71260 CT THORAX DX C+: CPT | Performed by: PHYSICIAN ASSISTANT

## 2024-04-29 RX ORDER — HEPARIN SODIUM (PORCINE) LOCK FLUSH IV SOLN 100 UNIT/ML 100 UNIT/ML
5 SOLUTION INTRAVENOUS ONCE
Status: COMPLETED | OUTPATIENT
Start: 2024-04-29 | End: 2024-04-29

## 2024-04-29 RX ADMIN — HEPARIN SODIUM (PORCINE) LOCK FLUSH IV SOLN 100 UNIT/ML 500 UNITS: 100 SOLUTION INTRAVENOUS at 09:59:00

## 2024-04-29 NOTE — TELEPHONE ENCOUNTER
Wife states Dr. López put results in for CT scan and wondering if Dr. Hernandez can review it please follow up

## 2024-04-30 NOTE — TELEPHONE ENCOUNTER
Pt's wife is aware of Dr. Hernandez's result notes below. Reassured her. Explained test results should be given by ordering provider, will inform MD pt also had labs. She voiced understanding.    Dr. Hernandez- pt also had labs ordered by another provider. Pls sign rx pending if agreeable.

## 2024-04-30 NOTE — TELEPHONE ENCOUNTER
CT CHEST+ABDOMEN (ALL CNTRST ONLY) (RSZ=[id number]): Result Notes     Chris Hernandez MD  4/29/2024  6:59 PM CDT       RN, please call the patient to let him know that his CT scan of the chest and abdomen is improved.  There are a few new subtle areas of haziness in the lungs and I think it important to repeat the CT scan of the chest at the 3-month interval to follow these abnormalities.  Brannon BYRNE

## 2024-05-03 ENCOUNTER — APPOINTMENT (OUTPATIENT)
Dept: HEMATOLOGY/ONCOLOGY | Facility: HOSPITAL | Age: 79
End: 2024-05-03
Attending: INTERNAL MEDICINE
Payer: MEDICARE

## 2024-05-03 VITALS
HEIGHT: 70 IN | OXYGEN SATURATION: 97 % | DIASTOLIC BLOOD PRESSURE: 70 MMHG | SYSTOLIC BLOOD PRESSURE: 136 MMHG | BODY MASS INDEX: 27.2 KG/M2 | TEMPERATURE: 98 F | WEIGHT: 190 LBS | HEART RATE: 67 BPM | RESPIRATION RATE: 18 BRPM

## 2024-05-03 DIAGNOSIS — J98.4 PNEUMONITIS: ICD-10-CM

## 2024-05-03 DIAGNOSIS — I26.99 PULMONARY EMBOLISM, OTHER, UNSPECIFIED CHRONICITY, UNSPECIFIED WHETHER ACUTE COR PULMONALE PRESENT (HCC): ICD-10-CM

## 2024-05-03 DIAGNOSIS — C34.92 MALIGNANT NEOPLASM OF LEFT LUNG, UNSPECIFIED PART OF LUNG (HCC): Primary | ICD-10-CM

## 2024-05-03 DIAGNOSIS — Z51.11 CHEMOTHERAPY MANAGEMENT, ENCOUNTER FOR: ICD-10-CM

## 2024-05-03 PROCEDURE — 99215 OFFICE O/P EST HI 40 MIN: CPT | Performed by: INTERNAL MEDICINE

## 2024-05-03 PROCEDURE — G2211 COMPLEX E/M VISIT ADD ON: HCPCS | Performed by: INTERNAL MEDICINE

## 2024-05-03 NOTE — PROGRESS NOTES
Cancer Center Progress Note    Patient Name: Luis Villasenor   YOB: 1945   Medical Record Number: [id number]   Attending Physician: Mike Mcclure M.D.       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  He completed definitive concurrent chemoradiotherapy using cisplatin and etoposide early March 2017.  He had low-grade anemia neutropenia on treatment.    He subsequently has been followed on surveillance with no evidence of progression.    The patient started on durvalumab in October 2017 given he had unresectable stage III disease with no evidence of progression following chemo radiotherapy    He has had occasional mild anemia on treatment.  Laboratory follow-up in May 2018 showed severe iron deficiency.  Received IV iron    He completed 13 cycles of durvalumab  however towards the end of his treatment he developed 2 separate instances of pneumonitis/transaminitis requiring separate courses of prednisone.  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     Started single-agent Pembrolizumab on 3/19/21.  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    PE/DVT admission 1/2023.    1/28-2/1 admission for hypoxia, dyspnea, fevers. Treated for pneumonia/pneumonitis. Discharged on steroid taper per Pulmonology.    Interval History:  Returns for six week follow-up.  Feels fairly well.   Fatigue continues to  be an issue.  Keeping active/finishing projects makes him happy, not always able to do more than 20 minutes of activity before needing rest.  Denies signs of infection, breathing changes, bleeding. No fever, chills or sick contacts. On prednisone 10mg daily.    Denies anorexia, headache, mouth sores, cough, dyspnea, abdominal pain, nausea/vomiting, diarrhea, constipation, peripheral edema, bone pain, rash, dysuria and insomnia.      Performance Status:  ECOG 0    Past Medical History:  Past Medical History:    Deep vein thrombosis (HCC)    CURRENTLY HAS RIGHT BLOOD CLOT    Dysphagia    Esophageal reflux    Exposure to medical diagnostic radiation    COMPLETED    Hemorrhoids    History of blood transfusion    Pt on Immunotherapy    History of immunotherapy    Impotence    Lung cancer (HCC)    NSCLCA stage IIIB    Necrotizing granulomatous inflammation of lung (HCC)    Obstructive sleep apnea    Osteoarthritis    Personal history of antineoplastic chemotherapy    Pneumonia due to organism    Pulmonary embolism (HCC)    Pulmonary embolus (HCC)    Sinus problem    Sinus problems    Sleep apnea    CPAP    Visual impairment    wears eyeglasses       Past Surgical History:  Past Surgical History:   Procedure Laterality Date    Arthroscopy of joint unlisted      Cholecystectomy      Colonoscopy  2010    Colonoscopy N/A 06/21/2018    Procedure: COLONOSCOPY;  Surgeon: Cesar Beckman MD;  Location: [facility] ENDOSCOPY    Colonoscopy      Excis spermatocele Right 03/08/2024    Port, indwelling, imp Right 2017    Rotator cuff repair Left     Total knee replacement Bilateral 2020    Vein ligation and stripping         Family History:  Family History   Problem Relation Age of Onset    Cancer Father         Lung    Cancer Mother         Breast, ovarian       Social History:  Social History     Socioeconomic History    Marital status:      Spouse name: Not on file    Number of children: Not on file    Years of education: Not  on file    Highest education level: Not on file   Occupational History    Not on file   Tobacco Use    Smoking status: Former     Current packs/day: 0.00     Average packs/day: 1 pack/day for 30.0 years (30.0 ttl pk-yrs)     Types: Cigarettes     Start date: 1970     Quit date: 2000     Years since quittin.8    Smokeless tobacco: Never   Vaping Use    Vaping status: Never Used   Substance and Sexual Activity    Alcohol use: Not Currently     Alcohol/week: 0.8 standard drinks of alcohol     Types: 1 Glasses of wine per week     Comment: very rarely    Drug use: No    Sexual activity: Not on file   Other Topics Concern     Service Not Asked    Blood Transfusions Not Asked    Caffeine Concern Yes     Comment: coffee, 2 cups daily    Occupational Exposure Not Asked    Hobby Hazards Not Asked    Sleep Concern Not Asked    Stress Concern Not Asked    Weight Concern Not Asked    Special Diet Not Asked    Back Care Not Asked    Exercise Not Asked    Bike Helmet Not Asked    Seat Belt Not Asked    Self-Exams Not Asked   Social History Narrative    Not on file     Social Determinants of Health     Financial Resource Strain: Low Risk  (2024)    Financial Resource Strain     Difficulty of Paying Living Expenses: Not very hard     Med Affordability: No   Food Insecurity: No Food Insecurity (2024)    Food Insecurity     Food Insecurity: Never true   Transportation Needs: No Transportation Needs (2024)    Transportation Needs     Lack of Transportation: No   Physical Activity: Not on File (2022)    Received from [facility]     Physical Activity     Physical Activity: 0   Stress: Not on File (2022)    Received from [facility]     Stress     Stress: 0   Social Connections: Not on File (2022)    Received from [facility]     Social Connections     Social Connections and Isolation: 0   Housing Stability: Low Risk  (2024)    Housing Stability     Housing Instability: No      Housing Instability Emergency: Not on file         Current Medications:    Current Outpatient Medications:     voriconazole 200 MG Oral Tab, Take 1 tablet (200 mg total) by mouth 2 (two) times daily., Disp: 180 tablet, Rfl: 3    rivaroxaban (XARELTO) 10 MG Oral Tab, Take 1 tablet (10 mg total) by mouth at bedtime., Disp: 90 tablet, Rfl: 1    ipratropium-albuterol 0.5-2.5 (3) MG/3ML Inhalation Solution, Take 3 mL by nebulization every 6 (six) hours as needed., Disp: 90 each, Rfl: 5    pantoprazole 40 MG Oral Tab EC, Take 1 tablet (40 mg total) by mouth every morning before breakfast., Disp: 30 tablet, Rfl: 5    sulfamethoxazole-trimethoprim -160 MG Oral Tab per tablet, TAKE 1 TABLET BY MOUTH TWICE A DAY ON MONDAY, WEDNESDAY AND FRIDAY (Patient taking differently: Take 1 tablet by mouth 2 (two) times daily. TAKE 1 TABLET BY MOUTH TWICE A DAY ON MONDAY, WEDNESDAY AND FRIDAY), Disp: 60 tablet, Rfl: 3    predniSONE 10 MG Oral Tab, Take 1 tablet (10 mg total) by mouth daily. (Patient taking differently: Take 1 tablet (10 mg total) by mouth every morning.), Disp: 90 tablet, Rfl: 0    dabrafenib mesylate 50 MG Oral Cap, Take 3 capsules (150 mg total) by mouth 2 (two) times daily. Take THREE capsules at least 1 hour before or 2 hours after a meal, twice a day, Disp: 180 capsule, Rfl: 5    Trametinib Dimethyl Sulfoxide 2 MG Oral Tab, Take 2 mg by mouth daily. Take at least 1 hour before or at least 2 hours after a meal. (Patient taking differently: Take 2 mg by mouth daily with lunch. Take at least 1 hour before or at least 2 hours after a meal.), Disp: 30 tablet, Rfl: 5    fluticasone furoate-vilanterol (BREO ELLIPTA) 100-25 MCG/INH Inhalation Aerosol Powder, Breath Activated, Inhale 1 puff into the lungs daily. Follow with mouth rinse and spit (Patient taking differently: Inhale 1 puff into the lungs every morning. Follow with mouth rinse and spit), Disp: 1 each, Rfl: 6    albuterol 108 (90 Base) MCG/ACT  Inhalation Aero Soln, Inhale 2 puffs into the lungs every 4 (four) hours as needed for Wheezing., Disp: 1 each, Rfl: 2    cholecalciferol 50 MCG (2000 UT) Oral Tab, Take 0.5 tablets (1,000 Units total) by mouth every morning., Disp: , Rfl:     Vitamin C 500 MG Oral Tab, Take 1 tablet (500 mg total) by mouth every morning., Disp: , Rfl:     Allergies:  No Known Allergies     Review of Systems:  All other systems reviewed and negative x12    Vital Signs:  /70 (BP Location: Right arm, Patient Position: Sitting, Cuff Size: adult)   Pulse 67   Temp 97.7 °F (36.5 °C) (Oral)   Resp 18   Ht 1.778 m (5' 10\")   Wt 86.2 kg (190 lb)   SpO2 97%   BMI 27.26 kg/m²     Physical Examination:  General: Patient is alert and oriented x 3, not in acute distress.  Psych:  Mood and affect appropriate, in good spirits.   HEENT: EOMs intact. Watery eyes.  Anicteric.  Oropharynx is clear.   Nec:  Supple, non tender, no LAD  Chest: non-labored breathing, CTA  Cardiovascular: Regular rate and rhythm. Normal S1S2  Extremities: No edema.   Neurological: 5/5 motor x4.    Derm:  No rash    LABS:    CBC:    Lab Results   Component Value Date    WBC 4.2 04/29/2024    WBC 4.8 03/22/2024    WBC 6.0 02/09/2024     Lab Results   Component Value Date    HGB 13.2 04/29/2024    HGB 12.3 (L) 03/22/2024    HGB 13.6 02/09/2024      Lab Results   Component Value Date    .0 04/29/2024    .0 03/22/2024    .0 02/09/2024       Lab Results   Component Value Date     (H) 04/29/2024    BUN 24 (H) 04/29/2024    BUNCREA 27.9 (H) 04/29/2024    CREATSERUM 0.86 04/29/2024    ANIONGAP 6 04/29/2024    GFR >60 05/14/2016    GFRNAA 81 07/22/2022    GFRAA 94 07/22/2022    CA 9.3 04/29/2024    OSMOCALC 296 (H) 04/29/2024    ALKPHO 120 (H) 04/29/2024    AST 43 (H) 04/29/2024    ALT 29 04/29/2024    ALKPHOS 58 12/10/2011    BILT 0.4 04/29/2024    TP 5.9 04/29/2024    ALB 3.9 04/29/2024    GLOBULIN 2.0 (L) 04/29/2024     04/29/2024     K 3.9 04/29/2024     04/29/2024    CO2 27.0 04/29/2024       Radiology:  PEt/CT Chest       Cancer of upper lobe of left lung (HCC)    Staging form: Lung AJCC V7      Clinical stage from 1/9/2017: Stage IIIB (T1a, N3, M0) - Signed by Mike Mcclure MD on 1/9/2017    Impression and Plan:  79 year oldmale former smoker with 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  He was diagnosed in December 2016 as above.  He was treated with concurrent definitive chemoradiotherapy using cisplatin and etoposide finishing in early March 2017.  He had low-grade neutropenia and anemia on chemotherapy. Given he has unresectable stage IIIb disease with no progression following initial chemotherapy we  added immunotherapy with durvalumab. Completed 13 cycles of durvalumab however developed recurrent pneumonitis/transaminitis.  He has responded well to prednisone. He remains off of all cancer directed therapy as of the end of April 2018.    Recurrent disease biopsy-proven mediastinal lymph nodes as of March 2021.  Same histology.  Given PD-L1 greater than 50% we will proceed with single agent pembrolizumab.  Also discussed nivolumab/ipilimumab however with previous issues of pneumonitis and transaminitis will use single agent immunotherapy.   Received Cycle #1 Pembrolizumab on 3/19/21, to be good long-term disease control through summer 2023.    BRAF V600 E peripheral blood sequencing September 2023  --  dabrafenib trametinib start 10/23/23--tolerating.  Continue same full dosing.     -CT imaging 1/28 in ER with favorable tx response. Admission as below. CT imaging 4/2024 with improvement in pneumonitis/pneumonia and relatively stable dx-will monitor indeterminate nodules on next CT imaging in 3 mo.  Rtc ~6 weeks with labs and MDV    -1/28-2/1 admission for hypoxia, dyspnea, fevers. Treated for pneumonia/ possible pneumonitis. Discharged on steroid taper per  Pulmonology-currently back on prednisone 10mg. D/t fatigue AE, will increase prednisone dosing temporarily and assess symptoms, as this has been long-term use for recurrent pneumonitis. CT improved as above.    - Pulmonary embolus now on rivaroxaban diagnosed January 2023.  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  - Recurrent pneumonitis now off pembrolizumab, ER admission 1/2024 as above, Prednisone per Pulmonology as above.      --choledocholithiasis 11/2023 presented to ER for abd pain. He did well clinically.  He underwent ERCP extraction of the common bile duct stones.  lap cholecystectomy 12/11. Abnormality was seen at the adjacent liver site and segmental hepatectomy performed as well. Symptoms resolved and recovered well. Path benign.     --dermatitis with pruritus minimal to area of lower back. Unclear if tx related. prn topical steroid. Currently resolved. Monitor.    --depression: lexapro with MD polly.       MDM: high, malignancy, life threatening. Drug therapy requiring intensive monitoring for toxicity    We will continue to be focal point of care in setting of malignancy undergoing ongoing treatment.   Management plan for cancer treatment leading high risk MDM was formulated by physician.     BRYON Celis

## 2024-05-03 NOTE — PROGRESS NOTES
Seen and examined with PA agree with note above metastatic adenocarcinoma lung status post previous lines of therapy now on dabrafenib and trametinib given BRAF V600 E.  Tolerating treatment well we will continue on exam comfortable labored respirations history of pneumonitis he remains on low-dose prednisone for pulmonary embolus he remains on prophylactic dose rivaroxaban    We will continue to be focal point of care in setting of malignancy undergoing ongoing treatment.   Management plan for cancer treatment leading high risk MDM was formulated by physician.

## 2024-05-07 ENCOUNTER — TELEPHONE (OUTPATIENT)
Dept: PULMONOLOGY | Facility: CLINIC | Age: 79
End: 2024-05-07

## 2024-05-07 NOTE — TELEPHONE ENCOUNTER
Spoke with patient's wife Satinder. Appointment scheduled for 7/29/24 at 4:30PM. Wife verbalized understanding.     Dr. Hernandez: Do you want patient to get any labs prior to appointment?

## 2024-05-07 NOTE — TELEPHONE ENCOUNTER
Patient was originally scheduled with Pulmonary on 7/23/2024 but was rescheduled for 11/18/2024.  Patient is requesting to be seen sooner. Please call.  Thank you.

## 2024-05-08 ENCOUNTER — TELEPHONE (OUTPATIENT)
Dept: HEMATOLOGY/ONCOLOGY | Facility: HOSPITAL | Age: 79
End: 2024-05-08

## 2024-05-08 NOTE — TELEPHONE ENCOUNTER
Pt's wife is calling and would like to speak to a nurse regarding a letter that she received about assistance with the medication copay-NL

## 2024-05-08 NOTE — TELEPHONE ENCOUNTER
Spoke with patient's wife and informed of Dr. Hernandez's message below. Wife verbalized understanding.

## 2024-05-08 NOTE — TELEPHONE ENCOUNTER
Pt is scheduled for hosp f/u appt w/ MD on 2/27/24. Updated appt notes to reflect need for surgical clearance for R spermatocelectomy on 3/8.     Dr. Ilana ANN.   Female

## 2024-05-20 ENCOUNTER — PATIENT MESSAGE (OUTPATIENT)
Dept: PULMONOLOGY | Facility: CLINIC | Age: 79
End: 2024-05-20

## 2024-05-20 ENCOUNTER — TELEPHONE (OUTPATIENT)
Dept: PULMONOLOGY | Facility: CLINIC | Age: 79
End: 2024-05-20

## 2024-05-20 NOTE — TELEPHONE ENCOUNTER
Last office visit 2/27/24  CT chest, abdomen 4/29/24  Per Dr. Hernandez patient to have repeat CT scan of the chest at the 3-month interval (see result notes 4/29/24).  Patient does have upcoming appointment on 7/29/24, but is not due for CT until this date (3 month interval).    Dr. Hernandez- patient planning on having CT prior to appointment with you on 7/29/24, but he is not due for CT until 7/29/24. Do you want him to have CT testing on or after due date? He is also requesting a recommendation to replace Dr. Mcclure.

## 2024-05-20 NOTE — TELEPHONE ENCOUNTER
From: Luis Villasenor  To: Chris Hernandez  Sent: 5/20/2024 2:43 PM CDT  Subject: Letter about CT chest due in June    Just had a CT at the end of April so I plan on scheduling my next CT in July before my appointment with you.    Thank you    By the way...who do you suggest I see now that Dr. Mike Mcclure is no longer practicing here??    Thanks again,  Mike

## 2024-05-24 ENCOUNTER — TELEPHONE (OUTPATIENT)
Dept: PULMONOLOGY | Facility: CLINIC | Age: 79
End: 2024-05-24

## 2024-05-24 NOTE — TELEPHONE ENCOUNTER
Copied & pasted from patient message 5/20/24 for Dr. Hernandez:    You4 days ago   AMIE  Last office visit 2/27/24  CT chest, abdomen 4/29/24  Per Dr. Hernandez patient to have repeat CT scan of the chest at the 3-month interval (see result notes 4/29/24).  Patient does have upcoming appointment on 7/29/24, but is not due for CT until this date (3 month interval).     Dr. Hernandez- patient planning on having CT prior to appointment with you on 7/29/24, but he is not due for CT until 7/29/24. Do you want him to have CT testing on or after due date? He is also requesting a recommendation to replace Dr. Mcclure.         Note         You4 days ago   JM  From: Luis Villasenor  To: Chris Hernandez  Sent: 5/20/2024  2:43 PM CDT  Subject: Letter about CT chest due in June    Just had a CT at the end of April so I plan on scheduling my next CT in July before my appointment with you.    Thank you    By the way...who do you suggest I see now that Dr. Mike Mcclure is no longer practicing here??    Thanks again,  Mike BOJORQUEZ  [facility] Clinical Staff (supporting Chris Hernandez MD)4 days ago     Just had a CT at the end of April so I plan on scheduling my next CT in July before my appointment with you.     Thank you     By the way...who do you suggest I see now that Dr. Mike Mcclure is no longer practicing here??     Thanks again,  Mike

## 2024-06-03 NOTE — TELEPHONE ENCOUNTER
Goal Outcome Evaluation:      Problem: Lower Extremity Amputation  Goal: Optimal Mobility Samaria and Safety  Outcome: Not Progressing     Problem: Confusion Acute  Goal: Optimal Cognitive Function  Outcome: Not Progressing     Problem: Adult Inpatient Plan of Care  Goal: Absence of Hospital-Acquired Illness or Injury  Intervention: Prevent Skin Injury  Recent Flowsheet Documentation  Taken 6/3/2024 0229 by Mary Dillon, RN  Body Position:   turned   left  Taken 6/3/2024 0030 by Mary Dillon, RN  Body Position:   turned   right  Taken 6/2/2024 2200 by Mary Dillon, RN  Body Position:   weight shifting   legs elevated   heels elevated  Taken 6/2/2024 2030 by Mary Dillon, RN  Device Skin Pressure Protection:   tubing/devices free from skin contact   adhesive use limited   absorbent pad utilized/changed     VSS on 2L O2 NC. Patient intermittently confused and forgetful. Reoriented. Q2 turns continued to promote skin integrity. Purewick in place. Up to BSC Ax2-3 for BM. Loose stool x1, senna held at bedtime. Alarms on for safety. IV abx infusing. Safety checks continued.    I wrote a letter for preoperative clearance. Please ensure pre-op at [facility] receives the letter promptly and also ensure this is satisfactory for pt to proceed with surgery tomorrow.

## 2024-06-14 ENCOUNTER — NURSE ONLY (OUTPATIENT)
Dept: HEMATOLOGY/ONCOLOGY | Facility: HOSPITAL | Age: 79
End: 2024-06-14
Attending: INTERNAL MEDICINE
Payer: MEDICARE

## 2024-06-14 ENCOUNTER — OFFICE VISIT (OUTPATIENT)
Dept: HEMATOLOGY/ONCOLOGY | Facility: HOSPITAL | Age: 79
End: 2024-06-14
Attending: NURSE PRACTITIONER
Payer: MEDICARE

## 2024-06-14 VITALS
BODY MASS INDEX: 27.63 KG/M2 | HEART RATE: 67 BPM | TEMPERATURE: 97 F | RESPIRATION RATE: 18 BRPM | SYSTOLIC BLOOD PRESSURE: 134 MMHG | DIASTOLIC BLOOD PRESSURE: 71 MMHG | WEIGHT: 193 LBS | OXYGEN SATURATION: 96 % | HEIGHT: 70 IN

## 2024-06-14 DIAGNOSIS — Z79.899 ENCOUNTER FOR MONITORING CARDIOTOXIC DRUG THERAPY: ICD-10-CM

## 2024-06-14 DIAGNOSIS — R53.83 OTHER FATIGUE: ICD-10-CM

## 2024-06-14 DIAGNOSIS — Z51.11 CHEMOTHERAPY MANAGEMENT, ENCOUNTER FOR: ICD-10-CM

## 2024-06-14 DIAGNOSIS — C34.92 MALIGNANT NEOPLASM OF LEFT LUNG, UNSPECIFIED PART OF LUNG (HCC): Primary | ICD-10-CM

## 2024-06-14 DIAGNOSIS — J98.4 PNEUMONITIS: ICD-10-CM

## 2024-06-14 DIAGNOSIS — B44.9 ASPERGILLUS PNEUMONIA (HCC): ICD-10-CM

## 2024-06-14 DIAGNOSIS — Z45.2 ENCOUNTER FOR CARE RELATED TO PORT-A-CATH: Primary | ICD-10-CM

## 2024-06-14 DIAGNOSIS — Z51.81 ENCOUNTER FOR MONITORING CARDIOTOXIC DRUG THERAPY: ICD-10-CM

## 2024-06-14 DIAGNOSIS — I26.99 PULMONARY EMBOLISM, OTHER, UNSPECIFIED CHRONICITY, UNSPECIFIED WHETHER ACUTE COR PULMONALE PRESENT (HCC): ICD-10-CM

## 2024-06-14 DIAGNOSIS — C34.92 MALIGNANT NEOPLASM OF LEFT LUNG, UNSPECIFIED PART OF LUNG (HCC): ICD-10-CM

## 2024-06-14 LAB
ALBUMIN SERPL-MCNC: 3.7 G/DL (ref 3.2–4.8)
ALBUMIN/GLOB SERPL: 1.9 {RATIO} (ref 1–2)
ALP LIVER SERPL-CCNC: 128 U/L
ALT SERPL-CCNC: 22 U/L
ANION GAP SERPL CALC-SCNC: 2 MMOL/L (ref 0–18)
AST SERPL-CCNC: 34 U/L (ref ?–34)
BASOPHILS # BLD AUTO: 0.02 X10(3) UL (ref 0–0.2)
BASOPHILS NFR BLD AUTO: 0.5 %
BILIRUB SERPL-MCNC: 0.5 MG/DL (ref 0.2–1.1)
BUN BLD-MCNC: 26 MG/DL (ref 9–23)
BUN/CREAT SERPL: 28.3 (ref 10–20)
CALCIUM BLD-MCNC: 8.5 MG/DL (ref 8.7–10.4)
CHLORIDE SERPL-SCNC: 111 MMOL/L (ref 98–112)
CO2 SERPL-SCNC: 27 MMOL/L (ref 21–32)
CREAT BLD-MCNC: 0.92 MG/DL
DEPRECATED RDW RBC AUTO: 50.4 FL (ref 35.1–46.3)
EGFRCR SERPLBLD CKD-EPI 2021: 84 ML/MIN/1.73M2 (ref 60–?)
EOSINOPHIL # BLD AUTO: 0.03 X10(3) UL (ref 0–0.7)
EOSINOPHIL NFR BLD AUTO: 0.7 %
ERYTHROCYTE [DISTWIDTH] IN BLOOD BY AUTOMATED COUNT: 14.4 % (ref 11–15)
GLOBULIN PLAS-MCNC: 1.9 G/DL (ref 2–3.5)
GLUCOSE BLD-MCNC: 92 MG/DL (ref 70–99)
HCT VFR BLD AUTO: 38.4 %
HGB BLD-MCNC: 13.1 G/DL
IMM GRANULOCYTES # BLD AUTO: 0.02 X10(3) UL (ref 0–1)
IMM GRANULOCYTES NFR BLD: 0.5 %
LYMPHOCYTES # BLD AUTO: 1.02 X10(3) UL (ref 1–4)
LYMPHOCYTES NFR BLD AUTO: 24.6 %
MCH RBC QN AUTO: 32.5 PG (ref 26–34)
MCHC RBC AUTO-ENTMCNC: 34.1 G/DL (ref 31–37)
MCV RBC AUTO: 95.3 FL
MONOCYTES # BLD AUTO: 0.48 X10(3) UL (ref 0.1–1)
MONOCYTES NFR BLD AUTO: 11.6 %
NEUTROPHILS # BLD AUTO: 2.57 X10 (3) UL (ref 1.5–7.7)
NEUTROPHILS # BLD AUTO: 2.57 X10(3) UL (ref 1.5–7.7)
NEUTROPHILS NFR BLD AUTO: 62.1 %
OSMOLALITY SERPL CALC.SUM OF ELEC: 294 MOSM/KG (ref 275–295)
PLATELET # BLD AUTO: 209 10(3)UL (ref 150–450)
POTASSIUM SERPL-SCNC: 3.6 MMOL/L (ref 3.5–5.1)
PROT SERPL-MCNC: 5.6 G/DL (ref 5.7–8.2)
RBC # BLD AUTO: 4.03 X10(6)UL
SODIUM SERPL-SCNC: 140 MMOL/L (ref 136–145)
WBC # BLD AUTO: 4.1 X10(3) UL (ref 4–11)

## 2024-06-14 PROCEDURE — 85025 COMPLETE CBC W/AUTO DIFF WBC: CPT

## 2024-06-14 PROCEDURE — 80053 COMPREHEN METABOLIC PANEL: CPT

## 2024-06-14 PROCEDURE — 99215 OFFICE O/P EST HI 40 MIN: CPT | Performed by: NURSE PRACTITIONER

## 2024-06-14 PROCEDURE — 36591 DRAW BLOOD OFF VENOUS DEVICE: CPT

## 2024-06-14 RX ORDER — HEPARIN SODIUM (PORCINE) LOCK FLUSH IV SOLN 100 UNIT/ML 100 UNIT/ML
5 SOLUTION INTRAVENOUS ONCE
Status: COMPLETED | OUTPATIENT
Start: 2024-06-14 | End: 2024-06-14

## 2024-06-14 RX ADMIN — HEPARIN SODIUM (PORCINE) LOCK FLUSH IV SOLN 100 UNIT/ML 500 UNITS: 100 SOLUTION INTRAVENOUS at 08:35:00

## 2024-06-14 NOTE — PROGRESS NOTES
Cancer Center Progress Note    Patient Name: Luis Villasenor   YOB: 1945   Medical Record Number: [id number]   Attending Physician: Mike Mcclure M.D.       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  He completed definitive concurrent chemoradiotherapy using cisplatin and etoposide early March 2017.  He had low-grade anemia neutropenia on treatment.    He subsequently has been followed on surveillance with no evidence of progression.    The patient started on durvalumab in October 2017 given he had unresectable stage III disease with no evidence of progression following chemo radiotherapy    He has had occasional mild anemia on treatment.  Laboratory follow-up in May 2018 showed severe iron deficiency.  Received IV iron    He completed 13 cycles of durvalumab  however towards the end of his treatment he developed 2 separate instances of pneumonitis/transaminitis requiring separate courses of prednisone.  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     Started single-agent Pembrolizumab on 3/19/21.  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    PE/DVT admission 1/2023.    1/28-2/1 admission for hypoxia, dyspnea, fevers. Treated for pneumonia/pneumonitis. Discharged on steroid taper per Pulmonology.    Interval History:  Returns for six week follow-up.  Feels fairly well.   Fatigue continues to  be an issue.  Keeping active/finishing projects makes him happy, not always able to do more than 20 minutes of activity before needing rest.  Trialed an increase in prednisone temporarily since last visit with no change in energy.  Denies signs of infection, breathing changes, bleeding. No fever, chills or sick contacts. On prednisone 10mg daily per Pulm.    Denies anorexia, headache, mouth sores, cough, dyspnea, abdominal pain, nausea/vomiting, diarrhea, constipation, peripheral edema, bone pain, rash, dysuria and insomnia.      Performance Status:  ECOG 0    Past Medical History:  Past Medical History:    Deep vein thrombosis (HCC)    CURRENTLY HAS RIGHT BLOOD CLOT    Dysphagia    Esophageal reflux    Exposure to medical diagnostic radiation    COMPLETED    Hemorrhoids    History of blood transfusion    Pt on Immunotherapy    History of immunotherapy    Impotence    Lung cancer (HCC)    NSCLCA stage IIIB    Necrotizing granulomatous inflammation of lung (HCC)    Obstructive sleep apnea    Osteoarthritis    Personal history of antineoplastic chemotherapy    Pneumonia due to organism    Pulmonary embolism (HCC)    Pulmonary embolus (HCC)    Sinus problem    Sinus problems    Sleep apnea    CPAP    Visual impairment    wears eyeglasses       Past Surgical History:  Past Surgical History:   Procedure Laterality Date    Arthroscopy of joint unlisted      Cholecystectomy      Colonoscopy  2010    Colonoscopy N/A 06/21/2018    Procedure: COLONOSCOPY;  Surgeon: Cesar Beckman MD;  Location: [facility] ENDOSCOPY    Colonoscopy      Excis spermatocele Right 03/08/2024    Port, indwelling, imp Right 2017    Rotator cuff repair Left     Total knee replacement Bilateral 2020    Vein ligation and stripping         Family History:  Family History   Problem Relation Age of Onset    Cancer Father         Lung    Cancer Mother         Breast, ovarian       Social History:  Social History     Socioeconomic History    Marital status:       Spouse name: Not on file    Number of children: Not on file    Years of education: Not on file    Highest education level: Not on file   Occupational History    Not on file   Tobacco Use    Smoking status: Former     Current packs/day: 0.00     Average packs/day: 1 pack/day for 30.0 years (30.0 ttl pk-yrs)     Types: Cigarettes     Start date: 1970     Quit date: 2000     Years since quittin.0    Smokeless tobacco: Never   Vaping Use    Vaping status: Never Used   Substance and Sexual Activity    Alcohol use: Not Currently     Alcohol/week: 0.8 standard drinks of alcohol     Types: 1 Glasses of wine per week     Comment: very rarely    Drug use: No    Sexual activity: Not on file   Other Topics Concern     Service Not Asked    Blood Transfusions Not Asked    Caffeine Concern Yes     Comment: coffee, 2 cups daily    Occupational Exposure Not Asked    Hobby Hazards Not Asked    Sleep Concern Not Asked    Stress Concern Not Asked    Weight Concern Not Asked    Special Diet Not Asked    Back Care Not Asked    Exercise Not Asked    Bike Helmet Not Asked    Seat Belt Not Asked    Self-Exams Not Asked   Social History Narrative    Not on file     Social Determinants of Health     Financial Resource Strain: Low Risk  (2024)    Financial Resource Strain     Difficulty of Paying Living Expenses: Not very hard     Med Affordability: No   Food Insecurity: No Food Insecurity (2024)    Food Insecurity     Food Insecurity: Never true   Transportation Needs: No Transportation Needs (2024)    Transportation Needs     Lack of Transportation: No   Physical Activity: Not on File (2022)    Received from [facility]     Physical Activity     Physical Activity: 0   Stress: Not on File (2022)    Received from [facility]     Stress     Stress: 0   Social Connections: Not on File (2022)    Received from [facility]     Social Connections     Social Connections and  Isolation: 0   Housing Stability: Low Risk  (1/28/2024)    Housing Stability     Housing Instability: No     Housing Instability Emergency: Not on file     Crib or Bassinette: Not on file         Current Medications:    Current Outpatient Medications:     voriconazole 200 MG Oral Tab, Take 1 tablet (200 mg total) by mouth 2 (two) times daily., Disp: 180 tablet, Rfl: 3    rivaroxaban (XARELTO) 10 MG Oral Tab, Take 1 tablet (10 mg total) by mouth at bedtime., Disp: 90 tablet, Rfl: 1    ipratropium-albuterol 0.5-2.5 (3) MG/3ML Inhalation Solution, Take 3 mL by nebulization every 6 (six) hours as needed., Disp: 90 each, Rfl: 5    pantoprazole 40 MG Oral Tab EC, Take 1 tablet (40 mg total) by mouth every morning before breakfast., Disp: 30 tablet, Rfl: 5    sulfamethoxazole-trimethoprim -160 MG Oral Tab per tablet, TAKE 1 TABLET BY MOUTH TWICE A DAY ON MONDAY, WEDNESDAY AND FRIDAY (Patient taking differently: Take 1 tablet by mouth 2 (two) times daily. TAKE 1 TABLET BY MOUTH TWICE A DAY ON MONDAY, WEDNESDAY AND FRIDAY), Disp: 60 tablet, Rfl: 3    predniSONE 10 MG Oral Tab, Take 1 tablet (10 mg total) by mouth daily. (Patient taking differently: Take 1 tablet (10 mg total) by mouth every morning.), Disp: 90 tablet, Rfl: 0    dabrafenib mesylate 50 MG Oral Cap, Take 3 capsules (150 mg total) by mouth 2 (two) times daily. Take THREE capsules at least 1 hour before or 2 hours after a meal, twice a day, Disp: 180 capsule, Rfl: 5    Trametinib Dimethyl Sulfoxide 2 MG Oral Tab, Take 2 mg by mouth daily. Take at least 1 hour before or at least 2 hours after a meal. (Patient taking differently: Take 2 mg by mouth daily with lunch. Take at least 1 hour before or at least 2 hours after a meal.), Disp: 30 tablet, Rfl: 5    fluticasone furoate-vilanterol (BREO ELLIPTA) 100-25 MCG/INH Inhalation Aerosol Powder, Breath Activated, Inhale 1 puff into the lungs daily. Follow with mouth rinse and spit (Patient taking differently:  Inhale 1 puff into the lungs every morning. Follow with mouth rinse and spit), Disp: 1 each, Rfl: 6    albuterol 108 (90 Base) MCG/ACT Inhalation Aero Soln, Inhale 2 puffs into the lungs every 4 (four) hours as needed for Wheezing., Disp: 1 each, Rfl: 2    cholecalciferol 50 MCG (2000 UT) Oral Tab, Take 0.5 tablets (1,000 Units total) by mouth every morning., Disp: , Rfl:     Vitamin C 500 MG Oral Tab, Take 1 tablet (500 mg total) by mouth every morning., Disp: , Rfl:     Allergies:  No Known Allergies     Review of Systems:  All other systems reviewed and negative x12    Vital Signs:  /71 (BP Location: Left arm, Patient Position: Sitting, Cuff Size: adult)   Pulse 67   Temp 97.3 °F (36.3 °C) (Oral)   Resp 18   Ht 1.778 m (5' 10\")   Wt 87.5 kg (193 lb)   SpO2 96%   BMI 27.69 kg/m²     Physical Examination:  General: Patient is alert and oriented x 3, not in acute distress.  Psych:  Mood and affect appropriate, in good spirits.   HEENT: EOMs intact.   Anicteric.  Oropharynx is clear.   Nec:  Supple, non tender, no LAD  Chest: non-labored breathing, CTA  Cardiovascular: Regular rate and rhythm. Normal S1S2  Extremities: No edema.   Neurological: 5/5 motor x4.    Derm:  No rash    LABS:    CBC:    Lab Results   Component Value Date    WBC 4.1 06/14/2024    WBC 4.2 04/29/2024    WBC 4.8 03/22/2024     Lab Results   Component Value Date    HGB 13.1 06/14/2024    HGB 13.2 04/29/2024    HGB 12.3 (L) 03/22/2024      Lab Results   Component Value Date    .0 06/14/2024    .0 04/29/2024    .0 03/22/2024       Lab Results   Component Value Date    GLU 92 06/14/2024    BUN 26 (H) 06/14/2024    BUNCREA 28.3 (H) 06/14/2024    CREATSERUM 0.92 06/14/2024    ANIONGAP 2 06/14/2024    GFR >60 05/14/2016    GFRNAA 81 07/22/2022    GFRAA 94 07/22/2022    CA 8.5 (L) 06/14/2024    OSMOCALC 294 06/14/2024    ALKPHO 128 (H) 06/14/2024    AST 34 06/14/2024    ALT 22 06/14/2024    ALKPHOS 58 12/10/2011    BILT  0.5 06/14/2024    TP 5.6 (L) 06/14/2024    ALB 3.7 06/14/2024    GLOBULIN 1.9 (L) 06/14/2024     06/14/2024    K 3.6 06/14/2024     06/14/2024    CO2 27.0 06/14/2024       Radiology:  PEt/CT Chest       Cancer of upper lobe of left lung (HCC)    Staging form: Lung AJCC V7      Clinical stage from 1/9/2017: Stage IIIB (T1a, N3, M0) - Signed by Mike Mcclure MD on 1/9/2017    Impression and Plan:  79 year oldmale former smoker with 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  He was diagnosed in December 2016 as above.  He was treated with concurrent definitive chemoradiotherapy using cisplatin and etoposide finishing in early March 2017.  He had low-grade neutropenia and anemia on chemotherapy. Given he has unresectable stage IIIb disease with no progression following initial chemotherapy we  added immunotherapy with durvalumab. Completed 13 cycles of durvalumab however developed recurrent pneumonitis/transaminitis.  He has responded well to prednisone. He remains off of all cancer directed therapy as of the end of April 2018.    Recurrent disease biopsy-proven mediastinal lymph nodes as of March 2021.  Same histology.  Given PD-L1 greater than 50% we will proceed with single agent pembrolizumab.  Also discussed nivolumab/ipilimumab however with previous issues of pneumonitis and transaminitis will use single agent immunotherapy.   Received Cycle #1 Pembrolizumab on 3/19/21, with good long-term disease control through summer 2023. Discontineud d/t recurrent pneumonitis below.    BRAF V600 E peripheral blood sequencing September 2023  --  dabrafenib trametinib start 10/23/23--tolerating relatively well.  Continue, but d/t ongoing fatigue/stamina issues below, will hold for 1 week and recheck in with phone call; will consider dose reduction of dabrafenib at minimum if improvement of symptoms. Will also rule out cardiomyopathy AE with repeat ECHO-order  placed. No improvement in fatigue with temp increase in prednisone below, however low threshold to consider Endo consult if ongoing at next visit, as has been on long-term low-dose steroid, may be AI related?.  -CT imaging 1/28 in ER with favorable tx response. Admission as below. CT imaging 4/2024 with improvement in pneumonitis/pneumonia and relatively stable dx-will monitor indeterminate nodules on next CT imaging in 3 mo-due prior to next visit 7/2024.  Rtc ~6 weeks with labs and MDV with oncologist    -1/28-2/1 admission for hypoxia, dyspnea, fevers. Treated for pneumonia/ possible pneumonitis. Discharged on steroid taper per Pulmonology-currently back on daily prednisone 10mg.  CT improved as above. Will see pulm after 7/2024 imaging above.    - Pulmonary embolus now on rivaroxaban diagnosed January 2023.  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  - Recurrent pneumonitis now off pembrolizumab, ER admission 1/2024 as above, Prednisone per Pulmonology as above.      --choledocholithiasis 11/2023 presented to ER for abd pain. He did well clinically.  He underwent ERCP extraction of the common bile duct stones.  lap cholecystectomy 12/11. Abnormality was seen at the adjacent liver site and segmental hepatectomy performed as well. Symptoms resolved and recovered well. Path benign.     --dermatitis with pruritus minimal to area of lower back. Unclear if tx related. prn topical steroid. Currently resolved. Monitor.    MDM: high, malignancy, life threatening. Drug therapy requiring intensive monitoring for toxicity    We will continue to be focal point of care in setting of malignancy undergoing ongoing treatment    BRYON Celis

## 2024-06-14 NOTE — PATIENT INSTRUCTIONS
Interrupt/HOLD both medications for 1 week, will check in via phone call.    If improvement in fatigue, will restart at dose reduction-- dabrafenib 100mg (2 cap) twice daily.  Can also consider dose reduction of trametinib in future. Will discuss.    ECHO of heart to assess heart function in interim.  These medications can be tough on the heart function in rare cases, lets assess.    CT scan that Dr matias ordered prior to next visit with Yaneth Bello MD    Return 7/26.    [phone]

## 2024-06-21 ENCOUNTER — TELEPHONE (OUTPATIENT)
Dept: HEMATOLOGY/ONCOLOGY | Facility: HOSPITAL | Age: 79
End: 2024-06-21

## 2024-06-21 NOTE — TELEPHONE ENCOUNTER
Called to check in on symptoms. Feels energy/fatigue levels improved this week with treatment hold. Plans for ECHO prior to next visit. No new acute symptoms.    Will restart medications, but with dose reduction-- dabrafenib 100mg (2 cap) twice daily. Can also consider dose reduction of trametinib in future, but will keep same dosing for now. Call if worsening symptoms. Agreeable to plan.   BRYON Celis

## 2024-06-25 DIAGNOSIS — C34.92 MALIGNANT NEOPLASM OF LEFT LUNG, UNSPECIFIED PART OF LUNG (HCC): ICD-10-CM

## 2024-06-25 RX ORDER — PANTOPRAZOLE SODIUM 40 MG/1
40 TABLET, DELAYED RELEASE ORAL
Qty: 90 TABLET | Refills: 1 | Status: SHIPPED | OUTPATIENT
Start: 2024-06-25

## 2024-07-02 ENCOUNTER — TELEPHONE (OUTPATIENT)
Dept: HEMATOLOGY/ONCOLOGY | Facility: HOSPITAL | Age: 79
End: 2024-07-02

## 2024-07-02 ENCOUNTER — TELEPHONE (OUTPATIENT)
Dept: PULMONOLOGY | Facility: CLINIC | Age: 79
End: 2024-07-02

## 2024-07-02 NOTE — TELEPHONE ENCOUNTER
I called Satinder and updated her that BRYON Ordoñez wants to make sure she tells the ER doctor that Luis had a immune mediated pneumonitis from his Keytruda treatment. The ER doctor may call the Cancer Center or Dr Brannon Hernandez's office if he/she has any questions. I also told Satinder that BRYON Ordoñez wants Luis to hold his Dabrafenib and Tremetinib until he is feeling better. Satinder verbalized understanding. No other questions or concerns at this time.

## 2024-07-02 NOTE — TELEPHONE ENCOUNTER
Wife calling eliceo is tired no energy feels out of breath with exertion and has chills . Pt is out of town in [place]. [email or web address]

## 2024-07-02 NOTE — TELEPHONE ENCOUNTER
Toxicities: Dabrafenib/Trametinib    Chills/Dyspnea/Fatigue    Satinder reports that they are out of town in [place]. She reports Luis is complaining of feeling chills. He is complaining of feeling short of breath and \"very\" fatigued.    I explained that she needs to bring him to the closest Emergency Room near where they are staying so he may be evaluated and receive treatment. Satinder verbalized understanding and agreed with the plan. I assured her I would update Margie Costa APRN and BRYON Ordoñez.

## 2024-07-03 ENCOUNTER — TELEPHONE (OUTPATIENT)
Dept: HEMATOLOGY/ONCOLOGY | Facility: HOSPITAL | Age: 79
End: 2024-07-03

## 2024-07-03 ENCOUNTER — TELEPHONE (OUTPATIENT)
Dept: PULMONOLOGY | Facility: CLINIC | Age: 79
End: 2024-07-03

## 2024-07-03 NOTE — TELEPHONE ENCOUNTER
Called patient for additional information.  Patient is out of hospital, no pneumonia or infections.  Is on prednisone 10 mg daily. They have been holdind Dabrafenib Mesylate and Trametinib as instructed and wondering if they are to restart it?    Returning from [place] 7/16.    Please advise.

## 2024-07-03 NOTE — TELEPHONE ENCOUNTER
Called per Thalia, to hold meds for now and restart on Monday and call if any symptoms or issue arise.  They verbalize understanding.

## 2024-07-03 NOTE — TELEPHONE ENCOUNTER
Patient wife returning MD's call.  She states that they are in [place] and went to ER yesterday.  She state patient test results were negative for pneumonia and lungs were clear.  Patient was discharged late last night.

## 2024-07-03 NOTE — TELEPHONE ENCOUNTER
Pt's wife is calling to give an update. She stated that pt does not have pneumonia. She is asking if he should continue to stop taking the medicine that you told him to hold. Called 7/3/24 KM

## 2024-07-22 ENCOUNTER — HOSPITAL ENCOUNTER (OUTPATIENT)
Dept: CV DIAGNOSTICS | Facility: HOSPITAL | Age: 79
Discharge: HOME | End: 2024-07-22
Attending: NURSE PRACTITIONER
Payer: MEDICARE

## 2024-07-22 DIAGNOSIS — Z51.11 CHEMOTHERAPY MANAGEMENT, ENCOUNTER FOR: ICD-10-CM

## 2024-07-22 DIAGNOSIS — Z51.81 ENCOUNTER FOR MONITORING CARDIOTOXIC DRUG THERAPY: ICD-10-CM

## 2024-07-22 DIAGNOSIS — C34.92 MALIGNANT NEOPLASM OF LEFT LUNG, UNSPECIFIED PART OF LUNG (HCC): ICD-10-CM

## 2024-07-22 DIAGNOSIS — Z79.899 ENCOUNTER FOR MONITORING CARDIOTOXIC DRUG THERAPY: ICD-10-CM

## 2024-07-22 DIAGNOSIS — R53.83 OTHER FATIGUE: ICD-10-CM

## 2024-07-22 PROCEDURE — 93306 TTE W/DOPPLER COMPLETE: CPT | Performed by: NURSE PRACTITIONER

## 2024-07-22 RX ORDER — HEPARIN SODIUM (PORCINE) LOCK FLUSH IV SOLN 100 UNIT/ML 100 UNIT/ML
5 SOLUTION INTRAVENOUS ONCE
OUTPATIENT
Start: 2024-07-22

## 2024-07-22 RX ORDER — SODIUM CHLORIDE 9 MG/ML
10 INJECTION, SOLUTION INTRAMUSCULAR; INTRAVENOUS; SUBCUTANEOUS ONCE
OUTPATIENT
Start: 2024-07-22

## 2024-07-23 ENCOUNTER — NURSE ONLY (OUTPATIENT)
Dept: HEMATOLOGY/ONCOLOGY | Facility: HOSPITAL | Age: 79
End: 2024-07-23
Attending: INTERNAL MEDICINE
Payer: MEDICARE

## 2024-07-23 ENCOUNTER — HOSPITAL ENCOUNTER (OUTPATIENT)
Dept: CT IMAGING | Facility: HOSPITAL | Age: 79
Discharge: HOME | End: 2024-07-23
Attending: INTERNAL MEDICINE
Payer: MEDICARE

## 2024-07-23 DIAGNOSIS — Z51.11 CHEMOTHERAPY MANAGEMENT, ENCOUNTER FOR: ICD-10-CM

## 2024-07-23 DIAGNOSIS — Z45.2 ENCOUNTER FOR CARE RELATED TO PORT-A-CATH: Primary | ICD-10-CM

## 2024-07-23 DIAGNOSIS — C34.92 MALIGNANT NEOPLASM OF LEFT LUNG, UNSPECIFIED PART OF LUNG (HCC): ICD-10-CM

## 2024-07-23 LAB
ALBUMIN SERPL-MCNC: 4 G/DL (ref 3.2–4.8)
ALBUMIN/GLOB SERPL: 2 {RATIO} (ref 1–2)
ALP LIVER SERPL-CCNC: 149 U/L
ALT SERPL-CCNC: 18 U/L
ANION GAP SERPL CALC-SCNC: 5 MMOL/L (ref 0–18)
AST SERPL-CCNC: 23 U/L (ref ?–34)
BASOPHILS # BLD AUTO: 0.02 X10(3) UL (ref 0–0.2)
BASOPHILS NFR BLD AUTO: 0.4 %
BILIRUB SERPL-MCNC: 0.5 MG/DL (ref 0.2–1.1)
BUN BLD-MCNC: 21 MG/DL (ref 9–23)
BUN/CREAT SERPL: 21.9 (ref 10–20)
CALCIUM BLD-MCNC: 8.9 MG/DL (ref 8.7–10.4)
CHLORIDE SERPL-SCNC: 107 MMOL/L (ref 98–112)
CO2 SERPL-SCNC: 27 MMOL/L (ref 21–32)
CREAT BLD-MCNC: 0.96 MG/DL
CREAT BLD-MCNC: 1 MG/DL
DEPRECATED RDW RBC AUTO: 49.5 FL (ref 35.1–46.3)
EGFRCR SERPLBLD CKD-EPI 2021: 76 ML/MIN/1.73M2 (ref 60–?)
EGFRCR SERPLBLD CKD-EPI 2021: 79 ML/MIN/1.73M2 (ref 60–?)
EOSINOPHIL # BLD AUTO: 0.03 X10(3) UL (ref 0–0.7)
EOSINOPHIL NFR BLD AUTO: 0.6 %
ERYTHROCYTE [DISTWIDTH] IN BLOOD BY AUTOMATED COUNT: 14 % (ref 11–15)
FASTING STATUS PATIENT QL REPORTED: NO
GLOBULIN PLAS-MCNC: 2 G/DL (ref 2–3.5)
GLUCOSE BLD-MCNC: 89 MG/DL (ref 70–99)
HCT VFR BLD AUTO: 37.5 %
HGB BLD-MCNC: 12.7 G/DL
IMM GRANULOCYTES # BLD AUTO: 0.02 X10(3) UL (ref 0–1)
IMM GRANULOCYTES NFR BLD: 0.4 %
LYMPHOCYTES # BLD AUTO: 1.03 X10(3) UL (ref 1–4)
LYMPHOCYTES NFR BLD AUTO: 19.4 %
MCH RBC QN AUTO: 32.3 PG (ref 26–34)
MCHC RBC AUTO-ENTMCNC: 33.9 G/DL (ref 31–37)
MCV RBC AUTO: 95.4 FL
MONOCYTES # BLD AUTO: 0.55 X10(3) UL (ref 0.1–1)
MONOCYTES NFR BLD AUTO: 10.3 %
NEUTROPHILS # BLD AUTO: 3.67 X10 (3) UL (ref 1.5–7.7)
NEUTROPHILS # BLD AUTO: 3.67 X10(3) UL (ref 1.5–7.7)
NEUTROPHILS NFR BLD AUTO: 68.9 %
OSMOLALITY SERPL CALC.SUM OF ELEC: 290 MOSM/KG (ref 275–295)
PLATELET # BLD AUTO: 213 10(3)UL (ref 150–450)
POTASSIUM SERPL-SCNC: 3.6 MMOL/L (ref 3.5–5.1)
PROT SERPL-MCNC: 6 G/DL (ref 5.7–8.2)
RBC # BLD AUTO: 3.93 X10(6)UL
SODIUM SERPL-SCNC: 139 MMOL/L (ref 136–145)
WBC # BLD AUTO: 5.3 X10(3) UL (ref 4–11)

## 2024-07-23 PROCEDURE — 74160 CT ABDOMEN W/CONTRAST: CPT | Performed by: INTERNAL MEDICINE

## 2024-07-23 PROCEDURE — 82565 ASSAY OF CREATININE: CPT

## 2024-07-23 PROCEDURE — 80053 COMPREHEN METABOLIC PANEL: CPT

## 2024-07-23 PROCEDURE — 85025 COMPLETE CBC W/AUTO DIFF WBC: CPT

## 2024-07-23 PROCEDURE — 71260 CT THORAX DX C+: CPT | Performed by: INTERNAL MEDICINE

## 2024-07-23 PROCEDURE — 36591 DRAW BLOOD OFF VENOUS DEVICE: CPT

## 2024-07-23 RX ORDER — HEPARIN SODIUM (PORCINE) LOCK FLUSH IV SOLN 100 UNIT/ML 100 UNIT/ML
5 SOLUTION INTRAVENOUS ONCE
OUTPATIENT
Start: 2024-07-23

## 2024-07-23 RX ORDER — SODIUM CHLORIDE 9 MG/ML
10 INJECTION, SOLUTION INTRAMUSCULAR; INTRAVENOUS; SUBCUTANEOUS ONCE
OUTPATIENT
Start: 2024-07-23

## 2024-07-23 RX ORDER — HEPARIN SODIUM (PORCINE) LOCK FLUSH IV SOLN 100 UNIT/ML 100 UNIT/ML
5 SOLUTION INTRAVENOUS ONCE
Status: COMPLETED | OUTPATIENT
Start: 2024-07-23 | End: 2024-07-23

## 2024-07-23 RX ADMIN — HEPARIN SODIUM (PORCINE) LOCK FLUSH IV SOLN 100 UNIT/ML 500 UNITS: 100 SOLUTION INTRAVENOUS at 10:17:00

## 2024-07-24 DIAGNOSIS — R91.8 PULMONARY NODULES: Primary | ICD-10-CM

## 2024-07-26 ENCOUNTER — APPOINTMENT (OUTPATIENT)
Dept: HEMATOLOGY/ONCOLOGY | Facility: HOSPITAL | Age: 79
End: 2024-07-26
Attending: INTERNAL MEDICINE
Payer: MEDICARE

## 2024-07-26 VITALS
BODY MASS INDEX: 27.2 KG/M2 | WEIGHT: 190 LBS | TEMPERATURE: 98 F | HEART RATE: 58 BPM | RESPIRATION RATE: 18 BRPM | SYSTOLIC BLOOD PRESSURE: 127 MMHG | DIASTOLIC BLOOD PRESSURE: 65 MMHG | HEIGHT: 70 IN | OXYGEN SATURATION: 96 %

## 2024-07-26 DIAGNOSIS — C34.92 ADENOCARCINOMA OF LEFT LUNG (HCC): Primary | ICD-10-CM

## 2024-07-26 DIAGNOSIS — I26.99 PULMONARY EMBOLISM, OTHER, UNSPECIFIED CHRONICITY, UNSPECIFIED WHETHER ACUTE COR PULMONALE PRESENT (HCC): ICD-10-CM

## 2024-07-26 DIAGNOSIS — B44.9 ASPERGILLUS PNEUMONIA (HCC): ICD-10-CM

## 2024-07-26 DIAGNOSIS — T50.905A DRUG-INDUCED PNEUMONITIS: ICD-10-CM

## 2024-07-26 DIAGNOSIS — J98.4 DRUG-INDUCED PNEUMONITIS: ICD-10-CM

## 2024-07-26 DIAGNOSIS — Z51.11 CHEMOTHERAPY MANAGEMENT, ENCOUNTER FOR: ICD-10-CM

## 2024-07-26 DIAGNOSIS — C77.1 NON-SMALL CELL LUNG CANCER METASTATIC TO INTRATHORACIC LYMPH NODE (HCC): ICD-10-CM

## 2024-07-26 DIAGNOSIS — C34.90 NON-SMALL CELL LUNG CANCER METASTATIC TO INTRATHORACIC LYMPH NODE (HCC): ICD-10-CM

## 2024-07-26 DIAGNOSIS — R53.83 OTHER FATIGUE: ICD-10-CM

## 2024-07-26 DIAGNOSIS — Z79.01 LONG TERM CURRENT USE OF ANTICOAGULANT: ICD-10-CM

## 2024-07-26 DIAGNOSIS — J98.4 PNEUMONITIS: ICD-10-CM

## 2024-07-26 PROCEDURE — G2211 COMPLEX E/M VISIT ADD ON: HCPCS | Performed by: INTERNAL MEDICINE

## 2024-07-26 PROCEDURE — 99215 OFFICE O/P EST HI 40 MIN: CPT | Performed by: INTERNAL MEDICINE

## 2024-07-26 RX ORDER — DEXAMETHASONE 1 MG
1 TABLET ORAL
Qty: 30 TABLET | Refills: 2 | Status: SHIPPED | OUTPATIENT
Start: 2024-07-26

## 2024-07-26 NOTE — PROGRESS NOTES
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  He completed definitive concurrent chemoradiotherapy using cisplatin and etoposide early March 2017.  He had mild anemia neutropenia on treatment.    He subsequently has been followed on surveillance with no evidence of progression.    The patient started on durvalumab in October 2017 given he had unresectable stage III disease with no evidence of progression following chemo radiotherapy    He has had occasional mild anemia on treatment.  Laboratory follow-up in May 2018 showed severe iron deficiency.  Received IV iron    He completed 13 cycles of durvalumab  however towards the end of his treatment he developed 2 separate instances of pneumonitis/transaminitis requiring separate courses of prednisone.  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    PE/DVT admission 1/2023.    BRAF V6 100 E mutation discovered peripheral blood ctDAN September 2023  Dabrafenib and trametenib initiated 10/2023    1/28-2/1 hospitalization pneumonia/pneumonitis.       Interval History:  Returns for six week follow-up. He is on trametinib and dabrafenib dose reduced 100 mg bid for fatigue. Also remains on prednisone 10  mg daily.   He is complaining of progressive weakness and fatigue for the last 6-8 months significantly limiting his ADLs and impacting his performance status   He states that felt better when higher dose prednisone. No difference with holding meds. He is also on bactrim and voriconazole ppx. No new cough, dyspnea at rest, chest pain, fever, edema, skin rash, headache, dizziness, anorexia, weight loss.     Performance Status: ECOG 1      Current Medications:    Current Outpatient Medications:     pantoprazole 40 MG Oral Tab EC, Take 1 tablet (40 mg total) by mouth before breakfast., Disp: 90 tablet, Rfl: 1    dabrafenib mesylate 50 MG Oral Cap, Take 3 capsules (150 mg total) by mouth 2 (two) times daily. Take THREE capsules at least 1 hour before or 2 hours after a meal, twice a day, Disp: 180 capsule, Rfl: 5    Trametinib Dimethyl Sulfoxide 2 MG Oral Tab, Take 2 mg by mouth daily. Take at least 1 hour before or at least 2 hours after a meal., Disp: 30 tablet, Rfl: 5    voriconazole 200 MG Oral Tab, Take 1 tablet (200 mg total) by mouth 2 (two) times daily., Disp: 180 tablet, Rfl: 3    rivaroxaban (XARELTO) 10 MG Oral Tab, Take 1 tablet (10 mg total) by mouth at bedtime., Disp: 90 tablet, Rfl: 1    ipratropium-albuterol 0.5-2.5 (3) MG/3ML Inhalation Solution, Take 3 mL by nebulization every 6 (six) hours as needed., Disp: 90 each, Rfl: 5    sulfamethoxazole-trimethoprim -160 MG Oral Tab per tablet, TAKE 1 TABLET BY MOUTH TWICE A DAY ON MONDAY, WEDNESDAY AND FRIDAY (Patient taking differently: Take 1 tablet by mouth 2 (two) times daily. TAKE 1 TABLET BY MOUTH TWICE A DAY ON MONDAY, WEDNESDAY AND FRIDAY), Disp: 60 tablet, Rfl: 3    predniSONE 10 MG Oral Tab, Take 1 tablet (10 mg total) by mouth daily. (Patient taking differently: Take 1 tablet (10 mg total) by mouth every morning.), Disp: 90 tablet, Rfl: 0    fluticasone furoate-vilanterol (BREO ELLIPTA) 100-25 MCG/INH Inhalation Aerosol Powder, Breath  Activated, Inhale 1 puff into the lungs daily. Follow with mouth rinse and spit (Patient taking differently: Inhale 1 puff into the lungs every morning. Follow with mouth rinse and spit), Disp: 1 each, Rfl: 6    albuterol 108 (90 Base) MCG/ACT Inhalation Aero Soln, Inhale 2 puffs into the lungs every 4 (four) hours as needed for Wheezing., Disp: 1 each, Rfl: 2    cholecalciferol 50 MCG (2000 UT) Oral Tab, Take 0.5 tablets (1,000 Units total) by mouth every morning., Disp: , Rfl:     Vitamin C 500 MG Oral Tab, Take 1 tablet (500 mg total) by mouth every morning., Disp: , Rfl:        Review of Systems:  All other systems reviewed and negative x12    Vital Signs:  /65 (BP Location: Left arm, Patient Position: Sitting, Cuff Size: adult)   Pulse 58   Temp 98.1 °F (36.7 °C) (Oral)   Resp 18   Ht 1.778 m (5' 10\")   Wt 86.2 kg (190 lb)   SpO2 96%   BMI 27.26 kg/m²     Physical Examination:  General: Patient is alert and oriented x 3, not in acute distress.  Psych:  Mood and affect appropriate, in good spirits.   HEENT: EOMs intact.   Anicteric.  Oropharynx is clear.   Neck:  Supple, non tender, no LAD  Chest: non-labored breathing, CTA  Cardiovascular: Regular rate and rhythm. Normal S1S2  Extremities: No edema. Scattered bruises on arms.   Neurological: 5/5 motor x4.    Derm:  No rash    Labs:  Lab Results   Component Value Date    WBC 5.3 07/23/2024    RBC 3.93 07/23/2024    HGB 12.7 (L) 07/23/2024    HCT 37.5 (L) 07/23/2024    MCV 95.4 07/23/2024    MCH 32.3 07/23/2024    MCHC 33.9 07/23/2024    RDW 14.0 07/23/2024    .0 07/23/2024    MPV 9.3 11/20/2018     Lab Results   Component Value Date     07/23/2024    K 3.6 07/23/2024     07/23/2024    CO2 27.0 07/23/2024    BUN 21 07/23/2024    CREATSERUM 0.96 07/23/2024    GLU 89 07/23/2024    CA 8.9 07/23/2024    ALKPHO 149 (H) 07/23/2024    ALT 18 07/23/2024    AST 23 07/23/2024    BILT 0.5 07/23/2024    ALB 4.0 07/23/2024    TP 6.0 07/23/2024       Lab Results   Component Value Date/Time    TSH 1.600 09/29/2023 01:54 PM    TSH 2.420 09/12/2023 11:34 AM    TSH 3.480 09/01/2023 09:20 AM    TSH 1.980 07/28/2023 12:58 PM    TSH 3.120 07/07/2023 10:13 AM    TSH 2.100 06/16/2023 01:07 PM       Radiology:  CT CHEST+ABDOMEN (ALL [facility] ONLY) (CPT=71260/53257)    Result Date: 7/23/2024  CONCLUSION:  1. There is a history of left upper lobe lung cancer with stable post treatment fibrosis in the left upper lobe.  A few noncalcified pulmonary nodules have developed bilaterally since the April 2024 exam.  These nodules are nonspecific and could be infectious/inflammatory in nature, however recurrent malignancy/metastases cannot be excluded.  Recommend a follow-up chest CT in 3 months to assess stability. 2. Ground-glass opacities previously seen in both upper lobes have near completely resolved, likely relating to an atypical infectious or inflammatory process. 3. Mild extrahepatic biliary ductal dilatation is unchanged, likely relating to a combination of age-related ductal ectasia and the patient's post cholecystectomy state.  A small curvilinear hypoenhancing lesion is seen in the liver along the gallbladder  fossa, probably representing sequela of the prior cholecystectomy such as a chronic postoperative seroma or chronic hepatic infarct. 4. Stable branching hypoenhancing structure in the left hepatic lobe lateral segment with surrounding wedge-shaped hepatic parenchymal hyperenhancement.  This finding could represent a chronically thrombosed branch of the left portal vein with associated  perfusional phenomenon, or potentially a residual focally dilated intrahepatic duct with associated peribiliary inflammation.    Dictated by (CST): Fahad Matamoros MD on 7/23/2024 at 12:26 PM     Finalized by (CST): Fahad Matamoros MD on 7/23/2024 at 12:52 PM          CT CHEST+ABDOMEN (ALL [facility] ONLY) (CPT=71260/70924)    Result Date: 4/29/2024  CONCLUSION:  1.  Pulmonary  emphysema.  Multiple bilateral ground-glass nodules are present, for the most part these nodules are smaller or stable since prior exam.  Additionally, pneumonitis was present on previous CT, which has otherwise improved.  However they are notable changes that are indeterminate:  In the posterior aspect the right apex a semi-solid nodule which is new since prior exam measuring approximately 29 x 22 mm and has a 10 mm solid nodular component.  In the left upper lobe there is a 23 x 19 mm ground-glass nodule which was absent on prior exam. Because of these latter 2 changes, recommend short-term CT follow-up in 3 months versus PET-CT. 2.  Coronary atherosclerosis. 3.  Aberrant right subclavian artery, which is a normal anatomic variant often associated with dysphasia (dysphasia lusoria). 4.  Hypodense appearance of the left hepatic lobe near the gallbladder fossa suggesting focal fatty infiltration stable since prior imaging. 5.  Post cholecystectomy.  There is intra and extrahepatic biliary ductal dilatation similar prior imaging suggesting chronic age related and post cholecystectomy changes however correlate for biliary lab abnormality. 6.  Tiny fat containing umbilical hernia.    Dictated by (CST): Melvin Brooks MD on 4/29/2024 at 2:29 PM     Finalized by (CST): Melvin Brooks MD on 4/29/2024 at 2:37 PM              Impression and Plan:    Stage IIIB adenocarcinoma of the left upper lung (EGFR,ALK, ROS1 negative, PD-L1 80%).    - s/p concurrent definitive chemoradiotherapy using cisplatin and etoposide finishing in early March 2017 followed by consolidative durvalumab completed 13 cycles as of May 2018 c/b recurrent pneumonitis/transaminitis.    Recurrent lung adenocarcinoma    - biopsy-proven recurrence in mediastinal lymph nodes as of March 2021.  Same histology.  Given high PD-L1 >50% recommended single agent pembrolizumab.  Also discussed nivolumab/ipilimumab however with previous issues of pneumonitis and  transaminitis will use single agent immunotherapy.   - started cycle #1 Pembrolizumab on 3/19/21, with good long-term disease control through summer 2023. Discontineud d/t recurrent pneumonitis below.    BRAF V600 E peripheral blood sequencing September 2023  - dabrafenib trametinib started 10/23/23, tolerated well initially, but now with progressive fatigue malaise worsening > 6 months. Mildly improved with holding the drugs and higher doses of prednisone.   - recent ER visit in [place], [place] with unremarkable work up. No evidence of pneumonia or other acute abnormality.   - echocardiogram 7/22/24 normal. Labs stable.   - CT C/A/P 7/23/24 new bilateral lung nodules non specific infectious/inflammatory vs metastases. Stable SLIM post treatment fibrosis. GGO in both lungs resolved. Plan to repeat in 3 months for close surveillance.   - severe fatigue is likely multifactorial; discussed a trial of dexamethasone 1 mg daily and reassess symptoms by phone within 1 week. Continue ppx bactrim, voriconazole and PPI.   - continue dabrafenib at 100 mg bid and trametinib 2 mg daily.   - check TSH, consider endo eval for adrenal function evaluation.     Recurrent pneumonitis va atypical pneumonia   - hospitalized 1/2024. currently back on daily prednisone 10mg.  CT improved as above.  - follow with Dr. Hernandez as directed.    Recurrent aspergillus pneumonia now on voriconazole, managed by Pulmonology.    Pulmonary embolus   - diagnosed January 2023, continue xarelto proph dose 10mg daily       MDM: high, malignancy, life threatening. Drug therapy requiring intensive monitoring for toxicity.  Longitudinal care provided today.     Return in about 4 weeks (around 8/23/2024) for Labs, Follow-up visit.     Call as needed      Yaneth Bello MD  Hematology Oncology

## 2024-07-26 NOTE — PATIENT INSTRUCTIONS
HOLD Prednisone.    Trial a once daily Dexamethasone 1mg tablet in AM with food. Continue pantoprazole to protect stomach.    Nurse will call in 2 weeks to check-in on fatigue symptom and how it's going.    Continue treatment pills.    MD to see you in 4 weeks.

## 2024-07-29 ENCOUNTER — OFFICE VISIT (OUTPATIENT)
Dept: PULMONOLOGY | Facility: CLINIC | Age: 79
End: 2024-07-29
Payer: MEDICARE

## 2024-07-29 VITALS
RESPIRATION RATE: 14 BRPM | TEMPERATURE: 99 F | SYSTOLIC BLOOD PRESSURE: 124 MMHG | DIASTOLIC BLOOD PRESSURE: 68 MMHG | HEART RATE: 67 BPM | BODY MASS INDEX: 27.2 KG/M2 | HEIGHT: 70 IN | OXYGEN SATURATION: 95 % | WEIGHT: 190 LBS

## 2024-07-29 DIAGNOSIS — J96.01 ACUTE HYPOXEMIC RESPIRATORY FAILURE (HCC): Primary | ICD-10-CM

## 2024-07-29 PROBLEM — J98.4 DRUG-INDUCED PNEUMONITIS: Status: ACTIVE | Noted: 2024-07-29

## 2024-07-29 PROBLEM — T50.905A DRUG-INDUCED PNEUMONITIS: Status: ACTIVE | Noted: 2024-07-29

## 2024-07-29 PROBLEM — Z79.01 LONG TERM CURRENT USE OF ANTICOAGULANT: Status: ACTIVE | Noted: 2024-07-29

## 2024-07-29 PROBLEM — D69.6 THROMBOCYTOPENIA: Chronic | Status: ACTIVE | Noted: 2024-07-29

## 2024-07-29 PROBLEM — D69.6 THROMBOCYTOPENIA (HCC): Chronic | Status: ACTIVE | Noted: 2024-07-29

## 2024-07-29 PROBLEM — C34.90 NON-SMALL CELL LUNG CANCER METASTATIC TO INTRATHORACIC LYMPH NODE (HCC): Status: ACTIVE | Noted: 2024-07-29

## 2024-07-29 PROBLEM — C77.1 NON-SMALL CELL LUNG CANCER METASTATIC TO INTRATHORACIC LYMPH NODE (HCC): Status: ACTIVE | Noted: 2024-07-29

## 2024-07-29 PROBLEM — Z51.11 CHEMOTHERAPY MANAGEMENT, ENCOUNTER FOR: Status: ACTIVE | Noted: 2024-07-29

## 2024-07-29 RX ORDER — AMOXICILLIN AND CLAVULANATE POTASSIUM 875; 125 MG/1; MG/1
1 TABLET, FILM COATED ORAL 2 TIMES DAILY
Qty: 14 TABLET | Refills: 0 | Status: SHIPPED | OUTPATIENT
Start: 2024-07-29

## 2024-07-29 NOTE — PROGRESS NOTES
The patient is an 79-year-old male who I know well from prior evaluation comes in now for follow-up.  He notes that for the past 3 days she has had chest congestion with cough and low-grade fever.  He was switched from prednisone to dexamethasone.  He had a CT scan of the chest last week which suggested some subtle mood changes.  He has a follow-up study for 4 months from now.  The patient's vision is intact while wearing glasses.  He can hear a whisper.  His recall of 3 items is perfect and there is no memory issue.  The patient completed his Medicare [facility] AWV questionnaire and there was no significant red flag.  There is minimal unsteadiness with walking.  He does not need cane or walker physical therapy at this moment.    Review of Systems:  Vision normal. Ear nose and throat normal. Bowel normal. Bladder function normal. No depression. No thyroid disease. No lymphatic system concerns.  No rash. Muscles and joints unremarkable. No weight loss no weight gain.    Physical Examination:  Vital signs normal. HEENT examination is unremarkable with pupils equal round and reactive to light and accommodation. Neck without adenopathy, thyromegaly, JVD nor bruit. Lungs diminished with few right basilar inspiratory crackles to auscultation and percussion. Cardiac regular rate and rhythm no murmur. Abdomen nontender, without hepatosplenomegaly and no mass appreciable. Extremities and Musculoskeletal without clubbing cyanosis nor edema, and mobility acceptable. Neurologic grossly intact with symmetric tone and strength and reflex.    Assessment and plan:  1.  Cough with fever and congestion-the patient clearly has bronchitis but certainly could have a pneumonitis.  Will give Augmentin and the patient will call me in 3 days to give an update.  2.  Aspergillus lung infection-remain on voriconazole  3.  Pneumonitis associated with immune therapy-patient recently put on dexamethasone and prednisone discontinued, as per his  oncologist  4.  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  The patient remains on Xarelto.  5.  Recurrent non-small cell carcinoma of the lung-as per oncology  6.  COPD-continue current management and patient to see me again in the office at 6-month interval but he will call the office in 3 days to give me an update.

## 2024-07-30 ENCOUNTER — TELEPHONE (OUTPATIENT)
Dept: PULMONOLOGY | Facility: CLINIC | Age: 79
End: 2024-07-30

## 2024-07-30 RX ORDER — FLUTICASONE FUROATE AND VILANTEROL TRIFENATATE 100; 25 UG/1; UG/1
1 POWDER RESPIRATORY (INHALATION) DAILY
Qty: 60 EACH | Refills: 5 | OUTPATIENT
Start: 2024-07-30

## 2024-07-31 NOTE — TELEPHONE ENCOUNTER
Spoke with patient's wife Satinder (verbal release on file). Wife states two CT Chest scan orders are showing up in [facility]. Informed wife next CT Chest will be due in November 2024.     Dr. Hernandez: Wife states patient is still not feelings better, same symptoms during office visit but a little worse. Wife reports cough is getting worse.

## 2024-08-01 NOTE — TELEPHONE ENCOUNTER
Patient wife is calling again.  Patient is still having trouble breathing and the cough is awful.  Please call

## 2024-08-04 DIAGNOSIS — C34.12 CANCER OF UPPER LOBE OF LEFT LUNG (HCC): ICD-10-CM

## 2024-08-04 DIAGNOSIS — J98.4 PNEUMONITIS: ICD-10-CM

## 2024-08-04 DIAGNOSIS — D84.9 IMMUNOCOMPROMISED (HCC): ICD-10-CM

## 2024-08-04 DIAGNOSIS — Z51.11 CHEMOTHERAPY MANAGEMENT, ENCOUNTER FOR: ICD-10-CM

## 2024-08-05 ENCOUNTER — TELEPHONE (OUTPATIENT)
Dept: PULMONOLOGY | Facility: CLINIC | Age: 79
End: 2024-08-05

## 2024-08-05 RX ORDER — SULFAMETHOXAZOLE AND TRIMETHOPRIM 800; 160 MG/1; MG/1
TABLET ORAL
Qty: 72 TABLET | Refills: 2 | Status: SHIPPED | OUTPATIENT
Start: 2024-08-05

## 2024-08-05 NOTE — TELEPHONE ENCOUNTER
Spoke with patient's wife regarding condition update. Wife states patient is doing a little better, still feeling congested, hard to cough up sputum but when he does cough, sputum is green. Patient completed antibiotics today.

## 2024-08-05 NOTE — TELEPHONE ENCOUNTER
Spoke with patient's wife and informed no further orders at this time. Advised if symptoms worsening to call pulmonary office back. Wife verbalized understanding.

## 2024-08-07 ENCOUNTER — TELEPHONE (OUTPATIENT)
Dept: PULMONOLOGY | Facility: CLINIC | Age: 79
End: 2024-08-07

## 2024-08-19 DIAGNOSIS — R53.83 OTHER FATIGUE: ICD-10-CM

## 2024-08-19 DIAGNOSIS — J98.4 PNEUMONITIS: ICD-10-CM

## 2024-08-19 RX ORDER — DEXAMETHASONE 1 MG
1 TABLET ORAL
Qty: 30 TABLET | Refills: 2 | OUTPATIENT
Start: 2024-08-19

## 2024-08-21 DIAGNOSIS — R53.83 OTHER FATIGUE: ICD-10-CM

## 2024-08-21 DIAGNOSIS — J98.4 PNEUMONITIS: ICD-10-CM

## 2024-08-22 DIAGNOSIS — Z51.11 CHEMOTHERAPY MANAGEMENT, ENCOUNTER FOR: Primary | ICD-10-CM

## 2024-08-22 DIAGNOSIS — C34.12 CANCER OF UPPER LOBE OF LEFT LUNG (HCC): ICD-10-CM

## 2024-08-23 ENCOUNTER — OFFICE VISIT (OUTPATIENT)
Dept: HEMATOLOGY/ONCOLOGY | Facility: HOSPITAL | Age: 79
End: 2024-08-23
Attending: INTERNAL MEDICINE
Payer: MEDICARE

## 2024-08-23 VITALS
WEIGHT: 193 LBS | HEART RATE: 67 BPM | DIASTOLIC BLOOD PRESSURE: 56 MMHG | RESPIRATION RATE: 18 BRPM | TEMPERATURE: 98 F | HEIGHT: 70 IN | OXYGEN SATURATION: 94 % | BODY MASS INDEX: 27.63 KG/M2 | SYSTOLIC BLOOD PRESSURE: 123 MMHG

## 2024-08-23 DIAGNOSIS — Z51.11 CHEMOTHERAPY MANAGEMENT, ENCOUNTER FOR: ICD-10-CM

## 2024-08-23 DIAGNOSIS — J98.4 PNEUMONITIS: ICD-10-CM

## 2024-08-23 DIAGNOSIS — C34.12 CANCER OF UPPER LOBE OF LEFT LUNG (HCC): ICD-10-CM

## 2024-08-23 DIAGNOSIS — Z45.2 ENCOUNTER FOR CARE RELATED TO PORT-A-CATH: Primary | ICD-10-CM

## 2024-08-23 DIAGNOSIS — C34.90 NON-SMALL CELL LUNG CANCER METASTATIC TO INTRATHORACIC LYMPH NODE (HCC): ICD-10-CM

## 2024-08-23 DIAGNOSIS — R53.83 CHEMOTHERAPY-INDUCED FATIGUE: ICD-10-CM

## 2024-08-23 DIAGNOSIS — I26.99 PULMONARY EMBOLISM, OTHER, UNSPECIFIED CHRONICITY, UNSPECIFIED WHETHER ACUTE COR PULMONALE PRESENT (HCC): ICD-10-CM

## 2024-08-23 DIAGNOSIS — Z79.01 LONG TERM CURRENT USE OF ANTICOAGULANT: ICD-10-CM

## 2024-08-23 DIAGNOSIS — C77.1 NON-SMALL CELL LUNG CANCER METASTATIC TO INTRATHORACIC LYMPH NODE (HCC): ICD-10-CM

## 2024-08-23 DIAGNOSIS — C34.92 ADENOCARCINOMA OF LEFT LUNG (HCC): Primary | ICD-10-CM

## 2024-08-23 DIAGNOSIS — T45.1X5A CHEMOTHERAPY-INDUCED FATIGUE: ICD-10-CM

## 2024-08-23 DIAGNOSIS — R53.83 OTHER FATIGUE: ICD-10-CM

## 2024-08-23 LAB
ALBUMIN SERPL-MCNC: 3.8 G/DL (ref 3.2–4.8)
ALBUMIN/GLOB SERPL: 1.9 {RATIO} (ref 1–2)
ALP LIVER SERPL-CCNC: 124 U/L
ALT SERPL-CCNC: 25 U/L
ANION GAP SERPL CALC-SCNC: 4 MMOL/L (ref 0–18)
AST SERPL-CCNC: 34 U/L (ref ?–34)
BASOPHILS # BLD AUTO: 0.01 X10(3) UL (ref 0–0.2)
BASOPHILS NFR BLD AUTO: 0.2 %
BILIRUB SERPL-MCNC: 0.6 MG/DL (ref 0.2–1.1)
BUN BLD-MCNC: 23 MG/DL (ref 9–23)
BUN/CREAT SERPL: 25.8 (ref 10–20)
CALCIUM BLD-MCNC: 8.7 MG/DL (ref 8.7–10.4)
CHLORIDE SERPL-SCNC: 107 MMOL/L (ref 98–112)
CO2 SERPL-SCNC: 28 MMOL/L (ref 21–32)
CREAT BLD-MCNC: 0.89 MG/DL
DEPRECATED RDW RBC AUTO: 51.8 FL (ref 35.1–46.3)
EGFRCR SERPLBLD CKD-EPI 2021: 86 ML/MIN/1.73M2 (ref 60–?)
EOSINOPHIL # BLD AUTO: 0.06 X10(3) UL (ref 0–0.7)
EOSINOPHIL NFR BLD AUTO: 1.4 %
ERYTHROCYTE [DISTWIDTH] IN BLOOD BY AUTOMATED COUNT: 14.6 % (ref 11–15)
GLOBULIN PLAS-MCNC: 2 G/DL (ref 2–3.5)
GLUCOSE BLD-MCNC: 104 MG/DL (ref 70–99)
HCT VFR BLD AUTO: 37.9 %
HGB BLD-MCNC: 12.9 G/DL
IMM GRANULOCYTES # BLD AUTO: 0.01 X10(3) UL (ref 0–1)
IMM GRANULOCYTES NFR BLD: 0.2 %
LYMPHOCYTES # BLD AUTO: 0.84 X10(3) UL (ref 1–4)
LYMPHOCYTES NFR BLD AUTO: 19.8 %
MCH RBC QN AUTO: 32.7 PG (ref 26–34)
MCHC RBC AUTO-ENTMCNC: 34 G/DL (ref 31–37)
MCV RBC AUTO: 96.2 FL
MONOCYTES # BLD AUTO: 0.64 X10(3) UL (ref 0.1–1)
MONOCYTES NFR BLD AUTO: 15.1 %
NEUTROPHILS # BLD AUTO: 2.69 X10 (3) UL (ref 1.5–7.7)
NEUTROPHILS # BLD AUTO: 2.69 X10(3) UL (ref 1.5–7.7)
NEUTROPHILS NFR BLD AUTO: 63.3 %
OSMOLALITY SERPL CALC.SUM OF ELEC: 292 MOSM/KG (ref 275–295)
PLATELET # BLD AUTO: 195 10(3)UL (ref 150–450)
POTASSIUM SERPL-SCNC: 4 MMOL/L (ref 3.5–5.1)
PROT SERPL-MCNC: 5.8 G/DL (ref 5.7–8.2)
RBC # BLD AUTO: 3.94 X10(6)UL
SODIUM SERPL-SCNC: 139 MMOL/L (ref 136–145)
T4 FREE SERPL-MCNC: 1.3 NG/DL (ref 0.8–1.7)
TSI SER-ACNC: 5.65 MIU/ML (ref 0.55–4.78)
WBC # BLD AUTO: 4.3 X10(3) UL (ref 4–11)

## 2024-08-23 PROCEDURE — 84439 ASSAY OF FREE THYROXINE: CPT

## 2024-08-23 PROCEDURE — 36591 DRAW BLOOD OFF VENOUS DEVICE: CPT

## 2024-08-23 PROCEDURE — 99215 OFFICE O/P EST HI 40 MIN: CPT | Performed by: INTERNAL MEDICINE

## 2024-08-23 PROCEDURE — 80053 COMPREHEN METABOLIC PANEL: CPT

## 2024-08-23 PROCEDURE — 85025 COMPLETE CBC W/AUTO DIFF WBC: CPT

## 2024-08-23 PROCEDURE — G2211 COMPLEX E/M VISIT ADD ON: HCPCS | Performed by: INTERNAL MEDICINE

## 2024-08-23 PROCEDURE — 84443 ASSAY THYROID STIM HORMONE: CPT

## 2024-08-23 RX ORDER — HEPARIN SODIUM (PORCINE) LOCK FLUSH IV SOLN 100 UNIT/ML 100 UNIT/ML
5 SOLUTION INTRAVENOUS ONCE
Status: CANCELLED | OUTPATIENT
Start: 2024-08-23

## 2024-08-23 RX ORDER — DEXAMETHASONE 1 MG
1 TABLET ORAL
Qty: 30 TABLET | Refills: 2 | OUTPATIENT
Start: 2024-08-23

## 2024-08-23 RX ORDER — SODIUM CHLORIDE 9 MG/ML
10 INJECTION, SOLUTION INTRAMUSCULAR; INTRAVENOUS; SUBCUTANEOUS ONCE
OUTPATIENT
Start: 2024-08-23

## 2024-08-23 NOTE — PROGRESS NOTES
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  He completed definitive concurrent chemoradiotherapy using cisplatin and etoposide early March 2017.  He had mild anemia neutropenia on treatment.    He subsequently was followed on surveillance with no evidence of progression.    The patient started on durvalumab in October 2017 given he had unresectable stage III disease with no evidence of progression following chemo radiotherapy    He has had occasional mild anemia on treatment.  Laboratory follow-up in May 2018 showed severe iron deficiency.  Received IV iron    He completed 13 cycles of durvalumab  however towards the end of his treatment he developed 2 separate instances of pneumonitis/transaminitis requiring separate courses of prednisone.  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    PE/DVT admission 1/2023.    BRAF V6 100 E mutation discovered peripheral blood ctDAN September 2023  Dabrafenib and trametenib initiated 10/2023    1/28-2/1 hospitalization pneumonia/pneumonitis.       Interval History:  Patient returns for follow up. He is on trametinib and dabrafenib dose reduced 100 mg bid for fatigue. He continues to tolerate treatment  well. He has been on dexamethasone 1 mg daily since last visit which is helping a lot. Overall he feels better being on dex after treated for bronchitis.   He continues on bactrim and voriconazole ppx. No new cough, shortness of breath, chest pain, fever, swelling, skin rash, headache, dizziness, anorexia, weight loss.     Performance Status: ECOG 1      Current Medications:    Current Outpatient Medications:     sulfamethoxazole-trimethoprim -160 MG Oral Tab per tablet, TAKE 1 TABLET BY MOUTH TWICE A DAY ON MONDAY, WEDNESDAY AND FRIDAY, Disp: 72 tablet, Rfl: 2    dexamethasone (DECADRON) 1 MG TABS, Take 1 tablet (1 mg total) by mouth daily with breakfast. Take in AM with food, Disp: 30 tablet, Rfl: 2    pantoprazole 40 MG Oral Tab EC, Take 1 tablet (40 mg total) by mouth before breakfast., Disp: 90 tablet, Rfl: 1    dabrafenib mesylate 50 MG Oral Cap, Take 3 capsules (150 mg total) by mouth 2 (two) times daily. Take THREE capsules at least 1 hour before or 2 hours after a meal, twice a day, Disp: 180 capsule, Rfl: 5    Trametinib Dimethyl Sulfoxide 2 MG Oral Tab, Take 2 mg by mouth daily. Take at least 1 hour before or at least 2 hours after a meal., Disp: 30 tablet, Rfl: 5    voriconazole 200 MG Oral Tab, Take 1 tablet (200 mg total) by mouth 2 (two) times daily., Disp: 180 tablet, Rfl: 3    rivaroxaban (XARELTO) 10 MG Oral Tab, Take 1 tablet (10 mg total) by mouth at bedtime., Disp: 90 tablet, Rfl: 1    ipratropium-albuterol 0.5-2.5 (3) MG/3ML Inhalation Solution, Take 3 mL by nebulization every 6 (six) hours as needed., Disp: 90 each, Rfl: 5    fluticasone furoate-vilanterol (BREO ELLIPTA) 100-25 MCG/INH Inhalation Aerosol Powder, Breath Activated, Inhale 1 puff into the lungs daily. Follow with mouth rinse and spit (Patient taking differently: Inhale 1 puff into the lungs every morning. Follow with mouth rinse and spit), Disp: 1 each, Rfl: 6    albuterol 108 (90 Base) MCG/ACT Inhalation Aero Soln, Inhale  2 puffs into the lungs every 4 (four) hours as needed for Wheezing., Disp: 1 each, Rfl: 2    cholecalciferol 50 MCG (2000 UT) Oral Tab, Take 0.5 tablets (1,000 Units total) by mouth every morning., Disp: , Rfl:     Vitamin C 500 MG Oral Tab, Take 1 tablet (500 mg total) by mouth every morning., Disp: , Rfl:        Review of Systems:  All other systems reviewed and negative x12    Vital Signs:  /56 (BP Location: Left arm, Patient Position: Sitting, Cuff Size: adult)   Pulse 67   Temp 97.6 °F (36.4 °C) (Oral)   Resp 18   Ht 1.778 m (5' 10\")   Wt 87.5 kg (193 lb)   SpO2 94%   BMI 27.69 kg/m²     Physical Examination:  General: Alert and oriented x 3, not in acute distress.  Psych:  Mood and affect appropriate.   HEENT: Anicteric. Oropharynx is clear.   Neck: No lymphadenopathy   Chest: non-labored breathing, clear bilaterally   Cardiovascular: Regular rate and rhythm.   Extremities: No edema. Scattered bruises on arms.   Neurological: Grossly intact.   Derm:  No rash or skin lesions.     Labs:  Lab Results   Component Value Date    WBC 4.3 08/23/2024    RBC 3.94 08/23/2024    HGB 12.9 (L) 08/23/2024    HCT 37.9 (L) 08/23/2024    MCV 96.2 08/23/2024    MCH 32.7 08/23/2024    MCHC 34.0 08/23/2024    RDW 14.6 08/23/2024    .0 08/23/2024    MPV 9.3 11/20/2018     Lab Results   Component Value Date     08/23/2024    K 4.0 08/23/2024     08/23/2024    CO2 28.0 08/23/2024    BUN 23 08/23/2024    CREATSERUM 0.89 08/23/2024     (H) 08/23/2024    CA 8.7 08/23/2024    ALKPHO 124 (H) 08/23/2024    ALT 25 08/23/2024    AST 34 (H) 08/23/2024    BILT 0.6 08/23/2024    ALB 3.8 08/23/2024    TP 5.8 08/23/2024      Lab Results   Component Value Date/Time    TSH 5.649 (H) 08/23/2024 08:38 AM    TSH 1.600 09/29/2023 01:54 PM    TSH 2.420 09/12/2023 11:34 AM    TSH 3.480 09/01/2023 09:20 AM    TSH 1.980 07/28/2023 12:58 PM    TSH 3.120 07/07/2023 10:13 AM       Radiology:  CT CHEST+ABDOMEN (ALL [facility]  ONLY) (CPT=71260/00968)    Result Date: 7/23/2024  CONCLUSION:  1. There is a history of left upper lobe lung cancer with stable post treatment fibrosis in the left upper lobe.  A few noncalcified pulmonary nodules have developed bilaterally since the April 2024 exam.  These nodules are nonspecific and could be infectious/inflammatory in nature, however recurrent malignancy/metastases cannot be excluded.  Recommend a follow-up chest CT in 3 months to assess stability. 2. Ground-glass opacities previously seen in both upper lobes have near completely resolved, likely relating to an atypical infectious or inflammatory process. 3. Mild extrahepatic biliary ductal dilatation is unchanged, likely relating to a combination of age-related ductal ectasia and the patient's post cholecystectomy state.  A small curvilinear hypoenhancing lesion is seen in the liver along the gallbladder  fossa, probably representing sequela of the prior cholecystectomy such as a chronic postoperative seroma or chronic hepatic infarct. 4. Stable branching hypoenhancing structure in the left hepatic lobe lateral segment with surrounding wedge-shaped hepatic parenchymal hyperenhancement.  This finding could represent a chronically thrombosed branch of the left portal vein with associated  perfusional phenomenon, or potentially a residual focally dilated intrahepatic duct with associated peribiliary inflammation.    Dictated by (CST): Fahad Matamoros MD on 7/23/2024 at 12:26 PM     Finalized by (CST): Fahad Matamoros MD on 7/23/2024 at 12:52 PM              Impression and Plan:    Stage IIIB adenocarcinoma of the left upper lung (EGFR, ALK, ROS1 negative, PD-L1 80%).    - s/p concurrent definitive chemoradiotherapy using cisplatin and etoposide completed in March 2017 followed by consolidative durvalumab completed 13 cycles in May 2018 c/b recurrent pneumonitis/transaminitis.    Recurrent lung adenocarcinoma    - biopsy-proven recurrence in  mediastinal lymph nodes in March 2021.  Same histology.  Given high PD-L1 >50%  - started Pembrolizumab on 3/19/21 with good long-term disease control through summer 2023, then discontinued d/t recurrent pneumonitis.     BRAF V600 E peripheral blood sequencing September 2023  - dabrafenib trametinib started 10/23/23, tolerated well initially, then dose reduced due to significant fatigue.   - CT C/A/P 7/23/24 new bilateral lung nodules non specific infectious/inflammatory vs metastases. Stable SLIM post treatment fibrosis. GGO in both lungs resolved. Plan to repeat in 3 months for close surveillance.   - continue dabrafenib at 100 mg bid and trametinib 2 mg daily. No further dose adjustment.    Fatigue:  - likely multifactorial; improved with dexamethasone 1 mg daily; decrease to 1 mg every other day if possible. Continue ppx bactrim, voriconazole and PPI.   - echocardiogram 7/22/24 normal. Labs stable. FT4 normal.       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    Pulmonary embolus- diagnosed January 2023, continue xarelto proph dose 10mg daily       MDM: high, malignancy, life threatening. Oral  chemotherapy requiring intensive monitoring for toxicity.  Longitudinal care provided today.       Return in about 6 weeks (around 10/4/2024).         Yaneth Bello MD  Hematology Oncology

## 2024-09-16 DIAGNOSIS — R53.83 OTHER FATIGUE: ICD-10-CM

## 2024-09-16 DIAGNOSIS — J98.4 PNEUMONITIS: ICD-10-CM

## 2024-09-16 RX ORDER — DEXAMETHASONE 1 MG
1 TABLET ORAL
Qty: 30 TABLET | Refills: 0 | Status: SHIPPED | OUTPATIENT
Start: 2024-09-16 | End: 2024-09-17

## 2024-09-16 RX ORDER — RIVAROXABAN 10 MG/1
10 TABLET, FILM COATED ORAL NIGHTLY
Qty: 90 TABLET | Refills: 1 | Status: SHIPPED | OUTPATIENT
Start: 2024-09-16

## 2024-09-16 NOTE — TELEPHONE ENCOUNTER
Last seen: 7/29/24  Suggested follow up: 6 months  Next appointment: 2/13/25  Last refill: 2/28/24      Refill pended, please review/sign if agreeable.

## 2024-09-17 DIAGNOSIS — R53.83 OTHER FATIGUE: ICD-10-CM

## 2024-09-17 DIAGNOSIS — J98.4 PNEUMONITIS: ICD-10-CM

## 2024-09-17 RX ORDER — DEXAMETHASONE 1 MG
1 TABLET ORAL
Qty: 30 TABLET | Refills: 0 | OUTPATIENT
Start: 2024-09-17

## 2024-09-17 RX ORDER — DEXAMETHASONE 1 MG
1 TABLET ORAL
Qty: 90 TABLET | Refills: 0 | Status: SHIPPED | OUTPATIENT
Start: 2024-09-17

## 2024-09-17 NOTE — TELEPHONE ENCOUNTER
Pharmacy Comments:    Patient requests new RX: Pt requested a new RX for Dexamethasone 1MG taking 1 a day with 90 days supply

## 2024-09-20 NOTE — TELEPHONE ENCOUNTER
[facility]    09/20/2024    King Botello (38 y.o.) is followed by the [facility].      Assessment/Plan:    King Botello presents with a therapeutic INR. Goal INR: 2.5-3.5    Lab Results   Component Value Date    INR 2.8 09/20/2024    INR 2.0 09/17/2024    INR 2.4 09/10/2024       Assessment of patient findings per MA/LPN and chart review:   The following significant findings were found:   None    Recommendation for patient's warfarin regimen:   No change was made to warfarin therapy during this visit and patient has been instructed to continue their current warfarin regimen.    Recommended repeat INR in 1 week      Pat FontenotD, BCPS  Clinical Pharmacist - [facility]  Preferred Contact: Secure Messaging or In Basket Message       Pt returning rn call attempt to transfer rn not available please call thank you [phone]

## 2024-09-26 ENCOUNTER — APPOINTMENT (OUTPATIENT)
Dept: GENERAL RADIOLOGY | Facility: HOSPITAL | Age: 79
End: 2024-09-26
Attending: STUDENT IN AN ORGANIZED HEALTH CARE EDUCATION/TRAINING PROGRAM
Payer: MEDICARE

## 2024-09-26 ENCOUNTER — TELEPHONE (OUTPATIENT)
Dept: HEMATOLOGY/ONCOLOGY | Facility: HOSPITAL | Age: 79
End: 2024-09-26

## 2024-09-26 ENCOUNTER — HOSPITAL ENCOUNTER (INPATIENT)
Facility: HOSPITAL | Age: 79
LOS: 3 days | Discharge: HOME | End: 2024-09-29
Attending: STUDENT IN AN ORGANIZED HEALTH CARE EDUCATION/TRAINING PROGRAM | Admitting: INTERNAL MEDICINE
Payer: MEDICARE

## 2024-09-26 DIAGNOSIS — R50.9 FEVER, UNKNOWN ORIGIN: Primary | ICD-10-CM

## 2024-09-26 DIAGNOSIS — R39.15 URINARY URGENCY: ICD-10-CM

## 2024-09-26 DIAGNOSIS — R53.1 WEAKNESS GENERALIZED: ICD-10-CM

## 2024-09-26 LAB
ANION GAP SERPL CALC-SCNC: 9 MMOL/L (ref 0–18)
ATRIAL RATE: 84 BPM
BASOPHILS # BLD AUTO: 0.01 X10(3) UL (ref 0–0.2)
BASOPHILS NFR BLD AUTO: 0.4 %
BILIRUB UR QL: NEGATIVE
BUN BLD-MCNC: 20 MG/DL (ref 9–23)
BUN/CREAT SERPL: 20.6 (ref 10–20)
CALCIUM BLD-MCNC: 8.4 MG/DL (ref 8.7–10.4)
CHLORIDE SERPL-SCNC: 101 MMOL/L (ref 98–112)
CLARITY UR: CLEAR
CO2 SERPL-SCNC: 25 MMOL/L (ref 21–32)
COLOR UR: YELLOW
CREAT BLD-MCNC: 0.97 MG/DL
DEPRECATED RDW RBC AUTO: 51.3 FL (ref 35.1–46.3)
EGFRCR SERPLBLD CKD-EPI 2021: 78 ML/MIN/1.73M2 (ref 60–?)
EOSINOPHIL # BLD AUTO: 0 X10(3) UL (ref 0–0.7)
EOSINOPHIL NFR BLD AUTO: 0 %
ERYTHROCYTE [DISTWIDTH] IN BLOOD BY AUTOMATED COUNT: 14.6 % (ref 11–15)
GLUCOSE BLD-MCNC: 121 MG/DL (ref 70–99)
GLUCOSE UR-MCNC: NORMAL MG/DL
HCT VFR BLD AUTO: 38.5 %
HGB BLD-MCNC: 13.1 G/DL
HGB UR QL STRIP.AUTO: NEGATIVE
IMM GRANULOCYTES # BLD AUTO: 0.02 X10(3) UL (ref 0–1)
IMM GRANULOCYTES NFR BLD: 0.7 %
KETONES UR-MCNC: 20 MG/DL
LEUKOCYTE ESTERASE UR QL STRIP.AUTO: NEGATIVE
LYMPHOCYTES # BLD AUTO: 0.39 X10(3) UL (ref 1–4)
LYMPHOCYTES NFR BLD AUTO: 13.8 %
MCH RBC QN AUTO: 32.6 PG (ref 26–34)
MCHC RBC AUTO-ENTMCNC: 34 G/DL (ref 31–37)
MCV RBC AUTO: 95.8 FL
MONOCYTES # BLD AUTO: 0.41 X10(3) UL (ref 0.1–1)
MONOCYTES NFR BLD AUTO: 14.5 %
NEUTROPHILS # BLD AUTO: 2 X10 (3) UL (ref 1.5–7.7)
NEUTROPHILS # BLD AUTO: 2 X10(3) UL (ref 1.5–7.7)
NEUTROPHILS NFR BLD AUTO: 70.6 %
NITRITE UR QL STRIP.AUTO: NEGATIVE
OSMOLALITY SERPL CALC.SUM OF ELEC: 284 MOSM/KG (ref 275–295)
P AXIS: 48 DEGREES
P-R INTERVAL: 158 MS
PH UR: 6.5 [PH] (ref 5–8)
PLATELET # BLD AUTO: 144 10(3)UL (ref 150–450)
POTASSIUM SERPL-SCNC: 3.4 MMOL/L (ref 3.5–5.1)
PROT UR-MCNC: 30 MG/DL
Q-T INTERVAL: 356 MS
QRS DURATION: 96 MS
QTC CALCULATION (BEZET): 420 MS
R AXIS: -28 DEGREES
RBC # BLD AUTO: 4.02 X10(6)UL
SARS-COV-2 RNA RESP QL NAA+PROBE: NOT DETECTED
SODIUM SERPL-SCNC: 135 MMOL/L (ref 136–145)
SP GR UR STRIP: 1.02 (ref 1–1.03)
T AXIS: 75 DEGREES
UROBILINOGEN UR STRIP-ACNC: NORMAL
VENTRICULAR RATE: 84 BPM
WBC # BLD AUTO: 2.8 X10(3) UL (ref 4–11)

## 2024-09-26 PROCEDURE — 99223 1ST HOSP IP/OBS HIGH 75: CPT | Performed by: INTERNAL MEDICINE

## 2024-09-26 PROCEDURE — 71046 X-RAY EXAM CHEST 2 VIEWS: CPT | Performed by: STUDENT IN AN ORGANIZED HEALTH CARE EDUCATION/TRAINING PROGRAM

## 2024-09-26 RX ORDER — CHOLECALCIFEROL (VITAMIN D3) 25 MCG
1000 TABLET ORAL EVERY MORNING
Status: DISCONTINUED | OUTPATIENT
Start: 2024-09-26 | End: 2024-09-29

## 2024-09-26 RX ORDER — DEXAMETHASONE 0.5 MG/1
1 TABLET ORAL
Status: DISCONTINUED | OUTPATIENT
Start: 2024-09-27 | End: 2024-09-27

## 2024-09-26 RX ORDER — PANTOPRAZOLE SODIUM 40 MG/1
40 TABLET, DELAYED RELEASE ORAL
Status: DISCONTINUED | OUTPATIENT
Start: 2024-09-27 | End: 2024-09-29

## 2024-09-26 RX ORDER — IPRATROPIUM BROMIDE AND ALBUTEROL SULFATE 2.5; .5 MG/3ML; MG/3ML
3 SOLUTION RESPIRATORY (INHALATION) EVERY 6 HOURS PRN
Status: DISCONTINUED | OUTPATIENT
Start: 2024-09-26 | End: 2024-09-29

## 2024-09-26 RX ORDER — LIDOCAINE HYDROCHLORIDE 20 MG/ML
10 JELLY TOPICAL ONCE
Status: DISCONTINUED | OUTPATIENT
Start: 2024-09-26 | End: 2024-09-26

## 2024-09-26 RX ORDER — ASCORBIC ACID 500 MG
500 TABLET ORAL EVERY MORNING
Status: DISCONTINUED | OUTPATIENT
Start: 2024-09-26 | End: 2024-09-29

## 2024-09-26 RX ORDER — ONDANSETRON 2 MG/ML
4 INJECTION INTRAMUSCULAR; INTRAVENOUS EVERY 6 HOURS PRN
Status: DISCONTINUED | OUTPATIENT
Start: 2024-09-26 | End: 2024-09-29

## 2024-09-26 RX ORDER — SULFAMETHOXAZOLE/TRIMETHOPRIM 800-160 MG
1 TABLET ORAL DAILY
Status: DISCONTINUED | OUTPATIENT
Start: 2024-09-26 | End: 2024-09-29

## 2024-09-26 RX ORDER — ALBUTEROL SULFATE 90 UG/1
2 INHALANT RESPIRATORY (INHALATION) EVERY 4 HOURS PRN
Status: DISCONTINUED | OUTPATIENT
Start: 2024-09-26 | End: 2024-09-29

## 2024-09-26 RX ORDER — POTASSIUM CHLORIDE 1500 MG/1
40 TABLET, EXTENDED RELEASE ORAL ONCE
Status: COMPLETED | OUTPATIENT
Start: 2024-09-26 | End: 2024-09-26

## 2024-09-26 RX ORDER — SODIUM CHLORIDE 9 MG/ML
INJECTION, SOLUTION INTRAVENOUS CONTINUOUS
Status: DISCONTINUED | OUTPATIENT
Start: 2024-09-26 | End: 2024-09-29

## 2024-09-26 RX ORDER — FLUTICASONE PROPIONATE AND SALMETEROL 250; 50 UG/1; UG/1
1 POWDER RESPIRATORY (INHALATION) 2 TIMES DAILY
Status: DISCONTINUED | OUTPATIENT
Start: 2024-09-26 | End: 2024-09-27

## 2024-09-26 RX ORDER — VORICONAZOLE 200 MG/1
200 TABLET, FILM COATED ORAL 2 TIMES DAILY
Status: DISCONTINUED | OUTPATIENT
Start: 2024-09-26 | End: 2024-09-29

## 2024-09-26 NOTE — ED INITIAL ASSESSMENT (HPI)
Patient feels generally weak, reports chills, and urinary incontinence and frequency since last night.     Also reports shortness of breath, worsening since this am. Current Lung CA, on oral chemo.

## 2024-09-26 NOTE — ED PROVIDER NOTES
History     Chief Complaint   Patient presents with    Difficulty Breathing    Urinary Frequency    Incontinence    Weakness       HPI    79 year old male with lung cancer on continued therapy, VTE on anticoagulation, recurrent aspergillus pneumonia on voriconazole brought in by wife for evaluation of fevers.  102 this morning Tmax, associated with chills and generalized fatigue.  Patient states he was up every hour urinating and having increased urinary frequency and urgency which is not normal for him.  Has a slight cough, no other sick symptoms.        Past Medical History:    Deep vein thrombosis (HCC)    CURRENTLY HAS RIGHT BLOOD CLOT    Dysphagia    Esophageal reflux    Exposure to medical diagnostic radiation    COMPLETED    Hemorrhoids    History of blood transfusion    Pt on Immunotherapy    History of immunotherapy    Impotence    Lung cancer (HCC)    NSCLCA stage IIIB    Necrotizing granulomatous inflammation of lung (HCC)    Obstructive sleep apnea    Osteoarthritis    Personal history of antineoplastic chemotherapy    Pneumonia due to organism    Pulmonary embolism (HCC)    Pulmonary embolus (HCC)    Sinus problem    Sinus problems    Sleep apnea    CPAP    Visual impairment    wears eyeglasses       Past Surgical History:   Procedure Laterality Date    Arthroscopy of joint unlisted      Cholecystectomy      Colonoscopy  2010    Colonoscopy N/A 06/21/2018    Procedure: COLONOSCOPY;  Surgeon: Cesar Beckman MD;  Location: [facility] ENDOSCOPY    Colonoscopy      Excis spermatocele Right 03/08/2024    Port, indwelling, imp Right 2017    Rotator cuff repair Left     Total knee replacement Bilateral 2020    Vein ligation and stripping         Social History     Socioeconomic History    Marital status:    Tobacco Use    Smoking status: Former     Current packs/day: 0.00     Average packs/day: 1 pack/day for 30.0 years (30.0 ttl pk-yrs)     Types: Cigarettes     Start date: 6/16/1970     Quit date:  2000     Years since quittin.2    Smokeless tobacco: Never   Vaping Use    Vaping status: Never Used   Substance and Sexual Activity    Alcohol use: Not Currently     Alcohol/week: 0.8 standard drinks of alcohol     Types: 1 Glasses of wine per week     Comment: very rarely    Drug use: No   Other Topics Concern    Caffeine Concern Yes     Comment: coffee, 2 cups daily     Social Determinants of Health     Financial Resource Strain: Low Risk  (2024)    Financial Resource Strain     Difficulty of Paying Living Expenses: Not very hard     Med Affordability: No   Transportation Needs: No Transportation Needs (2024)    Transportation Needs     Lack of Transportation: No   Physical Activity: Not on File (2022)    Received from [facility]    Physical Activity     Physical Activity: 0   Stress: Not on File (2022)    Received from [facility]    Stress     Stress: 0   Social Connections: Not on File (2024)    Received from [facility]    Social Connections     Connectedness: 0   Housing Stability: Low Risk  (2024)    Housing Stability     Housing Instability: No                   Physical Exam     ED Triage Vitals [24 0937]   /87   Pulse 88   Resp 20   Temp 98.8 °F (37.1 °C)   Temp src Oral   SpO2 93 %   O2 Device None (Room air)       Physical Exam  Constitutional:       General: He is not in acute distress.  Eyes:      Extraocular Movements: Extraocular movements intact.   Cardiovascular:      Rate and Rhythm: Normal rate and regular rhythm.      Pulses: Normal pulses.   Pulmonary:      Effort: Pulmonary effort is normal. No respiratory distress.      Breath sounds: Normal breath sounds.   Abdominal:      General: Abdomen is flat. There is no distension.      Tenderness: There is no abdominal tenderness.   Musculoskeletal:         General: No swelling or deformity.      Cervical back: Normal range of motion.   Skin:     General: Skin is warm.   Neurological:      General:  No focal deficit present.      Mental Status: He is alert.              ED Course     Labs Reviewed   BASIC METABOLIC PANEL (8) - Abnormal; Notable for the following components:       Result Value    Glucose 121 (*)     Sodium 135 (*)     Potassium 3.4 (*)     BUN/CREA Ratio 20.6 (*)     Calcium, Total 8.4 (*)     All other components within normal limits   CBC WITH DIFFERENTIAL WITH PLATELET - Abnormal; Notable for the following components:    WBC 2.8 (*)     HCT 38.5 (*)     RDW-SD 51.3 (*)     .0 (*)     Lymphocyte Absolute 0.39 (*)     All other components within normal limits   URINALYSIS WITH CULTURE REFLEX - Abnormal; Notable for the following components:    Ketones Urine 20 (*)     Protein Urine 30 (*)     Squamous Epi. Cells Few (*)     All other components within normal limits   RAPID SARS-COV-2 BY PCR - Normal   BLOOD CULTURE   BLOOD CULTURE     XR CHEST PA + LAT CHEST (CPT=71046)    Result Date: 9/26/2024  CONCLUSION:   No new pulmonary consolidation.  Minimal subsegmental atelectasis at the left lung base.  Trace left pleural effusion.  History of left upper lobe malignancy.  Unchanged appearance of the left upper paratracheal/suprahilar region, may relate to underlying malignancy and/or post treatment change.  Left upper atelectasis or scarring extending from the suprahilar opacity     Dictated by (CST): Mago Ruth MD on 9/26/2024 at 1:00 PM     Finalized by (CST): Mago Ruth MD on 9/26/2024 at 1:03 PM               MDM     Vitals:    09/26/24 0937 09/26/24 1130 09/26/24 1500   BP: 116/87 145/61 128/66   Pulse: 88 79 79   Resp: 20 18 18   Temp: 98.8 °F (37.1 °C)     TempSrc: Oral     SpO2: 93% 92% 93%   Weight: 86.2 kg     Height: 177.8 cm (5' 10\")         reported fever at home, afebrile here.  Urinary symptoms.  Nonfocal examination otherwise.  Patient is immunocompromise.  Will check cultures, fever.    ED Course as of 09/26/24  1541  ------------------------------------------------------------  Time: 09/26 1008  Comment: EKG interpretation by me: EKG sinus rhythm at a rate of 84, axis nomal, no concerning acute ischemic ST changes, incomplete rbbb  ------------------------------------------------------------  Time: 09/26 1137  Comment: Labs with leukopenia without neutropenia.  Reassuring renal function, no acidosis  ------------------------------------------------------------  Time: 09/26 1307  Comment: My interpretation of chest x-ray without acute consolidations, redemonstrating chronic changes  ------------------------------------------------------------  Time: 09/26 1512  Comment: Unclear source of patient's fever.  He remains weak and difficulty ambulating here, consulted with oncologist, will send cultures and start prophylactic antibiotics.  Patient endorsed to patient's primary care physician for admission.         Disposition and Plan     Clinical Impression:  1. Fever, unknown origin    2. Weakness generalized    3. Urinary urgency        Disposition:  Admit    Follow-up:  No follow-up provider specified.    Medications Prescribed:  Current Discharge Medication List          Hospital Problems       Present on Admission  Date Reviewed: 8/23/2024            ICD-10-CM Noted POA    * (Principal) Fever, unknown origin R50.9 9/26/2024 Unknown

## 2024-09-26 NOTE — ED QUICK NOTES
Orders for admission, patient is aware of plan and ready to go upstairs. Any questions, please call ED MARY Harley at extension [phone].     Patient Covid vaccination status: Fully vaccinated     COVID Test Ordered in ED: Rapid SARS-CoV-2 by PCR    COVID Suspicion at Admission: N/A    Running Infusions:      Mental Status/LOC at time of transport: A&Ox4    Other pertinent information:   CIWA score: N/A   NIH score:  N/A

## 2024-09-26 NOTE — TELEPHONE ENCOUNTER
Patient's wife is calling. Patient has a fever temp 102. Not holding urine and chills. Symptoms started on 9/24/24. MP

## 2024-09-26 NOTE — H&P
[facility]  part of [facility]    History & Physical    Date:  9/26/2024   Date of Admission:  9/26/2024    Chief Complaint:   Luis Villasenor is a(n) 79 year old male with chills and dysuria.    HPI:   The patient has a history of recurrent lung cancer currently on dabrafenib and trametanib.  2 days ago he had chills with malaise and overnight he had urination every hour with some dysuria.  He also noted a fever.  He came to emergency room for this concern.  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  There is a history of deep venous thrombosis as well.  He denies blood in the urine or stool.  There is no new respiratory symptomatology.  He does have mild cough occasionally with yellow phlegm.    History     Past Medical History:    Deep vein thrombosis (HCC)    CURRENTLY HAS RIGHT BLOOD CLOT    Dysphagia    Esophageal reflux    Exposure to medical diagnostic radiation    COMPLETED    Hemorrhoids    History of blood transfusion    Pt on Immunotherapy    History of immunotherapy    Impotence    Lung cancer (HCC)    NSCLCA stage IIIB    Necrotizing granulomatous inflammation of lung (HCC)    Obstructive sleep apnea    Osteoarthritis    Personal history of antineoplastic chemotherapy    Pneumonia due to organism    Pulmonary embolism (HCC)    Pulmonary embolus (HCC)    Sinus problem    Sinus problems    Sleep apnea    CPAP    Visual impairment    wears eyeglasses     Past Surgical History:   Procedure Laterality Date    Arthroscopy of joint unlisted      Cholecystectomy      Colonoscopy  2010    Colonoscopy N/A 06/21/2018    Procedure: COLONOSCOPY;  Surgeon: Cesar Beckman MD;  Location: [facility] ENDOSCOPY    Colonoscopy      Excis spermatocele Right 03/08/2024    Port, indwelling, imp Right 2017    Rotator cuff repair Left     Total knee replacement Bilateral 2020    Vein ligation and stripping        Family History   Problem Relation Age of Onset    Cancer Father         Lung    Cancer Mother         Breast, ovarian     Social History:  Social History     Socioeconomic History    Marital status:    Tobacco Use    Smoking status: Former     Current packs/day: 0.00     Average packs/day: 1 pack/day for 30.0 years (30.0 ttl pk-yrs)     Types: Cigarettes     Start date: 1970     Quit date: 2000     Years since quittin.2    Smokeless tobacco: Never   Vaping Use    Vaping status: Never Used   Substance and Sexual Activity    Alcohol use: Not Currently     Alcohol/week: 0.8 standard drinks of alcohol     Types: 1 Glasses of wine per week     Comment: very rarely    Drug use: No   Other Topics Concern    Caffeine Concern Yes     Comment: coffee, 2 cups daily     Social Determinants of Health     Financial Resource Strain: Low Risk  (2024)    Financial Resource Strain     Difficulty of Paying Living Expenses: Not very hard     Med Affordability: No   Transportation Needs: No Transportation Needs (2024)    Transportation Needs     Lack of Transportation: No   Physical Activity: Not on File (2022)    Received from [employer]    Physical Activity     Physical Activity: 0   Stress: Not on File (2022)    Received from [employer]    Stress     Stress: 0   Social Connections: Not on File (2024)    Received from [employer]    Social Connections     Connectedness: 0   Housing Stability: Low Risk  (2024)    Housing Stability     Housing Instability: No     Allergies/Medications:   Allergies: No Known Allergies  (Not in a hospital admission)      Review of Systems:   Review of Systems:  Vision normal. Ear nose and throat normal. Bowel normal. Bladder function normal. No depression. No thyroid disease. No lymphatic system concerns.  No rash. Muscles and joints unremarkable. No weight loss no weight gain.    Physical Exam:   Vital Signs:  Blood pressure 128/66, pulse 79,  temperature 98.8 °F (37.1 °C), temperature source Oral, resp. rate 18, height 5' 10\" (1.778 m), weight 190 lb (86.2 kg), SpO2 93%.    Alert white male jolanta appearing  HEENT examination is unremarkable with pupils equal round and reactive to light and accommodation.   Neck without adenopathy, thyromegaly, JVD nor bruit.   Lungs clear to auscultation and percussion.  Cardiac regular rate and rhythm no murmur.   Abdomen nontender, without hepatosplenomegaly and no mass appreciable.   Extremities without clubbing cyanosis nor edema.   Neurologic grossly intact with symmetric tone and strength and reflex.  Skin without gross abnormality    Results:     Lab Results   Component Value Date    WBC 2.8 09/26/2024    HGB 13.1 09/26/2024    HCT 38.5 09/26/2024    .0 09/26/2024    CREATSERUM 0.97 09/26/2024    BUN 20 09/26/2024     09/26/2024    K 3.4 09/26/2024     09/26/2024    CO2 25.0 09/26/2024     09/26/2024    CA 8.4 09/26/2024     Chest x-ray-No new pulmonary consolidation.      Minimal subsegmental atelectasis at the left lung base.      Trace left pleural effusion.      History of left upper lobe malignancy.  Unchanged appearance of the left upper paratracheal/suprahilar region, may relate to underlying malignancy and/or post treatment change.      Left upper atelectasis or scarring extending from the suprahilar opacity     Assessment/Plan:   1.  Fever and chills with dysuria-the urinalysis shows only 1-5 white blood cells but the patient is mildly leukopenic and currently on 2 immune therapies.  There is no obvious source.  The patient has an abnormal chest x-ray at baseline but this is unchanged.  The urine is unrevealing.  There is no diarrhea.  The patient is ill-appearing and will empirically treat with antibiotic in the short-term awaiting cultures.    Recommendations:  1.  Empiric antibiotic with cefepime  2.  Blood cultures  3.  Gentle hydration  4.  Will follow clinically.    2.   Recurrent lung cancer    Recommendations: As per Dr. Bello.    3.  DVT prophylaxis with history of right femoral vein thrombus-continue Xarelto    4.  Pulmonary aspergillosis-continue voriconazole    5.  History of immunotherapy associated pneumonitis    6.  COPD-continue current management      Chris Hernandez MD  Medical Director, Critical Care, [facility]  Medical Director, [facility]  Pager: [phone]

## 2024-09-26 NOTE — TELEPHONE ENCOUNTER
Dabrafenib/Trametinib    Fever/incontinence/lightheaded    Patient is having a fever and chills, chills started on Tuesday, had temp of 99 on Wednesday and he took Tylenol to keep it down.  Today he has fever of 102.  Also having incontinence of urine and unsteady on his feet.    Fever - Grade 1 - fever 102  States some sob, no chest pain  Denies n/v/d, has been eating and drinking. Denies bowel complaints.    Instructed to present to ER for evaluation.

## 2024-09-27 LAB
ANION GAP SERPL CALC-SCNC: 4 MMOL/L (ref 0–18)
BASOPHILS # BLD AUTO: 0.01 X10(3) UL (ref 0–0.2)
BASOPHILS NFR BLD AUTO: 0.4 %
BUN BLD-MCNC: 18 MG/DL (ref 9–23)
BUN/CREAT SERPL: 20 (ref 10–20)
CALCIUM BLD-MCNC: 8.4 MG/DL (ref 8.7–10.4)
CHLORIDE SERPL-SCNC: 109 MMOL/L (ref 98–112)
CO2 SERPL-SCNC: 24 MMOL/L (ref 21–32)
CREAT BLD-MCNC: 0.9 MG/DL
DEPRECATED RDW RBC AUTO: 51.8 FL (ref 35.1–46.3)
EGFRCR SERPLBLD CKD-EPI 2021: 86 ML/MIN/1.73M2 (ref 60–?)
EOSINOPHIL # BLD AUTO: 0 X10(3) UL (ref 0–0.7)
EOSINOPHIL NFR BLD AUTO: 0 %
ERYTHROCYTE [DISTWIDTH] IN BLOOD BY AUTOMATED COUNT: 14.8 % (ref 11–15)
GLUCOSE BLD-MCNC: 133 MG/DL (ref 70–99)
HCT VFR BLD AUTO: 37.2 %
HGB BLD-MCNC: 12.7 G/DL
IMM GRANULOCYTES # BLD AUTO: 0.01 X10(3) UL (ref 0–1)
IMM GRANULOCYTES NFR BLD: 0.4 %
LYMPHOCYTES # BLD AUTO: 0.71 X10(3) UL (ref 1–4)
LYMPHOCYTES NFR BLD AUTO: 29.6 %
MCH RBC QN AUTO: 32.4 PG (ref 26–34)
MCHC RBC AUTO-ENTMCNC: 34.1 G/DL (ref 31–37)
MCV RBC AUTO: 94.9 FL
MONOCYTES # BLD AUTO: 0.47 X10(3) UL (ref 0.1–1)
MONOCYTES NFR BLD AUTO: 19.6 %
NEUTROPHILS # BLD AUTO: 1.2 X10 (3) UL (ref 1.5–7.7)
NEUTROPHILS # BLD AUTO: 1.2 X10(3) UL (ref 1.5–7.7)
NEUTROPHILS NFR BLD AUTO: 50 %
OSMOLALITY SERPL CALC.SUM OF ELEC: 288 MOSM/KG (ref 275–295)
PLATELET # BLD AUTO: 117 10(3)UL (ref 150–450)
POTASSIUM SERPL-SCNC: 3.7 MMOL/L (ref 3.5–5.1)
POTASSIUM SERPL-SCNC: 4 MMOL/L (ref 3.5–5.1)
RBC # BLD AUTO: 3.92 X10(6)UL
SODIUM SERPL-SCNC: 137 MMOL/L (ref 136–145)
WBC # BLD AUTO: 2.4 X10(3) UL (ref 4–11)

## 2024-09-27 PROCEDURE — 99233 SBSQ HOSP IP/OBS HIGH 50: CPT | Performed by: PHYSICIAN ASSISTANT

## 2024-09-27 PROCEDURE — 99223 1ST HOSP IP/OBS HIGH 75: CPT | Performed by: INTERNAL MEDICINE

## 2024-09-27 RX ORDER — DEXAMETHASONE 0.5 MG/1
1 TABLET ORAL EVERY OTHER DAY
Status: DISCONTINUED | OUTPATIENT
Start: 2024-09-28 | End: 2024-09-29

## 2024-09-27 RX ORDER — ARFORMOTEROL TARTRATE 15 UG/2ML
15 SOLUTION RESPIRATORY (INHALATION)
Status: DISCONTINUED | OUTPATIENT
Start: 2024-09-27 | End: 2024-09-29

## 2024-09-27 NOTE — PLAN OF CARE
Plan of care reviewed with patient and his wife at bedside. Patient has had two episodes of diarrhea. Stool specimen sent to lab. Patient reports feeling \"better.\" Ambulating in hallway with stand by assistance. IVF continued. Afebrile. Tolerating diet. Safety measures in place and call light within reach.

## 2024-09-27 NOTE — CONSULTS
Hematology Oncology Consult Note      Luis Villasenor Patient Status:  Inpatient    1945 MRN [id number]   Location [facility] 4W/SW/SE Attending Chris Hernandez MD   Hosp Day # 1 PCP Chris Hernandez MD     Reason for Consultation: lung cancer, fever    History of Present Illness:  Luis Villasenor is a 79 year old White male well known to me with history of recurrent metastatic BRAF positive lung adenocarcinoma, currently on dabrafenib and trametinib with favorable response, history of pneumonitis and aspergillus, presented with fever, chills, and urinary symptoms. T max 102 at home. He has been afebrile since admission. Initial work up unremarkable including UA, CXR and labs. He was started on cefepime empirically and admitted for observation. He feels slightly better today, but developed diarrhea overnight with > 3 episodes. He reports chronic fatigue that is unchanged. Remains on dexamethasone 1 mg daily and think it's not helping his energy much. swelling. Urinary symptoms resolved. Denies pain, nausea, vomiting, bleeding,     History:  Past Medical History:    Deep vein thrombosis (HCC)    CURRENTLY HAS RIGHT BLOOD CLOT    Dysphagia    Esophageal reflux    Exposure to medical diagnostic radiation    COMPLETED    Hemorrhoids    History of blood transfusion    Pt on Immunotherapy    History of immunotherapy    Impotence    Lung cancer (HCC)    NSCLCA stage IIIB    Necrotizing granulomatous inflammation of lung (HCC)    Obstructive sleep apnea    Osteoarthritis    Personal history of antineoplastic chemotherapy    Pneumonia due to organism    Pulmonary embolism (HCC)    Pulmonary embolus (HCC)    Sinus problem    Sinus problems    Sleep apnea    CPAP    Visual impairment    wears eyeglasses     Past Surgical History:   Procedure Laterality Date    Arthroscopy of joint unlisted      Cholecystectomy      Colonoscopy      Colonoscopy N/A 2018    Procedure: COLONOSCOPY;  Surgeon:  Cesar Beckman MD;  Location: [facility] ENDOSCOPY    Colonoscopy      Excis spermatocele Right 03/08/2024    Port, indwelling, imp Right 2017    Rotator cuff repair Left     Total knee replacement Bilateral 2020    Vein ligation and stripping       Family History   Problem Relation Age of Onset    Cancer Father         Lung    Cancer Mother         Breast, ovarian      reports that he quit smoking about 24 years ago. His smoking use included cigarettes. He started smoking about 54 years ago. He has a 30 pack-year smoking history. He has never used smokeless tobacco. He reports that he does not currently use alcohol after a past usage of about 0.8 standard drinks of alcohol per week. He reports that he does not use drugs.    Allergies:  No Known Allergies    Medications:    Current Facility-Administered Medications:     arformoterol (Brovana) 15 MCG/2ML nebulizer solution 15 mcg, 15 mcg, Nebulization, 2 times daily    fluticasone furoate (Arnuity Ellipta) 100 MCG/ACT inhaler 1 puff, 1 puff, Inhalation, Daily    sodium chloride 0.9% infusion, , Intravenous, Continuous    ondansetron (Zofran) 4 MG/2ML injection 4 mg, 4 mg, Intravenous, Q6H PRN    ascorbic acid (Vitamin C) tab 500 mg, 500 mg, Oral, QAM    cholecalciferol (Vitamin D3) tab 1,000 Units, 1,000 Units, Oral, QAM    albuterol (Ventolin HFA) 108 (90 Base) MCG/ACT inhaler 2 puff, 2 puff, Inhalation, Q4H PRN    ipratropium-albuterol (Duoneb) 0.5-2.5 (3) MG/3ML inhalation solution 3 mL, 3 mL, Nebulization, Q6H PRN    voriconazole (Vfend) tab 200 mg, 200 mg, Oral, BID    pantoprazole (Protonix) DR tab 40 mg, 40 mg, Oral, Before breakfast    sulfamethoxazole-trimethoprim DS (Bactrim DS) 800-160 MG per tab 1 tablet, 1 tablet, Oral, Daily    rivaroxaban (Xarelto) tab 10 mg, 10 mg, Oral, Nightly    dexamethasone (Decadron) tab 1 mg, 1 mg, Oral, Daily with breakfast    ceFEPIme (Maxipime) 1 g in sodium chloride 0.9% 100 mL IVPB-MBP, 1 g, Intravenous, Q8H    Review of  Systems:  A 12-point review of systems was done with pertinent positives and negatives per the HPI.    Weight:  Wt Readings from Last 6 Encounters:   09/26/24 87.8 kg (193 lb 8 oz)   08/23/24 87.5 kg (193 lb)   07/29/24 86.2 kg (190 lb)   07/26/24 86.2 kg (190 lb)   06/14/24 87.5 kg (193 lb)   05/03/24 86.2 kg (190 lb)       Vital Sings:  /46 (BP Location: Right arm)   Pulse 70   Temp 97.9 °F (36.6 °C) (Oral)   Resp 18   Ht 1.778 m (5' 10\")   Wt 87.8 kg (193 lb 8 oz)   SpO2 96%   BMI 27.76 kg/m²     Physical Exam:   General: Patient is alert and oriented x 3, not in acute distress.   HEENT: EOMs intact. Anicteric sclera. Pink conjunctiva. Oropharynx is clear.   Neck: No JVD. No palpable lymphadenopathy. Neck is supple.  Chest: Clear to auscultation.  No rales or wheezes.  Heart: Regular rate and rhythm.   Abdomen: Soft, non tender with good bowel sounds. No hepatosplenomegaly.  Extremities: Pedal pulses are present. No edema and no tenderness.  Neurological: Grossly intact.   Lymphatics: No palpable lymphadenopathy throughout in the cervical, supraclavicular, axillary, or inguinal regions.  Psych: Appropriate mood and affect.     Laboratory Data:    Recent Labs   Lab 09/26/24  1056 09/27/24  0915   RBC 4.02 3.92   HGB 13.1 12.7*   HCT 38.5* 37.2*   MCV 95.8 94.9   MCH 32.6 32.4   MCHC 34.0 34.1   RDW 14.6 14.8   NEPRELIM 2.00 1.20*   WBC 2.8* 2.4*   .0* 117.0*       Recent Labs   Lab 09/26/24  1056 09/27/24  0530 09/27/24  0915   *  --  133*   BUN 20  --  18   CREATSERUM 0.97  --  0.90   EGFRCR 78  --  86   CA 8.4*  --  8.4*   *  --  137   K 3.4* 4.0 3.7     --  109   CO2 25.0  --  24.0        No results for input(s): \"PT\", \"INR\", \"PTT\" in the last 168 hours.     Imaging:  XR CHEST PA + LAT CHEST (CPT=71046)    Result Date: 9/26/2024  CONCLUSION:   No new pulmonary consolidation.  Minimal subsegmental atelectasis at the left lung base.  Trace left pleural effusion.  History of  left upper lobe malignancy.  Unchanged appearance of the left upper paratracheal/suprahilar region, may relate to underlying malignancy and/or post treatment change.  Left upper atelectasis or scarring extending from the suprahilar opacity     Dictated by (CST): Mago Ruth MD on 9/26/2024 at 1:00 PM     Finalized by (CST): Mago Ruth MD on 9/26/2024 at 1:03 PM              Impression and Plan:      Metastatic BRAF positive isabel adenocarcinoma    - on dabrafenib trametinib since 10/2023 with favorable response, dose reduced due to significant fatigue.   - CT C/A/P 7/23/24 new bilateral lung nodules non specific infectious/inflammatory vs metastases. Stable SLIM post treatment fibrosis. GGO in both lungs resolved. Plan to repeat next month for close surveillance.   - ok to hold dabrafenib and trametinib during admission and resume at discharge.      Fever  - T max 102 f at home, afebrile since admission  - probably a viral illness vs drug induced fever (most common adverse event associated with dabrafenib plus trametinib)  - infectious work up negative so far, stool sent for C diff.   - continue cefepime empirically and monitor temp and cbc.   - continue ppx bactrim and voriconazole     Fatigue:  - likely multifactorial; improved with dexamethasone 1 mg daily; decrease to 1 mg every other day.   - echocardiogram 7/22/24 normal. FT4 normal.       History of PE  - diagnosed January 2023, continue xarelto proph dose 10mg daily         Thank you for the opportunity to participate in the care of Luis Villasenor. Will continue to follow along with you.   Please contact me for any questions or concerns.       Yaneth Bello MD  Hematology Oncology

## 2024-09-27 NOTE — PLAN OF CARE
Luis came up from the ED, he is aox4, tolerating general diet, ambulating standby with a walker, he is unsteady and had a previous fall at home, voiding, and had a BM yesterday. Denies pain and nausea. Wife is at bedside. Call light within reach. Safety precautions in place. Port was accessed from the cancer center.       Problem: Patient Centered Care  Goal: Patient preferences are identified and integrated in the patient's plan of care  Description: Interventions:  - What would you like us to know as we care for you? I am from [place], I have two grown kids boy and girl. I worked as a  and construction.   - Provide timely, complete, and accurate information to patient/family  - Incorporate patient and family knowledge, values, beliefs, and cultural backgrounds into the planning and delivery of care  - Encourage patient/family to participate in care and decision-making at the level they choose  - Honor patient and family perspectives and choices  Outcome: Progressing     Problem: Patient/Family Goals  Goal: Patient/Family Long Term Goal  Description: Patient's Long Term Goal: Go home    Interventions:  -   - See additional Care Plan goals for specific interventions  Outcome: Progressing  Goal: Patient/Family Short Term Goal  Description: Patient's Short Term Goal: Get my Strength back    Interventions:   -   - See additional Care Plan goals for specific interventions  Outcome: Progressing     Problem: PAIN - ADULT  Goal: Verbalizes/displays adequate comfort level or patient's stated pain goal  Description: INTERVENTIONS:  - Encourage pt to monitor pain and request assistance  - Assess pain using appropriate pain scale  - Administer analgesics based on type and severity of pain and evaluate response  - Implement non-pharmacological measures as appropriate and evaluate response  - Consider cultural and social influences on pain and pain management  - Manage/alleviate anxiety  - Utilize distraction and/or  relaxation techniques  - Monitor for opioid side effects  - Notify MD/LIP if interventions unsuccessful or patient reports new pain  - Anticipate increased pain with activity and pre-medicate as appropriate  Outcome: Progressing     Problem: SAFETY ADULT - FALL  Goal: Free from fall injury  Description: INTERVENTIONS:  - Assess pt frequently for physical needs  - Identify cognitive and physical deficits and behaviors that affect risk of falls.  - [place] fall precautions as indicated by assessment.  - Educate pt/family on patient safety including physical limitations  - Instruct pt to call for assistance with activity based on assessment  - Modify environment to reduce risk of injury  - Provide assistive devices as appropriate  - Consider OT/PT consult to assist with strengthening/mobility  - Encourage toileting schedule  Outcome: Progressing     Problem: RESPIRATORY - ADULT  Goal: Achieves optimal ventilation and oxygenation  Description: INTERVENTIONS:  - Assess for changes in respiratory status  - Assess for changes in mentation and behavior  - Position to facilitate oxygenation and minimize respiratory effort  - Oxygen supplementation based on oxygen saturation or ABGs  - Provide Smoking Cessation handout, if applicable  - Encourage broncho-pulmonary hygiene including cough, deep breathe, Incentive Spirometry  - Assess the need for suctioning and perform as needed  - Assess and instruct to report SOB or any respiratory difficulty  - Respiratory Therapy support as indicated  - Manage/alleviate anxiety  - Monitor for signs/symptoms of CO2 retention  Outcome: Progressing

## 2024-09-27 NOTE — RESPIRATORY THERAPY NOTE
Pt has Dx of WALLACE and regularly uses CPAP at home. Pt is wearing a large full face mask on room air on a CPAP of 11.

## 2024-09-27 NOTE — PLAN OF CARE
Problem: Patient Centered Care  Goal: Patient preferences are identified and integrated in the patient's plan of care  Description: Interventions:  - What would you like us to know as we care for you?   - Provide timely, complete, and accurate information to patient/family  - Incorporate patient and family knowledge, values, beliefs, and cultural backgrounds into the planning and delivery of care  - Encourage patient/family to participate in care and decision-making at the level they choose  - Honor patient and family perspectives and choices  Outcome: Progressing     Problem: Patient/Family Goals  Goal: Patient/Family Long Term Goal  Description: Patient's Long Term Goal:     Interventions:  -   - See additional Care Plan goals for specific interventions  Outcome: Progressing  Goal: Patient/Family Short Term Goal  Description: Patient's Short Term Goal:     Interventions:   -  - See additional Care Plan goals for specific interventions  Outcome: Progressing     Problem: PAIN - ADULT  Goal: Verbalizes/displays adequate comfort level or patient's stated pain goal  Description: INTERVENTIONS:  - Encourage pt to monitor pain and request assistance  - Assess pain using appropriate pain scale  - Administer analgesics based on type and severity of pain and evaluate response  - Implement non-pharmacological measures as appropriate and evaluate response  - Consider cultural and social influences on pain and pain management  - Manage/alleviate anxiety  - Utilize distraction and/or relaxation techniques  - Monitor for opioid side effects  - Notify MD/LIP if interventions unsuccessful or patient reports new pain  - Anticipate increased pain with activity and pre-medicate as appropriate  Outcome: Progressing     Problem: SAFETY ADULT - FALL  Goal: Free from fall injury  Description: INTERVENTIONS:  - Assess pt frequently for physical needs  - Identify cognitive and physical deficits and behaviors that affect risk of falls.  -  [place] fall precautions as indicated by assessment.  - Educate pt/family on patient safety including physical limitations  - Instruct pt to call for assistance with activity based on assessment  - Modify environment to reduce risk of injury  - Provide assistive devices as appropriate  - Consider OT/PT consult to assist with strengthening/mobility  - Encourage toileting schedule  Outcome: Progressing     Problem: RESPIRATORY - ADULT  Goal: Achieves optimal ventilation and oxygenation  Description: INTERVENTIONS:  - Assess for changes in respiratory status  - Assess for changes in mentation and behavior  - Position to facilitate oxygenation and minimize respiratory effort  - Oxygen supplementation based on oxygen saturation or ABGs  - Provide Smoking Cessation handout, if applicable  - Encourage broncho-pulmonary hygiene including cough, deep breathe, Incentive Spirometry  - Assess the need for suctioning and perform as needed  - Assess and instruct to report SOB or any respiratory difficulty  - Respiratory Therapy support as indicated  - Manage/alleviate anxiety  - Monitor for signs/symptoms of CO2 retention  Outcome: Progressing     Pt resting in bed. Ambulates in room with walker and assist. Denies pain, nausea or SOB. CPAP at HS. IVF continued. IV antibiotic given. Loose stool per pt report- need stool sample for r/o C. Diff. Pt states voiding without difficulty. Remote tele maintained. Safety measures in place. Frequent rounding being done.

## 2024-09-27 NOTE — PROGRESS NOTES
[facility]  part of [facility]    Progress Note    Luis Villasenor Patient Status:  Inpatient    1945 MRN [id number]   Location [facility] 4W/SW/SE Attending Chris Hernandez MD   Hosp Day # 1 PCP Chris Hernandez MD     Subjective:   Seen and examined while resting in bed. Wife at bedside. Feels slightly better today. Had 2 episodes of diarrhea overnight. Had chills overnight but no fever. No shortness of breath or cough. No abdominal pain, nausea, or vomiting. No dysuria or increased urinary frequency/urgency. Appetite is good. On room air.    Objective:   Blood pressure 119/46, pulse 70, temperature 97.9 °F (36.6 °C), temperature source Oral, resp. rate 18, height 5' 10\" (1.778 m), weight 193 lb 8 oz (87.8 kg), SpO2 96%.  Physical Exam  Vitals and nursing note reviewed.   Constitutional:       General: He is awake. He is not in acute distress.     Appearance: Normal appearance. He is not ill-appearing.   HENT:      Head: Normocephalic and atraumatic.   Cardiovascular:      Rate and Rhythm: Normal rate and regular rhythm.   Pulmonary:      Effort: Pulmonary effort is normal. No respiratory distress.      Breath sounds: Normal breath sounds. No wheezing, rhonchi or rales.   Abdominal:      General: Bowel sounds are normal. There is no distension.      Palpations: Abdomen is soft.      Tenderness: There is no abdominal tenderness. There is no guarding.   Musculoskeletal:      Cervical back: Normal range of motion and neck supple.      Right lower leg: No edema.      Left lower leg: No edema.   Skin:     General: Skin is warm and dry.      Findings: Ecchymosis (scattered ecchymosis b/l upper extremities) present.   Neurological:      General: No focal deficit present.      Mental Status: He is alert and oriented to person, place, and time.   Psychiatric:         Mood and Affect: Mood normal.         Behavior: Behavior is cooperative.       Results:   Lab Results   Component  Value Date    WBC 2.4 (L) 09/27/2024    HGB 12.7 (L) 09/27/2024    HCT 37.2 (L) 09/27/2024    .0 (L) 09/27/2024    CREATSERUM 0.90 09/27/2024    BUN 18 09/27/2024     09/27/2024    K 3.7 09/27/2024     09/27/2024    CO2 24.0 09/27/2024     (H) 09/27/2024    CA 8.4 (L) 09/27/2024    ALB 3.8 08/23/2024    ALKPHO 124 (H) 08/23/2024    BILT 0.6 08/23/2024    TP 5.8 08/23/2024    AST 34 (H) 08/23/2024    ALT 25 08/23/2024    PTT 42.8 (H) 02/27/2024    INR 1.06 02/27/2024    T4F 1.3 08/23/2024    TSH 5.649 (H) 08/23/2024    LIP 97 (H) 11/20/2023    PSA 2.8 08/08/2018    ESRML 99 (H) 08/18/2018    MG 2.0 11/21/2023    PHOS 3.5 11/21/2023    TROPHS 7 01/14/2023    B12 >1,500 (H) 05/22/2018       XR CHEST PA + LAT CHEST (CPT=71046)    Result Date: 9/26/2024  CONCLUSION:   No new pulmonary consolidation.  Minimal subsegmental atelectasis at the left lung base.  Trace left pleural effusion.  History of left upper lobe malignancy.  Unchanged appearance of the left upper paratracheal/suprahilar region, may relate to underlying malignancy and/or post treatment change.  Left upper atelectasis or scarring extending from the suprahilar opacity     Dictated by (CST): Mago Ruth MD on 9/26/2024 at 1:00 PM     Finalized by (CST): Mago Ruth MD on 9/26/2024 at 1:03 PM         EKG 12 Lead    Result Date: 9/26/2024  Normal sinus rhythm Incomplete right bundle branch block Borderline ECG When compared with ECG of 29-JAN-2024 11:35, Nonspecific T wave abnormality now evident in Lateral leads Confirmed by GRAHAM DONIS ([id number]) on 9/26/2024 11:23:49 AM     Assessment & Plan:   Fever and chills with leukopenia  Leukopenia and currently on 2 immune therapies for lung cancer  UA unrevealing  CXR abnormal at baseline but unchanged  Now with diarrhea; C diff pending  Blood cultures pending  Plan:  -Empiric antibiotic (cefepime) while awaiting cultures  -Follow up blood cultures  -Follow up stool for C  diff  -Gentle IVF    Recurrent lung cancer  On immune therapy  Plan:  -Bactrim ppx  -Decadron 1 mg daily  -As per oncology    COPD  No signs of exacerbation  Plan:  -Arnuity and Shalaa while here (Breo at home)    Pulmonary aspergillosis  Plan:  -Continue voriconazole    History of PE and DVT  Plan:  -Xarelto    WALLACE  Plan:  -CPAP nightly    Code status: Full code    D/w RN.    REAGAN Randolph-C  9/27/2024

## 2024-09-28 PROBLEM — R53.0 NEOPLASTIC MALIGNANT RELATED FATIGUE: Status: ACTIVE | Noted: 2024-09-28

## 2024-09-28 PROBLEM — C34.90: Status: ACTIVE | Noted: 2024-09-28

## 2024-09-28 PROCEDURE — 99232 SBSQ HOSP IP/OBS MODERATE 35: CPT | Performed by: INTERNAL MEDICINE

## 2024-09-28 PROCEDURE — 99233 SBSQ HOSP IP/OBS HIGH 50: CPT | Performed by: INTERNAL MEDICINE

## 2024-09-28 NOTE — PLAN OF CARE
No acute changes over night. Pt is alert and oriented on RA. Pt wears CPAP at night. Remote tele in place. Pt voiding freely. Pt denies pain. Pt tolerating a general diet. Pt denies nausea and vomiting. Pt is up independently. Call light is within reach and safety measures are in place.     Problem: PAIN - ADULT  Goal: Verbalizes/displays adequate comfort level or patient's stated pain goal  Description: INTERVENTIONS:  - Encourage pt to monitor pain and request assistance  - Assess pain using appropriate pain scale  - Administer analgesics based on type and severity of pain and evaluate response  - Implement non-pharmacological measures as appropriate and evaluate response  - Consider cultural and social influences on pain and pain management  - Manage/alleviate anxiety  - Utilize distraction and/or relaxation techniques  - Monitor for opioid side effects  - Notify MD/LIP if interventions unsuccessful or patient reports new pain  - Anticipate increased pain with activity and pre-medicate as appropriate  Outcome: Progressing     Problem: SAFETY ADULT - FALL  Goal: Free from fall injury  Description: INTERVENTIONS:  - Assess pt frequently for physical needs  - Identify cognitive and physical deficits and behaviors that affect risk of falls.  - Roy fall precautions as indicated by assessment.  - Educate pt/family on patient safety including physical limitations  - Instruct pt to call for assistance with activity based on assessment  - Modify environment to reduce risk of injury  - Provide assistive devices as appropriate  - Consider OT/PT consult to assist with strengthening/mobility  - Encourage toileting schedule  Outcome: Progressing     Problem: RESPIRATORY - ADULT  Goal: Achieves optimal ventilation and oxygenation  Description: INTERVENTIONS:  - Assess for changes in respiratory status  - Assess for changes in mentation and behavior  - Position to facilitate oxygenation and minimize respiratory effort  -  Oxygen supplementation based on oxygen saturation or ABGs  - Provide Smoking Cessation handout, if applicable  - Encourage broncho-pulmonary hygiene including cough, deep breathe, Incentive Spirometry  - Assess the need for suctioning and perform as needed  - Assess and instruct to report SOB or any respiratory difficulty  - Respiratory Therapy support as indicated  - Manage/alleviate anxiety  - Monitor for signs/symptoms of CO2 retention  Outcome: Progressing

## 2024-09-28 NOTE — PROGRESS NOTES
[facility]  part of [facility]    Progress Note    Luis Villasenor Patient Status:  Inpatient    1945 MRN [id number]   Location [facility] 4W/SW/SE Attending Chris Hernandez MD   Hosp Day # 2 PCP Chris Hernandez MD         Subjective:     Constitutional:  Negative for fever.   HENT:  Negative for congestion.    Respiratory:  Negative for cough and shortness of breath.    Cardiovascular:  Negative for chest pain.   Gastrointestinal: Negative.    Neurological: Negative.    Psychiatric/Behavioral: Negative.       Feeling better today  No more fever  Comfortable on room air  Ambulatory  No cough or sputum  Denied abdominal pain or diarrhea  Objective:   Blood pressure 128/63, pulse 58, temperature 97.7 °F (36.5 °C), temperature source Oral, resp. rate 18, height 5' 10\" (1.778 m), weight 193 lb 8 oz (87.8 kg), SpO2 97%.  Physical Exam  Constitutional:       General: He is not in acute distress.     Appearance: Normal appearance.   HENT:      Head: Atraumatic.      Nose: Nose normal.      Mouth/Throat:      Mouth: Mucous membranes are moist.   Eyes:      General: No scleral icterus.  Cardiovascular:      Rate and Rhythm: Normal rate.      Pulses: Normal pulses.      Heart sounds:      No gallop.   Pulmonary:      Effort: No respiratory distress.      Breath sounds: No stridor. No wheezing, rhonchi or rales.   Abdominal:      General: Abdomen is flat. Bowel sounds are normal. There is no distension.      Palpations: Abdomen is soft.      Tenderness: There is no guarding.   Musculoskeletal:      Cervical back: Normal range of motion.      Right lower leg: No edema.      Left lower leg: No edema.   Neurological:      Mental Status: He is oriented to person, place, and time.         Results:   Lab Results   Component Value Date    WBC 2.4 (L) 2024    HGB 12.7 (L) 2024    HCT 37.2 (L) 2024    .0 (L) 2024    CREATSERUM 0.90 2024    BUN 18  09/27/2024     09/27/2024    K 3.7 09/27/2024     09/27/2024    CO2 24.0 09/27/2024     (H) 09/27/2024    CA 8.4 (L) 09/27/2024    ALB 3.8 08/23/2024    ALKPHO 124 (H) 08/23/2024    BILT 0.6 08/23/2024    TP 5.8 08/23/2024    AST 34 (H) 08/23/2024    ALT 25 08/23/2024    PTT 42.8 (H) 02/27/2024    INR 1.06 02/27/2024    T4F 1.3 08/23/2024    TSH 5.649 (H) 08/23/2024    LIP 97 (H) 11/20/2023    PSA 2.8 08/08/2018    ESRML 99 (H) 08/18/2018    MG 2.0 11/21/2023    PHOS 3.5 11/21/2023    TROPHS 7 01/14/2023    B12 >1,500 (H) 05/22/2018       XR CHEST PA + LAT CHEST (CPT=71046)    Result Date: 9/26/2024  CONCLUSION:   No new pulmonary consolidation.  Minimal subsegmental atelectasis at the left lung base.  Trace left pleural effusion.  History of left upper lobe malignancy.  Unchanged appearance of the left upper paratracheal/suprahilar region, may relate to underlying malignancy and/or post treatment change.  Left upper atelectasis or scarring extending from the suprahilar opacity     Dictated by (CST): Mago Ruth MD on 9/26/2024 at 1:00 PM     Finalized by (CST): Mago Ruth MD on 9/26/2024 at 1:03 PM               Assessment & Plan:       1- Fever and chills with leukopenia  Leukopenia and currently on 2 immune therapies for lung cancer  UA unrevealing  C. difficile negative  Blood culture negative  Chest x-ray negative    -Empiric antibiotic (cefepime)   -Gentle IVF  Check procalcitonin     2-Recurrent lung cancer  On immune therapy  Plan:  -Bactrim ppx  -Decadron 1 mg daily  -As per oncology     3-COPD  Stable     -Arnuity and Brovana while here (Breo at home)     4-Pulmonary aspergillosis  -Continue voriconazole     5-History of PE and DVT  -Xarelto     6-WALLACE  -CPAP nightly       7-  Full code    Overall better no further fever  Continue present management if no more fever by tomorrow we will discharge home in a.m.      Kori Justice MD  9/28/2024

## 2024-09-28 NOTE — PROGRESS NOTES
Hematology Oncology Progress Note    Patient Name: Luis Villasenor   YOB: 1945   Medical Record Number: [id number]   CSN: [id number]   Attending Physician: Yaneth Bello MD     Subjective:  Feels much better today. Remains afebrile. Diarrhea resolved. No other complaints.     Objective:  Vitals:  Vitals:    09/28/24 0312 09/28/24 1213 09/28/24 1248 09/28/24 1422   BP: 133/71 128/63     BP Location: Right arm Right arm     Pulse: 51 58     Resp: 18 18     Temp:  97.7 °F (36.5 °C) 97.6 °F (36.4 °C)    TempSrc:  Oral Oral    SpO2: 99% 97%  94%   Weight:       Height:           Current Medications:    Current Facility-Administered Medications:     arformoterol (Brovana) 15 MCG/2ML nebulizer solution 15 mcg, 15 mcg, Nebulization, 2 times daily    fluticasone furoate (Arnuity Ellipta) 100 MCG/ACT inhaler 1 puff, 1 puff, Inhalation, Daily    dexamethasone (Decadron) tab 1 mg, 1 mg, Oral, QOD    sodium chloride 0.9% infusion, , Intravenous, Continuous    ondansetron (Zofran) 4 MG/2ML injection 4 mg, 4 mg, Intravenous, Q6H PRN    ascorbic acid (Vitamin C) tab 500 mg, 500 mg, Oral, QAM    cholecalciferol (Vitamin D3) tab 1,000 Units, 1,000 Units, Oral, QAM    albuterol (Ventolin HFA) 108 (90 Base) MCG/ACT inhaler 2 puff, 2 puff, Inhalation, Q4H PRN    ipratropium-albuterol (Duoneb) 0.5-2.5 (3) MG/3ML inhalation solution 3 mL, 3 mL, Nebulization, Q6H PRN    voriconazole (Vfend) tab 200 mg, 200 mg, Oral, BID    pantoprazole (Protonix) DR tab 40 mg, 40 mg, Oral, Before breakfast    sulfamethoxazole-trimethoprim DS (Bactrim DS) 800-160 MG per tab 1 tablet, 1 tablet, Oral, Daily    rivaroxaban (Xarelto) tab 10 mg, 10 mg, Oral, Nightly    ceFEPIme (Maxipime) 1 g in sodium chloride 0.9% 100 mL IVPB-MBP, 1 g, Intravenous, Q8H    Physical Examination:  General: Aert and oriented x 3, not in acute distress.       HEENT:  Anicteric sclera. Oropharynx is clear.   Chest: Clear to auscultation.    Abdomen: Soft, non tender    Extremities: No edema and no tenderness.  Neurological: Grossly intact.   Psych: Appropriate mood and affect.     Labs:  Recent Labs   Lab 09/26/24  1056 09/27/24  0915   RBC 4.02 3.92   HGB 13.1 12.7*   HCT 38.5* 37.2*   MCV 95.8 94.9   MCH 32.6 32.4   MCHC 34.0 34.1   RDW 14.6 14.8   NEPRELIM 2.00 1.20*   WBC 2.8* 2.4*   .0* 117.0*     Recent Labs   Lab 09/26/24  1056 09/27/24  0530 09/27/24  0915   *  --  133*   BUN 20  --  18   CREATSERUM 0.97  --  0.90   EGFRCR 78  --  86   CA 8.4*  --  8.4*   *  --  137   K 3.4* 4.0 3.7     --  109   CO2 25.0  --  24.0      No results for input(s): \"PT\", \"INR\", \"PTT\" in the last 168 hours.     Radiology:  No results found.     Impression and Plan:    Metastatic BRAF positive lung adenocarcinoma    - on dabrafenib trametinib since 10/2023 with favorable response, dose reduced due to significant fatigue.   - CT C/A/P 7/23/24 new bilateral lung nodules non specific infectious/inflammatory vs metastases. Stable SLIM post treatment fibrosis. GGO in both lungs resolved. Plan to repeat next month for close surveillance.   - ok to hold dabrafenib and trametinib during admission and resume at discharge.      Fever  - T max 102 f at home, afebrile since admission  - probably a viral illness vs drug induced fever (most common adverse effect of dabrafenib plus trametinib)  - infectious work up negative so far, stool sent for C diff.   - continue cefepime empirically and monitor temp and cbc.  - continue ppx bactrim and voriconazole   - appreciate pulm input.      Fatigue:  - likely multifactorial; improved with dexamethasone 1 mg daily; decrease to 1 mg every other day.   - echocardiogram 7/22/24 normal. FT4 normal.      History of PE  - diagnosed January 2023, continue xarelto proph dose 10mg daily      Ok to discharge from oncology standpoint. Follow up in clinic in 3-4 weeks.       Yaneth Bello MD

## 2024-09-29 VITALS
OXYGEN SATURATION: 99 % | SYSTOLIC BLOOD PRESSURE: 140 MMHG | WEIGHT: 193.5 LBS | HEIGHT: 70 IN | RESPIRATION RATE: 18 BRPM | TEMPERATURE: 98 F | DIASTOLIC BLOOD PRESSURE: 63 MMHG | HEART RATE: 44 BPM | BODY MASS INDEX: 27.7 KG/M2

## 2024-09-29 LAB
ANION GAP SERPL CALC-SCNC: 5 MMOL/L (ref 0–18)
BASOPHILS # BLD AUTO: 0 X10(3) UL (ref 0–0.2)
BASOPHILS NFR BLD AUTO: 0 %
BUN BLD-MCNC: 14 MG/DL (ref 9–23)
BUN/CREAT SERPL: 17.1 (ref 10–20)
CALCIUM BLD-MCNC: 8.9 MG/DL (ref 8.7–10.4)
CHLORIDE SERPL-SCNC: 111 MMOL/L (ref 98–112)
CO2 SERPL-SCNC: 26 MMOL/L (ref 21–32)
CREAT BLD-MCNC: 0.82 MG/DL
DEPRECATED RDW RBC AUTO: 50.5 FL (ref 35.1–46.3)
EGFRCR SERPLBLD CKD-EPI 2021: 88 ML/MIN/1.73M2 (ref 60–?)
EOSINOPHIL # BLD AUTO: 0 X10(3) UL (ref 0–0.7)
EOSINOPHIL NFR BLD AUTO: 0 %
ERYTHROCYTE [DISTWIDTH] IN BLOOD BY AUTOMATED COUNT: 14.3 % (ref 11–15)
GLUCOSE BLD-MCNC: 121 MG/DL (ref 70–99)
HCT VFR BLD AUTO: 34.6 %
HGB BLD-MCNC: 11.7 G/DL
IMM GRANULOCYTES # BLD AUTO: 0.01 X10(3) UL (ref 0–1)
IMM GRANULOCYTES NFR BLD: 0.4 %
LYMPHOCYTES # BLD AUTO: 0.65 X10(3) UL (ref 1–4)
LYMPHOCYTES NFR BLD AUTO: 25.8 %
MCH RBC QN AUTO: 32.4 PG (ref 26–34)
MCHC RBC AUTO-ENTMCNC: 33.8 G/DL (ref 31–37)
MCV RBC AUTO: 95.8 FL
MONOCYTES # BLD AUTO: 0.22 X10(3) UL (ref 0.1–1)
MONOCYTES NFR BLD AUTO: 8.7 %
NEUTROPHILS # BLD AUTO: 1.64 X10 (3) UL (ref 1.5–7.7)
NEUTROPHILS # BLD AUTO: 1.64 X10(3) UL (ref 1.5–7.7)
NEUTROPHILS NFR BLD AUTO: 65.1 %
OSMOLALITY SERPL CALC.SUM OF ELEC: 296 MOSM/KG (ref 275–295)
PLATELET # BLD AUTO: 155 10(3)UL (ref 150–450)
POTASSIUM SERPL-SCNC: 4.1 MMOL/L (ref 3.5–5.1)
PROCALCITONIN SERPL-MCNC: 0.09 NG/ML (ref ?–0.05)
RBC # BLD AUTO: 3.61 X10(6)UL
SODIUM SERPL-SCNC: 142 MMOL/L (ref 136–145)
WBC # BLD AUTO: 2.5 X10(3) UL (ref 4–11)

## 2024-09-29 PROCEDURE — 99239 HOSP IP/OBS DSCHRG MGMT >30: CPT | Performed by: INTERNAL MEDICINE

## 2024-09-29 RX ORDER — DEXAMETHASONE 1 MG
1 TABLET ORAL EVERY OTHER DAY
Qty: 30 TABLET | Refills: 0 | Status: SHIPPED | OUTPATIENT
Start: 2024-09-30

## 2024-09-29 NOTE — PLAN OF CARE
Plan of care reviewed with Luis and his wife. Patient afebrile. Ambulating independently in hallway. Tolerating diet. Denies pain. Voiding freely. Safety measures in place and call light within reach.

## 2024-09-29 NOTE — PLAN OF CARE
Patient discharged home with his wife. Afebrile overnight/ this am. Reports, \"feeling ready to leave.\" Port de cannulated. Follow up information reviewed and discharge criteria met.

## 2024-09-29 NOTE — PROGRESS NOTES
[facility]  part of [facility]    Progress Note    Luis Villasenor Patient Status:  Inpatient    1945 MRN [id number]   Location [facility] 4W/SW/SE Attending Chris Hernandez MD   Hosp Day # 3 PCP Chris Hernandez MD         Subjective:     Constitutional: Negative.    HENT: Negative.     Respiratory: Negative.     Cardiovascular: Negative.    Gastrointestinal: Negative.    Neurological: Negative.    Hematological: Negative.    Psychiatric/Behavioral: Negative.       Patient was seen and examined  Comfortable on room air and ambulatory  Asymptomatic overall  No fever since admission.   Wants to go home  Objective:   Blood pressure 140/63, pulse 55, temperature 97.5 °F (36.4 °C), temperature source Oral, resp. rate 18, height 5' 10\" (1.778 m), weight 193 lb 8 oz (87.8 kg), SpO2 99%.  Physical Exam  Constitutional:       General: He is not in acute distress.     Appearance: Normal appearance.   HENT:      Head: Normocephalic and atraumatic.      Nose: Nose normal.      Mouth/Throat:      Mouth: Mucous membranes are moist.   Eyes:      General: No scleral icterus.  Cardiovascular:      Rate and Rhythm: Normal rate.      Pulses: Normal pulses.      Heart sounds:      No gallop.   Pulmonary:      Effort: Pulmonary effort is normal. No respiratory distress.      Breath sounds: No wheezing, rhonchi or rales.   Chest:      Chest wall: No tenderness.   Abdominal:      General: Abdomen is flat. Bowel sounds are normal. There is no distension.      Palpations: Abdomen is soft.      Tenderness: There is no abdominal tenderness. There is no guarding or rebound.   Musculoskeletal:      Cervical back: Normal range of motion.      Right lower leg: No edema.      Left lower leg: No edema.   Skin:     General: Skin is dry.   Neurological:      General: No focal deficit present.      Mental Status: He is oriented to person, place, and time.         Results:   Lab Results   Component Value Date     WBC 2.5 (L) 09/29/2024    HGB 11.7 (L) 09/29/2024    HCT 34.6 (L) 09/29/2024    .0 09/29/2024    CREATSERUM 0.82 09/29/2024    BUN 14 09/29/2024     09/29/2024    K 4.1 09/29/2024     09/29/2024    CO2 26.0 09/29/2024     (H) 09/29/2024    CA 8.9 09/29/2024    ALB 3.8 08/23/2024    ALKPHO 124 (H) 08/23/2024    BILT 0.6 08/23/2024    TP 5.8 08/23/2024    AST 34 (H) 08/23/2024    ALT 25 08/23/2024    PTT 42.8 (H) 02/27/2024    INR 1.06 02/27/2024    T4F 1.3 08/23/2024    TSH 5.649 (H) 08/23/2024    LIP 97 (H) 11/20/2023    PSA 2.8 08/08/2018    ESRML 99 (H) 08/18/2018    MG 2.0 11/21/2023    PHOS 3.5 11/21/2023    TROPHS 7 01/14/2023    B12 >1,500 (H) 05/22/2018           Assessment & Plan:     1- admitted with Fever and chills with leukopenia  Fever likely secondary to immunotherapy  Resolved with no fever since admission  Cultures are negative  Leukopenia and currently on 2 immune therapies for lung cancer  UA unrevealing  C. difficile negative  Blood culture negative  Chest x-ray negative     -received Empiric antibiotic (cefepime)   - IVF  Almost normal procalcitonin     2-Recurrent lung cancer  On immune therapy  Plan:  -Bactrim ppx  -Decadron 1 mg daily  -As per oncology     3-COPD  Stable      -Arnuity and Brovana while here (Breo at home)     4-Pulmonary aspergillosis  -Continue voriconazole     5-History of PE and DVT  -Xarelto     6-WALLACE  -CPAP nightly        7-  Full code     Overall better no further fever  Home today            Kori Justice MD  9/29/2024

## 2024-09-29 NOTE — DISCHARGE SUMMARY
[facility]  Discharge Summary    Luis Villasenor Patient Status:  Inpatient    1945 MRN [id number]   Location [facility] 4W/SW/SE Attending Chris Hernandez MD   Hosp Day # 3 PCP Chris Hernandez MD     Date of Admission: 2024    Date of Discharge: 24    Admitting Diagnosis:     Weakness generalized [R53.1]  Urinary urgency [R39.15]  Fever, unknown origin /secondary to immunotherapy    Discharge Diagnosis:   Patient Active Problem List   Diagnosis    Arthritis    Cancer of upper lobe of left lung (HCC)    Encounter for care related to Port-a-Cath    Medication monitoring encounter    Iron deficiency anemia    Malabsorption (HCC)    Pneumonia of right lung due to infectious organism    Malignant neoplasm of left lung (HCC)    Pneumonia due to infectious organism    Anemia    S/P total knee arthroplasty, left    S/P total knee arthroplasty, right    Sensorineural hearing loss, bilateral    Rash    Chronic obstructive pulmonary disease, unspecified (HCC)    Weakness generalized    Encounter for antineoplastic immunotherapy    Multiple subsegmental pulmonary emboli without acute cor pulmonale (HCC)    Malignant neoplasm of left lung, unspecified part of lung (HCC)    Esophageal dysphagia    Pneumonitis    Pulmonary embolus (HCC)    Dysphagia    Aspergillus pneumonia (HCC)    Malignant neoplasm of lung (HCC)    Acute hypoxemic respiratory failure (HCC)    Aspergillus (HCC)    Adenocarcinoma of lung (HCC)    Acute cholecystitis    Choledocholithiasis    Dilated bile duct    Abdominal discomfort    History of pulmonary embolism    Gallbladder mass    Community acquired pneumonia, unspecified laterality    Community acquired pneumonia of right upper lobe of lung    Hypoxia    Spermatocele of epididymis, multiple    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)    Drug-induced pneumonitis    Chemotherapy management, encounter for    Long term current use of  anticoagulant    Thrombocytopenia (HCC)    Fever, unknown origin    Urinary urgency    Recurrent adenocarcinoma of lung (HCC)    Neoplastic malignant related fatigue       Reason for Admission:   Fever     Physical Exam:   Normal exam and please refer to my progress note from today    History of Present Illness and hospital course   79-year-old male with history of lung cancer on immunotherapy with COPD and aspergillosis and chronically on dexamethasone and voriconazole  Admitted with fever and mild GI upset  No fever since admission and cultures were negative  C. difficile negative  Chest x-ray negative  Patient had leukopenia related to immunotherapy  Fever was thought to be related to immunotherapy  Patient was doing very well during hospital course and asymptomatic  He received empiric antibiotics with cefepime  At the day of discharge patient was completely asymptomatic with normal lung exam        Consultations:   Pathology oncology        Complications:   None     Disposition: Home or Self Care    Discharge Condition: Good    In summary     1- Fever and chills with leukopenia  Related to immunotherapy  Leukopenia and currently on 2 immune therapies for lung cancer  UA unrevealing  C. difficile negative  Blood culture negative  Chest x-ray negative     -received Empiric antibiotic (cefepime)   -Gentle IVF       2-Recurrent lung cancer  On immune therapy  Plan:  -Bactrim ppx  -Decadron 1 mg daily  -As per oncology     3-COPD  Stable      -Arnuity and Brovana while here (Breo at home)     4-Pulmonary aspergillosis  -Continue voriconazole     5-History of PE and DVT  -Xarelto     6-WALLACE  -CPAP nightly        7-  Full code     Overall better no further fever since admission and was cleared to discharge by hematology oncology         Discharge Medications:   Current Discharge Medication List        CONTINUE these medications which have NOT CHANGED    Details   dexamethasone 1 MG Oral Tab Take 1 tablet (1 mg total)  by mouth daily with breakfast. Take in AM with food  Qty: 90 tablet, Refills: 0    Associated Diagnoses: Other fatigue; Pneumonitis      XARELTO 10 MG Oral Tab TAKE 1 TABLET BY MOUTH EVERYDAY AT BEDTIME  Qty: 90 tablet, Refills: 1      sulfamethoxazole-trimethoprim -160 MG Oral Tab per tablet TAKE 1 TABLET BY MOUTH TWICE A DAY ON MONDAY, WEDNESDAY AND FRIDAY  Qty: 72 tablet, Refills: 2    Associated Diagnoses: Chemotherapy management, encounter for; Pneumonitis; Cancer of upper lobe of left lung (HCC); Immunocompromised (HCC)      pantoprazole 40 MG Oral Tab EC Take 1 tablet (40 mg total) by mouth before breakfast.  Qty: 90 tablet, Refills: 1    Associated Diagnoses: Malignant neoplasm of left lung, unspecified part of lung (HCC)      dabrafenib mesylate 50 MG Oral Cap Take 3 capsules (150 mg total) by mouth 2 (two) times daily. Take THREE capsules at least 1 hour before or 2 hours after a meal, twice a day  Qty: 180 capsule, Refills: 5    Associated Diagnoses: Malignant neoplasm of left lung, unspecified part of lung (HCC)      Trametinib Dimethyl Sulfoxide 2 MG Oral Tab Take 2 mg by mouth daily. Take at least 1 hour before or at least 2 hours after a meal.  Qty: 30 tablet, Refills: 5    Associated Diagnoses: Malignant neoplasm of left lung, unspecified part of lung (HCC)      voriconazole 200 MG Oral Tab Take 1 tablet (200 mg total) by mouth 2 (two) times daily.  Qty: 180 tablet, Refills: 3      ipratropium-albuterol 0.5-2.5 (3) MG/3ML Inhalation Solution Take 3 mL by nebulization every 6 (six) hours as needed.  Qty: 90 each, Refills: 5      fluticasone furoate-vilanterol (BREO ELLIPTA) 100-25 MCG/INH Inhalation Aerosol Powder, Breath Activated Inhale 1 puff into the lungs daily. Follow with mouth rinse and spit  Qty: 1 each, Refills: 6      cholecalciferol 50 MCG (2000 UT) Oral Tab Take 0.5 tablets (1,000 Units total) by mouth every morning.      Vitamin C 500 MG Oral Tab Take 1 tablet (500 mg total) by  mouth every morning.      albuterol 108 (90 Base) MCG/ACT Inhalation Aero Soln Inhale 2 puffs into the lungs every 4 (four) hours as needed for Wheezing.  Qty: 1 each, Refills: 2             Follow up Visits: Follow-up with DR Hernandez in 2-3 weeks and with Dr Hou in 2-3 weeks     35 min with discharging the pt     Kori Justice MD  9/29/2024  9:20 AM

## 2024-09-29 NOTE — PLAN OF CARE
Luis is A&Ox4. Room air, using CPAP overnight. Afebrile overnight. Tele monitor in place. No reported pain. IVF infusing, IV abx per orders. Tolerating general diet. Call light within reach.     Problem: Patient Centered Care  Goal: Patient preferences are identified and integrated in the patient's plan of care  Description: Interventions:  - What would you like us to know as we care for you? From home with wife  - Provide timely, complete, and accurate information to patient/family  - Incorporate patient and family knowledge, values, beliefs, and cultural backgrounds into the planning and delivery of care  - Encourage patient/family to participate in care and decision-making at the level they choose  - Honor patient and family perspectives and choices  Outcome: Progressing     Problem: Patient/Family Goals  Goal: Patient/Family Long Term Goal  Description: Patient's Long Term Goal:     Interventions:  - See additional Care Plan goals for specific interventions  Outcome: Progressing  Goal: Patient/Family Short Term Goal  Description: Patient's Short Term Goal:     Interventions:   - See additional Care Plan goals for specific interventions  Outcome: Progressing     Problem: PAIN - ADULT  Goal: Verbalizes/displays adequate comfort level or patient's stated pain goal  Description: INTERVENTIONS:  - Encourage pt to monitor pain and request assistance  - Assess pain using appropriate pain scale  - Administer analgesics based on type and severity of pain and evaluate response  - Implement non-pharmacological measures as appropriate and evaluate response  - Consider cultural and social influences on pain and pain management  - Manage/alleviate anxiety  - Utilize distraction and/or relaxation techniques  - Monitor for opioid side effects  - Notify MD/LIP if interventions unsuccessful or patient reports new pain  - Anticipate increased pain with activity and pre-medicate as appropriate  Outcome: Progressing     Problem:  SAFETY ADULT - FALL  Goal: Free from fall injury  Description: INTERVENTIONS:  - Assess pt frequently for physical needs  - Identify cognitive and physical deficits and behaviors that affect risk of falls.  - [place] fall precautions as indicated by assessment.  - Educate pt/family on patient safety including physical limitations  - Instruct pt to call for assistance with activity based on assessment  - Modify environment to reduce risk of injury  - Provide assistive devices as appropriate  - Consider OT/PT consult to assist with strengthening/mobility  - Encourage toileting schedule  Outcome: Progressing     Problem: RESPIRATORY - ADULT  Goal: Achieves optimal ventilation and oxygenation  Description: INTERVENTIONS:  - Assess for changes in respiratory status  - Assess for changes in mentation and behavior  - Position to facilitate oxygenation and minimize respiratory effort  - Oxygen supplementation based on oxygen saturation or ABGs  - Provide Smoking Cessation handout, if applicable  - Encourage broncho-pulmonary hygiene including cough, deep breathe, Incentive Spirometry  - Assess the need for suctioning and perform as needed  - Assess and instruct to report SOB or any respiratory difficulty  - Respiratory Therapy support as indicated  - Manage/alleviate anxiety  - Monitor for signs/symptoms of CO2 retention  Outcome: Progressing

## 2024-09-30 ENCOUNTER — TELEPHONE (OUTPATIENT)
Dept: HEMATOLOGY/ONCOLOGY | Facility: HOSPITAL | Age: 79
End: 2024-09-30

## 2024-09-30 ENCOUNTER — TELEPHONE (OUTPATIENT)
Dept: PULMONOLOGY | Facility: CLINIC | Age: 79
End: 2024-09-30

## 2024-09-30 NOTE — TELEPHONE ENCOUNTER
Per Dr. Justice's discharge summary from 9/29/24 patient to follow up with Dr. Hernandez in 2-3 weeks and with Dr. Hou in 2-3 weeks.    Last office visit with Dr. Hernandez 7/29/24.  H & P with Dr. Hernandez 9/26/24.    Dr. Hernandez- okay to add patient to your schedule in 2-3 weeks?

## 2024-09-30 NOTE — TELEPHONE ENCOUNTER
Patient's wife accepted appointment for patient with Dr. Hernandez on 10/17/24 12:45 pm ([facility]). Appointment information given. Explained we do not have COVID-19 vaccine, but we just received flu vaccine. Reassurance provided. She voiced understanding.

## 2024-09-30 NOTE — TELEPHONE ENCOUNTER
Called patient and spoke with wife Per Dr. Bello patient is to resume taking medications dabrafenib mesylate 50 MG Oral Cap and Trametinib Dimethyl Sulfoxide 2 MG Oral Tab.  Wife stated understanding.

## 2024-09-30 NOTE — TELEPHONE ENCOUNTER
Patient's wife is calling to ask if patient should resume taking dabrafenib mesylate 50 MG Oral Cap and Trametinib Dimethyl Sulfoxide 2 MG Oral Tab. Called 9/30/24. MP

## 2024-09-30 NOTE — TELEPHONE ENCOUNTER
Patient wife calling to schedule 2 week hospital follow up.  Patient was discharged on 9/29/24.  She also needs to know when he can have Flu and Covid vaccine.  Please call

## 2024-09-30 NOTE — PAYOR COMM NOTE
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  102 this morning Tmax, associated with chills and generalized fatigue.  Patient states he was up every hour urinating and having increased urinary frequency and urgency which is not normal for him.  Has a slight cough, no other sick symptoms.        Past Medical History:    Deep vein thrombosis (HCC)    CURRENTLY HAS RIGHT BLOOD CLOT    Dysphagia    Esophageal reflux    Exposure to medical diagnostic radiation    COMPLETED    Hemorrhoids    History of blood transfusion    Pt on Immunotherapy    History of immunotherapy    Impotence    Lung cancer (HCC)    NSCLCA stage IIIB    Necrotizing granulomatous inflammation of lung (HCC)    Obstructive sleep apnea    Osteoarthritis    Personal history of antineoplastic chemotherapy    Pneumonia due to organism    Pulmonary embolism (HCC)    Pulmonary embolus (HCC)    Sinus problem    Sinus problems    Sleep apnea    CPAP    Visual impairment    wears eyeglasses     Physical Exam     ED Triage Vitals [09/26/24 0937]   /87   Pulse 88   Resp 20   Temp 98.8 °F (37.1 °C)   Temp src Oral   SpO2 93 %   O2 Device None (Room air)       Physical Exam  Constitutional:       General: He is not in acute distress.  Eyes:      Extraocular Movements: Extraocular movements intact.   Cardiovascular:      Rate and Rhythm: Normal rate and regular rhythm.      Pulses: Normal pulses.   Pulmonary:      Effort:  Pulmonary effort is normal. No respiratory distress.      Breath sounds: Normal breath sounds.   Abdominal:      General: Abdomen is flat. There is no distension.      Tenderness: There is no abdominal tenderness.   Musculoskeletal:         General: No swelling or deformity.      Cervical back: Normal range of motion.   Skin:     General: Skin is warm.   Neurological:      General: No focal deficit present.      Mental Status: He is alert.              ED Course     Labs Reviewed   BASIC METABOLIC PANEL (8) - Abnormal; Notable for the following components:       Result Value    Glucose 121 (*)     Sodium 135 (*)     Potassium 3.4 (*)     BUN/CREA Ratio 20.6 (*)     Calcium, Total 8.4 (*)     All other components within normal limits   CBC WITH DIFFERENTIAL WITH PLATELET - Abnormal; Notable for the following components:    WBC 2.8 (*)     HCT 38.5 (*)     RDW-SD 51.3 (*)     .0 (*)     Lymphocyte Absolute 0.39 (*)     All other components within normal limits   URINALYSIS WITH CULTURE REFLEX - Abnormal; Notable for the following components:    Ketones Urine 20 (*)     Protein Urine 30 (*)     Squamous Epi. Cells Few (*)     All other components within normal limits   RAPID SARS-COV-2 BY PCR - Normal   BLOOD CULTURE   BLOOD CULTURE     XR CHEST PA + LAT CHEST (CPT=71046)    Result Date: 9/26/2024  CONCLUSION:   No new pulmonary consolidation.  Minimal subsegmental atelectasis at the left lung base.  Trace left pleural effusion.  History of left upper lobe malignancy.  Unchanged appearance of the left upper paratracheal/suprahilar region, may relate to underlying malignancy and/or post treatment change.  Left upper atelectasis or scarring extending from the suprahilar opacity     Dictated by (CST): Mago Ruth MD on 9/26/2024 at 1:00 PM     Finalized by (CST): Mago Ruth MD on 9/26/2024 at 1:03 PM               MDM     Vitals:    09/26/24 0937 09/26/24 1130 09/26/24 1500   BP: 116/87 145/61 128/66    Pulse: 88 79 79   Resp: 20 18 18   Temp: 98.8 °F (37.1 °C)     TempSrc: Oral     SpO2: 93% 92% 93%   Weight: 86.2 kg     Height: 177.8 cm (5' 10\")         reported fever at home, afebrile here.  Urinary symptoms.  Nonfocal examination otherwise.  Patient is immunocompromise.  Will check cultures, fever.    ED Course as of 09/26/24 1541  ------------------------------------------------------------  Time: 09/26 1008  Comment: EKG interpretation by me: EKG sinus rhythm at a rate of 84, axis nomal, no concerning acute ischemic ST changes, incomplete rbbb  ------------------------------------------------------------  Time: 09/26 1137  Comment: Labs with leukopenia without neutropenia.  Reassuring renal function, no acidosis  ------------------------------------------------------------  Time: 09/26 1307  Comment: My interpretation of chest x-ray without acute consolidations, redemonstrating chronic changes  ------------------------------------------------------------  Time: 09/26 1512  Comment: Unclear source of patient's fever.  He remains weak and difficulty ambulating here, consulted with oncologist, will send cultures and start prophylactic antibiotics.  Patient endorsed to patient's primary care physician for admission.         Disposition and Plan     Clinical Impression:  1. Fever, unknown origin    2. Weakness generalized    3. Urinary urgency        Disposition:  Admit    Follow-up:  No follow-up provider specified.    Medications Prescribed:  Current Discharge Medication List          Hospital Problems       Present on Admission  Date Reviewed: 8/23/2024            ICD-10-CM Noted POA    * (Principal) Fever, unknown origin R50.9 9/26/2024 Unknown              9/26 H&P     Luis Villasenor is a(n) 79 year old male with chills and dysuria.     HPI:   The patient has a history of recurrent lung cancer currently on dabrafenib and trametanib.  2 days ago he had chills with malaise and overnight he had urination  every hour with some dysuria.  He also noted a fever.  He came to emergency room for this concern.  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  There is a history of deep venous thrombosis as well.  He denies blood in the urine or stool.  There is no new respiratory symptomatology.  He does have mild cough occasionally with yellow phlegm.    1.  Fever and chills with dysuria-the urinalysis shows only 1-5 white blood cells but the patient is mildly leukopenic and currently on 2 immune therapies.  There is no obvious source.  The patient has an abnormal chest x-ray at baseline but this is unchanged.  The urine is unrevealing.  There is no diarrhea.  The patient is ill-appearing and will empirically treat with antibiotic in the short-term awaiting cultures.     Recommendations:  1.  Empiric antibiotic with cefepime  2.  Blood cultures  3.  Gentle hydration  4.  Will follow clinically.     2.  Recurrent lung cancer     Recommendations: As per Dr. Bello.     3.  DVT prophylaxis with history of right femoral vein thrombus-continue Xarelto     4.  Pulmonary aspergillosis-continue voriconazole     5.  History of immunotherapy associated pneumonitis     6.  COPD-continue current management            9/27 Pulmonary    Seen and examined while resting in bed. Wife at bedside. Feels slightly better today. Had 2 episodes of diarrhea overnight. Had chills overnight but no fever. No shortness of breath or cough. No abdominal pain, nausea, or vomiting. No dysuria or increased urinary frequency/urgency. Appetite is good. On room air.     Blood pressure 119/46, pulse 70, temperature 97.9 °F (36.6 °C), temperature source Oral, resp. rate 18, height 5' 10\" (1.778 m), weight 193 lb 8 oz (87.8 kg), SpO2 96%.         Findings: Ecchymosis (scattered ecchymosis b/l upper extremities) present.       Component Value Date     WBC 2.4 (L) 09/27/2024      HGB 12.7 (L) 09/27/2024     HCT 37.2 (L) 09/27/2024     .0 (L) 09/27/2024     CREATSERUM 0.90 09/27/2024     BUN 18 09/27/2024      09/27/2024     K 3.7 09/27/2024      09/27/2024     CO2 24.0 09/27/2024      (H) 09/27/2024     CA 8.4 (L) 09/27/2024       Fever and chills with leukopenia  Leukopenia and currently on 2 immune therapies for lung cancer  UA unrevealing  CXR abnormal at baseline but unchanged  Now with diarrhea; C diff pending  Blood cultures pending  Plan:  -Empiric antibiotic (cefepime) while awaiting cultures  -Follow up blood cultures  -Follow up stool for C diff  -Gentle IVF     Recurrent lung cancer  On immune therapy  Plan:  -Bactrim ppx  -Decadron 1 mg daily  -As per oncology     COPD  No signs of exacerbation  Plan:  -Arnuity and Brovana while here (Breo at home)     Pulmonary aspergillosis  Plan:  -Continue voriconazole     History of PE and DVT  Plan:  -Xarelto     WALLACE  Plan:  -CPAP nightly     Code status: Full code            9/27 Hem Onc         Metastatic BRAF positive isabel adenocarcinoma    - on dabrafenib trametinib since 10/2023 with favorable response, dose reduced due to significant fatigue.   - CT C/A/P 7/23/24 new bilateral lung nodules non specific infectious/inflammatory vs metastases. Stable SLIM post treatment fibrosis. GGO in both lungs resolved. Plan to repeat next month for close surveillance.   - ok to hold dabrafenib and trametinib during admission and resume at discharge.      Fever  - T max 102 f at home, afebrile since admission  - probably a viral illness vs drug induced fever (most common adverse event associated with dabrafenib plus trametinib)  - infectious work up negative so far, stool sent for C diff.   - continue cefepime empirically and monitor temp and cbc.   - continue ppx bactrim and voriconazole      Fatigue:  - likely multifactorial; improved with dexamethasone 1 mg daily; decrease to 1 mg every other day.   - echocardiogram  7/22/24 normal. FT4 normal.         History of PE  - diagnosed January 2023, continue xarelto proph dose 10mg daily            9/28 Hem Onc      Subjective:  Feels much better today. Remains afebrile. Diarrhea resolved. No other complaints.     Metastatic BRAF positive lung adenocarcinoma    - on dabrafenib trametinib since 10/2023 with favorable response, dose reduced due to significant fatigue.   - CT C/A/P 7/23/24 new bilateral lung nodules non specific infectious/inflammatory vs metastases. Stable SLIM post treatment fibrosis. GGO in both lungs resolved. Plan to repeat next month for close surveillance.   - ok to hold dabrafenib and trametinib during admission and resume at discharge.      Fever  - T max 102 f at home, afebrile since admission  - probably a viral illness vs drug induced fever (most common adverse effect of dabrafenib plus trametinib)  - infectious work up negative so far, stool sent for C diff.   - continue cefepime empirically and monitor temp and cbc.  - continue ppx bactrim and voriconazole   - appreciate pulm input.      Fatigue:  - likely multifactorial; improved with dexamethasone 1 mg daily; decrease to 1 mg every other day.   - echocardiogram 7/22/24 normal. FT4 normal.      History of PE  - diagnosed January 2023, continue xarelto proph dose 10mg daily              9/28 Pulmonary    Blood pressure 128/63, pulse 58, temperature 97.7 °F (36.5 °C), temperature source Oral, resp. rate 18, height 5' 10\" (1.778 m), weight 193 lb 8 oz (87.8 kg), SpO2 97%.       1- Fever and chills with leukopenia  Leukopenia and currently on 2 immune therapies for lung cancer  UA unrevealing  C. difficile negative  Blood culture negative  Chest x-ray negative     -Empiric antibiotic (cefepime)   -Gentle IVF  Check procalcitonin     2-Recurrent lung cancer  On immune therapy  Plan:  -Bactrim ppx  -Decadron 1 mg daily  -As per oncology     3-COPD  Stable      -Arnuity and Brovana while here (Breo at home)      4-Pulmonary aspergillosis  -Continue voriconazole     5-History of PE and DVT  -Xarelto     6-WALLACE  -CPAP nightly        7-  Full code        9/29 Pulmonary    1- admitted with Fever and chills with leukopenia  Fever likely secondary to immunotherapy  Resolved with no fever since admission  Cultures are negative  Leukopenia and currently on 2 immune therapies for lung cancer  UA unrevealing  C. difficile negative  Blood culture negative  Chest x-ray negative     -received Empiric antibiotic (cefepime)   - IVF  Almost normal procalcitonin     2-Recurrent lung cancer  On immune therapy  Plan:  -Bactrim ppx  -Decadron 1 mg daily  -As per oncology     3-COPD  Stable      -Arnuity and Brovana while here (Breo at home)     4-Pulmonary aspergillosis  -Continue voriconazole     5-History of PE and DVT  -Xarelto     6-WALLACE  -CPAP nightly        7-  Full code    Medications 09/23/24 09/24/24 09/25/24 09/26/24 09/27/24 09/28/24 09/29/24   arformoterol (Brovana) 15 MCG/2ML nebulizer solution 15 mcg  Dose: 15 mcg  Freq: 2 times daily (RT) Route: Nebulization  Start: 09/27/24 1045 End: 09/29/24 1309   Order specific questions:           1222 KN-Given           1422 AA-Given     2116 EL-Given      0756 JF-Given     1309-D/C'd      ascorbic acid (Vitamin C) tab 500 mg  Dose: 500 mg  Freq: Every morning Route: OR  Start: 09/26/24 1730 End: 09/29/24 1309       1820 BT-Given      1111 ML-Given      1000 ML-Given      1002 ML-Given     1309-D/C'd      ceFEPIme (Maxipime) 1 g in sodium chloride 0.9% 100 mL IVPB-MBP  Dose: 1 g  Freq: Every 8 hours Route: IV  Last Dose: 1 g (09/29/24 0523)  Start: 09/26/24 2000 End: 09/29/24 1309   Order specific questions:          2059 KS-New Bag      0359 KS-New Bag     1109 ML-New Bag     2114 MW-New Bag      0436 MW-New Bag     1249 ML-New Bag     2059 MF-New Bag      0523 MF-New Bag          1309-D/C'd          ceFEPIme (Maxpime) 2 g in sodium chloride 0.9% 100 mL IVPB-MBP  Dose: 2 g  Freq: Once  Route: IV  Last Dose: Stopped (09/26/24 1636)  Start: 09/26/24 1507 End: 09/26/24 1636   Order specific questions:          1606 KE-New Bag     1636 KE-Stopped           cholecalciferol (Vitamin D3) tab 1,000 Units  Dose: 1,000 Units  Freq: Every morning Route: OR  Start: 09/26/24 1730 End: 09/29/24 1309       1819 BT-Given      1110 ML-Given      1000 ML-Given      1002 ML-Given     1309-D/C'd      dexamethasone (Decadron) tab 1 mg  Dose: 1 mg  Freq: Every other day Route: OR  Start: 09/28/24 0900 End: 09/29/24 1309         1000 ML-Given      1309-D/C'd      dexamethasone (Decadron) tab 1 mg  Dose: 1 mg  Freq: Daily with breakfast Route: OR  Start: 09/27/24 0800 End: 09/27/24 1630        1110 ML-Given     1630-D/C'd        fluticasone furoate (Arnuity Ellipta) 100 MCG/ACT inhaler 1 puff  Dose: 1 puff  Freq: Daily Route: IN  Start: 09/27/24 1045 End: 09/29/24 1309   Admin Instructions:   Administer at the same time every day; rinse mouth with water after use (do not swallow)    R.N. TO ADMINISTER  RT To Administer First Dose.        1135 KN-Given      1100 ML-Given           1309-D/C'd          pantoprazole (Protonix) DR tab 40 mg  Dose: 40 mg  Freq: Before breakfast Route: OR  Start: 09/27/24 0700 End: 09/29/24 1309   Admin Instructions:   Do not crush        0527 KS-Given      0552 MW-Given      0523 MF-Given     1309-D/C'd      potassium chloride (Klor-Con M20) tab 40 mEq  Dose: 40 mEq  Freq: Once Route: OR  Start: 09/26/24 1800 End: 09/26/24 1820   Admin Instructions:   Do not crush       1820 BT-Given           rivaroxaban (Xarelto) tab 10 mg  Dose: 10 mg  Freq: Nightly Route: OR  Start: 09/26/24 2100 End: 09/29/24 1309   Admin Instructions:   Give with food.       2059 KS-Given      2115 MW-Given      2059 MF-Given      1309-D/C'd          sodium chloride 0.9 % IV bolus 1,000 mL  Dose: 1,000 mL  Freq: Once Route: IV  Last Dose: Stopped (09/26/24 1608)  Start: 09/26/24 1014 End: 09/26/24 1608       1056 SW-New  Bag     1608 KE-Stopped           sulfamethoxazole-trimethoprim DS (Bactrim DS) 800-160 MG per tab 1 tablet  Dose: 1 tablet  Freq: Daily Route: OR  Start: 09/26/24 1730 End: 09/29/24 1309   Admin Instructions:   Bactrim DS = 160 mg of Trimethoprim   Order specific questions:          1821 BT-Given      1110 ML-Given      1000 ML-Given      1002 ML-Given     1309-D/C'd      voriconazole (Vfend) tab 200 mg  Dose: 200 mg  Freq: 2 times daily Route: OR  Start: 09/26/24 2100 End: 09/29/24 1309   Admin Instructions:   CAUTION: REPRODUCTIVE RISK   Order specific questions:          2108 KS-Given      1111 ML-Given     2115 MW-Given      1000 ML-Given     2059 MF-Given      1002 ML-Given     1309-D/C'd          sodium chloride 0.9% infusion  Rate: 50 mL/hr  Freq: Continuous Route: IV  Start: 09/26/24 1730 End: 09/29/24 1309       1816 BT-New Bag      0359 KS-New Bag      1238 ML-Rate/Dose Change      0524 MF-New Bag     1309-D/C'd        09/29/24 0700 -- 44 Abnormal  -- -- -- -- -- -- BP   09/29/24 0524 97.5 °F (36.4 °C) 55 18 140/63 99 % -- CPAP --    09/29/24 0400 -- 44 Abnormal  -- -- -- -- -- -- EP   09/28/24 2157 97.8 °F (36.6 °C) 61 18 125/60 94 % -- None (Room air) --    09/28/24 2116 -- 60 -- -- -- -- -- -- EL   09/28/24 2000 -- 65 -- -- -- -- -- -- EP   09/28/24 1422 -- -- -- -- 94 % -- None (Room air) -- AA   09/28/24 1248 97.6 °F (36.4 °C) -- -- -- -- -- -- -- ML   09/28/24 1213 97.7 °F (36.5 °C) 58 18 128/63 97 % -- None (Room air) -- EPA   09/28/24 0312 -- 51 18 133/71 99 % -- CPAP --    09/27/24 1945 97.9 °F (36.6 °C) 61 18 105/63 95 % -- None (Room air) --    09/27/24 1154 97.9 °F (36.6 °C) -- 18 119/46 96 % -- None (Room air) --    09/27/24 0514 98.5 °F (36.9 °C) 70 18 98/60 91 % -- None (Room air) --    09/27/24 0400 99.5 °F (37.5 °C) -- -- -- -- -- -- -- KS   09/27/24 0300 -- 74 -- -- -- -- -- --    09/26/24 1906 -- 84 -- -- -- -- -- --    09/26/24 1806 -- 80 -- -- -- -- -- --    09/26/24  1709 98.9 °F (37.2 °C) 71 16 117/58 95 % -- None (Room air) --    24 1650 -- -- -- -- -- 193 lb 8 oz (87.8 kg) -- --    24 1630 -- 75 16 118/58 93 % -- None (Room air) --    24 1615 -- 77 -- 116/62 94 % -- None (Room air) --    24 1500 -- 79 18 128/66 93 % -- None (Room air) --    24 1130 -- 79 18 145/61 92 % -- None (Room air) --    24 0937 98.8 °F (37.1 °C) 88 20 116/87 93 % 190 lb (86.2 kg) None (Room air) -- CT             --------------  DISCHARGE REVIEW    Payor: [employer] AD[employer] PPO  Subscriber #:  [id number]  Authorization Number: [id number]    Admit date: 24  Admit time:   4:47 PM  Discharge Date: 2024 11:09 AM     Admitting Physician: Chris Hernandez MD  Attending Physician:  No att. providers found  Primary Care Physician: Chris Hernandez MD          Discharge Summary Notes        Discharge Summary signed by Kori Justice MD at 2024  9:25 AM       Author: Kori Justice MD Specialty: PULMONARY DISEASES, Internal Medicine, Critical Care Author Type: Physician    Filed: 2024  9:25 AM Date of Service: 2024  9:20 AM Status: Signed    : Kori Justice MD (Physician)         [facility]  Discharge Summary    Luis Villasenor Patient Status:  Inpatient    1945 MRN [id number]   Location [facility] 4W/SW/SE Attending Chris Hernandez MD   Hosp Day # 3 PCP Chris Hernandez MD     Date of Admission: 2024    Date of Discharge: 24    Admitting Diagnosis:     Weakness generalized [R53.1]  Urinary urgency [R39.15]  Fever, unknown origin /secondary to immunotherapy    Discharge Diagnosis:   Patient Active Problem List   Diagnosis    Arthritis    Cancer of upper lobe of left lung (HCC)    Encounter for care related to Port-a-Cath    Medication monitoring encounter    Iron deficiency anemia    Malabsorption (HCC)    Pneumonia of right lung due to infectious organism    Malignant neoplasm of left lung  (HCC)    Pneumonia due to infectious organism    Anemia    S/P total knee arthroplasty, left    S/P total knee arthroplasty, right    Sensorineural hearing loss, bilateral    Rash    Chronic obstructive pulmonary disease, unspecified (HCC)    Weakness generalized    Encounter for antineoplastic immunotherapy    Multiple subsegmental pulmonary emboli without acute cor pulmonale (HCC)    Malignant neoplasm of left lung, unspecified part of lung (HCC)    Esophageal dysphagia    Pneumonitis    Pulmonary embolus (HCC)    Dysphagia    Aspergillus pneumonia (HCC)    Malignant neoplasm of lung (HCC)    Acute hypoxemic respiratory failure (HCC)    Aspergillus (HCC)    Adenocarcinoma of lung (HCC)    Acute cholecystitis    Choledocholithiasis    Dilated bile duct    Abdominal discomfort    History of pulmonary embolism    Gallbladder mass    Community acquired pneumonia, unspecified laterality    Community acquired pneumonia of right upper lobe of lung    Hypoxia    Spermatocele of epididymis, multiple    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)    Drug-induced pneumonitis    Chemotherapy management, encounter for    Long term current use of anticoagulant    Thrombocytopenia (HCC)    Fever, unknown origin    Urinary urgency    Recurrent adenocarcinoma of lung (HCC)    Neoplastic malignant related fatigue       Reason for Admission:   Fever     Physical Exam:   Normal exam and please refer to my progress note from today    History of Present Illness and hospital course   79-year-old male with history of lung cancer on immunotherapy with COPD and aspergillosis and chronically on dexamethasone and voriconazole  Admitted with fever and mild GI upset  No fever since admission and cultures were negative  C. difficile negative  Chest x-ray negative  Patient had leukopenia related to immunotherapy  Fever was thought to be related to immunotherapy  Patient was doing very well during hospital course and  asymptomatic  He received empiric antibiotics with cefepime  At the day of discharge patient was completely asymptomatic with normal lung exam        Consultations:   Pathology oncology        Complications:   None     Disposition: Home or Self Care    Discharge Condition: Good    In summary     1- Fever and chills with leukopenia  Related to immunotherapy  Leukopenia and currently on 2 immune therapies for lung cancer  UA unrevealing  C. difficile negative  Blood culture negative  Chest x-ray negative     -received Empiric antibiotic (cefepime)   -Gentle IVF       2-Recurrent lung cancer  On immune therapy  Plan:  -Bactrim ppx  -Decadron 1 mg daily  -As per oncology     3-COPD  Stable      -Arnuity and Brovana while here (Breo at home)     4-Pulmonary aspergillosis  -Continue voriconazole     5-History of PE and DVT  -Xarelto     6-WALLACE  -CPAP nightly        7-  Full code     Overall better no further fever since admission and was cleared to discharge by hematology oncology         Discharge Medications:   Current Discharge Medication List        CONTINUE these medications which have NOT CHANGED    Details   dexamethasone 1 MG Oral Tab Take 1 tablet (1 mg total) by mouth daily with breakfast. Take in AM with food  Qty: 90 tablet, Refills: 0    Associated Diagnoses: Other fatigue; Pneumonitis      XARELTO 10 MG Oral Tab TAKE 1 TABLET BY MOUTH EVERYDAY AT BEDTIME  Qty: 90 tablet, Refills: 1      sulfamethoxazole-trimethoprim -160 MG Oral Tab per tablet TAKE 1 TABLET BY MOUTH TWICE A DAY ON MONDAY, WEDNESDAY AND FRIDAY  Qty: 72 tablet, Refills: 2    Associated Diagnoses: Chemotherapy management, encounter for; Pneumonitis; Cancer of upper lobe of left lung (HCC); Immunocompromised (HCC)      pantoprazole 40 MG Oral Tab EC Take 1 tablet (40 mg total) by mouth before breakfast.  Qty: 90 tablet, Refills: 1    Associated Diagnoses: Malignant neoplasm of left lung, unspecified part of lung (HCC)      dabrafenib  mesylate 50 MG Oral Cap Take 3 capsules (150 mg total) by mouth 2 (two) times daily. Take THREE capsules at least 1 hour before or 2 hours after a meal, twice a day  Qty: 180 capsule, Refills: 5    Associated Diagnoses: Malignant neoplasm of left lung, unspecified part of lung (HCC)      Trametinib Dimethyl Sulfoxide 2 MG Oral Tab Take 2 mg by mouth daily. Take at least 1 hour before or at least 2 hours after a meal.  Qty: 30 tablet, Refills: 5    Associated Diagnoses: Malignant neoplasm of left lung, unspecified part of lung (HCC)      voriconazole 200 MG Oral Tab Take 1 tablet (200 mg total) by mouth 2 (two) times daily.  Qty: 180 tablet, Refills: 3      ipratropium-albuterol 0.5-2.5 (3) MG/3ML Inhalation Solution Take 3 mL by nebulization every 6 (six) hours as needed.  Qty: 90 each, Refills: 5      fluticasone furoate-vilanterol (BREO ELLIPTA) 100-25 MCG/INH Inhalation Aerosol Powder, Breath Activated Inhale 1 puff into the lungs daily. Follow with mouth rinse and spit  Qty: 1 each, Refills: 6      cholecalciferol 50 MCG (2000 UT) Oral Tab Take 0.5 tablets (1,000 Units total) by mouth every morning.      Vitamin C 500 MG Oral Tab Take 1 tablet (500 mg total) by mouth every morning.      albuterol 108 (90 Base) MCG/ACT Inhalation Aero Soln Inhale 2 puffs into the lungs every 4 (four) hours as needed for Wheezing.  Qty: 1 each, Refills: 2             Follow up Visits: Follow-up with DR Hernandez in 2-3 weeks and with Dr Hou in 2-3 weeks     35 min with discharging the pt     Kori Justice MD  9/29/2024  9:20 AM     Electronically signed by Kori Justice MD on 9/29/2024  9:25 AM         REVIEWER COMMENTS

## 2024-10-01 ENCOUNTER — TELEPHONE (OUTPATIENT)
Dept: PULMONOLOGY | Facility: CLINIC | Age: 79
End: 2024-10-01

## 2024-10-01 ENCOUNTER — PATIENT OUTREACH (OUTPATIENT)
Dept: CASE MANAGEMENT | Age: 79
End: 2024-10-01

## 2024-10-01 NOTE — TELEPHONE ENCOUNTER
Noted. RN spoke to patient yesterday. Please see telephone encounter from 9/30/24 for Dr. Hernandez. Thanks.

## 2024-10-01 NOTE — TELEPHONE ENCOUNTER
Attempted to reach the patient for a Transitional Care Management hospital follow up call but was unsuccessful.    The patient is scheduled for a hospital follow up appointment with Dr. Hernandez on 10/17/2024, but a Transitional Care Management/hospital follow up appointment is recommended by 10/6/2024, as the patient is a high risk for readmission.  Please advise.    BOOK BY DATE (last date for Transitional Care Management): 10/13//2024    Please follow up with the patient and assist with scheduling a sooner Transitional Care Management/hospital follow up appointment.  Thank you!

## 2024-10-01 NOTE — TELEPHONE ENCOUNTER
Dr. Hernandez- patient is scheduled for an appointment on 10/17/24. Discharge date 9/29/24. Do you want to see patient sooner? Please see below message.

## 2024-10-01 NOTE — PROGRESS NOTES
Attempted to reach the patient to complete a Transitional Care Management-Hospital follow up call. Left a message for the patient to call the nurse care manager back at, [phone].    A telephone encounter was sent to the primary care physician office for an appointment review.

## 2024-10-04 ENCOUNTER — APPOINTMENT (OUTPATIENT)
Dept: HEMATOLOGY/ONCOLOGY | Facility: HOSPITAL | Age: 79
End: 2024-10-04
Attending: INTERNAL MEDICINE
Payer: MEDICARE

## 2024-10-09 ENCOUNTER — IMMUNIZATION (OUTPATIENT)
Dept: LAB | Age: 79
End: 2024-10-09
Attending: EMERGENCY MEDICINE
Payer: MEDICARE

## 2024-10-09 DIAGNOSIS — Z23 NEED FOR VACCINATION: Primary | ICD-10-CM

## 2024-10-09 PROCEDURE — 90471 IMMUNIZATION ADMIN: CPT

## 2024-10-09 PROCEDURE — 90662 IIV NO PRSV INCREASED AG IM: CPT

## 2024-10-09 PROCEDURE — 90480 ADMN SARSCOV2 VAC 1/ONLY CMP: CPT

## 2024-10-17 ENCOUNTER — OFFICE VISIT (OUTPATIENT)
Dept: PULMONOLOGY | Facility: CLINIC | Age: 79
End: 2024-10-17
Payer: MEDICARE

## 2024-10-17 VITALS
SYSTOLIC BLOOD PRESSURE: 113 MMHG | BODY MASS INDEX: 27.77 KG/M2 | HEIGHT: 70 IN | OXYGEN SATURATION: 96 % | RESPIRATION RATE: 14 BRPM | WEIGHT: 194 LBS | HEART RATE: 68 BPM | DIASTOLIC BLOOD PRESSURE: 69 MMHG

## 2024-10-17 DIAGNOSIS — C34.92 ADENOCARCINOMA OF LEFT LUNG (HCC): Primary | ICD-10-CM

## 2024-10-17 PROCEDURE — 1159F MED LIST DOCD IN RCRD: CPT | Performed by: INTERNAL MEDICINE

## 2024-10-17 PROCEDURE — 99214 OFFICE O/P EST MOD 30 MIN: CPT | Performed by: INTERNAL MEDICINE

## 2024-10-17 PROCEDURE — 1126F AMNT PAIN NOTED NONE PRSNT: CPT | Performed by: INTERNAL MEDICINE

## 2024-10-17 PROCEDURE — 3008F BODY MASS INDEX DOCD: CPT | Performed by: INTERNAL MEDICINE

## 2024-10-17 PROCEDURE — 1111F DSCHRG MED/CURRENT MED MERGE: CPT | Performed by: INTERNAL MEDICINE

## 2024-10-17 PROCEDURE — 3074F SYST BP LT 130 MM HG: CPT | Performed by: INTERNAL MEDICINE

## 2024-10-17 PROCEDURE — 3078F DIAST BP <80 MM HG: CPT | Performed by: INTERNAL MEDICINE

## 2024-10-17 NOTE — PROGRESS NOTES
Multiple attempts to reach the pt however unable to reach the pt. Past TCM timeframe, closing encounter.

## 2024-10-17 NOTE — PROGRESS NOTES
The patient is an 79-year-old male who I know well from prior evaluation who comes in now for follow-up.  There is no significant cough or fever at this juncture.  Breathing is pretty good.  He does have some slight instability of balance and he associates this with having vertigo in the past which she has had intermittently and he had a negative MRI of the brain last year except for very mild microangiopathic disease.    Review of Systems:  Vision normal. Ear nose and throat normal. Bowel normal. Bladder function normal. No depression. No thyroid disease. No lymphatic system concerns.  No rash. Muscles and joints unremarkable. No weight loss no weight gain.    Physical Examination:  Vital signs normal. HEENT examination is unremarkable with pupils equal round and reactive to light and accommodation. Neck without adenopathy, thyromegaly, JVD nor bruit. Lungs clear to auscultation and percussion. Cardiac regular rate and rhythm no murmur. Abdomen nontender, without hepatosplenomegaly and no mass appreciable. Extremities and Musculoskeletal without clubbing cyanosis nor edema, and mobility acceptable. Neurologic grossly intact with symmetric tone and strength and reflex.    Assessment and plan:  1.  Cough with fever-resolved  2.  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  Lung cancer-currently taking chemotherapeutic pills per oncology.  Follow-up oncology in the short-term.  5.  COPD-continue current therapy  6.  General Health-the patient should never drink and drive, always wear a safety belt, have a smoke detector and fire extiguisher in the house, avoid the use of candles in the home as they are the most common cause of housefire, and see me annually for complete examination.  7.  Sensation of being slightly unbalanced-patient describes having had vertigo  previously.  Had an MRI of the brain last year that was unimpressive.  He does have a history of lung cancer and certainly metastasis could manifest in this way; however, his cancer has been well-controlled.  He tells me that he has had this in the past and that the vertigo waxes and wanes for him.  We negotiated the endpoint that I will not repeat the MRI of the brain at this moment but if the problem persists or worsens, will do so.  Meclizine if needed.

## 2024-10-31 DIAGNOSIS — R53.83 OTHER FATIGUE: ICD-10-CM

## 2024-11-01 ENCOUNTER — APPOINTMENT (OUTPATIENT)
Dept: HEMATOLOGY/ONCOLOGY | Facility: HOSPITAL | Age: 79
End: 2024-11-01
Attending: INTERNAL MEDICINE
Payer: MEDICARE

## 2024-11-01 VITALS
SYSTOLIC BLOOD PRESSURE: 123 MMHG | TEMPERATURE: 97 F | WEIGHT: 191.63 LBS | RESPIRATION RATE: 18 BRPM | DIASTOLIC BLOOD PRESSURE: 60 MMHG | BODY MASS INDEX: 27.43 KG/M2 | HEIGHT: 70 IN | HEART RATE: 69 BPM | OXYGEN SATURATION: 96 %

## 2024-11-01 DIAGNOSIS — C34.92 MALIGNANT NEOPLASM OF LEFT LUNG, UNSPECIFIED PART OF LUNG (HCC): ICD-10-CM

## 2024-11-01 DIAGNOSIS — C34.90 NON-SMALL CELL LUNG CANCER METASTATIC TO INTRATHORACIC LYMPH NODE (HCC): Primary | ICD-10-CM

## 2024-11-01 DIAGNOSIS — C34.12 CANCER OF UPPER LOBE OF LEFT LUNG (HCC): ICD-10-CM

## 2024-11-01 DIAGNOSIS — Z51.11 CHEMOTHERAPY MANAGEMENT, ENCOUNTER FOR: ICD-10-CM

## 2024-11-01 DIAGNOSIS — C34.92 RECURRENT ADENOCARCINOMA OF LEFT LUNG (HCC): ICD-10-CM

## 2024-11-01 DIAGNOSIS — Z51.81 MEDICATION MONITORING ENCOUNTER: ICD-10-CM

## 2024-11-01 DIAGNOSIS — C77.1 NON-SMALL CELL LUNG CANCER METASTATIC TO INTRATHORACIC LYMPH NODE (HCC): Primary | ICD-10-CM

## 2024-11-01 DIAGNOSIS — R53.83 OTHER FATIGUE: ICD-10-CM

## 2024-11-01 LAB
ALBUMIN SERPL-MCNC: 4.2 G/DL (ref 3.2–4.8)
ALBUMIN/GLOB SERPL: 2.2 {RATIO} (ref 1–2)
ALP LIVER SERPL-CCNC: 122 U/L
ALT SERPL-CCNC: 29 U/L
ANION GAP SERPL CALC-SCNC: 6 MMOL/L (ref 0–18)
AST SERPL-CCNC: 38 U/L (ref ?–34)
BASOPHILS # BLD AUTO: 0.01 X10(3) UL (ref 0–0.2)
BASOPHILS NFR BLD AUTO: 0.3 %
BILIRUB SERPL-MCNC: 0.6 MG/DL (ref 0.2–1.1)
BUN BLD-MCNC: 23 MG/DL (ref 9–23)
BUN/CREAT SERPL: 27.4 (ref 10–20)
CALCIUM BLD-MCNC: 9.1 MG/DL (ref 8.7–10.4)
CHLORIDE SERPL-SCNC: 108 MMOL/L (ref 98–112)
CO2 SERPL-SCNC: 26 MMOL/L (ref 21–32)
CREAT BLD-MCNC: 0.84 MG/DL
DEPRECATED RDW RBC AUTO: 49.7 FL (ref 35.1–46.3)
EGFRCR SERPLBLD CKD-EPI 2021: 88 ML/MIN/1.73M2 (ref 60–?)
EOSINOPHIL # BLD AUTO: 0.04 X10(3) UL (ref 0–0.7)
EOSINOPHIL NFR BLD AUTO: 1.1 %
ERYTHROCYTE [DISTWIDTH] IN BLOOD BY AUTOMATED COUNT: 14.3 % (ref 11–15)
GLOBULIN PLAS-MCNC: 1.9 G/DL (ref 2–3.5)
GLUCOSE BLD-MCNC: 90 MG/DL (ref 70–99)
HCT VFR BLD AUTO: 37.9 %
HGB BLD-MCNC: 13 G/DL
IMM GRANULOCYTES # BLD AUTO: 0.02 X10(3) UL (ref 0–1)
IMM GRANULOCYTES NFR BLD: 0.6 %
LYMPHOCYTES # BLD AUTO: 0.76 X10(3) UL (ref 1–4)
LYMPHOCYTES NFR BLD AUTO: 21.7 %
MCH RBC QN AUTO: 32.5 PG (ref 26–34)
MCHC RBC AUTO-ENTMCNC: 34.3 G/DL (ref 31–37)
MCV RBC AUTO: 94.8 FL
MONOCYTES # BLD AUTO: 0.46 X10(3) UL (ref 0.1–1)
MONOCYTES NFR BLD AUTO: 13.1 %
NEUTROPHILS # BLD AUTO: 2.21 X10 (3) UL (ref 1.5–7.7)
NEUTROPHILS # BLD AUTO: 2.21 X10(3) UL (ref 1.5–7.7)
NEUTROPHILS NFR BLD AUTO: 63.2 %
OSMOLALITY SERPL CALC.SUM OF ELEC: 293 MOSM/KG (ref 275–295)
PLATELET # BLD AUTO: 225 10(3)UL (ref 150–450)
POTASSIUM SERPL-SCNC: 3.6 MMOL/L (ref 3.5–5.1)
PROT SERPL-MCNC: 6.1 G/DL (ref 5.7–8.2)
RBC # BLD AUTO: 4 X10(6)UL
SODIUM SERPL-SCNC: 140 MMOL/L (ref 136–145)
TSI SER-ACNC: 2.28 UIU/ML (ref 0.55–4.78)
WBC # BLD AUTO: 3.5 X10(3) UL (ref 4–11)

## 2024-11-01 PROCEDURE — 99215 OFFICE O/P EST HI 40 MIN: CPT | Performed by: PHYSICIAN ASSISTANT

## 2024-11-01 PROCEDURE — 85025 COMPLETE CBC W/AUTO DIFF WBC: CPT

## 2024-11-01 PROCEDURE — 36591 DRAW BLOOD OFF VENOUS DEVICE: CPT

## 2024-11-01 PROCEDURE — 80053 COMPREHEN METABOLIC PANEL: CPT

## 2024-11-01 PROCEDURE — 84443 ASSAY THYROID STIM HORMONE: CPT

## 2024-11-01 NOTE — PROGRESS NOTES
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  He completed definitive concurrent chemoradiotherapy using cisplatin and etoposide early March 2017.  He had mild anemia neutropenia on treatment.    He subsequently was followed on surveillance with no evidence of progression.    The patient started on durvalumab in October 2017 given he had unresectable stage III disease with no evidence of progression following chemo radiotherapy    He has had occasional mild anemia on treatment.  Laboratory follow-up in May 2018 showed severe iron deficiency.  Received IV iron    He completed 13 cycles of durvalumab  however towards the end of his treatment he developed 2 separate instances of pneumonitis/transaminitis requiring separate courses of prednisone.  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    PE/DVT admission 1/2023.    BRAF V6 100 E mutation discovered peripheral blood ctDAN September 2023  Dabrafenib and trametenib initiated 10/2023    1/28-2/1 hospitalization pneumonia/pneumonitis.     6/21/24 Dose reduction of Dabrafenib to 100 mg BID, no dose modification with Trametinib    9/26/24 - 9/29/24 [facility] admission, fever      Interval  History:  Patient returns for follow up. He is on trametinib and dabrafenib dose reduced 100 mg bid for fatigue. This medication was held during his September 2024 admission, otherwise, he endorses compliance.  Dyspnea has improved (only with greater exertion) as well as mucus after seeing a Naturopath one month ago.  He drinks a cup of tea daily of \"Lung Clear\" and takes 5 tablets of \"Mucosolve\".  He also is undergoing acupuncture.  He fatigue waxes and wanes.  He prefers to stay busy.  He has been on dexamethasone 1 mg daily since two visits ago which is helping a lot.     He continues on bactrim and voriconazole ppx.     He denies anorexia, fever, mouth sores, cough, abdominal pain, nausea/vomiting, diarrhea, constipation, peripheral edema, rash and bone pain.      Performance Status: ECOG 1      Current Medications:    Current Outpatient Medications:     dabrafenib mesylate 50 MG Oral Cap, Take 2 capsules (100 mg total) by mouth 2 (two) times daily. On dose reduction since 6/21/24 [facility], Disp: 120 capsule, Rfl: 5    Trametinib Dimethyl Sulfoxide 2 MG Oral Tab, Take 2 mg by mouth daily. Take at least 1 hour before or at least 2 hours after a meal., Disp: 30 tablet, Rfl: 5    dexamethasone 1 MG Oral Tab, Take 1 tablet (1 mg total) by mouth every other day., Disp: 30 tablet, Rfl: 0    XARELTO 10 MG Oral Tab, TAKE 1 TABLET BY MOUTH EVERYDAY AT BEDTIME, Disp: 90 tablet, Rfl: 1    sulfamethoxazole-trimethoprim -160 MG Oral Tab per tablet, TAKE 1 TABLET BY MOUTH TWICE A DAY ON MONDAY, WEDNESDAY AND FRIDAY, Disp: 72 tablet, Rfl: 2    pantoprazole 40 MG Oral Tab EC, Take 1 tablet (40 mg total) by mouth before breakfast., Disp: 90 tablet, Rfl: 1    voriconazole 200 MG Oral Tab, Take 1 tablet (200 mg total) by mouth 2 (two) times daily., Disp: 180 tablet, Rfl: 3    ipratropium-albuterol 0.5-2.5 (3) MG/3ML Inhalation Solution, Take 3 mL by nebulization every 6 (six) hours as needed., Disp: 90 each, Rfl: 5     fluticasone furoate-vilanterol (BREO ELLIPTA) 100-25 MCG/INH Inhalation Aerosol Powder, Breath Activated, Inhale 1 puff into the lungs daily. Follow with mouth rinse and spit, Disp: 1 each, Rfl: 6    albuterol 108 (90 Base) MCG/ACT Inhalation Aero Soln, Inhale 2 puffs into the lungs every 4 (four) hours as needed for Wheezing., Disp: 1 each, Rfl: 2    cholecalciferol 50 MCG (2000 UT) Oral Tab, Take 0.5 tablets (1,000 Units total) by mouth every morning., Disp: , Rfl:     Vitamin C 500 MG Oral Tab, Take 1 tablet (500 mg total) by mouth every morning., Disp: , Rfl:        Review of Systems:  All other systems reviewed and negative x12    Vital Signs:  /60 (BP Location: Left arm, Patient Position: Sitting, Cuff Size: adult)   Pulse 69   Temp 97.4 °F (36.3 °C) (Oral)   Resp 18   Ht 1.778 m (5' 10\")   Wt 86.9 kg (191 lb 9.6 oz)   SpO2 96%   BMI 27.49 kg/m²     Physical Examination:  General: Alert and oriented x 3, not in acute distress.  Psych:  Mood and affect appropriate.   HEENT: Anicteric. Oropharynx is clear.   Neck: No lymphadenopathy   Chest: non-labored breathing, clear bilaterally   Cardiovascular: Regular rate and rhythm.   Extremities: No edema. Scattered bruises on arms.   Neurological: Grossly intact.   Derm:  No rash or skin lesions.     Labs:  Lab Results   Component Value Date    WBC 3.5 (L) 11/01/2024    RBC 4.00 11/01/2024    HGB 13.0 11/01/2024    HCT 37.9 (L) 11/01/2024    MCV 94.8 11/01/2024    MCH 32.5 11/01/2024    MCHC 34.3 11/01/2024    RDW 14.3 11/01/2024    .0 11/01/2024    MPV 9.3 11/20/2018     Lab Results   Component Value Date     11/01/2024    K 3.6 11/01/2024     11/01/2024    CO2 26.0 11/01/2024    BUN 23 11/01/2024    CREATSERUM 0.84 11/01/2024    GLU 90 11/01/2024    CA 9.1 11/01/2024    ALKPHO 122 (H) 11/01/2024    ALT 29 11/01/2024    AST 38 (H) 11/01/2024    BILT 0.6 11/01/2024    ALB 4.2 11/01/2024    TP 6.1 11/01/2024      Lab Results   Component  Value Date/Time    TSH 2.276 11/01/2024 10:05 AM    TSH 5.649 (H) 08/23/2024 08:38 AM    TSH 1.600 09/29/2023 01:54 PM    TSH 2.420 09/12/2023 11:34 AM    TSH 3.480 09/01/2023 09:20 AM    TSH 1.980 07/28/2023 12:58 PM       Radiology:  XR CHEST PA + LAT CHEST (CPT=71046)    Result Date: 9/26/2024  CONCLUSION:   No new pulmonary consolidation.  Minimal subsegmental atelectasis at the left lung base.  Trace left pleural effusion.  History of left upper lobe malignancy.  Unchanged appearance of the left upper paratracheal/suprahilar region, may relate to underlying malignancy and/or post treatment change.  Left upper atelectasis or scarring extending from the suprahilar opacity     Dictated by (CST): Mago Ruth MD on 9/26/2024 at 1:00 PM     Finalized by (CST): Mago Ruth MD on 9/26/2024 at 1:03 PM              Impression and Plan:    Stage IIIB adenocarcinoma of the left upper lung (EGFR, ALK, ROS1 negative, PD-L1 80%).    - s/p concurrent definitive chemoradiotherapy using cisplatin and etoposide completed in March 2017 followed by consolidative durvalumab completed 13 cycles in May 2018 c/b recurrent pneumonitis/transaminitis.    Recurrent lung adenocarcinoma    - biopsy-proven recurrence in mediastinal lymph nodes in March 2021.  Same histology.  Given high PD-L1 >50%  - started Pembrolizumab on 3/19/21 with good long-term disease control through summer 2023, then discontinued d/t recurrent pneumonitis.     BRAF V600 E peripheral blood sequencing September 2023  - dabrafenib trametinib started 10/23/23, tolerated well initially, then dose reduced due to significant fatigue on 6/21/24.   - CT C/A/P 7/23/24 new bilateral lung nodules non specific infectious/inflammatory vs metastases. Stable SLIM post treatment fibrosis. GGO in both lungs resolved. Plan to repeat in 3 months for close surveillance.  Clinically improved.  CXR during 9/2024 admission.    - continue dabrafenib at 100 mg bid and trametinib 2  mg daily without further dose modification.  - Planning on Thanksgiving vacation in [place] from 11/19 - 12/7.  Recommend restaging CT prior to 12/13/24 follow-up with labs.     Fatigue:  - likely multifactorial; improved with dexamethasone 1 mg daily; decrease to 1 mg every other day if possible. Continue ppx bactrim, voriconazole and PPI.   - echocardiogram 7/22/24 normal. Labs stable. FT4 normal.     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    Pulmonary embolus- diagnosed January 2023, continue xarelto proph dose 10mg daily    Call prn.     MDM: high, malignancy, life threatening. Oral  chemotherapy requiring intensive monitoring for toxicity.  Longitudinal care provided today.       No follow-ups on file.         лОег Almanza PA-C  Hematology Oncology

## 2024-11-06 ENCOUNTER — TELEPHONE (OUTPATIENT)
Dept: HEMATOLOGY/ONCOLOGY | Facility: HOSPITAL | Age: 79
End: 2024-11-06

## 2024-11-06 NOTE — TELEPHONE ENCOUNTER
Returned call and spoke with pharmacist Marilee- prescription clarification dabrafenib prescription should be 100 mg TWO capsules  BID not 150 mg - Three capsules BID - noted dose reduction on 6/21/24.

## 2024-11-06 NOTE — TELEPHONE ENCOUNTER
[facility]  [phone]    We need clarification on the directions for this   Medication:  TAFINLAR  /TRAMETINIB.. Lainey Mitchell

## 2024-12-10 ENCOUNTER — HOSPITAL ENCOUNTER (OUTPATIENT)
Dept: CT IMAGING | Facility: HOSPITAL | Age: 79
Discharge: HOME | End: 2024-12-10
Attending: INTERNAL MEDICINE
Payer: MEDICARE

## 2024-12-10 ENCOUNTER — NURSE ONLY (OUTPATIENT)
Dept: HEMATOLOGY/ONCOLOGY | Facility: HOSPITAL | Age: 79
End: 2024-12-10
Attending: INTERNAL MEDICINE
Payer: MEDICARE

## 2024-12-10 DIAGNOSIS — R91.8 PULMONARY NODULES: ICD-10-CM

## 2024-12-10 LAB
CREAT BLD-MCNC: 1.1 MG/DL
EGFRCR SERPLBLD CKD-EPI 2021: 67 ML/MIN/1.73M2 (ref 60–?)

## 2024-12-10 PROCEDURE — 96523 IRRIG DRUG DELIVERY DEVICE: CPT

## 2024-12-10 PROCEDURE — 82565 ASSAY OF CREATININE: CPT

## 2024-12-10 PROCEDURE — 71260 CT THORAX DX C+: CPT | Performed by: INTERNAL MEDICINE

## 2024-12-13 ENCOUNTER — OFFICE VISIT (OUTPATIENT)
Dept: HEMATOLOGY/ONCOLOGY | Facility: HOSPITAL | Age: 79
End: 2024-12-13
Attending: INTERNAL MEDICINE
Payer: MEDICARE

## 2024-12-13 VITALS
HEIGHT: 70 IN | WEIGHT: 189 LBS | SYSTOLIC BLOOD PRESSURE: 104 MMHG | OXYGEN SATURATION: 96 % | RESPIRATION RATE: 18 BRPM | BODY MASS INDEX: 27.06 KG/M2 | DIASTOLIC BLOOD PRESSURE: 62 MMHG | TEMPERATURE: 98 F | HEART RATE: 61 BPM

## 2024-12-13 DIAGNOSIS — C34.12 CANCER OF UPPER LOBE OF LEFT LUNG (HCC): ICD-10-CM

## 2024-12-13 DIAGNOSIS — Z51.11 CHEMOTHERAPY MANAGEMENT, ENCOUNTER FOR: ICD-10-CM

## 2024-12-13 DIAGNOSIS — C34.92 RECURRENT ADENOCARCINOMA OF LEFT LUNG (HCC): Primary | ICD-10-CM

## 2024-12-13 DIAGNOSIS — T45.1X5A CHEMOTHERAPY-INDUCED FATIGUE: ICD-10-CM

## 2024-12-13 DIAGNOSIS — R53.83 CHEMOTHERAPY-INDUCED FATIGUE: ICD-10-CM

## 2024-12-13 DIAGNOSIS — R91.8 LUNG NODULES: Primary | ICD-10-CM

## 2024-12-13 DIAGNOSIS — J70.1 RADIATION FIBROSIS OF LUNG (HCC): ICD-10-CM

## 2024-12-13 DIAGNOSIS — I26.99 PULMONARY EMBOLISM, OTHER, UNSPECIFIED CHRONICITY, UNSPECIFIED WHETHER ACUTE COR PULMONALE PRESENT (HCC): ICD-10-CM

## 2024-12-13 DIAGNOSIS — Z79.01 MONITORING FOR ANTICOAGULANT USE: ICD-10-CM

## 2024-12-13 DIAGNOSIS — Z51.81 MONITORING FOR ANTICOAGULANT USE: ICD-10-CM

## 2024-12-13 LAB
ALBUMIN SERPL-MCNC: 3.8 G/DL (ref 3.2–4.8)
ALBUMIN/GLOB SERPL: 1.8 {RATIO} (ref 1–2)
ALP LIVER SERPL-CCNC: 136 U/L
ALT SERPL-CCNC: 26 U/L
ANION GAP SERPL CALC-SCNC: 7 MMOL/L (ref 0–18)
AST SERPL-CCNC: 31 U/L (ref ?–34)
BASOPHILS # BLD AUTO: 0.01 X10(3) UL (ref 0–0.2)
BASOPHILS NFR BLD AUTO: 0.3 %
BILIRUB SERPL-MCNC: 0.6 MG/DL (ref 0.2–1.1)
BUN BLD-MCNC: 20 MG/DL (ref 9–23)
BUN/CREAT SERPL: 21.7 (ref 10–20)
CALCIUM BLD-MCNC: 9.4 MG/DL (ref 8.7–10.4)
CHLORIDE SERPL-SCNC: 107 MMOL/L (ref 98–112)
CO2 SERPL-SCNC: 27 MMOL/L (ref 21–32)
CREAT BLD-MCNC: 0.92 MG/DL
DEPRECATED RDW RBC AUTO: 47.9 FL (ref 35.1–46.3)
EGFRCR SERPLBLD CKD-EPI 2021: 84 ML/MIN/1.73M2 (ref 60–?)
EOSINOPHIL # BLD AUTO: 0.05 X10(3) UL (ref 0–0.7)
EOSINOPHIL NFR BLD AUTO: 1.5 %
ERYTHROCYTE [DISTWIDTH] IN BLOOD BY AUTOMATED COUNT: 13.8 % (ref 11–15)
GLOBULIN PLAS-MCNC: 2.1 G/DL (ref 2–3.5)
GLUCOSE BLD-MCNC: 94 MG/DL (ref 70–99)
HCT VFR BLD AUTO: 36.5 %
HGB BLD-MCNC: 12.2 G/DL
IMM GRANULOCYTES # BLD AUTO: 0 X10(3) UL (ref 0–1)
IMM GRANULOCYTES NFR BLD: 0 %
LYMPHOCYTES # BLD AUTO: 0.7 X10(3) UL (ref 1–4)
LYMPHOCYTES NFR BLD AUTO: 21.2 %
MCH RBC QN AUTO: 31.5 PG (ref 26–34)
MCHC RBC AUTO-ENTMCNC: 33.4 G/DL (ref 31–37)
MCV RBC AUTO: 94.3 FL
MONOCYTES # BLD AUTO: 0.59 X10(3) UL (ref 0.1–1)
MONOCYTES NFR BLD AUTO: 17.9 %
NEUTROPHILS # BLD AUTO: 1.95 X10 (3) UL (ref 1.5–7.7)
NEUTROPHILS # BLD AUTO: 1.95 X10(3) UL (ref 1.5–7.7)
NEUTROPHILS NFR BLD AUTO: 59.1 %
OSMOLALITY SERPL CALC.SUM OF ELEC: 294 MOSM/KG (ref 275–295)
PLATELET # BLD AUTO: 195 10(3)UL (ref 150–450)
POTASSIUM SERPL-SCNC: 3.8 MMOL/L (ref 3.5–5.1)
PROT SERPL-MCNC: 5.9 G/DL (ref 5.7–8.2)
RBC # BLD AUTO: 3.87 X10(6)UL
SODIUM SERPL-SCNC: 141 MMOL/L (ref 136–145)
WBC # BLD AUTO: 3.3 X10(3) UL (ref 4–11)

## 2024-12-13 PROCEDURE — 80053 COMPREHEN METABOLIC PANEL: CPT

## 2024-12-13 PROCEDURE — G2211 COMPLEX E/M VISIT ADD ON: HCPCS | Performed by: INTERNAL MEDICINE

## 2024-12-13 PROCEDURE — 99215 OFFICE O/P EST HI 40 MIN: CPT | Performed by: INTERNAL MEDICINE

## 2024-12-13 PROCEDURE — 36591 DRAW BLOOD OFF VENOUS DEVICE: CPT

## 2024-12-13 PROCEDURE — 85025 COMPLETE CBC W/AUTO DIFF WBC: CPT

## 2024-12-13 NOTE — PROGRESS NOTES
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  He completed definitive concurrent chemoradiotherapy using cisplatin and etoposide early March 2017.  He had mild anemia neutropenia on treatment.    He subsequently was followed on surveillance with no evidence of progression.    The patient started on durvalumab in October 2017 given he had unresectable stage III disease with no evidence of progression following chemo radiotherapy    He has had occasional mild anemia on treatment.  Laboratory follow-up in May 2018 showed severe iron deficiency.  Received IV iron    He completed 13 cycles of durvalumab  however towards the end of his treatment he developed 2 separate instances of pneumonitis/transaminitis requiring separate courses of prednisone.  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    PE/DVT admission 1/2023.    BRAF V6 100 E mutation discovered peripheral blood ctDAN September 2023  Dabrafenib and trametenib initiated 10/2023    1/28-2/1 hospitalization pneumonia/pneumonitis.     6/21/24 Dose reduction of Dabrafenib to 100 mg BID, no dose modification with Trametinib    9/26/24 - 9/29/24 [facility] admission, fever. Treatment held during  admission.       Interval History:  Patient returns for follow up. He is on trametinib and dabrafenib dose reduced 100 mg bid for fatigue.  He's been doing reasonably well and denies any new issues or complaints. Still having low grade fevers and chills occasionally for which he takes tylenol or ibuprofen as needed. Breathing is stable. No new cough, dyspnea or pain. He fatigue waxes and wanes. Energy is fair and stable with dexamethasone 1 mg every other day. He continues on bactrim and voriconazole ppx.  He denies anorexia, fever, mouth sores, cough, abdominal pain, nausea/vomiting, diarrhea, constipation, peripheral edema, rash and bone pain.      Performance Status: ECOG 1      Current Medications:    Current Outpatient Medications:     dabrafenib mesylate 50 MG Oral Cap, Take 2 capsules (100 mg total) by mouth 2 (two) times daily. On dose reduction since 6/21/24 [facility], Disp: 120 capsule, Rfl: 5    Trametinib Dimethyl Sulfoxide 2 MG Oral Tab, Take 2 mg by mouth daily. Take at least 1 hour before or at least 2 hours after a meal., Disp: 30 tablet, Rfl: 5    dexamethasone 1 MG Oral Tab, Take 1 tablet (1 mg total) by mouth every other day., Disp: 30 tablet, Rfl: 0    XARELTO 10 MG Oral Tab, TAKE 1 TABLET BY MOUTH EVERYDAY AT BEDTIME, Disp: 90 tablet, Rfl: 1    sulfamethoxazole-trimethoprim -160 MG Oral Tab per tablet, TAKE 1 TABLET BY MOUTH TWICE A DAY ON MONDAY, WEDNESDAY AND FRIDAY, Disp: 72 tablet, Rfl: 2    pantoprazole 40 MG Oral Tab EC, Take 1 tablet (40 mg total) by mouth before breakfast., Disp: 90 tablet, Rfl: 1    voriconazole 200 MG Oral Tab, Take 1 tablet (200 mg total) by mouth 2 (two) times daily., Disp: 180 tablet, Rfl: 3    ipratropium-albuterol 0.5-2.5 (3) MG/3ML Inhalation Solution, Take 3 mL by nebulization every 6 (six) hours as needed., Disp: 90 each, Rfl: 5    fluticasone furoate-vilanterol (BREO ELLIPTA) 100-25 MCG/INH Inhalation Aerosol Powder, Breath Activated, Inhale 1 puff into the  lungs daily. Follow with mouth rinse and spit, Disp: 1 each, Rfl: 6    albuterol 108 (90 Base) MCG/ACT Inhalation Aero Soln, Inhale 2 puffs into the lungs every 4 (four) hours as needed for Wheezing., Disp: 1 each, Rfl: 2    cholecalciferol 50 MCG (2000 UT) Oral Tab, Take 0.5 tablets (1,000 Units total) by mouth every morning., Disp: , Rfl:     Vitamin C 500 MG Oral Tab, Take 1 tablet (500 mg total) by mouth every morning., Disp: , Rfl:        Review of Systems:  All other systems reviewed and negative x12    Vital Signs:  /62 (BP Location: Left arm, Patient Position: Sitting, Cuff Size: adult)   Pulse 61   Temp 98 °F (36.7 °C) (Tympanic)   Resp 18   Ht 1.778 m (5' 10\")   Wt 85.7 kg (189 lb)   SpO2 96%   BMI 27.12 kg/m²     Physical Examination:  General: Alert and oriented x 3, not in acute distress.  Psych:  Mood and affect appropriate.   HEENT: Anicteric. Oropharynx is clear.   Neck: No lymphadenopathy   Chest: non-labored breathing, clear bilaterally   Cardiovascular: Regular rate and rhythm.   Extremities: No edema. Scattered bruises on arms.   Neurological: Grossly intact.   Derm:  No rash or skin lesions.     Labs:  Lab Results   Component Value Date    WBC 3.3 (L) 12/13/2024    RBC 3.87 12/13/2024    HGB 12.2 (L) 12/13/2024    HCT 36.5 (L) 12/13/2024    MCV 94.3 12/13/2024    MCH 31.5 12/13/2024    MCHC 33.4 12/13/2024    RDW 13.8 12/13/2024    .0 12/13/2024    MPV 9.3 11/20/2018     Lab Results   Component Value Date     12/13/2024    K 3.8 12/13/2024     12/13/2024    CO2 27.0 12/13/2024    BUN 20 12/13/2024    CREATSERUM 0.92 12/13/2024    GLU 94 12/13/2024    CA 9.4 12/13/2024    ALKPHO 136 (H) 12/13/2024    ALT 26 12/13/2024    AST 31 12/13/2024    BILT 0.6 12/13/2024    ALB 3.8 12/13/2024    TP 5.9 12/13/2024      Lab Results   Component Value Date/Time    TSH 2.276 11/01/2024 10:05 AM    TSH 5.649 (H) 08/23/2024 08:38 AM    TSH 1.600 09/29/2023 01:54 PM    TSH 2.420  09/12/2023 11:34 AM    TSH 3.480 09/01/2023 09:20 AM    TSH 1.980 07/28/2023 12:58 PM       Radiology:  XR CHEST PA + LAT CHEST (CPT=71046)    Result Date: 9/26/2024  CONCLUSION:   No new pulmonary consolidation.  Minimal subsegmental atelectasis at the left lung base.  Trace left pleural effusion.  History of left upper lobe malignancy.  Unchanged appearance of the left upper paratracheal/suprahilar region, may relate to underlying malignancy and/or post treatment change.  Left upper atelectasis or scarring extending from the suprahilar opacity     Dictated by (CST): Mago Ruth MD on 9/26/2024 at 1:00 PM     Finalized by (CST): Mago Ruth MD on 9/26/2024 at 1:03 PM              Impression and Plan:    History of stage IIIB adenocarcinoma of the left upper lung (EGFR, ALK, ROS1 negative, PD-L1 80%).    - s/p concurrent definitive chemoradiotherapy with cisplatin and etoposide completed in March 2017 followed by consolidative durvalumab completed 13 cycles in May 2018 c/b recurrent pneumonitis/transaminitis.    Recurrent lung adenocarcinoma    - biopsy-proven recurrence in mediastinal lymph nodes in March 2021.  Same histology.  PD-L1 >50%  - started pembrolizumab March 2021 with good long-term disease control through summer 2023, then discontinued d/t recurrent pneumonitis.     BRAF V600 E peripheral blood sequencing September 2023  - dabrafenib trametinib started 10/23/23, tolerated well initially, then dose reduced due to significant fatigue on 6/21/24.   - CT C/A/P 7/2024 new bilateral lung nodules non specific infectious/inflammatory vs metastases. Stable SLIM post treatment fibrosis. GGO in both lungs resolved.   - CT chest 12/2024 with no concerning findings. Stable mild left perihilar radiation fibrosis. Small nodules and right apical GGO resolved.   - continue dabrafenib at 100 mg bid and trametinib 2 mg daily without further dose modification.  - repeat CT chest in 6 months, or sooner pending his  symptoms and clinical course.     Fatigue- continue dexamethasone 1 mg every other day. Continue ppx bactrim, voriconazole and PPI.     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 Hernandze.     Pulmonary embolus- diagnosed January 2023, continue xarelto proph dose 10mg daily        MDM: high, malignancy, life threatening. Oral  chemotherapy requiring intensive monitoring for toxicity.  Longitudinal care provided today.       Return in about 6 weeks (around 1/24/2025).       Yaneth Bello MD   Hematology Oncology

## 2024-12-14 DIAGNOSIS — C34.92 MALIGNANT NEOPLASM OF LEFT LUNG, UNSPECIFIED PART OF LUNG (HCC): ICD-10-CM

## 2024-12-16 RX ORDER — PANTOPRAZOLE SODIUM 40 MG/1
40 TABLET, DELAYED RELEASE ORAL
Qty: 90 TABLET | Refills: 1 | Status: SHIPPED | OUTPATIENT
Start: 2024-12-16

## 2025-01-02 DIAGNOSIS — R53.83 CHEMOTHERAPY-INDUCED FATIGUE: Primary | ICD-10-CM

## 2025-01-02 DIAGNOSIS — T45.1X5A CHEMOTHERAPY-INDUCED FATIGUE: Primary | ICD-10-CM

## 2025-01-02 RX ORDER — DEXAMETHASONE 1 MG
1 TABLET ORAL EVERY OTHER DAY
Qty: 90 TABLET | Refills: 0 | Status: SHIPPED | OUTPATIENT
Start: 2025-01-02

## 2025-01-15 ENCOUNTER — TELEPHONE (OUTPATIENT)
Dept: PULMONOLOGY | Facility: CLINIC | Age: 80
End: 2025-01-15

## 2025-01-15 NOTE — TELEPHONE ENCOUNTER
Received fax from [employer] for prior authorization questionnaire for Voriconazole. Placed in RN bin to be completed.

## 2025-01-16 NOTE — TELEPHONE ENCOUNTER
Received fax from [employer] stating voriconazole 200 mg is approved. Authorization is valid until 12/31/25. Sent for scanning.

## 2025-01-24 ENCOUNTER — OFFICE VISIT (OUTPATIENT)
Age: 80
End: 2025-01-24
Attending: INTERNAL MEDICINE
Payer: MEDICARE

## 2025-01-24 ENCOUNTER — NURSE ONLY (OUTPATIENT)
Age: 80
End: 2025-01-24
Attending: INTERNAL MEDICINE
Payer: MEDICARE

## 2025-01-24 VITALS
SYSTOLIC BLOOD PRESSURE: 119 MMHG | HEART RATE: 55 BPM | RESPIRATION RATE: 18 BRPM | WEIGHT: 195 LBS | BODY MASS INDEX: 27.92 KG/M2 | DIASTOLIC BLOOD PRESSURE: 72 MMHG | TEMPERATURE: 98 F | OXYGEN SATURATION: 94 % | HEIGHT: 70 IN

## 2025-01-24 DIAGNOSIS — C34.92 RECURRENT ADENOCARCINOMA OF LEFT LUNG (HCC): ICD-10-CM

## 2025-01-24 DIAGNOSIS — Z79.01 LONG TERM CURRENT USE OF ANTICOAGULANT: ICD-10-CM

## 2025-01-24 DIAGNOSIS — Z51.11 CHEMOTHERAPY MANAGEMENT, ENCOUNTER FOR: ICD-10-CM

## 2025-01-24 DIAGNOSIS — J98.4 DRUG-INDUCED PNEUMONITIS: ICD-10-CM

## 2025-01-24 DIAGNOSIS — C34.92 RECURRENT ADENOCARCINOMA OF LEFT LUNG (HCC): Primary | ICD-10-CM

## 2025-01-24 DIAGNOSIS — T45.1X5A CHEMOTHERAPY-INDUCED FATIGUE: ICD-10-CM

## 2025-01-24 DIAGNOSIS — R53.83 CHEMOTHERAPY-INDUCED FATIGUE: ICD-10-CM

## 2025-01-24 DIAGNOSIS — R74.8 ELEVATED ALKALINE PHOSPHATASE LEVEL: ICD-10-CM

## 2025-01-24 DIAGNOSIS — C34.92 ADENOCARCINOMA OF LEFT LUNG (HCC): ICD-10-CM

## 2025-01-24 DIAGNOSIS — B44.9 ASPERGILLUS PNEUMONIA (HCC): ICD-10-CM

## 2025-01-24 DIAGNOSIS — Z79.52 LONG TERM (CURRENT) USE OF SYSTEMIC STEROIDS: ICD-10-CM

## 2025-01-24 DIAGNOSIS — T50.905A DRUG-INDUCED PNEUMONITIS: ICD-10-CM

## 2025-01-24 LAB
ALBUMIN SERPL-MCNC: 4 G/DL (ref 3.2–4.8)
ALBUMIN/GLOB SERPL: 1.9 {RATIO} (ref 1–2)
ALP LIVER SERPL-CCNC: 213 U/L
ALT SERPL-CCNC: 19 U/L
ANION GAP SERPL CALC-SCNC: 9 MMOL/L (ref 0–18)
AST SERPL-CCNC: 26 U/L (ref ?–34)
BASOPHILS # BLD AUTO: 0.02 X10(3) UL (ref 0–0.2)
BASOPHILS NFR BLD AUTO: 0.5 %
BILIRUB SERPL-MCNC: 0.5 MG/DL (ref 0.2–1.1)
BUN BLD-MCNC: 21 MG/DL (ref 9–23)
BUN/CREAT SERPL: 21.6 (ref 10–20)
CALCIUM BLD-MCNC: 9.5 MG/DL (ref 8.7–10.4)
CHLORIDE SERPL-SCNC: 106 MMOL/L (ref 98–112)
CO2 SERPL-SCNC: 27 MMOL/L (ref 21–32)
CREAT BLD-MCNC: 0.97 MG/DL
DEPRECATED RDW RBC AUTO: 49.5 FL (ref 35.1–46.3)
EGFRCR SERPLBLD CKD-EPI 2021: 78 ML/MIN/1.73M2 (ref 60–?)
EOSINOPHIL # BLD AUTO: 0.02 X10(3) UL (ref 0–0.7)
EOSINOPHIL NFR BLD AUTO: 0.5 %
ERYTHROCYTE [DISTWIDTH] IN BLOOD BY AUTOMATED COUNT: 14 % (ref 11–15)
GLOBULIN PLAS-MCNC: 2.1 G/DL (ref 2–3.5)
GLUCOSE BLD-MCNC: 100 MG/DL (ref 70–99)
HCT VFR BLD AUTO: 38.2 %
HGB BLD-MCNC: 12.7 G/DL
IMM GRANULOCYTES # BLD AUTO: 0.01 X10(3) UL (ref 0–1)
IMM GRANULOCYTES NFR BLD: 0.2 %
LYMPHOCYTES # BLD AUTO: 1.17 X10(3) UL (ref 1–4)
LYMPHOCYTES NFR BLD AUTO: 26.4 %
MCH RBC QN AUTO: 31.9 PG (ref 26–34)
MCHC RBC AUTO-ENTMCNC: 33.2 G/DL (ref 31–37)
MCV RBC AUTO: 96 FL
MONOCYTES # BLD AUTO: 0.52 X10(3) UL (ref 0.1–1)
MONOCYTES NFR BLD AUTO: 11.7 %
NEUTROPHILS # BLD AUTO: 2.7 X10 (3) UL (ref 1.5–7.7)
NEUTROPHILS # BLD AUTO: 2.7 X10(3) UL (ref 1.5–7.7)
NEUTROPHILS NFR BLD AUTO: 60.7 %
OSMOLALITY SERPL CALC.SUM OF ELEC: 297 MOSM/KG (ref 275–295)
PLATELET # BLD AUTO: 227 10(3)UL (ref 150–450)
POTASSIUM SERPL-SCNC: 3.8 MMOL/L (ref 3.5–5.1)
PROT SERPL-MCNC: 6.1 G/DL (ref 5.7–8.2)
RBC # BLD AUTO: 3.98 X10(6)UL
SODIUM SERPL-SCNC: 142 MMOL/L (ref 136–145)
WBC # BLD AUTO: 4.4 X10(3) UL (ref 4–11)

## 2025-01-24 NOTE — PROGRESS NOTES
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  He completed definitive concurrent chemoradiotherapy using cisplatin and etoposide early March 2017.  He had mild anemia neutropenia on treatment.    He subsequently was followed on surveillance with no evidence of progression.    The patient started on durvalumab in October 2017 given he had unresectable stage III disease with no evidence of progression following chemo radiotherapy    He has had occasional mild anemia on treatment.  Laboratory follow-up in May 2018 showed severe iron deficiency.  Received IV iron    He completed 13 cycles of durvalumab  however towards the end of his treatment he developed 2 separate instances of pneumonitis/transaminitis requiring separate courses of prednisone.  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    PE/DVT admission 1/2023.    BRAF V6 100 E mutation discovered peripheral blood ctDAN September 2023  Dabrafenib and trametenib initiated 10/2023    1/28-2/1 hospitalization pneumonia/pneumonitis.     6/21/24 Dose reduction of Dabrafenib to 100 mg BID, no dose modification with Trametinib    9/26/24 - 9/29/24 [facility] admission, fever. Treatment  held during admission.       Interval History:  Patient returns for follow up. He is on trametinib and dabrafenib dose reduced 100 mg bid for fatigue.  He has been doing reasonably well and denies any new issues or complaints. He denies any recurrent fevers since September.   Breathing is stable. No new cough, dyspnea or pain. He fatigue waxes and wanes. Energy is fair and stable with dexamethasone 1 mg every other day. He continues on bactrim and voriconazole ppx. No recurrent infections.   He denies anorexia, mouth sores, cough, abdominal pain, nausea/vomiting, diarrhea, constipation, peripheral edema, rash and bone pain.      Performance Status: ECOG 1      Current Medications:    Current Outpatient Medications:     dexamethasone 1 MG Oral Tab, Take 1 tablet (1 mg total) by mouth every other day., Disp: 90 tablet, Rfl: 0    pantoprazole 40 MG Oral Tab EC, Take 1 tablet (40 mg total) by mouth before breakfast., Disp: 90 tablet, Rfl: 1    dabrafenib mesylate 50 MG Oral Cap, Take 2 capsules (100 mg total) by mouth 2 (two) times daily. On dose reduction since 6/21/24 [facility], Disp: 120 capsule, Rfl: 5    Trametinib Dimethyl Sulfoxide 2 MG Oral Tab, Take 2 mg by mouth daily. Take at least 1 hour before or at least 2 hours after a meal., Disp: 30 tablet, Rfl: 5    XARELTO 10 MG Oral Tab, TAKE 1 TABLET BY MOUTH EVERYDAY AT BEDTIME, Disp: 90 tablet, Rfl: 1    sulfamethoxazole-trimethoprim -160 MG Oral Tab per tablet, TAKE 1 TABLET BY MOUTH TWICE A DAY ON MONDAY, WEDNESDAY AND FRIDAY, Disp: 72 tablet, Rfl: 2    voriconazole 200 MG Oral Tab, Take 1 tablet (200 mg total) by mouth 2 (two) times daily., Disp: 180 tablet, Rfl: 3    ipratropium-albuterol 0.5-2.5 (3) MG/3ML Inhalation Solution, Take 3 mL by nebulization every 6 (six) hours as needed., Disp: 90 each, Rfl: 5    fluticasone furoate-vilanterol (BREO ELLIPTA) 100-25 MCG/INH Inhalation Aerosol Powder, Breath Activated, Inhale 1 puff into the lungs daily. Follow  with mouth rinse and spit, Disp: 1 each, Rfl: 6    albuterol 108 (90 Base) MCG/ACT Inhalation Aero Soln, Inhale 2 puffs into the lungs every 4 (four) hours as needed for Wheezing., Disp: 1 each, Rfl: 2    cholecalciferol 50 MCG (2000 UT) Oral Tab, Take 0.5 tablets (1,000 Units total) by mouth every morning., Disp: , Rfl:     Vitamin C 500 MG Oral Tab, Take 1 tablet (500 mg total) by mouth every morning., Disp: , Rfl:        Review of Systems:  All other systems reviewed and negative x12    Vital Signs:  /72 (BP Location: Left arm, Patient Position: Sitting, Cuff Size: large)   Pulse 55   Temp 97.6 °F (36.4 °C) (Tympanic)   Resp 18   Ht 1.778 m (5' 10\")   Wt 88.5 kg (195 lb)   SpO2 94%   BMI 27.98 kg/m²     Physical Examination:  General: Alert and oriented x 3, not in acute distress.  Psych:  Mood and affect appropriate.   HEENT: Anicteric. Oropharynx is clear.   Neck: No lymphadenopathy   Chest: Non-labored breathing, clear bilaterally   Cardiovascular: Regular rate and rhythm.   Extremities: No edema. Scattered bruises on arms.   Neurological: Grossly intact.   Derm:  No rash or skin lesions.     Labs:  Lab Results   Component Value Date    WBC 4.4 01/24/2025    RBC 3.98 01/24/2025    HGB 12.7 (L) 01/24/2025    HCT 38.2 (L) 01/24/2025    MCV 96.0 01/24/2025    MCH 31.9 01/24/2025    MCHC 33.2 01/24/2025    RDW 14.0 01/24/2025    .0 01/24/2025    MPV 9.3 11/20/2018     Lab Results   Component Value Date     01/24/2025    K 3.8 01/24/2025     01/24/2025    CO2 27.0 01/24/2025    BUN 21 01/24/2025    CREATSERUM 0.97 01/24/2025     (H) 01/24/2025    CA 9.5 01/24/2025    ALKPHO 213 (H) 01/24/2025    ALT 19 01/24/2025    AST 26 01/24/2025    BILT 0.5 01/24/2025    ALB 4.0 01/24/2025    TP 6.1 01/24/2025      Lab Results   Component Value Date/Time    TSH 2.276 11/01/2024 10:05 AM    TSH 5.649 (H) 08/23/2024 08:38 AM    TSH 1.600 09/29/2023 01:54 PM    TSH 2.420 09/12/2023 11:34 AM     TSH 3.480 09/01/2023 09:20 AM    TSH 1.980 07/28/2023 12:58 PM       Radiology:  No results found.       Impression and Plan:    History of stage IIIB adenocarcinoma of the left upper lung (EGFR, ALK, ROS1 negative, PD-L1 80%).    - s/p concurrent definitive chemoradiotherapy with cisplatin and etoposide completed in March 2017 followed by consolidative durvalumab completed 13 cycles in May 2018 c/b recurrent pneumonitis/transaminitis.    Recurrent lung adenocarcinoma    - biopsy-proven recurrence in mediastinal lymph nodes in March 2021.  Same histology.  PD-L1 >50%  - started pembrolizumab March 2021 with good long-term disease control through summer 2023, then discontinued d/t recurrent pneumonitis.     BRAF V600E peripheral blood sequencing September 2023  - dabrafenib trametinib started 10/23/23, tolerated well initially, then dose reduced due to significant fatigue on 6/21/24.   - CT C/A/P 7/2024 new bilateral lung nodules non specific infectious/inflammatory vs metastases. Stable SLIM post treatment fibrosis. GGO in both lungs resolved.   - CT chest 12/2024 with no concerning findings. Stable mild left perihilar radiation fibrosis. Small nodules and right apical GGO resolved.   - continue dabrafenib at 100 mg bid and trametinib 2 mg daily without further dose modification.  - will plan to repeat CT chest Mid May 2025, or sooner pending his symptoms and clinical course.     Fatigue- continue dexamethasone 1 mg every other day. Continue ppx bactrim, voriconazole and PPI.     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 Hernandez.     Pulmonary embolus- diagnosed January 2023, continue xarelto preventive dose 10mg daily. No sings of VTE recurrence.     Elevated ALK phos- chronically elevated, likely dug induced. AST/ALT/Bili remain normal. Will continue to monitor         RTC 8 weeks      [place]   Hematology Oncology

## 2025-02-13 ENCOUNTER — OFFICE VISIT (OUTPATIENT)
Dept: PULMONOLOGY | Facility: CLINIC | Age: 80
End: 2025-02-13
Payer: MEDICARE

## 2025-02-13 VITALS
WEIGHT: 194.19 LBS | SYSTOLIC BLOOD PRESSURE: 130 MMHG | HEART RATE: 85 BPM | BODY MASS INDEX: 27.8 KG/M2 | DIASTOLIC BLOOD PRESSURE: 60 MMHG | OXYGEN SATURATION: 96 % | HEIGHT: 70 IN

## 2025-02-13 DIAGNOSIS — J98.4 PNEUMONITIS: Primary | ICD-10-CM

## 2025-02-13 DIAGNOSIS — R68.83 CHILLS: ICD-10-CM

## 2025-02-13 PROCEDURE — 99213 OFFICE O/P EST LOW 20 MIN: CPT | Performed by: INTERNAL MEDICINE

## 2025-02-13 PROCEDURE — 3008F BODY MASS INDEX DOCD: CPT | Performed by: INTERNAL MEDICINE

## 2025-02-13 PROCEDURE — 3075F SYST BP GE 130 - 139MM HG: CPT | Performed by: INTERNAL MEDICINE

## 2025-02-13 PROCEDURE — 1126F AMNT PAIN NOTED NONE PRSNT: CPT | Performed by: INTERNAL MEDICINE

## 2025-02-13 PROCEDURE — 1159F MED LIST DOCD IN RCRD: CPT | Performed by: INTERNAL MEDICINE

## 2025-02-13 PROCEDURE — 3078F DIAST BP <80 MM HG: CPT | Performed by: INTERNAL MEDICINE

## 2025-02-13 NOTE — PROGRESS NOTES
The patient is an 79-year-old male who I know well from prior evaluation comes in now for follow-up.  He does have chills but otherwise no fever.  There is no cough or phlegm.  He does have a history of pulmonary aspergillosis as well as pneumonitis associated with immune therapy.  He follows closely with oncology.    Review of Systems:  Vision normal. Ear nose and throat normal. Bowel normal. Bladder function normal. No depression. No thyroid disease. No lymphatic system concerns.  No rash. Muscles and joints unremarkable. No weight loss no weight gain.    Physical Examination:  Vital signs normal. HEENT examination is unremarkable with pupils equal round and reactive to light and accommodation. Neck without adenopathy, thyromegaly, JVD nor bruit. Lungs clear to auscultation and percussion. Cardiac regular rate and rhythm no murmur. Abdomen nontender, without hepatosplenomegaly and no mass appreciable. Extremities and Musculoskeletal without clubbing cyanosis nor edema, and mobility acceptable. Neurologic grossly intact with symmetric tone and strength and reflex.    Assessment and plan:  1.  Chills-etiology is uncertain.  Will get chest x-ray, CMP and CBC but not start empiric antibiotic at this moment.  Patient to call me if worse.  2.  Pulmonary aspergillosis-continue current therapy.  3.  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  4.  Lung cancer-follow-up oncology  5.  Balance issues-MH with MRI of the brain at this juncture but if considerations worsen, would reimage.  6.  General Health-the patient should never drink and drive, always wear a safety belt, have a smoke detector and fire extiguisher in the house, avoid the use of candles in the home as they are the most common cause of housefire, and see me annually for complete examination.  7.  Sleep apnea-continue current therapy.

## 2025-02-14 ENCOUNTER — APPOINTMENT (OUTPATIENT)
Dept: LAB | Facility: HOSPITAL | Age: 80
End: 2025-02-14
Attending: INTERNAL MEDICINE
Payer: MEDICARE

## 2025-02-14 ENCOUNTER — HOSPITAL ENCOUNTER (OUTPATIENT)
Dept: GENERAL RADIOLOGY | Facility: HOSPITAL | Age: 80
Discharge: HOME | End: 2025-02-14
Attending: INTERNAL MEDICINE
Payer: MEDICARE

## 2025-02-14 ENCOUNTER — NURSE ONLY (OUTPATIENT)
Age: 80
End: 2025-02-14
Attending: INTERNAL MEDICINE
Payer: MEDICARE

## 2025-02-14 DIAGNOSIS — R68.83 CHILLS: ICD-10-CM

## 2025-02-14 LAB
ALBUMIN SERPL-MCNC: 4 G/DL (ref 3.2–4.8)
ALBUMIN/GLOB SERPL: 1.9 {RATIO} (ref 1–2)
ALP LIVER SERPL-CCNC: 173 U/L
ALT SERPL-CCNC: 28 U/L
ANION GAP SERPL CALC-SCNC: 10 MMOL/L (ref 0–18)
AST SERPL-CCNC: 40 U/L (ref ?–34)
BASOPHILS # BLD AUTO: 0.02 X10(3) UL (ref 0–0.2)
BASOPHILS NFR BLD AUTO: 0.6 %
BILIRUB SERPL-MCNC: 0.5 MG/DL (ref 0.2–1.1)
BUN BLD-MCNC: 24 MG/DL (ref 9–23)
BUN/CREAT SERPL: 22.2 (ref 10–20)
CALCIUM BLD-MCNC: 8.8 MG/DL (ref 8.7–10.4)
CHLORIDE SERPL-SCNC: 104 MMOL/L (ref 98–112)
CO2 SERPL-SCNC: 25 MMOL/L (ref 21–32)
CREAT BLD-MCNC: 1.08 MG/DL
DEPRECATED RDW RBC AUTO: 49.2 FL (ref 35.1–46.3)
EGFRCR SERPLBLD CKD-EPI 2021: 69 ML/MIN/1.73M2 (ref 60–?)
EOSINOPHIL # BLD AUTO: 0.01 X10(3) UL (ref 0–0.7)
EOSINOPHIL NFR BLD AUTO: 0.3 %
ERYTHROCYTE [DISTWIDTH] IN BLOOD BY AUTOMATED COUNT: 13.7 % (ref 11–15)
GLOBULIN PLAS-MCNC: 2.1 G/DL (ref 2–3.5)
GLUCOSE BLD-MCNC: 109 MG/DL (ref 70–99)
HCT VFR BLD AUTO: 40.2 %
HGB BLD-MCNC: 13.1 G/DL
IMM GRANULOCYTES # BLD AUTO: 0.01 X10(3) UL (ref 0–1)
IMM GRANULOCYTES NFR BLD: 0.3 %
LYMPHOCYTES # BLD AUTO: 0.86 X10(3) UL (ref 1–4)
LYMPHOCYTES NFR BLD AUTO: 25.3 %
MCH RBC QN AUTO: 31.6 PG (ref 26–34)
MCHC RBC AUTO-ENTMCNC: 32.6 G/DL (ref 31–37)
MCV RBC AUTO: 97.1 FL
MONOCYTES # BLD AUTO: 0.81 X10(3) UL (ref 0.1–1)
MONOCYTES NFR BLD AUTO: 23.8 %
NEUTROPHILS # BLD AUTO: 1.69 X10 (3) UL (ref 1.5–7.7)
NEUTROPHILS # BLD AUTO: 1.69 X10(3) UL (ref 1.5–7.7)
NEUTROPHILS NFR BLD AUTO: 49.7 %
OSMOLALITY SERPL CALC.SUM OF ELEC: 293 MOSM/KG (ref 275–295)
PLATELET # BLD AUTO: 196 10(3)UL (ref 150–450)
POTASSIUM SERPL-SCNC: 4.1 MMOL/L (ref 3.5–5.1)
PROT SERPL-MCNC: 6.1 G/DL (ref 5.7–8.2)
RBC # BLD AUTO: 4.14 X10(6)UL
SODIUM SERPL-SCNC: 139 MMOL/L (ref 136–145)
WBC # BLD AUTO: 3.4 X10(3) UL (ref 4–11)

## 2025-02-14 PROCEDURE — 71046 X-RAY EXAM CHEST 2 VIEWS: CPT | Performed by: INTERNAL MEDICINE

## 2025-03-08 ENCOUNTER — APPOINTMENT (OUTPATIENT)
Dept: GENERAL RADIOLOGY | Facility: HOSPITAL | Age: 80
End: 2025-03-08
Attending: EMERGENCY MEDICINE
Payer: MEDICARE

## 2025-03-08 ENCOUNTER — HOSPITAL ENCOUNTER (EMERGENCY)
Facility: HOSPITAL | Age: 80
Discharge: HOME | End: 2025-03-08
Attending: EMERGENCY MEDICINE
Payer: MEDICARE

## 2025-03-08 ENCOUNTER — TELEPHONE (OUTPATIENT)
Age: 80
End: 2025-03-08

## 2025-03-08 VITALS
WEIGHT: 187 LBS | HEART RATE: 67 BPM | BODY MASS INDEX: 26.77 KG/M2 | TEMPERATURE: 99 F | DIASTOLIC BLOOD PRESSURE: 51 MMHG | OXYGEN SATURATION: 95 % | RESPIRATION RATE: 15 BRPM | HEIGHT: 70 IN | SYSTOLIC BLOOD PRESSURE: 102 MMHG

## 2025-03-08 DIAGNOSIS — R50.9 FEVER, UNSPECIFIED FEVER CAUSE: ICD-10-CM

## 2025-03-08 DIAGNOSIS — R53.1 WEAKNESS GENERALIZED: Primary | ICD-10-CM

## 2025-03-08 LAB
ANION GAP SERPL CALC-SCNC: 9 MMOL/L (ref 0–18)
BASOPHILS # BLD AUTO: 0.01 X10(3) UL (ref 0–0.2)
BASOPHILS NFR BLD AUTO: 0.2 %
BILIRUB UR QL: NEGATIVE
BUN BLD-MCNC: 20 MG/DL (ref 9–23)
BUN/CREAT SERPL: 19.2 (ref 10–20)
CALCIUM BLD-MCNC: 8.1 MG/DL (ref 8.7–10.4)
CHLORIDE SERPL-SCNC: 100 MMOL/L (ref 98–112)
CLARITY UR: CLEAR
CO2 SERPL-SCNC: 23 MMOL/L (ref 21–32)
CREAT BLD-MCNC: 1.04 MG/DL
DEPRECATED RDW RBC AUTO: 47.8 FL (ref 35.1–46.3)
EGFRCR SERPLBLD CKD-EPI 2021: 72 ML/MIN/1.73M2 (ref 60–?)
EOSINOPHIL # BLD AUTO: 0 X10(3) UL (ref 0–0.7)
EOSINOPHIL NFR BLD AUTO: 0 %
ERYTHROCYTE [DISTWIDTH] IN BLOOD BY AUTOMATED COUNT: 14 % (ref 11–15)
FLUAV + FLUBV RNA SPEC NAA+PROBE: NEGATIVE
FLUAV + FLUBV RNA SPEC NAA+PROBE: NEGATIVE
GLUCOSE BLD-MCNC: 135 MG/DL (ref 70–99)
GLUCOSE UR-MCNC: NORMAL MG/DL
HCT VFR BLD AUTO: 36.3 %
HGB BLD-MCNC: 12.3 G/DL
HGB UR QL STRIP.AUTO: NEGATIVE
IMM GRANULOCYTES # BLD AUTO: 0.02 X10(3) UL (ref 0–1)
IMM GRANULOCYTES NFR BLD: 0.4 %
KETONES UR-MCNC: NEGATIVE MG/DL
LACTATE SERPL-SCNC: 0.7 MMOL/L (ref 0.5–2)
LEUKOCYTE ESTERASE UR QL STRIP.AUTO: NEGATIVE
LYMPHOCYTES # BLD AUTO: 0.18 X10(3) UL (ref 1–4)
LYMPHOCYTES NFR BLD AUTO: 4 %
MCH RBC QN AUTO: 31.8 PG (ref 26–34)
MCHC RBC AUTO-ENTMCNC: 33.9 G/DL (ref 31–37)
MCV RBC AUTO: 93.8 FL
MONOCYTES # BLD AUTO: 0.55 X10(3) UL (ref 0.1–1)
MONOCYTES NFR BLD AUTO: 12.3 %
NEUTROPHILS # BLD AUTO: 3.7 X10 (3) UL (ref 1.5–7.7)
NEUTROPHILS # BLD AUTO: 3.7 X10(3) UL (ref 1.5–7.7)
NEUTROPHILS NFR BLD AUTO: 83.1 %
NITRITE UR QL STRIP.AUTO: NEGATIVE
OSMOLALITY SERPL CALC.SUM OF ELEC: 279 MOSM/KG (ref 275–295)
PH UR: 6 [PH] (ref 5–8)
PLATELET # BLD AUTO: 165 10(3)UL (ref 150–450)
POTASSIUM SERPL-SCNC: 3.3 MMOL/L (ref 3.5–5.1)
PROT UR-MCNC: NEGATIVE MG/DL
RBC # BLD AUTO: 3.87 X10(6)UL
RSV RNA SPEC NAA+PROBE: NEGATIVE
SARS-COV-2 RNA RESP QL NAA+PROBE: NOT DETECTED
SODIUM SERPL-SCNC: 132 MMOL/L (ref 136–145)
SP GR UR STRIP: 1.01 (ref 1–1.03)
UROBILINOGEN UR STRIP-ACNC: NORMAL
WBC # BLD AUTO: 4.5 X10(3) UL (ref 4–11)

## 2025-03-08 PROCEDURE — 96360 HYDRATION IV INFUSION INIT: CPT

## 2025-03-08 PROCEDURE — 80048 BASIC METABOLIC PNL TOTAL CA: CPT | Performed by: EMERGENCY MEDICINE

## 2025-03-08 PROCEDURE — 0241U SARS-COV-2/FLU A AND B/RSV BY PCR (GENEXPERT): CPT | Performed by: EMERGENCY MEDICINE

## 2025-03-08 PROCEDURE — 99284 EMERGENCY DEPT VISIT MOD MDM: CPT

## 2025-03-08 PROCEDURE — 85025 COMPLETE CBC W/AUTO DIFF WBC: CPT | Performed by: EMERGENCY MEDICINE

## 2025-03-08 PROCEDURE — 83605 ASSAY OF LACTIC ACID: CPT | Performed by: EMERGENCY MEDICINE

## 2025-03-08 PROCEDURE — 81003 URINALYSIS AUTO W/O SCOPE: CPT | Performed by: EMERGENCY MEDICINE

## 2025-03-08 PROCEDURE — 87040 BLOOD CULTURE FOR BACTERIA: CPT | Performed by: EMERGENCY MEDICINE

## 2025-03-08 PROCEDURE — 36415 COLL VENOUS BLD VENIPUNCTURE: CPT

## 2025-03-08 PROCEDURE — 71045 X-RAY EXAM CHEST 1 VIEW: CPT | Performed by: EMERGENCY MEDICINE

## 2025-03-08 RX ORDER — AZITHROMYCIN 250 MG/1
TABLET, FILM COATED ORAL
Qty: 6 TABLET | Refills: 0 | Status: SHIPPED | OUTPATIENT
Start: 2025-03-08 | End: 2025-03-13

## 2025-03-08 RX ORDER — ACETAMINOPHEN 500 MG
1000 TABLET ORAL ONCE
Status: COMPLETED | OUTPATIENT
Start: 2025-03-08 | End: 2025-03-08

## 2025-03-08 NOTE — ED INITIAL ASSESSMENT (HPI)
Pt who has history of left lung adenocarcinoma brought in by wife for fever that started last night.  Per wife pt is on oral chemotherapy.  Per wife pt gets a lot of Pneumonitis.  Pt denies chest pain, shortness of breath.  Pt is A/Ox 4, breathing unlabored.  Denies cough.  Tylenol taken last night.  Pt with port to the right chest.

## 2025-03-08 NOTE — TELEPHONE ENCOUNTER
Patient's wife called to report that the patient had a fever to 103F and chills last night.  She gave him advil and fever resolved.  This am he is weak and using the walker, which is new and has MILLAN, also new.  He is also having chills and temp is rising.  He is on dabrafenib/trametinib for lung ca (not immunosuppressive), but has a h/o recurrent aspergillus pneumonia for which he is on voriconazole and follows with Dr. Hernandez.  Recommended to bring patient to the ED and ED called for report.

## 2025-03-09 NOTE — ED QUICK NOTES
Pt resting on cart in low/locked position, side rails up x 2, call light w/ in reach.  Family at bedside.  Pt in NAD, VSS, breathing easy, non-labored.    Denies any new needs/complaints @ this time.  Pt and family updated on plan of care, verbalized understanding.

## 2025-03-09 NOTE — ED PROVIDER NOTES
Patient Seen in: [facility] Emergency Department    History     Chief Complaint   Patient presents with    Fever    Fever       HPI    79-year-old male presents ER with complaints of cough weakness and shortness of breath.  Patient with past medical history of adenocarcinoma of the left lung currently on chemotherapy for the past year.  Patient states symptoms just started today where he had chills.  Patient with low-grade temp in the ER.  Patient also hypotensive upon arrival to the ER.    History reviewed.   Past Medical History:    Deep vein thrombosis (HCC)    CURRENTLY HAS RIGHT BLOOD CLOT    Dysphagia    Esophageal reflux    Exposure to medical diagnostic radiation    COMPLETED    Hemorrhoids    History of blood transfusion    Pt on Immunotherapy    History of immunotherapy    Impotence    Lung cancer (HCC)    NSCLCA stage IIIB    Necrotizing granulomatous inflammation of lung (HCC)    Obstructive sleep apnea    Osteoarthritis    Personal history of antineoplastic chemotherapy    Pneumonia due to organism    Pulmonary embolism (HCC)    Pulmonary embolus (HCC)    Sinus problem    Sinus problems    Sleep apnea    CPAP    Visual impairment    wears eyeglasses       History reviewed.   Past Surgical History:   Procedure Laterality Date    Arthroscopy of joint unlisted      Cholecystectomy      Colonoscopy  2010    Colonoscopy N/A 06/21/2018    Procedure: COLONOSCOPY;  Surgeon: Cesar Beckman MD;  Location: [facility] ENDOSCOPY    Colonoscopy      Excis spermatocele Right 03/08/2024    Port, indwelling, imp Right 2017    Rotator cuff repair Left     Total knee replacement Bilateral 2020    Vein ligation and stripping           Medications :  Prescriptions Prior to Admission[1]     Family History   Problem Relation Age of Onset    Cancer Father         Lung    Cancer Mother         Breast, ovarian       Smoking Status:   Social History     Socioeconomic History    Marital status:    Tobacco Use     Smoking status: Former     Current packs/day: 0.00     Average packs/day: 1 pack/day for 30.0 years (30.0 ttl pk-yrs)     Types: Cigarettes     Start date: 1970     Quit date: 2000     Years since quittin.7    Smokeless tobacco: Never   Vaping Use    Vaping status: Never Used   Substance and Sexual Activity    Alcohol use: Not Currently     Alcohol/week: 0.8 standard drinks of alcohol     Types: 1 Glasses of wine per week     Comment: very rarely    Drug use: No   Other Topics Concern    Caffeine Concern Yes     Comment: coffee, 2 cups daily       ROS  All pertinent positives for the review of systems are mentioned in the HPI  All other organ systems are reviewed and are negative.    Constitutional and vital signs reviewed.      Social History and Family History elements reviewed from today, pertinent positives to the presenting problem noted.    Physical Exam     ED Triage Vitals [25 1449]   /62   Pulse 118   Resp 20   Temp (!) 101 °F (38.3 °C)   Temp src Oral   SpO2 96 %   O2 Device None (Room air)       All measures to prevent infection transmission during my interaction with the patient were taken. The patient was already wearing a droplet mask on my arrival to the room. Personal protective equipment including droplet mask, eye protection, and gloves were worn throughout the duration of the exam.  Handwashing was performed prior to and after the exam.  Stethoscope and any equipment used during my examination was cleaned with super sani-cloth germicidal wipes following the exam.     Physical Exam  Vitals and nursing note reviewed.   Constitutional:       Appearance: He is well-developed.   HENT:      Head: Normocephalic and atraumatic.      Right Ear: External ear normal.      Left Ear: External ear normal.      Nose: Nose normal.   Eyes:      Conjunctiva/sclera: Conjunctivae normal.      Pupils: Pupils are equal, round, and reactive to light.   Cardiovascular:      Rate and Rhythm:  Normal rate and regular rhythm.      Heart sounds: Normal heart sounds.   Pulmonary:      Effort: Pulmonary effort is normal.      Breath sounds: Examination of the right-middle field reveals decreased breath sounds. Examination of the right-lower field reveals decreased breath sounds. Decreased breath sounds present. No wheezing, rhonchi or rales.   Abdominal:      General: Bowel sounds are normal.      Palpations: Abdomen is soft.   Musculoskeletal:         General: Normal range of motion.      Cervical back: Normal range of motion and neck supple.   Skin:     General: Skin is warm and dry.   Neurological:      Mental Status: He is alert and oriented to person, place, and time.      Deep Tendon Reflexes: Reflexes are normal and symmetric.   Psychiatric:         Behavior: Behavior normal.         Thought Content: Thought content normal.         Judgment: Judgment normal.         ED Course        Labs Reviewed   BASIC METABOLIC PANEL (8) - Abnormal; Notable for the following components:       Result Value    Glucose 135 (*)     Sodium 132 (*)     Potassium 3.3 (*)     Calcium, Total 8.1 (*)     All other components within normal limits   CBC WITH DIFFERENTIAL WITH PLATELET - Abnormal; Notable for the following components:    HGB 12.3 (*)     HCT 36.3 (*)     RDW-SD 47.8 (*)     Lymphocyte Absolute 0.18 (*)     All other components within normal limits   LACTIC ACID, PLASMA - Normal   SARS-COV-2/FLU A AND B/RSV BY PCR (GENEXPERT) - Normal    Narrative:     This test is intended for the qualitative detection and differentiation of SARS-CoV-2, influenza A, influenza B, and respiratory syncytial virus (RSV) viral RNA in nasopharyngeal or nares swabs from individuals suspected of respiratory viral infection consistent with COVID-19 by their healthcare provider. Signs and symptoms of respiratory viral infection due to SARS-CoV-2, influenza, and RSV can be similar.                                    Test performed using  the Xpert Xpress SARS-CoV-2/FLU/RSV (real time RT-PCR)  assay on the GeneXpert instrument, [facility], [place] [address].                   This test is being used under the Food and Drug Administration's Emergency Use Authorization.                                    The authorized Fact Sheet for Healthcare Providers for this assay is available upon request from the laboratory.   URINALYSIS WITH CULTURE REFLEX   BLOOD CULTURE   BLOOD CULTURE         Imaging Results Available and Reviewed while in ED: XR CHEST AP PORTABLE  (CPT=71045)    Result Date: 3/8/2025  CONCLUSION: Pulmonary opacity in the left mid lung zone which could represent scar and/or treated disease as the finding is similar to prior exams.  Left basal atelectasis.  Otherwise no acute cardiopulmonary abnormality.    Dictated by (CST): Melvin Brooks MD on 3/08/2025 at 6:08 PM     Finalized by (CST): Melvin Brooks MD on 3/08/2025 at 6:09 PM         ED Medications Administered:   Medications   acetaminophen (Tylenol Extra Strength) tab 1,000 mg (1,000 mg Oral Given 3/8/25 1456)   sodium chloride 0.9 % IV bolus 1,000 mL (0 mL Intravenous Stopped 3/8/25 1809)         MDM     Vitals:    03/08/25 1715 03/08/25 1800 03/08/25 1830 03/08/25 1900   BP: 114/53 106/51 113/56 115/52   Pulse: 72 67 69 66   Resp: 20 18 24 16   Temp:       TempSrc:       SpO2: 95% 93% 93% 94%   Weight:       Height:         *I personally reviewed and interpreted all ED vitals.  I also personally reviewed all labs and imaging if ordered    Pulse Ox: 95%, Room air, Normal     Monitor Interpretation:   normal sinus rhythm    Differential Diagnosis/ Diagnostic Considerations: Pneumonia, pleural effusion, pneumonitis, bronchitis    Medical Record Review: I personally reviewed available prior medical records for any recent pertinent discharge summaries, testing, and procedures and reviewed those reports.    Complicating Factors: The patient already has does not have any pertinent problems on  file. to contribute to the complexity of this ED evaluation.    Medical Decision Making  79-year-old male presents ER with complaint of fever chills and weakness.  Patient's blood work within normal limits.  Patient chest x-ray shows no acute pneumonia.  Lactic acid level normal blood cultures drawn.  Patient given 1 L of IV fluids for hypotension.  Discussed case with Dr. Hernandez who states to place the patient on Augmentin as well as Zithromax secondary to the patient being immunosuppressed.  Patient's family was okay with him being discharged home and instructed to return to the ER or follow-up with Dr. Hernandez as needed    Problems Addressed:  Fever, unspecified fever cause: acute illness or injury  Weakness generalized: acute illness or injury    Amount and/or Complexity of Data Reviewed  Independent Historian: spouse     Details: History obtained from the family was bedside states the patient had complaint of fevers and chills today.  Also with weak  External Data Reviewed: notes.     Details: Patient notes reviewed.  Patient with history of recurrent pneumonias also with adenocarcinoma of the lung getting chemotherapy  Labs: ordered. Decision-making details documented in ED Course.  Radiology: ordered and independent interpretation performed. Decision-making details documented in ED Course.     Details: X-ray reviewed by myself shows no acute opacity or pneumonia    Risk  Prescription drug management.        Condition upon leaving the department: Stable    Disposition and Plan     Clinical Impression:  1. Weakness generalized    2. Fever, unspecified fever cause        Disposition:  Discharge    Follow-up:  Chris Hernandez MD  [facility] [address] [address]  [phone]    Schedule an appointment as soon as possible for a visit  If symptoms worsen      Medications Prescribed:  Current Discharge Medication List        START taking these medications    Details   amoxicillin clavulanate [id number]  MG Oral Tab Take 1 tablet by mouth 2 (two) times daily for 5 days.  Qty: 10 tablet, Refills: 0      azithromycin (ZITHROMAX Z-MARGARET) 250 MG Oral Tab Take 2 tablets (500 mg total) by mouth daily for 1 day, THEN 1 tablet (250 mg total) daily for 4 days.  Qty: 6 tablet, Refills: 0                    [1] (Not in a hospital admission)

## 2025-03-09 NOTE — ED QUICK NOTES
Patient AO 4, discharged in wheelchair with son and wife. No questions regarding discharge instructions at this time. Port access removed, flushed with 10ml NS and bandage applied.

## 2025-03-10 ENCOUNTER — TELEPHONE (OUTPATIENT)
Dept: PULMONOLOGY | Facility: CLINIC | Age: 80
End: 2025-03-10

## 2025-03-10 NOTE — TELEPHONE ENCOUNTER
Patient's wife called to schedule ER follow up appointment.  Patient was in ER on Saturday.  No appointments available.  Please call.

## 2025-03-11 NOTE — TELEPHONE ENCOUNTER
Dr. Hernandez-Spoke with patient and his wife Satinder- states patient is doing better since ER discharge- no fever, no sob, good appetite. His main concern is fatigue/weakness he still has, he is using walker to ambulate, he also had couple episodes of diarrhea yesterday. Drinking plenty of fluids. Concerned could be related to chemo pills has been on. Can patient be added on 03/17/25 at 11:45 for ER follow up? Pt still finishing antibiotics-augmentin and zpak given at ER on 3/8/25. Also recommended for wife to check to monitor blood pressures at home since at ER was a little low and to call us if goes under 90/60. Went over red flag symptoms and when to return to ER or call us back.

## 2025-03-12 DIAGNOSIS — J98.4 PNEUMONITIS: ICD-10-CM

## 2025-03-12 DIAGNOSIS — C34.12 CANCER OF UPPER LOBE OF LEFT LUNG (HCC): ICD-10-CM

## 2025-03-12 DIAGNOSIS — D84.9 IMMUNOCOMPROMISED (HCC): ICD-10-CM

## 2025-03-12 DIAGNOSIS — Z51.11 CHEMOTHERAPY MANAGEMENT, ENCOUNTER FOR: ICD-10-CM

## 2025-03-12 DIAGNOSIS — T45.1X5A CHEMOTHERAPY-INDUCED FATIGUE: ICD-10-CM

## 2025-03-12 DIAGNOSIS — R53.83 CHEMOTHERAPY-INDUCED FATIGUE: ICD-10-CM

## 2025-03-12 NOTE — TELEPHONE ENCOUNTER
Patient's spouse accepted appointment for patient with Dr. Hernandez on 3/17 11:45 am ([facility]). Appointment given. She voiced understanding.

## 2025-03-13 RX ORDER — DEXAMETHASONE 1 MG
1 TABLET ORAL EVERY OTHER DAY
Qty: 90 TABLET | Refills: 0 | Status: SHIPPED | OUTPATIENT
Start: 2025-03-13

## 2025-03-13 RX ORDER — RIVAROXABAN 10 MG/1
10 TABLET, FILM COATED ORAL NIGHTLY
Qty: 90 TABLET | Refills: 1 | Status: SHIPPED | OUTPATIENT
Start: 2025-03-13

## 2025-03-13 RX ORDER — SULFAMETHOXAZOLE AND TRIMETHOPRIM 800; 160 MG/1; MG/1
TABLET ORAL
Qty: 72 TABLET | Refills: 2 | Status: SHIPPED | OUTPATIENT
Start: 2025-03-13

## 2025-03-13 NOTE — TELEPHONE ENCOUNTER
Dr. Hernandez- please sign pended order for Xarelto, if agreeable    Last office visit: 2/13/2025 Follow up: 3/17/2025 Last refill: 9/16/2024

## 2025-03-17 ENCOUNTER — OFFICE VISIT (OUTPATIENT)
Dept: PULMONOLOGY | Facility: CLINIC | Age: 80
End: 2025-03-17
Payer: MEDICARE

## 2025-03-17 VITALS
SYSTOLIC BLOOD PRESSURE: 120 MMHG | RESPIRATION RATE: 18 BRPM | HEART RATE: 56 BPM | DIASTOLIC BLOOD PRESSURE: 62 MMHG | OXYGEN SATURATION: 98 % | HEIGHT: 70 IN | WEIGHT: 187 LBS | BODY MASS INDEX: 26.77 KG/M2

## 2025-03-17 DIAGNOSIS — T50.905A DRUG-INDUCED PNEUMONITIS: Primary | ICD-10-CM

## 2025-03-17 DIAGNOSIS — J98.4 DRUG-INDUCED PNEUMONITIS: Primary | ICD-10-CM

## 2025-03-17 PROCEDURE — 3074F SYST BP LT 130 MM HG: CPT | Performed by: INTERNAL MEDICINE

## 2025-03-17 PROCEDURE — 1126F AMNT PAIN NOTED NONE PRSNT: CPT | Performed by: INTERNAL MEDICINE

## 2025-03-17 PROCEDURE — 3078F DIAST BP <80 MM HG: CPT | Performed by: INTERNAL MEDICINE

## 2025-03-17 PROCEDURE — 1159F MED LIST DOCD IN RCRD: CPT | Performed by: INTERNAL MEDICINE

## 2025-03-17 PROCEDURE — 3008F BODY MASS INDEX DOCD: CPT | Performed by: INTERNAL MEDICINE

## 2025-03-17 PROCEDURE — 99213 OFFICE O/P EST LOW 20 MIN: CPT | Performed by: INTERNAL MEDICINE

## 2025-03-17 NOTE — PROGRESS NOTES
The patient is an 79-year-old male who I know well from prior evaluation who comes in now for follow-up.  In general, he is improved.  He was seen in the emergency room couple days ago with chills and was given dual antibiotic and now is feeling much better.  He had a period of time when he was febrile where he felt like he had no power or strength.    Review of Systems:  Vision normal. Ear nose and throat normal. Bowel normal. Bladder function normal. No depression. No thyroid disease. No lymphatic system concerns.  No rash. Muscles and joints unremarkable. No weight loss no weight gain.    Physical Examination:  Vital signs normal. HEENT examination is unremarkable with pupils equal round and reactive to light and accommodation. Neck without adenopathy, thyromegaly, JVD nor bruit. Lungs clear to auscultation and percussion. Cardiac regular rate and rhythm no murmur. Abdomen nontender, without hepatosplenomegaly and no mass appreciable. Extremities and Musculoskeletal without clubbing cyanosis nor edema, and mobility acceptable. Neurologic grossly intact with symmetric tone and strength and reflex.    Assessment and plan:  1.  Fever-empiric antibiotic administered, possible bacterial infection although could have been viral.  Doing better at present.  He did develop fever blister on his lip.  Will continue current therapy and patient contact me promptly if problem persists or recurs.  2.  Pulmonary aspergillosis-continue current therapy  3.  Interstitial pneumonitis associated with immunotherapy-on dexamethasone every other day  4.  Lung cancer-oncology follow-up  5.  Sleep apnea-continue current therapy  6.  General Health-the patient should never drink and drive, always wear a safety belt, have a smoke detector and fire extiguisher in the house, avoid the use of candles in the home as they are the most common cause of housefire, and see me annually for complete examination.  7.  Balance issues-doing better.

## 2025-03-21 ENCOUNTER — OFFICE VISIT (OUTPATIENT)
Age: 80
End: 2025-03-21
Attending: INTERNAL MEDICINE
Payer: MEDICARE

## 2025-03-21 ENCOUNTER — NURSE ONLY (OUTPATIENT)
Age: 80
End: 2025-03-21
Attending: INTERNAL MEDICINE
Payer: MEDICARE

## 2025-03-21 VITALS
WEIGHT: 191 LBS | SYSTOLIC BLOOD PRESSURE: 106 MMHG | HEIGHT: 70 IN | BODY MASS INDEX: 27.35 KG/M2 | TEMPERATURE: 98 F | OXYGEN SATURATION: 95 % | DIASTOLIC BLOOD PRESSURE: 72 MMHG | RESPIRATION RATE: 18 BRPM | HEART RATE: 73 BPM

## 2025-03-21 DIAGNOSIS — Z51.11 CHEMOTHERAPY MANAGEMENT, ENCOUNTER FOR: ICD-10-CM

## 2025-03-21 DIAGNOSIS — J98.4 PNEUMONITIS: ICD-10-CM

## 2025-03-21 DIAGNOSIS — T45.1X5A CHEMOTHERAPY-INDUCED FATIGUE: ICD-10-CM

## 2025-03-21 DIAGNOSIS — R50.2 DRUG-INDUCED FEVER: ICD-10-CM

## 2025-03-21 DIAGNOSIS — C34.92 RECURRENT ADENOCARCINOMA OF LEFT LUNG (HCC): Primary | ICD-10-CM

## 2025-03-21 DIAGNOSIS — R74.8 ELEVATED ALKALINE PHOSPHATASE LEVEL: ICD-10-CM

## 2025-03-21 DIAGNOSIS — R53.83 CHEMOTHERAPY-INDUCED FATIGUE: ICD-10-CM

## 2025-03-21 DIAGNOSIS — Z79.52 LONG TERM (CURRENT) USE OF SYSTEMIC STEROIDS: ICD-10-CM

## 2025-03-21 DIAGNOSIS — C34.92 RECURRENT ADENOCARCINOMA OF LEFT LUNG (HCC): ICD-10-CM

## 2025-03-21 LAB
ALBUMIN SERPL-MCNC: 3.8 G/DL (ref 3.2–4.8)
ALBUMIN/GLOB SERPL: 2 {RATIO} (ref 1–2)
ALP LIVER SERPL-CCNC: 142 U/L
ALT SERPL-CCNC: 19 U/L
ANION GAP SERPL CALC-SCNC: 4 MMOL/L (ref 0–18)
AST SERPL-CCNC: 27 U/L (ref ?–34)
BASOPHILS # BLD AUTO: 0.02 X10(3) UL (ref 0–0.2)
BASOPHILS NFR BLD AUTO: 0.4 %
BILIRUB SERPL-MCNC: 0.6 MG/DL (ref 0.2–1.1)
BUN BLD-MCNC: 16 MG/DL (ref 9–23)
BUN/CREAT SERPL: 17 (ref 10–20)
CALCIUM BLD-MCNC: 8.8 MG/DL (ref 8.7–10.4)
CHLORIDE SERPL-SCNC: 107 MMOL/L (ref 98–112)
CO2 SERPL-SCNC: 26 MMOL/L (ref 21–32)
CREAT BLD-MCNC: 0.94 MG/DL
DEPRECATED RDW RBC AUTO: 49.8 FL (ref 35.1–46.3)
EGFRCR SERPLBLD CKD-EPI 2021: 81 ML/MIN/1.73M2 (ref 60–?)
EOSINOPHIL # BLD AUTO: 0.03 X10(3) UL (ref 0–0.7)
EOSINOPHIL NFR BLD AUTO: 0.6 %
ERYTHROCYTE [DISTWIDTH] IN BLOOD BY AUTOMATED COUNT: 14 % (ref 11–15)
GLOBULIN PLAS-MCNC: 1.9 G/DL (ref 2–3.5)
GLUCOSE BLD-MCNC: 107 MG/DL (ref 70–99)
HCT VFR BLD AUTO: 37.5 %
HGB BLD-MCNC: 12.7 G/DL
IMM GRANULOCYTES # BLD AUTO: 0.01 X10(3) UL (ref 0–1)
IMM GRANULOCYTES NFR BLD: 0.2 %
LYMPHOCYTES # BLD AUTO: 0.87 X10(3) UL (ref 1–4)
LYMPHOCYTES NFR BLD AUTO: 18.2 %
MCH RBC QN AUTO: 32.6 PG (ref 26–34)
MCHC RBC AUTO-ENTMCNC: 33.9 G/DL (ref 31–37)
MCV RBC AUTO: 96.4 FL
MONOCYTES # BLD AUTO: 0.67 X10(3) UL (ref 0.1–1)
MONOCYTES NFR BLD AUTO: 14 %
NEUTROPHILS # BLD AUTO: 3.18 X10 (3) UL (ref 1.5–7.7)
NEUTROPHILS # BLD AUTO: 3.18 X10(3) UL (ref 1.5–7.7)
NEUTROPHILS NFR BLD AUTO: 66.6 %
OSMOLALITY SERPL CALC.SUM OF ELEC: 286 MOSM/KG (ref 275–295)
PLATELET # BLD AUTO: 241 10(3)UL (ref 150–450)
POTASSIUM SERPL-SCNC: 4 MMOL/L (ref 3.5–5.1)
PROT SERPL-MCNC: 5.7 G/DL (ref 5.7–8.2)
RBC # BLD AUTO: 3.89 X10(6)UL
SODIUM SERPL-SCNC: 137 MMOL/L (ref 136–145)
WBC # BLD AUTO: 4.8 X10(3) UL (ref 4–11)

## 2025-03-21 NOTE — PROGRESS NOTES
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  He completed definitive concurrent chemoradiotherapy using cisplatin and etoposide early March 2017.  He had mild anemia neutropenia on treatment.    He subsequently was followed on surveillance with no evidence of progression.    The patient started on durvalumab in October 2017 given he had unresectable stage III disease with no evidence of progression following chemo radiotherapy    He has had occasional mild anemia on treatment.  Laboratory follow-up in May 2018 showed severe iron deficiency.  Received IV iron    He completed 13 cycles of durvalumab  however towards the end of his treatment he developed 2 separate instances of pneumonitis/transaminitis requiring separate courses of prednisone.  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    PE/DVT admission 1/2023.    BRAF V6 100 E mutation discovered peripheral blood ctDAN September 2023  Dabrafenib and trametenib initiated 10/2023    1/28-2/1 hospitalization pneumonia/pneumonitis.     6/21/24 Dose reduction of Dabrafenib to 100 mg BID, no dose modification with Trametinib    9/26/24 - 9/29/24 [facility] admission, fever. Treatment  held during admission.       Interval History:  Patient returns for 2 months follow up. He is on trametinib and dabrafenib dose reduced 100 mg bid for fatigue.  He was seen in ER on 3/28/25 for temp 103, chills, malaise, myalgia and excessive fatigue. Found to be febrile 101F and hypotensive. CXR was stable. He was given IVG and discharged home on augmention and azithromycin.  He is feeling better now and back to baseline. No recurrent fevers since then. No respiratory changes or  complaints. He reports chronic fatigue and exertional dyspnea which is stable. Energy is fair and stable with dexamethasone 1 mg every other day. He continues on bactrim and voriconazole ppx. He notes easy bruising on his hands and legs with minimal trauma and activity which is due to xarelto. He denies nausea/vomiting, diarrhea, constipation, peripheral edema, rash and bone pain.    Performance Status: ECOG 1      Current Medications:    Current Outpatient Medications:     XARELTO 10 MG Oral Tab, TAKE 1 TABLET BY MOUTH EVERYDAY AT BEDTIME, Disp: 90 tablet, Rfl: 1    SULFAMETHOXAZOLE-TRIMETHOPRIM -160 MG Oral Tab per tablet, TAKE 1 TABLET BY MOUTH TWICE A DAY ON MONDAY, WEDNESDAY AND FRIDAY, Disp: 72 tablet, Rfl: 2    dexamethasone 1 MG Oral Tab, Take 1 tablet (1 mg total) by mouth every other day., Disp: 90 tablet, Rfl: 0    pantoprazole 40 MG Oral Tab EC, Take 1 tablet (40 mg total) by mouth before breakfast., Disp: 90 tablet, Rfl: 1    dabrafenib mesylate 50 MG Oral Cap, Take 2 capsules (100 mg total) by mouth 2 (two) times daily. On dose reduction since 6/21/24 [facility], Disp: 120 capsule, Rfl: 5    voriconazole 200 MG Oral Tab, Take 1 tablet (200 mg total) by mouth 2 (two) times daily., Disp: 180 tablet, Rfl: 3    ipratropium-albuterol 0.5-2.5 (3) MG/3ML Inhalation Solution, Take 3 mL by nebulization every 6 (six) hours as needed., Disp: 90 each, Rfl: 5    fluticasone furoate-vilanterol (BREO ELLIPTA) 100-25 MCG/INH Inhalation  Aerosol Powder, Breath Activated, Inhale 1 puff into the lungs daily. Follow with mouth rinse and spit, Disp: 1 each, Rfl: 6    albuterol 108 (90 Base) MCG/ACT Inhalation Aero Soln, Inhale 2 puffs into the lungs every 4 (four) hours as needed for Wheezing., Disp: 1 each, Rfl: 2    cholecalciferol 50 MCG (2000 UT) Oral Tab, Take 0.5 tablets (1,000 Units total) by mouth every morning., Disp: , Rfl:     Vitamin C 500 MG Oral Tab, Take 1 tablet (500 mg total) by mouth every morning., Disp: , Rfl:     Trametinib Dimethyl Sulfoxide 2 MG Oral Tab, Take 2 mg by mouth daily. Take at least 1 hour before or at least 2 hours after a meal. (Patient not taking: Reported on 3/21/2025), Disp: 30 tablet, Rfl: 5       Review of Systems:  All other systems reviewed and negative x12    Vital Signs:  /72 (BP Location: Right arm, Patient Position: Sitting, Cuff Size: large)   Pulse 73   Temp 97.9 °F (36.6 °C) (Oral)   Resp 18   Ht 1.778 m (5' 10\")   Wt 86.6 kg (191 lb)   SpO2 95%   BMI 27.41 kg/m²     Physical Examination:  General: Alert and oriented x 3, not in acute distress.  Psych:  Mood and affect appropriate.   HEENT: Anicteric. Oropharynx is clear.   Neck: No lymphadenopathy   Chest: Non-labored breathing, clear bilaterally   Cardiovascular: Regular rate and rhythm.   Extremities: No edema. Scattered bruises on arms.   Neurological: Grossly intact.   Derm:  No rash or skin lesions.     Labs:  Lab Results   Component Value Date    WBC 4.8 03/21/2025    RBC 3.89 03/21/2025    HGB 12.7 (L) 03/21/2025    HCT 37.5 (L) 03/21/2025    MCV 96.4 03/21/2025    MCH 32.6 03/21/2025    MCHC 33.9 03/21/2025    RDW 14.0 03/21/2025    .0 03/21/2025    MPV 9.3 11/20/2018     Lab Results   Component Value Date     03/21/2025    K 4.0 03/21/2025     03/21/2025    CO2 26.0 03/21/2025    BUN 16 03/21/2025    CREATSERUM 0.94 03/21/2025     (H) 03/21/2025    CA 8.8 03/21/2025    ALKPHO 142 (H) 03/21/2025    ALT 19  03/21/2025    AST 27 03/21/2025    BILT 0.6 03/21/2025    ALB 3.8 03/21/2025    TP 5.7 03/21/2025      Lab Results   Component Value Date/Time    TSH 2.276 11/01/2024 10:05 AM    TSH 5.649 (H) 08/23/2024 08:38 AM    TSH 1.600 09/29/2023 01:54 PM    TSH 2.420 09/12/2023 11:34 AM    TSH 3.480 09/01/2023 09:20 AM    TSH 1.980 07/28/2023 12:58 PM       Radiology:  XR CHEST AP PORTABLE  (CPT=71045)    Result Date: 3/8/2025  CONCLUSION: Pulmonary opacity in the left mid lung zone which could represent scar and/or treated disease as the finding is similar to prior exams.  Left basal atelectasis.  Otherwise no acute cardiopulmonary abnormality.    Dictated by (CST): Melvin Brooks MD on 3/08/2025 at 6:08 PM     Finalized by (CST): Melvin Brooks MD on 3/08/2025 at 6:09 PM          XR CHEST PA + LAT CHEST (CPT=71046)    Result Date: 2/15/2025  CONCLUSION:  1. No acute cardiopulmonary disease.  Left perihilar soft tissue fullness parenchymal scarring unchanged. 2. No acute airspace consolidation or pleural effusion. .     Dictated by (CST): Garcia Wasserman MD on 2/15/2025 at 9:05 AM     Finalized by (CST): Garcia Wasserman MD on 2/15/2025 at 9:09 AM              Impression and Plan:    History of stage IIIB adenocarcinoma of the left upper lung, PD-L1 80%, EGFR, ALK, ROS1 negative:  - s/p concurrent definitive chemoradiotherapy with cisplatin and etoposide completed in March 2017 followed by consolidative durvalumab completed 13 cycles in May 2018 c/b recurrent pneumonitis/transaminitis.    Recurrent lung adenocarcinoma    - biopsy-proven recurrence in mediastinal lymph nodes in March 2021.  Same histology.  PD-L1 >50%  - started pembrolizumab March 2021 with good long-term disease control through summer 2023, then discontinued d/t recurrent pneumonitis.     BRAF V600E peripheral blood sequencing September 2023  - dabrafenib trametinib started 10/23/23, tolerated well initially, then dose reduced due to significant fatigue  in June 2024.   - CT chest 12/2024 with no concerning findings. Stable mild left perihilar radiation fibrosis. Small nodules and right apical GGO resolved.   - continue dabrafenib at 100 mg bid and trametinib 2 mg daily without further dose modification. He is clinically stable and recent CXR on 3/8/25 was stable.   - plan to repeat CT chest mid May 2025, or sooner pending his symptoms.     Pyrexia  - drug induced; recurrent fever is the most common side effects with the combination of dabrafenib/trametinib   - recommend tylenol as needed, hydration, monitor temp, HR, BP at home.  - advised to seek care if fever > 102, persistent > 24 hr or if he develops chills/rigors, confusion, localizing symptoms, dyspnea, vomiting and/or dehydration    Fatigue- mild, stable. continue dexamethasone 1 mg every other day. Continue ppx bactrim, voriconazole and PPI.     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 Hernandez.     Pulmonary embolus- diagnosed January 2023, continue xarelto preventive dose 10mg daily. No sings of VTE recurrence.     Elevated ALK phos- chronically elevated, likely dug induced. AST/ALT/Bili remain normal. Will continue to monitor         RTC 8 weeks      Yaneth Bello MD   Hematology Oncology

## 2025-03-27 RX ORDER — VORICONAZOLE 200 MG/1
200 TABLET, FILM COATED ORAL 2 TIMES DAILY
Qty: 180 TABLET | Refills: 3 | Status: SHIPPED | OUTPATIENT
Start: 2025-03-27

## 2025-03-27 NOTE — TELEPHONE ENCOUNTER
Dr. Hernandez- please sign pended order for voriconazole 200 MG, if agreeable    Last office visit: 3/17/2025 Follow up: none Last refill: 3/21/2024

## 2025-04-19 DIAGNOSIS — C34.92 MALIGNANT NEOPLASM OF LEFT LUNG, UNSPECIFIED PART OF LUNG (HCC): ICD-10-CM

## 2025-04-21 RX ORDER — DABRAFENIB 50 MG/1
CAPSULE ORAL
Qty: 120 CAPSULE | Refills: 5 | Status: SHIPPED | OUTPATIENT
Start: 2025-04-21

## 2025-04-21 RX ORDER — TRAMETINIB 2 MG/1
TABLET, FILM COATED ORAL
Qty: 30 TABLET | Refills: 5 | Status: SHIPPED | OUTPATIENT
Start: 2025-04-21

## 2025-05-09 ENCOUNTER — TELEPHONE (OUTPATIENT)
Age: 80
End: 2025-05-09

## 2025-05-09 NOTE — TELEPHONE ENCOUNTER
Pt called he would like to know if he should have his CT scan before he see  on 5/16.      Please give pt a call back     Thank you

## 2025-05-12 ENCOUNTER — TELEPHONE (OUTPATIENT)
Age: 80
End: 2025-05-12

## 2025-05-12 NOTE — TELEPHONE ENCOUNTER
Returned call to patient and discussed that they should have CT scan completed before follow up with Dr. Bello - they will call and schedule CT then call us back to schedule Port access for CT scan - will change follow up to 1 week post CT scan.  Luis and spouse stated understanding.

## 2025-05-12 NOTE — TELEPHONE ENCOUNTER
Pt wife Satinder called she would like to speak with a nurse in regards to pt Ct scan she stated she has it scheduled for 5/16 but she has a few more questions      Please give pt wife a call back [phone]    Thank you

## 2025-05-16 ENCOUNTER — APPOINTMENT (OUTPATIENT)
Age: 80
End: 2025-05-16
Attending: INTERNAL MEDICINE
Payer: MEDICARE

## 2025-05-16 ENCOUNTER — NURSE ONLY (OUTPATIENT)
Age: 80
End: 2025-05-16
Attending: INTERNAL MEDICINE
Payer: MEDICARE

## 2025-05-16 ENCOUNTER — HOSPITAL ENCOUNTER (OUTPATIENT)
Dept: CT IMAGING | Facility: HOSPITAL | Age: 80
Discharge: HOME | End: 2025-05-16
Attending: INTERNAL MEDICINE
Payer: MEDICARE

## 2025-05-16 DIAGNOSIS — Z51.11 CHEMOTHERAPY MANAGEMENT, ENCOUNTER FOR: ICD-10-CM

## 2025-05-16 DIAGNOSIS — R91.8 LUNG NODULES: ICD-10-CM

## 2025-05-16 DIAGNOSIS — C34.12 CANCER OF UPPER LOBE OF LEFT LUNG (HCC): ICD-10-CM

## 2025-05-16 LAB
ALBUMIN SERPL-MCNC: 4 G/DL (ref 3.2–4.8)
ALBUMIN/GLOB SERPL: 2.1 {RATIO} (ref 1–2)
ALP LIVER SERPL-CCNC: 144 U/L (ref 45–117)
ALT SERPL-CCNC: 21 U/L (ref 10–49)
ANION GAP SERPL CALC-SCNC: 9 MMOL/L (ref 0–18)
AST SERPL-CCNC: 30 U/L (ref ?–34)
BASOPHILS # BLD AUTO: 0.02 X10(3) UL (ref 0–0.2)
BASOPHILS NFR BLD AUTO: 0.6 %
BILIRUB SERPL-MCNC: 0.5 MG/DL (ref 0.2–1.1)
BUN BLD-MCNC: 21 MG/DL (ref 9–23)
BUN/CREAT SERPL: 20.6 (ref 10–20)
CALCIUM BLD-MCNC: 8.9 MG/DL (ref 8.7–10.4)
CHLORIDE SERPL-SCNC: 104 MMOL/L (ref 98–112)
CO2 SERPL-SCNC: 25 MMOL/L (ref 21–32)
CREAT BLD-MCNC: 0.9 MG/DL (ref 0.7–1.3)
CREAT BLD-MCNC: 1.02 MG/DL (ref 0.7–1.3)
DEPRECATED RDW RBC AUTO: 50.7 FL (ref 35.1–46.3)
EGFRCR SERPLBLD CKD-EPI 2021: 73 ML/MIN/1.73M2 (ref 60–?)
EGFRCR SERPLBLD CKD-EPI 2021: 85 ML/MIN/1.73M2 (ref 60–?)
EOSINOPHIL # BLD AUTO: 0.04 X10(3) UL (ref 0–0.7)
EOSINOPHIL NFR BLD AUTO: 1.1 %
ERYTHROCYTE [DISTWIDTH] IN BLOOD BY AUTOMATED COUNT: 14.3 % (ref 11–15)
GLOBULIN PLAS-MCNC: 1.9 G/DL (ref 2–3.5)
GLUCOSE BLD-MCNC: 111 MG/DL (ref 70–99)
HCT VFR BLD AUTO: 37.6 % (ref 39–53)
HGB BLD-MCNC: 12.5 G/DL (ref 13–17.5)
IMM GRANULOCYTES # BLD AUTO: 0.01 X10(3) UL (ref 0–1)
IMM GRANULOCYTES NFR BLD: 0.3 %
LYMPHOCYTES # BLD AUTO: 0.88 X10(3) UL (ref 1–4)
LYMPHOCYTES NFR BLD AUTO: 25.3 %
MCH RBC QN AUTO: 32 PG (ref 26–34)
MCHC RBC AUTO-ENTMCNC: 33.2 G/DL (ref 31–37)
MCV RBC AUTO: 96.2 FL (ref 80–100)
MONOCYTES # BLD AUTO: 0.51 X10(3) UL (ref 0.1–1)
MONOCYTES NFR BLD AUTO: 14.7 %
NEUTROPHILS # BLD AUTO: 2.02 X10 (3) UL (ref 1.5–7.7)
NEUTROPHILS # BLD AUTO: 2.02 X10(3) UL (ref 1.5–7.7)
NEUTROPHILS NFR BLD AUTO: 58 %
OSMOLALITY SERPL CALC.SUM OF ELEC: 290 MOSM/KG (ref 275–295)
PLATELET # BLD AUTO: 212 10(3)UL (ref 150–450)
POTASSIUM SERPL-SCNC: 3.7 MMOL/L (ref 3.5–5.1)
PROT SERPL-MCNC: 5.9 G/DL (ref 5.7–8.2)
RBC # BLD AUTO: 3.91 X10(6)UL (ref 3.8–5.8)
SODIUM SERPL-SCNC: 138 MMOL/L (ref 136–145)
WBC # BLD AUTO: 3.5 X10(3) UL (ref 4–11)

## 2025-05-16 PROCEDURE — 71260 CT THORAX DX C+: CPT | Performed by: INTERNAL MEDICINE

## 2025-05-16 PROCEDURE — 82565 ASSAY OF CREATININE: CPT

## 2025-05-19 ENCOUNTER — TELEPHONE (OUTPATIENT)
Dept: PULMONOLOGY | Facility: CLINIC | Age: 80
End: 2025-05-19

## 2025-05-19 DIAGNOSIS — M79.605 LEFT LEG PAIN: Primary | ICD-10-CM

## 2025-05-19 NOTE — TELEPHONE ENCOUNTER
The patients wife called to ask if the patient needs to be seen for his sciatic pain. The patient's wife also wanted to ask if Dr. Hernandez can see the patient's CT chest scan. Please call.

## 2025-05-20 ENCOUNTER — OFFICE VISIT (OUTPATIENT)
Dept: PULMONOLOGY | Facility: CLINIC | Age: 80
End: 2025-05-20
Payer: MEDICARE

## 2025-05-20 ENCOUNTER — HOSPITAL ENCOUNTER (OUTPATIENT)
Dept: GENERAL RADIOLOGY | Facility: HOSPITAL | Age: 80
Discharge: HOME | End: 2025-05-20
Attending: INTERNAL MEDICINE
Payer: MEDICARE

## 2025-05-20 ENCOUNTER — TELEPHONE (OUTPATIENT)
Dept: PULMONOLOGY | Facility: CLINIC | Age: 80
End: 2025-05-20

## 2025-05-20 VITALS
HEIGHT: 70 IN | WEIGHT: 191 LBS | DIASTOLIC BLOOD PRESSURE: 53 MMHG | SYSTOLIC BLOOD PRESSURE: 121 MMHG | BODY MASS INDEX: 27.35 KG/M2 | OXYGEN SATURATION: 95 % | HEART RATE: 63 BPM

## 2025-05-20 DIAGNOSIS — C34.92 MALIGNANT NEOPLASM OF LEFT LUNG, UNSPECIFIED PART OF LUNG (HCC): ICD-10-CM

## 2025-05-20 DIAGNOSIS — M25.552 PAIN OF LEFT HIP: ICD-10-CM

## 2025-05-20 DIAGNOSIS — M79.605 LEFT LEG PAIN: ICD-10-CM

## 2025-05-20 DIAGNOSIS — M25.552 PAIN OF LEFT HIP: Primary | ICD-10-CM

## 2025-05-20 DIAGNOSIS — M25.552 LEFT HIP PAIN: ICD-10-CM

## 2025-05-20 PROCEDURE — 73502 X-RAY EXAM HIP UNI 2-3 VIEWS: CPT | Performed by: INTERNAL MEDICINE

## 2025-05-20 PROCEDURE — 1126F AMNT PAIN NOTED NONE PRSNT: CPT | Performed by: INTERNAL MEDICINE

## 2025-05-20 PROCEDURE — 3074F SYST BP LT 130 MM HG: CPT | Performed by: INTERNAL MEDICINE

## 2025-05-20 PROCEDURE — 1159F MED LIST DOCD IN RCRD: CPT | Performed by: INTERNAL MEDICINE

## 2025-05-20 PROCEDURE — 72110 X-RAY EXAM L-2 SPINE 4/>VWS: CPT | Performed by: INTERNAL MEDICINE

## 2025-05-20 PROCEDURE — 3078F DIAST BP <80 MM HG: CPT | Performed by: INTERNAL MEDICINE

## 2025-05-20 PROCEDURE — 3008F BODY MASS INDEX DOCD: CPT | Performed by: INTERNAL MEDICINE

## 2025-05-20 PROCEDURE — 99213 OFFICE O/P EST LOW 20 MIN: CPT | Performed by: INTERNAL MEDICINE

## 2025-05-20 RX ORDER — PREDNISONE 20 MG/1
TABLET ORAL
Qty: 10 TABLET | Refills: 0 | Status: SHIPPED | OUTPATIENT
Start: 2025-05-20

## 2025-05-20 NOTE — TELEPHONE ENCOUNTER
Spoke with wife- let her know patient should not take Advil while on Xarelto. Let her know Dr. Hernandez will be ordering xrays and prednisone. She was wondering if okay to take prednisone since patient is already on dexamethasone. Also booked pt for 2pm today. Orders pended.

## 2025-05-20 NOTE — TELEPHONE ENCOUNTER
Dr. Hernandez- Spoke with wife and patient. Reports patient started to have pain in left upper buttock area that radiates down to left knee starting on Saturday 05/17. States pain is sharp and constant, reports 8/10. Taking 400mg Advil twice a day. He reports difficulty sleeping from pain. No redness/swelling present. No injury.  Can walk but difficult. Patient would like to be seen asap for an appointment,  also recommend immediate care if needed.

## 2025-05-20 NOTE — TELEPHONE ENCOUNTER
RN, as this patient is on Xarelto, he should not be taking ibuprofen.  Can offer a short course of prednisone for possible sciatica.  Please get x-rays of the lumbosacral spine.  Also, I can get him an appointment to see me sometime in the short-term.

## 2025-05-20 NOTE — TELEPHONE ENCOUNTER
Wife calling to make sure it is okay for patient to take prednisone and dexamethasone - states takes dexamethasone every other day?

## 2025-05-20 NOTE — PROGRESS NOTES
The patient is an 80-year-old male who I know well from prior evaluation comes in now for follow-up primarily because he has 2 days of onset of severe left hip discomfort impairing his ability to ambulate and it does radiate down the left leg.  There was no history of trauma.    Review of Systems:  Vision normal. Ear nose and throat normal. Bowel normal. Bladder function normal. No depression. No thyroid disease. No lymphatic system concerns.  No rash. Muscles and joints unremarkable. No weight loss no weight gain.    Physical Examination:  Vital signs normal. HEENT examination is unremarkable with pupils equal round and reactive to light and accommodation. Neck without adenopathy, thyromegaly, JVD nor bruit. Lungs clear to auscultation and percussion. Cardiac regular rate and rhythm no murmur. Abdomen nontender, without hepatosplenomegaly and no mass appreciable. Extremities and Musculoskeletal without clubbing cyanosis nor edema, and mobility acceptable. Neurologic grossly intact with symmetric tone and strength and reflex.    Assessment and plan:  1.  Left back and hip discomfort-I am not certain if this could be sciatica versus primary hip arthritis or lesion as he does have a history of carcinoma.    Recommendations: Patient should not take ibuprofen while he is on Xarelto, can continue with Tylenol 1000 mg up to 4 times daily, x-ray of the lumbosacral spine and the left hip and pelvis, short course prednisone.    2.  General Health and other medical problems-as per prior notes.  Return as per routine.    3.  Abnormal CT scan of the chest-images taken but the report not yet available.

## 2025-05-21 ENCOUNTER — TELEPHONE (OUTPATIENT)
Facility: LOCATION | Age: 80
End: 2025-05-21

## 2025-05-22 RX ORDER — PANTOPRAZOLE SODIUM 40 MG/1
40 TABLET, DELAYED RELEASE ORAL
Qty: 90 TABLET | Refills: 1 | Status: SHIPPED | OUTPATIENT
Start: 2025-05-22

## 2025-05-23 ENCOUNTER — TELEPHONE (OUTPATIENT)
Facility: LOCATION | Age: 80
End: 2025-05-23

## 2025-05-23 ENCOUNTER — OFFICE VISIT (OUTPATIENT)
Age: 80
End: 2025-05-23
Payer: MEDICARE

## 2025-05-23 VITALS — WEIGHT: 191 LBS | BODY MASS INDEX: 27 KG/M2

## 2025-05-23 DIAGNOSIS — M47.816 LUMBAR SPONDYLOSIS: Primary | ICD-10-CM

## 2025-05-23 DIAGNOSIS — M48.062 SPINAL STENOSIS OF LUMBAR REGION WITH NEUROGENIC CLAUDICATION: ICD-10-CM

## 2025-05-23 DIAGNOSIS — M54.16 LUMBAR RADICULOPATHY: ICD-10-CM

## 2025-05-23 NOTE — PROGRESS NOTES
The following individual(s) verbally consented to be recorded using ambient AI listening technology and understand that they can each withdraw their consent to this listening technology at any point by asking the clinician to turn off or pause the recording:    Patient name: Luis Villasenor  Additional names:  wife  History of Present Illness  Luis Villasenor is an 80 year old male who presents with left buttock pain after yard work.    He has been experiencing severe left buttock pain that began a few days after performing yard work and has persisted for about a week. The pain radiates down the back of his leg to the knee but does not extend past the knee. It is severe enough to prevent him from putting his foot down and necessitates the use of a walker when standing up straight. No numbness, tingling, or weakness in the leg is reported.    He has been taking prednisone for three days, which has provided minimal relief. He mentions having similar pain in the past that resolved within three to four hours without medical intervention, but this episode is more severe and prolonged.    He has a history of bilateral knee replacements performed during the pandemic. He resides in [place], near [place] [facility]. He enjoys gardening, growing tomatoes, cucumbers, squash, and beans, despite his current pain.    During the review of symptoms, he denies any right-sided issues. He can walk for about half an hour to three-quarters of an hour before needing to rest, often leaning on a shopping cart for support.    Results  RADIOLOGY  Lumbar spine X-ray: Decreased disc space height, disc bulge, foraminal stenosis  EXAM: X-RAY OF LUMBAR SPINE     CLINICAL INFORMATION: Back pain     FINDINGS:     AP, Lateral with flexion/extension views of lumbar spine completed.   5 lumbar vertebral bodies seen.   Spondylosis is seen with disc space loss and sclerotic endplate changes and facet joint hypertrophy noted  throughout the lumbar spine.  No spondylolysis   No spondylolisthesis.  No fractures   No destructive lesions seen.      IMPRESSION:    Lumbar spondylosis as detailed above        Physical Exam  MUSCULOSKELETAL: No pain on hip joint movement.  Patient is in no apparent distress.  Alert and oriented times 3.  In the seated position:   Left 5/5 Hip flex Right 5/5 Hip flex     5/5 Knee ext  5/5 Knee ext    5/5 EHL  5/5 EHL    5/5 DF   5/5 DF    4-/5 PF   5/5 PF    negative SLR  negative SLR    Deep tendon reflexes 2+ to bilateral patella and Achilles tendons  Clonus is negative to bilateral lower extremities  Calves soft and non tender bilaterally   Sensation is fully intact  Smooth pain free ROM to bilateral Hips, Knees, Ankles    Assessment & Plan  Spinal stenosis  Chronic spinal stenosis with acute exacerbation due to disc bulging and nerve root compression. Conservative management preferred, surgery only if necessary.  - Continue prednisone therapy.  - Order MRI to assess severity.  Patient Does have weakness with left plantarflexion.  He does have a history of lung cancer.10/10 pain.  - Refer to physical therapy with [facility] in [facility].  - Discuss cortisone injections post-MRI if pain persists.  - Avoid surgery unless necessary.        VISIT SUMMARY:  You came in today because of severe left buttock pain that started after doing yard work. The pain has been persistent for about a week and radiates down the back of your leg to your knee. You have been using a walker to help you stand up straight. You have a history of similar pain that resolved quickly in the past, but this episode is more severe.    YOUR PLAN:  -SPINAL STENOSIS: Spinal stenosis is a condition where the spaces within your spine narrow, which can put pressure on the nerves that travel through the spine. Your current pain is due to an acute flare-up caused by disc bulging and nerve root compression. We will continue your prednisone therapy  and have ordered an MRI to assess the severity. You are also being referred to physical therapy with [facility] in [facility]. If the pain persists, we will discuss the option of cortisone injections. Surgery will be considered only if absolutely necessary.      INSTRUCTIONS:  Please schedule an MRI as soon as possible and follow up with physical therapy with [facility] in [facility]. Continue taking prednisone as prescribed. If your pain does not improve, we will discuss cortisone injections. Also, make sure to continue your regular follow-ups with your oncologist, Dr. Verdin, and review your recent CT scan results with them.    Contains text generated by Araceli Watts PA-C

## 2025-05-29 ENCOUNTER — OFFICE VISIT (OUTPATIENT)
Age: 80
End: 2025-05-29
Attending: INTERNAL MEDICINE
Payer: MEDICARE

## 2025-05-29 ENCOUNTER — APPOINTMENT (OUTPATIENT)
Age: 80
End: 2025-05-29
Attending: INTERNAL MEDICINE
Payer: MEDICARE

## 2025-05-29 VITALS
RESPIRATION RATE: 16 BRPM | DIASTOLIC BLOOD PRESSURE: 65 MMHG | OXYGEN SATURATION: 97 % | SYSTOLIC BLOOD PRESSURE: 103 MMHG | HEART RATE: 66 BPM | TEMPERATURE: 98 F | BODY MASS INDEX: 27 KG/M2 | WEIGHT: 190 LBS

## 2025-05-29 DIAGNOSIS — R53.83 CHEMOTHERAPY-INDUCED FATIGUE: ICD-10-CM

## 2025-05-29 DIAGNOSIS — I26.99 PULMONARY EMBOLISM, OTHER, UNSPECIFIED CHRONICITY, UNSPECIFIED WHETHER ACUTE COR PULMONALE PRESENT (HCC): ICD-10-CM

## 2025-05-29 DIAGNOSIS — Z79.52 LONG TERM (CURRENT) USE OF SYSTEMIC STEROIDS: ICD-10-CM

## 2025-05-29 DIAGNOSIS — R74.8 ELEVATED ALKALINE PHOSPHATASE LEVEL: ICD-10-CM

## 2025-05-29 DIAGNOSIS — Z51.11 CHEMOTHERAPY MANAGEMENT, ENCOUNTER FOR: ICD-10-CM

## 2025-05-29 DIAGNOSIS — T50.905A DRUG-INDUCED PNEUMONITIS: ICD-10-CM

## 2025-05-29 DIAGNOSIS — C34.90 NON-SMALL CELL LUNG CANCER METASTATIC TO INTRATHORACIC LYMPH NODE (HCC): ICD-10-CM

## 2025-05-29 DIAGNOSIS — C34.92 RECURRENT ADENOCARCINOMA OF LEFT LUNG (HCC): Primary | ICD-10-CM

## 2025-05-29 DIAGNOSIS — J98.4 DRUG-INDUCED PNEUMONITIS: ICD-10-CM

## 2025-05-29 DIAGNOSIS — C77.1 NON-SMALL CELL LUNG CANCER METASTATIC TO INTRATHORACIC LYMPH NODE (HCC): ICD-10-CM

## 2025-05-29 DIAGNOSIS — T45.1X5A CHEMOTHERAPY-INDUCED FATIGUE: ICD-10-CM

## 2025-05-29 NOTE — PATIENT INSTRUCTIONS
VISIT SUMMARY:  You visited us today due to significant buttock pain and concerns about your bone density. We discussed your ongoing treatment for lung cancer, recent findings of esophagitis, and your chronic fatigue. An MRI has been performed to assess your buttock pain, and we will review the results to determine the next steps.    YOUR PLAN:  STAGE IIIB ADENOCARCINOMA OF THE LEFT UPPER LOBE OF THE LUNG: Your recent chest scan shows no evidence of cancer, with no suspicious nodules or enlarged lymph nodes. There are chronic radiation changes in your left lung, likely scar tissue, but no acute issues.  -Continue your current medication regimen.  -Perform a CT scan every four months to monitor your response.  -Schedule follow-up visits every two months.  -Get an immediate scan if you experience acute symptoms such as increased cough, chest pain, or coughing up blood.    ESOPHAGITIS: Your chest scan noted esophagitis, possibly related to prior radiation treatment. You do not report heartburn, indicating it may be asymptomatic or radiation-related esophagitis.  -Monitor for symptoms such as heartburn or difficulty swallowing.    ARTHRITIS FLARE-UP: You are experiencing pain in the buttock area, possibly related to an arthritis flare-up. Long-term prednisone use may affect your bone density.  -We have ordered an MRI to assess for fractures or other issues.  -We will review the MRI results to determine if you need further intervention, such as an injection.  -Consider a bone density test if the MRI indicates low bone density.    FATIGUE: Your fatigue is chronic and managed with prednisone. There are no new reports of fever or infection contributing to your fatigue.  -Continue managing your fatigue with prednisone.    Contains text generated by Araceli

## 2025-05-29 NOTE — PROGRESS NOTES
[facility] Hematology Oncology   Progress Note    Patient Name: Luis Villasenor   YOB: 1945   Medical Record Number: [id number]   Attending Physician: Yaneth Bello MD     Luis Villasenor verbally consented to be recorded using ambient AI listening technology and understand that they can each withdraw their consent to this listening technology at any point by asking the clinician to turn off or pause the recording.      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  He completed definitive concurrent chemoradiotherapy using cisplatin and etoposide early March 2017.  He had mild anemia neutropenia on treatment.    He subsequently was followed on surveillance with no evidence of progression.    The patient started on durvalumab in October 2017 given he had unresectable stage III disease with no evidence of progression following chemo radiotherapy    He has had occasional mild anemia on treatment.  Laboratory follow-up in May 2018 showed severe iron deficiency.  Received IV iron    He completed 13 cycles of durvalumab  however towards the end of his treatment he developed 2 separate instances of pneumonitis/transaminitis requiring separate courses of prednisone.  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    PE/DVT admission 1/2023.    BRAF V6 100 E mutation discovered peripheral blood ctDAN  September 2023  Dabrafenib and trametenib initiated 10/2023    1/28-2/1 hospitalization pneumonia/pneumonitis.     6/21/24 Dose reduction of Dabrafenib to 100 mg BID, no dose modification with Trametinib    9/26/24 - 9/29/24 [facility] admission, fever. Treatment held during admission.       Interval History:  History of Present Illness  Luis Villasenor is an 80 year old male with adenocarcinoma of the left lung who presents for 2 months follow up.     He is on trametinib and dabrafenib dose reduced 100 mg bid for fatigue. He has been tolerating therapy well with no new side effects. Energy is fair and stable with dexamethasone 1 mg every other day. He continues on bactrim and voriconazole ppx    He experiences significant pain in the buttock area, which affects his sleep and daily activities. He is using a walker to ambulate. No tingling or numbness. An MRI has been performed to assess the area of pain.     He has been on low dose prednisone for several years. He is concerned about the potential impact of long-term steroid use on his bone density, although he has not had a bone density test before.    He reports occasional chills but no recent fevers, increased cough, or infections. His last fever was in January when he was hospitalized. Recent lab work from May 16 shows stable blood counts and normal potassium levels.        Performance Status: ECOG 1      Current Medications:    Current Outpatient Medications:     PANTOPRAZOLE 40 MG Oral Tab EC, TAKE 1 TABLET BY MOUTH BEFORE BREAKFAST, Disp: 90 tablet, Rfl: 1    TAFINLAR 50 MG Oral Cap, TAKE 2 CAPSULES (100 MG) BY MOUTH TWICE A DAY AT LEAST 1 HR BEFORE OR 2 HRS AFTER A MEAL. DOSE REDUCTION, Disp: 120 capsule, Rfl: 5    MEKINIST 2 MG Oral Tab, TAKE 1 TABLET (2MG) BY MOUTH DAILY AT LEAST 1 HOUR BEFORE OR 2 HOURS AFTER A MEAL, Disp: 30 tablet, Rfl: 5    VORICONAZOLE 200 MG Oral Tab, TAKE ONE TABLET BY MOUTH TWICE DAILY, Disp: 180 tablet, Rfl: 3    XARELTO 10 MG Oral  Tab, TAKE 1 TABLET BY MOUTH EVERYDAY AT BEDTIME, Disp: 90 tablet, Rfl: 1    SULFAMETHOXAZOLE-TRIMETHOPRIM -160 MG Oral Tab per tablet, TAKE 1 TABLET BY MOUTH TWICE A DAY ON MONDAY, WEDNESDAY AND FRIDAY, Disp: 72 tablet, Rfl: 2    dexamethasone 1 MG Oral Tab, Take 1 tablet (1 mg total) by mouth every other day., Disp: 90 tablet, Rfl: 0    ipratropium-albuterol 0.5-2.5 (3) MG/3ML Inhalation Solution, Take 3 mL by nebulization every 6 (six) hours as needed., Disp: 90 each, Rfl: 5    fluticasone furoate-vilanterol (BREO ELLIPTA) 100-25 MCG/INH Inhalation Aerosol Powder, Breath Activated, Inhale 1 puff into the lungs daily. Follow with mouth rinse and spit, Disp: 1 each, Rfl: 6    albuterol 108 (90 Base) MCG/ACT Inhalation Aero Soln, Inhale 2 puffs into the lungs every 4 (four) hours as needed for Wheezing., Disp: 1 each, Rfl: 2    cholecalciferol 50 MCG (2000 UT) Oral Tab, Take 0.5 tablets (1,000 Units total) by mouth every morning., Disp: , Rfl:     Vitamin C 500 MG Oral Tab, Take 1 tablet (500 mg total) by mouth every morning., Disp: , Rfl:     predniSONE 20 MG Oral Tab, Take 2 tablets (40mg total) for 3 days, then 1 tablet (20 mg) for 4 days. (Patient not taking: Reported on 5/29/2025), Disp: 10 tablet, Rfl: 0       Review of Systems:  All other systems reviewed and negative x12    Vital Signs:  /65 (BP Location: Left arm, Patient Position: Sitting, Cuff Size: adult)   Pulse 66   Temp 97.7 °F (36.5 °C) (Tympanic)   Resp 16   Wt 86.2 kg (190 lb)   SpO2 97%   BMI 27.26 kg/m²     Physical Examination:  General: Alert and oriented x 3, not in acute distress.  Psych:  Mood and affect appropriate.   HEENT: Anicteric. Oropharynx is clear.   Neck: No lymphadenopathy   Chest: Non-labored breathing, clear bilaterally   Cardiovascular: Regular rate and rhythm.   Extremities: No edema. Scattered bruises on arms.   Neurological: Grossly intact.   Derm:  No rash or skin lesions.     Labs:  Lab Results   Component  Value Date    WBC 3.5 (L) 05/16/2025    RBC 3.91 05/16/2025    HGB 12.5 (L) 05/16/2025    HCT 37.6 (L) 05/16/2025    MCV 96.2 05/16/2025    MCH 32.0 05/16/2025    MCHC 33.2 05/16/2025    RDW 14.3 05/16/2025    .0 05/16/2025    MPV 9.3 11/20/2018     Lab Results   Component Value Date     05/16/2025    K 3.7 05/16/2025     05/16/2025    CO2 25.0 05/16/2025    BUN 21 05/16/2025    CREATSERUM 1.02 05/16/2025     (H) 05/16/2025    CA 8.9 05/16/2025    ALKPHO 144 (H) 05/16/2025    ALT 21 05/16/2025    AST 30 05/16/2025    BILT 0.5 05/16/2025    ALB 4.0 05/16/2025    TP 5.9 05/16/2025      Lab Results   Component Value Date/Time    TSH 2.276 11/01/2024 10:05 AM    TSH 5.649 (H) 08/23/2024 08:38 AM    TSH 1.600 09/29/2023 01:54 PM    TSH 2.420 09/12/2023 11:34 AM    TSH 3.480 09/01/2023 09:20 AM    TSH 1.980 07/28/2023 12:58 PM       Radiology:  CT CHEST(CONTRAST ONLY) (CPT=71260)  Result Date: 5/22/2025  CONCLUSION:   1. There is a history of adenocarcinoma of the left lung status post radiation therapy.  Stable radiation changes in the left lung.  No suspicious pulmonary nodules are identified.  2. No enlarged lymph nodes in the chest.  3.  Mild diffuse thickening of the esophagus may indicate esophagitis.  New  4. Additional chronic or incidental findings are described in the body of this report.    elm-remote    Dictated by (CST): Abram Rahman MD on 5/22/2025 at 10:25 PM     Finalized by (CST): Abram Rahman MD on 5/22/2025 at 10:30 PM          XR LUMBAR SPINE (MIN 4 VIEWS) (CPT=72110)  Result Date: 5/21/2025  CONCLUSION: Severe degenerative changes within the lower thoracic and lumbar spine.    Dictated by (CST): Beni Shelton MD on 5/21/2025 at 1:54 PM     Finalized by (CST): Beni Shelton MD on 5/21/2025 at 1:55 PM          XR HIP W OR WO PELVIS 2 OR 3 VIEWS, LEFT (CPT=73502)  Result Date: 5/21/2025  CONCLUSION: Moderate to severe symmetric degenerative changes within both hips.     Dictated by (CST): Beni Shelton MD on 5/21/2025 at 1:52 PM     Finalized by (CST): Beni Shelton MD on 5/21/2025 at 1:54 PM          XR CHEST AP PORTABLE  (CPT=71045)  Result Date: 3/8/2025  CONCLUSION: Pulmonary opacity in the left mid lung zone which could represent scar and/or treated disease as the finding is similar to prior exams.  Left basal atelectasis.  Otherwise no acute cardiopulmonary abnormality.    Dictated by (CST): Melvin Brooks MD on 3/08/2025 at 6:08 PM     Finalized by (CST): Melvin Brooks MD on 3/08/2025 at 6:09 PM             Impression and Plan:    History of stage IIIB adenocarcinoma of the left upper lung, PD-L1 80%, EGFR, ALK, ROS1 negative:  - s/p concurrent definitive chemoradiotherapy with cisplatin and etoposide completed in March 2017 followed by consolidative durvalumab completed 13 cycles in May 2018 c/b recurrent pneumonitis/transaminitis.    Recurrent lung adenocarcinoma    - biopsy-proven recurrence in mediastinal lymph nodes in March 2021.  Same histology.  PD-L1 >50%  - started pembrolizumab March 2021 with good disease control through summer 2023, then discontinued d/t recurrent pneumonitis.     BRAF V600E peripheral blood sequencing September 2023  - dabrafenib trametinib started 10/23/23, tolerated well initially, then dose reduced due to significant fatigue in June 2024.   - repeat CT chest 5/2025 with stable RT changes and no evidence of disease.   - continue dabrafenib at 100 mg bid and trametinib 2 mg daily. No dose adjustment.   - repeat imaging in 4 months, sooner pending his symptoms.     Pyrexia  - drug induced; most common side effects with the combination of dabrafenib/trametinib   - recommend tylenol as needed, hydration, monitor temp, HR, BP at home.  - advised to seek care if fever > 102, persistent > 24 hr or if he develops chills/rigors, confusion, localizing symptoms, dyspnea, vomiting and/or dehydration    Fatigue- mild, stable. continue dexamethasone 1  mg every other day. Continue ppx bactrim, voriconazole and PPI.     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 Hernandez.     Pulmonary embolus- diagnosed January 2023, continue xarelto preventive dose 10mg daily. No sings of VTE recurrence.     Elevated ALK phos- chronically elevated, likely dug induced. AST/ALT/Bili remain normal. Will continue to monitor     Low back pain- plan for MRI lumbar spine.       RTC 2 months       Yaneth Bello MD   Hematology Oncology

## 2025-05-30 ENCOUNTER — HOSPITAL ENCOUNTER (OUTPATIENT)
Dept: MRI IMAGING | Age: 80
Discharge: HOME | End: 2025-05-30
Attending: PHYSICIAN ASSISTANT
Payer: MEDICARE

## 2025-05-30 DIAGNOSIS — M48.062 SPINAL STENOSIS OF LUMBAR REGION WITH NEUROGENIC CLAUDICATION: ICD-10-CM

## 2025-05-30 DIAGNOSIS — M54.16 LUMBAR RADICULOPATHY: ICD-10-CM

## 2025-05-30 DIAGNOSIS — M47.816 LUMBAR SPONDYLOSIS: ICD-10-CM

## 2025-05-30 PROCEDURE — 72148 MRI LUMBAR SPINE W/O DYE: CPT | Performed by: PHYSICIAN ASSISTANT

## 2025-06-03 ENCOUNTER — OFFICE VISIT (OUTPATIENT)
Age: 80
End: 2025-06-03
Payer: MEDICARE

## 2025-06-03 DIAGNOSIS — M47.816 LUMBAR SPONDYLOSIS: Primary | ICD-10-CM

## 2025-06-03 DIAGNOSIS — M48.062 SPINAL STENOSIS OF LUMBAR REGION WITH NEUROGENIC CLAUDICATION: ICD-10-CM

## 2025-06-03 PROCEDURE — 1160F RVW MEDS BY RX/DR IN RCRD: CPT | Performed by: PHYSICIAN ASSISTANT

## 2025-06-03 PROCEDURE — 99213 OFFICE O/P EST LOW 20 MIN: CPT | Performed by: PHYSICIAN ASSISTANT

## 2025-06-03 PROCEDURE — 1159F MED LIST DOCD IN RCRD: CPT | Performed by: PHYSICIAN ASSISTANT

## 2025-06-03 NOTE — PROGRESS NOTES
The following individual(s) verbally consented to be recorded using ambient AI listening technology and understand that they can each withdraw their consent to this listening technology at any point by asking the clinician to turn off or pause the recording:    Patient name: Luis Villasenor  Additional names:  wife         Name: Luis Villasenor   MRN: [id number]  Date: 6/3/2025     HPI:   History of Present Illness  Luis Villasenor is an 80-year-old male who presents with severe buttock pain.    He experiences severe pain in the left buttock, rated as 8 to 9 out of 10. The pain is intense and causes concern about falling. It is localized to the buttock and does not radiate to the back. Bending forward provides relief, while standing straight worsens the pain.. The pain radiates down the back of his leg to the knee but does not extend past the knee. It is severe enough to prevent him from putting his foot down and necessitates the use of a walker when standing up straight. No numbness, tingling, or weakness in the leg is reported.     He recently underwent an MRI, which was uncomfortable due to noise and duration.     He has a Mohs procedure scheduled for the following day at [facility] for a cancerous lesion identified by a dermatologist. Initially, he thought the irritation was due to his glasses rubbing against his skin.    He enjoys gardening, growing tomatoes, cucumbers, squash, and beans, despite his current pain.       PMH:   Past Medical History[1]    PAST SURGICAL HX:  Past Surgical History[2]    FAMILY HX:  Family History[3]    ALLERGIES:  Patient has no known allergies.    MEDICATIONS: Current Medications[4]    SOCIAL HX:  Social History     Tobacco Use    Smoking status: Former     Current packs/day: 0.00     Average packs/day: 1 pack/day for 30.0 years (30.0 ttl pk-yrs)     Types: Cigarettes     Start date: 1970     Quit date: 2000     Years since quittin.9     Passive  exposure: Past    Smokeless tobacco: Never   Substance Use Topics    Alcohol use: Not Currently     Alcohol/week: 0.8 standard drinks of alcohol     Types: 1 Glasses of wine per week     Comment: very rarely       PE:     Physical Exam      Patient is in no apparent distress.  Alert and oriented times 3.  In the seated position:   Left 5/5 Hip flex Right 5/5 Hip flex     5/5 Knee ext  5/5 Knee ext    5/5 EHL  5/5 EHL    5/5 DF   5/5 DF    4/5 PF   5/5 PF    negative SLR  negative SLR    Deep tendon reflexes 2+ to bilateral patella and Achilles tendons  Clonus is negative to bilateral lower extremities  Calves soft and non tender bilaterally   Sensation is fully intact  Smooth pain free ROM to bilateral Hips, Knees, Ankles    Results  RADIOLOGY  Lumbar spine MRI: Central canal is patent, disc bulges at L4-5 and L5-S1, left foraminal stenosis at L4-5, L5-S1.  MRI SPINE LUMBAR (CPT=72148)  Result Date: 5/31/2025  PROCEDURE: MRI SPINE LUMBAR (CPT=72148)  COMPARISON: None.  INDICATIONS: M48.062 Spinal stenosis of lumbar region with neurogenic claudication M54.16 Lumbar radiculopathy M47.816 Lumbar spondylosis  TECHNIQUE: A variety of imaging planes and parameters were utilized for visualization of suspected pathology.  FINDINGS:   SOFT TISSUES: Normal with no visible mass.  ALIGMENT: No significant listhesis.  Mild kyphosis of the upper lumbar spine. BONES: No acute fracture.  Multilevel osteophytes.  Marrow signal is unremarkable. SACROILIAC JOINTS: Mild degenerative joint disease. CORD/CAUDA EQUINA: Normal caliber, contour, and signal intensity.   LUMBAR DISC LEVELS: L1-L2: Mild disc bulge without significant spinal canal or foraminal stenosis. L2-L3: Mild disc bulge without significant spinal canal or foraminal stenosis. L3-L4: Mild disc bulge and facet arthropathy.  Mild spinal canal stenosis and mild bilateral foraminal stenosis. L4-L5: Minimal disc bulge.  Facet arthropathy.  No significant spinal canal stenosis.   Moderate to severe bilateral foraminal stenosis. L5-S1: Minimal disc bulge.  Facet arthropathy.  No significant spinal canal stenosis.  Moderate right and moderate to severe left foraminal stenosis.          CONCLUSION:   Multifactorial degenerative changes, overall mild spondylosis as follows: L3-4:  Mild spinal canal and bilateral foraminal stenosis. L4-5:  Moderate to severe bilateral foraminal stenosis.  No significant spinal canal stenosis. L5-S1:  Moderate right and moderate severe left foraminal stenosis.  No significant spinal canal stenosis.    [facility]     Dictated by (CST): Abram Rahman MD on 5/31/2025 at 7:23 AM     Finalized by (CST): Abram Rahman MD on 5/31/2025 at 7:26 AM          CT CHEST(CONTRAST ONLY) (CPT=71260)  Result Date: 5/22/2025  PROCEDURE: CT CHEST(CONTRAST ONLY) (CPT=71260)  COMPARISON: [facility], CT CHEST(CONTRAST ONLY) (CPT=71260), 12/10/2024, 9:05 AM.  INDICATIONS: History of adenocarcinoma of the left lung, +pulmonary nodules, s/p radiation therapy, currently on oral Immunotherapy.  TECHNIQUE: CT images of the chest were obtained with non-ionic intravenous contrast material.  Automated exposure control for dose reduction was used. Adjustment of the mA and/or kV was done based on the patient's size. Use of iterative reconstruction technique for dose reduction was used. Dose information is transmitted to the ACR (American College of Radiology) NRDR (National Radiology Data Registry) which includes the Dose Index Registry.  FINDINGS:   SUPPORT DEVICES: Right chest wall central venous port catheter terminates overlying the superior cavoatrial junction. CARDIAC: Heart is mildly enlarged.  Coronary artery calcifications cannot be assessed due to motion artifact. THORACIC AORTA: No aneurysm or dissection.  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  Moderate calcified atherosclerosis.  Borderline ascending aortic ectasia measuring 3.6 cm.  VASCULATURE: No large central PE. MEDIASTINUM/NURYS: No mass or enlarged adenopathy.  ESOPHAGUS: Mild diffuse thickening of the esophagus. NECK BASE: No suspicious lymph nodes. CHEST WALL: No suspicious axillary lymph nodes. BONES: No acute fracture.  Moderate spondylosis.  Tenodesis screws in the left humeral head. DIAPHRAGM: Unremarkable. UPPER ABDOMEN: Pneumobilia is incompletely visualized. PLEURA: No pneumothorax or pleural effusion. LUNGS: The trachea and central airways are clear.  There are no acute consolidations.  Left perihilar radiation fibrosis is again noted.  No suspicious pulmonary nodules are identified.          CONCLUSION:   1. There is a history of adenocarcinoma of the left lung status post radiation therapy.  Stable radiation changes in the left lung.  No suspicious pulmonary nodules are identified.  2. No enlarged lymph nodes in the chest.  3.  Mild diffuse thickening of the esophagus may indicate esophagitis.  New  4. Additional chronic or incidental findings are described in the body of this report.    elm-remote    Dictated by (CST): Abram Rahman MD on 5/22/2025 at 10:25 PM     Finalized by (CST): Abram Rahman MD on 5/22/2025 at 10:30 PM          XR LUMBAR SPINE (MIN 4 VIEWS) (CPT=72110)  Result Date: 5/21/2025  PROCEDURE: XR LUMBAR SPINE (MIN 4 VIEWS) (CPT=72110)  COMPARISON: None.  INDICATIONS: Left hip pain for serveral weeks no known injury.  TECHNIQUE: Lumbar spine radiographs (minimum 4 views)   FINDINGS:   Bone mineralization is normal.  There are 5 lumbar type vertebral bodies in gross alignment with no acute fracture/traumatic subluxation.  There are severe degenerative changes throughout the lower thoracic and lumbar spine (most significant within the mid and lower lumbar spine).  These degenerative changes are manifested by osteophyte formation, endplate sclerosis, disc space narrowing, and facet hypertrophy.  There is no vertebral body subluxation.         CONCLUSION: Severe  degenerative changes within the lower thoracic and lumbar spine.    Dictated by (CST): Beni Shelton MD on 5/21/2025 at 1:54 PM     Finalized by (CST): Beni Shelton MD on 5/21/2025 at 1:55 PM          XR HIP W OR WO PELVIS 2 OR 3 VIEWS, LEFT (CPT=73502)  Result Date: 5/21/2025  PROCEDURE: XR HIP W OR WO PELVIS 2 OR 3 VIEWS, LEFT (CPT=73502)  COMPARISON: None.  INDICATIONS: Pain of left hip for serveral weeks no known injury.  TECHNIQUE: 3 views were obtained.   FINDINGS:   Bone mineralization is normal.  There is no acute fracture/dislocation.  There are moderate to severe symmetric degenerative changes within both hips manifested by bony hypertrophy, slight articular space narrowing, and subarticular sclerosis.         CONCLUSION: Moderate to severe symmetric degenerative changes within both hips.    Dictated by (CST): Beni Shelton MD on 5/21/2025 at 1:52 PM     Finalized by (CST): Beni Shelton MD on 5/21/2025 at 1:54 PM              IMPRESSION: Luis Villasenor is a 80 year old male   Assessment & Plan  Spinal stenosis  Chronic spinal stenosis with significant left buttock pain due to foraminal canal narrowing at L4-5 and L5-S1. MRI shows disc bulging at these levels. No central stenosis.  We reviewed his MRI together and it does show foraminal stenosis most notable at L4-5 and L5-S1 on the left side.  He is getting severe left buttock pain to the posterior thigh.  He is in significant pain.  He will go for an injection to calm down the pain so that he can do his physical therapy.  He is very limited in using a walker.  He has no pain with hip range of motion testing today.  - Order nerve root injection at L4-5 and L5-S1.  - Refer to Ariane for additional support.  - Schedule follow-up with Dr. Marcellus Barillas in one month.        Luis eunice WOODS went over imaging, prior treatments and arranged for a plan that incorporated personal goals, social circumstances and any current medical challenges.        ICD-10-CM    1. Lumbar spondylosis  M47.816 Pain Management Referral - In Network      2. Spinal stenosis of lumbar region with neurogenic claudication  M48.062 Pain Management Referral - In Network                 Ron Watts PA-C     [facility]   [email or web address], [place]  t: [phone]  f: [phone]    Greater than 45 minutes of total time was required in the care of the patient today. Time includes evaluation of prior and current imaging and review of records.  Note to patient: The 21st Century Cures Act makes medical notes like these available to patients in the interest of transparency. However, be advised this is a medical document. It is intended primarily as peer to peer communication, is written in medical language, and may contain abbreviations or verbiage that are unfamiliar. This document is intended to carry relevant information, facts as evident, and the clinical opinion of the practitioner at the time of writing.          [1]   Past Medical History:   Deep vein thrombosis (HCC)    CURRENTLY HAS RIGHT BLOOD CLOT    Dysphagia    Esophageal reflux    Exposure to medical diagnostic radiation    COMPLETED    Hemorrhoids    History of blood transfusion    Pt on Immunotherapy    History of immunotherapy    Impotence    Lung cancer (HCC)    NSCLCA stage IIIB    Necrotizing granulomatous inflammation of lung (HCC)    Obstructive sleep apnea    Osteoarthritis    Personal history of antineoplastic chemotherapy    Pneumonia due to organism    Pulmonary embolism (HCC)    Pulmonary embolus (HCC)    Sinus problem    Sinus problems    Sleep apnea    CPAP    Visual impairment    wears eyeglasses   [2]   Past Surgical History:  Procedure Laterality Date    Arthroscopy of joint unlisted      Cholecystectomy      Colonoscopy  2010    Colonoscopy N/A 06/21/2018    Procedure: COLONOSCOPY;  Surgeon: Cesar Beckman MD;  [facility] ENDOSCOPY    Colonoscopy      Excis spermatocele Right 03/08/2024     Port, indwelling, imp Right 2017    Rotator cuff repair Left     Total knee replacement Bilateral 2020    Vein ligation and stripping     [3]   Family History  Problem Relation Age of Onset    Cancer Father         Lung    Cancer Mother         Breast, ovarian   [4]   Current Outpatient Medications   Medication Sig Dispense Refill    PANTOPRAZOLE 40 MG Oral Tab EC TAKE 1 TABLET BY MOUTH BEFORE BREAKFAST 90 tablet 1    TAFINLAR 50 MG Oral Cap TAKE 2 CAPSULES (100 MG) BY MOUTH TWICE A DAY AT LEAST 1 HR BEFORE OR 2 HRS AFTER A MEAL. DOSE REDUCTION 120 capsule 5    MEKINIST 2 MG Oral Tab TAKE 1 TABLET (2MG) BY MOUTH DAILY AT LEAST 1 HOUR BEFORE OR 2 HOURS AFTER A MEAL 30 tablet 5    VORICONAZOLE 200 MG Oral Tab TAKE ONE TABLET BY MOUTH TWICE DAILY 180 tablet 3    XARELTO 10 MG Oral Tab TAKE 1 TABLET BY MOUTH EVERYDAY AT BEDTIME 90 tablet 1    SULFAMETHOXAZOLE-TRIMETHOPRIM -160 MG Oral Tab per tablet TAKE 1 TABLET BY MOUTH TWICE A DAY ON MONDAY, WEDNESDAY AND FRIDAY 72 tablet 2    dexamethasone 1 MG Oral Tab Take 1 tablet (1 mg total) by mouth every other day. 90 tablet 0    ipratropium-albuterol 0.5-2.5 (3) MG/3ML Inhalation Solution Take 3 mL by nebulization every 6 (six) hours as needed. 90 each 5    fluticasone furoate-vilanterol (BREO ELLIPTA) 100-25 MCG/INH Inhalation Aerosol Powder, Breath Activated Inhale 1 puff into the lungs daily. Follow with mouth rinse and spit 1 each 6    albuterol 108 (90 Base) MCG/ACT Inhalation Aero Soln Inhale 2 puffs into the lungs every 4 (four) hours as needed for Wheezing. 1 each 2    cholecalciferol 50 MCG (2000 UT) Oral Tab Take 0.5 tablets (1,000 Units total) by mouth every morning.      Vitamin C 500 MG Oral Tab Take 1 tablet (500 mg total) by mouth every morning.

## 2025-06-06 ENCOUNTER — TELEPHONE (OUTPATIENT)
Dept: ORTHOPEDICS CLINIC | Facility: CLINIC | Age: 80
End: 2025-06-06

## 2025-06-06 NOTE — TELEPHONE ENCOUNTER
Patient was seen in office on 6/3 and was given pain management referral for an injection, stated that someone was to reach out to him to get scheduled. Please advise

## 2025-06-06 NOTE — TELEPHONE ENCOUNTER
Called patient and LVM.   Advised patient PM Specialist office should call patient by beginning of week.

## 2025-06-09 ENCOUNTER — OFFICE VISIT (OUTPATIENT)
Dept: PAIN CLINIC | Facility: CLINIC | Age: 80
End: 2025-06-09
Payer: MEDICARE

## 2025-06-09 VITALS — DIASTOLIC BLOOD PRESSURE: 62 MMHG | OXYGEN SATURATION: 97 % | SYSTOLIC BLOOD PRESSURE: 118 MMHG | HEART RATE: 57 BPM

## 2025-06-09 DIAGNOSIS — M54.16 LUMBAR RADICULITIS: Primary | ICD-10-CM

## 2025-06-09 PROBLEM — M48.062 LUMBAR STENOSIS WITH NEUROGENIC CLAUDICATION: Status: ACTIVE | Noted: 2025-06-09

## 2025-06-09 PROCEDURE — 3078F DIAST BP <80 MM HG: CPT | Performed by: ANESTHESIOLOGY

## 2025-06-09 PROCEDURE — 1159F MED LIST DOCD IN RCRD: CPT | Performed by: ANESTHESIOLOGY

## 2025-06-09 PROCEDURE — 1160F RVW MEDS BY RX/DR IN RCRD: CPT | Performed by: ANESTHESIOLOGY

## 2025-06-09 PROCEDURE — 99204 OFFICE O/P NEW MOD 45 MIN: CPT | Performed by: ANESTHESIOLOGY

## 2025-06-09 PROCEDURE — 3074F SYST BP LT 130 MM HG: CPT | Performed by: ANESTHESIOLOGY

## 2025-06-09 NOTE — PATIENT INSTRUCTIONS
Refill policies:    Allow 2-3 business days for refills; controlled substances may take longer.  Contact your pharmacy at least 5 days prior to running out of medication and have them send an electronic request or submit request through the “request refill” option in your [employer] account.  Refills are not addressed on weekends; covering physicians do not authorize routine medications on weekends.  No narcotics or controlled substances are refilled after noon on Fridays or by on call physicians.  By law, narcotics must be electronically prescribed.  A 30 day supply with no refills is the maximum allowed.  If your prescription is due for a refill, you may be due for a follow up appointment.  To best provide you care, patients receiving routine medications need to be seen at least once a year.  Patients receiving narcotic/controlled substance medications need to be seen at least once every 3 months.  In the event that your preferred pharmacy does not have the requested medication in stock (e.g. Backordered), it is your responsibility to find another pharmacy that has the requested medication available.  We will gladly send a new prescription to that pharmacy at your request.    Scheduling Tests:    If your physician has ordered radiology tests such as MRI or CT scans, please contact Central Scheduling at [phone] right away to schedule the test.  Once scheduled, the [facility] Centralized Referral Team will work with your insurance carrier to obtain pre-certification or prior authorization.  Depending on your insurance carrier, approval may take 3-10 days.  It is highly recommended patients assure they have received an authorization before having a test performed.  If test is done without insurance authorization, patient may be responsible for the entire amount billed.      Precertification and Prior Authorizations:  If your physician has recommended that you have a procedure or additional testing performed the [facility]  Centralized Referral Team will contact your insurance carrier to obtain pre-certification or prior authorization.    You are strongly encouraged to contact your insurance carrier to verify that your procedure/test has been approved and is a COVERED benefit.  Although the [facility] Centralized Referral Team does its due diligence, the insurance carrier gives the disclaimer that \"Although the procedure is authorized, this does not guarantee payment.\"    Ultimately the patient is responsible for payment.   Thank you for your understanding in this matter.  Paperwork Completion:  If you require FMLA or disability paperwork for your recovery, please make sure to either drop it off or have it faxed to our office at [phone]. Be sure the form has your name and date of birth on it.  The form will be faxed to our Forms Department and they will complete it for you.  There is a 25$ fee for all forms that need to be filled out.  Please be aware there is a 10-14 day turnaround time.  You will need to sign a release of information (ANA LUISA) form if your paperwork does not come with one.  You may call the Forms Department with any questions at [phone].  Their fax number is [phone].

## 2025-06-09 NOTE — H&P
Name: Luis Villasenor   : 1945   DOS: 2025     Chief complaint: Lumbar radiculitis    History of present illness:  Luis Villasenor is a 80 year old male with a history of lung cancer who presents today for evaluation of low back pain with severe pain in left buttock.  This is described as a burning and tingling sensation in the buttock and in the outside of the hip.  He does not had below the knee.  Rates the pain 8 out of 10 with exacerbation of 10 out of 10.      Past Medical History[1]   Current Medications[2]  Past Surgical History[3]   Family History[4]  Short Social Hx on File[5]    Review of  other systems:  10 point ROS otherwise negative    Physical examination: Luis is a 80 year old male not in acute distress  /62 (BP Location: Left arm, Patient Position: Sitting, Cuff Size: adult)   Pulse 57   SpO2 97%    The patient is awake, alert, oriented and corporative. He has a normal affect. The patient ambulates with a walker.  HEENT: No gross lesion noted. PEERL. No icterus.  Neck and Upper Extremity: Supple. No thyromegaly or lymphadenopathy.    Motor Examination:    (R)   (L)  Deltoid:      5    5  Biceps:       5    5  Triceps:      5    5  Wrist Extension:     5    5  Wrist Flexsion:     5    5  Finger Extension:     5    5  Finger Flexsion:     5    5       Cardiovascular system: Regular rate and rhythm. .  Respiratory system: Speech is nonlabored  Abdomen: Soft, nontender,   Neurologic:  Cranial nerves II through XII are grossly intact.       Examination of the lower back:    Motor Examination:   (R)   (L)   Hip Abduction:   5    5   Hip Flexion:    5    5   Knee Extension:   5    5   Knee Flexion:    5    5   Ant. Tibialis:    5    5  Extensor Hallucis Longus:   5    5  Peroneals:     5    5  Gastrocsoleus:     5    5       Radiology diagnostic studies:   Lumbar MRI reviewed independently with patient.  There is evidence of severe neuroforaminal stenosis at L4 and  L5    Assessment:  Encounter Diagnosis   Name Primary?    Lumbar radiculitis Yes   .      Plan:     The patient is a pleasant 80-year-old gentleman who presents today for evaluation of left-sided radicular symptoms.  Does have advanced degenerative changes severe foraminal stenosis at L4 and L5.  Discussed treatment options including transforaminal epidural steroid injection.  Of note, patient is on Xarelto.  Will obtain prior authorization and medical clearance.  Procedure discussed with patient in detail.        Garcia Orellana MD MPH  Pain Management                [1]   Past Medical History:   Deep vein thrombosis (HCC)    CURRENTLY HAS RIGHT BLOOD CLOT    Dysphagia    Esophageal reflux    Exposure to medical diagnostic radiation    COMPLETED    Hemorrhoids    History of blood transfusion    Pt on Immunotherapy    History of immunotherapy    Impotence    Lung cancer (HCC)    NSCLCA stage IIIB    Necrotizing granulomatous inflammation of lung (HCC)    Obstructive sleep apnea    Osteoarthritis    Personal history of antineoplastic chemotherapy    Pneumonia due to organism    Pulmonary embolism (HCC)    Pulmonary embolus (HCC)    Sinus problem    Sinus problems    Sleep apnea    CPAP    Visual impairment    wears eyeglasses   [2]   Current Outpatient Medications   Medication Sig Dispense Refill    PANTOPRAZOLE 40 MG Oral Tab EC TAKE 1 TABLET BY MOUTH BEFORE BREAKFAST 90 tablet 1    TAFINLAR 50 MG Oral Cap TAKE 2 CAPSULES (100 MG) BY MOUTH TWICE A DAY AT LEAST 1 HR BEFORE OR 2 HRS AFTER A MEAL. DOSE REDUCTION 120 capsule 5    MEKINIST 2 MG Oral Tab TAKE 1 TABLET (2MG) BY MOUTH DAILY AT LEAST 1 HOUR BEFORE OR 2 HOURS AFTER A MEAL 30 tablet 5    VORICONAZOLE 200 MG Oral Tab TAKE ONE TABLET BY MOUTH TWICE DAILY 180 tablet 3    XARELTO 10 MG Oral Tab TAKE 1 TABLET BY MOUTH EVERYDAY AT BEDTIME 90 tablet 1    SULFAMETHOXAZOLE-TRIMETHOPRIM -160 MG Oral Tab per tablet TAKE 1 TABLET BY MOUTH TWICE A DAY ON MONDAY, WEDNESDAY  AND FRIDAY 72 tablet 2    dexamethasone 1 MG Oral Tab Take 1 tablet (1 mg total) by mouth every other day. 90 tablet 0    ipratropium-albuterol 0.5-2.5 (3) MG/3ML Inhalation Solution Take 3 mL by nebulization every 6 (six) hours as needed. 90 each 5    fluticasone furoate-vilanterol (BREO ELLIPTA) 100-25 MCG/INH Inhalation Aerosol Powder, Breath Activated Inhale 1 puff into the lungs daily. Follow with mouth rinse and spit 1 each 6    albuterol 108 (90 Base) MCG/ACT Inhalation Aero Soln Inhale 2 puffs into the lungs every 4 (four) hours as needed for Wheezing. 1 each 2    cholecalciferol 50 MCG (2000 UT) Oral Tab Take 0.5 tablets (1,000 Units total) by mouth every morning.      Vitamin C 500 MG Oral Tab Take 1 tablet (500 mg total) by mouth every morning.     [3]   Past Surgical History:  Procedure Laterality Date    Arthroscopy of joint unlisted      Cholecystectomy      Colonoscopy      Colonoscopy N/A 2018    Procedure: COLONOSCOPY;  Surgeon: Cesar Beckman MD;  Location: [facility] ENDOSCOPY    Colonoscopy      Excis spermatocele Right 2024    Port, indwelling, imp Right     Rotator cuff repair Left     Total knee replacement Bilateral 2020    Vein ligation and stripping     [4]   Family History  Problem Relation Age of Onset    Cancer Father         Lung    Cancer Mother         Breast, ovarian   [5]   Social History  Socioeconomic History    Marital status:    Tobacco Use    Smoking status: Former     Current packs/day: 0.00     Average packs/day: 1 pack/day for 30.0 years (30.0 ttl pk-yrs)     Types: Cigarettes     Start date: 1970     Quit date: 2000     Years since quittin.9     Passive exposure: Past    Smokeless tobacco: Never   Vaping Use    Vaping status: Never Used   Substance and Sexual Activity    Alcohol use: Not Currently     Alcohol/week: 0.8 standard drinks of alcohol     Types: 1 Glasses of wine per week     Comment: very rarely    Drug use: No   Other  Topics Concern    Caffeine Concern Yes     Comment: coffee, 2 cups daily     Social Drivers of Health     Food Insecurity: No Food Insecurity (9/26/2024)    Food Insecurity     Food Insecurity: Never true   Transportation Needs: No Transportation Needs (9/26/2024)    Transportation Needs     Lack of Transportation: No   Stress: Not on File (11/19/2022)    Received from [facility]    Stress     Stress: 0   Housing Stability: Low Risk  (9/26/2024)    Housing Stability     Housing Instability: No

## 2025-06-09 NOTE — PROGRESS NOTES
Subjective:   Patient ID: Luis Villasenor is a 80 year old male.    HPI    History/Other:   Review of Systems  Current Medications[1]  Allergies:Allergies[2]    Objective:   Physical Exam  Constitutional:          Assessment & Plan:   No diagnosis found.    No orders of the defined types were placed in this encounter.      Meds This Visit:  Requested Prescriptions      No prescriptions requested or ordered in this encounter       Imaging & Referrals:  None    Location of Pain: left side glute and posterior upper leg    Date Pain Began: 5/17/25          Work Related:   No        Receiving Work Comp/Disability:   No    Numeric Rating Scale:  Pain at Present:  8/10                                                                                                            (No Pain) 0  to  10 (Worst Pain)                 Minimum Pain:   5  Maximum Pain  10    Distribution of Pain:    left    Quality of Pain:   sharp/stabbing and throbbing    Origin of Pain:    Lifting and Repetitive motion    Aggravating Factors:    Standing and Walking    Past Treatments for Current Pain Condition:   Other none    Prior diagnostic testing for your pain:  MRI            [1]  Current Outpatient Medications   Medication Sig Dispense Refill   • PANTOPRAZOLE 40 MG Oral Tab EC TAKE 1 TABLET BY MOUTH BEFORE BREAKFAST 90 tablet 1   • TAFINLAR 50 MG Oral Cap TAKE 2 CAPSULES (100 MG) BY MOUTH TWICE A DAY AT LEAST 1 HR BEFORE OR 2 HRS AFTER A MEAL. DOSE REDUCTION 120 capsule 5   • MEKINIST 2 MG Oral Tab TAKE 1 TABLET (2MG) BY MOUTH DAILY AT LEAST 1 HOUR BEFORE OR 2 HOURS AFTER A MEAL 30 tablet 5   • VORICONAZOLE 200 MG Oral Tab TAKE ONE TABLET BY MOUTH TWICE DAILY 180 tablet 3   • XARELTO 10 MG Oral Tab TAKE 1 TABLET BY MOUTH EVERYDAY AT BEDTIME 90 tablet 1   • SULFAMETHOXAZOLE-TRIMETHOPRIM -160 MG Oral Tab per tablet TAKE 1 TABLET BY MOUTH TWICE A DAY ON MONDAY, WEDNESDAY AND FRIDAY 72 tablet 2   • dexamethasone 1 MG Oral Tab Take 1  tablet (1 mg total) by mouth every other day. 90 tablet 0   • ipratropium-albuterol 0.5-2.5 (3) MG/3ML Inhalation Solution Take 3 mL by nebulization every 6 (six) hours as needed. 90 each 5   • fluticasone furoate-vilanterol (BREO ELLIPTA) 100-25 MCG/INH Inhalation Aerosol Powder, Breath Activated Inhale 1 puff into the lungs daily. Follow with mouth rinse and spit 1 each 6   • albuterol 108 (90 Base) MCG/ACT Inhalation Aero Soln Inhale 2 puffs into the lungs every 4 (four) hours as needed for Wheezing. 1 each 2   • cholecalciferol 50 MCG (2000 UT) Oral Tab Take 0.5 tablets (1,000 Units total) by mouth every morning.     • Vitamin C 500 MG Oral Tab Take 1 tablet (500 mg total) by mouth every morning.     [2]  No Known Allergies

## 2025-06-10 ENCOUNTER — TELEPHONE (OUTPATIENT)
Dept: PAIN CLINIC | Facility: CLINIC | Age: 80
End: 2025-06-10

## 2025-06-10 DIAGNOSIS — M54.16 LUMBAR RADICULITIS: Primary | ICD-10-CM

## 2025-06-10 NOTE — TELEPHONE ENCOUNTER
Order Questions    Question Answer Comment   Anesthesia Type Local    Provider Esteban    Location [facility] Procedure Lab    Procedure Transforaminal    Laterality/Level left l4, l5    CPT (Hit enter after each entry) INJECTION, ANESTHETIC/STEROID, TRANSFORAMINAL EPIDURAL; LUMBAR/SACRAL, SINGLE LEVEL     INJECTION, ANESTHETIC/STEROID, TRANSFORAMINAL EPIDURAL; LUMBAR/SACRAL, ADD'L LEVEL    Medications to hold xarelto    Medical clearance requested (will send to Pain Navigator) Yes polly   Patient has Medicare coverage? No    Comments (Please list entire procedure name here.) left lumbar 4, lumbar 5 transforaminal epidural steroid injection

## 2025-06-10 NOTE — TELEPHONE ENCOUNTER
Medical Clearance faxed to 's Office at Fax #: [phone];confirmation r'cvd.     06/10/25    RE: Luis Villasenor    : 1945    Dear Dr. Hernandez,    Your patient is being scheduled for a pain management procedure at [facility].    Procedure:  Left L4,L5 Transforaminal Injection.  Date of Procedure: TBD -pending medical clearance.  Physician: Dr. Orellana- Anesthesiologist     Your patient is currently taking Xarelto. Dr. Orellana usually recommends this medication to be held for 3 days prior to injection.     Please verify patient is cleared to proceed with pain management procedures.

## 2025-06-11 ENCOUNTER — TELEPHONE (OUTPATIENT)
Dept: PULMONOLOGY | Facility: CLINIC | Age: 80
End: 2025-06-11

## 2025-06-11 NOTE — TELEPHONE ENCOUNTER
Received fax from [facility] requesting clearance to hold xarelto 3 days prior to left L4 and L5 transforaminal injection with Dr. Orellana.    Dr. Hernandez- please advise if okay to hold and sign form in your folder.

## 2025-06-12 ENCOUNTER — TELEPHONE (OUTPATIENT)
Dept: PAIN CLINIC | Facility: CLINIC | Age: 80
End: 2025-06-12

## 2025-06-12 NOTE — TELEPHONE ENCOUNTER
Prior authorization request completed for: left L4/5,L5/S1 TLESI   Authorization # [id number]  Authorization dates: 6/12/25-8/8/25  CPT codes approved:  94779,79609  Number of visits/dates of service approved: 1  Physician: jordi  Location: [facility]     Patient can be scheduled. Routed to Navigator.

## 2025-06-12 NOTE — TELEPHONE ENCOUNTER
Dr. Hernandez checked clearance approved and signed form. Faxed back at fax# [phone] and sent for scanning.

## 2025-06-13 NOTE — TELEPHONE ENCOUNTER
Sonia Hollins  Signed Yesterday     Copy     Prior authorization request completed for: left L4/5,L5/S1 TLESI   Authorization # [id number]  Authorization dates: 6/12/25-8/8/25  CPT codes approved:  61302,19507  Number of visits/dates of service approved: 1  Physician: jordi  Location: [facility]      Patient can be scheduled. Routed to Navigator.

## 2025-06-16 NOTE — TELEPHONE ENCOUNTER
Patient advised of insurance approval to proceed with injections and is agreeable to scheduling.  pre-procedure instructions reviewed. Patient prefers Local sedation. Reviewed sedation instructions including No Fasting & No  Required. Patient advised to hold Xarelto for 3 days (06/23/25) prior to procedure. Patient verbalized understanding of instructions, no further needs at this time.    [facility] PAIN CLINIC  PRE-PROCEDURE INSTRUCTIONS WITHOUT SEDATION    Procedure: Left L4,L5 TLESI       Appointment Date: 06/26/2025  Check-In Time: 12:30 PM    Follow-Up Date/Time: 07/11/2025 @ 10:45 AM    Prior to the procedure:  Please update us prior to the procedure if you are experiencing any symptoms of infection such as cough, fever, chills, urinary symptoms, or have recently been prescribed antibiotics, have open wounds, have recently had surgery or dental procedures.    Day of Procedure:  **Drivers will be required for patients who receive prescriptions for Valium.    NO FASTING REQUIRED  Please bring your Insurance Card, Photo ID, List of Current Medications and Referral (if applicable) to your appointment.  Please park in the [facility] and follow the signs to the [facility].  Check in at [facility] ([address] [place] [address]) outpatient registration in the [facility].  Please note-No prescriptions will be written by Pain Clinic in OR on the day of procedure. If you require a refill of medications, please contact the office 48 hours prior to your procedure.  If you have an implanted Spinal Cord or Peripheral Nerve Stimulator: Please remember to turn device off for procedure.        Medication Hold:    Number of days you need to be off for the following medications:    Aggrenox 10 days   Agrylin (Anagrelide) 10 days  Brilinta (Ticagrelor) 7 days  Imbruvica (Ibrutinib) 3 days   Enbrel (Etanercept) 24 hours   Fragmin (Dalteparin) 24 hours   Pletal (Cilostazol) 7 days  Effient  (Prasugrel) 7 days  Pradaxa 10 days  Trental 7 days  Eliquis (Apixaban) 3 days  Xarelto (Rivaroxaban) 3 days  Lovenox (Enoxaparin) 24 hours  Aspirin  Greater than 81mg but less than 325mg   5 days  325mg and greater                  7 days  Coumadin       5 days  Procedure may be cancelled if INR is elevated.   Excedrin (with aspirin) 7 days  Plavix (Clopidogrel)                            7 days    NSAIDs: 24 hours preferred      Ibuprofen (Motrin, Advil, Vicoprofen), Naproxen (Naprosyn, Aleve), Piroxcam (Feldene), Meloxicam (Mobic), Oxaprozin (Daypro), Diclofenac (Voltaren), Indomethacin (Indocin), Etodolac (Lodine), Nabumetone (Relafen), Celebrex (Celecoxib)           HERBAL SUPPLEMENTS  5 days preferred  Fish oil, krill oil, Omega-3, Vascepa, Vitamin E, Turmeric, Garlic                       Insurance Authorization:   Most insurances are now requiring a preauthorization for all procedures.  In the event that your insurance does not authorize your procedure within 48 hours of the scheduled date, your procedure will be cancelled and rescheduled to a later date.  Please contact your insurance carrier to determine what your financial responsibility will be for the procedure(s).      Cancellation/Rescheduling Appointment:   In the event you need to cancel or reschedule your appointment, you must notify the office 24 hours prior.    Post-procedure instructions:        Please schedule a follow up visit within 2 to 4 weeks after your last procedure date   Please call our office with any questions or concerns before or after your procedure at  [phone].  If you are a diabetic, please increase the frequency of your glucose monitoring after the procedure as this may cause a temporary increase in your blood sugar.  Contact your primary care physician if your blood sugar rises as you may require some medication adjustment.  It is normal to have increased pain at injection site for up to 3-5 days after procedure, you can  use heat or ice (20 minutes on 20 minutes off) for comfort.    **To hear a recorded version of these instructions, please call [phone] and follow the prompts.  **Para escuchar las instrucciones en Español, por favor de llamar el vishnu [phone] opción 4.

## 2025-06-26 ENCOUNTER — HOSPITAL ENCOUNTER (OUTPATIENT)
Facility: HOSPITAL | Age: 80
Setting detail: HOSPITAL OUTPATIENT SURGERY
Discharge: HOME | End: 2025-06-26
Attending: ANESTHESIOLOGY | Admitting: ANESTHESIOLOGY
Payer: MEDICARE

## 2025-06-26 ENCOUNTER — APPOINTMENT (OUTPATIENT)
Dept: GENERAL RADIOLOGY | Facility: HOSPITAL | Age: 80
End: 2025-06-26
Attending: ANESTHESIOLOGY
Payer: MEDICARE

## 2025-06-26 ENCOUNTER — TELEPHONE (OUTPATIENT)
Dept: PHYSICAL THERAPY | Age: 80
End: 2025-06-26

## 2025-06-26 VITALS
OXYGEN SATURATION: 99 % | DIASTOLIC BLOOD PRESSURE: 64 MMHG | TEMPERATURE: 97 F | RESPIRATION RATE: 20 BRPM | HEIGHT: 70 IN | WEIGHT: 190 LBS | SYSTOLIC BLOOD PRESSURE: 146 MMHG | HEART RATE: 56 BPM | BODY MASS INDEX: 27.2 KG/M2

## 2025-06-26 PROCEDURE — 64483 NJX AA&/STRD TFRM EPI L/S 1: CPT | Performed by: ANESTHESIOLOGY

## 2025-06-26 PROCEDURE — 64484 NJX AA&/STRD TFRM EPI L/S EA: CPT | Performed by: ANESTHESIOLOGY

## 2025-06-26 RX ORDER — LIDOCAINE HYDROCHLORIDE 10 MG/ML
INJECTION, SOLUTION EPIDURAL; INFILTRATION; INTRACAUDAL; PERINEURAL
Status: DISCONTINUED | OUTPATIENT
Start: 2025-06-26 | End: 2025-06-26

## 2025-06-26 RX ORDER — NALOXONE HYDROCHLORIDE 0.4 MG/ML
0.08 INJECTION, SOLUTION INTRAMUSCULAR; INTRAVENOUS; SUBCUTANEOUS AS NEEDED
Status: DISCONTINUED | OUTPATIENT
Start: 2025-06-26 | End: 2025-06-26

## 2025-06-26 RX ORDER — DEXAMETHASONE SODIUM PHOSPHATE 10 MG/ML
INJECTION, SOLUTION INTRAMUSCULAR; INTRAVENOUS
Status: DISCONTINUED | OUTPATIENT
Start: 2025-06-26 | End: 2025-06-26

## 2025-06-26 RX ORDER — SODIUM CHLORIDE 9 MG/ML
INJECTION, SOLUTION INTRAMUSCULAR; INTRAVENOUS; SUBCUTANEOUS
Status: DISCONTINUED | OUTPATIENT
Start: 2025-06-26 | End: 2025-06-26

## 2025-06-26 RX ORDER — IOPAMIDOL 408 MG/ML
INJECTION, SOLUTION INTRATHECAL
Status: DISCONTINUED | OUTPATIENT
Start: 2025-06-26 | End: 2025-06-26

## 2025-06-26 NOTE — OPERATIVE REPORT
[facility]  Operative Report  2025     Luis Villasenor Patient Status:  Hospital Outpatient Surgery    1945 MRN [id number]   Location [facility] PAIN CENTER Attending Garcia Orellana MD   Hosp Day # 0 PCP Chris Hernandez MD     Indication: Luis is a 80 year old male with lumbar radiculitis    Preoperative Diagnosis:  Lumbar radiculitis [M54.16]    Postoperative Diagnosis: Same as above.    Procedure performed: Left Lumbar 4, Lumbar 5 TRANSFORAMINAL  EPIDURAL STEROID INJECTION MULTIPLE LEVEL with local     Anesthesia: Local      EBL: Less than 1 ml.    Procedure Description:  After reviewing the patient's history and performing a focused physical examination, the diagnosis was confirmed and contraindications such as infection and coagulopathy were ruled out.  Following review of potential side effects and complications, including but not necessarily limited to infection, allergic reaction, local tissue breakdown, nerve injury, and paresis, the patient indicated they understood and agreed to proceed.  After obtaining the informed consent, the patient was brought to the procedure room and monitored.           In the prone position, following sterile prep and drape of the lumbar region,  the  L4 neural foramen was identified under fluoroscopy.  The skin and subcutaneous tissue was anesthetized via 25-gauge 1.5\" needle with approximately 2 cc of 1% lidocaine.  A 22-gauge 5\" Quincke spinal needle was introduced toward the inferior aspect of the junction between the transverse process and pedicle of the  L4 level atraumatically under fluoroscopic guidance. The needle was advanced into the anterior epidural space at this level. The needle position was confirmed under AP and lateral fluoroscopic view.  Following negative aspiration for CSF and blood, approximately 1 cc of Omnipaque 240 was injected.  An excellent contrast spread along the epidural space and the nerve root was obtained.   At this point, 1cc of normal saline with 5 mg of dexamethasone was injected without complication.  The needle was withdrawn with stylet in situ after being flushed with 1 cc PF lidocaine.     The  L5 neural foramen was also identified under fluoroscopy.  The skin and subcutaneous tissue was anesthetized via 25-gauge 1.5\" needle with approximately 2 cc of 1% lidocaine.  A 22-gauge 5\" Quincke spinal needle was introduced toward the inferior aspect of the junction between the transverse process and pedicle of the L5 level atraumatically under fluoroscopic guidance. The needle was advanced into the anterior epidural space at this level. The needle position was confirmed under AP and lateral fluoroscopic view.  Following negative aspiration for CSF and blood, approximately 1 cc of Omnipaque 240 was injected.  An excellent contrast spread along the epidural space and the nerve root was obtained.  At this point, 1cc of normal saline with 5 mg of dexamethasone was injected without complication.  The needle was withdrawn with stylet in situ after being flushed with 1 cc PF lidocaine..  The patient tolerated procedure very well.  The patient was observed until discharge criteria met.  Discharge instructions were given and patient was released to a responsible adult.       Complications: None.    Follow up:  The patient was followed in the pain clinic as needed basis.        Garcia Orellana MD

## 2025-06-26 NOTE — H&P
History & Physical Examination    Patient Name: Luis Villasenor  MRN: [id number]  CSN: [id number]  YOB: 1945    Pre-Operative Diagnosis:  Lumbar radiculitis [M54.16]    Present Illness: Lumbar radiculitis    ASA: 3  MP class: 1  Sedation: Local      Prescriptions Prior to Admission[1]  Current Hospital Medications[2]    Allergies: Allergies[3]    Past Medical History[4]  Past Surgical History[5]  Family History[6]  Social History     Tobacco Use    Smoking status: Former     Current packs/day: 0.00     Average packs/day: 1 pack/day for 30.0 years (30.0 ttl pk-yrs)     Types: Cigarettes     Start date: 1970     Quit date: 2000     Years since quittin.0     Passive exposure: Past    Smokeless tobacco: Never   Substance Use Topics    Alcohol use: Not Currently     Alcohol/week: 0.8 standard drinks of alcohol     Types: 1 Glasses of wine per week     Comment: very rarely       SYSTEM Check if Review is Normal Check if Physical Exam is Normal If not normal, please explain:   HEENT [x ] [x ]    NECK & BACK [x ] [x ]    HEART [x ] [x ]    LUNGS [x ] [x ]    ABDOMEN [x ] [x ]    UROGENITAL [x ] [x ]    EXTREMITIES [x ] [x ]    OTHER        [ x ] I have discussed the risks and benefits and alternatives with the patient/family.  They understand and agree to proceed with plan of care.  [ x ] I have reviewed the History and Physical done within the last 30 days.  Any changes noted above.    Garcia Orellana MD              [1]   Medications Prior to Admission   Medication Sig Dispense Refill Last Dose/Taking    XARELTO 10 MG Oral Tab TAKE 1 TABLET BY MOUTH EVERYDAY AT BEDTIME 90 tablet 1 2025    PANTOPRAZOLE 40 MG Oral Tab EC TAKE 1 TABLET BY MOUTH BEFORE BREAKFAST 90 tablet 1     TAFINLAR 50 MG Oral Cap TAKE 2 CAPSULES (100 MG) BY MOUTH TWICE A DAY AT LEAST 1 HR BEFORE OR 2 HRS AFTER A MEAL. DOSE REDUCTION 120 capsule 5     MEKINIST 2 MG Oral Tab TAKE 1 TABLET (2MG) BY MOUTH DAILY AT LEAST 1  HOUR BEFORE OR 2 HOURS AFTER A MEAL 30 tablet 5     VORICONAZOLE 200 MG Oral Tab TAKE ONE TABLET BY MOUTH TWICE DAILY 180 tablet 3     SULFAMETHOXAZOLE-TRIMETHOPRIM -160 MG Oral Tab per tablet TAKE 1 TABLET BY MOUTH TWICE A DAY ON MONDAY, WEDNESDAY AND FRIDAY 72 tablet 2     dexamethasone 1 MG Oral Tab Take 1 tablet (1 mg total) by mouth every other day. 90 tablet 0     ipratropium-albuterol 0.5-2.5 (3) MG/3ML Inhalation Solution Take 3 mL by nebulization every 6 (six) hours as needed. 90 each 5     fluticasone furoate-vilanterol (BREO ELLIPTA) 100-25 MCG/INH Inhalation Aerosol Powder, Breath Activated Inhale 1 puff into the lungs daily. Follow with mouth rinse and spit 1 each 6     albuterol 108 (90 Base) MCG/ACT Inhalation Aero Soln Inhale 2 puffs into the lungs every 4 (four) hours as needed for Wheezing. 1 each 2     cholecalciferol 50 MCG (2000 UT) Oral Tab Take 0.5 tablets (1,000 Units total) by mouth every morning.       Vitamin C 500 MG Oral Tab Take 1 tablet (500 mg total) by mouth every morning.      [2]   No current facility-administered medications for this encounter.   [3] No Known Allergies  [4]   Past Medical History:   Deep vein thrombosis (HCC)    CURRENTLY HAS RIGHT BLOOD CLOT    Dysphagia    Esophageal reflux    Exposure to medical diagnostic radiation    COMPLETED    Hemorrhoids    History of blood transfusion    Pt on Immunotherapy    History of immunotherapy    Impotence    Lung cancer (HCC)    NSCLCA stage IIIB    Necrotizing granulomatous inflammation of lung (HCC)    Obstructive sleep apnea    Osteoarthritis    Personal history of antineoplastic chemotherapy    Pneumonia due to organism    Pulmonary embolism (HCC)    Pulmonary embolus (HCC)    Sinus problem    Sinus problems    Sleep apnea    CPAP    Visual impairment    wears eyeglasses   [5]   Past Surgical History:  Procedure Laterality Date    Arthroscopy of joint unlisted      Cholecystectomy      Colonoscopy  2010    Colonoscopy  N/A 06/21/2018    Procedure: COLONOSCOPY;  Surgeon: Cesar Beckman MD;  Location: [facility] ENDOSCOPY    Colonoscopy      Excis spermatocele Right 03/08/2024    Port, indwelling, imp Right 2017    Rotator cuff repair Left     Total knee replacement Bilateral 2020    Vein ligation and stripping     [6]   Family History  Problem Relation Age of Onset    Cancer Father         Lung    Cancer Mother         Breast, ovarian

## 2025-06-26 NOTE — DISCHARGE INSTRUCTIONS
Home Care Instructions Following Your Pain Procedure     Luis,  It has been a pleasure to have you as our patient. To help you at home, you must follow these general discharge instructions. We will review these with you before you are discharged. It is our hope that you have a complete and uneventful recovery from our procedure.     General Instructions:  What to Expect:  Bandages from your procedure today can be removed when you get home.  Please avoid soaking and/or swimming for 24 hours.  Showering is okay  It is normal to have increased pain symptoms and/or pain at injection site for up to 3-5 days after procedure, you can use heat or ice (20 minutes on 20 minutes off) for comfort.  You may experience some temporary side effects which may include restlessness or insomnia, flushing of the face, or heart palpitations.  Please contact the provider if these symptoms do not resolve within 3-4 days.  Lightheadedness or nausea may occur and should resolve within 24 to 48 hours.  If you develop a headache after treatment, rest, drink fluids (with caffeine, if possible) and take mild over-the-counter pain medication.  If the headache does not improve with the above treatment, contact the physician.  Home Medications:  Resume all previously prescribed medication.  Please avoid taking NSAIDs (Non-Steriodal Anti-Inflammatory Drugs) such as:  Ibuprofen ( Advil, Motrin) Aleve (Naproxen), Diclofenac, Meloxicam for 6 hours after procedure.   If you are on Coumadin (Warfarin) or any other anti-coagulant (or \"blood thinning\") medication such as Plavix (Clopidogrel), Xarelto (Rivaroxaban), Eliquis (Apixaban), Effient (Prasugrel) etc., restart on the following day from the procedure unless otherwise directed by your provider.  If you are a diabetic, please increase the frequency of your glucose monitoring after the procedure as steroids may cause a temporary (2-3 day) increase in your blood sugar.  Contact your primary care  physician if your blood sugar remains elevated as you may require some medication adjustment.  Diet:  Resume your regular diet as tolerated.  Activity:  We recommend that you relax and rest during the rest of your procedure day.  If you feel weakness in your arms or legs do not drive.  Follow-up Appointment  Please schedule a follow-up visit within 3 to 4 weeks after your last procedure date.  Question or Concerns:  Feel free to call our office with any questions or concerns at [phone] (option #2)    Luis  Thank you for coming to [facility] for your procedure.  The nurses try very hard to make sure you receive the best care possible.  Your trust in them as well as us is greatly appreciated.    Thanks so much,   Dr. Garcia Orellana

## 2025-06-27 ENCOUNTER — TELEPHONE (OUTPATIENT)
Dept: PAIN CLINIC | Facility: CLINIC | Age: 80
End: 2025-06-27

## 2025-06-27 NOTE — TELEPHONE ENCOUNTER
Follow-up call post pain procedure. Left message informing patient to contact the pain clinic at [phone] option #3 regarding any questions or concerns about recent pain procedure.       Procedure: Left Lumbar 4, Lumbar 5 TRANSFORAMINAL  EPIDURAL STEROID INJECTION MULTIPLE LEVEL with local   Date: 06/26/25  Follow up Visit Scheduled: 07/11/25

## 2025-07-01 ENCOUNTER — TELEPHONE (OUTPATIENT)
Dept: PHYSICAL THERAPY | Facility: HOSPITAL | Age: 80
End: 2025-07-01

## 2025-07-03 ENCOUNTER — TELEPHONE (OUTPATIENT)
Dept: PULMONOLOGY | Facility: CLINIC | Age: 80
End: 2025-07-03

## 2025-07-03 DIAGNOSIS — G47.33 OSA (OBSTRUCTIVE SLEEP APNEA): Primary | ICD-10-CM

## 2025-07-03 NOTE — TELEPHONE ENCOUNTER
Spoke to patient and he states [facility] said he is eligible for a new machine. Patient denies getting machine linked by [facility] [facility] so no data is available.     Dr Hernandez- please sign pended order if agreeable

## 2025-07-03 NOTE — TELEPHONE ENCOUNTER
Patient wife states that she contacted [facility] because patient Cpap is not working.  [facility] is requesting an order for a new Cpap, so that it can be replaced.       Please call

## 2025-07-07 ENCOUNTER — TELEPHONE (OUTPATIENT)
Dept: PULMONOLOGY | Facility: CLINIC | Age: 80
End: 2025-07-07

## 2025-07-07 NOTE — TELEPHONE ENCOUNTER
[facility] is calling because patient has arrived at [facility] for CPAP.  She is requesting clinicals to be faxed.  The clinical notes need to state that patient is using and benefiting from the use of the Cpap.  Patient states he is going to sit there and wait.  Please call

## 2025-07-07 NOTE — TELEPHONE ENCOUNTER
Dr Hernandez- please review data download in your folder and addend recent office visit note to state patient is using and benefiting from CPAP per Medicare guidelines if agreeable

## 2025-07-07 NOTE — TELEPHONE ENCOUNTER
Patient scheduled for 9/30/25 at 11:45. Reviewed date, time, place and where to park. Patient verbalized understanding

## 2025-07-08 NOTE — TELEPHONE ENCOUNTER
Sent patient mychart informing that PAP download from 4/8/25-7/5/25 was reviewed by Dr Hernandez who states usage looks excellent    Download sent to scanning

## 2025-07-09 ENCOUNTER — OFFICE VISIT (OUTPATIENT)
Dept: PHYSICAL THERAPY | Age: 80
End: 2025-07-09
Attending: PHYSICIAN ASSISTANT
Payer: MEDICARE

## 2025-07-09 DIAGNOSIS — M48.062 SPINAL STENOSIS OF LUMBAR REGION WITH NEUROGENIC CLAUDICATION: ICD-10-CM

## 2025-07-09 DIAGNOSIS — M54.16 LUMBAR RADICULOPATHY: ICD-10-CM

## 2025-07-09 DIAGNOSIS — M47.816 LUMBAR SPONDYLOSIS: Primary | ICD-10-CM

## 2025-07-09 PROCEDURE — 97110 THERAPEUTIC EXERCISES: CPT | Performed by: PHYSICAL THERAPIST

## 2025-07-09 PROCEDURE — 97161 PT EVAL LOW COMPLEX 20 MIN: CPT | Performed by: PHYSICAL THERAPIST

## 2025-07-09 NOTE — PROGRESS NOTES
SPINE EVALUATION:     Diagnosis:   Lumbar spondylosis (M47.816)  Lumbar radiculopathy (M54.16)  Spinal stenosis of lumbar region with neurogenic claudication (M48.062) Patient:  Luis Villasenor (80 year old, male)        Referring Provider: Ron Watts  Today's Date   7/9/2025    Precautions:      Date of Evaluation: 07/09/25  Next MD visit: Friday  Date of Surgery: NA, date of onset May. LESI 2 weeks ago     PATIENT SUMMARY     Summary of chief complaints: back pain, radiates into L thigh to knee  History of current condition: episodic LBP for years, this episode worse.   Pain level: current 6 /10, at best 0 /10, at worst 10 /10  Description of symptoms: occ sharp pain in L buttock/thigh. sleep disturbed   Occupation: , retired   Leisure activities/Hobbies: gardening, house work.   Prior level of function: active  Current limitations: difficulty standing, walking, bending, sitting  Pt goals: control pain  Red flag signs/symptoms: Pt denies dizziness, drop attacks, dysphagia, dysarthria, diplopia; Pt denies changes in bowel/bladder function, saddle anesthesia    Past medical history was reviewed with Luis.  Significant findings include:    Imaging/Tests:     Luis  has a past medical history of Deep vein thrombosis (HCC), Dysphagia, Esophageal reflux, Exposure to medical diagnostic radiation, Hemorrhoids, History of blood transfusion, History of immunotherapy (07/28/2023), Impotence, Lung cancer (HCC), Necrotizing granulomatous inflammation of lung (HCC), Obstructive sleep apnea, Osteoarthritis, Personal history of antineoplastic chemotherapy, Pneumonia due to organism, Pulmonary embolism (HCC), Pulmonary embolus (HCC), Sinus problem, Sleep apnea, and Visual impairment.  He  has a past surgical history that includes colonoscopy (2010); port, indwelling, imp (Right, 2017); colonoscopy (N/A, 06/21/2018); colonoscopy; total knee replacement (Bilateral, 2020); Rotator cuff repair (Left);  cholecystectomy; arthroscopy of joint unlisted; vein ligation and stripping; and excis spermatocele (Right, 03/08/2024).    ASSESSMENT  Luis presents to physical therapy evaluation with primary c/o back pain, radiates into L thigh to knee. The results of the objective tests and measures show symptoms indicative of spinal stenosis with L sided radiculopathy. Functional deficits include but are not limited to difficulty standing, walking, bending, sitting. Signs and symptoms are consistent with diagnosis of Lumbar spondylosis (M47.816)  Lumbar radiculopathy (M54.16)  Spinal stenosis of lumbar region with neurogenic claudication (M48.062). Pt and PT discussed evaluation findings, pathology, POC and HEP.  Pt voiced understanding and performs HEP correctly without reported pain. Skilled Physical Therapy is medically necessary to address the above impairments and reach functional goals.    OBJECTIVE:      Musculoskeletal:  Observation/Posture: posterior pelvic tilt   Accessory Motion:     Palpation:       Special tests:   Neg SLR     ROM and Strength:  (* denotes performed with pain)  Trunk ROM MMT (-/5)     Flex 80, nil 4+     Ext 40* 4    R L R L     Side bend 70 70         Rotation           ,   Hip   ROM MMT (-/5)    R L R L     Flex (L2) ROM WFL both hips   5 4+     Ext      5 4     Abd     5 4     ER             IR            ,   Knee   ROM MMT (-/5)    R L R L     Flex ROM WFL both knees   5 5-     Ext (L3)     5 4+     ,   Ankle/Foot   ROM MMT (-/5)    R L R L     PF     5- 4     DF (L4)     5 5-     Inversion             Eversion             Grt Toe Ext (L5)               Flexibility:  LE Flexibility R L     Hip Flexor mod restricted mod restricted     Hamstrings min restricted min restricted     ITB         Piriformis         Quads         Gastroc-soleus mod restricted mod restricted       Neurological:  Sensation: grossly intact to light touch NOHEMY UE/LE     Deep Tendon Reflexes: grossly intact NOHEMY UE/LE      UMN:      Velasquez's: negative     Clonus: negative     Babinski:       Peripheral Neurodynamic: WNL except     Balance and Functional Mobility:  Gait: pt ambulates on level ground with decreased stance phase; decreased foot clearance.  Balance: SLS: EO R  , EO L          Today's Treatment and Response:   Pt education was provided on exam findings, treatment diagnosis, treatment plan, expectations, and prognosis.    Today's Treatment       7/9/2025   Spine Treatment   Therapeutic Exercise LTR  Bridge  SKTC  Sit to stand   Therapeutic Exercise Minutes 12   Evaluation Minutes 33   Total Time Of Timed Procedures 12   Total Time Of Service-Based Procedures 33   Total Treatment Time 45   HEP LTR  Bridge  SKTC  Sit to stand        Patient was instructed in and issued a HEP for: LTR  Bridge  SKTC  Sit to stand    Charges:  PT EVAL: Low Complexity, TE1  In agreement with evaluation findings and clinical rationale, this evaluation involved LOW COMPLEXITY decision making due to no personal factors/comorbidities, 1-2 body structures involved/activity limitations, and stable symptoms as documented in the evaluation.                                                                         PLAN OF CARE:      Goals: (to be met in 10 visits)     Long term goals to be reached in 10 visits.  Pt. will report decreased pain from 10/10 to 4/10 at worst.  Increase strength of trunk mm to 5/5 to allow pt to minimize discomfort with gardening.  Pt. will be able to tolerate walking for 10 minutes without increased symptoms.  Sleep not disturbed by pain  Pt. will be independent with home exercise program and self management.       Frequency / Duration: Patient will be seen 1-2x/week or a total of 10  visits over a 90 day period. Treatment will include: Manual Therapy; Neuromuscular Re-education; Therapeutic Exercise; Home Exercise Program instruction    Education or treatment limitation: None   Rehab Potential: fair     Oswestry Disability  Index Score  Score: (Patient-Rptd) 62 % (7/7/2025  5:16 PM)      Patient/Family/Caregiver was advised of these findings, precautions, and treatment options and has agreed to actively participate in planning and for this course of care.    Thank you for your referral. Please co-sign or sign and return this letter via fax as soon as possible to [phone]. If you have any questions, please contact me at Dept: [phone]    Sincerely,  Electronically signed by therapist: Victor Manuel Feliz, PT, DPT, OCS, Cert MDT   Physician's certification required: Yes  I certify the need for these services furnished under this plan of treatment and while under my care.    X___________________________________________________ Date____________________    Certification From: 7/9/2025  To: 10/7/2025

## 2025-07-11 ENCOUNTER — OFFICE VISIT (OUTPATIENT)
Dept: PAIN CLINIC | Facility: CLINIC | Age: 80
End: 2025-07-11
Payer: MEDICARE

## 2025-07-11 ENCOUNTER — TELEPHONE (OUTPATIENT)
Dept: PULMONOLOGY | Facility: CLINIC | Age: 80
End: 2025-07-11

## 2025-07-11 VITALS
BODY MASS INDEX: 27 KG/M2 | DIASTOLIC BLOOD PRESSURE: 62 MMHG | OXYGEN SATURATION: 95 % | WEIGHT: 191 LBS | HEART RATE: 68 BPM | SYSTOLIC BLOOD PRESSURE: 110 MMHG

## 2025-07-11 DIAGNOSIS — M48.062 LUMBAR STENOSIS WITH NEUROGENIC CLAUDICATION: Primary | ICD-10-CM

## 2025-07-11 DIAGNOSIS — M54.16 LUMBAR RADICULITIS: ICD-10-CM

## 2025-07-11 PROCEDURE — 3074F SYST BP LT 130 MM HG: CPT | Performed by: ANESTHESIOLOGY

## 2025-07-11 PROCEDURE — 1159F MED LIST DOCD IN RCRD: CPT | Performed by: ANESTHESIOLOGY

## 2025-07-11 PROCEDURE — G2211 COMPLEX E/M VISIT ADD ON: HCPCS | Performed by: ANESTHESIOLOGY

## 2025-07-11 PROCEDURE — 3078F DIAST BP <80 MM HG: CPT | Performed by: ANESTHESIOLOGY

## 2025-07-11 PROCEDURE — 99214 OFFICE O/P EST MOD 30 MIN: CPT | Performed by: ANESTHESIOLOGY

## 2025-07-11 NOTE — TELEPHONE ENCOUNTER
Received fax from [facility] with set up confirmation. Patient is due for PAP compliance follow up between 8/10/25 and 10/8/25. Patient is scheduled for appointment on 9/30/25

## 2025-07-11 NOTE — PROGRESS NOTES
Last procedure: Left Lumbar 4, Lumbar 5 TRANSFORAMINAL  EPIDURAL STEROID INJECTION MULTIPLE LEVEL with local   Date: 6/26/25  Percentage of relief obtained: less than 50%  Duration of relief: up until now    Current Pain Score: 7-8/10    Narcotic Contract Exp: n/a

## 2025-07-11 NOTE — PROGRESS NOTES
Name: Luis Villasenor   : 1945   DOS: 2025     Pain Clinic Follow Up Visit:     Chief Complaint   Patient presents with    Procedure Follow Up     Left Lumbar 4, Lumbar 5 TRANSFORAMINAL  EPIDURAL STEROID INJECTION MULTIPLE LEVEL with local from 25       Luis Villasenor is a 80 year old male with a history of multilevel lumbar degenerative disc disease and facet arthropathy leading to severe foraminal stenosis.  The patient is following up after lumbar transforaminal epidural steroid injection with 50% relief and improved function.  Since injection, patient has also been evaluated by orthopedic spine who discussed surgical versus continuation conservative management.  Referred back for repeat injection prior to surgical consideration.    Pt denies any chills, fever, or weakness. There is no bladder or bowel incontinence associated with the pain.    REVIEW OF SYSTEMS:  A ten point review of systems was performed with pertinent positives and negatives in the HPI.    Allergies[1]    Current Medications[2]      EXAM:   /62 (BP Location: Left arm, Patient Position: Sitting, Cuff Size: large)   Pulse 68   Wt 191 lb (86.6 kg)   SpO2 95%   BMI 27.41 kg/m²   General:  Patient is a(n) 80 year old year old male in no acute distress.  Neurologic:: WNL-Orientation to time, place and person, normal mood & affect, concentration & attention span intact.   Inspection:  Ambulates with well-coordinated, fluid, non-antalgic gait.  Gait is normal.  Neck: Full range of motion  Cranial nerve: Grossly intact  Respiratory: Nonlabored  Back: Gait intact  IMAGES:     MRI reviewed with severe bilateral foraminal stenosis at L4 and L5 due to facet arthropathy    ASSESSMENT AND PLAN:     1. Lumbar stenosis with neurogenic claudication    2. Lumbar radiculitis      The patient is an 80-year-old gentleman with a history of low back pain and left-sided radicular symptoms.  This is due to advanced degenerative changes  and facet arthropathy.  Recommended by orthopedic spine for repeat injection prior to surgical consideration.  This is discussed with the patient and he would like to move forward.    Orders:  Orders Placed This Encounter   Procedures    [facility] PAIN NAVIGATOR         Radiology orders and consultations:None  The patient indicates understanding of these issues and agrees to the plan.  No follow-ups on file.    Garcia Orellana MD, 7/11/2025, 11:05 AM              [1] No Known Allergies  [2]   Current Outpatient Medications   Medication Sig Dispense Refill    PANTOPRAZOLE 40 MG Oral Tab EC TAKE 1 TABLET BY MOUTH BEFORE BREAKFAST 90 tablet 1    TAFINLAR 50 MG Oral Cap TAKE 2 CAPSULES (100 MG) BY MOUTH TWICE A DAY AT LEAST 1 HR BEFORE OR 2 HRS AFTER A MEAL. DOSE REDUCTION 120 capsule 5    MEKINIST 2 MG Oral Tab TAKE 1 TABLET (2MG) BY MOUTH DAILY AT LEAST 1 HOUR BEFORE OR 2 HOURS AFTER A MEAL 30 tablet 5    VORICONAZOLE 200 MG Oral Tab TAKE ONE TABLET BY MOUTH TWICE DAILY 180 tablet 3    XARELTO 10 MG Oral Tab TAKE 1 TABLET BY MOUTH EVERYDAY AT BEDTIME 90 tablet 1    SULFAMETHOXAZOLE-TRIMETHOPRIM -160 MG Oral Tab per tablet TAKE 1 TABLET BY MOUTH TWICE A DAY ON MONDAY, WEDNESDAY AND FRIDAY 72 tablet 2    dexamethasone 1 MG Oral Tab Take 1 tablet (1 mg total) by mouth every other day. 90 tablet 0    ipratropium-albuterol 0.5-2.5 (3) MG/3ML Inhalation Solution Take 3 mL by nebulization every 6 (six) hours as needed. 90 each 5    fluticasone furoate-vilanterol (BREO ELLIPTA) 100-25 MCG/INH Inhalation Aerosol Powder, Breath Activated Inhale 1 puff into the lungs daily. Follow with mouth rinse and spit 1 each 6    albuterol 108 (90 Base) MCG/ACT Inhalation Aero Soln Inhale 2 puffs into the lungs every 4 (four) hours as needed for Wheezing. 1 each 2    cholecalciferol 50 MCG (2000 UT) Oral Tab Take 0.5 tablets (1,000 Units total) by mouth every morning.      Vitamin C 500 MG Oral Tab Take 1 tablet (500 mg total) by mouth  every morning.

## 2025-07-11 NOTE — PATIENT INSTRUCTIONS
Refill policies:    Allow 2-3 business days for refills; controlled substances may take longer.  Contact your pharmacy at least 5 days prior to running out of medication and have them send an electronic request or submit request through the “request refill” option in your [employer] account.  Refills are not addressed on weekends; covering physicians do not authorize routine medications on weekends.  No narcotics or controlled substances are refilled after noon on Fridays or by on call physicians.  By law, narcotics must be electronically prescribed.  A 30 day supply with no refills is the maximum allowed.  If your prescription is due for a refill, you may be due for a follow up appointment.  To best provide you care, patients receiving routine medications need to be seen at least once a year.  Patients receiving narcotic/controlled substance medications need to be seen at least once every 3 months.  In the event that your preferred pharmacy does not have the requested medication in stock (e.g. Backordered), it is your responsibility to find another pharmacy that has the requested medication available.  We will gladly send a new prescription to that pharmacy at your request.    Scheduling Tests:    If your physician has ordered radiology tests such as MRI or CT scans, please contact Central Scheduling at [phone] right away to schedule the test.  Once scheduled, the [facility] Centralized Referral Team will work with your insurance carrier to obtain pre-certification or prior authorization.  Depending on your insurance carrier, approval may take 3-10 days.  It is highly recommended patients assure they have received an authorization before having a test performed.  If test is done without insurance authorization, patient may be responsible for the entire amount billed.      Precertification and Prior Authorizations:  If your physician has recommended that you have a procedure or additional testing performed the [facility]  Centralized Referral Team will contact your insurance carrier to obtain pre-certification or prior authorization.    You are strongly encouraged to contact your insurance carrier to verify that your procedure/test has been approved and is a COVERED benefit.  Although the [facility] Centralized Referral Team does its due diligence, the insurance carrier gives the disclaimer that \"Although the procedure is authorized, this does not guarantee payment.\"    Ultimately the patient is responsible for payment.   Thank you for your understanding in this matter.  Paperwork Completion:  If you require FMLA or disability paperwork for your recovery, please make sure to either drop it off or have it faxed to our office at [phone]. Be sure the form has your name and date of birth on it.  The form will be faxed to our Forms Department and they will complete it for you.  There is a 25$ fee for all forms that need to be filled out.  Please be aware there is a 10-14 day turnaround time.  You will need to sign a release of information (ANA LUISA) form if your paperwork does not come with one.  You may call the Forms Department with any questions at [phone].  Their fax number is [phone].

## 2025-07-16 ENCOUNTER — OFFICE VISIT (OUTPATIENT)
Dept: PHYSICAL THERAPY | Age: 80
End: 2025-07-16
Attending: PHYSICIAN ASSISTANT
Payer: MEDICARE

## 2025-07-16 PROCEDURE — 97110 THERAPEUTIC EXERCISES: CPT | Performed by: PHYSICAL THERAPIST

## 2025-07-16 NOTE — PROGRESS NOTES
Patient: Luis Villasenor (80 year old, male) Referring Provider:  Insurance:   Diagnosis: Lumbar spondylosis (M47.816)  Lumbar radiculopathy (M54.16)  Spinal stenosis of lumbar region with neurogenic claudication (M48.062) Ron LAGUER[facility]   Date of Surgery: NA, date of onset May. LESI 2 weeks ago Next MD visit:  N/A   Precautions:    Friday Referral Information:    Date of Evaluation: Req: 8, Auth: 8, Exp: 10/9/2025    07/09/25 POC Auth Visits:  10       Today's Date   7/16/2025    Subjective  doing HEP, it is really helpful, does not use the cane for a while after doing HEP.       Pain: 2/10     Objective  reviewed HEP. Added additional strengthening ex.            Assessment  pt with min sx's in the clinic, does well with added ex.    Goals (to be met in 10 visits)   Long term goals to be reached in 10 visits.  Pt. will report decreased pain from 10/10 to 4/10 at worst.  Increase strength of trunk mm to 5/5 to allow pt to minimize discomfort with gardening.  Pt. will be able to tolerate walking for 10 minutes without increased symptoms.  Sleep not disturbed by pain  Pt. will be independent with home exercise program and self management.           Plan  cont current HEP, monitor response.    Treatment Last 4 Visits  Treatment Day: 2       7/9/2025 7/16/2025   Spine Treatment   Therapeutic Exercise LTR  Bridge  SKTC  Sit to stand Nustep 10 min, L4  Shuttle squats L6 x 50  Supine LTR x 30  SKTC x 20 ea   Therapeutic Exercise Minutes 12 44   Evaluation Minutes 33    Total Time Of Timed Procedures 12 44   Total Time Of Service-Based Procedures 33 0   Total Treatment Time 45 44   HEP LTR  Bridge  SKTC  Sit to stand         HEP  LTR  Bridge  SKTC  Sit to stand    Charges  TE3

## 2025-07-17 ENCOUNTER — TELEPHONE (OUTPATIENT)
Dept: PAIN CLINIC | Facility: CLINIC | Age: 80
End: 2025-07-17

## 2025-07-17 ENCOUNTER — TELEPHONE (OUTPATIENT)
Dept: PULMONOLOGY | Facility: CLINIC | Age: 80
End: 2025-07-17

## 2025-07-17 NOTE — TELEPHONE ENCOUNTER
Medical Clearance faxed to 's Office at Fax #:[phone] ; confirmation r'cvd.       25    RE: Luis Villasenor    : 1945    Dear Dr. Hernandez,    Your patient is being scheduled for a pain management procedure at [facility].    Procedure:  Left L4,L5 Transforaminal Injection.   Date of Procedure: TBD -pending medical clearance.  Physician: Dr. Orellana- Anesthesiologist     Your patient is currently taking Xarelto. Dr. Orellana usually recommends this medication to be held for 3 days prior to injection.     Please verify patient is cleared to proceed with pain management procedures.

## 2025-07-17 NOTE — TELEPHONE ENCOUNTER
Order Questions    Question Answer   Anesthesia Type Local   Provider Esteban   Location [facility] Procedure Lab   Procedure Transforaminal   Laterality/Level left L4, L5   CPT (Hit enter after each entry) INJECTION, ANESTHETIC/STEROID, TRANSFORAMINAL EPIDURAL; LUMBAR/SACRAL, SINGLE LEVEL    INJECTION, ANESTHETIC/STEROID, TRANSFORAMINAL EPIDURAL; LUMBAR/SACRAL, ADD'L LEVEL   Medications to hold xarelto   Medical clearance requested (will send to Pain Navigator) No   Patient has Medicare coverage? No   Comments (Please list entire procedure name here.) left lumbar 4, lumbar 5 transforaminal epidural steroid injection

## 2025-07-17 NOTE — TELEPHONE ENCOUNTER
Received surgical Clearance from "[employer] for Left L4, L5 Transforaminal injection with Dr Orellana. Faxed approval to hold Xarelto from 6/12/25 again to [facility]. Confirmation received

## 2025-07-17 NOTE — TELEPHONE ENCOUNTER
Prior authorization request completed for: Left L4,L5 TLESI   Authorization #[id number]  Tracking #[id number]    Authorization dates: 07/17/25 - 09/17/25  CPT codes approved: 19448,47672  Number of visits/dates of service approved: 1  Physician: Esteban  Location: [facility]      Patient can be scheduled. Routed to Navigator.

## 2025-07-18 NOTE — TELEPHONE ENCOUNTER
Received Medical Clearance Fax from  that indicated that clearance was already faxed to our office on 06/12. This medical clearance dated 06/12 was already used for procedure on 06/26 and cannot re use medical clearance for new procedure ordered.     Re faxed medical clearance to 's Office to please address new medical clearance dated 07/17 as new medical clearance is required for each procedure that is ordered by .

## 2025-07-18 NOTE — TELEPHONE ENCOUNTER
Dr Hernandez- Received surgical clearance form dated 71/7/25 requesting new medical clearance for Left L4, L5 Transforaminal injection. [facility] states new medical clearance is needed for each procedure. Requesting clearance to hold Xarelto 3 days prior to injection. Please find and complete form in your folder

## 2025-07-21 NOTE — TELEPHONE ENCOUNTER
Clearance signed by Dr Hernandez and faxed to [phone]. Confirmation received and sent to scanning

## 2025-07-28 ENCOUNTER — APPOINTMENT (OUTPATIENT)
Dept: PHYSICAL THERAPY | Age: 80
End: 2025-07-28
Attending: PHYSICIAN ASSISTANT
Payer: MEDICARE

## 2025-07-29 ENCOUNTER — OFFICE VISIT (OUTPATIENT)
Facility: LOCATION | Age: 80
End: 2025-07-29
Attending: INTERNAL MEDICINE
Payer: MEDICARE

## 2025-07-29 ENCOUNTER — APPOINTMENT (OUTPATIENT)
Facility: LOCATION | Age: 80
End: 2025-07-29
Attending: INTERNAL MEDICINE
Payer: MEDICARE

## 2025-07-29 ENCOUNTER — NURSE ONLY (OUTPATIENT)
Facility: LOCATION | Age: 80
End: 2025-07-29
Attending: INTERNAL MEDICINE
Payer: MEDICARE

## 2025-07-29 VITALS
RESPIRATION RATE: 16 BRPM | TEMPERATURE: 97 F | WEIGHT: 189 LBS | HEART RATE: 53 BPM | OXYGEN SATURATION: 95 % | SYSTOLIC BLOOD PRESSURE: 128 MMHG | BODY MASS INDEX: 27 KG/M2 | DIASTOLIC BLOOD PRESSURE: 70 MMHG

## 2025-07-29 DIAGNOSIS — Z51.11 CHEMOTHERAPY MANAGEMENT, ENCOUNTER FOR: ICD-10-CM

## 2025-07-29 DIAGNOSIS — C77.1 NON-SMALL CELL LUNG CANCER METASTATIC TO INTRATHORACIC LYMPH NODE (HCC): ICD-10-CM

## 2025-07-29 DIAGNOSIS — I26.99 PULMONARY EMBOLISM, OTHER, UNSPECIFIED CHRONICITY, UNSPECIFIED WHETHER ACUTE COR PULMONALE PRESENT (HCC): ICD-10-CM

## 2025-07-29 DIAGNOSIS — C34.90 NON-SMALL CELL LUNG CANCER METASTATIC TO INTRATHORACIC LYMPH NODE (HCC): ICD-10-CM

## 2025-07-29 DIAGNOSIS — J98.4 DRUG-INDUCED PNEUMONITIS: ICD-10-CM

## 2025-07-29 DIAGNOSIS — Z51.81 MONITORING FOR ANTICOAGULANT USE: ICD-10-CM

## 2025-07-29 DIAGNOSIS — Z79.01 MONITORING FOR ANTICOAGULANT USE: ICD-10-CM

## 2025-07-29 DIAGNOSIS — Z79.52 LONG TERM (CURRENT) USE OF SYSTEMIC STEROIDS: ICD-10-CM

## 2025-07-29 DIAGNOSIS — R53.83 CHEMOTHERAPY-INDUCED FATIGUE: ICD-10-CM

## 2025-07-29 DIAGNOSIS — T45.1X5A CHEMOTHERAPY-INDUCED FATIGUE: ICD-10-CM

## 2025-07-29 DIAGNOSIS — C34.12 CANCER OF UPPER LOBE OF LEFT LUNG (HCC): ICD-10-CM

## 2025-07-29 DIAGNOSIS — T50.905A DRUG-INDUCED PNEUMONITIS: ICD-10-CM

## 2025-07-29 DIAGNOSIS — C34.92 RECURRENT ADENOCARCINOMA OF LEFT LUNG (HCC): Primary | ICD-10-CM

## 2025-07-29 DIAGNOSIS — R74.8 ELEVATED ALKALINE PHOSPHATASE LEVEL: ICD-10-CM

## 2025-07-29 DIAGNOSIS — J98.4 PNEUMONITIS: ICD-10-CM

## 2025-07-29 DIAGNOSIS — D84.9 IMMUNOCOMPROMISED (HCC): ICD-10-CM

## 2025-07-29 LAB
ALBUMIN SERPL-MCNC: 4.1 G/DL (ref 3.2–4.8)
ALBUMIN/GLOB SERPL: 2.2 (ref 1–2)
ALP LIVER SERPL-CCNC: 157 U/L (ref 45–117)
ALT SERPL-CCNC: 22 U/L (ref 10–49)
ANION GAP SERPL CALC-SCNC: 6 MMOL/L (ref 0–18)
AST SERPL-CCNC: 28 U/L (ref ?–34)
BASOPHILS # BLD AUTO: 0.02 X10(3) UL (ref 0–0.2)
BASOPHILS NFR BLD AUTO: 0.4 %
BILIRUB SERPL-MCNC: 0.5 MG/DL (ref 0.2–1.1)
BUN BLD-MCNC: 17 MG/DL (ref 9–23)
BUN/CREAT SERPL: 17.2 (ref 10–20)
CALCIUM BLD-MCNC: 9.1 MG/DL (ref 8.7–10.4)
CHLORIDE SERPL-SCNC: 106 MMOL/L (ref 98–112)
CO2 SERPL-SCNC: 27 MMOL/L (ref 21–32)
CREAT BLD-MCNC: 0.99 MG/DL (ref 0.7–1.3)
DEPRECATED RDW RBC AUTO: 49.6 FL (ref 35.1–46.3)
EGFRCR SERPLBLD CKD-EPI 2021: 76 ML/MIN/1.73M2 (ref 60–?)
EOSINOPHIL # BLD AUTO: 0.06 X10(3) UL (ref 0–0.7)
EOSINOPHIL NFR BLD AUTO: 1.2 %
ERYTHROCYTE [DISTWIDTH] IN BLOOD BY AUTOMATED COUNT: 14.1 % (ref 11–15)
GLOBULIN PLAS-MCNC: 1.9 G/DL (ref 2–3.5)
GLUCOSE BLD-MCNC: 99 MG/DL (ref 70–99)
HCT VFR BLD AUTO: 38.3 % (ref 39–53)
HGB BLD-MCNC: 12.6 G/DL (ref 13–17.5)
IMM GRANULOCYTES # BLD AUTO: 0.02 X10(3) UL (ref 0–1)
IMM GRANULOCYTES NFR BLD: 0.4 %
LYMPHOCYTES # BLD AUTO: 1.53 X10(3) UL (ref 1–4)
LYMPHOCYTES NFR BLD AUTO: 29.5 %
MCH RBC QN AUTO: 31.6 PG (ref 26–34)
MCHC RBC AUTO-ENTMCNC: 32.9 G/DL (ref 31–37)
MCV RBC AUTO: 96 FL (ref 80–100)
MONOCYTES # BLD AUTO: 0.64 X10(3) UL (ref 0.1–1)
MONOCYTES NFR BLD AUTO: 12.3 %
NEUTROPHILS # BLD AUTO: 2.92 X10 (3) UL (ref 1.5–7.7)
NEUTROPHILS # BLD AUTO: 2.92 X10(3) UL (ref 1.5–7.7)
NEUTROPHILS NFR BLD AUTO: 56.2 %
OSMOLALITY SERPL CALC.SUM OF ELEC: 290 MOSM/KG (ref 275–295)
PLATELET # BLD AUTO: 234 10(3)UL (ref 150–450)
POTASSIUM SERPL-SCNC: 3.7 MMOL/L (ref 3.5–5.1)
PROT SERPL-MCNC: 6 G/DL (ref 5.7–8.2)
RBC # BLD AUTO: 3.99 X10(6)UL (ref 3.8–5.8)
SODIUM SERPL-SCNC: 139 MMOL/L (ref 136–145)
WBC # BLD AUTO: 5.2 X10(3) UL (ref 4–11)

## 2025-08-04 ENCOUNTER — APPOINTMENT (OUTPATIENT)
Dept: PHYSICAL THERAPY | Age: 80
End: 2025-08-04
Attending: PHYSICIAN ASSISTANT

## 2025-08-07 ENCOUNTER — TELEPHONE (OUTPATIENT)
Dept: PHYSICAL THERAPY | Age: 80
End: 2025-08-07

## 2025-08-11 ENCOUNTER — APPOINTMENT (OUTPATIENT)
Dept: PHYSICAL THERAPY | Age: 80
End: 2025-08-11
Attending: PHYSICIAN ASSISTANT

## 2025-08-12 ENCOUNTER — APPOINTMENT (OUTPATIENT)
Dept: GENERAL RADIOLOGY | Facility: HOSPITAL | Age: 80
End: 2025-08-12
Attending: ANESTHESIOLOGY

## 2025-08-12 ENCOUNTER — HOSPITAL ENCOUNTER (OUTPATIENT)
Facility: HOSPITAL | Age: 80
Setting detail: HOSPITAL OUTPATIENT SURGERY
Discharge: HOME | End: 2025-08-12
Attending: ANESTHESIOLOGY | Admitting: ANESTHESIOLOGY

## 2025-08-12 VITALS
TEMPERATURE: 97 F | WEIGHT: 189 LBS | BODY MASS INDEX: 27.06 KG/M2 | RESPIRATION RATE: 16 BRPM | DIASTOLIC BLOOD PRESSURE: 59 MMHG | HEIGHT: 70 IN | OXYGEN SATURATION: 98 % | SYSTOLIC BLOOD PRESSURE: 137 MMHG | HEART RATE: 52 BPM

## 2025-08-12 DIAGNOSIS — Z51.11 CHEMOTHERAPY MANAGEMENT, ENCOUNTER FOR: ICD-10-CM

## 2025-08-12 DIAGNOSIS — C34.12 CANCER OF UPPER LOBE OF LEFT LUNG (HCC): ICD-10-CM

## 2025-08-12 PROCEDURE — 64484 NJX AA&/STRD TFRM EPI L/S EA: CPT | Performed by: ANESTHESIOLOGY

## 2025-08-12 PROCEDURE — 64483 NJX AA&/STRD TFRM EPI L/S 1: CPT | Performed by: ANESTHESIOLOGY

## 2025-08-12 RX ORDER — SODIUM CHLORIDE 9 MG/ML
INJECTION, SOLUTION INTRAMUSCULAR; INTRAVENOUS; SUBCUTANEOUS
Status: DISCONTINUED | OUTPATIENT
Start: 2025-08-12 | End: 2025-08-12

## 2025-08-12 RX ORDER — NALOXONE HYDROCHLORIDE 0.4 MG/ML
0.08 INJECTION, SOLUTION INTRAMUSCULAR; INTRAVENOUS; SUBCUTANEOUS AS NEEDED
Status: DISCONTINUED | OUTPATIENT
Start: 2025-08-12 | End: 2025-08-12

## 2025-08-12 RX ORDER — IOPAMIDOL 408 MG/ML
INJECTION, SOLUTION INTRATHECAL
Status: DISCONTINUED | OUTPATIENT
Start: 2025-08-12 | End: 2025-08-12

## 2025-08-12 RX ORDER — LIDOCAINE HYDROCHLORIDE 10 MG/ML
INJECTION, SOLUTION EPIDURAL; INFILTRATION; INTRACAUDAL; PERINEURAL
Status: DISCONTINUED | OUTPATIENT
Start: 2025-08-12 | End: 2025-08-12

## 2025-08-12 RX ORDER — DEXAMETHASONE SODIUM PHOSPHATE 10 MG/ML
INJECTION, SOLUTION INTRAMUSCULAR; INTRAVENOUS
Status: DISCONTINUED | OUTPATIENT
Start: 2025-08-12 | End: 2025-08-12

## 2025-08-13 ENCOUNTER — TELEPHONE (OUTPATIENT)
Dept: PAIN CLINIC | Facility: CLINIC | Age: 80
End: 2025-08-13

## 2025-08-18 ENCOUNTER — TELEPHONE (OUTPATIENT)
Facility: CLINIC | Age: 80
End: 2025-08-18

## 2025-08-18 ENCOUNTER — OFFICE VISIT (OUTPATIENT)
Dept: PHYSICAL THERAPY | Age: 80
End: 2025-08-18
Attending: PHYSICIAN ASSISTANT

## 2025-08-18 PROCEDURE — 97116 GAIT TRAINING THERAPY: CPT | Performed by: PHYSICAL THERAPIST

## 2025-08-18 PROCEDURE — 97110 THERAPEUTIC EXERCISES: CPT | Performed by: PHYSICAL THERAPIST

## 2025-08-25 ENCOUNTER — OFFICE VISIT (OUTPATIENT)
Dept: PAIN CLINIC | Facility: CLINIC | Age: 80
End: 2025-08-25

## 2025-08-25 VITALS
HEART RATE: 57 BPM | WEIGHT: 193 LBS | BODY MASS INDEX: 28 KG/M2 | OXYGEN SATURATION: 99 % | SYSTOLIC BLOOD PRESSURE: 120 MMHG | DIASTOLIC BLOOD PRESSURE: 54 MMHG

## 2025-08-25 DIAGNOSIS — M54.16 LUMBAR RADICULITIS: Primary | ICD-10-CM

## 2025-08-25 PROCEDURE — 99214 OFFICE O/P EST MOD 30 MIN: CPT | Performed by: ANESTHESIOLOGY

## 2025-08-25 PROCEDURE — 3074F SYST BP LT 130 MM HG: CPT | Performed by: ANESTHESIOLOGY

## 2025-08-25 PROCEDURE — G2211 COMPLEX E/M VISIT ADD ON: HCPCS | Performed by: ANESTHESIOLOGY

## 2025-08-25 PROCEDURE — 1159F MED LIST DOCD IN RCRD: CPT | Performed by: ANESTHESIOLOGY

## 2025-08-25 PROCEDURE — 3078F DIAST BP <80 MM HG: CPT | Performed by: ANESTHESIOLOGY

## 2025-08-27 ENCOUNTER — OFFICE VISIT (OUTPATIENT)
Dept: PHYSICAL THERAPY | Age: 80
End: 2025-08-27
Attending: PHYSICIAN ASSISTANT

## 2025-08-27 PROCEDURE — 97110 THERAPEUTIC EXERCISES: CPT | Performed by: PHYSICAL THERAPIST

## 2025-08-27 PROCEDURE — 97116 GAIT TRAINING THERAPY: CPT | Performed by: PHYSICAL THERAPIST

## (undated) DIAGNOSIS — R91.8 PULMONARY INFILTRATE: Primary | ICD-10-CM

## (undated) DIAGNOSIS — R06.02 SOB (SHORTNESS OF BREATH): Primary | ICD-10-CM

## (undated) DIAGNOSIS — E78.5 DYSLIPIDEMIA: Primary | ICD-10-CM

## (undated) DEVICE — SINGLE USE BIOPSY VALVE MAJ-210: Brand: SINGLE USE BIOPSY VALVE (STERILE)

## (undated) DEVICE — SUT COAT VCRL 3-0 27IN SH ABSRB UD 26MM 1/2

## (undated) DEVICE — MEDI-VAC NON-CONDUCTIVE SUCTION TUBING: Brand: CARDINAL HEALTH

## (undated) DEVICE — CONMED SCOPE SAVER BITE BLOCK, 20X27 MM: Brand: SCOPE SAVER

## (undated) DEVICE — SYRINGE MNJCT 10ML LF STRL REG

## (undated) DEVICE — ADAPTER BRONCHOSCOPE SWIVEL

## (undated) DEVICE — STERILE LATEX POWDER-FREE SURGICAL GLOVESWITH NITRILE COATING: Brand: PROTEXIS

## (undated) DEVICE — 6 ML SYRINGE LUER-LOCK TIP: Brand: MONOJECT

## (undated) DEVICE — CONTAINER SPEC CLIKSEAL 4OZ

## (undated) DEVICE — Device: Brand: DUAL NARE NASAL CANNULAE FEMALE LUER CON 7FT O2 TUBE

## (undated) DEVICE — BALLOON DILATATION CATHETER: Brand: HURRICANE™ RX

## (undated) DEVICE — CANNULA SEAL

## (undated) DEVICE — GLOVE SUR 7.5 SENSICARE PIP WHT PWD F

## (undated) DEVICE — BANDAGE,ADHESIVE,FABRIC,1"X3",STRL,LF: Brand: CURAD

## (undated) DEVICE — CONTAINER CLIKSEAL PP 4OZ BLU

## (undated) DEVICE — YANKAUER SUCTION INSTRUMENT NO CONTROL VENT, BULB TIP, CLEAR: Brand: YANKAUER

## (undated) DEVICE — KIT CLEAN ENDOKIT 1.1OZ GOWNX2

## (undated) DEVICE — MASK PROC W/VISOR ANTIGLARE

## (undated) DEVICE — SUTURE. PROL SZ 4-0 L36IN NONABSORBABLE BLU .

## (undated) DEVICE — ENDOSCOPY PACK UPPER: Brand: MEDLINE INDUSTRIES, INC.

## (undated) DEVICE — KIT ENDO ORCAPOD 160/180/190

## (undated) DEVICE — SINGLE USE SUCTION VALVE MAJ-209: Brand: SINGLE USE SUCTION VALVE (STERILE)

## (undated) DEVICE — TISSUE RETRIEVAL SYSTEM: Brand: INZII RETRIEVAL SYSTEM

## (undated) DEVICE — PERMANENT CAUTERY HOOK: Brand: ENDOWRIST

## (undated) DEVICE — REMOVER LOT 4OZ N IRRIG UNSCNT SFT MOIST LIQ

## (undated) DEVICE — UNDYED BRAIDED (POLYGLACTIN 910), SYNTHETIC ABSORBABLE SUTURE: Brand: COATED VICRYL

## (undated) DEVICE — 35 ML SYRINGE REGULAR TIP: Brand: MONOJECT

## (undated) DEVICE — PENROSE DRAIN 12" X 1/4: Brand: CARDINAL HEALTH

## (undated) DEVICE — FORCEPS BX 100CM 1.8MM RJ STD

## (undated) DEVICE — NEEDLE SPNL 22GA L5IN BLK HUB QNCKE BVL DISP

## (undated) DEVICE — RETRIEVAL BALLOON CATHETER: Brand: EXTRACTOR™ PRO RX

## (undated) DEVICE — Device: Brand: DEFENDO AIR/WATER/SUCTION AND BIOPSY VALVE

## (undated) DEVICE — LINE MNTR ADLT SET O2 INTMD

## (undated) DEVICE — MONOPOLAR CURVED SCISSORS: Brand: ENDOWRIST

## (undated) DEVICE — ROBOTIC: Brand: MEDLINE INDUSTRIES, INC.

## (undated) DEVICE — TIP COVER ACCESSORY

## (undated) DEVICE — GAMMEX® PI HYBRID SIZE 7.5, STERILE POWDER-FREE SURGICAL GLOVE, POLYISOPRENE AND NEOPRENE BLEND: Brand: GAMMEX

## (undated) DEVICE — INSUFFLATION NEEDLE TO ESTABLISH PNEUMOPERITONEUM.: Brand: INSUFFLATION NEEDLE

## (undated) DEVICE — Device: Brand: CUSTOM PROCEDURE KIT

## (undated) DEVICE — SNARE CAPTIFLEX MICRO-OVL OLY

## (undated) DEVICE — GAMMEX® PI HYBRID SIZE 7, STERILE POWDER-FREE SURGICAL GLOVE, POLYISOPRENE AND NEOPRENE BLEND: Brand: GAMMEX

## (undated) DEVICE — FORCEP RADIAL JAW 4

## (undated) DEVICE — BLADELESS OBTURATOR: Brand: WECK VISTA

## (undated) DEVICE — NDL ASP 21GA 2MM STRL DISP

## (undated) DEVICE — ARM DRAPE

## (undated) DEVICE — OR TOWEL, 17" X 26" STERILE, BLUE: Brand: PREMIERPRO

## (undated) DEVICE — SYRINGE 10ML SLIP TIP

## (undated) DEVICE — DRAPE SHEET LAPCHOLE 124X100X7

## (undated) DEVICE — INFLATION DEVICE: Brand: ENCORE 26

## (undated) DEVICE — GLOVE,SURG,SENSICARE,ALOE,LF,PF,7: Brand: MEDLINE

## (undated) DEVICE — 3M™ IOBAN™ 2 ANTIMICROBIAL INCISE DRAPE 6650EZ: Brand: IOBAN™ 2

## (undated) DEVICE — SNARE OPTMZ PLPCTM TRP

## (undated) DEVICE — SUTURE SILK 3-0 SA64H

## (undated) DEVICE — SUTURE VCRL SZ 0 L27IN ABSRB VLT L26MM UR-6

## (undated) DEVICE — SOLUTION IRRIG 3000ML 0.9% NACL FLX CONT

## (undated) DEVICE — 3 ML SYRINGE LUER-LOCK TIP: Brand: MONOJECT

## (undated) DEVICE — MEGADYNE 2.75IN NEEDLE MONO

## (undated) DEVICE — GAUZE,SPONGE,FLUFF,6"X6.75",STRL,5/TRAY: Brand: MEDLINE

## (undated) DEVICE — SOLUTION IRRIG 1000ML 0.9% NACL USP BTL

## (undated) DEVICE — TRAP MCS 40ML 5IN PLS SCR CAP

## (undated) DEVICE — SPHINCTEROTOME: Brand: DREAMTOME™ RX 44

## (undated) DEVICE — BANDAGE ADH 1INX3IN NAT FAB N ADH PD CURAD

## (undated) DEVICE — COLUMN DRAPE

## (undated) DEVICE — SUPPORT SCROT AD L FOR 39-44IN WAIST WHT COT

## (undated) DEVICE — PROGRASP FORCEPS: Brand: ENDOWRIST

## (undated) DEVICE — CAPSULE ENDO PILL CAM SB3

## (undated) DEVICE — PAIN TRAY: Brand: MEDLINE INDUSTRIES, INC.

## (undated) DEVICE — SKIN PREP TRAY 4 COMPARTM TRAY: Brand: MEDLINE INDUSTRIES, INC.

## (undated) DEVICE — MEDI-VAC NON-CONDUCTIVE SUCTION TUBING 6MM X 1.8M (6FT.) L: Brand: CARDINAL HEALTH

## (undated) DEVICE — DRAPE SHEET LAPAROTOMY

## (undated) DEVICE — 60 ML SYRINGE REGULAR TIP: Brand: MONOJECT

## (undated) DEVICE — ELECTRO LUBE IS A SINGLE PATIENT USE DEVICE THAT IS INTENDED TO BE USED ON ELECTROSURGICAL ELECTRODES TO REDUCE STICKING.: Brand: KEY SURGICAL ELECTRO LUBE

## (undated) DEVICE — SOLUTION IRRIG 1000ML ST H2O AQUALITE PLAS

## (undated) DEVICE — FENESTRATED BIPOLAR FORCEPS: Brand: ENDOWRIST

## (undated) DEVICE — PACK PAIN MGMT

## (undated) DEVICE — DISPOSABLE CYTOLOGY BRUSH: Brand: DISPOSABLE CYTOLOGY BRUSH

## (undated) DEVICE — SUT CHRM GUT 3-0 27IN SH ABSRB UD 26MM 1/2

## (undated) DEVICE — GLOVE SUR 6.5 SENSICARE PIP WHT PWD F

## (undated) DEVICE — MINOR GENERAL: Brand: MEDLINE INDUSTRIES, INC.

## (undated) DEVICE — SUTURE PROL SZ 2-0 L30IN NONABSORB BLU

## (undated) DEVICE — HANDLE SUR BLU PLAS LT FLX SLIP ON ST DISP

## (undated) DEVICE — SUTURE VCRL SZ 0 L54IN ABSRB UD POLYGLACTIN

## (undated) DEVICE — ABSORBABLE HEMOSTAT (OXIDIZED REGENERATED CELLULOSE, U.S.P.): Brand: SURGICEL

## (undated) DEVICE — TUBING MEGADYNE LAPAROSCOPIC

## (undated) DEVICE — PROXIMATE SKIN STAPLERS (35 WIDE) CONTAINS 35 STAINLESS STEEL STAPLES (FIXED HEAD): Brand: PROXIMATE

## (undated) DEVICE — LAPAROVUE VISIBILITY SYSTEM LAPAROSCOPIC SOLUTIONS: Brand: LAPAROVUE

## (undated) DEVICE — LARGE HEM-O-LOK CLIP APPLIER: Brand: ENDOWRIST

## (undated) DEVICE — PAD POS 36IN DISP SURGYPAD

## (undated) DEVICE — GAMMEX® PI HYBRID SIZE 6, STERILE POWDER-FREE SURGICAL GLOVE, POLYISOPRENE AND NEOPRENE BLEND: Brand: GAMMEX

## (undated) DEVICE — CATHETER,URETHRAL,REDRUBBER,STRL,18FR: Brand: MEDLINE

## (undated) DEVICE — SYSTEM BX CAP BILI RX CAP LOK DEV COMPATIBLE

## (undated) DEVICE — ENDOSCOPY PACK - LOWER: Brand: MEDLINE INDUSTRIES, INC.

## (undated) DEVICE — CLIP INT L POLYMER LOK LIG HEM O LOK

## (undated) NOTE — MR AVS SNAPSHOT
[address] [facility]   2017 9:15 AM   Office Visit   MRN:  [id number]    Description:  Male : 1945   Provider:  Leyla Lopez   Department:  [address] [place] [address]              Visit Summary      Matthias Tam an Open Medical Record policy. We can provide you with your current medication list and lab results today.   If you would like to review your medical record, we can assist you to set up an appointment with the appropriate medical professional to review you You can access your MyChart to more actively manage your health care and view more details from this visit by going to [email or web address].   If you've recently had a stay at the Hospital you can access your discharge instructions in [address] by xi

## (undated) NOTE — MR AVS SNAPSHOT
[facility]   2017 9:30 AM   Office Visit   MRN:  [id number]    Description:  Male : 1945   Provider:  Jamil Wong   Department:  [facility] Hematology Oncology              Visit Summary      Allergies     Dextromethor Please keep your updated medication list with you to share with other healthcare providers. At [facility] we continue to have an Open Medical Record policy. We can provide you with your current medication list and lab results today.   I

## (undated) NOTE — LETTER
ASHLEY POMPA  [address]  [facility] [address]      10/25/2018    Dear Conrado Geller,    It has come to our attention that you are due for: X-ray in November. This test was ordered by Dr. Carmen Zarate.     No appointment necessary for this test.

## (undated) NOTE — Clinical Note
SETH, TCM call made, see notes. Patient has an appointment on 12/7/2023 at 12:30, however, it is a 3 month follow up, message sent to MD's office to see if this can be changed to a TCM.

## (undated) NOTE — LETTER
[address] [facility] [address] [place], [place]  Authorization for Surgical Operation and Procedure                                                                                           I hereby authorize Abhijit Pack MD, my physician and his/her assistants (if applicable), which may include medical students, residents, and/or fellows, to perform the following surgical operation/ procedure and administer such anesthesia as may be determined necessary by my physician: Operation/Procedure name (s) ENDOSCOPIC RETROGRADE CHOLANGIOPANCREATOGRAPHY (ERCP) on [address] [place] [address]   2. I recognize that during the surgical operation/procedure, unforeseen conditions may necessitate additional or different procedures than those listed above. I, therefore, further authorize and request that the above-named surgeon, assistants, or designees perform such procedures as are, in their judgment, necessary and desirable. 3.   My surgeon/physician has discussed prior to my surgery the potential benefits, risks and side effects of this procedure; the likelihood of achieving goals; and potential problems that might occur during recuperation. They also discussed reasonable alternatives to the procedure, including risks, benefits, and side effects related to the alternatives and risks related to not receiving this procedure. I have had all my questions answered and I acknowledge that no guarantee has been made as to the result that may be obtained. 4.   Should the need arise during my operation/procedure, which includes change of level of care prior to discharge, I also consent to the administration of blood and/or blood products. Further, I understand that despite careful testing and screening of blood or blood products by collecting agencies, I may still be subject to ill effects as a result of receiving a blood transfusion and/or blood products.   The following are some, but not all, of the potential risks that can occur: fever and allergic reactions, hemolytic reactions, transmission of diseases such as Hepatitis, AIDS and Cytomegalovirus (CMV) and fluid overload. In the event that I wish to have an autologous transfusion of my own blood, or a directed donor transfusion, I will discuss this with my physician. Check only if Refusing Blood or Blood Products  I understand refusal of blood or blood products as deemed necessary by my physician may have serious consequences to my condition to include possible death. I hereby assume responsibility for my refusal and release the hospital, its personnel, and my physicians from any responsibility for the consequences of my refusal.    o  Refuse   5. I authorize the use of any specimen, organs, tissues, body parts or foreign objects that may be removed from my body during the operation/procedure for diagnosis, research or teaching purposes and their subsequent disposal by hospital authorities. I also authorize the release of specimen test results and/or written reports to my treating physician on the hospital medical staff or other referring or consulting physicians involved in my care, at the discretion of the Pathologist or my treating physician. 6.   I consent to the photographing or videotaping of the operations or procedures to be performed, including appropriate portions of my body for medical, scientific, or educational purposes, provided my identity is not revealed by the pictures or by descriptive texts accompanying them. If the procedure has been photographed/videotaped, the surgeon will obtain the original picture, image, videotape or CD. The hospital will not be responsible for storage, release or maintenance of the picture, image, tape or CD.    7.   I consent to the presence of a  or observers in the operating room as deemed necessary by my physician or their designees.     8.   I recognize that in the event my procedure results in extended X-Ray/fluoroscopy time, I may develop a skin reaction. 9. If I have a Do Not Attempt Resuscitation (DNAR) order in place, that status will be suspended while in the operating room, procedural suite, and during the recovery period unless otherwise explicitly stated by me (or a person authorized to consent on my behalf). The surgeon or my attending physician will determine when the applicable recovery period ends for purposes of reinstating the DNAR order. 10. Patients having a sterilization procedure: I understand that if the procedure is successful the results will be permanent and it will therefore be impossible for me to inseminate, conceive, or bear children. I also understand that the procedure is intended to result in sterility, although the result has not been guaranteed. 11. I acknowledge that my physician has explained sedation/analgesia administration to me including the risk and benefits I consent to the administration of sedation/analgesia as may be necessary or desirable in the judgment of my physician. I CERTIFY THAT I HAVE READ AND FULLY UNDERSTAND THE ABOVE CONSENT TO OPERATION and/or OTHER PROCEDURE.     _________________________________________ _________________________________     ___________________________________  Signature of Patient     Signature of Responsible Person                   Printed Name of Responsible Person                              _________________________________________ ______________________________        ___________________________________  Signature of Witness         Date  Time         Relationship to Patient    STATEMENT OF PHYSICIAN My signature below affirms that prior to the time of the procedure; I have explained to the patient and/or his/her legal representative, the risks and benefits involved in the proposed treatment and any reasonable alternative to the proposed treatment.  I have also explained the risks and benefits involved in refusal of the proposed treatment and alternatives to the proposed treatment and have answered the patient's questions.  If I have a significant financial interest in a co-management agreement or a significant financial interest in any product or implant, or other significant relationship used in this procedure/surgery, I have disclosed this and had a discussion with my patient.     _______________________________________________________________ _____________________________  Carol Parsons Physician)                                                                                         (Date)                                   (Time)  Patient Name: Ella Putnam    : 1945   Printed: 2023      Medical Record #: [id number]                                              Page 1 of 1

## (undated) NOTE — MR AVS SNAPSHOT
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 [facility] [address]       Tuesday February 28, 2017 8:00 AM     Appointment with Wyatt Moore; Natanael Juan at [address] [facility] ([phone])   [employer] [address]   [address] [facility]

## (undated) NOTE — LETTER
5/20/2024              Luis Villasenor        [id number] [facility] [id number]         Dear Luis,    It has come to our attention that a required CT Chest is due in June.     This test requires a prior authorization from your insurance. Please call central scheduling at [phone] to schedule the test. Once scheduled, our managed care department will work on obtaining authorization. The Managed Care Department can be reached at [phone] for questions regarding authorization.    If you have any other questions, please contact our office at [phone].       Sincerely,    Chris Hernandez MD

## (undated) NOTE — LETTER
ASHLEY POMPA  [address] [place] [address]      8/1/2019    Dear Artemio Sarabia,    It has come to our attention that you are due for CT chest.    This test was ordered by Dr Ortega Patrick.         If you are allergic to iodine or have any questions, p

## (undated) NOTE — Clinical Note
SETH Bernal,    We started Augmentin course as he had already had Shahana Sandhu with his pred taper recently. He feels well, just minimal MILLAN and mild leuks today. Not sure if you think he'll need a bronch to assess further/if things don't improve (has that hx of fungal PNA)  We have him coming back in a week for reassessment and consider treatment restart, he's been off 1 week.     Thanks,  Fitz Lodge Kory

## (undated) NOTE — LETTER
02/21/23        Luis Villasenor  [address] [place] [id number]      Dear Yamilet Levi,    [facility] records indicate that you have outstanding lab work and or testing that was ordered for you and has not yet been completed:  XR ESOPHAGUS SINGLE CONTRAST (CPT=74220) (Order #[id number]) on 1/16/23  Please call [phone] to schedule the X-Ray. To provide you with the best possible care, please complete these orders at your earliest convenience. If you have recently completed these orders please disregard this letter. If you have any questions please call the office at [phone].     Thank you,   Evette Martins MD

## (undated) NOTE — LETTER
[address] [place] [address], [place]  Authorization for Surgical Operation and Procedure                                                                                           I hereby authorize Eugenia Diez MD, my physician and his/her assistants (if applicable), which may include medical students, residents, and/or fellows, to perform the following surgical operation/ procedure and administer such anesthesia as may be determined necessary by my physician: Operation/Procedure name (s) BRONCHOSCOPY with transbronchial biopsy on [address] [place] [address]   2. I recognize that during the surgical operation/procedure, unforeseen conditions may necessitate additional or different procedures than those listed above. I, therefore, further authorize and request that the above-named surgeon, assistants, or designees perform such procedures as are, in their judgment, necessary and desirable. 3.   My surgeon/physician has discussed prior to my surgery the potential benefits, risks and side effects of this procedure; the likelihood of achieving goals; and potential problems that might occur during recuperation. They also discussed reasonable alternatives to the procedure, including risks, benefits, and side effects related to the alternatives and risks related to not receiving this procedure. I have had all my questions answered and I acknowledge that no guarantee has been made as to the result that may be obtained. 4.   Should the need arise during my operation/procedure, which includes change of level of care prior to discharge, I also consent to the administration of blood and/or blood products. Further, I understand that despite careful testing and screening of blood or blood products by collecting agencies, I may still be subject to ill effects as a result of receiving a blood transfusion and/or blood products.   The following are some, but not all, of the potential risks that can occur: fever and allergic reactions, hemolytic reactions, transmission of diseases such as Hepatitis, AIDS and Cytomegalovirus (CMV) and fluid overload. In the event that I wish to have an autologous transfusion of my own blood, or a directed donor transfusion, I will discuss this with my physician. Check only if Refusing Blood or Blood Products  I understand refusal of blood or blood products as deemed necessary by my physician may have serious consequences to my condition to include possible death. I hereby assume responsibility for my refusal and release the hospital, its personnel, and my physicians from any responsibility for the consequences of my refusal.    o  Refuse   5. I authorize the use of any specimen, organs, tissues, body parts or foreign objects that may be removed from my body during the operation/procedure for diagnosis, research or teaching purposes and their subsequent disposal by hospital authorities. I also authorize the release of specimen test results and/or written reports to my treating physician on the hospital medical staff or other referring or consulting physicians involved in my care, at the discretion of the Pathologist or my treating physician. 6.   I consent to the photographing or videotaping of the operations or procedures to be performed, including appropriate portions of my body for medical, scientific, or educational purposes, provided my identity is not revealed by the pictures or by descriptive texts accompanying them. If the procedure has been photographed/videotaped, the surgeon will obtain the original picture, image, videotape or CD. The hospital will not be responsible for storage, release or maintenance of the picture, image, tape or CD.    7.   I consent to the presence of a  or observers in the operating room as deemed necessary by my physician or their designees.     8.   I recognize that in the event my procedure results in extended X-Ray/fluoroscopy time, I may develop a skin reaction. 9. If I have a Do Not Attempt Resuscitation (DNAR) order in place, that status will be suspended while in the operating room, procedural suite, and during the recovery period unless otherwise explicitly stated by me (or a person authorized to consent on my behalf). The surgeon or my attending physician will determine when the applicable recovery period ends for purposes of reinstating the DNAR order. 10. Patients having a sterilization procedure: I understand that if the procedure is successful the results will be permanent and it will therefore be impossible for me to inseminate, conceive, or bear children. I also understand that the procedure is intended to result in sterility, although the result has not been guaranteed. 11. I acknowledge that my physician has explained sedation/analgesia administration to me including the risk and benefits I consent to the administration of sedation/analgesia as may be necessary or desirable in the judgment of my physician. I CERTIFY THAT I HAVE READ AND FULLY UNDERSTAND THE ABOVE CONSENT TO OPERATION and/or OTHER PROCEDURE.     _________________________________________ _________________________________     ___________________________________  Signature of Patient     Signature of Responsible Person                   Printed Name of Responsible Person                              _________________________________________ ______________________________        ___________________________________  Signature of Witness         Date  Time         Relationship to Patient    STATEMENT OF PHYSICIAN My signature below affirms that prior to the time of the procedure; I have explained to the patient and/or his/her legal representative, the risks and benefits involved in the proposed treatment and any reasonable alternative to the proposed treatment.  I have also explained the risks and benefits involved in refusal of the proposed treatment and alternatives to the proposed treatment and have answered the patient's questions.  If I have a significant financial interest in a co-management agreement or a significant financial interest in any product or implant, or other significant relationship used in this procedure/surgery, I have disclosed this and had a discussion with my patient.     _______________________________________________________________ _____________________________  So Mota of Physician)                                                                                         (Date)                                   (Time)  Patient Name: Roxanne Lopez    : 1945   Printed: 2023      Medical Record #: [id number]                                              Page 1 of 1

## (undated) NOTE — MR AVS SNAPSHOT
Luis Villasenor   2017 7:30 AM   Office Visit   MRN:  [id number]    Description:  Male : 1945   Department:  [address] [place] [address]              Visit Summary      Primary Visit Diagnosis     Cancer of upper lobe You can access your MyChart to more actively manage your health care and view more details from this visit by going to [email or web address].   If you've recently had a stay at the Hospital you can access your discharge instructions in [address] by xi

## (undated) NOTE — LETTER
[address], [place]  Authorization for Invasive Procedures  Date: 11/22/23           Time: 1020    I hereby authorize Dr. Chuck Ibrahim, my physician and his/her assistants (if applicable), which may include medical students, residents, and/or fellows, to perform the following surgical operation/ procedure and administer such anesthesia as may be determined necessary by my physician: endoscopic retrograde cholangiopancreatography on [facility]  2. I recognize that during the surgical operation/procedure, unforeseen conditions may necessitate additional or different procedures than those listed above. I, therefore, further authorize and request that the above-named surgeon, assistants, or designees perform such procedures as are, in their judgment, necessary and desirable. 3.   My surgeon/physician has discussed prior to my surgery the potential benefits, risks and side effects of this procedure; the likelihood of achieving goals; and potential problems that might occur during recuperation. They also discussed reasonable alternatives to the procedure, including risks, benefits, and side effects related to the alternatives and risks related to not receiving this procedure. I have had all my questions answered and I acknowledge that no guarantee has been made as to the result that may be obtained. 4.   Should the need arise during my operation/procedure, which includes change of level of care prior to discharge, I also consent to the administration of blood and/or blood products. Further, I understand that despite careful testing and screening of blood or blood products by collecting agencies, I may still be subject to ill effects as a result of receiving a blood transfusion and/or blood products.   The following are some, but not all, of the potential risks that can occur: fever and allergic reactions, hemolytic reactions, transmission of diseases such as Hepatitis, AIDS and Cytomegalovirus (CMV) and fluid overload. In the event that I wish to have an autologous transfusion of my own blood, or a directed donor transfusion, I will discuss this with my physician. Check only if Refusing Blood or Blood Products  I understand refusal of blood or blood products as deemed necessary by my physician may have serious consequences to my condition to include possible death. I hereby assume responsibility for my refusal and release the hospital, its personnel, and my physicians from any responsibility for the consequences of my refusal.         o  Refuse         5. I authorize the use of any specimen, organs, tissues, body parts or foreign objects that may be removed from my body during the operation/procedure for diagnosis, research or teaching purposes and their subsequent disposal by hospital authorities. I also authorize the release of specimen test results and/or written reports to my treating physician on the hospital medical staff or other referring or consulting physicians involved in my care, at the discretion of the Pathologist or my treating physician. 6.   I consent to the photographing or videotaping of the operations or procedures to be performed, including appropriate portions of my body for medical, scientific, or educational purposes, provided my identity is not revealed by the pictures or by descriptive texts accompanying them. If the procedure has been photographed/videotaped, the surgeon will obtain the original picture, image, videotape or CD. The hospital will not be responsible for storage, release or maintenance of the picture, image, tape or CD.    7.   I consent to the presence of a  or observers in the operating room as deemed necessary by my physician or their designees. 8.   I recognize that in the event my procedure results in extended X-Ray/fluoroscopy time, I may develop a skin reaction. 9.  If I have a Do Not Attempt Resuscitation (DNAR) order in place, that status will be suspended while in the operating room, procedural suite, and during the recovery period unless otherwise explicitly stated by me (or a person authorized to consent on my behalf). The surgeon or my attending physician will determine when the applicable recovery period ends for purposes of reinstating the DNAR order. 10. Patients having a sterilization procedure: I understand that if the procedure is successful the results will be permanent and it will therefore be impossible for me to inseminate, conceive, or bear children. I also understand that the procedure is intended to result in sterility, although the result has not been guaranteed. 11. I acknowledge that my physician has explained sedation/analgesia administration to me including the risk and benefits I consent to the administration of sedation/analgesia as may be necessary or desirable in the judgment of my physician.     I CERTIFY THAT I HAVE READ AND FULLY UNDERSTAND THE ABOVE CONSENT TO OPERATION and/or OTHER PROCEDURE.        ____________________________________       _________________________________      ______________________________  Signature of Patient         Signature of Responsible Person        Printed Name of Responsible Person        ____________________________________      _________________________________      ______________________________       Signature of Witness          Relationship to Patient                       Date                                       Time    Patient Name: [facility]     : 1945                 Printed: 2023      Medical Record #: [id number]                      Page 1 of 2          [place] My signature below affirms that prior to the time of the procedure; I have explained to the patient and/or his/her legal representative, the risks and benefits involved in the proposed treatment and any reasonable alternative to the proposed treatment. I have also explained the risks and benefits involved in refusal of the proposed treatment and alternatives to the proposed treatment and have answered the patient's questions.  If I have a significant financial interest in a co-management agreement or a significant financial interest in any product or implant, or other significant relationship used in this procedure/surgery, I have disclosed this and had a discussion with my patient.     _______________________________________________________________ _____________________________  Sachin Moraes of Physician)                                                                                         (Date)                                   (Time)    Patient Name: Papa Joiner     : 1945                 Printed: 2023      Medical Record #: [id number]                      Page 2 of 2

## (undated) NOTE — MR AVS SNAPSHOT
After Visit Summary   1/30/2017    Raymond Beavers    MRN: [id number]           Visit Information        Provider Department Dept Phone    1/30/2017  7:30  [address] [facility] Chemo Infusion [phone]      Your Vitals Were     BP Pul 2/20/2017 7:30 AM EM CC INFRN [address] - Infusion    2/21/2017 8:30 AM EM [facility]FR[facility] [address] - Infusion    2/21/2017 9:15 AM Nan Rush; [facility]    2/22/2 [facility] now offers Video Visits through [address] for adult and pediatric patients. Video Visits are available Monday - Friday for many common conditions such as allergies, colds, cough, fever, rash, sore throat, headache and pink eye.   The cost for a Video Vi

## (undated) NOTE — MR AVS SNAPSHOT
60 [facility]   2017 11:30 AM   Office Visit   MRN:  [id number]    Description:  Male : 1945   Department:  [address] [place] [address]              Visit Summary      Primary Visit Diagnosis     Cancer of upper lobe medication list and lab results today. If you would like to review your medical record, we can assist you to set up an appointment with the appropriate medical professional to review your records.       To Do List     Thursday January 26, 2017 2:00 PM Appointment with LAZARA JAMA 1 at [address] [place] [address] ([phone])   [address]   [address] [address]       Tuesday February 21, 2017 8:30 AM     Appointment with LAZARA JAMA 3 at [address] [place] [address] [email or web address]. If you've recently had a stay at the Hospital you can access your discharge instructions in [facility] by going to Visits < Admission Summaries.  If you've been to the Emergency Department or your doctor's office, you can view yo

## (undated) NOTE — LETTER
[address] [place] [address]  Authorization for Invasive Procedures  1.  I hereby authorize Dr. Kiara Rico and/or Dr. Maurilio Boogie** , my physician and whomever may be designated as the doctor's assistant, to perform the following operation and/or performed for the purposes of advancing medicine, science, and/or education, provided my identity is not revealed. If the procedure has been videotaped, the physician/surgeon will obtain the original videotape.  The hospital will not be responsible for stor My signature below affirms that prior to the time of the procedure, I have explained to the patient and/or his legal representative, the risks and benefits involved in the proposed treatment and any reasonable alternative to the proposed treatment.  I have

## (undated) NOTE — MR AVS SNAPSHOT
60 [facility]   2017 1:00 PM   Office Visit   MRN:  [id number]    Description:  Male : 1945   Department:  [address] [place] [address] - Infusion              Visit Summary      Primary Visit Diagnosis     Cancer of upper lobe Appointment with Jayla Do at [address] [employer] ([phone])   [address] [address]       Friday February 17, 2017 9:30 AM     Appointment with LAZARA HARRY LAB1 at [address] [facility] Tuesday February 28, 2017 8:00 AM     Appointment with Camila Ornelas; Keith Kahn at [address] [facility] ([phone])   [address] [address]       Tuesday March 07, 2017 9:15 AM     Appointment with Hanna Hicks

## (undated) NOTE — MR AVS SNAPSHOT
Luis Villasenor   2017 9:15 AM   Office Visit   MRN:  [id number]    Description:  Male : 1945   Provider:  Aleja Arellano   Department:  [address] [facility]              Visit Summary      [facility] Tuesday February 21, 2017 9:15 AM     Appointment with Josef Vanegas; Chirag Thornton at [address] [facility] ([phone])   [address]   [address] [place] [address]       Wednesday February 22, 2017 8:30 AM     Appointment with Visits < Visit Summaries. MyChart questions? Call [phone] for help. [facility] is NOT to be used for urgent needs. For medical emergencies, dial 911.

## (undated) NOTE — MR AVS SNAPSHOT
60 [facility]   2017 8:30 AM   Office Visit   MRN:  [id number]    Description:  Male : 1945   Department:  [address] [place] [address]              Visit Summary      Primary Visit Diagnosis     Cancer of upper lobe [facility] ([phone])   [address] [address]       Tuesday March 07, 2017 9:15 AM     Appointment with Jayla Do at [address] [place], [place] [address] ([phone])   [address]

## (undated) NOTE — MR AVS SNAPSHOT
Luis Villasenor   2017 1:30 PM   Office Visit   MRN:  [id number]    Description:  Male : 1945   Provider:  Pauline Meredith   Department:  [address] [place] [address]              Visit Summary      [facility] Monday January 30, 2017 7:30 AM     Appointment with LAZARA JAMA 2 at [address] [place] [address] ([phone])   [address]   [address] [place] [address]       Tuesday January 31, 2017 9:15 AM     Appointment with Juan Jose Lainez; [facility] Infusion ([phone])   [address] [facility] [address]       Friday February 24, 2017 8:30 AM     Appointment with EM PIERO JAMA 1 at [address] [place] [address] ([phone])   [address]   [address] [place] [address]       Monday

## (undated) NOTE — MR AVS SNAPSHOT
60 [facility]   2017 10:00 AM   Office Visit   MRN:  [id number]    Description:  Male : 1945   Provider:  Tammie Ocampo   Department:  [address] [place] [address]              Visit Summary      [facility] [address] [facility]   [place] [address]       Monday June 26, 2017 10:00 AM     Appointment with Allen Marcelo at [facility] CRISTEL SWAPNA [phone]       Monday July 24, 2

## (undated) NOTE — MR AVS SNAPSHOT
60 [facility]   2017 10:00 AM   Nurse Only   MRN:  [id number]    Description:  Male : 1945   Department:  [facility] AND [facility] Hematology Oncology              Visit Summary      Primary Visit Diagnosis     Cancer of upper lobe of le

## (undated) NOTE — LETTER
[facility]  Administration of Anesthesia  1. Arabella Alfaro, or _________________________________ acting on his behalf, (Patient) (Dependent/Representative) request to receive anesthesia for my pending procedure/operation/treatment. A physician (anesthesiologist) alone or an anesthesiologist working with a nurse anesthetist may administer my anesthesia. 2. I understand that my anesthesiologist is not an employee or agent of the hospital, but is an independent medical practitioner who has been permitted to use its facilities for the care and treatment of his/her patients. 3. I acknowledge that a physician from [facility] or their designate(s), recommended anesthesia for me using her/his medical judgment. The type(s) of anesthesia I may receive include:                a) General Anesthesia, b) Spinal/Epidural Anesthesia, c) Regional Anesthesia or d) Monitored Anesthesia Care. 4. If my spinal, regional or monitored anesthesia care (local) is not satisfactory for my comfort, or if my medical condition requires, I consent to the administration of general anesthesia. 5. I am aware that the practice of anesthesiology is not an exact science and that some foreseeable risks or consequences may occur. Some common risks/consequences include sore throat and hoarseness, nausea and vomiting, muscle soreness, backache, damage to the mouth/teeth/vocal cords and eye injury. I understand that more rare but serious potential risks of anesthesia include blood pressure changes, drug reactions, cardiac arrest, brain damage, paralysis or death. These risks apply to whether I have general, spinal/epidural, regional or monitored anesthesia care. 6. OBSTETRIC PATIENTS: Specific risks/consequences of spinal/epidural anesthesia may include itching, low blood pressure, difficulty urinating, slowing of the baby's heart rate and headache.  Rare risks include infections, high spinal block, spinal bleeding, seizure, cardiac arrest and death. 7. AWARENESS: I understand that it is possible (but unlikely) to have explicit memory of events from the operating room while under general anesthesia. 8. ELECTROCONVULSIVE THERAPY PATIENTS: This consent serves for all treatments in a single course of therapy. 9. I understand that I must inform my anesthesiologist when I last ate and/or drank to minimize the risk of anesthesia. 10. If I am pregnant, or may pregnant, I understand that elective surgery should be postponed until after the baby is born. Anesthetics cross the placenta and may temporarily anesthetize the baby. Although fetal complications of anesthesia during pregnancy are rare, they may include birth defects, premature labor, brain damage and death. 11. I certify that I informed the anesthesiologist, to the best of my ability, about medication I take including blood thinners, anticoagulants, herbal remedies, narcotics and recreational drugs (e.g. cocaine, marijuana, PCP). Failure to inform my anesthesiologist about these medicines may increase my risk of anesthetic complications. The nature and purpose of my anesthetic management was explained to me. I had the opportunity to ask questions and the answers and information provided meet my satisfaction.   I retain the right to withdraw this consent at any time prior to the administration of said anesthetic.    ___________________________________________________           _____________________________________________________  Patient Signature                                                                                      Witness Signature                ___________________________________________________           _____________________________________________________  Date/Time                                                                                               Responsible person in case of minor/ unconscious pt /Relationship    My signature below affirms that prior to the time of the procedure, I have explained to the patient and/or his/her guardian, the risks and benefits of undergoing anesthesia, as well as any reasonable alternatives.     ___________________________________________________            _____________________________________________________  Physician Signature                            Date/Time  Patient Name: Daren Sánchez     : 1945     Printed: 4/15/2022      Medical Record #: [id number]                              Page 1 of 1    ----------ANESTHESIA CONSENT----------

## (undated) NOTE — LETTER
[address] [place] [address]  Authorization for Invasive Procedures  1.  I hereby authorize Dr. June Mathias, my physician and whomever may be designated as the doctor's assistant, to perform the following operation and/or procedure:  C performed for the purposes of advancing medicine, science, and/or education, provided my identity is not revealed. If the procedure has been videotaped, the physician/surgeon will obtain the original videotape.  The hospital will not be responsible for stor My signature below affirms that prior to the time of the procedure, I have explained to the patient and/or his legal representative, the risks and benefits involved in the proposed treatment and any reasonable alternative to the proposed treatment.  I have

## (undated) NOTE — MR AVS SNAPSHOT
Luis Villasenor   2017 9:15 AM   Office Visit   MRN:  [id number]    Description:  Male : 1945   Provider:  Carlitos Sullivan   Department:  [address]              Visit Summary      [facility] ([phone])   [address] [place] [address]       Friday February 17, 2017 10:15 AM     Appointment with Nomi Sadler at [facility] AND [facility] Hematology Oncology ([phone])   [address]   [facility] [address]       Monday February 20 Appointment with Juan Jose Lainez; Ayush Ly at [address] [facility] ([phone])   [address]   [facility] [address]            MyChart     Visit MyChart  You can access your MyChart to more actively manage your health c

## (undated) NOTE — Clinical Note
Pt does not have HFU appt scheduled at this time. TE sent to triage regarding HFU appt, chest x-ray, and orders request for [facility]. Thank you!

## (undated) NOTE — MR AVS SNAPSHOT
[facility]  [address] [facility] [place] [address] [phone]               Thank you for choosing us for your health care visit with Davy Dobson MD.  We are glad to serve you and happy to provide you with this summary of your visit. Current Medications          This list is accurate as of: 4/20/17  2:06 PM.  Always use your most recent med list.                Alum & Mag Hydroxide-Simeth 200-200-20 MG/5ML Susp   Take 15 mL by mouth 4 (four) times daily as needed for Indigestion. [address], [place] [facility] [address]     Phone:  [phone]    - azithromycin 250 MG Tabs  - predniSONE 20 MG Tabs      You can get these medications from any pharmacy     Bring a paper prescription for each of these medications

## (undated) NOTE — MR AVS SNAPSHOT
60 [facility]   2017 9:00 AM   Nurse Only   MRN:  [id number]    Description:  Male : 1945   Department:  [address] [place] [address] - [facility]              Visit Summary      Primary Visit Diagnosis     Cancer of upper lobe of [address] [facility] [place] [address]       Friday January 27, 2017 1:00 PM     Appointment with EM PIERO HOPPERN 4 at [address] [facility] 15 ([phone])   [address]   [place] [address]       Monday January 30, 2017 6:30 AM [address] [address]       Monday February 27, 2017 7:30 AM     Appointment with EM PIERO HOPPERN 1 at [address] [place] [address] 15 ([phone])   [address]   [address] [facility] [address]            MyChart     Visit MyChart

## (undated) NOTE — Clinical Note
SETH Hernandez:/    They are very anxious about the recalled cpap stuff, I know you're on it for them. They were worried it caused his symptoms. But CXR so far shows PNA/pneumo changes, so he's on abx/steroid taper for now, im checking in on Friday, low threshold to admit if worse. We will repeat CT scan in coming month, or sooner if no improvements.     Thanks,  Sydnee SLATER

## (undated) NOTE — LETTER
7/22/2020              Luis Villasenor        [id number] [facility] [address]         To Whom It May Concern,    Faustino Calderon is a patient of our practice.  He was last evaluated in office on 7/9/2020 by myself for preoperative

## (undated) NOTE — MR AVS SNAPSHOT
60 [facility]   3/17/2017 10:45 AM   Office Visit   MRN:  [id number]    Description:  Male : 1945   Provider:  Yogesh Parker   Department:  [facility] AND [facility] Hematology Oncology              Visit Summary      Primary Visit Diagnosis Ondansetron HCl (ZOFRAN) 8 MG tablet Take 1 tablet (8 mg total) by mouth every 8 (eight) hours as needed for Nausea.          Patient receiving Oncology Treatment         Patient Instructions     None      Please Note     Please keep your updated medicatio

## (undated) NOTE — MR AVS SNAPSHOT
60 [facility]   2017 8:00 AM   Office Visit   MRN:  [id number]    Description:  Male : 1945   Provider:  Aleja Fong   Department:  [address] [place] [address]              Visit Summary      Prim You can access your MyChart to more actively manage your health care and view more details from this visit by going to [email or web address].   If you've recently had a stay at the Hospital you can access your discharge instructions in [address] by xi

## (undated) NOTE — MR AVS SNAPSHOT
60 [facility]   2017 6:30 AM   Nurse Only   MRN:  [id number]    Description:  Male : 1945   Department:  [address] [place] [address] - [facility]              Visit Summary      Primary Visit Diagnosis     Cancer of upper lobe of [address] [facility] [address]       Friday February 17, 2017 9:30 AM     Appointment with LAZARA HARRY LAB1 at [address] [place] [address] ([phone])   [address]   [address] [place] [address]       Friday February 17, 2017 10:15 AM [facility] ([phone])   [address] [address]       Tuesday March 07, 2017 9:15 AM     Appointment with Grace Hermosillo; [facility] ([phone])   [address]

## (undated) NOTE — Clinical Note
Printed: 2017    Patient Name: Arnaldo President  : 1945   Medical Record #: [id number]    Consent to [address] [place] [address], understand that I have been diagnosed with stage IIIB lung cancer.     I u or my Cancer Care Team at any time if I have questions, by calling [phone]. Additional written information will be given to me prior to start of therapy. Additionally, I will receive a copy of this consent form.     I have read and fully [place]

## (undated) NOTE — MR AVS SNAPSHOT
60 [facility]   2017 6:30 AM   Nurse Only   MRN:  [id number]    Description:  Male : 1945   Department:  [address] [place] [address] - [facility]              Visit Summary      Primary Visit Diagnosis     Cancer of upper lobe of [address] [place] [address]       Friday March 17, 2017 10:45 AM     Appointment with Ethan Pollack at [facility] Hematology Oncology ([phone])   [address]   [facility] [address]            MyChart     Visit MyChart  You can access your MyChart t

## (undated) NOTE — Clinical Note
TCM assessment completed with patient. Patient has contacted office to schedule his Hospital FU.   Thank you

## (undated) NOTE — LETTER
25    RE: Luis Villasenor    : 1945    Dear Dr. Hernandez,    Your patient is being scheduled for a pain management procedure at [facility].    Procedure:  Left L4,L5 Transforaminal Injection.   Date of Procedure: TBD -pending medical clearance.  Physician: Dr. Orellana- Anesthesiologist     Your patient is currently taking Xarelto. Dr. Orellana usually recommends this medication to be held for 3 days prior to injection.     Please verify patient is cleared to proceed with pain management procedures.      Clearance Approved   ____________    Clearance Denied       ____________      Comments: ______________________________________________________    Signature: ________________________________  Date: _________________       If you have any questions please feel free to contact our office at [phone], option # 3.    Please fax this clearance request to our office at fax # [phone] or send electronically.       Thank you,      [facility] Staff

## (undated) NOTE — MR AVS SNAPSHOT
60 [facility]   2017 12:30 PM   Office Visit   MRN:  [id number]    Description:  Male : 1945   Provider:  Nomi Sadler   Department:  [facility] Hematology Oncology              Visit Summary      Primary Visit Diagnosis

## (undated) NOTE — LETTER
2023           Dr. Brian Reynolds / Dick Bales,    [facility]   1945      This letter is in correspondence to the fax we received regarding pre-operative clearance for surgical removal of abcessed tooth with bone removal.    Wood Cotter is okay to hold the xarelto for 5 days, and should resume taking it the day after.         Mariza James MD  [facility], [address] [place], [place] [address] [phone]

## (undated) NOTE — Clinical Note
New dysphagia symptoms, has seen you before. [facility] [place] on immunotherapy. Could your team assist in setting up a visit for possible egd mgmt options?   Thanks, Roxana olsen/ Dr Mignon Castellon

## (undated) NOTE — MR AVS SNAPSHOT
Luis Villasenor   2017 8:30 AM   Office Visit   MRN:  [id number]    Description:  Male : 1945   Provider:  Jayla Do   Department:  [address] [place] [address]              Visit Summary      [facility] For medical emergencies, dial 911.

## (undated) NOTE — MR AVS SNAPSHOT
Luis Villasenor   2017 8:30 AM   Office Visit   MRN:  [id number]    Description:  Male : 1945   Department:  [address] [place] [address]              Visit Summary      Primary Visit Diagnosis     Cancer of upper lobe Appointment with LAZARA CC LAB2 at [address] [facility] ([phone])   [address]   [address] [place] [address]       Friday March 17, 2017 10:45 AM     Appointment with Sincere Ng at [facility] Hematology Oncology ([phone]

## (undated) NOTE — MR AVS SNAPSHOT
Luis Villasenor   2017 7:30 AM   Office Visit   MRN:  [id number]    Description:  Male : 1945   Department:  [address] [place] [address]              Visit Summary      Primary Visit Diagnosis     Cancer of upper lobe Please keep your updated medication list with you to share with other healthcare providers. At [facility] we continue to have an Open Medical Record policy. We can provide you with your current medication list and lab results today.   I Appointment with Jennifer Mireles; Delgado Wilkins at [facility] ([phone])   [address]   [address] [place] [address]       Tuesday February 14, 2017 9:15 AM     Appointment with Jennifer Mireles; Raghu Randall at [facility] [address] [facility] [address]       Monday February 27, 2017 7:30 AM     Appointment with LAZARA JAMA 1 at [address] [place] [address] ([phone])   [address]   [address] [facility] [address]       Tuesday February 28, 2017 8:00 AM     Appointment with

## (undated) NOTE — MR AVS SNAPSHOT
60 [facility]   3/17/2017 9:30 AM   Nurse Only   MRN:  [id number]    Description:  Male : 1945   Department:  [address] [place] [address] - [facility]              Visit Summary      Primary Visit Diagnosis     Cancer of upper lobe of

## (undated) NOTE — MR AVS SNAPSHOT
60 [facility]   2017 10:45 AM   Office Visit   MRN:  [id number]    Description:  Male : 1945   Provider:  Nomi García   Department:  [facility] Hematology Oncology              Visit Summary      Primary Visit Diagnosis healthcare providers. At [facility] we continue to have an Open Medical Record policy. We can provide you with your current medication list and lab results today.   If you would like to review your medical record, we can assist you to se

## (undated) NOTE — LETTER
[address], [place]  Authorization for Invasive Procedures  1.  I hereby authorize Dr. Annette Devlin , my physician and whomever may be designated as the doctor's assistant, to perform the following operation and/or procedure:  Col performed for the purposes of advancing medicine, science, and/or education, provided my identity is not revealed. If the procedure has been videotaped, the physician/surgeon will obtain the original videotape.  The hospital will not be responsible for stor My signature below affirms that prior to the time of the procedure, I have explained to the patient and/or his legal representative, the risks and benefits involved in the proposed treatment and any reasonable alternative to the proposed treatment.  I have

## (undated) NOTE — Clinical Note
HFU on 8/23 (out of TCM window- discussed w RN Manuel Score). If seen any sooner pt will qualify for TCM unitl 8/18/18 .  RN outreach complete no acute issues to report

## (undated) NOTE — MR AVS SNAPSHOT
60 [facility]   2017 10:00 AM   Office Visit   MRN:  [id number]    Description:  Male : 1945   Provider:  Angely Gudino   Department:  [address] [place] [address]              Visit Summary      Esha Royal Do not eat solid food 2 hours before appointment time. You may drink clear fluids up to 2 hours before appointment time. At any time you may take your medications with a small amount of water.   If you are breastfeeding and your exam requires IV Contrast y

## (undated) NOTE — LETTER
ASHLEY POMPA  [address]  [facility] [address]      7/10/2018    Dear Myles Bergeron,    It has come to our attention that you are due for: CT in August.    This test was ordered by Dr. Kymberly Badillo. Please call for an appointment at [phone].

## (undated) NOTE — MR AVS SNAPSHOT
60 [facility]   2017 9:15 AM   Office Visit   MRN:  [id number]    Description:  Male : 1945   Provider:  Sheree Stafford   Department:  [address] [place] [address]              Visit Summary      Aller medication list and lab results today. If you would like to review your medical record, we can assist you to set up an appointment with the appropriate medical professional to review your records.       To Do List     Tuesday February 14, 2017 9:15 AM Appointment with EM PIERO LAB1 at [address] [place] [address] ([phone])   [address] [address]       Monday February 27, 2017 7:30 AM     Appointment with LAZARA HARRY INFRN 2 at [address] [place] [address] ([phone]

## (undated) NOTE — LETTER
[facility], [place]  Authorization for Invasive Procedures  1.  I hereby authorize Dr. Maile Marquez** , my physician and whomever may be designated as the doctor's assistant, to perform the following operation and/or procedure:  *Ri for the purposes of advancing medicine, science, and/or education, provided my identity is not revealed. If the procedure has been videotaped, the physician/surgeon will obtain the original videotape.  [facility] will not be responsible for storage or [facility] prior to the time of the procedure, I have explained to the patient and/or his legal representative, the risks and benefits involved in the proposed treatment and any reasonable alternative to the proposed treatment.  I have also explained the risks and paulino

## (undated) NOTE — LETTER
[address], [place]  Authorization for Invasive Procedures  1. I hereby authorize Dr. Moriah Lehman , my physician and whomever may be designated as the doctor's assistant, to perform the following operation and/or procedure:  Bronchoscopy with transbronchial biospy on Emelyn Maciel at [facility].    2. My physician has explained to me the nature and purpose of the operation or other procedure, possible alternative methods of treatment, the risks involved and the possibility of complications to me. I understand the probable consequences of declining the recommended procedure and the alternative methods of treatment. I acknowledge that no guarantee has been made as to the result that may be obtained. 3. I recognize that during the course of this operation or other procedure, unforeseen conditions may necessitate additional or different procedures than those listed above. I, therefore, further authorize and request that the above-named physician, his/her physician assistants, or designees perform such procedures as are, in his/her professional opinion, necessary and desirable. If I have a Do Not Attempt Resuscitation (DNAR) order in place, that status will be suspended while in the operating room, procedural suite, and during the recovery period unless otherwise explicitly stated by me (or a person authorized to consent on my behalf). The surgeon or my attending physician will determine when the applicable recovery period ends for purposes of reinstating the DNAR order. 4. Should the need arise during my operation or immediate post-operative period; I also consent to the administration of blood and/or blood products.  Further, I understand that despite careful testing and screening of blood and blood products, I may still be subject to ill effects as a result of recieving a blood transfusion an/or blood producst. The following are some, but not all, of the potential risks that can occur: fever and allergic reactions, hemolytic reactions, transmission of disease such as hepatitis, AIDS, cytomegalovirus (CMV), and flluid overload. In the event that I wish to have autologous transfusions of my own blood, or a directed donor transfusion, I will discuss this with my physician. 5. I consent to the photographing of the operations or procedures to be performed for the purposes of advancing medicine, science, and/or education, provided my identity is not revealed. If the procedure has been videotaped, the physician/surgeon will obtain the original videotape. The \[facility]\"" will not be responsible for storage or maintenance of this tape. 6. I consent to the presence of a  or observer as deemed necessary by my physician or his designee. 7. Any tissues or organs removed in the operation or other procedure may be disposed of by and at the discretion of [facility] and the [facility].    8. I understand that the physician and his/her physician assistants may not be employees or agents of [facility] and the [facility], [facility], nor [facility], but are independent medical practitioners who have been permitted to use its facilities for the care and treatment of their patients. 9. Patients having a sterilization procedure: I understand that if the procedure is successful the results will be permanent and it will therefore be impossible for me to inseminate, conceive or bear children. I also understand that the procedure is intended to result in sterility, although the result has not been guaranteed. 10. I CERTIFY THAT I HAVE READ AND FULLY UNDERSTAND THE ABOVE CONSENT TO OPERATION and/or OTHER PROCEDURE. 11. I acknowledge that my physician has explained sedation/analgesia administration to me including the risks and benefits.  I consent to the administration of sedation/analgesia as may be necessary or desirable in the judgment of my physician. Signature of Patient:  ________________________________________________ Date: _________Time: _________    Responsible person in case of minor or unconscious: _____________________________Relationship: ____________     Witness Signature: ____________________________________________ Date: __________ Time: ___________  Statement of Physician  My signature below affirms that prior to the time of the procedure, I have explained to the patient and/or his legal representative, the risks and benefits involved in the proposed treatment and any reasonable alternative to the proposed treatment. I have also explained the risks and benefits involved in the refusal of the proposed treatment and have answered the patient's questions. If I have a significant financial interest in this procedure/surgery, I have disclosed this and had a discussion with my patient.     Signature of Physician:   ________________________________________Date: _________Time:_______ Patient Name: [facility]  : 1945   Printed: April 15, 2022    Medical Record #: [id number]

## (undated) NOTE — LETTER
06/10/25    RE: Luis Villasenor    : 1945    Dear Dr. Hernandez,    Your patient is being scheduled for a pain management procedure at [facility].    Procedure:  Left L4,L5 Transforaminal Injection.  Date of Procedure: TBD -pending medical clearance.  Physician: Dr. Orellana- Anesthesiologist     Your patient is currently taking Xarelto. Dr. Orellana usually recommends this medication to be held for 3 days prior to injection.     Please verify patient is cleared to proceed with pain management procedures.      Clearance Approved   ____________    Clearance Denied       ____________      Comments: ______________________________________________________    Signature: ________________________________  Date: _________________       If you have any questions please feel free to contact our office at [phone], option # 3.    Please fax this clearance request to our office at fax # [phone] or send electronically.       Thank you,      [facility] Staff

## (undated) NOTE — MR AVS SNAPSHOT
60 [facility]   2017 9:30 AM   Nurse Only   MRN:  [id number]    Description:  Male : 1945   Department:  [address] [place] [address] - [facility]              Visit Summary      Primary Visit Diagnosis     Cancer of upper lobe of [facility] ([phone])   [place] [address] [facility] [address]       Wednesday February 22, 2017 8:30 AM     Appointment with LAZARA JAMA 3 at [address] [place] [address] ([phone])   [address]   [address] [facility] For medical emergencies, dial 911.

## (undated) NOTE — MR AVS SNAPSHOT
60 [facility]   2017 7:30 AM   Office Visit   MRN:  [id number]    Description:  Male : 1945   Department:  [address] [place] [address] - Infusion              Visit Summary      Primary Visit Diagnosis     Cancer of upper lobe medication list and lab results today. If you would like to review your medical record, we can assist you to set up an appointment with the appropriate medical professional to review your records.       To Do List     Tuesday January 31, 2017 9:15 AM     A Appointment with EM CC INFRN 1 at [address] [place] [address] ([phone])   [address]   [facility] [address]       Friday February 24, 2017 8:30 AM     Appointment with EM CC INFRN 1 at [address] [place] [address] (

## (undated) NOTE — IP AVS SNAPSHOT
[address]  [address] [facility] ~ [phone]                Discharge Summary   1/4/2017    [facility]           Admission Information        Provider Department    1/4/2017 Venkata Hinds MD [facility] I [ ] If the box is checked, do not take blood thinners, aspirin or Plavix for 24 hours    · Changes to your existing medications: None  ·       Contact interventional radiology at [phone] if you have severe/unrelieved pain, fever, chills, dizziness/ office, you can view your past visit information in [employer] by going to Visits < Visit Summaries. [employer] questions? Call [phone] for help. [employer] is NOT to be used for urgent needs.   For medical emergencies, dial 911.             _____________

## (undated) NOTE — LETTER
ASHLEY HAZELABHIJIT  [address]  [place] [id number]      12/29/2022    Dear Luis A Felt,    It has come to our attention that you are due for: CT Chest in February. This test was ordered by Dr. Esperanza Garcia. Please call for an appointment at [phone]. If you are allergic to iodine or have any questions, please call the office at [phone].        Thank you,         [facility] Clinical Staff

## (undated) NOTE — LETTER
[address], [place]  Authorization for Invasive Procedures  1.  I hereby authorize Shericester Son, my physician and whomever may be designated as the doctor's assistant, to perform the following operation and/or procedure:  BRONCHOSCOPY performed for the purposes of advancing medicine, science, and/or education, provided my identity is not revealed. If the procedure has been videotaped, the physician/surgeon will obtain the original videotape.  The hospital will not be responsible for stor My signature below affirms that prior to the time of the procedure, I have explained to the patient and/or his legal representative, the risks and benefits involved in the proposed treatment and any reasonable alternative to the proposed treatment.  I have

## (undated) NOTE — LETTER
[address], [place]  Authorization for Invasive Procedures  Date: 7/31/2023           Time: 1830    I hereby authorize Dr. Davin Albright, my physician and his/her assistants (if applicable), which may include medical students, residents, and/or fellows, to perform the following surgical operation/ procedure and administer such anesthesia as may be determined necessary by my physician: BRONCHOSCOPY with transbronchial biopsy  on [facility]  2. I recognize that during the surgical operation/procedure, unforeseen conditions may necessitate additional or different procedures than those listed above. I, therefore, further authorize and request that the above-named surgeon, assistants, or designees perform such procedures as are, in their judgment, necessary and desirable. 3.   My surgeon/physician has discussed prior to my surgery the potential benefits, risks and side effects of this procedure; the likelihood of achieving goals; and potential problems that might occur during recuperation. They also discussed reasonable alternatives to the procedure, including risks, benefits, and side effects related to the alternatives and risks related to not receiving this procedure. I have had all my questions answered and I acknowledge that no guarantee has been made as to the result that may be obtained. 4.   Should the need arise during my operation/procedure, which includes change of level of care prior to discharge, I also consent to the administration of blood and/or blood products. Further, I understand that despite careful testing and screening of blood or blood products by collecting agencies, I may still be subject to ill effects as a result of receiving a blood transfusion and/or blood products.   The following are some, but not all, of the potential risks that can occur: fever and allergic reactions, hemolytic reactions, transmission of diseases such as Hepatitis, AIDS and Cytomegalovirus (CMV) and fluid overload. In the event that I wish to have an autologous transfusion of my own blood, or a directed donor transfusion, I will discuss this with my physician. Check only if Refusing Blood or Blood Products  I understand refusal of blood or blood products as deemed necessary by my physician may have serious consequences to my condition to include possible death. I hereby assume responsibility for my refusal and release the hospital, its personnel, and my physicians from any responsibility for the consequences of my refusal.         o  Refuse         5. I authorize the use of any specimen, organs, tissues, body parts or foreign objects that may be removed from my body during the operation/procedure for diagnosis, research or teaching purposes and their subsequent disposal by hospital authorities. I also authorize the release of specimen test results and/or written reports to my treating physician on the hospital medical staff or other referring or consulting physicians involved in my care, at the discretion of the Pathologist or my treating physician. 6.   I consent to the photographing or videotaping of the operations or procedures to be performed, including appropriate portions of my body for medical, scientific, or educational purposes, provided my identity is not revealed by the pictures or by descriptive texts accompanying them. If the procedure has been photographed/videotaped, the surgeon will obtain the original picture, image, videotape or CD. The hospital will not be responsible for storage, release or maintenance of the picture, image, tape or CD.    7.   I consent to the presence of a  or observers in the operating room as deemed necessary by my physician or their designees. 8.   I recognize that in the event my procedure results in extended X-Ray/fluoroscopy time, I may develop a skin reaction. 9.  If I have a Do Not Attempt Resuscitation (DNAR) order in place, that status will be suspended while in the operating room, procedural suite, and during the recovery period unless otherwise explicitly stated by me (or a person authorized to consent on my behalf). The surgeon or my attending physician will determine when the applicable recovery period ends for purposes of reinstating the DNAR order. 10. Patients having a sterilization procedure: I understand that if the procedure is successful the results will be permanent and it will therefore be impossible for me to inseminate, conceive, or bear children. I also understand that the procedure is intended to result in sterility, although the result has not been guaranteed. 11. I acknowledge that my physician has explained sedation/analgesia administration to me including the risk and benefits I consent to the administration of sedation/analgesia as may be necessary or desirable in the judgment of my physician.     I CERTIFY THAT I HAVE READ AND FULLY UNDERSTAND THE ABOVE CONSENT TO OPERATION and/or OTHER PROCEDURE.        ____________________________________       _________________________________      ______________________________  Signature of Patient         Signature of Responsible Person        Printed Name of Responsible Person        ____________________________________      _________________________________      ______________________________       Signature of Witness          Relationship to Patient                       Date                                       Time    Patient Name: [facility]     : 1945                 Printed: 2023      Medical Record #: [id number]                      Page 1 of 2          STATEMENT OF PHYSICIAN My signature below affirms that prior to the time of the procedure; I have explained to the patient and/or his/her legal representative, the risks and benefits involved in the proposed treatment and any reasonable alternative to the proposed treatment. I have also explained the risks and benefits involved in refusal of the proposed treatment and alternatives to the proposed treatment and have answered the patient's questions.  If I have a significant financial interest in a co-management agreement or a significant financial interest in any product or implant, or other significant relationship used in this procedure/surgery, I have disclosed this and had a discussion with my patient.     _______________________________________________________________ _____________________________  Eli Collins of Physician)                                                                                         (Date)                                   (Time)    Patient Name: Jason Hendrickson     : 1945                 Printed: 2023      Medical Record #: [id number]                      Page 2 of 2

## (undated) NOTE — Clinical Note
Spoke with pt and spouse today for TCM-confirmed 2/27/2024 repeat CXR and HFU and specialist appts, as scheduled. Thank you.  Future Appointments 2/9/2024   10:00 AM   [facility]               [facility] HEM ONC         EMO 2/9/2024   11:00 AM   Christine Lopez APRN  [facility] HEM ONC         EMO 2/27/2024  12:15 PM   Chris Hernandez MD       [facility] 2/28/2024  4:30 PM    Lonnie Bonilla MD       [facility]

## (undated) NOTE — MR AVS SNAPSHOT
After Visit Summary   3/7/2017    Clement     MRN: [id number]           Diagnoses this Visit     Cancer of upper lobe of left lung (HCC)    -  Primary       Allergies     Dextromethorphan Hbr Itching    Guaifenesin Rash    Homeopathic Pro Patient Instructions     None      Please Note     If a lab draw is part of your appointment, please arrive 15 minutes early.       To Do List     Friday March 17, 2017 9:30 AM     Appointment with LAZARA HARRY [facility]2 at [address] [place] [address] [phone]

## (undated) NOTE — MR AVS SNAPSHOT
[facility]   2017 2:00 PM   Office Visit   MRN:  [id number]    Description:  Male : 1945   Department:  [address] [place] [address] - [facility]              Visit Summary      Primary Visit Diagnosis     Cancer of upper lobe Tuesday January 31, 2017 9:15 AM     Appointment with Shalom Serra; Tamara Christensen at [address] [facility] ([phone])   [address]   [address] [place] [address]       Tuesday February 07, 2017 9:15 AM     Appointment with Aminta Ramírez Appointment with EM CC INFRN 1 at [address] [place] [address] ([phone])   [address]   [address] [place] [address]       Monday February 27, 2017 6:30 AM     Appointment with EM PIERO [facility]1 at [address] [place] [address] [phone]

## (undated) NOTE — MR AVS SNAPSHOT
Luis Villasenor   2017 8:30 AM   Office Visit   MRN:  [id number]    Description:  Male : 1945   Department:  [address] [place] [address]              Visit Summary      Primary Visit Diagnosis     Cancer of upper lobe [facility] ([phone])   [place] [address] [facility] [address]       Friday March 17, 2017 9:30 AM     Appointment with LAZARA HARRY [facility]2 at [address] [place] [address] ([phone])   [address]   [address] [facility] [address]

## (undated) NOTE — MR AVS SNAPSHOT
[address] [facility]   2017 10:15 AM   Office Visit   MRN:  [id number]    Description:  Male : 1945   Provider:  Henny Malik   Department:  [facility] AND [facility] Hematology Oncology              Visit Summary      Primary Visit Diagnosis Please keep your updated medication list with you to share with other healthcare providers. At [facility] we continue to have an Open Medical Record policy. We can provide you with your current medication list and lab results today.   I Monday February 27, 2017 7:30 AM     Appointment with LAZARA JAMA 4 at [address] [place] [address] ([phone])   [address]   [address] [place] [address]       Tuesday February 28, 2017 8:00 AM     Appointment with Dez Roy; Birgit, And

## (undated) NOTE — LETTER
Printed: 10/24/2017    Patient Name: Teri Eden  : 1945   Medical Record #: [id number]    Consent to [address], understand that I have been diagnosed with lung cancer (stage IIIB).     I understand that the t I have had the chance to ask questions about this treatment, and my questions have been answered to my satisfaction.   I understand that I can contact my oncologist, Kathy Chapman, or my Cancer Care Team at any time if I have questions, by calling [phone]

## (undated) NOTE — MR AVS SNAPSHOT
[address] [place] [address]   2017 7:30 AM   Office Visit   MRN:  [id number]    Description:  Male : 1945   Department:  [facility]              Visit Summary      Primary Visit Diagnosis     Cancer of upper lobe Appointment with LAZARA HARRY INFRN 1 at [address] [place] [address] ([phone])   [address]   [address] [place] [address]       Monday February 27, 2017 6:30 AM     Appointment with LAZARA HARRY LAB1 at [address] [place] [address] [phone]